# Patient Record
Sex: FEMALE | Race: WHITE | Employment: FULL TIME | URBAN - METROPOLITAN AREA
[De-identification: names, ages, dates, MRNs, and addresses within clinical notes are randomized per-mention and may not be internally consistent; named-entity substitution may affect disease eponyms.]

---

## 2017-02-01 ENCOUNTER — TRANSFERRED RECORDS (OUTPATIENT)
Dept: HEALTH INFORMATION MANAGEMENT | Facility: CLINIC | Age: 52
End: 2017-02-01

## 2017-02-01 LAB — PAP SMEAR - HIM PATIENT REPORTED: NEGATIVE

## 2017-03-16 ENCOUNTER — APPOINTMENT (OUTPATIENT)
Dept: CT IMAGING | Facility: CLINIC | Age: 52
End: 2017-03-16
Attending: EMERGENCY MEDICINE
Payer: COMMERCIAL

## 2017-03-16 ENCOUNTER — HOSPITAL ENCOUNTER (EMERGENCY)
Facility: CLINIC | Age: 52
Discharge: HOME | End: 2017-03-16
Attending: EMERGENCY MEDICINE | Admitting: EMERGENCY MEDICINE
Payer: COMMERCIAL

## 2017-03-16 VITALS
RESPIRATION RATE: 12 BRPM | DIASTOLIC BLOOD PRESSURE: 77 MMHG | WEIGHT: 130 LBS | BODY MASS INDEX: 20.4 KG/M2 | TEMPERATURE: 98.3 F | OXYGEN SATURATION: 100 % | SYSTOLIC BLOOD PRESSURE: 116 MMHG | HEIGHT: 67 IN

## 2017-03-16 DIAGNOSIS — S00.03XA CONTUSION OF SCALP, INITIAL ENCOUNTER: ICD-10-CM

## 2017-03-16 DIAGNOSIS — R55 VASOVAGAL SYNCOPE: ICD-10-CM

## 2017-03-16 DIAGNOSIS — E86.0 DEHYDRATION: ICD-10-CM

## 2017-03-16 LAB
ANION GAP SERPL CALCULATED.3IONS-SCNC: 8 MMOL/L (ref 3–14)
BASOPHILS # BLD AUTO: 0 10E9/L (ref 0–0.2)
BASOPHILS NFR BLD AUTO: 0.2 %
BUN SERPL-MCNC: 11 MG/DL (ref 7–30)
CALCIUM SERPL-MCNC: 10.2 MG/DL (ref 8.5–10.1)
CHLORIDE SERPL-SCNC: 105 MMOL/L (ref 94–109)
CO2 SERPL-SCNC: 25 MMOL/L (ref 20–32)
CREAT SERPL-MCNC: 0.84 MG/DL (ref 0.52–1.04)
DIFFERENTIAL METHOD BLD: NORMAL
EOSINOPHIL # BLD AUTO: 0.1 10E9/L (ref 0–0.7)
EOSINOPHIL NFR BLD AUTO: 1.3 %
ERYTHROCYTE [DISTWIDTH] IN BLOOD BY AUTOMATED COUNT: 12.6 % (ref 10–15)
GFR SERPL CREATININE-BSD FRML MDRD: 72 ML/MIN/1.7M2
GLUCOSE SERPL-MCNC: 119 MG/DL (ref 70–99)
HCT VFR BLD AUTO: 41.4 % (ref 35–47)
HGB BLD-MCNC: 14.1 G/DL (ref 11.7–15.7)
IMM GRANULOCYTES # BLD: 0 10E9/L (ref 0–0.4)
IMM GRANULOCYTES NFR BLD: 0.1 %
LYMPHOCYTES # BLD AUTO: 1.6 10E9/L (ref 0.8–5.3)
LYMPHOCYTES NFR BLD AUTO: 19.4 %
MCH RBC QN AUTO: 29.8 PG (ref 26.5–33)
MCHC RBC AUTO-ENTMCNC: 34.1 G/DL (ref 31.5–36.5)
MCV RBC AUTO: 88 FL (ref 78–100)
MONOCYTES # BLD AUTO: 0.7 10E9/L (ref 0–1.3)
MONOCYTES NFR BLD AUTO: 8.4 %
NEUTROPHILS # BLD AUTO: 5.9 10E9/L (ref 1.6–8.3)
NEUTROPHILS NFR BLD AUTO: 70.6 %
NRBC # BLD AUTO: 0 10*3/UL
NRBC BLD AUTO-RTO: 0 /100
PLATELET # BLD AUTO: 334 10E9/L (ref 150–450)
POTASSIUM SERPL-SCNC: 4.1 MMOL/L (ref 3.4–5.3)
RBC # BLD AUTO: 4.73 10E12/L (ref 3.8–5.2)
SODIUM SERPL-SCNC: 138 MMOL/L (ref 133–144)
WBC # BLD AUTO: 8.4 10E9/L (ref 4–11)

## 2017-03-16 PROCEDURE — 96376 TX/PRO/DX INJ SAME DRUG ADON: CPT

## 2017-03-16 PROCEDURE — 96361 HYDRATE IV INFUSION ADD-ON: CPT

## 2017-03-16 PROCEDURE — 25000128 H RX IP 250 OP 636: Performed by: EMERGENCY MEDICINE

## 2017-03-16 PROCEDURE — 96375 TX/PRO/DX INJ NEW DRUG ADDON: CPT

## 2017-03-16 PROCEDURE — 85025 COMPLETE CBC W/AUTO DIFF WBC: CPT | Performed by: EMERGENCY MEDICINE

## 2017-03-16 PROCEDURE — 96374 THER/PROPH/DIAG INJ IV PUSH: CPT

## 2017-03-16 PROCEDURE — 99285 EMERGENCY DEPT VISIT HI MDM: CPT | Mod: 25

## 2017-03-16 PROCEDURE — 70450 CT HEAD/BRAIN W/O DYE: CPT

## 2017-03-16 PROCEDURE — 80048 BASIC METABOLIC PNL TOTAL CA: CPT | Performed by: EMERGENCY MEDICINE

## 2017-03-16 RX ORDER — ONDANSETRON 2 MG/ML
4 INJECTION INTRAMUSCULAR; INTRAVENOUS ONCE
Status: COMPLETED | OUTPATIENT
Start: 2017-03-16 | End: 2017-03-16

## 2017-03-16 RX ORDER — OXYCODONE HYDROCHLORIDE 5 MG/1
5-10 TABLET ORAL EVERY 4 HOURS PRN
Qty: 15 TABLET | Refills: 0 | Status: SHIPPED | OUTPATIENT
Start: 2017-03-16 | End: 2017-03-30

## 2017-03-16 RX ORDER — ONDANSETRON 4 MG/1
4 TABLET, ORALLY DISINTEGRATING ORAL EVERY 6 HOURS PRN
Qty: 10 TABLET | Refills: 0 | Status: SHIPPED | OUTPATIENT
Start: 2017-03-16 | End: 2017-03-30

## 2017-03-16 RX ORDER — HYDROMORPHONE HYDROCHLORIDE 1 MG/ML
0.5 INJECTION, SOLUTION INTRAMUSCULAR; INTRAVENOUS; SUBCUTANEOUS ONCE
Status: COMPLETED | OUTPATIENT
Start: 2017-03-16 | End: 2017-03-16

## 2017-03-16 RX ADMIN — ONDANSETRON 4 MG: 2 SOLUTION INTRAMUSCULAR; INTRAVENOUS at 10:58

## 2017-03-16 RX ADMIN — HYDROMORPHONE HYDROCHLORIDE 0.5 MG: 1 INJECTION, SOLUTION INTRAMUSCULAR; INTRAVENOUS; SUBCUTANEOUS at 12:15

## 2017-03-16 RX ADMIN — HYDROMORPHONE HYDROCHLORIDE 0.5 MG: 1 INJECTION, SOLUTION INTRAMUSCULAR; INTRAVENOUS; SUBCUTANEOUS at 10:58

## 2017-03-16 RX ADMIN — SODIUM CHLORIDE 1000 ML: 9 INJECTION, SOLUTION INTRAVENOUS at 10:44

## 2017-03-16 ASSESSMENT — ENCOUNTER SYMPTOMS
NAUSEA: 1
ABDOMINAL PAIN: 1
DIARRHEA: 1
HEADACHES: 1
VOMITING: 0

## 2017-03-16 NOTE — ED PROVIDER NOTES
"  History     Chief Complaint:  Loss of Consciousness    HPI   Jyothi Freitas is a 51 year old female who presents following a fall last night. The patient reports that she has had ongoing issues of constipation, which Miralax is typically able to resolve. She began experiencing constipation last week, for which she took laxatives, and thinks she took too many and became dehydrated. She was having abdominal cramping and diarrhea last night.     At 0130, she got up to go to the bathroom, and suffered a syncopal episode. She hit her head and was unconscious for a brief time. She woke up on the bathroom floor with her arm in the toilet. This morning, she has had a diffuse headache, which is especially bad at the top right of her head. She does think she may have had a migraine coming on last night. She describes feeling \"foggy\", with waves of nausea. Denies rib pain, visual disturbances, or vomiting.    Allergies:  Sulfa drugs     Medications:    Xanax  Wellbutrin  Beyaz  Actonel  Alvesco  Prilosec     Past Medical History:    Asthma  Migraine  Osteopenia     Past Surgical History:    Single oophorectomy  TMJ arthroscopic surgery    Family History:    History reviewed.  No significant family history.    Social History:  Relationship status:   Tobacco use: Former smoker (Quit 1997)  Alcohol use: Seldom  The patient presents with her .     Review of Systems   Eyes: Negative for visual disturbance.   Gastrointestinal: Positive for abdominal pain, diarrhea and nausea. Negative for vomiting.   Musculoskeletal:        Negative for rib pain.   Neurological: Positive for syncope and headaches.   All other systems reviewed and are negative.      Physical Exam   First Vitals:  BP: 134/69  Heart Rate: 61  Temp: 98.3  F (36.8  C)  Resp: 12  Height: 170.2 cm (5' 7\")  Weight: 59 kg (130 lb)  SpO2: 100 %    Physical Exam  Nursing note and vitals reviewed.  Constitutional:  Appears well-developed and well-nourished, " comfortable.   HENT:    No hemotympanum. Ecchymosis on top of left ear.  Head:   Tenderness but no swelling over anterior left parietal scalp.  Nose:    Nose normal. No drainage from the nose.  Mouth/Throat:  Mucosa is moist.  Eyes:    Conjunctivae are normal.      Pupils are equal, round, and reactive to light.      Right eye exhibits no discharge. Left eye exhibits no discharge.      No scleral icterus.   Cardiovascular:  Normal rate, regular rhythm.      Normal heart sounds and intact distal pulses.       No murmur heard.  Pulmonary/Chest:  Effort normal and breath sounds normal. No respiratory distress.     No wheezes. No rales. No chest wall tenderness. No stridor.   Abdominal:   Soft. No distension and no mass. No tenderness.      No rebound and no guarding. No flank pain.  Musculoskeletal:  Full range of motion.     No tenderness in extremities.     No edema and no tenderness.                                       Neck supple. Paraspinous neck tenderness. No midline tenderness.   Neurological:   Alert and oriented to person, place, and year.      No cranial nerve deficit.      Exhibits normal muscle tone. Coordination normal.      GCS eye subscore is 4. GCS verbal subscore is 5.      GCS motor subscore is 6.   Skin:    Skin is warm and dry. No rash noted. No diaphoresis.      No erythema. No pallor.   Psychiatric:   Behavior is normal. Judgment and thought content normal.       Emergency Department Course     Imaging:  Radiographic findings were communicated with the patient who voiced understanding of the findings.    Head CT, without contrast, per radiology:   No bleed or fracture identified.      Laboratory:  CBC: WNL (WBC 8.4, HGB 14.1, )  BMP: Glucose 119 (H), Calcium 10.2, o/w WNL (Creatinine 0.84)    Interventions:  1054: Normal Saline, 1000 mL, IV  1058: Zofran, 4 mg, IV injection  1058: Dilaudid, 0.5 mg, IV injection  1215: Dilaudid, 0.5 mg, IV injection    Emergency Department  Course:  Nursing notes and vitals reviewed.  I performed an exam of the patient as documented above.  The above workup was undertaken.  1230: I rechecked the patient and discussed results.    Findings and plan explained to the Patient. Patient discharged home, status improved, with instructions regarding supportive care, medications, and reasons to return as well as the importance of close follow-up was reviewed.      Impression & Plan      Medical Decision Making:  Jyothi Freitas is a 51 year old female who comes in after a syncopal spell last night. She had been constipated, taken a lot of laxative, and felt dehydrated. She got up to go to bathroom and passed out. She did hit her head and remembers everything. Her scalp hurts. She has a bit of a migraine type headache. She was given a 0.5 mg of dilaudid, which was repeated. Migraine completely gone. Still has scalp tenderness. CT is normal. Labs also are normal with normal BMP other than calcium at 10.2, slightly elevated. Glucose 119, CBC is normal. Patient was likely dehydrated and had a vasovagal spell, causing the syncope. She is comfortable going home at this point. I am going to have her take it easy, but can always follow up and return if worsening. She was tolerating liquids and fluids here. She was orthostatic when she came in, received 1 L of fluids, and will now push fluids at home. I did speak with her about something for pain, and that can constipate, so she will take Miralax with this, and make sure she is drinking fluids. If she has further syncopal spells she will need further workup as well.    Diagnosis:    ICD-10-CM   1. Vasovagal syncope R55   2. Contusion of scalp, initial encounter S00.03XA   3. Dehydration E86.0     Disposition:  Take it easy for a few days, ice to your scalp, Motrin, Tylenol and or Roxicodone as needed for pain.  Zofran as needed for nausea.  Push fluids, Miralax 2-3 times a day.  Recheck in the clinic next week if you aren't  improving, return to the ER sooner if you get worse with severe headache, vomiting, confusion, etc.    Discharge Medications:   ONDANSETRON (ZOFRAN ODT) 4 MG ODT TAB    Take 1 tablet (4 mg) by mouth every 6 hours as needed for nausea    OXYCODONE (ROXICODONE) 5 MG IR TABLET    Take 1-2 tablets (5-10 mg) by mouth every 4 hours as needed for moderate to severe pain     IDarren, am serving as a scribe on 3/16/2017 at 10:36 AM to personally document services performed by Danyelle Raymundo MD, based on my observations and the provider's statements to me.   EMERGENCY DEPARTMENT       Danyelle Raymundo MD  03/16/17 1700

## 2017-03-16 NOTE — ED AVS SNAPSHOT
Emergency Department    [address] [facility] [address]    Phone:  [phone]    Fax:  [phone]                                       Jyothi Freitas   MRN: [id number]    Department:   Emergency Department   Date of Visit:  3/16/2017           Patient Information     Date Of Birth          1965        Your diagnoses for this visit were:     Vasovagal syncope     Contusion of scalp, initial encounter     Dehydration        You were seen by Danyelle Raymundo MD.      Follow-up Information     Schedule an appointment as soon as possible for a visit with Peter Franklin MD.    Specialty:  Internal Medicine    Why:  As needed, If symptoms worsen    Contact information:    [facility]  [address] [address]  [phone]          Discharge Instructions       Take it easy for a few days, ice to your scalp, Motrin, Tylenol and or Roxicodone as needed for pain.  Zofran as needed for nausea.  Push fluids, Miralax 2-3 times a day.  Recheck in the clinic next week if you aren't improving, return to the ER sooner if you get worse with severe headache, vomiting, confusion, etc.    Discharge Instructions  Head Injury    You have been seen today for a head injury. You were checked for serious problems, like bleeding on the brain, but these problems cannot always be found right away.  Due to this risk, you should not be alone for 24 hours after your injury.  Follow up with your regular physician in 7 days as needed. If you are taking a blood thinner, such as aspirin, Pradaxa  (dabigatran), Coumadin  (warfarin), or Plavix  (clopidogrel), you are at especially high risk for immediate or delayed bleeding, and need to re-check with a physician in 24 hours, or sooner if any of the symptoms below happen.     Return to the Emergency Department if:    You are confused, have amnesia, or you are not acting right.    Your headache gets worse or you start to have a really  bad headache even with your recommended treatment plan.    You vomit more than once.    You have a convulsion or seizure.    You have trouble walking.    You have weakness or paralysis in an arm or a leg.    You have blood or fluid coming from your ears or nose.    You have new symptoms or anything that worries you.    Sleeping:  It is okay for you to sleep, but someone should wake you up as instructed by your doctor, and someone should check on you at your usual time to wake up.     Activity:    Do not drive for at least 24 hours.    Do not drive if you have dizzy spells or trouble concentrating, or remembering things.    Do not return to any contact sports until cleared by your regular doctor.     Follow-up:  It is very important that you make an appointment with your clinic and go to the appointment.  If you do not follow-up with your regular doctor, it may result in missing an important development which could result in permanent injury or disability and/or lasting pain.  If there is any problem keeping your appointment, call your doctor or return to the Emergency Department.    MORE INFORMATION:    Concussion:  A concussion is a minor head injury that may cause temporary problems with the way your brain works.  Some symptoms include:  confusion, amnesia, nausea and vomiting, dizziness, fatigue, memory or concentration problems, irritability and sleep problems.    CT Scans: Your evaluation today may have included a CT scan (CAT scan) to look for things like bleeding or a skull fracture (break).  CT scans involve radiation and too many CT scans can cause serious health problems like cancer, especially in children.  Because of this, your doctor may not have ordered a CT scan today if they think you are at low risk for a serious or life threatening problem.    If you were given a prescription for medicine here today, be sure to read all of the information (including the package insert) that comes with your  prescription.  This will include important information about the medicine, its side effects, and any warnings that you need to know about.  The pharmacist who fills the prescription can provide more information and answer questions you may have about the medicine.  If you have questions or concerns that the pharmacist cannot address, please call or return to the Emergency Department.     Opioid Medication Information    Pain medications are among the most commonly prescribed medicines, so we are including this information for all our patients. If you did not receive pain medication or get a prescription for pain medicine, you can ignore it.     You may have been given a prescription for an opioid (narcotic) pain medicine and/or have received a pain medicine while here in the Emergency Department. These medicines can make you drowsy or impaired. You must not drive, operate dangerous equipment, or engage in any other dangerous activities while taking these medications. If you drive while taking these medications, you could be arrested for DUI, or driving under the influence. Do not drink any alcohol while you are taking these medications.     Opioid pain medications can cause addiction. If you have a history of chemical dependency of any type, you are at a higher risk of becoming addicted to pain medications.  Only take these prescribed medications to treat your pain when all other options have been tried. Take it for as short a time and as few doses as possible. Store your pain pills in a secure place, as they are frequently stolen and provide a dangerous opportunity for children or visitors in your house to start abusing these powerful medications. We will not replace any lost or stolen medicine.  As soon as your pain is better, you should flush all your remaining medication.     Many prescription pain medications contain Tylenol  (acetaminophen), including Vicodin , Tylenol #3 , Norco , Lortab , and Percocet .  You  should not take any extra pills of Tylenol  if you are using these prescription medications or you can get very sick.  Do not ever take more than 3000 mg of acetaminophen in any 24 hour period.    All opioids tend to cause constipation. Drink plenty of water and eat foods that have a lot of fiber, such as fruits, vegetables, prune juice, apple juice and high fiber cereal.  Take a laxative if you don t move your bowels at least every other day. Miralax , Milk of Magnesia, Colace , or Senna  can be used to keep you regular.      Remember that you can always come back to the Emergency Department if you are not able to see your regular doctor in the amount of time listed above, if you get any new symptoms, or if there is anything that worries you.            Future Appointments        Provider Department Dept Phone Center    3/21/2017 8:00 AM Peter Franklin MD [facility] [phone]       24 Hour Appointment Hotline       To make an appointment at any [facility], call [phone] ([phone]). If you don't have a family doctor or clinic, we will help you find one. [facility] are conveniently located to serve the needs of you and your family.             Review of your medicines      START taking        Dose / Directions Last dose taken    ondansetron 4 MG ODT tab   Commonly known as:  ZOFRAN ODT   Dose:  4 mg   Quantity:  10 tablet        Take 1 tablet (4 mg) by mouth every 6 hours as needed for nausea   Refills:  0        oxyCODONE 5 MG IR tablet   Commonly known as:  ROXICODONE   Dose:  5-10 mg   Quantity:  15 tablet        Take 1-2 tablets (5-10 mg) by mouth every 4 hours as needed for moderate to severe pain   Refills:  0          Our records show that you are taking the medicines listed below. If these are incorrect, please call your family doctor or clinic.        Dose / Directions Last dose taken    ACTONEL 150 MG tablet   Generic drug:  RISEdronate        Take  by mouth every 30  days.   Refills:  0        ALPRAZolam 0.25 MG tablet   Commonly known as:  XANAX   Dose:  0.25 mg   Quantity:  10 tablet        Take 1 tablet by mouth 3 times daily as needed for anxiety.   Refills:  0        ALVESCO 80 MCG/ACT inhaler   Generic drug:  ciclesonide        Inhale  into the lungs 2 times daily.   Refills:  0        BEYAZ 3-0.02-0.451 MG Tabs   Generic drug:  Drospiren-Eth Estrad-Levomefol        Take  by mouth daily.   Refills:  0        PRILOSEC PO        Take  by mouth.   Refills:  0        * buPROPion 150 MG 24 hr tablet   Commonly known as:  WELLBUTRIN XL   Dose:  150 mg   Quantity:  180 tablet        Take 1 tablet by mouth 2 times daily.   Refills:  3        * WELLBUTRIN  MG 24 hr tablet   Dose:  300 mg   Generic drug:  buPROPion        Take 300 mg by mouth daily.   Refills:  0        * Notice:  This list has 2 medication(s) that are the same as other medications prescribed for you. Read the directions carefully, and ask your doctor or other care provider to review them with you.            Prescriptions were sent or printed at these locations (2 Prescriptions)                   Other Prescriptions                Printed at Department/Unit printer (2 of 2)         oxyCODONE (ROXICODONE) 5 MG IR tablet               ondansetron (ZOFRAN ODT) 4 MG ODT tab                Procedures and tests performed during your visit     Basic metabolic panel    CBC with platelets + differential    Head CT w/o contrast    Orthostatic blood pressure and pulse      Orders Needing Specimen Collection     Ordered          03/16/17 1024  UA reflex to Microscopic - STAT, Pri[facility]: STAT, Needs to be Collected     Scheduled Task Status   03/16/17 1025 Collect UA reflex to Microscopic Open   Order Class:  PCU Collect                  Pending Results     No orders found from 3/14/2017 to 3/17/2017.            Pending Culture Results     No orders found from 3/14/2017 to 3/17/2017.             Test Results from your hospital  stay     3/16/2017 11:00 AM - Interface, Flexilab Results      Component Results     Component Value Ref Range & Units Status    WBC 8.4 4.0 - 11.0 10e9/L Final    RBC Count 4.73 3.8 - 5.2 10e12/L Final    Hemoglobin 14.1 11.7 - 15.7 g/dL Final    Hematocrit 41.4 35.0 - 47.0 % Final    MCV 88 78 - 100 fl Final    MCH 29.8 26.5 - 33.0 pg Final    MCHC 34.1 31.5 - 36.5 g/dL Final    RDW 12.6 10.0 - 15.0 % Final    Platelet Count 334 150 - 450 10e9/L Final    Diff Method Automated Method  Final    % Neutrophils 70.6 % Final    % Lymphocytes 19.4 % Final    % Monocytes 8.4 % Final    % Eosinophils 1.3 % Final    % Basophils 0.2 % Final    % Immature Granulocytes 0.1 % Final    Nucleated RBCs 0 0 /100 Final    Absolute Neutrophil 5.9 1.6 - 8.3 10e9/L Final    Absolute Lymphocytes 1.6 0.8 - 5.3 10e9/L Final    Absolute Monocytes 0.7 0.0 - 1.3 10e9/L Final    Absolute Eosinophils 0.1 0.0 - 0.7 10e9/L Final    Absolute Basophils 0.0 0.0 - 0.2 10e9/L Final    Abs Immature Granulocytes 0.0 0 - 0.4 10e9/L Final    Absolute Nucleated RBC 0.0  Final         3/16/2017 11:13 AM - Interface, Flexilab Results      Component Results     Component Value Ref Range & Units Status    Sodium 138 133 - 144 mmol/L Final    Potassium 4.1 3.4 - 5.3 mmol/L Final    Chloride 105 94 - 109 mmol/L Final    Carbon Dioxide 25 20 - 32 mmol/L Final    Anion Gap 8 3 - 14 mmol/L Final    Glucose 119 (H) 70 - 99 mg/dL Final    Urea Nitrogen 11 7 - 30 mg/dL Final    Creatinine 0.84 0.52 - 1.04 mg/dL Final    GFR Estimate 72 >60 mL/min/1.7m2 Final    Non  GFR Calc    GFR Estimate If Black 87 >60 mL/min/1.7m2 Final    African American GFR Calc    Calcium 10.2 (H) 8.5 - 10.1 mg/dL Final         3/16/2017 11:48 AM - Interface, Radiant Ib      Narrative     CT HEAD W/O CONTRAST   3/16/2017 11:37 AM     HISTORY: fall, hit head last pm left parietal scalp, headache    TECHNIQUE:  Axial images of the head without IV contrast material.  Radiation  dose for this scan was reduced using automated exposure  control, adjustment of the mA and/or kV according to patient size, or  iterative reconstruction technique.    COMPARISON: None.    FINDINGS: The ventricles are normal in size, shape and configuration.  The brain parenchyma and subarachnoid spaces are normal. There is no  evidence of intracranial hemorrhage, mass, acute infarct or anomaly.  The visualized portions of the sinuses and mastoids appear normal. No  intracranial bleed or skull fractures are identified..        Impression     IMPRESSION:  No bleed or fracture identified.    IRINEO AVALOS MD                Clinical Quality Measure: Blood Pressure Screening     Your blood pressure was checked while you were in the emergency department today. The last reading we obtained was  BP: 134/69 . Please read the guidelines below about what these numbers mean and what you should do about them.  If your systolic blood pressure (the top number) is less than 120 and your diastolic blood pressure (the bottom number) is less than 80, then your blood pressure is normal. There is nothing more that you need to do about it.  If your systolic blood pressure (the top number) is 120-139 or your diastolic blood pressure (the bottom number) is 80-89, your blood pressure may be higher than it should be. You should have your blood pressure rechecked within a year by a primary care provider.  If your systolic blood pressure (the top number) is 140 or greater or your diastolic blood pressure (the bottom number) is 90 or greater, you may have high blood pressure. High blood pressure is treatable, but if left untreated over time it can put you at risk for heart attack, stroke, or kidney failure. You should have your blood pressure rechecked by a primary care provider within the next 4 weeks.  If your provider in the emergency department today gave you specific instructions to follow-up with your doctor or provider even sooner than  "that, you should follow that instruction and not wait for up to 4 weeks for your follow-up visit.        Thank you for choosing [facility]       Thank you for choosing [facility] for your care. Our goal is always to provide you with excellent care. Hearing back from our patients is one way we can continue to improve our services. Please take a few minutes to complete the written survey that you may receive in the mail after you visit with us. Thank you!        [employer] Information     [employer] lets you send messages to your doctor, view your test results, renew your prescriptions, schedule appointments and more. To sign up, go to [email or web address] . Click on \"Log in\" on the left side of the screen, which will take you to the Welcome page. Then click on \"Sign up Now\" on the right side of the page.     You will be asked to enter the access code listed below, as well as some personal information. Please follow the directions to create your username and password.     Your access code is: [email or web address]  Expires: 2017 12:39 PM     Your access code will  in 90 days. If you need help or a new code, please call your [facility] clinic or [phone].        Care EveryWhere ID     This is your Care EveryWhere ID. This could be used by other organizations to access your [facility] medical records  [id number]        After Visit Summary       This is your record. Keep this with you and show to your community pharmacist(s) and doctor(s) at your next visit.                  "

## 2017-03-16 NOTE — DISCHARGE INSTRUCTIONS
Take it easy for a few days, ice to your scalp, Motrin, Tylenol and or Roxicodone as needed for pain.  Zofran as needed for nausea.  Push fluids, Miralax 2-3 times a day.  Recheck in the clinic next week if you aren't improving, return to the ER sooner if you get worse with severe headache, vomiting, confusion, etc.    Discharge Instructions  Head Injury    You have been seen today for a head injury. You were checked for serious problems, like bleeding on the brain, but these problems cannot always be found right away.  Due to this risk, you should not be alone for 24 hours after your injury.  Follow up with your regular physician in 7 days as needed. If you are taking a blood thinner, such as aspirin, Pradaxa  (dabigatran), Coumadin  (warfarin), or Plavix  (clopidogrel), you are at especially high risk for immediate or delayed bleeding, and need to re-check with a physician in 24 hours, or sooner if any of the symptoms below happen.     Return to the Emergency Department if:    You are confused, have amnesia, or you are not acting right.    Your headache gets worse or you start to have a really bad headache even with your recommended treatment plan.    You vomit more than once.    You have a convulsion or seizure.    You have trouble walking.    You have weakness or paralysis in an arm or a leg.    You have blood or fluid coming from your ears or nose.    You have new symptoms or anything that worries you.    Sleeping:  It is okay for you to sleep, but someone should wake you up as instructed by your doctor, and someone should check on you at your usual time to wake up.     Activity:    Do not drive for at least 24 hours.    Do not drive if you have dizzy spells or trouble concentrating, or remembering things.    Do not return to any contact sports until cleared by your regular doctor.     Follow-up:  It is very important that you make an appointment with your clinic and go to the appointment.  If you do not  follow-up with your regular doctor, it may result in missing an important development which could result in permanent injury or disability and/or lasting pain.  If there is any problem keeping your appointment, call your doctor or return to the Emergency Department.    MORE INFORMATION:    Concussion:  A concussion is a minor head injury that may cause temporary problems with the way your brain works.  Some symptoms include:  confusion, amnesia, nausea and vomiting, dizziness, fatigue, memory or concentration problems, irritability and sleep problems.    CT Scans: Your evaluation today may have included a CT scan (CAT scan) to look for things like bleeding or a skull fracture (break).  CT scans involve radiation and too many CT scans can cause serious health problems like cancer, especially in children.  Because of this, your doctor may not have ordered a CT scan today if they think you are at low risk for a serious or life threatening problem.    If you were given a prescription for medicine here today, be sure to read all of the information (including the package insert) that comes with your prescription.  This will include important information about the medicine, its side effects, and any warnings that you need to know about.  The pharmacist who fills the prescription can provide more information and answer questions you may have about the medicine.  If you have questions or concerns that the pharmacist cannot address, please call or return to the Emergency Department.     Opioid Medication Information    Pain medications are among the most commonly prescribed medicines, so we are including this information for all our patients. If you did not receive pain medication or get a prescription for pain medicine, you can ignore it.     You may have been given a prescription for an opioid (narcotic) pain medicine and/or have received a pain medicine while here in the Emergency Department. These medicines can make you  drowsy or impaired. You must not drive, operate dangerous equipment, or engage in any other dangerous activities while taking these medications. If you drive while taking these medications, you could be arrested for DUI, or driving under the influence. Do not drink any alcohol while you are taking these medications.     Opioid pain medications can cause addiction. If you have a history of chemical dependency of any type, you are at a higher risk of becoming addicted to pain medications.  Only take these prescribed medications to treat your pain when all other options have been tried. Take it for as short a time and as few doses as possible. Store your pain pills in a secure place, as they are frequently stolen and provide a dangerous opportunity for children or visitors in your house to start abusing these powerful medications. We will not replace any lost or stolen medicine.  As soon as your pain is better, you should flush all your remaining medication.     Many prescription pain medications contain Tylenol  (acetaminophen), including Vicodin , Tylenol #3 , Norco , Lortab , and Percocet .  You should not take any extra pills of Tylenol  if you are using these prescription medications or you can get very sick.  Do not ever take more than 3000 mg of acetaminophen in any 24 hour period.    All opioids tend to cause constipation. Drink plenty of water and eat foods that have a lot of fiber, such as fruits, vegetables, prune juice, apple juice and high fiber cereal.  Take a laxative if you don t move your bowels at least every other day. Miralax , Milk of Magnesia, Colace , or Senna  can be used to keep you regular.      Remember that you can always come back to the Emergency Department if you are not able to see your regular doctor in the amount of time listed above, if you get any new symptoms, or if there is anything that worries you.

## 2017-03-16 NOTE — ED AVS SNAPSHOT
Emergency Department    [address] [place] [address]    Phone:  [phone]    Fax:  [phone]                                       Jyothi Freitas   MRN: [id number]    Department:   Emergency Department   Date of Visit:  3/16/2017           After Visit Summary Signature Page     I have received my discharge instructions, and my questions have been answered. I have discussed any challenges I see with this plan with the nurse or doctor.    ..........................................................................................................................................  Patient/Patient Representative Signature      ..........................................................................................................................................  Patient Representative Print Name and Relationship to Patient    ..................................................               ................................................  Date                                            Time    ..........................................................................................................................................  Reviewed by Signature/Title    ...................................................              ..............................................  Date                                                            Time

## 2017-03-16 NOTE — ED NOTES
Bed: ED15  Expected date:   Expected time:   Means of arrival:   Comments:  Zackary Freitas will see

## 2017-03-29 NOTE — PROGRESS NOTES
SUBJECTIVE:                                                    Jyothi Freitas is a 51 year old female who presents to clinic today for the following health issues:      ED/UC Followup:    Facility:  [facility]  Date of visit: 3/16/17  Reason for visit:   Current Status: syncope, fall     Jyothi Freitas is a 51 year old female who presents for lots of issues.  I saw her last over 4 years ago.      1. Follow up syncope, as noted in er, notes and labs reviewed.  Patient had constipation and took laxatives and then diarrhea middle of the night and with that nausea, sweats and then syncope.  NO h/o heart dz, had been fine before, can work out without diff, no chest pain or shortness of breath, no palp but for years occasionally feels heart beating, no dizziness.  Since no syncope or dizziness.      2. Concussion, due to above, hit her head, at first memory issues and some confusion but that is better, still  Some short term memory issues, headache, but is better.  At times can mix up words.  No motor or gait or speech issues.  No n/v    3. Left ear pain off and on few weeks, had uri, that is gone, right ear fine.  No uri now, no fever.  Feels plugged. Did get ab but that has not helped.    4. Elevated blood pressure, does not check it reg, 2nd fine for guy    5. Follow up migraines, has seen neuro in past, but not now, has ha's for years and on nadolol and significantly better, no issues lately    6. Asthma, since childhood, using advair for long time, no need for albuterol, no exacerbations or shortness of breath.    She otherwise feels fine, had cpx [place] 3/16 and normal colon per patient, up to date pap and mammogram recently done.                Past Medical History:      Past Medical History:   Diagnosis Date     Intermittent asthma     since childhood     Migraines     most of adult life, has seen neuro in past, on bblocker     Normal stress echocardiogram July 2011    due to hear racing     Osteopenia 2008             Past  Surgical History:      Past Surgical History:   Procedure Laterality Date     C TMJ ARTHROSCOPY/SURGERY       single oopherectomy  2010    cyst             Social History:     Social History     Social History     Marital status:      Spouse name: N/A     Number of children: 2     Years of education: N/A     Occupational History      Unemployed     Social History Main Topics     Smoking status: Former Smoker     Quit date: 3/27/1997     Smokeless tobacco: Not on file     Alcohol use Yes      Comment: rarely     Drug use: No     Sexual activity: Not on file     Other Topics Concern     Not on file     Social History Narrative             Family History:   reviewed         Allergies:     Allergies   Allergen Reactions     Sulfa Drugs      Red face. Ringing in the ears.             Medications:     Current Outpatient Prescriptions   Medication Sig Dispense Refill     NADOLOL PO Take 20 mg by mouth daily       fluticasone-salmeterol (ADVAIR DISKUS) 100-50 MCG/DOSE diskus inhaler Inhale 1 puff into the lungs 2 times daily       fluticasone (FLOVENT DISKUS) 100 MCG/BLIST AEPB Inhale 1 puff into the lungs 2 times daily 3 Inhaler 3     albuterol (PROAIR HFA/PROVENTIL HFA/VENTOLIN HFA) 108 (90 BASE) MCG/ACT Inhaler Inhale 2 puffs into the lungs every 6 hours as needed for shortness of breath / dyspnea or wheezing 1 Inhaler 0     Drospiren-Eth Estrad-Levomefol (BEYAZ) 3-0.02-0.451 MG TABS Take  by mouth daily.       buPROPion (WELLBUTRIN XL) 300 MG 24 hr tablet Take 300 mg by mouth daily.                 Review of Systems:   The 10 point Review of Systems is negative other than noted in the HPI           Physical Exam:   Blood pressure 124/80, pulse 77, temperature 98.2  F (36.8  C), temperature source Oral, weight 130 lb (59 kg), SpO2 100 %, not currently breastfeeding.    Exam:  Constitutional: healthy appearing, alert and in no distress  Heent: Normocephalic. Head without obvious masses or lesions. PERRLDC, EOMI.  Mouth exam within normal limits: tongue, mucous membranes, posterior pharynx all normal, no lesions or abnormalities seen.  Tm's and canals within normal limits bilaterally. Neck supple, no nuchal rigidity or masses. No supraclavicular, or cervical adenopathy. Thyroid symmetric, no masses.  Cardiovascular: Regular rate and rhythm, no murmer, rub or gallops.  JVP not elevated, no edema.  Carotids within normal limits bilaterally, no bruits.  Respiratory: Normal respiratory effort.  Lungs clear, normal flow, no wheezing or crackles.  Gastrointestinal: Normal active bowel sounds.   Soft, not tender, no masses, guarding or rebound.  No hepatosplenomegaly.   Musculoskeletal: extremities normal, no gross deformities noted.  Skin: no suspicious lesions or rashes   Neurologic: Mental status within normal limits.  Speech fluent.  No gross motor abnormalities and gait intact.  Cn within normal limits, gait within normal limits.  Psychiatric: mentation appears normal and affect normal.         Data:   noted        Assessment:   1. Syncope, most c/w vasovagal, doubt ischemia, cmyop, neuro event, sepsis, gi bleed, to be safe will check echo  2. Probable concussion, is improving, if not resolving soon to call  3. Left ear pain, neg exam, to ent if persits  4. Migraines, controlled  5. Asthma, d/w patient step down therapy, will try that, change to flovent and prn albuterol, if not well controlled call.  If controlled consider going off flovent  6. Health care maintenance  7. Elevated sugar, calcium in er, will get follow up fasting labs         Plan:   Pneumovax, she left before could do it, do next time  Return to clinic for follow up labs  Echocardiogram  Change advair to flovent, prn albuterol, call if not effective  Up to date mammogram, pap and colon  Call if problems      Peter Franklin M.D.

## 2017-03-30 ENCOUNTER — OFFICE VISIT (OUTPATIENT)
Dept: FAMILY MEDICINE | Facility: CLINIC | Age: 52
End: 2017-03-30
Payer: COMMERCIAL

## 2017-03-30 ENCOUNTER — TELEPHONE (OUTPATIENT)
Dept: FAMILY MEDICINE | Facility: CLINIC | Age: 52
End: 2017-03-30

## 2017-03-30 VITALS
OXYGEN SATURATION: 100 % | SYSTOLIC BLOOD PRESSURE: 124 MMHG | BODY MASS INDEX: 20.36 KG/M2 | WEIGHT: 130 LBS | HEART RATE: 77 BPM | DIASTOLIC BLOOD PRESSURE: 80 MMHG | TEMPERATURE: 98.2 F

## 2017-03-30 DIAGNOSIS — E83.52 SERUM CALCIUM ELEVATED: ICD-10-CM

## 2017-03-30 DIAGNOSIS — R73.9 ELEVATED BLOOD SUGAR: ICD-10-CM

## 2017-03-30 DIAGNOSIS — R55 SYNCOPE, UNSPECIFIED SYNCOPE TYPE: Primary | ICD-10-CM

## 2017-03-30 DIAGNOSIS — J45.20 INTERMITTENT ASTHMA, UNCOMPLICATED: ICD-10-CM

## 2017-03-30 PROCEDURE — 99204 OFFICE O/P NEW MOD 45 MIN: CPT | Performed by: INTERNAL MEDICINE

## 2017-03-30 RX ORDER — ALBUTEROL SULFATE 90 UG/1
2 AEROSOL, METERED RESPIRATORY (INHALATION) EVERY 6 HOURS PRN
Qty: 1 INHALER | Refills: 0 | Status: SHIPPED | OUTPATIENT
Start: 2017-03-30 | End: 2017-04-25

## 2017-03-30 NOTE — PATIENT INSTRUCTIONS
Come back for the fasting labs    If the heart clinic does not call to do the echo in the next 3 days let me know    Try changing advair to flovent with albuterol as needed for acute events.  If this does not work let me know.    Peter Franklin M.D.

## 2017-03-30 NOTE — TELEPHONE ENCOUNTER
Please call patient, forgot to give her pneumovax while her, please be sure to do when comes back for labs    Thanks    Peter Franklin M.D.

## 2017-03-30 NOTE — MR AVS SNAPSHOT
After Visit Summary   3/30/2017    Jyothi Freitas    MRN: [id number]           Patient Information     Date Of Birth          1965        Visit Information        Provider Department      3/30/2017 2:00 PM Peter Franklin MD [facility]        Today's Diagnoses     Syncope, unspecified syncope type    -  1    Intermittent asthma, uncomplicated        Elevated blood sugar        Serum calcium elevated          Care Instructions    Come back for the fasting labs    If the heart clinic does not call to do the echo in the next 3 days let me know    Try changing advair to flovent with albuterol as needed for acute events.  If this does not work let me know.    Peter Franklin M.D.          Follow-ups after your visit        Future tests that were ordered for you today     Open Future Orders        Priority Expected Expires Ordered    Echocardiogram Complete Routine  3/30/2018 3/30/2017    Glucose Routine  7/7/2017 3/30/2017    Hemoglobin A1c Routine  9/26/2017 3/30/2017    Calcium ionized Routine  3/30/2018 3/30/2017            Who to contact     If you have questions or need follow up information about today's clinic visit or your schedule please contact [facility] directly at [phone].  Normal or non-critical lab and imaging results will be communicated to you by MyChart, letter or phone within 4 business days after the clinic has received the results. If you do not hear from us within 7 days, please contact the clinic through MyChart or phone. If you have a critical or abnormal lab result, we will notify you by phone as soon as possible.  Submit refill requests through [employer] or call your pharmacy and they will forward the refill request to us. Please allow 3 business days for your refill to be completed.          Additional Information About Your Visit        MyChart Information     [employer] lets you send messages to your doctor, view your test results, renew your  "prescriptions, schedule appointments and more. To sign up, go to [email or web address] . Click on \"Log in\" on the left side of the screen, which will take you to the Welcome page. Then click on \"Sign up Now\" on the right side of the page.     You will be asked to enter the access code listed below, as well as some personal information. Please follow the directions to create your username and password.     Your access code is: [email or web address]  Expires: 2017 12:39 PM     Your access code will  in 90 days. If you need help or a new code, please call your [facility] clinic or [phone].        Care EveryWhere ID     This is your Care EveryWhere ID. This could be used by other organizations to access your [facility] medical records  [id number]        Your Vitals Were     Pulse Temperature Pulse Oximetry Breastfeeding? BMI (Body Mass Index)       77 98.2  F (36.8  C) (Oral) 100% No 20.36 kg/m2        Blood Pressure from Last 3 Encounters:   17 124/80   17 116/77   04/10/12 128/84    Weight from Last 3 Encounters:   17 130 lb (59 kg)   17 130 lb (59 kg)   04/10/12 129 lb (58.5 kg)                 Today's Medication Changes          These changes are accurate as of: 3/30/17  2:39 PM.  If you have any questions, ask your nurse or doctor.               Start taking these medicines.        Dose/Directions    albuterol 108 (90 BASE) MCG/ACT Inhaler   Commonly known as:  PROAIR HFA/PROVENTIL HFA/VENTOLIN HFA   Used for:  Syncope, unspecified syncope type, Intermittent asthma, uncomplicated   Started by:  Peter Franklin MD        Dose:  2 puff   Inhale 2 puffs into the lungs every 6 hours as needed for shortness of breath / dyspnea or wheezing   Quantity:  1 Inhaler   Refills:  0       fluticasone 100 MCG/BLIST Aepb   Commonly known as:  FLOVENT DISKUS   Used for:  Intermittent asthma, uncomplicated   Started by:  Peter Franklin MD        Dose:  1 puff   Inhale 1 puff into the " lungs 2 times daily   Quantity:  3 Inhaler   Refills:  3         These medicines have changed or have updated prescriptions.        Dose/Directions    WELLBUTRIN  MG 24 hr tablet   This may have changed:  Another medication with the same name was removed. Continue taking this medication, and follow the directions you see here.   Generic drug:  buPROPion   Changed by:  Peter Franklin MD        Dose:  300 mg   Take 300 mg by mouth daily.   Refills:  0         Stop taking these medicines if you haven't already. Please contact your care team if you have questions.     ACTONEL 150 MG tablet   Generic drug:  RISEdronate   Stopped by:  Peter Franklin MD           ALPRAZolam 0.25 MG tablet   Commonly known as:  XANAX   Stopped by:  Peter Franklin MD           ALVESCO 80 MCG/ACT inhaler   Generic drug:  ciclesonide   Stopped by:  Peter Franklin MD           ondansetron 4 MG ODT tab   Commonly known as:  ZOFRAN ODT   Stopped by:  Peter Franklin MD           oxyCODONE 5 MG IR tablet   Commonly known as:  ROXICODONE   Stopped by:  Peter Franklin MD           PRILOSEC PO   Stopped by:  Peter Franklin MD                Where to get your medicines      These medications were sent to [facility] [address] [place] [address], [place] [address] [address] [facility] [address]  [address]  [facility] [address] [place] [address]     Phone:  [phone]     albuterol 108 (90 BASE) MCG/ACT Inhaler    fluticasone 100 MCG/BLIST Aepb                Primary Care Provider Office Phone # Fax #    Peter Franklin [phone]       [facility] [address]  [place] [address]        Thank you!     Thank you for choosing [facility]  for your care. Our goal is always to provide you with excellent care. Hearing back from our patients is one way we can continue to improve our services. Please take a few minutes to complete the written survey that you  may receive in the mail after your visit with us. Thank you!             Your Updated Medication List - Protect others around you: Learn how to safely use, store and throw away your medicines at www.disposemymeds.org.          This list is accurate as of: 3/30/17  2:39 PM.  Always use your most recent med list.                   Brand Name Dispense Instructions for use    ADVAIR DISKUS 100-50 MCG/DOSE diskus inhaler   Generic drug:  fluticasone-salmeterol      Inhale 1 puff into the lungs 2 times daily       albuterol 108 (90 BASE) MCG/ACT Inhaler    PROAIR HFA/PROVENTIL HFA/VENTOLIN HFA    1 Inhaler    Inhale 2 puffs into the lungs every 6 hours as needed for shortness of breath / dyspnea or wheezing       BEYAZ 3-0.02-0.451 MG Tabs   Generic drug:  Drospiren-Eth Estrad-Levomefol      Take  by mouth daily.       fluticasone 100 MCG/BLIST Aepb    FLOVENT DISKUS    3 Inhaler    Inhale 1 puff into the lungs 2 times daily       NADOLOL PO      Take 20 mg by mouth daily       WELLBUTRIN  MG 24 hr tablet   Generic drug:  buPROPion      Take 300 mg by mouth daily.

## 2017-03-30 NOTE — Clinical Note
Please abstract:  1. Colonoscopy done [employer] 3/16 and normal 2. Pap done 2/17 gyn and normal 3. Mammogram done 2/17 and normal  Peter Franklin M.D.

## 2017-04-03 DIAGNOSIS — R73.9 ELEVATED BLOOD SUGAR: ICD-10-CM

## 2017-04-03 DIAGNOSIS — E83.52 SERUM CALCIUM ELEVATED: ICD-10-CM

## 2017-04-03 LAB
CA-I SERPL ISE-MCNC: 4.8 MG/DL (ref 4.4–5.2)
GLUCOSE SERPL-MCNC: 97 MG/DL (ref 70–99)
HBA1C MFR BLD: 5.2 % (ref 4.3–6)

## 2017-04-03 PROCEDURE — 83036 HEMOGLOBIN GLYCOSYLATED A1C: CPT | Performed by: INTERNAL MEDICINE

## 2017-04-03 PROCEDURE — 82330 ASSAY OF CALCIUM: CPT | Performed by: INTERNAL MEDICINE

## 2017-04-03 PROCEDURE — 82947 ASSAY GLUCOSE BLOOD QUANT: CPT | Performed by: INTERNAL MEDICINE

## 2017-04-03 PROCEDURE — 36415 COLL VENOUS BLD VENIPUNCTURE: CPT | Performed by: INTERNAL MEDICINE

## 2017-04-03 NOTE — LETTER
[address]  [place] [address]  [phone]                                                                                               Date: 4/4/2017    Jyothi Freitas                                                                               [address] [facility]  [place] [address]              Dear Jyothi,    Your labs are all normal.  Your calcium level is now normal.  Your sugar test for diabetes is normal and a second test for diabetes, the a1c is also normal.  You do not have diabetes.    Enclosed is a copy of your results.      It was a pleasure to see you at your last appointment. If you have any questions, please feel free to call myself or my nurse at [phone].          Sincerely,    Peter Franklin MD/ Janneth LUCIANO CMA  Results for orders placed or performed in visit on 04/03/17   Glucose   Result Value Ref Range    Glucose 97 70 - 99 mg/dL   Hemoglobin A1c   Result Value Ref Range    Hemoglobin A1C 5.2 4.3 - 6.0 %   Calcium ionized   Result Value Ref Range    Calcium Ionized 4.8 4.4 - 5.2 mg/dL

## 2017-04-03 NOTE — PROGRESS NOTES
It was a pleasure seeing you.  I wanted to get back to you with your test results.  I have enclosed a copy for your records.    Your labs are all normal.  Your calcium level is now normal.  Your sugar test for diabetes is normal and a second test for diabetes, the a1c is also normal.  You do not have diabetes.    If you have any questions please call me.

## 2017-04-06 ENCOUNTER — TELEPHONE (OUTPATIENT)
Dept: FAMILY MEDICINE | Facility: CLINIC | Age: 52
End: 2017-04-06

## 2017-04-06 DIAGNOSIS — J45.20 INTERMITTENT ASTHMA, UNCOMPLICATED: Primary | ICD-10-CM

## 2017-04-06 NOTE — TELEPHONE ENCOUNTER
Insurance is not covering the Flovent inhaler prescribed recently. They do cover Qvar or Asmanex, would either of these be suitable for the patient?    Heriberto Jimenez, CMA

## 2017-04-07 NOTE — TELEPHONE ENCOUNTER
I called the patient to let her know, she will check on this at the pharmacy.    Heriberto Jimenez, CMA

## 2017-04-10 ENCOUNTER — HOSPITAL ENCOUNTER (OUTPATIENT)
Dept: CARDIOLOGY | Facility: CLINIC | Age: 52
Discharge: HOME | End: 2017-04-10
Attending: INTERNAL MEDICINE | Admitting: INTERNAL MEDICINE
Payer: COMMERCIAL

## 2017-04-10 DIAGNOSIS — R55 SYNCOPE, UNSPECIFIED SYNCOPE TYPE: ICD-10-CM

## 2017-04-10 PROCEDURE — 93306 TTE W/DOPPLER COMPLETE: CPT

## 2017-04-10 PROCEDURE — 93306 TTE W/DOPPLER COMPLETE: CPT | Mod: 26 | Performed by: INTERNAL MEDICINE

## 2017-04-10 NOTE — LETTER
[address]  [address]  [place]  [address]  Tel: [phone]    2017    Jyothi Freitas  [address] [place] [address]        Dear Ms. Freitas,    I am happy to report that your echocardiogram looks very good.  Your heart squeezes normally and no old heart attacks.  You have a very small amount of leakage at the tricuspid valve which is not a problem and not unusual.  There is nothing for you to do about this and I doubt it will ever become a problem. We can repeat the echo in 3 years to be safe.      If you have any questions please call me.      Sincerely,    Peter Franklin MD/maximilian    Results for orders placed or performed during the hospital encounter of 04/10/17   Echocardiogram Complete    Narrative    [id number]  [id number]  [id number]  [id number]^ERICA^PETER^MERCEDES        [facility]  [facility]  Echocardiography Laboratory  [address]  [address]  [place] [address]  Phone [phone]  Fax [phone]        Name: JYOTHI FREITAS  MRN: [id number]  : 1965  Study Date: 04/10/2017 03:10 PM  Age: 51 yrs  Gender: Female  [place] [facility]  Reason For Study: Syncope and collapse  Ordering Physician: PETER FRANKLIN  Referring Physician: PETER FRANKLIN  Performed By: [facility] Kathrine Stinson     BSA: 1.7 m2  Height: 67 in  Weight: 130 lb  HR: 74  BP: 135/90 mmHg  _____________________________________________________________________________  __     Procedure  Complete Echo Adult.     _____________________________________________________________________________  __        Interpretation Summary     Left ventricular systolic function is normal.  The visual ejection fraction is estimated at 60-65%.  Grade I or early diastolic dysfunction.  There is mild (1+) tricuspid regurgitation.  The right ventricular systolic pressure is approximated at 22mmHg plus the  right atrial pressure.  There is no comparison  study available.  _____________________________________________________________________________  __        Left Ventricle  The left ventricle is normal in size. There is normal left ventricular wall  thickness. Left ventricular systolic function is normal. The visual ejection  fraction is estimated at 60-65%. Grade I or early diastolic dysfunction. E by  E prime ratio is between 8 and 15, which is indeterminate for assessment of  left ventricular filling pressures.     Right Ventricle  The right ventricle is normal in structure, function and size.     Atria  Normal left atrial size. Right atrial size is normal. There is no color  Doppler evidence of an atrial shunt.     Mitral Valve  There is mild mitral annular calcification. There is trace to mild mitral  regurgitation.     Tricuspid Valve  The tricuspid valve is normal in structure and function. There is mild (1+)  tricuspid regurgitation. The right ventricular systolic pressure is  approximated at 22mmHg plus the right atrial pressure.        Aortic Valve  Normal tricuspid aortic valve. No aortic regurgitation is present. No aortic  stenosis is present.     Pulmonic Valve  The pulmonic valve is not well visualized. There is no pulmonic valvular  regurgitation. Normal pulmonic valve velocity.     Vessels  The aortic root is normal size. Normal size ascending aorta. The IVC is normal  in size and reactivity with respiration, suggesting normal central venous  pressure.     Pericardium  There is no pericardial effusion.     Rhythm  The rhythm was normal sinus.     _____________________________________________________________________________  __  MMode/2D Measurements & Calculations  IVSd: 0.96 cm  LVIDd: 4.7 cm  LVIDs: 3.0 cm  LVPWd: 0.75 cm  FS: 36.5 %  EDV(Teich): 101.3 ml  ESV(Teich): 34.2 ml  LV mass(C)d: 132.8 grams  Ao root diam: 2.6 cm  LA dimension: 3.1 cm     asc Aorta Diam: 3.4 cm  LA/Ao: 1.2  LVOT diam: 2.0 cm  LVOT area: 3.1 cm2  LA Volume (BP): 43.0  ml  LA Volume Index (BP): 25.6 ml/m2        Doppler Measurements & Calculations  MV E max nahum: 78.8 cm/sec  MV A max nahum: 57.2 cm/sec  MV E/A: 1.4  MV dec time: 0.20 sec  PA acc time: 0.05 sec  TR max nahum: 233.0 cm/sec  TR max P.7 mmHg     Lateral E/e': 7.4  Medial E/e': 13.3           _____________________________________________________________________________  __           Report approved by: Corby Syed 04/10/2017 04:52 PM              Enclosure: Lab Results

## 2017-04-11 NOTE — PROGRESS NOTES
I am happy to report that your echocardiogram looks very good.  Your heart squeezes normally and no old heart attacks.  You have a very small amount of leakage at the tricuspid valve which is not a problem and not unusual.  There is nothing for you to do about this and I doubt it will ever become a problem. We can repeat the echo in 3 years to be safe.      If you have any questions please call me.

## 2017-04-25 DIAGNOSIS — J45.20 INTERMITTENT ASTHMA, UNCOMPLICATED: ICD-10-CM

## 2017-04-25 DIAGNOSIS — R55 SYNCOPE, UNSPECIFIED SYNCOPE TYPE: ICD-10-CM

## 2017-04-25 RX ORDER — ALBUTEROL SULFATE 90 UG/1
AEROSOL, METERED RESPIRATORY (INHALATION)
Qty: 8.5 G | Refills: 0 | Status: SHIPPED | OUTPATIENT
Start: 2017-04-25 | End: 2020-04-17

## 2017-04-25 NOTE — TELEPHONE ENCOUNTER
Prescription approved per [facility] Refill Protocol.  Della Chiu RN

## 2017-04-25 NOTE — TELEPHONE ENCOUNTER
albuterol (PROAIR HFA/PROVENTIL HFA/VENTOLIN HFA) 108 (90 BASE) MCG/ACT Inhaler 1 Inhaler 0 3/30/2017          Last Written Prescription Date: 3/30/17  Last Fill Quantity: 1 Inhaler, # refills: 0    Last Office Visit with G, P or [facility] prescribing provider:  3/30/17   Future Office Visit:       Date of Last Asthma Action Plan Letter:   There are no preventive care reminders to display for this patient.   Asthma Control Test:   ACT Total Scores 4/10/2012   ACT TOTAL SCORE 22   ASTHMA ER VISITS 0 = None   ASTHMA HOSPITALIZATIONS 0 = None       Date of Last Spirometry Test:   No results found for this or any previous visit.

## 2017-05-04 ENCOUNTER — TRANSFERRED RECORDS (OUTPATIENT)
Dept: HEALTH INFORMATION MANAGEMENT | Facility: CLINIC | Age: 52
End: 2017-05-04

## 2017-05-30 ENCOUNTER — OFFICE VISIT (OUTPATIENT)
Dept: FAMILY MEDICINE | Facility: CLINIC | Age: 52
End: 2017-05-30
Payer: COMMERCIAL

## 2017-05-30 VITALS
OXYGEN SATURATION: 99 % | SYSTOLIC BLOOD PRESSURE: 118 MMHG | BODY MASS INDEX: 21.19 KG/M2 | HEART RATE: 67 BPM | HEIGHT: 67 IN | DIASTOLIC BLOOD PRESSURE: 77 MMHG | TEMPERATURE: 98.2 F | WEIGHT: 135 LBS

## 2017-05-30 DIAGNOSIS — H92.02 LEFT EAR PAIN: Primary | ICD-10-CM

## 2017-05-30 DIAGNOSIS — J45.20 INTERMITTENT ASTHMA, UNCOMPLICATED: ICD-10-CM

## 2017-05-30 PROCEDURE — 99212 OFFICE O/P EST SF 10 MIN: CPT | Performed by: NURSE PRACTITIONER

## 2017-05-30 NOTE — PROGRESS NOTES
HPI      SUBJECTIVE:                                                    Jyothi Freitas is a 51 year old female who presents to clinic today for the following health issues:    Chief Complaint   Patient presents with     Ear Problem     left ear ache , Saw ENT about 2 weeks ago        Left ear pain for a couple months   Saw PCP several weeks ago then went to ENT  No other symptoms with this ear pain--fevers, ST, HA  Pain is localized to the anterior part of the ear and tragus       Past Medical History:   Diagnosis Date     Intermittent asthma     since childhood     Migraines     most of adult life, has seen neuro in past, on bblocker     Mild depression      Normal stress echocardiogram July 2011    due to hear racing     Osteopenia 2008     Syncope 03/2017    echo nl lv size and fxn, grade 1 dd, mild tr     Past Surgical History:   Procedure Laterality Date     C TMJ ARTHROSCOPY/SURGERY       single oopherectomy  2010    cyst     Social History   Substance Use Topics     Smoking status: Former Smoker     Quit date: 3/27/1997     Smokeless tobacco: Not on file     Alcohol use Yes      Comment: rarely     Current Outpatient Prescriptions   Medication Sig Dispense Refill     beclomethasone (QVAR) 40 MCG/ACT Inhaler Inhale 2 puffs into the lungs 2 times daily 3 Inhaler 3     ALBUTEROL 108 (90 BASE) MCG/ACT inhaler INHALE 2 PUFFS INTO THE LUNGS EVERY 6 HOURS AS NEEDED FOR SHORTNESS OF BREATH OR DIFFICULT BREATHING OR WHEEZING 8.5 g 0     NADOLOL PO Take 20 mg by mouth daily       fluticasone-salmeterol (ADVAIR DISKUS) 100-50 MCG/DOSE diskus inhaler Inhale 1 puff into the lungs 2 times daily       fluticasone (FLOVENT DISKUS) 100 MCG/BLIST AEPB Inhale 1 puff into the lungs 2 times daily 3 Inhaler 3     buPROPion (WELLBUTRIN XL) 300 MG 24 hr tablet Take 300 mg by mouth daily.       [DISCONTINUED] beclomethasone (QVAR) 40 MCG/ACT Inhaler Inhale 2 puffs into the lungs 2 times daily (Patient not taking: Reported on 5/30/2017)  "3 Inhaler 3     Drospiren-Eth Estrad-Levomefol (BEYAZ) 3-0.02-0.451 MG TABS Take  by mouth daily.       Allergies   Allergen Reactions     Sulfa Drugs Other (See Comments)     Red face. Ringing in the ears.       Reviewed PMH, med list and allergies.      ROS  Detailed as above       /77 (BP Location: Right arm, Patient Position: Chair, Cuff Size: Adult Regular)  Pulse 67  Temp 98.2  F (36.8  C) (Oral)  Ht 5' 7\" (1.702 m)  Wt 135 lb (61.2 kg)  SpO2 99%  Breastfeeding? No  BMI 21.14 kg/m2      Physical Exam   Constitutional: She is well-developed, well-nourished, and in no distress.   HENT:   Head: Normocephalic.   Right Ear: Tympanic membrane, external ear and ear canal normal.   Left Ear: Tympanic membrane normal.   Small pimple left anterior ear canal. Mod amt of hard cerumen. After removal of cerumen, mild irritation of canal.   Neurological: She is alert.   Psychiatric: Mood and affect normal.   Vitals reviewed.      Assessment and Plan:       ICD-10-CM    1. Left ear pain H92.02    2. Intermittent asthma, uncomplicated J45.20 beclomethasone (QVAR) 40 MCG/ACT Inhaler       Left ear pain likely d/t pimple within the ear canal of the anterior aspect, correlating to area of pain. Albrecht was easily popped with just light pressure from otoscope. Hard piece of cerumen adjacent to the area was removed, which could have been adding to the discomfort.   If pain is not gone in the next 1-2 days, she should again f/u with ENT       Zan Valente, APRN, CNP  [facility]    "

## 2017-05-30 NOTE — NURSING NOTE
"Chief Complaint   Patient presents with     Ear Problem     left era ache        Initial /77 (BP Location: Right arm, Patient Position: Chair, Cuff Size: Adult Regular)  Pulse 67  Temp 98.2  F (36.8  C) (Oral)  Ht 5' 7\" (1.702 m)  Wt 135 lb (61.2 kg)  SpO2 99%  Breastfeeding? No  BMI 21.14 kg/m2 Estimated body mass index is 21.14 kg/(m^2) as calculated from the following:    Height as of this encounter: 5' 7\" (1.702 m).    Weight as of this encounter: 135 lb (61.2 kg).  Medication Reconciliation: complete  "

## 2017-05-30 NOTE — MR AVS SNAPSHOT
"              After Visit Summary   2017    Jyothi Freitas    MRN: [id number]           Patient Information     Date Of Birth          1965        Visit Information        Provider Department      2017 1:30 PM Zan Valente APRN CNP [facility]        Today's Diagnoses     Left ear pain    -  1    Intermittent asthma, uncomplicated           Follow-ups after your visit        Who to contact     If you have questions or need follow up information about today's clinic visit or your schedule please contact [facility] directly at [phone].  Normal or non-critical lab and imaging results will be communicated to you by [employer], letter or phone within 4 business days after the clinic has received the results. If you do not hear from us within 7 days, please contact the clinic through [employer] or phone. If you have a critical or abnormal lab result, we will notify you by phone as soon as possible.  Submit refill requests through [employer] or call your pharmacy and they will forward the refill request to us. Please allow 3 business days for your refill to be completed.          Additional Information About Your Visit        MyChart Information     [employer] lets you send messages to your doctor, view your test results, renew your prescriptions, schedule appointments and more. To sign up, go to [email or web address] . Click on \"Log in\" on the left side of the screen, which will take you to the Welcome page. Then click on \"Sign up Now\" on the right side of the page.     You will be asked to enter the access code listed below, as well as some personal information. Please follow the directions to create your username and password.     Your access code is: [email or web address]  Expires: 2017 12:39 PM     Your access code will  in 90 days. If you need help or a new code, please call your [facility] or [phone].        Care EveryWhere ID     This is your Care EveryWhere ID. " "This could be used by other organizations to access your [facility] medical records  [id number]        Your Vitals Were     Pulse Temperature Height Pulse Oximetry Breastfeeding? BMI (Body Mass Index)    67 98.2  F (36.8  C) (Oral) 5' 7\" (1.702 m) 99% No 21.14 kg/m2       Blood Pressure from Last 3 Encounters:   05/30/17 118/77   03/30/17 124/80   03/16/17 116/77    Weight from Last 3 Encounters:   05/30/17 135 lb (61.2 kg)   03/30/17 130 lb (59 kg)   03/16/17 130 lb (59 kg)              Today, you had the following     No orders found for display       Primary Care Provider Office Phone # Fax #    Peter Franklin [phone]       [facility] [address]  [place] [address]        Thank you!     Thank you for choosing [facility]  for your care. Our goal is always to provide you with excellent care. Hearing back from our patients is one way we can continue to improve our services. Please take a few minutes to complete the written survey that you may receive in the mail after your visit with us. Thank you!             Your Updated Medication List - Protect others around you: Learn how to safely use, store and throw away your medicines at www.disposemymeds.org.          This list is accurate as of: 5/30/17  3:28 PM.  Always use your most recent med list.                   Brand Name Dispense Instructions for use    ADVAIR DISKUS 100-50 MCG/DOSE diskus inhaler   Generic drug:  fluticasone-salmeterol      Inhale 1 puff into the lungs 2 times daily       albuterol 108 (90 BASE) MCG/ACT Inhaler   Generic drug:  albuterol     8.5 g    INHALE 2 PUFFS INTO THE LUNGS EVERY 6 HOURS AS NEEDED FOR SHORTNESS OF BREATH OR DIFFICULT BREATHING OR WHEEZING       BEYAZ 3-0.02-0.451 MG Tabs   Generic drug:  Drospiren-Eth Estrad-Levomefol      Take  by mouth daily.       fluticasone 100 MCG/BLIST Aepb    FLOVENT DISKUS    3 Inhaler    Inhale 1 puff into the lungs 2 times daily    "    NADOLOL PO      Take 20 mg by mouth daily       QVAR 40 MCG/ACT Inhaler   Generic drug:  beclomethasone     3 Inhaler    Inhale 2 puffs into the lungs 2 times daily       WELLBUTRIN  MG 24 hr tablet   Generic drug:  buPROPion      Take 300 mg by mouth daily.

## 2017-05-31 ASSESSMENT — ASTHMA QUESTIONNAIRES: ACT_TOTALSCORE: 25

## 2017-06-28 ENCOUNTER — TELEPHONE (OUTPATIENT)
Dept: FAMILY MEDICINE | Facility: CLINIC | Age: 52
End: 2017-06-28

## 2017-06-28 NOTE — TELEPHONE ENCOUNTER
I got a fax from the pharmacy requesting a PA on the Flovent again. Though this time they mentioned that the patient has tried and failed with QVAR. I called [facility] to complete PA form over the phone. This has been done and sent over for further clinical review.    Heriberto Jimenez, CMA

## 2017-06-29 NOTE — TELEPHONE ENCOUNTER
PA has been approved for the Flovent from 6/28/17 - 6/28/18. I called the pharmacy and patient to let them know.    Heriberto Jimenez, CMA

## 2017-10-16 ENCOUNTER — TELEPHONE (OUTPATIENT)
Dept: FAMILY MEDICINE | Facility: CLINIC | Age: 52
End: 2017-10-16

## 2017-10-16 NOTE — TELEPHONE ENCOUNTER
Reason for Call:  appointment and call back    Detailed comments: Pt has appt scheduled for 11/9 but is worried that it's too long to wait for Dr. Franklin. She states that since taking hormone meds prescribed by her gyno, she has had a serious increase in acne and hair loss. She wants to discuss other options with Dr. Franklin. Please call to advise.     Phone Number Patient can be reached at: Cell number on file:    Telephone Information:   Mobile [phone]       Best Time: any    Can we leave a detailed message on this number? Not Applicable    Call taken on 10/16/2017 at 4:13 PM by Tonya Laureano

## 2017-10-20 ENCOUNTER — OFFICE VISIT (OUTPATIENT)
Dept: FAMILY MEDICINE | Facility: CLINIC | Age: 52
End: 2017-10-20
Payer: COMMERCIAL

## 2017-10-20 VITALS
DIASTOLIC BLOOD PRESSURE: 75 MMHG | SYSTOLIC BLOOD PRESSURE: 112 MMHG | RESPIRATION RATE: 12 BRPM | BODY MASS INDEX: 20.52 KG/M2 | TEMPERATURE: 96.9 F | WEIGHT: 131 LBS | HEART RATE: 63 BPM | OXYGEN SATURATION: 100 %

## 2017-10-20 DIAGNOSIS — L70.9 ADULT ACNE: ICD-10-CM

## 2017-10-20 DIAGNOSIS — Z23 NEED FOR PROPHYLACTIC VACCINATION AND INOCULATION AGAINST INFLUENZA: ICD-10-CM

## 2017-10-20 DIAGNOSIS — L65.9 HAIR LOSS: Primary | ICD-10-CM

## 2017-10-20 DIAGNOSIS — J45.20 INTERMITTENT ASTHMA WITHOUT COMPLICATION, UNSPECIFIED ASTHMA SEVERITY: ICD-10-CM

## 2017-10-20 LAB
FERRITIN SERPL-MCNC: 8 NG/ML (ref 8–252)
IRON SATN MFR SERPL: 11 % (ref 15–46)
IRON SERPL-MCNC: 48 UG/DL (ref 35–180)
TIBC SERPL-MCNC: 427 UG/DL (ref 240–430)
TSH SERPL DL<=0.005 MIU/L-ACNC: 1 MU/L (ref 0.4–4)

## 2017-10-20 PROCEDURE — 90686 IIV4 VACC NO PRSV 0.5 ML IM: CPT | Performed by: INTERNAL MEDICINE

## 2017-10-20 PROCEDURE — 36415 COLL VENOUS BLD VENIPUNCTURE: CPT | Performed by: INTERNAL MEDICINE

## 2017-10-20 PROCEDURE — 82728 ASSAY OF FERRITIN: CPT | Performed by: INTERNAL MEDICINE

## 2017-10-20 PROCEDURE — 83540 ASSAY OF IRON: CPT | Performed by: INTERNAL MEDICINE

## 2017-10-20 PROCEDURE — 99213 OFFICE O/P EST LOW 20 MIN: CPT | Mod: 25 | Performed by: INTERNAL MEDICINE

## 2017-10-20 PROCEDURE — 90732 PPSV23 VACC 2 YRS+ SUBQ/IM: CPT | Performed by: INTERNAL MEDICINE

## 2017-10-20 PROCEDURE — 83550 IRON BINDING TEST: CPT | Performed by: INTERNAL MEDICINE

## 2017-10-20 PROCEDURE — 84443 ASSAY THYROID STIM HORMONE: CPT | Performed by: INTERNAL MEDICINE

## 2017-10-20 RX ORDER — PYRIDOXINE HCL (VITAMIN B6) 50 MG
TABLET ORAL
COMMUNITY
End: 2019-10-11

## 2017-10-20 RX ORDER — ESTERIFIED ESTROGEN AND METHYLTESTOSTERONE 1.25; 2.5 MG/1; MG/1
0.5 TABLET ORAL DAILY
Status: ON HOLD | COMMUNITY
End: 2017-10-30

## 2017-10-20 RX ORDER — MEDROXYPROGESTERONE ACETATE 2.5 MG/1
2.5 TABLET ORAL DAILY
Refills: 2 | Status: ON HOLD | COMMUNITY
Start: 2017-08-21 | End: 2017-10-30

## 2017-10-20 NOTE — PROGRESS NOTES
Jyothi Freitas is a 51 year old female who presents for     1. Hair loss, has been 2 months, started after a stressor, not patches, no scalp itching, not ill.  No family history of this.  She did change from ocp to hrt 2 months ago as well.  She feels well.      2. hrt discussion.  Patient had been on ocp for years then at gyn changed as she wasn't feeling great and now on hrt with testosterone component and feels very well now.  Not ill and no symptoms.    3. Adult acne, never before, started with hrt change, face and back.    She otherwise feels fine, needs immunizations today.  As noted at last office visit I changed inhalers and doing fine with that, no exacerbations, using albuterol when works out    Past Medical History:   Diagnosis Date     Intermittent asthma     since childhood     Migraines     most of adult life, has seen neuro in past, on bblocker     Mild depression (H)      Normal stress echocardiogram July 2011    due to hear racing     Osteopenia 2008     Syncope 03/2017    echo nl lv size and fxn, grade 1 dd, mild tr     Past Surgical History:   Procedure Laterality Date     C TMJ ARTHROSCOPY/SURGERY       single oopherectomy  2010    cyst     Social History     Social History     Marital status:      Spouse name: N/A     Number of children: 2     Years of education: N/A     Occupational History      Unemployed     Social History Main Topics     Smoking status: Former Smoker     Quit date: 3/27/1997     Smokeless tobacco: Never Used     Alcohol use Yes      Comment: rarely     Drug use: No     Sexual activity: Yes     Partners: Male     Birth control/ protection: Pill     Other Topics Concern     Not on file     Social History Narrative     Current Outpatient Prescriptions   Medication Sig Dispense Refill     estrogens-methylTESTOSTERone (ESTRATEST) 1.25-2.5 MG per tablet Take 0.5 tablets by mouth daily       medroxyPROGESTERone (PROVERA) 2.5 MG tablet Take 2.5 mg by mouth daily  2      Cyanocobalamin (B-12) 100 MCG TABS        ALBUTEROL 108 (90 BASE) MCG/ACT inhaler INHALE 2 PUFFS INTO THE LUNGS EVERY 6 HOURS AS NEEDED FOR SHORTNESS OF BREATH OR DIFFICULT BREATHING OR WHEEZING 8.5 g 0     NADOLOL PO Take 20 mg by mouth daily       fluticasone (FLOVENT DISKUS) 100 MCG/BLIST AEPB Inhale 1 puff into the lungs 2 times daily 3 Inhaler 3     buPROPion (WELLBUTRIN XL) 300 MG 24 hr tablet Take 150 mg by mouth daily        Allergies   Allergen Reactions     Sulfa Drugs Other (See Comments)     Red face. Ringing in the ears.     FAMILY HISTORY NOTED AND REVIEWED    REVIEW OF SYSTEMS: above    PHYSICAL EXAM    /75  Pulse 63  Temp 96.9  F (36.1  C) (Oral)  Resp 12  Wt 131 lb (59.4 kg)  SpO2 100%  BMI 20.52 kg/m2    Patient appears non toxic  Scalp appears within normal limits, no lesions, neg pull test    ASSESSMENT:  1. Hair loss, may be stress, will check labs, doubt hrt  2. Adult acne, suspect the testosterone in the hrt  3. hrt use, feels well on it    PLAN:  Check labs today for hair loss  If hair loss persists to derm  She will d/w patient changing hrt to get rid of testosterone as I suspect this is the cause of acne  Immunizations today    Peter Franklin M.D.

## 2017-10-20 NOTE — MR AVS SNAPSHOT
"              After Visit Summary   10/20/2017    Jyothi Freitas    MRN: [id number]           Patient Information     Date Of Birth          1965        Visit Information        Provider Department      10/20/2017 10:00 AM Peter Franklin MD [facility]        Today's Diagnoses     Hair loss    -  1    Intermittent asthma without complication, unspecified asthma severity        Adult acne        Need for prophylactic vaccination and inoculation against influenza           Follow-ups after your visit        Who to contact     If you have questions or need follow up information about today's clinic visit or your schedule please contact [facility] directly at [phone].  Normal or non-critical lab and imaging results will be communicated to you by [employer] plchart, letter or phone within 4 business days after the clinic has received the results. If you do not hear from us within 7 days, please contact the clinic through [employer] plchart or phone. If you have a critical or abnormal lab result, we will notify you by phone as soon as possible.  Submit refill requests through [employer] or call your pharmacy and they will forward the refill request to us. Please allow 3 business days for your refill to be completed.          Additional Information About Your Visit        MyChart Information     [employer] lets you send messages to your doctor, view your test results, renew your prescriptions, schedule appointments and more. To sign up, go to [email or web address] . Click on \"Log in\" on the left side of the screen, which will take you to the Welcome page. Then click on \"Sign up Now\" on the right side of the page.     You will be asked to enter the access code listed below, as well as some personal information. Please follow the directions to create your username and password.     Your access code is: [email or web address]  Expires: 2018 10:55 AM     Your access code will  in 90 days. If you need help or a new " code, please call your [place] clinic or [phone].        Care EveryWhere ID     This is your Care EveryWhere ID. This could be used by other organizations to access your [place] medical records  [id number]        Your Vitals Were     Pulse Temperature Respirations Pulse Oximetry BMI (Body Mass Index)       63 96.9  F (36.1  C) (Oral) 12 100% 20.52 kg/m2        Blood Pressure from Last 3 Encounters:   10/20/17 112/75   05/30/17 118/77   03/30/17 124/80    Weight from Last 3 Encounters:   10/20/17 131 lb (59.4 kg)   05/30/17 135 lb (61.2 kg)   03/30/17 130 lb (59 kg)              We Performed the Following     Ferritin     HC FLU VAC PRESRV FREE QUAD SPLIT VIR 3+YRS IM     Iron and iron binding capacity     PNEUMOCOCCAL VACCINE,ADULT,SQ OR IM     TSH with free T4 reflex          Today's Medication Changes          These changes are accurate as of: 10/20/17 10:55 AM.  If you have any questions, ask your nurse or doctor.               Stop taking these medicines if you haven't already. Please contact your care team if you have questions.     ADVAIR DISKUS 100-50 MCG/DOSE diskus inhaler   Generic drug:  fluticasone-salmeterol   Stopped by:  Peter Franklin MD           BEYAZ 3-0.02-0.451 MG Tabs   Generic drug:  Drospiren-Eth Estrad-Levomefol   Stopped by:  Peter Franklin MD           QVAR 40 MCG/ACT Inhaler   Generic drug:  beclomethasone   Stopped by:  Peter Franklin MD                    Primary Care Provider Office Phone # Fax #    Peter Franklin [phone] [address]  [place] [address]        Equal Access to Services     IESHA GARCIA AH: Roberta Mao, waaxda luqadaha, qaybta kaalmada angyyada, dexter galan. [facility] [phone].    ATENCIÓN: Si habla español, tiene a regalado disposición servicios gratuitos de asistencia lingüística. Monica campbell [phone].    We comply with applicable federal civil rights  laws and [place] laws. We do not discriminate on the basis of race, color, national origin, age, disability, sex, sexual orientation, or gender identity.            Thank you!     Thank you for choosing [facility]  for your care. Our goal is always to provide you with excellent care. Hearing back from our patients is one way we can continue to improve our services. Please take a few minutes to complete the written survey that you may receive in the mail after your visit with us. Thank you!             Your Updated Medication List - Protect others around you: Learn how to safely use, store and throw away your medicines at www.disposemymeds.org.          This list is accurate as of: 10/20/17 10:55 AM.  Always use your most recent med list.                   Brand Name Dispense Instructions for use Diagnosis    B-12 100 MCG Tabs           estrogens-methylTESTOSTERone 1.25-2.5 MG per tablet    ESTRATEST     Take 0.5 tablets by mouth daily        fluticasone 100 MCG/BLIST Aepb    FLOVENT DISKUS    3 Inhaler    Inhale 1 puff into the lungs 2 times daily    Intermittent asthma, uncomplicated       medroxyPROGESTERone 2.5 MG tablet    PROVERA     Take 2.5 mg by mouth daily        NADOLOL PO      Take 20 mg by mouth daily        PROAIR  (90 BASE) MCG/ACT Inhaler   Generic drug:  albuterol     8.5 g    INHALE 2 PUFFS INTO THE LUNGS EVERY 6 HOURS AS NEEDED FOR SHORTNESS OF BREATH OR DIFFICULT BREATHING OR WHEEZING    Syncope, unspecified syncope type, Intermittent asthma, uncomplicated       WELLBUTRIN  MG 24 hr tablet   Generic drug:  buPROPion      Take 150 mg by mouth daily

## 2017-10-20 NOTE — LETTER
[place] [address] [facility]  [address]  [place]  [address]  Tel: [phone]    October 23, 2017    Jyothi Freitas  [address] [facility] [address]        Dear Ms. Freitas,    Enclosed are the labs we discussed.  If they have not contacted you by now for the upper endoscopy please let me know.  After the upper gi endoscopy let's talk more about the next step, call me then.    If you have any further questions or problems, please contact our office.      Sincerely,    Peter Franklin MD/ Janneth LUCIANO CMA  Results for orders placed or performed in visit on 10/20/17   Iron and iron binding capacity   Result Value Ref Range    Iron 48 35 - 180 ug/dL    Iron Binding Cap 427 240 - 430 ug/dL    Iron Saturation Index 11 (L) 15 - 46 %   TSH with free T4 reflex   Result Value Ref Range    TSH 1.00 0.40 - 4.00 mU/L   Ferritin   Result Value Ref Range    Ferritin 8 8 - 252 ng/mL               Enclosure: Lab Results

## 2017-10-20 NOTE — PROGRESS NOTES

## 2017-10-20 NOTE — NURSING NOTE
"Chief Complaint   Patient presents with     Medication Problem       Initial /75  Pulse 63  Temp 96.9  F (36.1  C) (Oral)  Resp 12  Wt 131 lb (59.4 kg)  SpO2 100%  BMI 20.52 kg/m2 Estimated body mass index is 20.52 kg/(m^2) as calculated from the following:    Height as of 5/30/17: 5' 7\" (1.702 m).    Weight as of this encounter: 131 lb (59.4 kg).  Medication Reconciliation: complete     Doris Rocha MA     "

## 2017-10-23 ENCOUNTER — TELEPHONE (OUTPATIENT)
Dept: FAMILY MEDICINE | Facility: CLINIC | Age: 52
End: 2017-10-23

## 2017-10-23 DIAGNOSIS — E61.1 LOW IRON: Primary | ICD-10-CM

## 2017-10-23 NOTE — TELEPHONE ENCOUNTER
I d/w patient her low iron, explained risk of missing something if not looking including cancer.  No significant gi c/o x chronic constipation, not new.  Prior colon done at [facility] 3/16.  I explained to patient the testing to be done would include colon, upper gi endoscopy and pill cam.    Patient understands and wants to do some testing.  She will get me colon report, get upper gi endoscopy, ordered, and if neg consider pill cam.  Patient to call me after upper gi endoscopy to discuss things        Peter Franklin M.D.

## 2017-10-23 NOTE — PROGRESS NOTES
Enclosed are the labs we discussed.  If they have not contacted you by now for the upper endoscopy please let me know.  After the upper gi endoscopy let's talk more about the next step, call me then.

## 2017-10-30 ENCOUNTER — HOSPITAL ENCOUNTER (OUTPATIENT)
Facility: CLINIC | Age: 52
Discharge: HOME | End: 2017-10-30
Attending: INTERNAL MEDICINE | Admitting: INTERNAL MEDICINE
Payer: COMMERCIAL

## 2017-10-30 ENCOUNTER — SURGERY (OUTPATIENT)
Age: 52
End: 2017-10-30

## 2017-10-30 VITALS
HEIGHT: 67 IN | RESPIRATION RATE: 16 BRPM | BODY MASS INDEX: 20.4 KG/M2 | WEIGHT: 130 LBS | DIASTOLIC BLOOD PRESSURE: 79 MMHG | SYSTOLIC BLOOD PRESSURE: 107 MMHG | OXYGEN SATURATION: 100 %

## 2017-10-30 LAB — UPPER GI ENDOSCOPY: NORMAL

## 2017-10-30 PROCEDURE — 43239 EGD BIOPSY SINGLE/MULTIPLE: CPT | Performed by: INTERNAL MEDICINE

## 2017-10-30 PROCEDURE — 25000128 H RX IP 250 OP 636: Performed by: INTERNAL MEDICINE

## 2017-10-30 PROCEDURE — G0500 MOD SEDAT ENDO SERVICE >5YRS: HCPCS | Performed by: INTERNAL MEDICINE

## 2017-10-30 PROCEDURE — 88305 TISSUE EXAM BY PATHOLOGIST: CPT | Performed by: INTERNAL MEDICINE

## 2017-10-30 PROCEDURE — 25000125 ZZHC RX 250: Performed by: INTERNAL MEDICINE

## 2017-10-30 PROCEDURE — 88305 TISSUE EXAM BY PATHOLOGIST: CPT | Mod: 26 | Performed by: INTERNAL MEDICINE

## 2017-10-30 RX ORDER — ESTERIFIED ESTROGEN AND METHYLTESTOSTERONE .625; 1.25 MG/1; MG/1
1 TABLET ORAL DAILY
COMMUNITY
End: 2018-07-20

## 2017-10-30 RX ORDER — IPRATROPIUM BROMIDE 21 UG/1
SPRAY, METERED NASAL
Refills: 3 | Status: ON HOLD | COMMUNITY
Start: 2017-02-16 | End: 2017-10-30

## 2017-10-30 RX ORDER — KETOCONAZOLE 10 MG/ML
SHAMPOO TOPICAL
Refills: 0 | COMMUNITY
Start: 2017-10-04 | End: 2018-07-20

## 2017-10-30 RX ORDER — FENTANYL CITRATE 50 UG/ML
INJECTION, SOLUTION INTRAMUSCULAR; INTRAVENOUS PRN
Status: DISCONTINUED | OUTPATIENT
Start: 2017-10-30 | End: 2017-10-30 | Stop reason: HOSPADM

## 2017-10-30 RX ADMIN — TOPICAL ANESTHETIC 1 APPLICATOR: 200 SPRAY DENTAL; PERIODONTAL at 14:02

## 2017-10-30 RX ADMIN — FENTANYL CITRATE 100 MCG: 50 INJECTION, SOLUTION INTRAMUSCULAR; INTRAVENOUS at 14:05

## 2017-10-30 RX ADMIN — MIDAZOLAM HYDROCHLORIDE 2 MG: 1 INJECTION, SOLUTION INTRAMUSCULAR; INTRAVENOUS at 14:05

## 2017-10-31 LAB — COPATH REPORT: NORMAL

## 2017-11-13 ENCOUNTER — TELEPHONE (OUTPATIENT)
Dept: FAMILY MEDICINE | Facility: CLINIC | Age: 52
End: 2017-11-13

## 2017-11-13 DIAGNOSIS — G43.809 OTHER MIGRAINE WITHOUT STATUS MIGRAINOSUS, NOT INTRACTABLE: Primary | ICD-10-CM

## 2017-11-13 DIAGNOSIS — E61.1 LOW IRON: Primary | ICD-10-CM

## 2017-11-13 NOTE — TELEPHONE ENCOUNTER
Reason for Call:  She wants to leave test results    Detailed comments: test results and Labs said she has low Iron   Endoscopy results were negative and where to go form here should she  Take Iron Pills please call to discuss    Phone Number Patient can be reached at: Home number on file [phone] (home)    Best Time: anytime    Can we leave a detailed message on this number? YES    Call taken on 11/13/2017 at 10:41 AM by Misbah Sandoval

## 2017-11-13 NOTE — TELEPHONE ENCOUNTER
Dr. Franklin,    Please advise,  Patient wondering what next steps are for Low Iron. If appropriate, please send supplement

## 2017-11-14 NOTE — TELEPHONE ENCOUNTER
Ok so I called [facility]. I gave them the info on the Pill Cam referral. The one thing that their procedure coordinator was worried about is insurance coverage. He said that typically insurance will not cover a pill cam study if a colonoscopy and an EGD have not been done within one year to rule out bleeds in those areas. Looks like colonoscopy was done last at [facility] in March 2016. In any case, [facility] will task their business team to check with the patient's insurance and will give the patient a call to help her get scheduled if pill cam will be covered.     If not covered the patient may be giving us a call back to further discuss.    Heriberto Jimenez, [facility]

## 2017-11-14 NOTE — TELEPHONE ENCOUNTER
Please call [facility], patient needs pill cam study for low iron, please arrange to have this done    Thanks    Peter Franklin M.D.

## 2017-11-14 NOTE — TELEPHONE ENCOUNTER
Yes, I did see that and [facility] knows. I also faxed the EGD results and the pill cam referral to [facility]. Hopefully it works well with insurance.    Heriberto Jimenez, CMA

## 2017-11-15 ENCOUNTER — TELEPHONE (OUTPATIENT)
Dept: FAMILY MEDICINE | Facility: CLINIC | Age: 52
End: 2017-11-15

## 2017-11-15 DIAGNOSIS — E61.1 LOW IRON: Primary | ICD-10-CM

## 2017-11-15 RX ORDER — NADOLOL 20 MG/1
20 TABLET ORAL DAILY
Qty: 90 TABLET | Refills: 1 | Status: SHIPPED | OUTPATIENT
Start: 2017-11-15 | End: 2018-04-03

## 2017-11-15 NOTE — LETTER
[facility]  [address] [facility] [address]  [phone]          November 28, 2017    RE:  Jyothi Freitas                                                                                                                  1965  [employer] ID: [id number]                                   [address] [address]            To whom it may concern:  [employer] Utilization Management    Jyothi Freitas is under my professional care for primary care. I would like for her to have a capsule endoscopy completed. She has had a Upper GI Endoscopy on 10/30/17 and a colonoscopy in March of 2016. Both of these GI tests have been inconclusive which warrants the need to preform a capsule endoscopy. I want to make sure this capsule endoscopy is covered for the patient even though the colonoscopy she had done was completed over 1 year ago in March 2016. I do not feel the patient should need to repeat a colonoscopy because he last colonoscopy was clear.       Sincerely,        Peter Franklin MD

## 2017-11-15 NOTE — TELEPHONE ENCOUNTER
Reason for Call:  [facility]    Detailed comments: Per Alberto she needs to have a Colonoscopy 1st   Please call [facility] to discuss         Call taken on 11/15/2017 at 10:27 AM by Misbah Sandoval

## 2017-11-15 NOTE — TELEPHONE ENCOUNTER
Requested Prescriptions   Pending Prescriptions Disp Refills     nadolol (CORGARD) 20 MG tablet       Sig: Take 1 tablet (20 mg) by mouth daily    Beta-Blockers Protocol Passed    11/13/2017  8:16 PM       Passed - Blood pressure under 140/90    BP Readings from Last 3 Encounters:   10/30/17 107/79   10/20/17 112/75   05/30/17 118/77                Passed - Patient is age 6 or older       Passed - Recent or future visit with authorizing provider's specialty    Patient had office visit in the last year or has a visit in the next 30 days with authorizing provider.  See chart review.               Routing refill request to provider for review/approval because:  Medication is reported/historical    Sandee Hutton BS, RN, PHN  [facility]) [phone]

## 2017-11-16 NOTE — TELEPHONE ENCOUNTER
Per insurance policy, the colonoscopy needs to still be done within 1 year and her last colonoscopy is outside that time frame. It's possible that maybe a peer to peer review can be done, if you would like to talk with MD with insurance but not sure that would guarantee approval.    Heriberto Jimenez, CMA

## 2017-11-16 NOTE — TELEPHONE ENCOUNTER
Dr Franklin,    Per [facility], after checking insurance it has been determined for sure that insurance will not cover a pill cam study unless the patient has had a colonoscopy as well. How would you like to proceed?    Heriberto Jimenez, CMA

## 2017-11-27 NOTE — TELEPHONE ENCOUNTER
In talking with [employer], it sounds like speaking with a medical doctor at the insurance would not be the first step. We might have to submit a prior auth form to verify coverage and from there if denied could be reviewed further with appeal or [id number] with insurance. When I gave them the CPT code for a capsule endoscopy (21150) they told me this procedure does not even require a prior authorization.    BUT I will talk again to [facility] as they were the ones who ran an insurance verification (supposedly) for the capsule endoscopy and stated that patient would need to get colonoscopy first. I will check to see if this is actual insurance policy or just [facility] preference.    Sorry this is taking a bit longer than desired.    Heriberto Jimenez, [facility]

## 2017-11-28 NOTE — TELEPHONE ENCOUNTER
Spoke with [facility], they confirmed that this is not their protocol but the insurance protocol. I will attempt to send a PA form to [employer] to see what comes back on this.    If [employer] does come back saying that the procedure does not require a PA then there frankly is nothing else we can do to investigate the coverage of the capsule endoscopy prior to it being done. So if the capsule endoscopy truly does get denied coverage because the the patient did not have a colonoscopy done with in the last 12 months then the only thing we can attempt is an appeal after the fact. There is no guarantee that the capsule endoscopy can be covered even if we do an appeal, or a [id number] review if the initial appeal is denied, therefore the patient may be stuck with the cost of the procedure if all attempts to appeal fail.    Heriberto Jimenez, [facility]

## 2017-12-01 NOTE — TELEPHONE ENCOUNTER
I discussed with patient the denial on the capsule study.  I rec gi consult and patient agrees.    Peter Franklin M.D.

## 2017-12-01 NOTE — TELEPHONE ENCOUNTER
Well, so it was not an appeal just a prior authorization to see if it needed one prior to the procedure being done. Anyway, [facility] has come back stating that this procedure does not require a prior authorization and is a covered benefit if medically necessary and if it follows their guidelines prior to the procedure being done.    As you already know, [facility] is stating that the patient needs to have both an EGD and colonoscopy completed within 1 year of a capsule endoscopy per her insurance in order for it to be covered. [employer]'s coverage policy on their web site also seems to corroborate this. Because of this, I am fairly certain that patient will ultimately pay out of pocket for this procedure if she were to have it done now without repeating another colonoscopy. Unfortunately, [facility] will not allow appeals or [id number] reviews at this point because these particular processes is reserved for procedures that have already been completed and initially denied coverage by insurance.    Not sure anything else can be done at this point and to ensure best results with insurance, it would seem like the course of action would be for the patient to repeat another colonoscopy and then get the capsule endoscopy.    Heriberto Jimenez, [facility]

## 2017-12-07 ENCOUNTER — TRANSFERRED RECORDS (OUTPATIENT)
Dept: HEALTH INFORMATION MANAGEMENT | Facility: CLINIC | Age: 52
End: 2017-12-07

## 2018-03-30 DIAGNOSIS — J45.20 INTERMITTENT ASTHMA WITHOUT COMPLICATION, UNSPECIFIED ASTHMA SEVERITY: ICD-10-CM

## 2018-03-30 NOTE — TELEPHONE ENCOUNTER
PCP: Please see pended medication request for Advair.   Last ACT completed 5/30/17.   Please advise.     Carolyn White RN      ACT Total Scores 4/10/2012 5/30/2017   ACT TOTAL SCORE 22 -   ASTHMA ER VISITS 0 = None -   ASTHMA HOSPITALIZATIONS 0 = None -   ACT TOTAL SCORE (Goal Greater than or Equal to 20) - 25   In the past 12 months, how many times did you visit the emergency room for your asthma without being admitted to the hospital? - 0   In the past 12 months, how many times were you hospitalized overnight because of your asthma? - 0

## 2018-03-30 NOTE — TELEPHONE ENCOUNTER
"Last Written Prescription Date:  2/27/2018  Last Fill Quantity: 1,  # refills: 0   Last office visit: 10/20/2017 with prescribing provider:     Future Office Visit:      Requested Prescriptions   Pending Prescriptions Disp Refills     ADVAIR DISKUS 100-50 MCG/DOSE diskus inhaler [Pharmacy Med Name: ADVAIR DISKUS 100/50MCG (GREEN)60'S]  0     Sig: INHALE 1 PUFF BY MOUTH TWICE DAILY    Inhaled Steroids Protocol Failed    3/30/2018  3:37 AM       Failed - Asthma control assessment score within normal limits in last 6 months    Please review ACT score.          Passed - Patient is age 12 or older       Passed - Recent (6 mo) or future (30 days) visit within the authorizing provider's specialty    Patient had office visit in the last 6 months or has a visit in the next 30 days with authorizing provider or within the authorizing provider's specialty.  See \"Patient Info\" tab in inbasket, or \"Choose Columns\" in Meds & Orders section of the refill encounter.              "

## 2018-04-03 DIAGNOSIS — G43.809 OTHER MIGRAINE WITHOUT STATUS MIGRAINOSUS, NOT INTRACTABLE: ICD-10-CM

## 2018-04-03 RX ORDER — NADOLOL 20 MG/1
TABLET ORAL
Qty: 90 TABLET | Refills: 1 | Status: SHIPPED | OUTPATIENT
Start: 2018-04-03 | End: 2018-07-20

## 2018-04-03 NOTE — TELEPHONE ENCOUNTER
Routing refill request to provider for review/approval because:  Drug interaction warning  HIGH drug-drug interaction:   Drug-Drug: nadolol and ADVAIR DISKUS, PROAIR HFA  Pharmacologic effects of Sympathomimetics may be decreased by Beta-Adrenergic Blockers. Untoward physiologic effects, characterized by bronchospasm, may occur.     Mila LUCIANO RN

## 2018-04-03 NOTE — TELEPHONE ENCOUNTER
"Requested Prescriptions   Pending Prescriptions Disp Refills     nadolol (CORGARD) 20 MG tablet [Pharmacy Med Name: NADOLOL 20MG TABLETS] 90 tablet 0    Last Written Prescription Date:  11/15/17  Last Fill Quantity: 90,  # refills: 1   Last office visit: 10/20/2017 with prescribing provider:  pcp   Future Office Visit:     Sig: TAKE 1 TABLET(20 MG) BY MOUTH DAILY    Beta-Blockers Protocol Passed    4/3/2018  6:17 PM       Passed - Blood pressure under 140/90 in past 12 months    BP Readings from Last 3 Encounters:   10/30/17 107/79   10/20/17 112/75   05/30/17 118/77                Passed - Patient is age 6 or older       Passed - Recent (12 mo) or future (30 days) visit within the authorizing provider's specialty    Patient had office visit in the last 12 months or has a visit in the next 30 days with authorizing provider or within the authorizing provider's specialty.  See \"Patient Info\" tab in inbasket, or \"Choose Columns\" in Meds & Orders section of the refill encounter.              "

## 2018-05-04 DIAGNOSIS — J45.20 INTERMITTENT ASTHMA WITHOUT COMPLICATION, UNSPECIFIED ASTHMA SEVERITY: ICD-10-CM

## 2018-05-04 NOTE — TELEPHONE ENCOUNTER
ACT Total Scores 4/10/2012 5/30/2017   ACT TOTAL SCORE 22 -   ASTHMA ER VISITS 0 = None -   ASTHMA HOSPITALIZATIONS 0 = None -   ACT TOTAL SCORE (Goal Greater than or Equal to 20) - 25   In the past 12 months, how many times did you visit the emergency room for your asthma without being admitted to the hospital? - 0   In the past 12 months, how many times were you hospitalized overnight because of your asthma? - 0

## 2018-05-04 NOTE — TELEPHONE ENCOUNTER
"ADVAIR DISKUS 100-50 MCG/DOSE diskus inhaler 1 Inhaler 0 3/30/2018     Last Written Prescription Date:  3/30/18  Last Fill Quantity: 1 inhaler,  # refills: 0   Last office visit: 10/20/2017 with prescribing provider:  Rigoberto   Future Office Visit:  none    Requested Prescriptions   Pending Prescriptions Disp Refills     ADVAIR DISKUS 100-50 MCG/DOSE diskus inhaler [Pharmacy Med Name: ADVAIR DISKUS 100/50MCG (GREEN)60'S]  0     Sig: INHALE 1 PUFF BY MOUTH TWICE DAILY    Inhaled Steroids Protocol Failed    5/4/2018  3:35 AM       Failed - Asthma control assessment score within normal limits in last 6 months    Please review ACT score.          Failed - Recent (6 mo) or future (30 days) visit within the authorizing provider's specialty    Patient had office visit in the last 6 months or has a visit in the next 30 days with authorizing provider or within the authorizing provider's specialty.  See \"Patient Info\" tab in inbasket, or \"Choose Columns\" in Meds & Orders section of the refill encounter.           Passed - Patient is age 12 or older        No flowsheet data found.  "

## 2018-05-04 NOTE — TELEPHONE ENCOUNTER
Routing to TCs to contact patient to inform due for appointment. Please route back to refill pool once scheduled.   Mila LUCIANO RN

## 2018-05-08 NOTE — TELEPHONE ENCOUNTER
To PCP:     Routing refill request to provider for review/approval because:  Rosario given x1 and patient did not follow up, please advise    Please review and authorize if appropriate,     Thank you,   Karley HOLLOWAY RN

## 2018-06-07 DIAGNOSIS — J45.20 INTERMITTENT ASTHMA WITHOUT COMPLICATION, UNSPECIFIED ASTHMA SEVERITY: ICD-10-CM

## 2018-06-07 NOTE — LETTER
[facility]  [address] [facility]  [address]  [place]  [address]  Tel: [phone]    June 7, 2018    Jyothi Freitas  [address] [place] [address]        Brando Roe,    Our records indicate that you are due for a follow up visit with .  Please call to schedule an appointment so that we can avoid interruption of your medical care and medication refills.  Your recent request for the medication,Advair, has been filled.  Further refills can be discussed at your next appointment. I have enclosed an ACT for you to fill out.  Please bring it to your next office visit OR mail it to the clinic.      Sincerely,    Amara ARBOLEDA RN   [facility]

## 2018-06-07 NOTE — TELEPHONE ENCOUNTER
Mailed letter and ACT to patient.  Prescription approved per [facility] Refill Protocol X 1 with instructions to schedule an appointment.  Amara Collins RN

## 2018-06-07 NOTE — TELEPHONE ENCOUNTER
"Last Written Prescription Date:  5/08/18  Last Fill Quantity: 1 inhaler,  # refills: 0   Last office visit: 10/20/2017 with prescribing provider:  Rigoberto   Future Office Visit:      Requested Prescriptions   Pending Prescriptions Disp Refills     ADVAIR DISKUS 100-50 MCG/DOSE diskus inhaler [Pharmacy Med Name: ADVAIR DISKUS 100/50MCG (GREEN)60'S]  0     Sig: INHALE 1 PUFF BY MOUTH TWICE DAILY    Inhaled Steroids Protocol Failed    6/7/2018  3:37 AM       Failed - Asthma control assessment score within normal limits in last 6 months    Please review ACT score.          Failed - Recent (6 mo) or future (30 days) visit within the authorizing provider's specialty    Patient had office visit in the last 6 months or has a visit in the next 30 days with authorizing provider or within the authorizing provider's specialty.  See \"Patient Info\" tab in inbasket, or \"Choose Columns\" in Meds & Orders section of the refill encounter.           Passed - Patient is age 12 or older        ACT Total Scores 4/10/2012 5/30/2017   ACT TOTAL SCORE 22 -   ASTHMA ER VISITS 0 = None -   ASTHMA HOSPITALIZATIONS 0 = None -   ACT TOTAL SCORE (Goal Greater than or Equal to 20) - 25   In the past 12 months, how many times did you visit the emergency room for your asthma without being admitted to the hospital? - 0   In the past 12 months, how many times were you hospitalized overnight because of your asthma? - 0       "

## 2018-07-20 ENCOUNTER — OFFICE VISIT (OUTPATIENT)
Dept: FAMILY MEDICINE | Facility: CLINIC | Age: 53
End: 2018-07-20
Payer: COMMERCIAL

## 2018-07-20 VITALS
BODY MASS INDEX: 20.4 KG/M2 | TEMPERATURE: 98.6 F | OXYGEN SATURATION: 98 % | SYSTOLIC BLOOD PRESSURE: 133 MMHG | WEIGHT: 130 LBS | HEIGHT: 67 IN | HEART RATE: 68 BPM | DIASTOLIC BLOOD PRESSURE: 78 MMHG

## 2018-07-20 DIAGNOSIS — M26.609 TEMPOROMANDIBULAR JOINT DISORDER: Primary | ICD-10-CM

## 2018-07-20 DIAGNOSIS — G43.809 OTHER MIGRAINE WITHOUT STATUS MIGRAINOSUS, NOT INTRACTABLE: ICD-10-CM

## 2018-07-20 DIAGNOSIS — J45.20 INTERMITTENT ASTHMA, UNCOMPLICATED: ICD-10-CM

## 2018-07-20 PROCEDURE — 99214 OFFICE O/P EST MOD 30 MIN: CPT | Performed by: INTERNAL MEDICINE

## 2018-07-20 RX ORDER — NADOLOL 20 MG/1
TABLET ORAL
Qty: 90 TABLET | Refills: 1 | Status: SHIPPED | OUTPATIENT
Start: 2018-07-20 | End: 2018-07-20

## 2018-07-20 RX ORDER — NADOLOL 20 MG/1
TABLET ORAL
Qty: 90 TABLET | Refills: 3 | Status: SHIPPED | OUTPATIENT
Start: 2018-07-20 | End: 2019-05-21

## 2018-07-20 NOTE — PATIENT INSTRUCTIONS
Use the flovent instead of the advair and if your asthma worsens let me know    See the tmj clinic    Peter Franklin M.D.

## 2018-07-20 NOTE — PROGRESS NOTES
The patient is here for a few issues.    The patient has asthma as noted which is currently well controlled with Advair.  She does not have to use her rescue inhaler.  In the past I suggested go to stepdown therapy and just use Flovent.  When she tried this she was having a lot of stress in her life and was not sure if it made it worse.  She would like to go back and try this again.    The patient has chronic migraines.  She has had them for many years and is currently on nadolol for this.  With that she feels like the migraines are under fairly good control.  She may get one every 5 weeks now as before she was having a much more frequently.  When she has them she takes Advil or Tylenol.  They can last up to 3-4 days.  These are not new or change.  She is tried a triptan's in the past but had side effects with those.  She like to continue the nadolol.    Patient also notes for over a year of pain on the left jaw area.  She was seen by ENT and nothing was found as noted last year.  The pain has persisted since then and it is a daily discomfort.  It goes away with Tylenol or Advil.  Nothing makes it worse.  It comes and goes throughout the day.  There is no triggers.  Chewing does not bother her.  No throat pain.  No fevers or night sweats or history of vasculitis.  No significant temporal artery pains.  It is a sharp throbbing discomfort when she has it.  No neck pain.    She otherwise has felt well but does have a fair amount of stress.    Past Medical History:   Diagnosis Date     H/O colonoscopy 03/08/2016    done at [facility]     Intermittent asthma     since childhood     Low iron 10/2017    done for eval of hair loss, egd [facility]l     Migraines     most of adult life, has seen neuro in past, on bblocker     Mild depression (H)      Normal stress echocardiogram July 2011    due to hear racing     Osteopenia 2008     Syncope 03/2017    echo n[facility] lv size and fxn, grade 1 dd, mild tr     Past Surgical History:  "  Procedure Laterality Date     C TMJ ARTHROSCOPY/SURGERY       ESOPHAGOSCOPY, GASTROSCOPY, DUODENOSCOPY (EGD), COMBINED N/A 10/30/2017    Procedure: COMBINED ESOPHAGOSCOPY, GASTROSCOPY, DUODENOSCOPY (EGD), BIOPSY SINGLE OR MULTIPLE;  COMBINED ESOPHAGOSCOPY, GASTROSCOPY, DUODENOSCOPY (EGD);  Surgeon: Martin Rodriguez MD;  Location:  GI     ORTHOPEDIC SURGERY      \"leg surgery\"     single oopherectomy  2010    cyst     Social History     Social History     Marital status:      Spouse name: N/A     Number of children: 2     Years of education: N/A     Occupational History      Unemployed     Social History Main Topics     Smoking status: Former Smoker     Quit date: 3/27/1997     Smokeless tobacco: Never Used     Alcohol use Yes      Comment: rarely     Drug use: No     Sexual activity: Yes     Partners: Male     Birth control/ protection: Pill     Other Topics Concern     Not on file     Social History Narrative     Current Outpatient Prescriptions   Medication Sig Dispense Refill     ADVAIR DISKUS 100-50 MCG/DOSE diskus inhaler INHALE 1 PUFF BY MOUTH TWICE DAILY 1 Inhaler 1     ALBUTEROL 108 (90 BASE) MCG/ACT inhaler INHALE 2 PUFFS INTO THE LUNGS EVERY 6 HOURS AS NEEDED FOR SHORTNESS OF BREATH OR DIFFICULT BREATHING OR WHEEZING 8.5 g 0     buPROPion (WELLBUTRIN XL) 300 MG 24 hr tablet Take 150 mg by mouth daily        Cyanocobalamin (B-12) 100 MCG TABS        Estrogens Conjugated (PREMARIN PO)        fluticasone (FLOVENT DISKUS) 100 MCG/BLIST AEPB Inhale 1 puff into the lungs 2 times daily 3 Inhaler 3     nadolol (CORGARD) 20 MG tablet TAKE 1 TABLET(20 MG) BY MOUTH DAILY 90 tablet 3     Progesterone Micronized (PROGESTERONE PO)        [DISCONTINUED] fluticasone (FLOVENT DISKUS) 100 MCG/BLIST AEPB Inhale 1 puff into the lungs 2 times daily 3 Inhaler 3     [DISCONTINUED] nadolol (CORGARD) 20 MG tablet TAKE 1 TABLET(20 MG) BY MOUTH DAILY 90 tablet 1     [DISCONTINUED] nadolol (CORGARD) 20 MG tablet TAKE " "1 TABLET(20 MG) BY MOUTH DAILY 90 tablet 1     Allergies   Allergen Reactions     Sulfa Drugs Other (See Comments)     Red face. Ringing in the ears.     FAMILY HISTORY NOTED AND REVIEWED    REVIEW OF SYSTEMS: above    PHYSICAL EXAM    /78 (BP Location: Right arm, Patient Position: Sitting, Cuff Size: Adult Regular)  Pulse 68  Temp 98.6  F (37  C)  Ht 5' 7\" (1.702 m)  Wt 130 lb (59 kg)  SpO2 98%  Breastfeeding? No  BMI 20.36 kg/m2    Patient appears non toxic  Mouth and eye exam is normal.  Her facial exam is normal with no facial redness, or swelling.  There is no tenderness over the jaw area which is the area of her discomfort.  There is no temporal artery discomfort or enlargement.  The TMs and canals are completely normal.  Her neck exams within normal limits.  Her thyroid is not enlarged.  No supraclavicular, cervical or axillary lymphadenopathy  Lungs clear, normal flow  cv reglar rate and rhythm, no murmer, rub or gallop, carotids within normal limits, no bruits    ASSESSMENT:  1. Left tmj pain, doubt vasculitis, doubt ear, doubt cns, doubt carotid  2. Asthma, controlled, will try step down therapy  3. Migraines, stable    PLAN:  To tmj clinic  Change advair to flovent and call if worsens  Continue same migraine meds    Peter Franklin M.D.        "

## 2018-07-20 NOTE — MR AVS SNAPSHOT
After Visit Summary   7/20/2018    Jyothi Freitas    MRN: [id number]           Patient Information     Date Of Birth          1965        Visit Information        Provider Department      7/20/2018 9:00 AM Peter Franklin MD [facility]        Today's Diagnoses     Temporomandibular joint disorder    -  1    Other migraine without status migrainosus, not intractable        Intermittent asthma, uncomplicated          Care Instructions    Use the flovent instead of the advair and if your asthma worsens let me know    See the tmj clinic    Peter Franklin M.D.            Follow-ups after your visit        Additional Services     OTOLARYNGOLOGY REFERRAL       Your provider has referred you to: [facility]: [facility] ([facility] Only) - [place] [phone]   [email or web address]    Please be aware that coverage of these services is subject to the terms and limitations of your health insurance plan.  Call member services at your health plan with any benefit or coverage questions.      Please bring the following with you to your appointment:    (1) Any X-Rays, CTs or MRIs which have been performed.  Contact the facility where they were done to arrange for  prior to your scheduled appointment.   (2) List of current medications  (3) This referral request   (4) Any documents/labs given to you for this referral                  Who to contact     If you have questions or need follow up information about today's clinic visit or your schedule please contact [facility] directly at [phone].  Normal or non-critical lab and imaging results will be communicated to you by MyChart, letter or phone within 4 business days after the clinic has received the results. If you do not hear from us within 7 days, please contact the clinic through MyChart or phone. If you have a critical or abnormal lab result, we will notify you by phone as soon as possible.  Submit refill  "requests through [employer] or call your pharmacy and they will forward the refill request to us. Please allow 3 business days for your refill to be completed.          Additional Information About Your Visit        Care EveryWhere ID     This is your Care EveryWhere ID. This could be used by other organizations to access your [place] medical records  [id number]        Your Vitals Were     Pulse Temperature Height Pulse Oximetry Breastfeeding? BMI (Body Mass Index)    68 98.6  F (37  C) 5' 7\" (1.702 m) 98% No 20.36 kg/m2       Blood Pressure from Last 3 Encounters:   07/20/18 133/78   10/30/17 107/79   10/20/17 112/75    Weight from Last 3 Encounters:   07/20/18 130 lb (59 kg)   10/30/17 130 lb (59 kg)   10/20/17 131 lb (59.4 kg)              We Performed the Following     OTOLARYNGOLOGY REFERRAL          Today's Medication Changes          These changes are accurate as of 7/20/18  9:31 AM.  If you have any questions, ask your nurse or doctor.               Start taking these medicines.        Dose/Directions    nadolol 20 MG tablet   Commonly known as:  CORGARD   Used for:  Other migraine without status migrainosus, not intractable   Started by:  Peter Franklin MD        TAKE 1 TABLET(20 MG) BY MOUTH DAILY   Quantity:  90 tablet   Refills:  3         Stop taking these medicines if you haven't already. Please contact your care team if you have questions.     estrogens-methylTESTOSTERone 0.625-1.25 MG per tablet   Commonly known as:  ESTRATEST HS   Stopped by:  Peter Franklin MD                Where to get your medicines      [id number] [place] - [address] AT [facility] [place] [address]     Phone:  [phone]     fluticasone 100 MCG/BLIST Aepb    nadolol 20 MG tablet                Primary Care Provider Office Phone # Fax #    Peter Franklin [phone] [address]  [facility] [address]        Equal Access to Services     [facility] : Hadii aad ku hadaishao Sodebbieali, waaxda luqadaha, qaybta kaalmada juneguilletawanda, waxpapito florina jovanyag quintanacaspertami galan. So [facility] [phone].    ATENCIÓN: Si habla español, tiene a regalado disposición servicios gratuitos de asistencia lingüística. Llame al [phone].    We comply with applicable federal civil rights laws and [place] laws. We do not discriminate on the basis of race, color, national origin, age, disability, sex, sexual orientation, or gender identity.            Thank you!     Thank you for choosing [facility]  for your care. Our goal is always to provide you with excellent care. Hearing back from our patients is one way we can continue to improve our services. Please take a few minutes to complete the written survey that you may receive in the mail after your visit with us. Thank you!             Your Updated Medication List - Protect others around you: Learn how to safely use, store and throw away your medicines at www.disposemymeds.org.          This list is accurate as of 7/20/18  9:31 AM.  Always use your most recent med list.                   Brand Name Dispense Instructions for use Diagnosis    ADVAIR DISKUS 100-50 MCG/DOSE diskus inhaler   Generic drug:  fluticasone-salmeterol     1 Inhaler    INHALE 1 PUFF BY MOUTH TWICE DAILY    Intermittent asthma without complication, unspecified asthma severity       B-12 100 MCG Tabs           fluticasone 100 MCG/BLIST Aepb    FLOVENT DISKUS    3 Inhaler    Inhale 1 puff into the lungs 2 times daily    Intermittent asthma, uncomplicated       nadolol 20 MG tablet    CORGARD    90 tablet    TAKE 1 TABLET(20 MG) BY MOUTH DAILY    Other migraine without status migrainosus, not intractable       PREMARIN PO           PROAIR  (90 Base) MCG/ACT Inhaler   Generic drug:  albuterol     8.5 g    INHALE 2 PUFFS INTO THE LUNGS EVERY 6 HOURS AS NEEDED FOR SHORTNESS OF BREATH OR  DIFFICULT BREATHING OR WHEEZING    Syncope, unspecified syncope type, Intermittent asthma, uncomplicated       PROGESTERONE PO           WELLBUTRIN  MG 24 hr tablet   Generic drug:  buPROPion      Take 150 mg by mouth daily

## 2018-07-21 ASSESSMENT — ASTHMA QUESTIONNAIRES: ACT_TOTALSCORE: 25

## 2018-09-17 DIAGNOSIS — G43.809 OTHER MIGRAINE WITHOUT STATUS MIGRAINOSUS, NOT INTRACTABLE: ICD-10-CM

## 2018-09-17 RX ORDER — NADOLOL 20 MG/1
TABLET ORAL
Qty: 90 TABLET | Refills: 0 | COMMUNITY
Start: 2018-09-17

## 2019-01-22 DIAGNOSIS — J45.20 INTERMITTENT ASTHMA WITHOUT COMPLICATION, UNSPECIFIED ASTHMA SEVERITY: ICD-10-CM

## 2019-01-24 NOTE — TELEPHONE ENCOUNTER
"ADVAIR DISKUS 100-50 MCG/DOSE diskus inhaler 1 Inhaler 1 6/7/2018     Last Written Prescription Date:  6/7/18  Last Fill Quantity: 1 inhaler,  # refills: 1   Last office visit: 7/20/2018 with prescribing provider:  Ml   Future Office Visit:  None    Requested Prescriptions   Pending Prescriptions Disp Refills     ADVAIR DISKUS 100-50 MCG/DOSE inhaler [Pharmacy Med Name: ADVAIR DISKUS 100/50MCG (GREEN)60'S]  0     Sig: INHALE 1 PUFF BY MOUTH TWICE DAILY    Inhaled Steroids Protocol Failed - 1/23/2019  1:22 PM       Failed - Asthma control assessment score within normal limits in last 6 months    Please review ACT score.          Failed - Recent (6 mo) or future (30 days) visit within the authorizing provider's specialty    Patient had office visit in the last 6 months or has a visit in the next 30 days with authorizing provider or within the authorizing provider's specialty.  See \"Patient Info\" tab in inbasket, or \"Choose Columns\" in Meds & Orders section of the refill encounter.           Passed - Patient is age 12 or older       Passed - Medication is active on med list        No flowsheet data found.      "

## 2019-01-24 NOTE — TELEPHONE ENCOUNTER
Pt is due for annual OV. Refilled 30 day supply with note to pharmacy to inform patient to schedule an appointment     Casa STERN RN

## 2019-03-01 DIAGNOSIS — J45.20 INTERMITTENT ASTHMA WITHOUT COMPLICATION, UNSPECIFIED ASTHMA SEVERITY: ICD-10-CM

## 2019-03-01 NOTE — TELEPHONE ENCOUNTER
"ADVAIR DISKUS 100-50 MCG/DOSE inhaler    Last Written Prescription Date:  01/24/2019  Last Fill Quantity: 1,  # refills: 0   Last office visit: 7/20/2018 with prescribing provider:  Rigoberto  Future Office Visit:  Unknown     Requested Prescriptions   Pending Prescriptions Disp Refills     ADVAIR DISKUS 100-50 MCG/DOSE inhaler [Pharmacy Med Name: ADVAIR DISKUS 100/50MCG (GREEN)60'S]  0     Sig: INHALE 1 PUFF BY MOUTH TWICE DAILY    Inhaled Steroids Protocol Failed - 3/1/2019  3:36 AM       Failed - Asthma control assessment score within normal limits in last 6 months    Please review ACT score.          Failed - Recent (6 mo) or future (30 days) visit within the authorizing provider's specialty    Patient had office visit in the last 6 months or has a visit in the next 30 days with authorizing provider or within the authorizing provider's specialty.  See \"Patient Info\" tab in inbasket, or \"Choose Columns\" in Meds & Orders section of the refill encounter.           Passed - Patient is age 12 or older       Passed - Medication is active on med list          "

## 2019-03-01 NOTE — TELEPHONE ENCOUNTER
ACT Total Scores 4/10/2012 5/30/2017 7/20/2018   ACT TOTAL SCORE 22 - -   ASTHMA ER VISITS 0 = None - -   ASTHMA HOSPITALIZATIONS 0 = None - -   ACT TOTAL SCORE (Goal Greater than or Equal to 20) - 25 25   In the past 12 months, how many times did you visit the emergency room for your asthma without being admitted to the hospital? - 0 0   In the past 12 months, how many times were you hospitalized overnight because of your asthma? - 0 0     Routing refill request to provider for review/approval because:  Rosario given x1 and patient did not follow up, please advise  Labs not current:  JOSHUA CastorenaN, RN  [employer] [facility]

## 2019-04-01 ENCOUNTER — TRANSFERRED RECORDS (OUTPATIENT)
Dept: HEALTH INFORMATION MANAGEMENT | Facility: CLINIC | Age: 54
End: 2019-04-01

## 2019-04-20 DIAGNOSIS — J45.20 INTERMITTENT ASTHMA WITHOUT COMPLICATION, UNSPECIFIED ASTHMA SEVERITY: ICD-10-CM

## 2019-04-22 NOTE — TELEPHONE ENCOUNTER
"I do not see Wixela  med on pt's med list  Unless another name is     ADVAIR DISKUS 100-50 MCG/DOSE inhaler 1 Inhaler 0 3/1/2019  No   Sig: INHALE 1 PUFF BY MOUTH TWICE DAILY     Last Written Prescription Date:  03/01/2019  Last Fill Quantity: 1,  # refills: 0   Last office visit: 7/20/2018 with prescribing provider:     Future Office Visit:      Requested Prescriptions   Pending Prescriptions Disp Refills     WIXELA INHUB 100-50 MCG/DOSE inhaler [Pharmacy Med Name: WIXELA INHUB DISKUS 100/50MCG 60S]  0     Sig: INHALE 1 PUFF BY MOUTH TWICE DAILY       Inhaled Steroids Protocol Failed - 4/20/2019  2:27 PM        Failed - Asthma control assessment score within normal limits in last 6 months     Please review ACT score.           Failed - Recent (6 mo) or future (30 days) visit within the authorizing provider's specialty     Patient had office visit in the last 6 months or has a visit in the next 30 days with authorizing provider or within the authorizing provider's specialty.  See \"Patient Info\" tab in inbasket, or \"Choose Columns\" in Meds & Orders section of the refill encounter.            Passed - Patient is age 12 or older        Passed - Medication is active on med list            "

## 2019-04-23 NOTE — TELEPHONE ENCOUNTER
Routing refill request to provider for review/approval because:  Drug not active on patient's medication list  Also due for physical and asthma visit.  Pended 2 months and added in pharm comments to schedule.  Please authorize if appropriate.  Thanks,  Bisi Pires RN

## 2019-05-08 ENCOUNTER — TRANSFERRED RECORDS (OUTPATIENT)
Dept: HEALTH INFORMATION MANAGEMENT | Facility: CLINIC | Age: 54
End: 2019-05-08

## 2019-05-10 ENCOUNTER — OFFICE VISIT (OUTPATIENT)
Dept: FAMILY MEDICINE | Facility: CLINIC | Age: 54
End: 2019-05-10
Payer: COMMERCIAL

## 2019-05-10 VITALS
HEIGHT: 67 IN | BODY MASS INDEX: 21.19 KG/M2 | WEIGHT: 135 LBS | DIASTOLIC BLOOD PRESSURE: 83 MMHG | SYSTOLIC BLOOD PRESSURE: 129 MMHG | OXYGEN SATURATION: 99 % | HEART RATE: 62 BPM

## 2019-05-10 DIAGNOSIS — H61.23 BILATERAL IMPACTED CERUMEN: Primary | ICD-10-CM

## 2019-05-10 PROCEDURE — 99212 OFFICE O/P EST SF 10 MIN: CPT | Mod: 25 | Performed by: NURSE PRACTITIONER

## 2019-05-10 PROCEDURE — 69209 REMOVE IMPACTED EAR WAX UNI: CPT | Mod: 50 | Performed by: NURSE PRACTITIONER

## 2019-05-10 ASSESSMENT — ANXIETY QUESTIONNAIRES
7. FEELING AFRAID AS IF SOMETHING AWFUL MIGHT HAPPEN: NOT AT ALL
5. BEING SO RESTLESS THAT IT IS HARD TO SIT STILL: NOT AT ALL
IF YOU CHECKED OFF ANY PROBLEMS ON THIS QUESTIONNAIRE, HOW DIFFICULT HAVE THESE PROBLEMS MADE IT FOR YOU TO DO YOUR WORK, TAKE CARE OF THINGS AT HOME, OR GET ALONG WITH OTHER PEOPLE: NOT DIFFICULT AT ALL
3. WORRYING TOO MUCH ABOUT DIFFERENT THINGS: NOT AT ALL
2. NOT BEING ABLE TO STOP OR CONTROL WORRYING: NOT AT ALL
1. FEELING NERVOUS, ANXIOUS, OR ON EDGE: NOT AT ALL
6. BECOMING EASILY ANNOYED OR IRRITABLE: NOT AT ALL
GAD7 TOTAL SCORE: 0

## 2019-05-10 ASSESSMENT — PATIENT HEALTH QUESTIONNAIRE - PHQ9
SUM OF ALL RESPONSES TO PHQ QUESTIONS 1-9: 4
5. POOR APPETITE OR OVEREATING: NOT AT ALL

## 2019-05-10 ASSESSMENT — MIFFLIN-ST. JEOR: SCORE: 1244.99

## 2019-05-10 NOTE — NURSING NOTE
Attempted to wash both ears warm plain water with both elephant ear and syringe no wax removed but ears half opened.     Francine VERGARA MA

## 2019-05-10 NOTE — PROGRESS NOTES
"HPI      SUBJECTIVE:   Jyothi Freitas is a 53 year old female who presents to clinic today for the following   health issues:    Chief Complaint   Patient presents with     Ear Problem     possibly plugged        Can barely hear starting 3 days ago of R ear   Maybe fullness of right sinus   No pain   Having some ringing       ROS  Detailed as above     /83 (BP Location: Right arm, Patient Position: Chair, Cuff Size: Adult Regular)   Pulse 62   Ht 1.702 m (5' 7\")   Wt 61.2 kg (135 lb)   SpO2 99%   BMI 21.14 kg/m     Physical Exam  NAD  bilat cerumen impaction    Assessment and Plan:       ICD-10-CM    1. Bilateral impacted cerumen H61.23 HC REMOVAL IMPACTED CERUMEN IRRIGATION/LVG UNILAT     CMA completed cerumen removal by lavage     KRISTIAN Jones, CNP  [facility]     "

## 2019-05-10 NOTE — NURSING NOTE
I gave pt  PHQ-9 and 2 JERILYN forms instead of ACT.   No ans cell.  Will try pt on Monday otherwise mail

## 2019-05-11 ASSESSMENT — ANXIETY QUESTIONNAIRES: GAD7 TOTAL SCORE: 0

## 2019-05-21 ENCOUNTER — OFFICE VISIT (OUTPATIENT)
Dept: FAMILY MEDICINE | Facility: CLINIC | Age: 54
End: 2019-05-21
Payer: COMMERCIAL

## 2019-05-21 VITALS
HEIGHT: 67 IN | SYSTOLIC BLOOD PRESSURE: 119 MMHG | BODY MASS INDEX: 21.19 KG/M2 | TEMPERATURE: 97 F | DIASTOLIC BLOOD PRESSURE: 83 MMHG | HEART RATE: 83 BPM | OXYGEN SATURATION: 99 % | WEIGHT: 135 LBS

## 2019-05-21 DIAGNOSIS — E61.1 LOW IRON: ICD-10-CM

## 2019-05-21 DIAGNOSIS — R53.83 OTHER FATIGUE: ICD-10-CM

## 2019-05-21 DIAGNOSIS — G43.809 OTHER MIGRAINE WITHOUT STATUS MIGRAINOSUS, NOT INTRACTABLE: ICD-10-CM

## 2019-05-21 DIAGNOSIS — Z00.00 ROUTINE GENERAL MEDICAL EXAMINATION AT A HEALTH CARE FACILITY: Primary | ICD-10-CM

## 2019-05-21 DIAGNOSIS — R73.9 ELEVATED BLOOD SUGAR: ICD-10-CM

## 2019-05-21 DIAGNOSIS — J45.20 INTERMITTENT ASTHMA, UNCOMPLICATED: ICD-10-CM

## 2019-05-21 DIAGNOSIS — J45.20 INTERMITTENT ASTHMA WITHOUT COMPLICATION, UNSPECIFIED ASTHMA SEVERITY: ICD-10-CM

## 2019-05-21 DIAGNOSIS — F32.A MILD DEPRESSION: ICD-10-CM

## 2019-05-21 DIAGNOSIS — M85.80 OSTEOPENIA, UNSPECIFIED LOCATION: ICD-10-CM

## 2019-05-21 LAB
ALBUMIN SERPL-MCNC: 3.9 G/DL (ref 3.4–5)
ALBUMIN UR-MCNC: NEGATIVE MG/DL
ALP SERPL-CCNC: 48 U/L (ref 40–150)
ALT SERPL W P-5'-P-CCNC: 18 U/L (ref 0–50)
ANION GAP SERPL CALCULATED.3IONS-SCNC: 10 MMOL/L (ref 3–14)
APPEARANCE UR: CLEAR
AST SERPL W P-5'-P-CCNC: 15 U/L (ref 0–45)
BACTERIA #/AREA URNS HPF: ABNORMAL /HPF
BILIRUB SERPL-MCNC: 0.3 MG/DL (ref 0.2–1.3)
BILIRUB UR QL STRIP: NEGATIVE
BUN SERPL-MCNC: 13 MG/DL (ref 7–30)
CALCIUM SERPL-MCNC: 9.2 MG/DL (ref 8.5–10.1)
CHLORIDE SERPL-SCNC: 108 MMOL/L (ref 94–109)
CHOLEST SERPL-MCNC: 197 MG/DL
CO2 SERPL-SCNC: 23 MMOL/L (ref 20–32)
COLOR UR AUTO: YELLOW
CREAT SERPL-MCNC: 0.86 MG/DL (ref 0.52–1.04)
ERYTHROCYTE [DISTWIDTH] IN BLOOD BY AUTOMATED COUNT: 12.4 % (ref 10–15)
FERRITIN SERPL-MCNC: 24 NG/ML (ref 8–252)
GFR SERPL CREATININE-BSD FRML MDRD: 76 ML/MIN/{1.73_M2}
GLUCOSE SERPL-MCNC: 107 MG/DL (ref 70–99)
GLUCOSE UR STRIP-MCNC: NEGATIVE MG/DL
HBA1C MFR BLD: 5.1 % (ref 0–5.6)
HCT VFR BLD AUTO: 41.3 % (ref 35–47)
HDLC SERPL-MCNC: 77 MG/DL
HGB BLD-MCNC: 14.1 G/DL (ref 11.7–15.7)
HGB UR QL STRIP: ABNORMAL
IRON SATN MFR SERPL: 29 % (ref 15–46)
IRON SERPL-MCNC: 121 UG/DL (ref 35–180)
KETONES UR STRIP-MCNC: NEGATIVE MG/DL
LDLC SERPL CALC-MCNC: 95 MG/DL
LEUKOCYTE ESTERASE UR QL STRIP: NEGATIVE
MCH RBC QN AUTO: 31.5 PG (ref 26.5–33)
MCHC RBC AUTO-ENTMCNC: 34.1 G/DL (ref 31.5–36.5)
MCV RBC AUTO: 92 FL (ref 78–100)
NITRATE UR QL: NEGATIVE
NON-SQ EPI CELLS #/AREA URNS LPF: ABNORMAL /LPF
NONHDLC SERPL-MCNC: 120 MG/DL
PH UR STRIP: 5.5 PH (ref 5–7)
PLATELET # BLD AUTO: 276 10E9/L (ref 150–450)
POTASSIUM SERPL-SCNC: 4 MMOL/L (ref 3.4–5.3)
PROT SERPL-MCNC: 7.7 G/DL (ref 6.8–8.8)
RBC # BLD AUTO: 4.47 10E12/L (ref 3.8–5.2)
RBC #/AREA URNS AUTO: ABNORMAL /HPF
SODIUM SERPL-SCNC: 141 MMOL/L (ref 133–144)
SOURCE: ABNORMAL
SP GR UR STRIP: 1.02 (ref 1–1.03)
TIBC SERPL-MCNC: 418 UG/DL (ref 240–430)
TRIGL SERPL-MCNC: 127 MG/DL
TSH SERPL DL<=0.005 MIU/L-ACNC: 1.56 MU/L (ref 0.4–4)
UROBILINOGEN UR STRIP-ACNC: 0.2 EU/DL (ref 0.2–1)
WBC # BLD AUTO: 5 10E9/L (ref 4–11)
WBC #/AREA URNS AUTO: ABNORMAL /HPF

## 2019-05-21 PROCEDURE — 84443 ASSAY THYROID STIM HORMONE: CPT | Performed by: INTERNAL MEDICINE

## 2019-05-21 PROCEDURE — 81001 URINALYSIS AUTO W/SCOPE: CPT | Performed by: INTERNAL MEDICINE

## 2019-05-21 PROCEDURE — 83540 ASSAY OF IRON: CPT | Performed by: INTERNAL MEDICINE

## 2019-05-21 PROCEDURE — 86803 HEPATITIS C AB TEST: CPT | Performed by: INTERNAL MEDICINE

## 2019-05-21 PROCEDURE — 80061 LIPID PANEL: CPT | Performed by: INTERNAL MEDICINE

## 2019-05-21 PROCEDURE — 85027 COMPLETE CBC AUTOMATED: CPT | Performed by: INTERNAL MEDICINE

## 2019-05-21 PROCEDURE — 87389 HIV-1 AG W/HIV-1&-2 AB AG IA: CPT | Performed by: INTERNAL MEDICINE

## 2019-05-21 PROCEDURE — 82728 ASSAY OF FERRITIN: CPT | Performed by: INTERNAL MEDICINE

## 2019-05-21 PROCEDURE — 99213 OFFICE O/P EST LOW 20 MIN: CPT | Mod: 25 | Performed by: INTERNAL MEDICINE

## 2019-05-21 PROCEDURE — 36415 COLL VENOUS BLD VENIPUNCTURE: CPT | Performed by: INTERNAL MEDICINE

## 2019-05-21 PROCEDURE — 99396 PREV VISIT EST AGE 40-64: CPT | Performed by: INTERNAL MEDICINE

## 2019-05-21 PROCEDURE — 83550 IRON BINDING TEST: CPT | Performed by: INTERNAL MEDICINE

## 2019-05-21 PROCEDURE — 83036 HEMOGLOBIN GLYCOSYLATED A1C: CPT | Performed by: INTERNAL MEDICINE

## 2019-05-21 PROCEDURE — 80053 COMPREHEN METABOLIC PANEL: CPT | Performed by: INTERNAL MEDICINE

## 2019-05-21 RX ORDER — MEDROXYPROGESTERONE ACETATE 2.5 MG/1
2.5 TABLET ORAL DAILY
COMMUNITY
End: 2019-10-17

## 2019-05-21 RX ORDER — NADOLOL 20 MG/1
TABLET ORAL
Qty: 90 TABLET | Refills: 3 | Status: SHIPPED | OUTPATIENT
Start: 2019-05-21 | End: 2020-07-13

## 2019-05-21 RX ORDER — BUPROPION HYDROCHLORIDE 300 MG/1
300 TABLET ORAL EVERY MORNING
Qty: 90 TABLET | Refills: 3 | Status: SHIPPED | OUTPATIENT
Start: 2019-05-21 | End: 2020-05-01

## 2019-05-21 ASSESSMENT — MIFFLIN-ST. JEOR: SCORE: 1244.99

## 2019-05-21 NOTE — PATIENT INSTRUCTIONS
Check on your tdap status    I would recommend getting the new shingles shot called shingrix, but I would do it at your pharmacy as they can check with the insurance company to see if it is paid for.    Peter Franklin M.D.

## 2019-05-21 NOTE — PROGRESS NOTES
"   SUBJECTIVE:   CC: Jyothi Freitas is an 53 year old woman who presents for preventive health visit.     Overall the patient is doing well.  Her depression is controlled.  Her asthma is controlled.  She finds that with seasonal changes she does better with the Advair but otherwise doing well with the Flovent and no albuterol use.  She is up-to-date with her gynecologist.  No GI symptoms.  Some ongoing fatigue.  Her migraines are under good control.  She otherwise feels well.    She is working full-time for [employer]               Past Medical History:      Past Medical History:   Diagnosis Date     H/O colonoscopy 03/08/2016    done at [place] and nl     Intermittent asthma     since childhood     Low iron 10/2017    done for eval of hair loss, egd nl     Migraines     most of adult life, has seen neuro in past, on bblocker     Mild depression (H)      Normal stress echocardiogram July 2011    due to hear racing     Osteopenia 2008     Syncope 03/2017    echo nl lv size and fxn, grade 1 dd, mild tr             Past Surgical History:      Past Surgical History:   Procedure Laterality Date     C TMJ ARTHROSCOPY/SURGERY       ESOPHAGOSCOPY, GASTROSCOPY, DUODENOSCOPY (EGD), COMBINED N/A 10/30/2017    Procedure: COMBINED ESOPHAGOSCOPY, GASTROSCOPY, DUODENOSCOPY (EGD), BIOPSY SINGLE OR MULTIPLE;  COMBINED ESOPHAGOSCOPY, GASTROSCOPY, DUODENOSCOPY (EGD);  Surgeon: Martin Rodriguez MD;  Location:  GI     ORTHOPEDIC SURGERY      \"leg surgery\"     single oopherectomy  2010    cyst             Social History:     Social History     Socioeconomic History     Marital status:      Spouse name: Not on file     Number of children: 2     Years of education: Not on file     Highest education level: Not on file   Occupational History     Not on file   Social Needs     Financial resource strain: Not on file     Food insecurity:     Worry: Not on file     Inability: Not on file     Transportation needs:     Medical: " Not on file     Non-medical: Not on file   Tobacco Use     Smoking status: Former Smoker     Last attempt to quit: 3/27/1997     Years since quittin.1     Smokeless tobacco: Never Used   Substance and Sexual Activity     Alcohol use: Yes     Comment: rarely     Drug use: No     Sexual activity: Yes     Partners: Male     Birth control/protection: Pill   Lifestyle     Physical activity:     Days per week: Not on file     Minutes per session: Not on file     Stress: Not on file   Relationships     Social connections:     Talks on phone: Not on file     Gets together: Not on file     Attends Bahai service: Not on file     Active member of club or organization: Not on file     Attends meetings of clubs or organizations: Not on file     Relationship status: Not on file     Intimate partner violence:     Fear of current or ex partner: Not on file     Emotionally abused: Not on file     Physically abused: Not on file     Forced sexual activity: Not on file   Other Topics Concern     Parent/sibling w/ CABG, MI or angioplasty before 65F 55M? Not Asked   Social History Narrative     Not on file             Family History:   reviewed         Allergies:     Allergies   Allergen Reactions     Sulfa Drugs Other (See Comments)     Red face. Ringing in the ears.             Medications:     Current Outpatient Medications   Medication Sig Dispense Refill     ALBUTEROL 108 (90 BASE) MCG/ACT inhaler INHALE 2 PUFFS INTO THE LUNGS EVERY 6 HOURS AS NEEDED FOR SHORTNESS OF BREATH OR DIFFICULT BREATHING OR WHEEZING 8.5 g 0     buPROPion (WELLBUTRIN XL) 300 MG 24 hr tablet Take 1 tablet (300 mg) by mouth every morning 90 tablet 3     Cyanocobalamin (B-12) 100 MCG TABS        Estrogens Conjugated (PREMARIN PO) Take 0.625 mg by mouth        fluticasone (FLOVENT DISKUS) 100 MCG/BLIST inhaler Inhale 1 puff into the lungs 2 times daily 3 Inhaler 3     fluticasone-salmeterol (ADVAIR) 100-50 MCG/DOSE inhaler Inhale 1 puff into the lungs  "every 12 hours 1 Inhaler 3     medroxyPROGESTERone (PROVERA) 2.5 MG tablet Take 2.5 mg by mouth daily       nadolol (CORGARD) 20 MG tablet TAKE 1 TABLET(20 MG) BY MOUTH DAILY 90 tablet 3               Review of Systems:   The 10 point Review of Systems is negative other than noted in the HPI           Physical Exam:   Blood pressure 119/83, pulse 83, temperature 97  F (36.1  C), temperature source Oral, height 1.702 m (5' 7\"), weight 61.2 kg (135 lb), SpO2 99 %, not currently breastfeeding.    Exam:  Constitutional: healthy appearing, alert and in no distress  Heent: Normocephalic. Head without obvious masses or lesions. PERRLDC, EOMI. Mouth exam within normal limits: tongue, mucous membranes, posterior pharynx all normal, no lesions or abnormalities seen.  Tm's and canals within normal limits bilaterally. Neck supple, no nuchal rigidity or masses. No supraclavicular, or cervical adenopathy. Thyroid symmetric, no masses.  Cardiovascular: Regular rate and rhythm, no murmer, rub or gallops.  JVP not elevated, no edema.  Carotids within normal limits bilaterally, no bruits.  Respiratory: Normal respiratory effort.  Lungs clear, normal flow, no wheezing or crackles.  Gastrointestinal: Normal active bowel sounds.   Soft, not tender, no masses, guarding or rebound.  No hepatosplenomegaly.   Musculoskeletal: extremities normal, no gross deformities noted.  Skin: no suspicious lesions or rashes   Neurologic: Mental status within normal limits.  Speech fluent.  No gross motor abnormalities and gait intact.  Psychiatric: mentation appears normal and affect normal.         Data:   Labs sent        Assessment:   1. Normal complete physical exam  2. Migraines, stable  3. Micro hematuria, follow up ua  4. Low iron, no signs malig cause, follow up labs  5. Depression, doing well  6. Asthma, controlled  7. Osteopenia, follow up gyn  8. hcm         Plan:   She wants to check on tdap status  shingrix at pharm  Up to date colon, pap, " mammogram  Exercise, diet  Letter with labs      Peter Franklin M.D.              Healthy Habits:    Getting at least 3 servings of Calcium per day:  Yes    Bi-annual eye exam:  Yes    Dental care twice a year:  Yes    Sleep apnea or symptoms of sleep apnea:  None    Diet:  Regular (no restrictions)    Frequency of exercise:  2-3 days/week    Duration of exercise:  45-60 minutes    Taking medications regularly:  Yes    Barriers to taking medications:  Not applicable    Medication side effects:  Not applicable    PHQ-2 Total Score:    Additional concerns today:  No              Today's PHQ-2 Score:   PHQ-2 (  [employer]) 5/10/2019   Q1: Little interest or pleasure in doing things 0   Q2: Feeling down, depressed or hopeless 1   PHQ-2 Score 1       Abuse: Current or Past(Physical, Sexual or Emotional)- No  Do you feel safe in your environment? Yes    Social History     Tobacco Use     Smoking status: Former Smoker     Last attempt to quit: 3/27/1997     Years since quittin.1     Smokeless tobacco: Never Used   Substance Use Topics     Alcohol use: Yes     Comment: rarely     If you drink alcohol do you typically have >3 drinks per day or >7 drinks per week? No               Past Medical History:      Past Medical History:   Diagnosis Date     H/O colonoscopy 2016    done at [place] and nl     Intermittent asthma     since childhood     Low iron 10/2017    done for eval of hair loss, egd nl     Migraines     most of adult life, has seen neuro in past, on bblocker     Mild depression (H)      Normal stress echocardiogram 2011    due to hear racing     Osteopenia      Syncope 2017    echo nl lv size and fxn, grade 1 dd, mild tr             Past Surgical History:      Past Surgical History:   Procedure Laterality Date     C TMJ ARTHROSCOPY/SURGERY       ESOPHAGOSCOPY, GASTROSCOPY, DUODENOSCOPY (EGD), COMBINED N/A 10/30/2017    Procedure: COMBINED ESOPHAGOSCOPY, GASTROSCOPY, DUODENOSCOPY (EGD), BIOPSY  "SINGLE OR MULTIPLE;  COMBINED ESOPHAGOSCOPY, GASTROSCOPY, DUODENOSCOPY (EGD);  Surgeon: Martin Rodriguez MD;  Location:  GI     ORTHOPEDIC SURGERY      \"leg surgery\"     single oopherectomy  2010    cyst             Social History:     Social History     Socioeconomic History     Marital status:      Spouse name: Not on file     Number of children: 2     Years of education: Not on file     Highest education level: Not on file   Occupational History     Not on file   Social Needs     Financial resource strain: Not on file     Food insecurity:     Worry: Not on file     Inability: Not on file     Transportation needs:     Medical: Not on file     Non-medical: Not on file   Tobacco Use     Smoking status: Former Smoker     Last attempt to quit: 3/27/1997     Years since quittin.1     Smokeless tobacco: Never Used   Substance and Sexual Activity     Alcohol use: Yes     Comment: rarely     Drug use: No     Sexual activity: Yes     Partners: Male     Birth control/protection: Pill   Lifestyle     Physical activity:     Days per week: Not on file     Minutes per session: Not on file     Stress: Not on file   Relationships     Social connections:     Talks on phone: Not on file     Gets together: Not on file     Attends Amish service: Not on file     Active member of club or organization: Not on file     Attends meetings of clubs or organizations: Not on file     Relationship status: Not on file     Intimate partner violence:     Fear of current or ex partner: Not on file     Emotionally abused: Not on file     Physically abused: Not on file     Forced sexual activity: Not on file   Other Topics Concern     Parent/sibling w/ CABG, MI or angioplasty before 65F 55M? Not Asked   Social History Narrative     Not on file             Family History:   reviewed         Allergies:     Allergies   Allergen Reactions     Sulfa Drugs Other (See Comments)     Red face. Ringing in the ears.             " "Medications:     Current Outpatient Medications   Medication Sig Dispense Refill     ALBUTEROL 108 (90 BASE) MCG/ACT inhaler INHALE 2 PUFFS INTO THE LUNGS EVERY 6 HOURS AS NEEDED FOR SHORTNESS OF BREATH OR DIFFICULT BREATHING OR WHEEZING 8.5 g 0     buPROPion (WELLBUTRIN XL) 300 MG 24 hr tablet Take 1 tablet (300 mg) by mouth every morning 90 tablet 3     Cyanocobalamin (B-12) 100 MCG TABS        Estrogens Conjugated (PREMARIN PO) Take 0.625 mg by mouth        fluticasone (FLOVENT DISKUS) 100 MCG/BLIST inhaler Inhale 1 puff into the lungs 2 times daily 3 Inhaler 3     fluticasone-salmeterol (ADVAIR) 100-50 MCG/DOSE inhaler Inhale 1 puff into the lungs every 12 hours 1 Inhaler 3     medroxyPROGESTERone (PROVERA) 2.5 MG tablet Take 2.5 mg by mouth daily       nadolol (CORGARD) 20 MG tablet TAKE 1 TABLET(20 MG) BY MOUTH DAILY 90 tablet 3               Review of Systems:   The 10 point Review of Systems is negative other than noted in the HPI           Physical Exam:   Blood pressure 119/83, pulse 83, temperature 97  F (36.1  C), temperature source Oral, height 1.702 m (5' 7\"), weight 61.2 kg (135 lb), SpO2 99 %, not currently breastfeeding.    Exam:  Constitutional: healthy appearing, alert and in no distress  Heent: Normocephalic. Head without obvious masses or lesions. PERRLDC, EOMI. Mouth exam within normal limits: tongue, mucous membranes, posterior pharynx all normal, no lesions or abnormalities seen.  Tm's and canals within normal limits bilaterally. Neck supple, no nuchal rigidity or masses. No supraclavicular, or cervical adenopathy. Thyroid symmetric, no masses.  Cardiovascular: Regular rate and rhythm, no murmer, rub or gallops.  JVP not elevated, no edema.  Carotids within normal limits bilaterally, no bruits.  Respiratory: Normal respiratory effort.  Lungs clear, normal flow, no wheezing or crackles.  Gastrointestinal: Normal active bowel sounds.   Soft, not tender, no masses, guarding or rebound.  No " hepatosplenomegaly.   Musculoskeletal: extremities normal, no gross deformities noted.  Skin: no suspicious lesions or rashes   Neurologic: Mental status within normal limits.  Speech fluent.  No gross motor abnormalities and gait intact.  Psychiatric: mentation appears normal and affect normal.         Data:   Labs sent        Assessment:   1. Normal complete physical exam  2. Asthma, controlled  3. Low iron, doubt malig cause, follow up labs  4. Hematuria, prior neg eval  5. Depression, doing well  6. Migraines, doing well  7. Osteopenia, follow up gyn  8. Fatigue, doubt pathologic  9. Elevated sugar, follow up labs  10. hcm         Plan:   She wants to check on tdap status  shingrix at pharm  Up to date colon, mammogram and pap  Letter with labs  Exercise, diet      Peter Franklin M.D.

## 2019-05-22 LAB
HCV AB SERPL QL IA: NONREACTIVE
HIV 1+2 AB+HIV1 P24 AG SERPL QL IA: NONREACTIVE

## 2019-05-22 ASSESSMENT — ASTHMA QUESTIONNAIRES: ACT_TOTALSCORE: 25

## 2019-05-23 NOTE — RESULT ENCOUNTER NOTE
It was a pleasure seeing you for your physical examination.  I wanted to get back to you with your test results.  I have enclosed a copy for your review.     I am happy to report that your cbc or complete blood count is normal with no signs of anemia, leukemia or platelet abnormalities. Your chemistry panel shows no diabetes.  While your blood sugar was just slightly elevated the other diabetes test called the hemoglobin A1c is normal.  The bottom line is that you do not have diabetes but may be predisposed to it.  The best way to keep the sugar down is regular exercise and a healthy diet.  Your blood salts, kidney tests, liver tests, hiv test, hepatitis c test, thyroid test, iron studies, urine, and proteins are all fine.    Your total cholesterol is 197 with the normal range being below 200.  Your HDL or good cholesterol is 77 with the normal range being above 50.  Your LDL or bad cholesterol is 95 with the normal range being below 130.  These numbers are super.    I am happy to bring you this excellent report.  If you have any questions let me know.    Peter Franklin M.D.

## 2019-06-23 DIAGNOSIS — J45.20 INTERMITTENT ASTHMA WITHOUT COMPLICATION, UNSPECIFIED ASTHMA SEVERITY: ICD-10-CM

## 2019-06-24 NOTE — TELEPHONE ENCOUNTER
"Pending Prescriptions:                       Disp   Refills    ADVAIR DISKUS 100-50 MCG/DOSE inhaler [Ph*       0            Sig: INHALE 1 PUFF BY MOUTH TWICE DAILY    Last Written Prescription Date:  05/21/2019  Last Fill Quantity: 1,  # refills: 3   Last office visit: 5/21/2019 with prescribing provider:     Future Office Visit:    Requested Prescriptions   Pending Prescriptions Disp Refills     ADVAIR DISKUS 100-50 MCG/DOSE inhaler [Pharmacy Med Name: ADVAIR DISKUS 100/50MCG (GREEN)60'S]  0     Sig: INHALE 1 PUFF BY MOUTH TWICE DAILY       Inhaled Steroids Protocol Passed - 6/23/2019 10:41 AM        Passed - Patient is age 12 or older        Passed - Asthma control assessment score within normal limits in last 6 months     Please review ACT score.           Passed - Medication is active on med list        Passed - Recent (6 mo) or future (30 days) visit within the authorizing provider's specialty     Patient had office visit in the last 6 months or has a visit in the next 30 days with authorizing provider or within the authorizing provider's specialty.  See \"Patient Info\" tab in inbasket, or \"Choose Columns\" in Meds & Orders section of the refill encounter.              "

## 2019-10-01 ENCOUNTER — HEALTH MAINTENANCE LETTER (OUTPATIENT)
Age: 54
End: 2019-10-01

## 2019-10-07 ENCOUNTER — TELEPHONE (OUTPATIENT)
Dept: FAMILY MEDICINE | Facility: CLINIC | Age: 54
End: 2019-10-07

## 2019-10-07 ENCOUNTER — OFFICE VISIT (OUTPATIENT)
Dept: FAMILY MEDICINE | Facility: CLINIC | Age: 54
End: 2019-10-07
Payer: COMMERCIAL

## 2019-10-07 VITALS
HEIGHT: 67 IN | SYSTOLIC BLOOD PRESSURE: 131 MMHG | OXYGEN SATURATION: 99 % | DIASTOLIC BLOOD PRESSURE: 94 MMHG | TEMPERATURE: 98.1 F | HEART RATE: 88 BPM | BODY MASS INDEX: 21.35 KG/M2 | WEIGHT: 136 LBS

## 2019-10-07 DIAGNOSIS — N63.15 BREAST LUMP ON RIGHT SIDE AT 9 O'CLOCK POSITION: Primary | ICD-10-CM

## 2019-10-07 PROCEDURE — 99213 OFFICE O/P EST LOW 20 MIN: CPT | Performed by: INTERNAL MEDICINE

## 2019-10-07 RX ORDER — CEPHALEXIN 500 MG/1
CAPSULE ORAL
Refills: 0 | COMMUNITY
Start: 2019-10-02 | End: 2019-10-11

## 2019-10-07 ASSESSMENT — MIFFLIN-ST. JEOR: SCORE: 1249.52

## 2019-10-07 NOTE — TELEPHONE ENCOUNTER
Reason for Call:  Other appointment    Detailed comments:  Pt wants a appt this Thursday to seen by collin for lump on rt breast and circulation bilateral . Please advise     Phone Number Patient can be reached at: Home number on file [phone] (home)    Best Time: any    Can we leave a detailed message on this number? YES    Call taken on 10/7/2019 at 12:28 PM by Martinez Banuelos

## 2019-10-07 NOTE — PROGRESS NOTES
"Patient presents for a right breast lump.  She first noticed it around May.  She had a mammogram that apparently was negative, I do not have it.  She saw her gynecologist who recommended observation.  She is not sure if it has grown.  It is not painful.  She has no history of breast disease and there is no family history.    Past Medical History:   Diagnosis Date     H/O colonoscopy 2016    done at [place] and nl     Hematuria 2016    eval at [place] and per pt cysto and ct neg     Intermittent asthma     since childhood     Low iron 10/2017    done for eval of hair loss, egd nl     Migraines     most of adult life, has seen neuro in past, on bblocker     Mild depression (H)      Normal stress echocardiogram 2011    due to hear racing     Osteopenia      Syncope 2017    echo nl lv size and fxn, grade 1 dd, mild tr     Past Surgical History:   Procedure Laterality Date     C TMJ ARTHROSCOPY/SURGERY       ESOPHAGOSCOPY, GASTROSCOPY, DUODENOSCOPY (EGD), COMBINED N/A 10/30/2017    Procedure: COMBINED ESOPHAGOSCOPY, GASTROSCOPY, DUODENOSCOPY (EGD), BIOPSY SINGLE OR MULTIPLE;  COMBINED ESOPHAGOSCOPY, GASTROSCOPY, DUODENOSCOPY (EGD);  Surgeon: Martin Rodriguez MD;  Location:  GI     ORTHOPEDIC SURGERY      \"leg surgery\"     single oopherectomy  2010    cyst     Social History     Socioeconomic History     Marital status:      Spouse name: Not on file     Number of children: 2     Years of education: Not on file     Highest education level: Not on file   Occupational History     Not on file   Social Needs     Financial resource strain: Not on file     Food insecurity:     Worry: Not on file     Inability: Not on file     Transportation needs:     Medical: Not on file     Non-medical: Not on file   Tobacco Use     Smoking status: Former Smoker     Last attempt to quit: 3/27/1997     Years since quittin.5     Smokeless tobacco: Never Used   Substance and Sexual Activity     Alcohol use: Yes     " Comment: rarely     Drug use: No     Sexual activity: Yes     Partners: Male     Birth control/protection: Pill   Lifestyle     Physical activity:     Days per week: Not on file     Minutes per session: Not on file     Stress: Not on file   Relationships     Social connections:     Talks on phone: Not on file     Gets together: Not on file     Attends Episcopal service: Not on file     Active member of club or organization: Not on file     Attends meetings of clubs or organizations: Not on file     Relationship status: Not on file     Intimate partner violence:     Fear of current or ex partner: Not on file     Emotionally abused: Not on file     Physically abused: Not on file     Forced sexual activity: Not on file   Other Topics Concern     Parent/sibling w/ CABG, MI or angioplasty before 65F 55M? Not Asked   Social History Narrative     Not on file     Current Outpatient Medications   Medication Sig Dispense Refill     ALBUTEROL 108 (90 BASE) MCG/ACT inhaler INHALE 2 PUFFS INTO THE LUNGS EVERY 6 HOURS AS NEEDED FOR SHORTNESS OF BREATH OR DIFFICULT BREATHING OR WHEEZING 8.5 g 0     buPROPion (WELLBUTRIN XL) 300 MG 24 hr tablet Take 1 tablet (300 mg) by mouth every morning 90 tablet 3     cephALEXin (KEFLEX) 500 MG capsule TK 1 C PO TID FOR 7 DAYS  0     Cyanocobalamin (B-12) 100 MCG TABS        Estrogens Conjugated (PREMARIN PO) Take 0.625 mg by mouth        fluticasone (FLOVENT DISKUS) 100 MCG/BLIST inhaler Inhale 1 puff into the lungs 2 times daily 3 Inhaler 3     fluticasone-salmeterol (ADVAIR) 100-50 MCG/DOSE inhaler Inhale 1 puff into the lungs every 12 hours 1 Inhaler 3     medroxyPROGESTERone (PROVERA) 2.5 MG tablet Take 2.5 mg by mouth daily       nadolol (CORGARD) 20 MG tablet TAKE 1 TABLET(20 MG) BY MOUTH DAILY 90 tablet 3     Allergies   Allergen Reactions     Sulfa Drugs Other (See Comments)     Red face. Ringing in the ears.     FAMILY HISTORY NOTED AND REVIEWED    REVIEW OF SYSTEMS: above    PHYSICAL  "EXAM    BP (!) 131/94 (BP Location: Left arm, Patient Position: Sitting, Cuff Size: Adult Regular)   Pulse 88   Temp 98.1  F (36.7  C) (Oral)   Ht 1.702 m (5' 7\")   Wt 61.7 kg (136 lb)   LMP 10/07/2017 (Approximate)   SpO2 99%   Breastfeeding? No   BMI 21.30 kg/m      Patient appears non toxic  Left breast and nipple within normal limits  Right breast with irreg shapred nodule 4 cm right of nipple, no other lesions, no ax adenopathy    ASSESSMENT:  Breast mass    PLAN:  To breast center for mammogram and us      "

## 2019-10-11 ENCOUNTER — HOSPITAL ENCOUNTER (OUTPATIENT)
Dept: MAMMOGRAPHY | Facility: CLINIC | Age: 54
End: 2019-10-11
Attending: INTERNAL MEDICINE
Payer: COMMERCIAL

## 2019-10-11 ENCOUNTER — HOSPITAL ENCOUNTER (OUTPATIENT)
Dept: MAMMOGRAPHY | Facility: CLINIC | Age: 54
Discharge: HOME | End: 2019-10-11
Attending: INTERNAL MEDICINE | Admitting: INTERNAL MEDICINE
Payer: COMMERCIAL

## 2019-10-11 DIAGNOSIS — N63.15 BREAST LUMP ON RIGHT SIDE AT 9 O'CLOCK POSITION: ICD-10-CM

## 2019-10-11 PROCEDURE — 00000158 ZZHCL STATISTIC H-FISH PROCESS B/S: Performed by: RADIOLOGY

## 2019-10-11 PROCEDURE — G0279 TOMOSYNTHESIS, MAMMO: HCPCS

## 2019-10-11 PROCEDURE — 25000125 ZZHC RX 250: Performed by: INTERNAL MEDICINE

## 2019-10-11 PROCEDURE — 00000159 ZZHCL STATISTIC H-SEND OUTS PREP: Performed by: RADIOLOGY

## 2019-10-11 PROCEDURE — 38505 NEEDLE BIOPSY LYMPH NODES: CPT

## 2019-10-11 PROCEDURE — 88305 TISSUE EXAM BY PATHOLOGIST: CPT | Mod: 26 | Performed by: RADIOLOGY

## 2019-10-11 PROCEDURE — 88305 TISSUE EXAM BY PATHOLOGIST: CPT | Performed by: RADIOLOGY

## 2019-10-11 PROCEDURE — 88377 M/PHMTRC ALYS ISHQUANT/SEMIQ: CPT | Performed by: PATHOLOGY

## 2019-10-11 PROCEDURE — 76642 ULTRASOUND BREAST LIMITED: CPT | Mod: RT

## 2019-10-11 PROCEDURE — 27210206 US BREAST BIOPSY CORE NEEDLE RIGHT

## 2019-10-11 PROCEDURE — 88360 TUMOR IMMUNOHISTOCHEM/MANUAL: CPT | Mod: 26,59 | Performed by: RADIOLOGY

## 2019-10-11 PROCEDURE — 88360 TUMOR IMMUNOHISTOCHEM/MANUAL: CPT | Performed by: RADIOLOGY

## 2019-10-11 PROCEDURE — 40000986 MA POST PROCEDURE RIGHT

## 2019-10-11 RX ADMIN — LIDOCAINE HYDROCHLORIDE 10 ML: 10 INJECTION, SOLUTION INFILTRATION; PERINEURAL at 12:29

## 2019-10-11 NOTE — LETTER
Jyothi Freitas  [address]  [place] [address]      October 11, 2019  Date of Exam:       Dear Jyothi Freitas:    Thank you for your recent visit.    Breast Imaging Result: Based on your recent breast imaging, you have a suspicious area that usually requires a biopsy, at which time a small tissue sample would be taken from your breast.      Breast Density: Your mammogram shows that you have dense breast tissue. This means you have a slightly higher risk of getting breast cancer. It also means your mammograms will be harder to read, but it doesn't mean that mammograms aren t useful. In fact, yearly mammograms are even more important for women at higher risk.    If you have already made these arrangements, please disregard this letter.    A report of your breast imaging results was sent to: Peter Franklin    Your breast imaging will become part of your medical file here at [facility] for at least 10 years. You are responsible for informing any new health care provider or breast imaging facility of the date and location of this examination.    We appreciate the opportunity to participate in your health care.    Sincerely,    Aguilar López MD  Interpreting Radiologist

## 2019-10-11 NOTE — DISCHARGE INSTRUCTIONS

## 2019-10-14 ENCOUNTER — CARE COORDINATION (OUTPATIENT)
Dept: SURGERY | Facility: CLINIC | Age: 54
End: 2019-10-14

## 2019-10-14 ENCOUNTER — TELEPHONE (OUTPATIENT)
Dept: SURGERY | Facility: CLINIC | Age: 54
End: 2019-10-14

## 2019-10-14 DIAGNOSIS — N63.10 MASS OF RIGHT BREAST: Primary | ICD-10-CM

## 2019-10-14 DIAGNOSIS — R59.0 AXILLARY LYMPHADENOPATHY: ICD-10-CM

## 2019-10-14 NOTE — TELEPHONE ENCOUNTER
Call placed to patient with pathology results from right breast biopsy. Left message requesting call back.    Adelina LEWISN, RN, OCN  Oncology Care Coordinator  [facility]  [facility]  Phone: [phone]

## 2019-10-14 NOTE — PROGRESS NOTES
Call received from Dr. Hernandez to add Jyothi on to her schedule 10/15/2019 and to arrange for Breast MRI prior to consult. Orders entered.    Adelina LEWISN, RN, OCN  Oncology Care Coordinator  [facility]  [facility]  Phone: [phone]      
no fever and no chills.

## 2019-10-14 NOTE — TELEPHONE ENCOUNTER
"Call returned by patient.  verified.    Patient notified pathology results from right breast and right axillary biopsy performed on 10/11/2019 revealed:     1. 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   2. Lymphovascular invasion identified.   3. Ductal carcinoma in situ (DCIS) not identified.   4. Estrogen receptor (ER) stains are pending and will be reported in an   addendum.   5. Progesterone receptor (SD) stains are pending and will be reported in   an addendum.   6. HER2 studies by fluorescence in situ hybridization (FISH) are pending   and will be reported separately when   available.     B: Lymph node, right axilla, 2.0 cm size, ultrasound core biopsy: Positive    for metastatic carcinoma.     Per radiologist, Dr. Surekha Jones, results are concordant with imaging findings.    Recommendation: Surgical and Medical Oncology Consultation.      Patient is scheduled to meet with Dr. Hernandez on 10/15/2019 at 0845. Patient has breast mri scheduled for tomorrow morning at 0700. Will arrange for medical oncology consult at time of visit.      All patient's questions answered appropriately and thoroughly. Patient will call our office in the interim with additional questions/concerns.     Both parties in agreement of above plan.    Adelina DANIEL, RN, OCN  Oncology Care Coordinator  [facility]  [facility]  Phone: [phone]            "

## 2019-10-14 NOTE — TELEPHONE ENCOUNTER
Call placed to patient. Left message requesting call back to discuss appointment details.     Adelina LEWISN, RN, OCN  Oncology Care Coordinator  [facility]  [facility]  Phone: [phone]

## 2019-10-15 ENCOUNTER — CARE COORDINATION (OUTPATIENT)
Dept: SURGERY | Facility: CLINIC | Age: 54
End: 2019-10-15

## 2019-10-15 ENCOUNTER — HOSPITAL ENCOUNTER (OUTPATIENT)
Dept: MRI IMAGING | Facility: CLINIC | Age: 54
Discharge: HOME | End: 2019-10-15
Attending: SURGERY | Admitting: SURGERY
Payer: COMMERCIAL

## 2019-10-15 ENCOUNTER — OFFICE VISIT (OUTPATIENT)
Dept: SURGERY | Facility: CLINIC | Age: 54
End: 2019-10-15
Payer: COMMERCIAL

## 2019-10-15 VITALS
DIASTOLIC BLOOD PRESSURE: 80 MMHG | SYSTOLIC BLOOD PRESSURE: 120 MMHG | HEART RATE: 78 BPM | BODY MASS INDEX: 21.35 KG/M2 | HEIGHT: 67 IN | WEIGHT: 136 LBS

## 2019-10-15 DIAGNOSIS — R59.0 AXILLARY LYMPHADENOPATHY: ICD-10-CM

## 2019-10-15 DIAGNOSIS — N63.10 MASS OF RIGHT BREAST: ICD-10-CM

## 2019-10-15 DIAGNOSIS — C50.411 MALIGNANT NEOPLASM OF UPPER-OUTER QUADRANT OF RIGHT BREAST IN FEMALE, ESTROGEN RECEPTOR POSITIVE (H): Primary | ICD-10-CM

## 2019-10-15 DIAGNOSIS — Z17.0 MALIGNANT NEOPLASM OF UPPER-OUTER QUADRANT OF RIGHT BREAST IN FEMALE, ESTROGEN RECEPTOR POSITIVE (H): Primary | ICD-10-CM

## 2019-10-15 LAB
COPATH REPORT: NORMAL
COPATH REPORT: NORMAL

## 2019-10-15 PROCEDURE — A9585 GADOBUTROL INJECTION: HCPCS | Performed by: SURGERY

## 2019-10-15 PROCEDURE — 25500064 ZZH RX 255 OP 636: Performed by: SURGERY

## 2019-10-15 PROCEDURE — 77049 MRI BREAST C-+ W/CAD BI: CPT

## 2019-10-15 PROCEDURE — 99205 OFFICE O/P NEW HI 60 MIN: CPT | Performed by: SURGERY

## 2019-10-15 RX ORDER — GADOBUTROL 604.72 MG/ML
6 INJECTION INTRAVENOUS ONCE
Status: COMPLETED | OUTPATIENT
Start: 2019-10-15 | End: 2019-10-15

## 2019-10-15 RX ADMIN — GADOBUTROL 6 ML: 604.72 INJECTION INTRAVENOUS at 07:02

## 2019-10-15 ASSESSMENT — MIFFLIN-ST. JEOR: SCORE: 1249.52

## 2019-10-15 NOTE — NURSING NOTE
Breast Patients    BREAST PATIENTS (ALL)    1-Do you have any of the following symptoms? Lump(s) or Mass(es)  2-In which breast are you having the symptoms? right  3-Have you had a Mammogram? Yes  Where: [facility]  Date: 10/15/19  4-Have you ever had a breast cyst drained? Yes  Side: [facility]  Date: 10/11/19  5-Have you ever had a breast biopsy? Yes  Side: right  Date: 10/11/19  6-Have you ever had a Breast Cancer? No   7-Is there a history of Breast Cancer in your family? No  8-Have you ever had Ovarian Cancer? No  9-Is there a history of Ovarian Cancer in your family? No  10-Summarize your caffeine intake (i.e. coffee, tea, chocolate, soda etc.): 2-3    BREAST PATIENTS (FEMALE)    11-What age did your periods begin? 13/15  12-Date your last menstrual period began? Unsure - 2015?  13-Number of full-term pregnancies: 2  14-Your age when your first child was born? 27  15-Did you nurse your children? Yes  16-Are you pregnant now? No  17-Have you begun menopause? Yes  18-Have you had either ovary removed?Yes  Date of Surgery:  10+ years ago  19-Do you have breast implants? No   20-Do you use hormone replacement therapy?  Yes  Type: Provera and Premarin  Dosage: 2.5mg and 0.625mg  21-Have you taken oral contraceptive pills?  Yes, For how many years?  25  22-Have you had an intrauterine device (IUD) placed?  Yes, For how many years?  5-7  23-What is your current bra size?  Fran Scott, ROSMERY  10/15/2019      9:53 AM

## 2019-10-15 NOTE — PATIENT INSTRUCTIONS
1. Dr. Monroe, medical oncologist, on 10/17 check in at 1:20 pm [facility] [address] [place] [address].    Adelina Briggs BSN, RN, OCN  Oncology Care Coordinator  [facility]  [facility]  Phone: [phone]

## 2019-10-15 NOTE — PROGRESS NOTES
Introduced self and explained my role of oncology care coordinator to patient. Accompanied Jyothi and her  to her surgical consultation today with Dr. Hernandez.      Jyothi was given the new patient packet which includes educational material and support resources such as American Cancer Society: For Women Facing Breast Cancer, What You Need to Know about Mastectomy and Breast Conservation Surgery, [employer], Fighting Cancer through Diet and Lifestyle, and [facility] Breast Cancer Support Group.     At the end of the consultation, we reviewed plan of care and education. Await results of HER2. Jyothi will meet with Dr. Monroe to discuss benefits of chemotherapy. Orders placed for genetic counseling. She was instructed by Dr. Hernandez to discontinue HRT at this time. She will contact Dr. Franklin to discuss tapering of these.     Will follow up with Jyothi after medical oncology consult.     Jyothi has my contact information and knows to contact me in the future with any questions/concerns.     Adelina LEWISN, RN, OCN  Oncology Care Coordinator  [facility] & [facility]  [phone]

## 2019-10-15 NOTE — PROGRESS NOTES
[facility] Breast Surgery Consultation    HPI:   Jyothi Freitas is a 53 year old female who is seen in consultation at the request of Dr. Franklin for evaluation of newly diagnosed right breast invasive ductal carcinoma, grade 3, ER/AR/HER 2 pending measuring 2.6cm in size at 10:00, 4cm FN with a right axillary lymph node which is positive for metastatic carcinoma.     Jyothi had diagnostic imaging of the right breast due to a palpable lump in the right breast. This revealed an irregularly shaped spiculated mass in the right upper outer breast measuring 2.6cm at 10:00, 4cm FN and several enlarged right axillary lymph nodes. She then had biopsies with the above results.      She reports she first felt it in April 2019 and had a mammogram at that time which per her report was normal. She then saw her OB who also felt the area but thought it was symmetric with increased density on the left. She then saw Dr. Franklin as she was still concerned about it and had diagnostic imaging as above. She reports she has had a cyst drained on the left breast 5 years ago and thought it was possibly a cyst. No other breast biopsies or surgeries. She had a basal cell cancer removed from her left cheek last week. No other cancer history, no family history of cancer.     She is currently taking estrogen and progesterone replacement as she is post menopausal. She reports she has not been off some type of hormonal therapy for >25 years as she was on OCPs prior to HRT.     Hormonal history:  menarche [age], 2 children, 1st at age 27,  Post-menopausal, 25years  OCP use, 3-4years HRT, no fertility treatment.     Family history of breast cancer: No  Family history of ovarian cancer:  No  Family history of colon cancer: No  Family history of prostate cancer: No    Imaging:     Recent Results (from the past 744 hour(s))   US Breast Right    Narrative    Examination: Right breast digital diagnostic mammography and digital  breast tomosynthesis  with computer aided detection, and focused  ultrasound of the RIGHT breast, 10/11/2019.    Comparison: 5/8/2019.    History: Palpable lump in the RIGHT breast.    BREAST DENSITY: Heterogeneously dense    Findings: Mammography with digital breast tomosynthesis demonstrates  an irregularly-shaped spiculated mass at the palpable area of concern  in the upper outer RIGHT breast. Prominent lymph nodes are also seen  in the RIGHT axilla.    Focussed ultrasound by radiologist and technologist of the upper outer  RIGHT breast was performed. Irregularly-shaped hypoechoic mass with  indistinct margins is seen at the palpable area of concern, 10:00  position 4 cm from the nipple on the RIGHT. This measures 2.6 x 1.5 x  2.4 cm.    RIGHT axillary ultrasound survey demonstrates moderately enlarged  lymph nodes.      Impression    IMPRESSION: BI-RADS CATEGORY: 5 - Highly Suggestive of  Malignancy-Appropriate Action Should Be Taken.    RECOMMENDED FOLLOW-UP: Biopsy.    Ultrasound-guided core needle biopsy of the RIGHT breast and RIGHT  axilla.    The patient was given the results of the examination.    PARIS SPAULDING MD   MA Diagnostic Right w/Christian    Narrative    Examination: Right breast digital diagnostic mammography and digital  breast tomosynthesis with computer aided detection, and focused  ultrasound of the RIGHT breast, 10/11/2019.    Comparison: 5/8/2019.    History: Palpable lump in the RIGHT breast.    BREAST DENSITY: Heterogeneously dense    Findings: Mammography with digital breast tomosynthesis demonstrates  an irregularly-shaped spiculated mass at the palpable area of concern  in the upper outer RIGHT breast. Prominent lymph nodes are also seen  in the RIGHT axilla.    Focussed ultrasound by radiologist and technologist of the upper outer  RIGHT breast was performed. Irregularly-shaped hypoechoic mass with  indistinct margins is seen at the palpable area of concern, 10:00  position 4 cm from the nipple on the RIGHT.  This measures 2.6 x 1.5 x  2.4 cm.    RIGHT axillary ultrasound survey demonstrates moderately enlarged  lymph nodes.      Impression    IMPRESSION: BI-RADS CATEGORY: 5 - Highly Suggestive of  Malignancy-Appropriate Action Should Be Taken.    RECOMMENDED FOLLOW-UP: Biopsy.    Ultrasound-guided core needle biopsy of the RIGHT breast and RIGHT  axilla.    The patient was given the results of the examination.    PARIS SPAULDING MD   US Breast Biopsy Core Needle Right    Addendum: 10/14/2019    OFELIA DELGADO  [id number]  [id number]  [id number]    Addendum: The pathology diagnosis for the right breast biopsy is  invasive ductal carcinoma. The right axillary lymph node biopsy is  positive for metastatic carcinoma. Surgical and oncologic  consultations are recommended.    Surekha Geronimo MD ( Date of Addendum: 10/14/2019 )    SUREKHA GERONIMO MD      Narrative    Ultrasound guided RIGHT breast and RIGHT axillary lymph node biopsies.    Comparisons: Mammogram and ultrasound earlier on the same day    FINDINGS: Procedure, risks, benefits and alternatives were discussed  with the patient and the patient gave written and verbal consent.  Aseptic technique was utilized. 1% lidocaine was utilized for local  anesthesia. Under ultrasound guidance, biopsies of RIGHT breast mass  and RIGHT axillary lymph node were performed and markers placed as  follows and images were archived:    Specimen A:  Size: 14 gauge core needle biopsy system  Number of cores: 3  Position: 10:00 position, 4 cm from the nipple on the RIGHT.  Marker: HydroMark, shaped like a coil with open ends.     Specimen B:  Size: 14 gauge core needle biopsy system  Number of cores: 2  Position: RIGHT axilla.  Marker: HydroMark, shaped like a coil with looped ends.     Less than 5 mL blood loss. Pressure was held over this area for  approximately 10 minutes. A dressing was placed and care instructions  were discussed with the patient.    Post biopsy mammogram demonstrates clip  deployment in the breast, the  axilla was not included.      Impression    IMPRESSION:    Uncomplicated ultrasound guided core needle biopsies of  the RIGHT breast and a RIGHT axillary lymph node.    PARIS SPAULDING MD   US Biopsy Lymph Node Core Additional Node    Addendum: 10/14/2019    OFELIA DELGADO  [id number]  [id number]    Addendum: The pathology diagnosis for the right breast biopsy is  invasive ductal carcinoma. The right axillary lymph node biopsy is  positive for metastatic carcinoma. Surgical and oncologic  consultations are recommended.    Surekha Geronimo MD ( Date of Addendum: 10/14/2019 )    SUREKHA GERONIMO MD      Narrative    Ultrasound guided RIGHT breast and RIGHT axillary lymph node biopsies.    Comparisons: Mammogram and ultrasound earlier on the same day    FINDINGS: Procedure, risks, benefits and alternatives were discussed  with the patient and the patient gave written and verbal consent.  Aseptic technique was utilized. 1% lidocaine was utilized for local  anesthesia. Under ultrasound guidance, biopsies of RIGHT breast mass  and RIGHT axillary lymph node were performed and markers placed as  follows and images were archived:    Specimen A:  Size: 14 gauge core needle biopsy system  Number of cores: 3  Position: 10:00 position, 4 cm from the nipple on the RIGHT.  Marker: HydroMark, shaped like a coil with open ends.     Specimen B:  Size: 14 gauge core needle biopsy system  Number of cores: 2  Position: RIGHT axilla.  Marker: HydroMark, shaped like a coil with looped ends.     Less than 5 mL blood loss. Pressure was held over this area for  approximately 10 minutes. A dressing was placed and care instructions  were discussed with the patient.    Post biopsy mammogram demonstrates clip deployment in the breast, the  axilla was not included.      Impression    IMPRESSION:    Uncomplicated ultrasound guided core needle biopsies of  the RIGHT breast and a RIGHT axillary lymph node.    PARIS  MD ERIBERTO SPAULDING Post Procedure Right    Addendum: 10/14/2019    OFELIA DELGADO  [id number]  [id number]  [id number]    Addendum: The pathology diagnosis for the right breast biopsy is  invasive ductal carcinoma. The right axillary lymph node biopsy is  positive for metastatic carcinoma. Surgical and oncologic  consultations are recommended.    Surekha Geronimo MD ( Date of Addendum: 10/14/2019 )    SUREKHA GERONIMO MD      Narrative    Ultrasound guided RIGHT breast and RIGHT axillary lymph node biopsies.    Comparisons: Mammogram and ultrasound earlier on the same day    FINDINGS: Procedure, risks, benefits and alternatives were discussed  with the patient and the patient gave written and verbal consent.  Aseptic technique was utilized. 1% lidocaine was utilized for local  anesthesia. Under ultrasound guidance, biopsies of RIGHT breast mass  and RIGHT axillary lymph node were performed and markers placed as  follows and images were archived:    Specimen A:  Size: 14 gauge core needle biopsy system  Number of cores: 3  Position: 10:00 position, 4 cm from the nipple on the RIGHT.  Marker: HydroMark, shaped like a coil with open ends.     Specimen B:  Size: 14 gauge core needle biopsy system  Number of cores: 2  Position: RIGHT axilla.  Marker: HydroMark, shaped like a coil with looped ends.     Less than 5 mL blood loss. Pressure was held over this area for  approximately 10 minutes. A dressing was placed and care instructions  were discussed with the patient.    Post biopsy mammogram demonstrates clip deployment in the breast, the  axilla was not included.      Impression    IMPRESSION:    Uncomplicated ultrasound guided core needle biopsies of  the RIGHT breast and a RIGHT axillary lymph node.    PARIS SPAULDING MD       Percutaneous core needle biopsy, right: SPECIMEN(S):   A: Right ultrasound guided breast needle biopsy, 10:00, 4.0 cm from   nipple, 2.6 cm size   B: Right ultrasound guided breast needle biopsy, 2.0 cm size.  "intermediat   suspicion lymph node     FINAL DIAGNOSIS:   A: Breast, right, 10:00, 4.0 cm from nipple, 2.6 cm size, ultrasound core   biopsy:   1. Invasive ductal carcinoma (\"invasive carcinoma of no special type\" per   WHO classification), Marbin   grade 3 (of 3).   2. Lymphovascular invasion identified.   3. Ductal carcinoma in situ (DCIS) not identified.   4. Estrogen receptor (ER) stains are pending and will be reported in an   addendum.   5. Progesterone receptor (AZ) stains are pending and will be reported in   an addendum.   6. HER2 studies by fluorescence in situ hybridization (FISH) are pending   and will be reported separately when   available.     B: Lymph node, right axilla, 2.0 cm size, ultrasound core biopsy: Positive    for metastatic carcinoma.       Past Medical History:   has a past medical history of H/O colonoscopy (03/08/2016), Hematuria (2016), Intermittent asthma, Low iron (10/2017), Migraines, Mild depression (H), Normal stress echocardiogram (July 2011), Osteopenia (2008), and Syncope (03/2017).      Current Outpatient Medications:      ALBUTEROL 108 (90 BASE) MCG/ACT inhaler, INHALE 2 PUFFS INTO THE LUNGS EVERY 6 HOURS AS NEEDED FOR SHORTNESS OF BREATH OR DIFFICULT BREATHING OR WHEEZING, Disp: 8.5 g, Rfl: 0     buPROPion (WELLBUTRIN XL) 300 MG 24 hr tablet, Take 1 tablet (300 mg) by mouth every morning, Disp: 90 tablet, Rfl: 3     Estrogens Conjugated (PREMARIN PO), Take 0.625 mg by mouth , Disp: , Rfl:      fluticasone (FLOVENT DISKUS) 100 MCG/BLIST inhaler, Inhale 1 puff into the lungs 2 times daily, Disp: 3 Inhaler, Rfl: 3     fluticasone-salmeterol (ADVAIR) 100-50 MCG/DOSE inhaler, Inhale 1 puff into the lungs every 12 hours, Disp: 1 Inhaler, Rfl: 3     medroxyPROGESTERone (PROVERA) 2.5 MG tablet, Take 2.5 mg by mouth daily, Disp: , Rfl:      nadolol (CORGARD) 20 MG tablet, TAKE 1 TABLET(20 MG) BY MOUTH DAILY, Disp: 90 tablet, Rfl: 3  No current facility-administered medications for " "this visit.     Past Surgical History:  Past Surgical History:   Procedure Laterality Date     C TMJ ARTHROSCOPY/SURGERY       ESOPHAGOSCOPY, GASTROSCOPY, DUODENOSCOPY (EGD), COMBINED N/A 10/30/2017    Procedure: COMBINED ESOPHAGOSCOPY, GASTROSCOPY, DUODENOSCOPY (EGD), BIOPSY SINGLE OR MULTIPLE;  COMBINED ESOPHAGOSCOPY, GASTROSCOPY, DUODENOSCOPY (EGD);  Surgeon: Martin Rodriguez MD;  Location:  GI     ORTHOPEDIC SURGERY      \"leg surgery\"     single oopherectomy  2010    cyst           Allergies   Allergen Reactions     Sulfa Drugs Other (See Comments)     Red face. Ringing in the ears.        Social History:  Social History     Socioeconomic History     Marital status:      Spouse name: Not on file     Number of children: 2     Years of education: Not on file     Highest education level: Not on file   Occupational History     Not on file   Social Needs     Financial resource strain: Not on file     Food insecurity:     Worry: Not on file     Inability: Not on file     Transportation needs:     Medical: Not on file     Non-medical: Not on file   Tobacco Use     Smoking status: Former Smoker     Last attempt to quit: 3/27/1997     Years since quittin.5     Smokeless tobacco: Never Used   Substance and Sexual Activity     Alcohol use: Yes     Comment: rarely     Drug use: No     Sexual activity: Yes     Partners: Male     Birth control/protection: Pill   Lifestyle     Physical activity:     Days per week: Not on file     Minutes per session: Not on file     Stress: Not on file   Relationships     Social connections:     Talks on phone: Not on file     Gets together: Not on file     Attends Congregational service: Not on file     Active member of club or organization: Not on file     Attends meetings of clubs or organizations: Not on file     Relationship status: Not on file     Intimate partner violence:     Fear of current or ex partner: Not on file     Emotionally abused: Not on file     Physically " "abused: Not on file     Forced sexual activity: Not on file   Other Topics Concern     Parent/sibling w/ CABG, MI or angioplasty before 65F 55M? Not Asked   Social History Narrative     Not on file        ROS:  The 10 point review of systems is negative other than noted in the HPI and above.    PE:  Vitals: /80   Pulse 78   Ht 1.702 m (5' 7\")   Wt 61.7 kg (136 lb)   LMP 10/07/2017 (Approximate)   BMI 21.30 kg/m    General appearance: well-nourished, sitting comfortably, no apparent distress  Psych: normal affect, pleasant  HEENT:  Head normocephalic and atraumatic, pupils equal and round, conjunctivae clear, mucous membranes moist, external ears and nose normal  Neck: Supple without thyromegaly or masses  Lungs: Respirations unlabored  Lymphatic: No cervical, or supraclavicular lymphadenopathy  Extremities: Without edema  Musculoskeletal:  Normal station and gait  Neurologic: nonfocal, grossly intact times four extremities, alert and oriented times three  Psychiatric: Mood and affect are appropriate  Skin: Without lesions or rashes    Breast:  A bilateral breast exam was performed in the supine position.. Bilateral breasts were palpated in a circumferential clockwise fashion including the supraclavicular and axillary areas.   Breasts are small and symmetrical. There is ecchymosis on the right upper outer breast. There is a 2-3cm palpable mass which is fairly well circumscribed on the right upper outer breast at 10:00, 4-5cm FN. The remainder of the breast exam is benign, she has dense tissue bilaterally. Nipple/areola are normal.     Lymph:       No supraclavicular/infraclavicular adenopathy.   Axillary adenopathy: palpable mobile node deep in the axilla on the right.     Assessment:  Right  breast invasive ductal carcinoma, grade 3, ER pending, [place] pending, [id number] pending measuring 2.6 cm at 10:00 and 4 cm from the nipple.    Plan:  Jyothi is a 53yof who unfortunately has newly diagnosed right breast " cancer.  I reviewed the imaging and pathology reports with her and her  and explained the findings.  We talked about the fact that this is invasive ductal carcinoma  that is 2.6cm in size and that there is axillary lymph node involvement. We discussed we are still awaiting the results of ER/ID and HER 2 receptors. We next discussed that often with breast cancer with ramiro involvement, neoadjuvant chemotherapy is recommended regardless of receptor status but especially in triple negative or Her2 positive cancers. We will assist her in scheduling an appt with oncology to discuss this further. Toward the end of the appt, ER/ID did result and are both strongly positive. Her 2 is still pending. We are also awaiting a final read on her breast MRI.      We next discussed the surgical options for treatment.  I described the procedures for lumpectomy with sentinel lymph node biopsy and mastectomy with sentinel lymph node biopsy, possible axillary node dissection including the details of the procedures, the risks, anesthesia and expected recovery.      Given ramiro involvement, if she were to have surgery first, I would recommend axillary node dissection. We discussed if she had neoadjuvant chemotherapy, she could become a candidate for SLNB pending her response to chemotherapy. We discussed risk of lymphedema with axillary node dissection can be 30-40%. We also discussed that regardless of surgical option, radiation would be recommended due to ramiro involvement.     I reviewed the data regarding lumpectomy and radiation vs mastectomy that shows that the local recurrence risk is slightly higher for lumpectomy and radiation vs mastectomy (3-5% vs. 1-2%), but the survival at 20 years is the same.     We also talked about post-mastectomy reconstruction and the stages involved. We also discussed the various types of mastectomy, including total, skin-sparing, and nipple-sparing mastectomy.  We reviewed that the  nipple-sparing technique is cosmetic; sensation and contractility will likely be lost.  Jyothi  is a candidate for nipple-sparing mastectomy from an oncologic perspective .  The option of having immediate versus delayed reconstruction was also discussed.   We reviewed that the advantages of immediate reconstruction includes superior cosmetics, as the skin is preserved.      In addition, I have recommended genetic counseling. She is interested in this. This will assist in surgical decision making between lumpectomy vs mastectomies.   The natural history of BRCA mutations and breast cancer were discussed with the patient. Should a deleterious mutation be identified, she would no longer be a good candidate for breast conservation.  We also reviewed the risk reduction benefits of a prophylactic mastectomy in this situation.    Plan:   Oncology appt  Await her 2 results  Decide neoadjuvant vs adjuvant chemotherapy        Time spent with the patient with greater that 50% of the time in discussion was 60 minutes.    Kaila Hernandez MD      Please route or send letter to:  Primary Care Provider (PCP) and Referring Provider

## 2019-10-16 ENCOUNTER — PATIENT OUTREACH (OUTPATIENT)
Dept: ONCOLOGY | Facility: CLINIC | Age: 54
End: 2019-10-16

## 2019-10-16 ENCOUNTER — TELEPHONE (OUTPATIENT)
Dept: SURGERY | Facility: CLINIC | Age: 54
End: 2019-10-16

## 2019-10-16 NOTE — PROGRESS NOTES
Called Jyothi, left message on her voice mail regarding her appointment tomorrow with Dr. Monroe. Jyothi was given date, time, parking policy of clinic. Tricia Smiley RN,BSN,OCN

## 2019-10-16 NOTE — PROGRESS NOTES
[facility]    Hematology/Oncology New Patient Note      Today's Date: 10/17/19    Reason for Consult: Right breast cancer.      HISTORY OF PRESENT ILLNESS: Jyothi Freitas is a 53 year old female who presents with the following oncologic history:   1.  10/11/2019: Diagnostic right sided mammogram performed for a palpable lump in the right breast.  This showed an irregularly-shaped spiculated mass in the upper outer right breast with prominent lymph nodes in the right axilla.  Targeted ultrasound of the right upper outer breast showed an irregularly-shaped hypoechoic mass at the 10 o'clock position, 4 cm from the nipple measuring 2.6 x 1.5 x 2.4 cm.  Right axillary ultrasound showed moderately enlarged lymph nodes.  Right breast needle biopsy showed a grade 3 invasive ductal carcinoma with lymphovascular invasion identified, ER strongly positive at 95%, HI strongly positive at 95%, HER-2/juan FISH negative.  Right axillary lymph node measuring 2 cm was positive for metastatic carcinoma.  Note prior 4/2019 mammogram deemed normal.  2.  10/15/2019: Bilateral breast MRI showed known right breast malignancy measuring 2.6 x 1.4 x 2 cm, 3 mildly enlarged right axillary lymph nodes and no contralateral breast malignancy.    Jyothi is postmenopausal and had been previously taking estrogen and progesterone replacement.  She discontinued this yesterday.  She was on some type of hormonal therapy for more than 25 years.  She was previously taking oral contraceptives prior to hormone replacement therapy.  She has had chronic left-sided facial pain for the past 2 years.  She had a 2017 noncontrast head CT that showed no abnormalities.  Otherwise, she denies any fevers, chills, night sweats, unintentional weight loss, bowel or bladder dysfunction.  She does have a prior history of iron deficiency anemia but is not currently taking any iron supplements.  She is not a vegetarian.    REVIEW OF SYSTEMS:   14 point ROS was  reviewed and is negative other than as noted above in HPI.       HOME MEDICATIONS:  Current Outpatient Medications   Medication Sig Dispense Refill     ALBUTEROL 108 (90 BASE) MCG/ACT inhaler INHALE 2 PUFFS INTO THE LUNGS EVERY 6 HOURS AS NEEDED FOR SHORTNESS OF BREATH OR DIFFICULT BREATHING OR WHEEZING 8.5 g 0     buPROPion (WELLBUTRIN XL) 300 MG 24 hr tablet Take 1 tablet (300 mg) by mouth every morning 90 tablet 3     fluticasone (FLOVENT DISKUS) 100 MCG/BLIST inhaler Inhale 1 puff into the lungs 2 times daily 3 Inhaler 3     fluticasone-salmeterol (ADVAIR) 100-50 MCG/DOSE inhaler Inhale 1 puff into the lungs every 12 hours 1 Inhaler 3     nadolol (CORGARD) 20 MG tablet TAKE 1 TABLET(20 MG) BY MOUTH DAILY 90 tablet 3       ALLERGIES:  Allergies   Allergen Reactions     Sulfa Drugs Other (See Comments)     Red face. Ringing in the ears.         PAST MEDICAL HISTORY:  Past Medical History:   Diagnosis Date     H/O colonoscopy 2016    done at [facility] and [facility]     Hematuria 2016    eval at [facility] and per pt cysto and ct neg     Intermittent asthma     since childhood     Low iron 10/2017    done for eval of hair loss, [facility]     Migraines     most of adult life, has seen neuro in past, on bblocker     Mild depression (H)      Normal stress echocardiogram 2011    due to hear racing     Osteopenia      Syncope 2017    echo [facility] lv size and fxn, grade 1 dd, mild tr     Gynecologic history: Menarche at age 13 or 14, , age of first pregnancy at 27, OCP use for 25 years and HRT use for 3-4 years, no fertility treatment.    PAST SURGICAL HISTORY:  Past Surgical History:   Procedure Laterality Date     C TMJ ARTHROSCOPY/SURGERY       ESOPHAGOSCOPY, GASTROSCOPY, DUODENOSCOPY (EGD), COMBINED N/A 10/30/2017    Procedure: COMBINED ESOPHAGOSCOPY, GASTROSCOPY, DUODENOSCOPY (EGD), BIOPSY SINGLE OR MULTIPLE;  COMBINED ESOPHAGOSCOPY, GASTROSCOPY, DUODENOSCOPY (EGD);  Surgeon: Martin Rodriguez MD;  Location:  GI  "    ORTHOPEDIC SURGERY      \"leg surgery\"     single oopherectomy  2010    cyst         SOCIAL HISTORY:  Social History     Socioeconomic History     Marital status:      Spouse name: Not on file     Number of children: 2     Years of education: Not on file     Highest education level: Not on file   Occupational History     Not on file   Social Needs     Financial resource strain: Not on file     Food insecurity:     Worry: Not on file     Inability: Not on file     Transportation needs:     Medical: Not on file     Non-medical: Not on file   Tobacco Use     Smoking status: Former Smoker     Last attempt to quit: 3/27/1997     Years since quittin.5     Smokeless tobacco: Never Used   Substance and Sexual Activity     Alcohol use: Yes     Comment: rarely     Drug use: No     Sexual activity: Yes     Partners: Male     Birth control/protection: Pill   Lifestyle     Physical activity:     Days per week: Not on file     Minutes per session: Not on file     Stress: Not on file   Relationships     Social connections:     Talks on phone: Not on file     Gets together: Not on file     Attends [employer] service: Not on file     Active member of club or organization: Not on file     Attends meetings of clubs or organizations: Not on file     Relationship status: Not on file     Intimate partner violence:     Fear of current or ex partner: Not on file     Emotionally abused: Not on file     Physically abused: Not on file     Forced sexual activity: Not on file   Other Topics Concern     Parent/sibling w/ CABG, MI or angioplasty before 65F 55M? Not Asked   Social History Narrative     Not on file         FAMILY HISTORY:  Family History   Problem Relation Age of Onset     Coronary Artery Disease Father      Emphysema Mother    Negative for breast, ovarian, colon, or prostate cancer.      PHYSICAL EXAM:  Vital signs:  BP (!) 129/93   Pulse 91   Temp 97.9  F (36.6  C) (Oral)   Resp 12   Ht 1.702 m (5' 7\")   Wt 61 " kg (134 lb 6.4 oz)   LMP 10/07/2017 (Approximate)   SpO2 100%   BMI 21.05 kg/m     ECO  GENERAL/CONSTITUTIONAL: No acute distress.  Well and fit appearing.  EYES:  Extraocular movements intact.  No scleral icterus.  ENT/MOUTH: Neck supple. Oropharynx clear, no mucositis.  LYMPH: Palpable movable right axillary lymph node.  No anterior cervical, posterior cervical, supraclavicular, or inguinal adenopathy.   BREAST: Breasts are fairly symmetric.  Right upper outer breast with a palpable 3 cm well-circumscribed mass, movable, nontender with overlying ecchymosis.  Left with no palpable mass, ulceration or rash.  Nipples are everted bilaterally with no discharge.  RESPIRATORY: Clear to auscultation bilaterally. No crackles or wheezing.   CARDIOVASCULAR: Regular rate and rhythm without murmurs, gallops, or rubs.  GASTROINTESTINAL: No hepatosplenomegaly, masses, or tenderness.  No guarding.  No distention.  MUSCULOSKELETAL: Warm and well-perfused, no cyanosis, clubbing, or edema.  NEUROLOGIC: Cranial nerves II-XII are intact. Alert, oriented, answers questions appropriately.  INTEGUMENTARY: No rashes or jaundice.  GAIT: Steady, does not use assistive device      LABS:  CBC RESULTS:   Recent Labs   Lab Test 19  0850   WBC 5.0   RBC 4.47   HGB 14.1   HCT 41.3   MCV 92   MCH 31.5   MCHC 34.1   RDW 12.4          Recent Labs   Lab Test 19  0850 17  0908 17  1045     --  138   POTASSIUM 4.0  --  4.1   CHLORIDE 108  --  105   CO2 23  --  25   ANIONGAP 10  --  8   * 97 119*   BUN 13  --  11   CR 0.86  --  0.84   JEFF 9.2  --  10.2*         PATHOLOGY:  Reviewed as per HPI.    IMAGING:  Reviewed as per HPI.    ASSESSMENT/PLAN:  Jyothi Freitas is a 53 year old female with the following issues:  1.  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  -We could consider neoadjuvant chemotherapy to observe for chemo responsiveness, reduce risk of recurrent breast cancer, and reduce burden of disease prior to surgery.  However, I told her that it is unlikely that we will achieve a complete response to neoadjuvant chemotherapy given that her tumor is strongly ER and ID positive.  -She would certainly be a candidate for adjuvant endocrine therapy based on the strongly ER and ID positive tumor status.  She is postmenopausal, so I would typically recommend anastrozole to reduce her risk of breast cancer recurrence by relative 50%.  -Given the presumed multiple (at least 3) lymph nodes involved, she would not be a candidate for Oncotype DX assay.  -After much discussion, she would like to proceed with neoadjuvant chemotherapy.  I discussed the recommendation for dose dense Adriamycin and Cytoxan followed by weekly paclitaxel.  I discussed the potential side effects of this drug regimen including but not limited to cardiac dysfunction, alopecia, cytopenias, neutropenic fever, peripheral neuropathy, fatigue, nausea, emesis, other bowel or bladder dysfunction.  Provided her comprehensive drug information handout on this regimen today.  -I referred her for genetic counseling.  -Given her locally advanced disease with lymph node involvement, she would like to proceed with scans.  We will arrange for PET scan and review results.  If there is no evidence of distant metastatic disease, then we will arrange for port placement by Dr. Hernandez followed by start of neoadjuvant chemotherapy.  -She would also likely be a candidate for adjuvant radiation therapy  given her lymph node involvement and depending on radiation oncology evaluation.  We will check baseline complete blood count and comprehensive metabolic panel today.  -Will check prechemotherapy echocardiogram as well.    2. Depression  -Mood stable. Continue bupropion. Would not recommend tamoxifen in future due to interaction with bupropion unless she switched antidepressants.    3.  Prior iron deficiency  -We will check ferritin today.    4. Left sided facial pain, chronic  -Of unclear etiology.  She had a prior noncontrast head CT in 2017 that showed no abnormalities.  As outlined above, planning to have her undergo imaging.    Return after PET scan.    Maricarmen Monroe MD  Hematology/Oncology  [facility]    I spent a total of 70 minutes with the patient, with greater than 50% of the time in counseling and coordination of care.

## 2019-10-16 NOTE — TELEPHONE ENCOUNTER
Call placed to patient with results of HER2. Left message, HER2 negative, requested call back to discuss in greater detail.    Adelina LEWISN, RN, OCN  Oncology Care Coordinator  [facility]  [facility]  Phone: [phone]

## 2019-10-17 ENCOUNTER — HOSPITAL ENCOUNTER (OUTPATIENT)
Facility: CLINIC | Age: 54
Setting detail: SPECIMEN
Discharge: HOME | End: 2019-10-17
Attending: INTERNAL MEDICINE | Admitting: INTERNAL MEDICINE
Payer: COMMERCIAL

## 2019-10-17 ENCOUNTER — PATIENT OUTREACH (OUTPATIENT)
Dept: ONCOLOGY | Facility: CLINIC | Age: 54
End: 2019-10-17

## 2019-10-17 ENCOUNTER — ONCOLOGY VISIT (OUTPATIENT)
Dept: ONCOLOGY | Facility: CLINIC | Age: 54
End: 2019-10-17
Attending: INTERNAL MEDICINE
Payer: COMMERCIAL

## 2019-10-17 VITALS
HEIGHT: 67 IN | SYSTOLIC BLOOD PRESSURE: 129 MMHG | DIASTOLIC BLOOD PRESSURE: 93 MMHG | HEART RATE: 91 BPM | WEIGHT: 134.4 LBS | RESPIRATION RATE: 12 BRPM | TEMPERATURE: 97.9 F | OXYGEN SATURATION: 100 % | BODY MASS INDEX: 21.09 KG/M2

## 2019-10-17 DIAGNOSIS — G89.29 CHRONIC FACIAL PAIN: ICD-10-CM

## 2019-10-17 DIAGNOSIS — C50.411 MALIGNANT NEOPLASM OF UPPER-OUTER QUADRANT OF RIGHT BREAST IN FEMALE, ESTROGEN RECEPTOR POSITIVE (H): Primary | ICD-10-CM

## 2019-10-17 DIAGNOSIS — Z17.0 MALIGNANT NEOPLASM OF UPPER-OUTER QUADRANT OF RIGHT BREAST IN FEMALE, ESTROGEN RECEPTOR POSITIVE (H): Primary | ICD-10-CM

## 2019-10-17 DIAGNOSIS — F32.5 MAJOR DEPRESSION IN COMPLETE REMISSION (H): ICD-10-CM

## 2019-10-17 DIAGNOSIS — R51.9 CHRONIC FACIAL PAIN: ICD-10-CM

## 2019-10-17 DIAGNOSIS — Z01.818 ENCOUNTER FOR OTHER PREPROCEDURAL EXAMINATION: ICD-10-CM

## 2019-10-17 LAB
ALBUMIN SERPL-MCNC: 3.8 G/DL (ref 3.4–5)
ALP SERPL-CCNC: 45 U/L (ref 40–150)
ALT SERPL W P-5'-P-CCNC: 16 U/L (ref 0–50)
ANION GAP SERPL CALCULATED.3IONS-SCNC: 4 MMOL/L (ref 3–14)
AST SERPL W P-5'-P-CCNC: 11 U/L (ref 0–45)
BASOPHILS # BLD AUTO: 0.1 10E9/L (ref 0–0.2)
BASOPHILS NFR BLD AUTO: 0.9 %
BILIRUB SERPL-MCNC: 0.2 MG/DL (ref 0.2–1.3)
BUN SERPL-MCNC: 12 MG/DL (ref 7–30)
CALCIUM SERPL-MCNC: 8.6 MG/DL (ref 8.5–10.1)
CHLORIDE SERPL-SCNC: 112 MMOL/L (ref 94–109)
CO2 SERPL-SCNC: 26 MMOL/L (ref 20–32)
CREAT SERPL-MCNC: 0.73 MG/DL (ref 0.52–1.04)
DIFFERENTIAL METHOD BLD: NORMAL
EOSINOPHIL # BLD AUTO: 0.4 10E9/L (ref 0–0.7)
EOSINOPHIL NFR BLD AUTO: 6.2 %
ERYTHROCYTE [DISTWIDTH] IN BLOOD BY AUTOMATED COUNT: 11.8 % (ref 10–15)
FERRITIN SERPL-MCNC: 27 NG/ML (ref 8–252)
GFR SERPL CREATININE-BSD FRML MDRD: >90 ML/MIN/{1.73_M2}
GLUCOSE SERPL-MCNC: 120 MG/DL (ref 70–99)
HCT VFR BLD AUTO: 39.9 % (ref 35–47)
HGB BLD-MCNC: 13.8 G/DL (ref 11.7–15.7)
IMM GRANULOCYTES # BLD: 0 10E9/L (ref 0–0.4)
IMM GRANULOCYTES NFR BLD: 0.1 %
LYMPHOCYTES # BLD AUTO: 2 10E9/L (ref 0.8–5.3)
LYMPHOCYTES NFR BLD AUTO: 29.6 %
MCH RBC QN AUTO: 30.9 PG (ref 26.5–33)
MCHC RBC AUTO-ENTMCNC: 34.6 G/DL (ref 31.5–36.5)
MCV RBC AUTO: 90 FL (ref 78–100)
MONOCYTES # BLD AUTO: 0.7 10E9/L (ref 0–1.3)
MONOCYTES NFR BLD AUTO: 10.3 %
NEUTROPHILS # BLD AUTO: 3.6 10E9/L (ref 1.6–8.3)
NEUTROPHILS NFR BLD AUTO: 52.9 %
NRBC # BLD AUTO: 0 10*3/UL
NRBC BLD AUTO-RTO: 0 /100
PLATELET # BLD AUTO: 339 10E9/L (ref 150–450)
POTASSIUM SERPL-SCNC: 3.9 MMOL/L (ref 3.4–5.3)
PROT SERPL-MCNC: 7.2 G/DL (ref 6.8–8.8)
RBC # BLD AUTO: 4.46 10E12/L (ref 3.8–5.2)
SODIUM SERPL-SCNC: 142 MMOL/L (ref 133–144)
WBC # BLD AUTO: 6.7 10E9/L (ref 4–11)

## 2019-10-17 PROCEDURE — 80053 COMPREHEN METABOLIC PANEL: CPT | Performed by: INTERNAL MEDICINE

## 2019-10-17 PROCEDURE — 36415 COLL VENOUS BLD VENIPUNCTURE: CPT

## 2019-10-17 PROCEDURE — G0463 HOSPITAL OUTPT CLINIC VISIT: HCPCS | Mod: 25

## 2019-10-17 PROCEDURE — 90682 RIV4 VACC RECOMBINANT DNA IM: CPT | Performed by: INTERNAL MEDICINE

## 2019-10-17 PROCEDURE — 25000128 H RX IP 250 OP 636: Performed by: INTERNAL MEDICINE

## 2019-10-17 PROCEDURE — 85025 COMPLETE CBC W/AUTO DIFF WBC: CPT | Performed by: INTERNAL MEDICINE

## 2019-10-17 PROCEDURE — 82728 ASSAY OF FERRITIN: CPT | Performed by: INTERNAL MEDICINE

## 2019-10-17 PROCEDURE — G0008 ADMIN INFLUENZA VIRUS VAC: HCPCS

## 2019-10-17 PROCEDURE — 99205 OFFICE O/P NEW HI 60 MIN: CPT | Performed by: INTERNAL MEDICINE

## 2019-10-17 RX ADMIN — INFLUENZA A VIRUS A/BRISBANE/02/2018 (H1N1) RECOMBINANT HEMAGGLUTININ ANTIGEN, INFLUENZA A VIRUS A/KANSAS/14/2017 (H3N2) RECOMBINANT HEMAGGLUTININ ANTIGEN, INFLUENZA B VIRUS B/PHUKET/3073/2013 RECOMBINANT HEMAGGLUTININ ANTIGEN, AND INFLUENZA B VIRUS B/MARYLAND/15/2016 RECOMBINANT HEMAGGLUTININ ANTIGEN 0.5 ML: 45; 45; 45; 45 INJECTION INTRAMUSCULAR at 15:49

## 2019-10-17 ASSESSMENT — PAIN SCALES - GENERAL: PAINLEVEL: NO PAIN (0)

## 2019-10-17 ASSESSMENT — MIFFLIN-ST. JEOR: SCORE: 1242.26

## 2019-10-17 NOTE — PROGRESS NOTES
Medical Assistant Note:  Jyothi Freitas presents today for lab draw.    Patient seen by provider today: Yes: Monroe.   present during visit today: Not Applicable.    Concerns: No Concerns.    Procedure:  Lab draw site: LAC, Needle type: Butterfly, Gauge: 23.    Post Assessment:  Labs drawn without difficulty: Yes.    Discharge Plan:  Departure Mode: Ambulatory.    Face to Face Time: 4 min.    Shari Schoenberger, CMA

## 2019-10-17 NOTE — PROGRESS NOTES
Chemo Teach  re: Adriamycin/Cytoxan, Neulasta, Taxol  treatment plan for diagnosis: Breast Cancer  -See Pt Education documentation - Chemo Effects (Adult)  -Reviewed treatment schedule including cycle length, lab monitoring and take home medications.    Verbal and written instruction provided using:     [facility] Adriamycin/Cytoxan, Taxol, Neulasta  Drug Information   -Reviewed What Chemotherapy  Is Used For, How Chemotherapy is Given, Side Effects, When to Contact Your Doctor of Health Care Provider and Self Care Tips sections.      [place] literature:   -Patient given MyCancer Guidebook and reviewed with patient   -Reviewed Getting Ready for Chemotherapy: What to Expect Before, During and After Your Treatment   -Reviewed Tips for Increasing Protein and Calories  -Reviewed Vascular Access Port Implantation with Power Port teaching book/model   -Patient given information on cold cap therapy, wigs, [employer] support groups      Tricia Smiley RN,BSN,OCN

## 2019-10-17 NOTE — LETTER
10/17/2019         RE: Jyothi Freitas  [address]  [place] [address]        Dear Colleague,    Thank you for referring your patient, Jyothi Freitas, to the [facility]. Please see a copy of my visit note below.    [facility]    Hematology/Oncology New Patient Note      Today's Date: 10/17/19    Reason for Consult: Right breast cancer.      HISTORY OF PRESENT ILLNESS: Jyothi Freitas is a 53 year old female who presents with the following oncologic history:   1.  10/11/2019: Diagnostic right sided mammogram performed for a palpable lump in the right breast.  This showed an irregularly-shaped spiculated mass in the upper outer right breast with prominent lymph nodes in the right axilla.  Targeted ultrasound of the right upper outer breast showed an irregularly-shaped hypoechoic mass at the 10 o'clock position, 4 cm from the nipple measuring 2.6 x 1.5 x 2.4 cm.  Right axillary ultrasound showed moderately enlarged lymph nodes.  Right breast needle biopsy showed a grade 3 invasive ductal carcinoma with lymphovascular invasion identified, ER strongly positive at 95%, [place] strongly positive at 95%, HER-2/juan FISH negative.  Right axillary lymph node measuring 2 cm was positive for metastatic carcinoma.  Note prior 4/2019 mammogram deemed normal.  2.  10/15/2019: Bilateral breast MRI showed known right breast malignancy measuring 2.6 x 1.4 x 2 cm, 3 mildly enlarged right axillary lymph nodes and no contralateral breast malignancy.    Jyothi is postmenopausal and had been previously taking estrogen and progesterone replacement.  She discontinued this yesterday.  She was on some type of hormonal therapy for more than 25 years.  She was previously taking oral contraceptives prior to hormone replacement therapy.  She has had chronic left-sided facial pain for the past 2 years.  She had a 2017 noncontrast head CT that showed no abnormalities.  Otherwise, she denies any fevers, chills, night  sweats, unintentional weight loss, bowel or bladder dysfunction.  She does have a prior history of iron deficiency anemia but is not currently taking any iron supplements.  She is not a vegetarian.    REVIEW OF SYSTEMS:   14 point ROS was reviewed and is negative other than as noted above in HPI.       HOME MEDICATIONS:  Current Outpatient Medications   Medication Sig Dispense Refill     ALBUTEROL 108 (90 BASE) MCG/ACT inhaler INHALE 2 PUFFS INTO THE LUNGS EVERY 6 HOURS AS NEEDED FOR SHORTNESS OF BREATH OR DIFFICULT BREATHING OR WHEEZING 8.5 g 0     buPROPion (WELLBUTRIN XL) 300 MG 24 hr tablet Take 1 tablet (300 mg) by mouth every morning 90 tablet 3     fluticasone (FLOVENT DISKUS) 100 MCG/BLIST inhaler Inhale 1 puff into the lungs 2 times daily 3 Inhaler 3     fluticasone-salmeterol (ADVAIR) 100-50 MCG/DOSE inhaler Inhale 1 puff into the lungs every 12 hours 1 Inhaler 3     nadolol (CORGARD) 20 MG tablet TAKE 1 TABLET(20 MG) BY MOUTH DAILY 90 tablet 3       ALLERGIES:  Allergies   Allergen Reactions     Sulfa Drugs Other (See Comments)     Red face. Ringing in the ears.         PAST MEDICAL HISTORY:  Past Medical History:   Diagnosis Date     H/O colonoscopy 2016    done at [facility] and nl     Hematuria 2016    eval at [facility] and per pt cysto and ct neg     Intermittent asthma     since childhood     Low iron 10/2017    done for eval of hair loss, egd nl     Migraines     most of adult life, has seen neuro in past, on bblocker     Mild depression (H)      Normal stress echocardiogram 2011    due to hear racing     Osteopenia      Syncope 2017    echo nl lv size and fxn, grade 1 dd, mild tr     Gynecologic history: Menarche at age 13 or 14, , age of first pregnancy at 27, OCP use for 25 years and HRT use for 3-4 years, no fertility treatment.    PAST SURGICAL HISTORY:  Past Surgical History:   Procedure Laterality Date     C TMJ ARTHROSCOPY/SURGERY       ESOPHAGOSCOPY, GASTROSCOPY, DUODENOSCOPY  "(EGD), COMBINED N/A 10/30/2017    Procedure: COMBINED ESOPHAGOSCOPY, GASTROSCOPY, DUODENOSCOPY (EGD), BIOPSY SINGLE OR MULTIPLE;  COMBINED ESOPHAGOSCOPY, GASTROSCOPY, DUODENOSCOPY (EGD);  Surgeon: Martin Rodriguez MD;  Location:  GI     ORTHOPEDIC SURGERY      \"leg surgery\"     single oopherectomy  2010    cyst         SOCIAL HISTORY:  Social History     Socioeconomic History     Marital status:      Spouse name: Not on file     Number of children: 2     Years of education: Not on file     Highest education level: Not on file   Occupational History     Not on file   Social Needs     Financial resource strain: Not on file     Food insecurity:     Worry: Not on file     Inability: Not on file     Transportation needs:     Medical: Not on file     Non-medical: Not on file   Tobacco Use     Smoking status: Former Smoker     Last attempt to quit: 3/27/1997     Years since quittin.5     Smokeless tobacco: Never Used   Substance and Sexual Activity     Alcohol use: Yes     Comment: rarely     Drug use: No     Sexual activity: Yes     Partners: Male     Birth control/protection: Pill   Lifestyle     Physical activity:     Days per week: Not on file     Minutes per session: Not on file     Stress: Not on file   Relationships     Social connections:     Talks on phone: Not on file     Gets together: Not on file     Attends Spiritism service: Not on file     Active member of club or organization: Not on file     Attends meetings of clubs or organizations: Not on file     Relationship status: Not on file     Intimate partner violence:     Fear of current or ex partner: Not on file     Emotionally abused: Not on file     Physically abused: Not on file     Forced sexual activity: Not on file   Other Topics Concern     Parent/sibling w/ CABG, MI or angioplasty before 65F 55M? Not Asked   Social History Narrative     Not on file         FAMILY HISTORY:  Family History   Problem Relation Age of Onset     Coronary " "Artery Disease Father      Emphysema Mother    Negative for breast, ovarian, colon, or prostate cancer.      PHYSICAL EXAM:  Vital signs:  BP (!) 129/93   Pulse 91   Temp 97.9  F (36.6  C) (Oral)   Resp 12   Ht 1.702 m (5' 7\")   Wt 61 kg (134 lb 6.4 oz)   LMP 10/07/2017 (Approximate)   SpO2 100%   BMI 21.05 kg/m      ECO  GENERAL/CONSTITUTIONAL: No acute distress.  Well and fit appearing.  EYES:  Extraocular movements intact.  No scleral icterus.  ENT/MOUTH: Neck supple. Oropharynx clear, no mucositis.  LYMPH: Palpable movable right axillary lymph node.  No anterior cervical, posterior cervical, supraclavicular, or inguinal adenopathy.   BREAST: Breasts are fairly symmetric.  Right upper outer breast with a palpable 3 cm well-circumscribed mass, movable, nontender with overlying ecchymosis.  Left with no palpable mass, ulceration or rash.  Nipples are everted bilaterally with no discharge.  RESPIRATORY: Clear to auscultation bilaterally. No crackles or wheezing.   CARDIOVASCULAR: Regular rate and rhythm without murmurs, gallops, or rubs.  GASTROINTESTINAL: No hepatosplenomegaly, masses, or tenderness.  No guarding.  No distention.  MUSCULOSKELETAL: Warm and well-perfused, no cyanosis, clubbing, or edema.  NEUROLOGIC: Cranial nerves II-XII are intact. Alert, oriented, answers questions appropriately.  INTEGUMENTARY: No rashes or jaundice.  GAIT: Steady, does not use assistive device      LABS:  CBC RESULTS:   Recent Labs   Lab Test 19  0850   WBC 5.0   RBC 4.47   HGB 14.1   HCT 41.3   MCV 92   MCH 31.5   MCHC 34.1   RDW 12.4          Recent Labs   Lab Test 19  0850 17  0908 17  1045     --  138   POTASSIUM 4.0  --  4.1   CHLORIDE 108  --  105   CO2 23  --  25   ANIONGAP 10  --  8   * 97 119*   BUN 13  --  11   CR 0.86  --  0.84   JEFF 9.2  --  10.2*         PATHOLOGY:  Reviewed as per HPI.    IMAGING:  Reviewed as per HPI.    ASSESSMENT/PLAN:  Jyothi LEENA Freitas is a 53 " year old female with the following issues:  1.  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  -We could consider neoadjuvant chemotherapy to observe for chemo responsiveness, reduce risk of recurrent breast cancer, and reduce burden of disease prior to surgery.  However, I told her that it is unlikely that we will achieve a complete response to neoadjuvant chemotherapy given that her tumor is strongly ER and AK positive.  -She would certainly be a candidate for adjuvant endocrine therapy based on the strongly ER and AK positive tumor status.  She is postmenopausal, so I would typically recommend anastrozole to reduce her risk of breast cancer recurrence by relative 50%.  -Given the presumed multiple (at least 3) lymph nodes involved, she would not be a candidate for Oncotype DX assay.  -After much discussion, she would like to proceed with neoadjuvant chemotherapy.  I discussed the recommendation for dose dense Adriamycin and Cytoxan followed by weekly paclitaxel.  I discussed the potential side effects of this drug regimen including but not limited to cardiac dysfunction, alopecia, cytopenias, neutropenic fever, peripheral neuropathy, fatigue, nausea, emesis, other bowel or bladder dysfunction.  Provided her comprehensive drug information handout on this regimen today.  -I referred her for genetic counseling.  -Given her locally advanced disease with lymph node involvement, she would like to proceed with scans.  We will arrange for PET scan and review  "results.  If there is no evidence of distant metastatic disease, then we will arrange for port placement by Dr. Hernandez followed by start of neoadjuvant chemotherapy.  -She would also likely be a candidate for adjuvant radiation therapy given her lymph node involvement and depending on radiation oncology evaluation.  We will check baseline complete blood count and comprehensive metabolic panel today.  -Will check prechemotherapy echocardiogram as well.    2. Depression  -Mood stable. Continue bupropion. Would not recommend tamoxifen in future due to interaction with bupropion unless she switched antidepressants.    3.  Prior iron deficiency  -We will check ferritin today.    4. Left sided facial pain, chronic  -Of unclear etiology.  She had a prior noncontrast head CT in 2017 that showed no abnormalities.  As outlined above, planning to have her undergo imaging.    Return after PET scan.    Maricarmen Monroe MD  Hematology/Oncology  [facility]    I spent a total of 70 minutes with the patient, with greater than 50% of the time in counseling and coordination of care.    Oncology Rooming Note    October 17, 2019 1:14 PM   Jyothi Freitas is a 53 year old female who presents for:    Chief Complaint   Patient presents with     Oncology Clinic Visit     Initial Vitals: BP (!) 129/93   Pulse 91   Temp 97.9  F (36.6  C) (Oral)   Resp 12   Ht 1.702 m (5' 7\")   Wt 61 kg (134 lb 6.4 oz)   LMP 10/07/2017 (Approximate)   SpO2 100%   BMI 21.05 kg/m    Estimated body mass index is 21.05 kg/m  as calculated from the following:    Height as of this encounter: 1.702 m (5' 7\").    Weight as of this encounter: 61 kg (134 lb 6.4 oz). Body surface area is 1.7 meters squared.  No Pain (0) Comment: Data Unavailable   Patient's last menstrual period was 10/07/2017 (approximate).  Allergies reviewed: Yes  Medications reviewed: Yes    Medications: Medication refills not needed today.  Pharmacy name entered into " EPIC: [id number] - [place], [place] - [address] [place] [facility] [address]    Clinical concerns: no       Shari J. Schoenberger, CMA              Medical Assistant Note:  Jyothi STERN Zena presents today for lab draw.    Patient seen by provider today: Yes: Fer.   present during visit today: Not Applicable.    Concerns: No Concerns.    Procedure:  Lab draw site: LAC, Needle type: Butterfly, Gauge: 23.    Post Assessment:  Labs drawn without difficulty: Yes.    Discharge Plan:  Departure Mode: Ambulatory.    Face to Face Time: 4 min.    Shari Schoenberger, CMA          Again, thank you for allowing me to participate in the care of your patient.        Sincerely,        Maricarmen Monroe MD

## 2019-10-17 NOTE — PROGRESS NOTES
"Oncology Rooming Note    October 17, 2019 1:14 PM   Jyothi Freitas is a 53 year old female who presents for:    Chief Complaint   Patient presents with     Oncology Clinic Visit     Initial Vitals: BP (!) 129/93   Pulse 91   Temp 97.9  F (36.6  C) (Oral)   Resp 12   Ht 1.702 m (5' 7\")   Wt 61 kg (134 lb 6.4 oz)   LMP 10/07/2017 (Approximate)   SpO2 100%   BMI 21.05 kg/m   Estimated body mass index is 21.05 kg/m  as calculated from the following:    Height as of this encounter: 1.702 m (5' 7\").    Weight as of this encounter: 61 kg (134 lb 6.4 oz). Body surface area is 1.7 meters squared.  No Pain (0) Comment: Data Unavailable   Patient's last menstrual period was 10/07/2017 (approximate).  Allergies reviewed: Yes  Medications reviewed: Yes    Medications: Medication refills not needed today.  Pharmacy name entered into [facility]: [facility][id number] [place], [place] - [address] [place] [facility] [address]    Clinical concerns: no       Shari J. Schoenberger, CMA            "

## 2019-10-19 NOTE — PROGRESS NOTES
[facility]    Hematology/Oncology Established Patient Follow-up Note      Today's Date: 10/22/19    Reason for Follow-up: Right breast cancer.    HISTORY OF PRESENT ILLNESS: Jyothi Freitas is a 53 year old female who presents with the following oncologic history:   1.  10/11/2019: Diagnostic right sided mammogram performed for a palpable lump in the right breast.  This showed an irregularly-shaped spiculated mass in the upper outer right breast with prominent lymph nodes in the right axilla.  Targeted ultrasound of the right upper outer breast showed an irregularly-shaped hypoechoic mass at the 10 o'clock position, 4 cm from the nipple measuring 2.6 x 1.5 x 2.4 cm.  Right axillary ultrasound showed moderately enlarged lymph nodes.  Right breast needle biopsy showed a grade 3 invasive ductal carcinoma with lymphovascular invasion identified, ER strongly positive at 95%, [place] strongly positive at 95%, HER-2/juan FISH negative.  Right axillary lymph node measuring 2 cm was positive for metastatic carcinoma.  Note prior 4/2019 mammogram deemed normal.  2.  10/15/2019: Bilateral breast MRI showed known right breast malignancy measuring 2.6 x 1.4 x 2 cm, 3 mildly enlarged right axillary lymph nodes and no contralateral breast malignancy.  3. 10/21/2019 PET scan showed scattered small hypermetabolic bilateral axillary lymph nodes; for example, a hypermetabolic right axillary lymph node measures 1.6 x 0.9 cm with SUV max 3.6.  Hypermetabolic mass in the right breast superiorly and laterally measuring 2.1 x 1.6 cm with SUV max 4.3.  A 0.6 cm low-density lesion in the right hepatic lobe is too small for accurate PET characterization but shows no appreciable hypermetabolic activity.  Incidentally noted ectasia of the ascending thoracic aorta measures 4 cm in diameter.      INTERIM HISTORY:  Jyothi Freitas reports some sensation of tightness in the bilateral axillary regions.  No other new complaints.      REVIEW OF  "SYSTEMS:   14 point ROS was reviewed and is negative other than as noted above in HPI.       HOME MEDICATIONS:  Current Outpatient Medications   Medication Sig Dispense Refill     ALBUTEROL 108 (90 BASE) MCG/ACT inhaler INHALE 2 PUFFS INTO THE LUNGS EVERY 6 HOURS AS NEEDED FOR SHORTNESS OF BREATH OR DIFFICULT BREATHING OR WHEEZING 8.5 g 0     buPROPion (WELLBUTRIN XL) 300 MG 24 hr tablet Take 1 tablet (300 mg) by mouth every morning 90 tablet 3     fluticasone (FLOVENT DISKUS) 100 MCG/BLIST inhaler Inhale 1 puff into the lungs 2 times daily 3 Inhaler 3     fluticasone-salmeterol (ADVAIR) 100-50 MCG/DOSE inhaler Inhale 1 puff into the lungs every 12 hours 1 Inhaler 3     nadolol (CORGARD) 20 MG tablet TAKE 1 TABLET(20 MG) BY MOUTH DAILY 90 tablet 3         ALLERGIES:  Allergies   Allergen Reactions     Sulfa Drugs Other (See Comments)     Red face. Ringing in the ears.         PAST MEDICAL HISTORY:  Past Medical History:   Diagnosis Date     H/O colonoscopy 03/08/2016    done at [facility] and nl     Hematuria 2016    eval at [facility] and per pt cysto and ct neg     Intermittent asthma     since childhood     Low iron 10/2017    done for eval of hair loss, egd nl     Migraines     most of adult life, has seen neuro in past, on bblocker     Mild depression (H)      Normal stress echocardiogram July 2011    due to hear racing     Osteopenia 2008     Syncope 03/2017    echo nl lv size and fxn, grade 1 dd, mild tr         PAST SURGICAL HISTORY:  Past Surgical History:   Procedure Laterality Date     C TMJ ARTHROSCOPY/SURGERY       ESOPHAGOSCOPY, GASTROSCOPY, DUODENOSCOPY (EGD), COMBINED N/A 10/30/2017    Procedure: COMBINED ESOPHAGOSCOPY, GASTROSCOPY, DUODENOSCOPY (EGD), BIOPSY SINGLE OR MULTIPLE;  COMBINED ESOPHAGOSCOPY, GASTROSCOPY, DUODENOSCOPY (EGD);  Surgeon: Martin Rodriguez MD;  Location:  GI     ORTHOPEDIC SURGERY      \"leg surgery\"     single oopherectomy  2010    cyst         SOCIAL HISTORY:  Social History " "    Socioeconomic History     Marital status:      Spouse name: Not on file     Number of children: 2     Years of education: Not on file     Highest education level: Not on file   Occupational History     Not on file   Social Needs     Financial resource strain: Not on file     Food insecurity:     Worry: Not on file     Inability: Not on file     Transportation needs:     Medical: Not on file     Non-medical: Not on file   Tobacco Use     Smoking status: Former Smoker     Last attempt to quit: 3/27/1997     Years since quittin.5     Smokeless tobacco: Never Used   Substance and Sexual Activity     Alcohol use: Yes     Comment: rarely     Drug use: No     Sexual activity: Yes     Partners: Male     Birth control/protection: Pill   Lifestyle     Physical activity:     Days per week: Not on file     Minutes per session: Not on file     Stress: Not on file   Relationships     Social connections:     Talks on phone: Not on file     Gets together: Not on file     Attends Hinduism service: Not on file     Active member of club or organization: Not on file     Attends meetings of clubs or organizations: Not on file     Relationship status: Not on file     Intimate partner violence:     Fear of current or ex partner: Not on file     Emotionally abused: Not on file     Physically abused: Not on file     Forced sexual activity: Not on file   Other Topics Concern     Parent/sibling w/ CABG, MI or angioplasty before 65F 55M? Not Asked   Social History Narrative     Not on file         FAMILY HISTORY:  Family History   Problem Relation Age of Onset     Coronary Artery Disease Father      Emphysema Mother          PHYSICAL EXAM:  Vital signs:  BP (!) 132/91   Pulse 91   Resp 16   Ht 1.702 m (5' 7\")   Wt 60.1 kg (132 lb 6.4 oz)   LMP 10/07/2017 (Approximate)   SpO2 98%   BMI 20.74 kg/m     ECO  GENERAL/CONSTITUTIONAL: No acute distress.  Exam deferred for detailed discussion.    LABS:  CBC RESULTS: "   Recent Labs   Lab Test 10/17/19  1458   WBC 6.7   RBC 4.46   HGB 13.8   HCT 39.9   MCV 90   MCH 30.9   MCHC 34.6   RDW 11.8          Recent Labs   Lab Test 10/17/19  1458 05/21/19  0850    141   POTASSIUM 3.9 4.0   CHLORIDE 112* 108   CO2 26 23   ANIONGAP 4 10   * 107*   BUN 12 13   CR 0.73 0.86   JEFF 8.6 9.2         PATHOLOGY:  None new.    IMAGING:  PET:    ASSESSMENT/PLAN:  Jyothi Freitas is a 53 year old female with the following issues:  1.  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  The scan shows some hypermetabolic bilateral axillary lymph nodes, more prominent on the right side.  It also shows the known primary right breast cancer.  I do not feel strongly that the contralateral axillary lymph nodes are truly representative of metastatic disease.  However, I will be discussing her case at the breast tumor board and reviewing the scans with radiology.  -Plan for neoadjuvant chemotherapy to observe for chemo responsiveness, reduce risk of recurrent breast cancer, and reduce burden of disease prior to surgery.  However, as per previous discussion, she understands that it is unlikely that we will achieve a complete response to neoadjuvant chemotherapy given that her tumor is strongly ER and AZ positive.  -She would certainly be a candidate for adjuvant endocrine therapy based on the strongly ER and AZ positive tumor status.  She is postmenopausal, so I would typically recommend anastrozole to reduce her risk of breast cancer recurrence by relative 50%.  -She expresses readiness to proceed with neoadjuvant chemotherapy with dose dense Adriamycin and Cytoxan for 4 cycles followed by weekly paclitaxel for 12 weeks.    -Genetic counseling pending with appointments in November  2019.  -She would also likely be a candidate for adjuvant radiation therapy given her lymph node involvement and depending on radiation oncology evaluation.    -I reviewed the results of the prechemotherapy echocardiogram which showed normal LVEF.    2. Depression  -Mood stable. Continue bupropion. Would not recommend tamoxifen in future due to interaction with bupropion unless she switched antidepressants.    3.  Prior iron deficiency  -10/17/2019 ferritin was normal at 27.  Her hemoglobin is normal.  Reviewed these results with her.    4. Left sided facial pain, chronic  -Of unclear etiology.  She had a prior noncontrast head CT in 2017 that showed no abnormalities.  PET scan showed no specific etiology.    5. Ectasia of thoracic aorta  -This measures 4 cm on the PET scan.  I reassured Jyothi that she would not need any urgent surgical management of the aorta that may need ongoing monitoring.  I recommended referral to see Dr. Silverio Gooden in cardio-oncology/vascular.  She agrees to this consult.    Return to see me with subsequent chemo treatment.      Maricarmen Monroe MD  Hematology/Oncology  [facility]    I spent a total of 40 minutes with 100% in counseling.

## 2019-10-21 ENCOUNTER — HOSPITAL ENCOUNTER (OUTPATIENT)
Dept: CARDIOLOGY | Facility: CLINIC | Age: 54
End: 2019-10-21
Attending: INTERNAL MEDICINE
Payer: COMMERCIAL

## 2019-10-21 ENCOUNTER — HOSPITAL ENCOUNTER (OUTPATIENT)
Dept: PET IMAGING | Facility: CLINIC | Age: 54
Discharge: HOME | End: 2019-10-21
Attending: INTERNAL MEDICINE | Admitting: INTERNAL MEDICINE
Payer: COMMERCIAL

## 2019-10-21 PROCEDURE — 93306 TTE W/DOPPLER COMPLETE: CPT

## 2019-10-21 PROCEDURE — A9552 F18 FDG: HCPCS | Performed by: INTERNAL MEDICINE

## 2019-10-21 PROCEDURE — 78816 PET IMAGE W/CT FULL BODY: CPT | Mod: PI

## 2019-10-21 PROCEDURE — 93306 TTE W/DOPPLER COMPLETE: CPT | Mod: 26 | Performed by: INTERNAL MEDICINE

## 2019-10-21 PROCEDURE — 34300033 ZZH RX 343: Performed by: INTERNAL MEDICINE

## 2019-10-21 RX ADMIN — FLUDEOXYGLUCOSE F-18 10.9 MCI.: 500 INJECTION, SOLUTION INTRAVENOUS at 14:26

## 2019-10-21 NOTE — TELEPHONE ENCOUNTER
ONCOLOGY INTAKE: Records Information      APPT INFORMATION:  Referring provider:  Maricarmen Monroe MD  Referring provider s clinic:   CANCER CLINIC  Reason for visit/diagnosis:  breast cancer  Has patient been notified of appointment date and time?: Yes    RECORDS INFORMATION:  Were the records received with the referral (via [employer])? No, Internal    Has patient been seen for any external appt for this diagnosis? No    If yes, where? NA    Has patient had any imaging or procedures outside of [facility] for this condition? NO      If Yes, where? NA    ADDITIONAL INFORMATION:  Pt requested asap appt at any clinic, Scheduled with [facility] for that reason.

## 2019-10-22 ENCOUNTER — TELEPHONE (OUTPATIENT)
Dept: SURGERY | Facility: PHYSICIAN GROUP | Age: 54
End: 2019-10-22

## 2019-10-22 ENCOUNTER — ONCOLOGY VISIT (OUTPATIENT)
Dept: ONCOLOGY | Facility: CLINIC | Age: 54
End: 2019-10-22
Attending: INTERNAL MEDICINE
Payer: COMMERCIAL

## 2019-10-22 ENCOUNTER — PREP FOR PROCEDURE (OUTPATIENT)
Dept: SURGERY | Facility: PHYSICIAN GROUP | Age: 54
End: 2019-10-22

## 2019-10-22 VITALS
RESPIRATION RATE: 16 BRPM | HEIGHT: 67 IN | WEIGHT: 132.4 LBS | HEART RATE: 91 BPM | OXYGEN SATURATION: 98 % | SYSTOLIC BLOOD PRESSURE: 132 MMHG | DIASTOLIC BLOOD PRESSURE: 91 MMHG | BODY MASS INDEX: 20.78 KG/M2

## 2019-10-22 DIAGNOSIS — C50.411 MALIGNANT NEOPLASM OF UPPER-OUTER QUADRANT OF RIGHT BREAST IN FEMALE, ESTROGEN RECEPTOR POSITIVE (H): Primary | ICD-10-CM

## 2019-10-22 DIAGNOSIS — G89.29 CHRONIC FACIAL PAIN: ICD-10-CM

## 2019-10-22 DIAGNOSIS — C50.919 BREAST CANCER (H): Primary | ICD-10-CM

## 2019-10-22 DIAGNOSIS — F32.5 MAJOR DEPRESSION IN COMPLETE REMISSION (H): ICD-10-CM

## 2019-10-22 DIAGNOSIS — R51.9 CHRONIC FACIAL PAIN: ICD-10-CM

## 2019-10-22 DIAGNOSIS — I71.20 THORACIC AORTIC ANEURYSM WITHOUT RUPTURE (H): ICD-10-CM

## 2019-10-22 DIAGNOSIS — Z17.0 MALIGNANT NEOPLASM OF UPPER-OUTER QUADRANT OF RIGHT BREAST IN FEMALE, ESTROGEN RECEPTOR POSITIVE (H): Primary | ICD-10-CM

## 2019-10-22 PROCEDURE — 99215 OFFICE O/P EST HI 40 MIN: CPT | Performed by: INTERNAL MEDICINE

## 2019-10-22 PROCEDURE — G0463 HOSPITAL OUTPT CLINIC VISIT: HCPCS

## 2019-10-22 ASSESSMENT — MIFFLIN-ST. JEOR: SCORE: 1233.19

## 2019-10-22 ASSESSMENT — PAIN SCALES - GENERAL: PAINLEVEL: NO PAIN (0)

## 2019-10-22 NOTE — LETTER
10/22/2019         RE: Jyothi Freitas  [address]  [place] [address]        Dear Colleague,    Thank you for referring your patient, Jyothi Freitas, to the [facility]. Please see a copy of my visit note below.    [facility]    Hematology/Oncology Established Patient Follow-up Note      Today's Date: 10/22/19    Reason for Follow-up: Right breast cancer.    HISTORY OF PRESENT ILLNESS: Jyothi Freitas is a 53 year old female who presents with the following oncologic history:   1.  10/11/2019: Diagnostic right sided mammogram performed for a palpable lump in the right breast.  This showed an irregularly-shaped spiculated mass in the upper outer right breast with prominent lymph nodes in the right axilla.  Targeted ultrasound of the right upper outer breast showed an irregularly-shaped hypoechoic mass at the 10 o'clock position, 4 cm from the nipple measuring 2.6 x 1.5 x 2.4 cm.  Right axillary ultrasound showed moderately enlarged lymph nodes.  Right breast needle biopsy showed a grade 3 invasive ductal carcinoma with lymphovascular invasion identified, ER strongly positive at 95%, IL strongly positive at 95%, HER-2/juan FISH negative.  Right axillary lymph node measuring 2 cm was positive for metastatic carcinoma.  Note prior 4/2019 mammogram deemed normal.  2.  10/15/2019: Bilateral breast MRI showed known right breast malignancy measuring 2.6 x 1.4 x 2 cm, 3 mildly enlarged right axillary lymph nodes and no contralateral breast malignancy.  3. 10/21/2019 PET scan showed scattered small hypermetabolic bilateral axillary lymph nodes; for example, a hypermetabolic right axillary lymph node measures 1.6 x 0.9 cm with SUV max 3.6.  Hypermetabolic mass in the right breast superiorly and laterally measuring 2.1 x 1.6 cm with SUV max 4.3.  A 0.6 cm low-density lesion in the right hepatic lobe is too small for accurate PET characterization but shows no appreciable hypermetabolic activity.   Incidentally noted ectasia of the ascending thoracic aorta measures 4 cm in diameter.      INTERIM HISTORY:  Jyothi Freitas reports some sensation of tightness in the bilateral axillary regions.  No other new complaints.      REVIEW OF SYSTEMS:   14 point ROS was reviewed and is negative other than as noted above in HPI.       HOME MEDICATIONS:  Current Outpatient Medications   Medication Sig Dispense Refill     ALBUTEROL 108 (90 BASE) MCG/ACT inhaler INHALE 2 PUFFS INTO THE LUNGS EVERY 6 HOURS AS NEEDED FOR SHORTNESS OF BREATH OR DIFFICULT BREATHING OR WHEEZING 8.5 g 0     buPROPion (WELLBUTRIN XL) 300 MG 24 hr tablet Take 1 tablet (300 mg) by mouth every morning 90 tablet 3     fluticasone (FLOVENT DISKUS) 100 MCG/BLIST inhaler Inhale 1 puff into the lungs 2 times daily 3 Inhaler 3     fluticasone-salmeterol (ADVAIR) 100-50 MCG/DOSE inhaler Inhale 1 puff into the lungs every 12 hours 1 Inhaler 3     nadolol (CORGARD) 20 MG tablet TAKE 1 TABLET(20 MG) BY MOUTH DAILY 90 tablet 3         ALLERGIES:  Allergies   Allergen Reactions     Sulfa Drugs Other (See Comments)     Red face. Ringing in the ears.         PAST MEDICAL HISTORY:  Past Medical History:   Diagnosis Date     H/O colonoscopy 03/08/2016    done at [facility] and nl     Hematuria 2016    eval at [facility] and per pt cysto and ct neg     Intermittent asthma     since childhood     Low iron 10/2017    done for eval of hair loss, egd nl     Migraines     most of adult life, has seen neuro in past, on bblocker     Mild depression (H)      Normal stress echocardiogram July 2011    due to hear racing     Osteopenia 2008     Syncope 03/2017    echo nl lv size and fxn, grade 1 dd, mild tr         PAST SURGICAL HISTORY:  Past Surgical History:   Procedure Laterality Date     C TMJ ARTHROSCOPY/SURGERY       ESOPHAGOSCOPY, GASTROSCOPY, DUODENOSCOPY (EGD), COMBINED N/A 10/30/2017    Procedure: COMBINED ESOPHAGOSCOPY, GASTROSCOPY, DUODENOSCOPY (EGD), BIOPSY SINGLE OR MULTIPLE;   "COMBINED ESOPHAGOSCOPY, GASTROSCOPY, DUODENOSCOPY (EGD);  Surgeon: Martin Rodriguez MD;  Location:  GI     ORTHOPEDIC SURGERY      \"leg surgery\"     single oopherectomy  2010    cyst         SOCIAL HISTORY:  Social History     Socioeconomic History     Marital status:      Spouse name: Not on file     Number of children: 2     Years of education: Not on file     Highest education level: Not on file   Occupational History     Not on file   Social Needs     Financial resource strain: Not on file     Food insecurity:     Worry: Not on file     Inability: Not on file     Transportation needs:     Medical: Not on file     Non-medical: Not on file   Tobacco Use     Smoking status: Former Smoker     Last attempt to quit: 3/27/1997     Years since quittin.5     Smokeless tobacco: Never Used   Substance and Sexual Activity     Alcohol use: Yes     Comment: rarely     Drug use: No     Sexual activity: Yes     Partners: Male     Birth control/protection: Pill   Lifestyle     Physical activity:     Days per week: Not on file     Minutes per session: Not on file     Stress: Not on file   Relationships     Social connections:     Talks on phone: Not on file     Gets together: Not on file     Attends Congregation service: Not on file     Active member of club or organization: Not on file     Attends meetings of clubs or organizations: Not on file     Relationship status: Not on file     Intimate partner violence:     Fear of current or ex partner: Not on file     Emotionally abused: Not on file     Physically abused: Not on file     Forced sexual activity: Not on file   Other Topics Concern     Parent/sibling w/ CABG, MI or angioplasty before 65F 55M? Not Asked   Social History Narrative     Not on file         FAMILY HISTORY:  Family History   Problem Relation Age of Onset     Coronary Artery Disease Father      Emphysema Mother          PHYSICAL EXAM:  Vital signs:  BP (!) 132/91   Pulse 91   Resp 16   Ht " "1.702 m (5' 7\")   Wt 60.1 kg (132 lb 6.4 oz)   LMP 10/07/2017 (Approximate)   SpO2 98%   BMI 20.74 kg/m      ECO  GENERAL/CONSTITUTIONAL: No acute distress.  Exam deferred for detailed discussion.    LABS:  CBC RESULTS:   Recent Labs   Lab Test 10/17/19  1458   WBC 6.7   RBC 4.46   HGB 13.8   HCT 39.9   MCV 90   MCH 30.9   MCHC 34.6   RDW 11.8          Recent Labs   Lab Test 10/17/19  1458 19  0850    141   POTASSIUM 3.9 4.0   CHLORIDE 112* 108   CO2 26 23   ANIONGAP 4 10   * 107*   BUN 12 13   CR 0.73 0.86   JEFF 8.6 9.2         PATHOLOGY:  None new.    IMAGING:  PET:    ASSESSMENT/PLAN:  Jyothi Freitas is a 53 year old female with the following issues:  1.  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  The scan shows some hypermetabolic bilateral axillary lymph nodes, more prominent on the right side.  It also shows the known primary right breast cancer.  I do not feel strongly that the contralateral axillary lymph nodes are truly representative of metastatic disease.  However, I will be discussing her case at the breast tumor board and reviewing the scans with radiology.  -Plan for neoadjuvant chemotherapy to observe for chemo responsiveness, reduce risk of recurrent breast cancer, and reduce burden of disease prior to surgery.  However, as per previous discussion, she understands that it is unlikely that we will achieve a complete response to neoadjuvant chemotherapy given that her tumor is strongly ER and MA positive.  -She would certainly be a candidate for adjuvant endocrine therapy based on the strongly ER and MA positive tumor status.  She is postmenopausal, so I would typically recommend anastrozole to reduce her risk of breast cancer recurrence by relative " "50%.  -She expresses readiness to proceed with neoadjuvant chemotherapy with dose dense Adriamycin and Cytoxan for 4 cycles followed by weekly paclitaxel for 12 weeks.    -Genetic counseling pending with appointments in November 2019.  -She would also likely be a candidate for adjuvant radiation therapy given her lymph node involvement and depending on radiation oncology evaluation.    -I reviewed the results of the prechemotherapy echocardiogram which showed normal LVEF.    2. Depression  -Mood stable. Continue bupropion. Would not recommend tamoxifen in future due to interaction with bupropion unless she switched antidepressants.    3.  Prior iron deficiency  -10/17/2019 ferritin was normal at 27.  Her hemoglobin is normal.  Reviewed these results with her.    4. Left sided facial pain, chronic  -Of unclear etiology.  She had a prior noncontrast head CT in 2017 that showed no abnormalities.  PET scan showed no specific etiology.    5. Ectasia of thoracic aorta  -This measures 4 cm on the PET scan.  I reassured Jyothi that she would not need any urgent surgical management of the aorta that may need ongoing monitoring.  I recommended referral to see Dr. Silverio Gooden in cardio-oncology/vascular.  She agrees to this consult.    Return to see me with subsequent chemo treatment.      Maricarmen Monroe MD  Hematology/Oncology  [facility]    I spent a total of 40 minutes with 100% in counseling.    Oncology Rooming Note    October 22, 2019 2:23 PM   Jyothi Freitas is a 53 year old female who presents for:    Chief Complaint   Patient presents with     Oncology Clinic Visit     Initial Vitals: BP (!) 132/91   Pulse 91   Resp 16   Ht 1.702 m (5' 7\")   Wt 60.1 kg (132 lb 6.4 oz)   LMP 10/07/2017 (Approximate)   SpO2 98%   BMI 20.74 kg/m    Estimated body mass index is 20.74 kg/m  as calculated from the following:    Height as of this encounter: 1.702 m (5' 7\").    Weight as of this encounter: 60.1 kg " (132 lb 6.4 oz). Body surface area is 1.69 meters squared.  No Pain (0) Comment: Data Unavailable   Patient's last menstrual period was 10/07/2017 (approximate).  Allergies reviewed: Yes  Medications reviewed: Yes    Medications: Medication refills not needed today.  Pharmacy name entered into [employer]:    [id number] - [place], [place] - [address] AT [facility] [address]  [id number] - [place], [place] - [address] AT [address] [place] [address]    Clinical concerns: no      Gloria Hand, [facility]              Again, thank you for allowing me to participate in the care of your patient.        Sincerely,        Maricarmen Monroe MD

## 2019-10-22 NOTE — TELEPHONE ENCOUNTER
Type of surgery: Port placement  Location of surgery: [facility]  Date and time of surgery: 10/24/19 at 7:30am  Surgeon: Dr. Kaila Hernandez  Pre-Op Appt Date: Patient to schedule  Post-Op Appt Date: Patient to schedule   Packet sent out: Yes  Pre-cert/Authorization completed:  Not Applicable  Date: 10/22/19

## 2019-10-22 NOTE — PROGRESS NOTES
"Oncology Rooming Note    October 22, 2019 2:23 PM   Jyothi Freitas is a 53 year old female who presents for:    Chief Complaint   Patient presents with     Oncology Clinic Visit     Initial Vitals: BP (!) 132/91   Pulse 91   Resp 16   Ht 1.702 m (5' 7\")   Wt 60.1 kg (132 lb 6.4 oz)   LMP 10/07/2017 (Approximate)   SpO2 98%   BMI 20.74 kg/m   Estimated body mass index is 20.74 kg/m  as calculated from the following:    Height as of this encounter: 1.702 m (5' 7\").    Weight as of this encounter: 60.1 kg (132 lb 6.4 oz). Body surface area is 1.69 meters squared.  No Pain (0) Comment: Data Unavailable   Patient's last menstrual period was 10/07/2017 (approximate).  Allergies reviewed: Yes  Medications reviewed: Yes    Medications: Medication refills not needed today.  Pharmacy name entered into [employer]:    [id number] - [place], [place] - [address] [facility]  [address]  [id number] - [place], [place] - [address] AT [address] [place] [address]    Clinical concerns: no      Gloria Hand CMA            "

## 2019-10-23 ENCOUNTER — HOSPITAL ENCOUNTER (OUTPATIENT)
Dept: MAMMOGRAPHY | Facility: CLINIC | Age: 54
End: 2019-10-23
Attending: INTERNAL MEDICINE
Payer: COMMERCIAL

## 2019-10-23 ENCOUNTER — CARE COORDINATION (OUTPATIENT)
Dept: SURGERY | Facility: CLINIC | Age: 54
End: 2019-10-23

## 2019-10-23 ENCOUNTER — TELEPHONE (OUTPATIENT)
Dept: ONCOLOGY | Facility: CLINIC | Age: 54
End: 2019-10-23

## 2019-10-23 ENCOUNTER — HOSPITAL ENCOUNTER (OUTPATIENT)
Dept: MAMMOGRAPHY | Facility: CLINIC | Age: 54
Discharge: HOME | End: 2019-10-23
Attending: INTERNAL MEDICINE | Admitting: INTERNAL MEDICINE
Payer: COMMERCIAL

## 2019-10-23 DIAGNOSIS — R22.32 AXILLARY MASS, LEFT: Primary | ICD-10-CM

## 2019-10-23 DIAGNOSIS — Z17.0 MALIGNANT NEOPLASM OF UPPER-OUTER QUADRANT OF RIGHT BREAST IN FEMALE, ESTROGEN RECEPTOR POSITIVE (H): ICD-10-CM

## 2019-10-23 DIAGNOSIS — R22.32 AXILLARY MASS, LEFT: ICD-10-CM

## 2019-10-23 DIAGNOSIS — C50.411 MALIGNANT NEOPLASM OF UPPER-OUTER QUADRANT OF RIGHT BREAST IN FEMALE, ESTROGEN RECEPTOR POSITIVE (H): ICD-10-CM

## 2019-10-23 PROCEDURE — 76882 US LMTD JT/FCL EVL NVASC XTR: CPT | Mod: LT

## 2019-10-23 PROCEDURE — 88305 TISSUE EXAM BY PATHOLOGIST: CPT | Mod: 26 | Performed by: RADIOLOGY

## 2019-10-23 PROCEDURE — 88305 TISSUE EXAM BY PATHOLOGIST: CPT | Performed by: RADIOLOGY

## 2019-10-23 PROCEDURE — 25000125 ZZHC RX 250: Performed by: INTERNAL MEDICINE

## 2019-10-23 PROCEDURE — 24065 BIOPSY ARM/ELBOW SOFT TISSUE: CPT

## 2019-10-23 RX ADMIN — LIDOCAINE HYDROCHLORIDE 5 ML: 10 INJECTION, SOLUTION INFILTRATION; PERINEURAL at 13:31

## 2019-10-23 NOTE — PROGRESS NOTES
I called Jyothi and let her know about her extensive discussion about her case in the breast tumor board conference this morning.  The PET scan had shown bilateral axillary hypermetabolism.  However, the breast MRI did not show a left axillary morphologic abnormality.  However, the consensus was to consider left axillary ultrasound with attempted biopsy if the lymph node is morphologically abnormal.  This could change subsequent surgical and systemic therapeutic management.  She expressed understanding and agreement with this plan of care.  We will have the ultrasound with possible biopsy performed today.

## 2019-10-23 NOTE — TELEPHONE ENCOUNTER
Jyothi notified of followin.) Ultrasound of left axilla possible biopsy scheduled today check in at [facility] at 1230.    Jyothi verbalized understanding.    Adelina LEWISN, RN, OCN  Oncology Care Coordinator  [facility]  [facility]  Phone: [phone]

## 2019-10-24 ENCOUNTER — APPOINTMENT (OUTPATIENT)
Dept: GENERAL RADIOLOGY | Facility: CLINIC | Age: 54
End: 2019-10-24
Attending: SURGERY
Payer: COMMERCIAL

## 2019-10-24 ENCOUNTER — ANESTHESIA (OUTPATIENT)
Dept: SURGERY | Facility: CLINIC | Age: 54
End: 2019-10-24
Payer: COMMERCIAL

## 2019-10-24 ENCOUNTER — HOSPITAL ENCOUNTER (OUTPATIENT)
Facility: CLINIC | Age: 54
Discharge: HOME | End: 2019-10-24
Attending: SURGERY | Admitting: SURGERY
Payer: COMMERCIAL

## 2019-10-24 ENCOUNTER — APPOINTMENT (OUTPATIENT)
Dept: SURGERY | Facility: PHYSICIAN GROUP | Age: 54
End: 2019-10-24
Payer: COMMERCIAL

## 2019-10-24 ENCOUNTER — ANESTHESIA EVENT (OUTPATIENT)
Dept: SURGERY | Facility: CLINIC | Age: 54
End: 2019-10-24
Payer: COMMERCIAL

## 2019-10-24 VITALS
BODY MASS INDEX: 24.25 KG/M2 | OXYGEN SATURATION: 100 % | SYSTOLIC BLOOD PRESSURE: 118 MMHG | RESPIRATION RATE: 16 BRPM | HEIGHT: 62 IN | TEMPERATURE: 97.5 F | DIASTOLIC BLOOD PRESSURE: 80 MMHG | HEART RATE: 55 BPM | WEIGHT: 131.8 LBS

## 2019-10-24 DIAGNOSIS — C50.919 BREAST CANCER (H): ICD-10-CM

## 2019-10-24 DIAGNOSIS — G89.18 ACUTE POST-OPERATIVE PAIN: Primary | ICD-10-CM

## 2019-10-24 LAB — COPATH REPORT: NORMAL

## 2019-10-24 PROCEDURE — 25000125 ZZHC RX 250: Performed by: SURGERY

## 2019-10-24 PROCEDURE — 37000009 ZZH ANESTHESIA TECHNICAL FEE, EACH ADDTL 15 MIN: Performed by: SURGERY

## 2019-10-24 PROCEDURE — 25800030 ZZH RX IP 258 OP 636: Performed by: SURGERY

## 2019-10-24 PROCEDURE — 36000052 ZZH SURGERY LEVEL 2 EA 15 ADDTL MIN: Performed by: SURGERY

## 2019-10-24 PROCEDURE — 25000128 H RX IP 250 OP 636: Performed by: SURGERY

## 2019-10-24 PROCEDURE — 36000050 ZZH SURGERY LEVEL 2 1ST 30 MIN: Performed by: SURGERY

## 2019-10-24 PROCEDURE — 27210794 ZZH OR GENERAL SUPPLY STERILE: Performed by: SURGERY

## 2019-10-24 PROCEDURE — 25000132 ZZH RX MED GY IP 250 OP 250 PS 637: Performed by: ANESTHESIOLOGY

## 2019-10-24 PROCEDURE — 37000008 ZZH ANESTHESIA TECHNICAL FEE, 1ST 30 MIN: Performed by: SURGERY

## 2019-10-24 PROCEDURE — 71000012 ZZH RECOVERY PHASE 1 LEVEL 1 FIRST HR: Performed by: SURGERY

## 2019-10-24 PROCEDURE — 71000027 ZZH RECOVERY PHASE 2 EACH 15 MINS: Performed by: SURGERY

## 2019-10-24 PROCEDURE — 40000170 ZZH STATISTIC PRE-PROCEDURE ASSESSMENT II: Performed by: SURGERY

## 2019-10-24 PROCEDURE — 25800030 ZZH RX IP 258 OP 636: Performed by: NURSE ANESTHETIST, CERTIFIED REGISTERED

## 2019-10-24 PROCEDURE — 36561 INSERT TUNNELED CV CATH: CPT | Performed by: SURGERY

## 2019-10-24 PROCEDURE — C1788 PORT, INDWELLING, IMP: HCPCS | Performed by: SURGERY

## 2019-10-24 PROCEDURE — 40000277 XR SURGERY CARM FLUORO LESS THAN 5 MIN W STILLS

## 2019-10-24 PROCEDURE — 25000128 H RX IP 250 OP 636: Performed by: NURSE ANESTHETIST, CERTIFIED REGISTERED

## 2019-10-24 DEVICE — CATH PORT POWERPORT CLEARVUE ISP 8FR 5608062
Type: IMPLANTABLE DEVICE | Site: CHEST | Status: NON-FUNCTIONAL
Removed: 2020-04-01

## 2019-10-24 RX ORDER — HEPARIN SODIUM (PORCINE) LOCK FLUSH IV SOLN 100 UNIT/ML 100 UNIT/ML
SOLUTION INTRAVENOUS
Status: DISCONTINUED
Start: 2019-10-24 | End: 2019-10-24 | Stop reason: HOSPADM

## 2019-10-24 RX ORDER — HYDROCODONE BITARTRATE AND ACETAMINOPHEN 5; 325 MG/1; MG/1
1-2 TABLET ORAL EVERY 4 HOURS PRN
Qty: 10 TABLET | Refills: 0 | Status: SHIPPED | OUTPATIENT
Start: 2019-10-24 | End: 2020-02-04

## 2019-10-24 RX ORDER — CEFAZOLIN SODIUM 1 G/3ML
1 INJECTION, POWDER, FOR SOLUTION INTRAMUSCULAR; INTRAVENOUS SEE ADMIN INSTRUCTIONS
Status: DISCONTINUED | OUTPATIENT
Start: 2019-10-24 | End: 2019-10-24 | Stop reason: HOSPADM

## 2019-10-24 RX ORDER — LIDOCAINE HYDROCHLORIDE 10 MG/ML
INJECTION, SOLUTION INFILTRATION; PERINEURAL
Status: DISCONTINUED
Start: 2019-10-24 | End: 2019-10-24 | Stop reason: HOSPADM

## 2019-10-24 RX ORDER — HYDROCODONE BITARTRATE AND ACETAMINOPHEN 5; 325 MG/1; MG/1
1 TABLET ORAL
Status: DISCONTINUED | OUTPATIENT
Start: 2019-10-24 | End: 2019-10-24 | Stop reason: HOSPADM

## 2019-10-24 RX ORDER — SODIUM CHLORIDE, SODIUM LACTATE, POTASSIUM CHLORIDE, CALCIUM CHLORIDE 600; 310; 30; 20 MG/100ML; MG/100ML; MG/100ML; MG/100ML
INJECTION, SOLUTION INTRAVENOUS CONTINUOUS PRN
Status: DISCONTINUED | OUTPATIENT
Start: 2019-10-24 | End: 2019-10-24

## 2019-10-24 RX ORDER — ONDANSETRON 2 MG/ML
INJECTION INTRAMUSCULAR; INTRAVENOUS PRN
Status: DISCONTINUED | OUTPATIENT
Start: 2019-10-24 | End: 2019-10-24

## 2019-10-24 RX ORDER — PROPOFOL 10 MG/ML
INJECTION, EMULSION INTRAVENOUS CONTINUOUS PRN
Status: DISCONTINUED | OUTPATIENT
Start: 2019-10-24 | End: 2019-10-24

## 2019-10-24 RX ORDER — SODIUM CHLORIDE, SODIUM LACTATE, POTASSIUM CHLORIDE, CALCIUM CHLORIDE 600; 310; 30; 20 MG/100ML; MG/100ML; MG/100ML; MG/100ML
INJECTION, SOLUTION INTRAVENOUS CONTINUOUS
Status: DISCONTINUED | OUTPATIENT
Start: 2019-10-24 | End: 2019-10-24 | Stop reason: HOSPADM

## 2019-10-24 RX ORDER — ONDANSETRON 2 MG/ML
4 INJECTION INTRAMUSCULAR; INTRAVENOUS EVERY 30 MIN PRN
Status: DISCONTINUED | OUTPATIENT
Start: 2019-10-24 | End: 2019-10-24 | Stop reason: HOSPADM

## 2019-10-24 RX ORDER — NALOXONE HYDROCHLORIDE 0.4 MG/ML
.1-.4 INJECTION, SOLUTION INTRAMUSCULAR; INTRAVENOUS; SUBCUTANEOUS
Status: DISCONTINUED | OUTPATIENT
Start: 2019-10-24 | End: 2019-10-24 | Stop reason: HOSPADM

## 2019-10-24 RX ORDER — AMOXICILLIN 250 MG
1-2 CAPSULE ORAL 2 TIMES DAILY
Qty: 30 TABLET | Refills: 0 | Status: SHIPPED | OUTPATIENT
Start: 2019-10-24 | End: 2020-02-04

## 2019-10-24 RX ORDER — HEPARIN SODIUM 1000 [USP'U]/ML
INJECTION, SOLUTION INTRAVENOUS; SUBCUTANEOUS
Status: DISCONTINUED
Start: 2019-10-24 | End: 2019-10-24 | Stop reason: HOSPADM

## 2019-10-24 RX ORDER — FENTANYL CITRATE 50 UG/ML
25-50 INJECTION, SOLUTION INTRAMUSCULAR; INTRAVENOUS
Status: DISCONTINUED | OUTPATIENT
Start: 2019-10-24 | End: 2019-10-24 | Stop reason: HOSPADM

## 2019-10-24 RX ORDER — DEXAMETHASONE SODIUM PHOSPHATE 4 MG/ML
INJECTION, SOLUTION INTRA-ARTICULAR; INTRALESIONAL; INTRAMUSCULAR; INTRAVENOUS; SOFT TISSUE PRN
Status: DISCONTINUED | OUTPATIENT
Start: 2019-10-24 | End: 2019-10-24

## 2019-10-24 RX ORDER — ONDANSETRON 4 MG/1
4 TABLET, ORALLY DISINTEGRATING ORAL EVERY 30 MIN PRN
Status: DISCONTINUED | OUTPATIENT
Start: 2019-10-24 | End: 2019-10-24 | Stop reason: HOSPADM

## 2019-10-24 RX ORDER — FENTANYL CITRATE 50 UG/ML
INJECTION, SOLUTION INTRAMUSCULAR; INTRAVENOUS PRN
Status: DISCONTINUED | OUTPATIENT
Start: 2019-10-24 | End: 2019-10-24

## 2019-10-24 RX ORDER — HYDROMORPHONE HYDROCHLORIDE 1 MG/ML
.3-.5 INJECTION, SOLUTION INTRAMUSCULAR; INTRAVENOUS; SUBCUTANEOUS EVERY 5 MIN PRN
Status: DISCONTINUED | OUTPATIENT
Start: 2019-10-24 | End: 2019-10-24 | Stop reason: HOSPADM

## 2019-10-24 RX ORDER — CEFAZOLIN SODIUM 2 G/100ML
2 INJECTION, SOLUTION INTRAVENOUS
Status: COMPLETED | OUTPATIENT
Start: 2019-10-24 | End: 2019-10-24

## 2019-10-24 RX ADMIN — ONDANSETRON 4 MG: 2 INJECTION INTRAMUSCULAR; INTRAVENOUS at 07:55

## 2019-10-24 RX ADMIN — MIDAZOLAM 2 MG: 1 INJECTION INTRAMUSCULAR; INTRAVENOUS at 07:39

## 2019-10-24 RX ADMIN — DEXAMETHASONE SODIUM PHOSPHATE 4 MG: 4 INJECTION, SOLUTION INTRA-ARTICULAR; INTRALESIONAL; INTRAMUSCULAR; INTRAVENOUS; SOFT TISSUE at 07:55

## 2019-10-24 RX ADMIN — CEFAZOLIN SODIUM 2 G: 2 INJECTION, SOLUTION INTRAVENOUS at 07:36

## 2019-10-24 RX ADMIN — FENTANYL CITRATE 50 MCG: 50 INJECTION, SOLUTION INTRAMUSCULAR; INTRAVENOUS at 07:39

## 2019-10-24 RX ADMIN — PROPOFOL 75 MCG/KG/MIN: 10 INJECTION, EMULSION INTRAVENOUS at 07:39

## 2019-10-24 RX ADMIN — SODIUM CHLORIDE, POTASSIUM CHLORIDE, SODIUM LACTATE AND CALCIUM CHLORIDE: 600; 310; 30; 20 INJECTION, SOLUTION INTRAVENOUS at 07:35

## 2019-10-24 RX ADMIN — FENTANYL CITRATE 50 MCG: 50 INJECTION, SOLUTION INTRAMUSCULAR; INTRAVENOUS at 07:55

## 2019-10-24 RX ADMIN — ACETAMINOPHEN, ASPIRIN AND CAFFEINE 2 TABLET: 250; 250; 65 TABLET, FILM COATED ORAL at 08:55

## 2019-10-24 ASSESSMENT — MIFFLIN-ST. JEOR: SCORE: 1151.09

## 2019-10-24 ASSESSMENT — LIFESTYLE VARIABLES: TOBACCO_USE: 1

## 2019-10-24 NOTE — BRIEF OP NOTE
[facility]    Brief Operative Note    Pre-operative diagnosis: Breast cancer (H) [C50.919]  Post-operative diagnosis Same as pre-operative diagnosis    Procedure: Procedure(s):  PORT PLACEMENT  Surgeon: Surgeon(s) and Role:     * Kaila Hernandez MD - Primary     * Andriy Lopez PA-C - Assisting  Anesthesia: Monitor Anesthesia Care   Estimated blood loss: 5cc  Drains: None  Specimens: * No specimens in log *  Findings:   Appropriate placement via left internal jugular confirmed with xray  Complications: None.  Implants:   Implant Name Type Inv. Item Serial No.  Lot No. LRB No. Used   CATH PORT POWERPORT CLEARVUE ISP [id number] Catheter CATH PORT POWERPORT CLEARVUE ISP [id number]   frintit [facility] [id number] 1       Andriy Lopez PA-C  Office: [phone]  Pager: [phone]

## 2019-10-24 NOTE — ANESTHESIA POSTPROCEDURE EVALUATION
Patient: Jyothi Freitas    Procedure(s):  PORT PLACEMENT    Diagnosis:Breast cancer (H) [C50.919]  Diagnosis Additional Information: No value filed.    Anesthesia Type:  MAC    Note:  Anesthesia Post Evaluation    Patient location during evaluation: PACU  Patient participation: Able to fully participate in evaluation  Level of consciousness: awake  Pain management: adequate  Airway patency: patent  Cardiovascular status: acceptable  Respiratory status: acceptable  Hydration status: acceptable  PONV: none             Last vitals:  Vitals:    10/24/19 0830 10/24/19 0845 10/24/19 0900   BP: 127/88 (!) 127/90 (!) 116/90   Pulse: 60 51 55   Resp: 13 9 15   Temp:   36.4  C (97.5  F)   SpO2: 100% 100% 100%         Electronically Signed By: Joaquín López MD  October 24, 2019  9:46 AM

## 2019-10-24 NOTE — PROGRESS NOTES
After review by Breast Center Radiologist, Dr. Constantin King, Ms. Freitas was called and  given her 10/23/2019 Left Axillary Lymph node Biopsy Pathology results (BENIGN LYMPH NODE TISSUE) and Follow up Recommendations (Oncologic/ surgical follow up).  Jyothi  denies any post biopsy site issues. I encouraged her to notify her doctor if any breast changes or concerns arise.    Evette Manzano BSN, RN  Procedure Nurse  [facility]  [phone]

## 2019-10-24 NOTE — ANESTHESIA PREPROCEDURE EVALUATION
"Anesthesia Pre-Procedure Evaluation    Patient: Jyothi Freitas   MRN: [id number] : 1965          Preoperative Diagnosis: Breast cancer (H) [C50.919]    Procedure(s):  PORT PLACEMENT    Past Medical History:   Diagnosis Date     GERD (gastroesophageal reflux disease)      H/O colonoscopy 2016    done at [facility] and [facility]     Hematuria 2016    eval at [facility] and per pt cysto and ct neg     Intermittent asthma     since childhood     Low iron 10/2017    done for eval of hair loss, egd nl     Migraines     most of adult life, has seen neuro in past, on bblocker     Mild depression (H)      Normal stress echocardiogram 2011    due to hear racing     Osteopenia      Syncope 2017    echo nl lv size and fxn, grade 1 dd, mild tr     Past Surgical History:   Procedure Laterality Date     C TMJ ARTHROSCOPY/SURGERY       ESOPHAGOSCOPY, GASTROSCOPY, DUODENOSCOPY (EGD), COMBINED N/A 10/30/2017    Procedure: COMBINED ESOPHAGOSCOPY, GASTROSCOPY, DUODENOSCOPY (EGD), BIOPSY SINGLE OR MULTIPLE;  COMBINED ESOPHAGOSCOPY, GASTROSCOPY, DUODENOSCOPY (EGD);  Surgeon: Martin Rodriguez MD;  Location:  GI     ORTHOPEDIC SURGERY      \"leg surgery\"     single oopherectomy  2010    cyst       Anesthesia Evaluation     . Pt has had prior anesthetic. Type: General    No history of anesthetic complications          ROS/MED HX    ENT/Pulmonary:     (+)tobacco use, Past use Intermittent asthma , . .    Neurologic:     (+)migraines,     Cardiovascular:     (+) ----. : . . fainting (syncope). :. . Previous cardiac testing Echodate:10/21/19results:1. Left ventricular systolic function is normal. The visual ejection fraction is estimated at 60-65%. Global peak LV longitudinal strain is averaged at - 19.8%. This is within reported normal limits (normal <-18%).  2. No regional wall motion abnormalities noted.  3. The right ventricle is normal in structure, function and size.  4. No significant valvular pathology.date: results: date: " "results: date: results:          METS/Exercise Tolerance:  >4 METS   Hematologic:     (+) Anemia, -      Musculoskeletal:   (+)  other musculoskeletal- Cervicalgia      GI/Hepatic:     (+) GERD       Renal/Genitourinary:     (+) Other Renal/ Genitourinary, Hematuria      Endo:         Psychiatric:     (+) psychiatric history depression      Infectious Disease:         Malignancy:   (+) Malignancy History of Breast          Other:                          Physical Exam  Normal systems: cardiovascular, pulmonary and dental    Airway   Mallampati: I  TM distance: >3 FB  Neck ROM: full    Dental     Cardiovascular       Pulmonary             Lab Results   Component Value Date    WBC 6.7 10/17/2019    HGB 13.8 10/17/2019    HCT 39.9 10/17/2019     10/17/2019     10/17/2019    POTASSIUM 3.9 10/17/2019    CHLORIDE 112 (H) 10/17/2019    CO2 26 10/17/2019    BUN 12 10/17/2019    CR 0.73 10/17/2019     (H) 10/17/2019    JEFF 8.6 10/17/2019    ALBUMIN 3.8 10/17/2019    PROTTOTAL 7.2 10/17/2019    ALT 16 10/17/2019    AST 11 10/17/2019    ALKPHOS 45 10/17/2019    BILITOTAL 0.2 10/17/2019    LIPASE 224 04/10/2012    AMYLASE 106 04/10/2012    TSH 1.56 05/21/2019    HCG Negative 03/18/2008    HCGS Negative 03/09/2011       Preop Vitals  BP Readings from Last 3 Encounters:   10/24/19 120/76   10/22/19 (!) 132/91   10/17/19 (!) 129/93    Pulse Readings from Last 3 Encounters:   10/22/19 91   10/17/19 91   10/15/19 78      Resp Readings from Last 3 Encounters:   10/24/19 16   10/22/19 16   10/17/19 12    SpO2 Readings from Last 3 Encounters:   10/24/19 100%   10/22/19 98%   10/17/19 100%      Temp Readings from Last 1 Encounters:   10/24/19 36  C (96.8  F) (Oral)    Ht Readings from Last 1 Encounters:   10/24/19 1.575 m (5' 2\")      Wt Readings from Last 1 Encounters:   10/24/19 59.8 kg (131 lb 12.8 oz)    Estimated body mass index is 24.11 kg/m  as calculated from the following:    Height as of this encounter: " "1.575 m (5' 2\").    Weight as of this encounter: 59.8 kg (131 lb 12.8 oz).       Anesthesia Plan      History & Physical Review  History and physical reviewed and following examination; no interval change.    ASA Status:  2 .    NPO Status:  > 8 hours    Plan for MAC Reason for MAC:  Deep or markedly invasive procedure (G8)  PONV prophylaxis:  Ondansetron (or other 5HT-3)       Postoperative Care  Postoperative pain management:  IV analgesics, Oral pain medications and Multi-modal analgesia.      Consents  Anesthetic plan, risks, benefits and alternatives discussed with:  Patient..                 Joaquín López MD  "

## 2019-10-24 NOTE — ANESTHESIA CARE TRANSFER NOTE
Patient: Jyothi Freitas    Procedure(s):  PORT PLACEMENT    Diagnosis: Breast cancer (H) [C50.919]  Diagnosis Additional Information: No value filed.    Anesthesia Type:   MAC     Note:  Airway :Room Air  Patient transferred to:PACU  Handoff Report: Identifed the Patient, Identified the Reponsible Provider, Reviewed the pertinent medical history, Discussed the surgical course, Reviewed Intra-OP anesthesia mangement and issues during anesthesia, Set expectations for post-procedure period and Allowed opportunity for questions and acknowledgement of understanding      Vitals: (Last set prior to Anesthesia Care Transfer)    CRNA VITALS  10/24/2019 0753 - 10/24/2019 0828      10/24/2019             Resp Rate (set):  10                Electronically Signed By: KRISTIAN Razo CRNA  October 24, 2019  8:28 AM

## 2019-10-24 NOTE — OP NOTE
General Surgery Operative Note      Pre-operative diagnosis: Breast cancer (H) [C50.919]   Post-operative diagnosis: Same   Procedure: Left internal jugular Power Port placement    Surgeon: Kaila Hernandez MD   Assistant(s): Andriy Lopez PA-C  The PA s assistance was medically necessary to provide adequate exposure in the operating field, maintain hemostasis, cutting suture, clamping and ligating bleeding vessels, and visualization of anatomic structures throughout the surgical procedure.    Anesthesia                                   Local with MAC    Estimated blood loss:  Findings:                                        5 cc  Tip of the catheter at the atriocaval junction           DESCRIPTION OF PROCEDURE:  The patient was taken to the operating room after obtaining informed consent.  The patient was placed on the table in supine position.  A roll was placed between her shoulder blades.  IV anesthetic was administered.  The bilateral neck and upper chest were prepped and draped in standard sterile fashion.  We anesthetized the skin of the upper left chest.  The patient was placed in Trendelenburg.  Local anesthetic was injected into the skin in the lower neck, upper chest for the port site and proposed track for the catheter. We made an incision in the skin.  We cannulated the internal jugular vein using ultrasound guidance with a needle.  We then passed a wire through the needle into the internal jugular vein.  Fluoroscopy was used and confirmed the wire was traveling into the SVC. We then used a #15 blade to make a skin incision in the right upper chest wall. The subcutaneous tissue was divided with cautery. The fascia was identified. A pocket was created above the fascia with blunt dissection. The port fit well into the pocket. We then used a tunneling device to place the catheter through the incision in the chest and tunneled to the neck incision. We then passed a dilator over the wire under fluoroscopy.   The catheter was then placed through the catheter introducer and threaded, using fluoroscopy until the tip of the catheter was at the atrial caval junction.  The catheter introducer was removed.  We flushed the port with injectable saline.  We attached the catheter to the port and secured it in place with the catheter hub.   The port was secured to the fascia with two 2-0 prolene sutures on either side.  We then closed the subcutaneous tissue with 3-0 interrupted Vicryl sutures.  The skin was closed with a running 4-0 Vicryl subcuticular suture and Dermabond.  A final flush of full strength heparin (2ml) was injected into the port using a Cortes needle.  The patient tolerated the procedure well.  All sponge and instrument counts were correct.    Kaila Hernandez MD

## 2019-10-24 NOTE — DISCHARGE INSTRUCTIONS
Same Day Surgery Discharge Instructions for  Sedation and General Anesthesia       It's not unusual to feel dizzy, light-headed or faint for up to 24 hours after surgery or while taking pain medication.  If you have these symptoms: sit for a few minutes before standing and have someone assist you when you get up to walk or use the bathroom.      You should rest and relax for the next 24 hours. We recommend you make arrangements to have an adult stay with you for at least 24 hours after your discharge.  Avoid hazardous and strenuous activity.      DO NOT DRIVE any vehicle or operate mechanical equipment for 24 hours following the end of your surgery.  Even though you may feel normal, your reactions may be affected by the medication you have received.      Do not drink alcoholic beverages for 24 hours following surgery.       Slowly progress to your regular diet as you feel able. It's not unusual to feel nauseated and/or vomit after receiving anesthesia.  If you develop these symptoms, drink clear liquids (apple juice, ginger ale, broth, 7-up, etc. ) until you feel better.  If your nausea and vomiting persists for 24 hours, please notify your surgeon.        All narcotic pain medications, along with inactivity and anesthesia, can cause constipation. Drinking plenty of liquids and increasing fiber intake will help.      For any questions of a medical nature, call your surgeon.      Do not make important decisions for 24 hours.      If you had general anesthesia, you may have a sore throat for a couple of days related to the breathing tube used during surgery.  You may use Cepacol lozenges to help with this discomfort.  If it worsens or if you develop a fever, contact your surgeon.       If you feel your pain is not well managed with the pain medications prescribed by your surgeon, please contact your surgeon's office to let them know so they can address your concerns.       [facility] - [facility]  Discharge Instructions: Post-Operative Port Placement/Removal    ACTIVITY    Take frequent, short walks and increase your activity gradually.      Avoid strenuous physical activity or heavy lifting greater than 15-20 lbs. for 1 week.  You may climb stairs.    You may drive without restrictions when you are not using any prescription pain medication and feel comfortable in a car.    You may return to work/school when you are comfortable without any prescription pain medication.    WOUND CARE    You may remove your outer dressing or Band-Aids and shower 48 hours after the surgery.  Pat your incisions dry and leave them open to air.  Re-apply dressing (Band-Aids or gauze/tape) as needed for comfort or drainage.    You may have steri-strips (looks like white tape) or Dermabond (looks like glue) on your incisions.  You may peel off the steri-strips 2 weeks after your surgery if they have not peeled off on their own.  If you have Dermabond, it will peel up and fall off on its own.    Do not soak your incisions in a tub or pool for 2 weeks.     Do not apply any lotions, creams, or ointments to your incision(s).    A ridge under your incision(s) is normal and will gradually resolve.    DIET    Start with liquids, then gradually resume your regular diet as tolerated.     Drink plenty of liquids to stay hydrated.    PAIN    Expect some tenderness and discomfort at the incision site(s).  Use the prescribed pain medication at your discretion.  Expect gradual resolution of your pain over several days.    You may take ibuprofen with food (unless you have been told not to) instead of or in addition to your prescribed pain medication.  If you are taking Norco or Percocet, do not take any additional acetaminophen/APAP/Tylenol.    Do not drink alcohol or drive while you are taking pain medications.    You may apply ice to your incisions in 20 minute intervals as needed for the next 48 hours.  After that time, consider  switching to heat if you prefer.    EXPECTATIONS    Pain medications can cause constipation.  Limit use when possible.  Take over the counter stool softener/stimulant, such as Colace or Senna, 1-2 times a day with plenty of water.  You may take a mild over the counter laxative, such as Miralax or a suppository, as needed.      You may discontinue these medications once you are having regular bowel movements and/or are no longer taking your narcotic pain medication.    FOLLOW UP    Follow up as needed.     CALL OUR OFFICE -[phone] IF YOU HAVE:     Chills or fever above 101 F.    Increased redness, warmth, or drainage at your incisions.    Significant bleeding.    Pain not relieved by your pain medication or rest.    Increasing pain after the first 48 hours.    Any other concerns or questions.      **If you have concerns or questions about your procedure,    please contact Dr Hernandez at  [phone]**

## 2019-10-25 ENCOUNTER — PATIENT OUTREACH (OUTPATIENT)
Dept: ONCOLOGY | Facility: CLINIC | Age: 54
End: 2019-10-25

## 2019-10-25 NOTE — PROGRESS NOTES
Called Jyothi left message on her voice mail to call clinic regarding her biopsy result. Message left that results were negative. Requested Jyothi to call clinic back.

## 2019-10-28 DIAGNOSIS — Z17.0 MALIGNANT NEOPLASM OF UPPER-OUTER QUADRANT OF RIGHT BREAST IN FEMALE, ESTROGEN RECEPTOR POSITIVE (H): ICD-10-CM

## 2019-10-28 DIAGNOSIS — C50.411 MALIGNANT NEOPLASM OF UPPER-OUTER QUADRANT OF RIGHT BREAST IN FEMALE, ESTROGEN RECEPTOR POSITIVE (H): ICD-10-CM

## 2019-10-28 RX ORDER — SODIUM CHLORIDE 9 MG/ML
1000 INJECTION, SOLUTION INTRAVENOUS CONTINUOUS PRN
Status: CANCELLED
Start: 2019-10-29

## 2019-10-28 RX ORDER — EPINEPHRINE 0.3 MG/.3ML
0.3 INJECTION SUBCUTANEOUS EVERY 5 MIN PRN
Status: CANCELLED | OUTPATIENT
Start: 2019-10-29

## 2019-10-28 RX ORDER — EPINEPHRINE 1 MG/ML
0.3 INJECTION, SOLUTION, CONCENTRATE INTRAVENOUS EVERY 5 MIN PRN
Status: CANCELLED | OUTPATIENT
Start: 2019-10-29

## 2019-10-28 RX ORDER — ALBUTEROL SULFATE 90 UG/1
1-2 AEROSOL, METERED RESPIRATORY (INHALATION)
Status: CANCELLED
Start: 2019-10-29

## 2019-10-28 RX ORDER — PALONOSETRON 0.05 MG/ML
0.25 INJECTION, SOLUTION INTRAVENOUS ONCE
Status: CANCELLED
Start: 2019-10-29

## 2019-10-28 RX ORDER — MEPERIDINE HYDROCHLORIDE 25 MG/ML
25 INJECTION INTRAMUSCULAR; INTRAVENOUS; SUBCUTANEOUS EVERY 30 MIN PRN
Status: CANCELLED | OUTPATIENT
Start: 2019-10-29

## 2019-10-28 RX ORDER — DOXORUBICIN HYDROCHLORIDE 2 MG/ML
60 INJECTION, SOLUTION INTRAVENOUS ONCE
Status: CANCELLED | OUTPATIENT
Start: 2019-10-29

## 2019-10-28 RX ORDER — DIPHENHYDRAMINE HYDROCHLORIDE 50 MG/ML
50 INJECTION INTRAMUSCULAR; INTRAVENOUS
Status: CANCELLED
Start: 2019-10-29

## 2019-10-28 RX ORDER — NALOXONE HYDROCHLORIDE 0.4 MG/ML
.1-.4 INJECTION, SOLUTION INTRAMUSCULAR; INTRAVENOUS; SUBCUTANEOUS
Status: CANCELLED | OUTPATIENT
Start: 2019-10-29

## 2019-10-28 RX ORDER — ALBUTEROL SULFATE 0.83 MG/ML
2.5 SOLUTION RESPIRATORY (INHALATION)
Status: CANCELLED | OUTPATIENT
Start: 2019-10-29

## 2019-10-28 RX ORDER — LORAZEPAM 2 MG/ML
0.5 INJECTION INTRAMUSCULAR EVERY 4 HOURS PRN
Status: CANCELLED
Start: 2019-10-29

## 2019-10-28 NOTE — PROGRESS NOTES
Jyothi left MyChart message that she will be doing cold cap therapy. Left message that if she has any questions regarding her biopsy result to call clinic. Writer will touch base with her tomorrow when she comes in for chemotherapy. Tricia Smiley RN,BSN,OCN

## 2019-10-29 ENCOUNTER — INFUSION THERAPY VISIT (OUTPATIENT)
Dept: INFUSION THERAPY | Facility: CLINIC | Age: 54
End: 2019-10-29
Attending: INTERNAL MEDICINE
Payer: COMMERCIAL

## 2019-10-29 VITALS
SYSTOLIC BLOOD PRESSURE: 153 MMHG | DIASTOLIC BLOOD PRESSURE: 94 MMHG | RESPIRATION RATE: 16 BRPM | WEIGHT: 139.2 LBS | BODY MASS INDEX: 25.62 KG/M2 | HEIGHT: 62 IN | TEMPERATURE: 97.7 F

## 2019-10-29 DIAGNOSIS — Z17.0 MALIGNANT NEOPLASM OF UPPER-OUTER QUADRANT OF RIGHT BREAST IN FEMALE, ESTROGEN RECEPTOR POSITIVE (H): Primary | ICD-10-CM

## 2019-10-29 DIAGNOSIS — C50.411 MALIGNANT NEOPLASM OF UPPER-OUTER QUADRANT OF RIGHT BREAST IN FEMALE, ESTROGEN RECEPTOR POSITIVE (H): Primary | ICD-10-CM

## 2019-10-29 PROCEDURE — 96377 APPLICATON ON-BODY INJECTOR: CPT | Mod: XS

## 2019-10-29 PROCEDURE — 25800030 ZZH RX IP 258 OP 636: Performed by: INTERNAL MEDICINE

## 2019-10-29 PROCEDURE — 96367 TX/PROPH/DG ADDL SEQ IV INF: CPT

## 2019-10-29 PROCEDURE — 96413 CHEMO IV INFUSION 1 HR: CPT

## 2019-10-29 PROCEDURE — 25000128 H RX IP 250 OP 636: Performed by: INTERNAL MEDICINE

## 2019-10-29 PROCEDURE — 96411 CHEMO IV PUSH ADDL DRUG: CPT

## 2019-10-29 PROCEDURE — 96375 TX/PRO/DX INJ NEW DRUG ADDON: CPT

## 2019-10-29 RX ORDER — PROCHLORPERAZINE MALEATE 10 MG
10 TABLET ORAL EVERY 6 HOURS PRN
Qty: 30 TABLET | Refills: 5 | Status: SHIPPED | OUTPATIENT
Start: 2019-10-29 | End: 2020-02-04

## 2019-10-29 RX ORDER — HEPARIN SODIUM (PORCINE) LOCK FLUSH IV SOLN 100 UNIT/ML 100 UNIT/ML
5 SOLUTION INTRAVENOUS ONCE
Status: COMPLETED | OUTPATIENT
Start: 2019-10-29 | End: 2019-10-29

## 2019-10-29 RX ORDER — ACETAMINOPHEN 500 MG
1000 TABLET ORAL EVERY 6 HOURS PRN
COMMUNITY

## 2019-10-29 RX ORDER — PALONOSETRON 0.05 MG/ML
0.25 INJECTION, SOLUTION INTRAVENOUS ONCE
Status: COMPLETED | OUTPATIENT
Start: 2019-10-29 | End: 2019-10-29

## 2019-10-29 RX ORDER — DOXORUBICIN HYDROCHLORIDE 2 MG/ML
100 INJECTION, SOLUTION INTRAVENOUS ONCE
Status: COMPLETED | OUTPATIENT
Start: 2019-10-29 | End: 2019-10-29

## 2019-10-29 RX ORDER — DEXAMETHASONE 4 MG/1
8 TABLET ORAL DAILY
Qty: 6 TABLET | Refills: 3 | Status: SHIPPED | OUTPATIENT
Start: 2019-10-29 | End: 2019-12-26

## 2019-10-29 RX ORDER — NAPROXEN SODIUM 220 MG
220 TABLET ORAL DAILY PRN
COMMUNITY
End: 2020-02-04

## 2019-10-29 RX ORDER — LORAZEPAM 0.5 MG/1
0.5 TABLET ORAL EVERY 4 HOURS PRN
Qty: 30 TABLET | Refills: 5 | Status: SHIPPED | OUTPATIENT
Start: 2019-10-29 | End: 2019-11-13 | Stop reason: SINTOL

## 2019-10-29 RX ADMIN — DOXORUBICIN HYDROCHLORIDE 100 MG: 2 INJECTION, SOLUTION INTRAVENOUS at 11:00

## 2019-10-29 RX ADMIN — CYCLOPHOSPHAMIDE 1000 MG: 1 INJECTION, POWDER, FOR SOLUTION INTRAVENOUS; ORAL at 11:15

## 2019-10-29 RX ADMIN — PEGFILGRASTIM 6 MG: KIT SUBCUTANEOUS at 15:22

## 2019-10-29 RX ADMIN — PALONOSETRON 0.25 MG: 0.25 INJECTION, SOLUTION INTRAVENOUS at 10:03

## 2019-10-29 RX ADMIN — SODIUM CHLORIDE 250 ML: 9 INJECTION, SOLUTION INTRAVENOUS at 10:03

## 2019-10-29 RX ADMIN — DEXAMETHASONE SODIUM PHOSPHATE: 10 INJECTION, SOLUTION INTRAMUSCULAR; INTRAVENOUS at 10:08

## 2019-10-29 RX ADMIN — HEPARIN SODIUM (PORCINE) LOCK FLUSH IV SOLN 100 UNIT/ML 5 ML: 100 SOLUTION at 13:35

## 2019-10-29 ASSESSMENT — MIFFLIN-ST. JEOR: SCORE: 1184.66

## 2019-10-29 ASSESSMENT — PAIN SCALES - GENERAL: PAINLEVEL: MILD PAIN (3)

## 2019-10-29 NOTE — PROGRESS NOTES
Infusion Nursing Note:  Jyothi Freitas presents today for C1D1 AC.    Patient seen by provider today: No   present during visit today: Not Applicable.    Note: OK to use labs from 10/17 per Dr. Monroe. Pt very anxious prior to and during treatment today. Medications/side effects were explained/reinforced. Take home meds were given to patient. Pt tolerated treatment without incident..    Intravenous Access:  Implanted Port.    Treatment Conditions:  Lab Results   Component Value Date    HGB 13.8 10/17/2019     Lab Results   Component Value Date    WBC 6.7 10/17/2019      Lab Results   Component Value Date    ANEU 3.6 10/17/2019     Lab Results   Component Value Date     10/17/2019      Lab Results   Component Value Date     10/17/2019                   Lab Results   Component Value Date    POTASSIUM 3.9 10/17/2019           No results found for: MAG         Lab Results   Component Value Date    CR 0.73 10/17/2019                   Lab Results   Component Value Date    JEFF 8.6 10/17/2019                Lab Results   Component Value Date    BILITOTAL 0.2 10/17/2019           Lab Results   Component Value Date    ALBUMIN 3.8 10/17/2019                    Lab Results   Component Value Date    ALT 16 10/17/2019           Lab Results   Component Value Date    AST 11 10/17/2019       Results reviewed, labs MET treatment parameters, ok to proceed with treatment.      Post Infusion Assessment:  Patient tolerated infusion without incident.  Blood return noted pre and post infusion.  Blood return noted during administration every 2 cc.  Site patent and intact, free from redness, edema or discomfort.  No evidence of extravasations.  Access discontinued per protocol.       Discharge Plan:   Prescription refills given for compazine, zofran, ativan.  Discharge instructions reviewed with: Patient and Family.  Patient and/or family verbalized understanding of discharge instructions and all questions  answered.  Copy of AVS reviewed with patient and/or family.  Patient will return in 2 weeks for next appointment.  Patient discharged in stable condition accompanied by: self and .  Departure Mode: Ambulatory.    Sandee Schneider RN    ONPRO  Was placed on patient's: right side of abdomen.    Was placed at 3:30 PM    ONPRO injector device Lot number: [id number]    Patient education included: what patient can expect after application, what colored lights mean on the device, when to remove device, when and where to call with questions or issues, all patients questions answered and that Neulasta administration will occur at 6:30pm.    Patient tolerated administration well.

## 2019-10-29 NOTE — PROGRESS NOTES
Patient was educated on the following oral medications: Dexamethasone, Compazine and Ativan on October 29, 2019. Teaching provided to patient and family member included indication, dose, administration, adverse effects and side effect management. Written materials were provided and patient was given the opportunity to ask questions. Patient verbalized understanding of the information presented.     Genaro Herrera PharmD.

## 2019-10-29 NOTE — PATIENT INSTRUCTIONS
Your On-body Neulasta Injector was applied to your body at 3:30pm.  At approximately 6:30pm on 10/30, your On-body Injector will beep to let you know your dose delivery will begin in 2 minutes.  Your medication will be delivered over the next 45 minutes.  You can remove your Injector at 7:30pm.  Please make sure your Injector has a solid green light or has turned off prior to removing the device.  Please contact your provider at [phone] with questions or concerns.

## 2019-10-30 ENCOUNTER — PATIENT OUTREACH (OUTPATIENT)
Dept: ONCOLOGY | Facility: CLINIC | Age: 54
End: 2019-10-30

## 2019-10-30 NOTE — PROGRESS NOTES
Called patient, left message on her voice mail to see how she is feeling post chemotherapy . Left message on her voice mail for Jyothi to call clinic if she needs anything. Tricia Smiley RN,BSN,OCN

## 2019-11-01 ENCOUNTER — TELEPHONE (OUTPATIENT)
Dept: ONCOLOGY | Facility: CLINIC | Age: 54
End: 2019-11-01

## 2019-11-01 NOTE — TELEPHONE ENCOUNTER
Social Work Progress Note      Data/Intervention:  Patient Name:  Jyothi Freitas  /Age:  1965 (53 year old)    Reason for Follow-Up:  Jyothi is a 53-year-old woman with a new diagnosis of breast cancer who is followed by Dr. Monroe at [facility], and started chemotherapy 10/30/19. This clinician received referral from infusion RN for emotional support.     Intervention:   This clinician reached out to Jyothi today with goal of introducing psychosocial services and support. This clinician left  with social work contact information and availability. This clinician will await return call from Jyothi.     Plan:  1) This clinician will plan to introduce self in-person 19 at next infusion unless she returns this clinician's call prior.   2) Ongoing collaboration with multidisciplinary care team.       Please call or page if needs or concerns arise.     STEPHANIE Dumont, LICSW  Direct Phone: [phone]  Pager: [phone]

## 2019-11-08 ENCOUNTER — TELEPHONE (OUTPATIENT)
Dept: ONCOLOGY | Facility: CLINIC | Age: 54
End: 2019-11-08

## 2019-11-08 NOTE — TELEPHONE ENCOUNTER
"Social Work Progress Note      Data/Intervention:  Patient Name:  Jyothi Freitas  /Age:  1965 (53 year old)    Reason for Follow-Up:  Jyothi is a 53-year-old woman with a diagnosis of breast cancer who is followed by Dr. Monroe at [facility]. Jyothi received C1D1 AC 10/29/19. This clinician received return call from Jyothi today.     Intervention:   Jyothi is a  woman and proud mother of 2 daughters in their 20s and works full-time in property management. Jyothi acknowledged today that she feels well supported by work and family, and that she experienced minimal side effects with first round of treatment. Oriented Jyothi to role of oncology social worker as part of care team, and common concerns women express to this clinician when coping with breast cancer. Oriented Jyothi to role of oncology psychologist, palliative care , , and dietician as additional support as needed. Jyothi reports that she feels that she is coping well from a physical and emotional standpoint, and is pleased to be doing cold cap therapy. Jyothi acknowledges that if she were to lose hair with cold-cap therapy she feels that she might need additional emotional support. Provided emotional support surrounding understandable worry about hair loss and the emotional impact that this has on daily life. Jyothi describes herself as \"proactive\" and someone who will reach out if in the case of distress or need. Denied concerns at present, but acknowledged an openness to meeting with this clinician at scheduled infusion 19.     Plan:  1) This clinician will plan on in-person introduction 19, psychosocial check-in and emotional support. Social work will continue to be available as needed for ongoing psychosocial support as she continues to adjust to treatment and realize its impacts.    2) Ongoing collaboration with multidisciplinary treatment team.     Please call or page if needs or concerns arise. "     STEPHANIE Dumont, LICSW  Direct Phone: [phone]  Pager: [phone]

## 2019-11-09 NOTE — PROGRESS NOTES
[facility]    Hematology/Oncology Established Patient Follow-up Note      Today's Date: 11/13/19    Reason for Follow-up: Right breast cancer.    HISTORY OF PRESENT ILLNESS: Jyothi Freitas is a 53 year old female who presents with the following oncologic history:   1.  10/11/2019: Diagnostic right sided mammogram performed for a palpable lump in the right breast.  This showed an irregularly-shaped spiculated mass in the upper outer right breast with prominent lymph nodes in the right axilla.  Targeted ultrasound of the right upper outer breast showed an irregularly-shaped hypoechoic mass at the 10 o'clock position, 4 cm from the nipple measuring 2.6 x 1.5 x 2.4 cm.  Right axillary ultrasound showed moderately enlarged lymph nodes.  Right breast needle biopsy showed a grade 3 invasive ductal carcinoma with lymphovascular invasion identified, ER strongly positive at 95%, OK strongly positive at 95%, HER-2/juan FISH negative.  Right axillary lymph node measuring 2 cm was positive for metastatic carcinoma.  Note prior 4/2019 mammogram deemed normal.  2.  10/15/2019: Bilateral breast MRI showed known right breast malignancy measuring 2.6 x 1.4 x 2 cm, 3 mildly enlarged right axillary lymph nodes and no contralateral breast malignancy.  3. 10/21/2019 PET scan showed scattered small hypermetabolic bilateral axillary lymph nodes; for example, a hypermetabolic right axillary lymph node measures 1.6 x 0.9 cm with SUV max 3.6.  Hypermetabolic mass in the right breast superiorly and laterally measuring 2.1 x 1.6 cm with SUV max 4.3.  A 0.6 cm low-density lesion in the right hepatic lobe is too small for accurate PET characterization but shows no appreciable hypermetabolic activity.  Incidentally noted ectasia of the ascending thoracic aorta measures 4 cm in diameter.  4.  10/23/2019: Left axillary ultrasound showed benign-appearing lymph node.  Left axillary lymph node biopsy showed benign lymph node tissue.  5.   10/29/2019: Started neoadjuvant chemotherapy with dose dense Adriamycin and Cytoxan, to be followed by weekly paclitaxel.    INTERIM HISTORY:  Jyothi Freitas reports constipation for about 4 to 5 days after chemotherapy.  She was taking her ondansetron on a scheduled basis post chemo.  She also experienced bone pain for about 4 days after her Neulasta injection.  She tried ibuprofen and Tylenol which only helped for about 3 hours at a time.  This did disturb her sleep.  Lorazepam did not help her sleep as it gave her a headache.  She feels that she otherwise tolerated chemotherapy well.  She denies any new bladder dysfunction, fevers, or chills.    REVIEW OF SYSTEMS:   14 point ROS was reviewed and is negative other than as noted above in HPI.       HOME MEDICATIONS:  Current Outpatient Medications   Medication Sig Dispense Refill     acetaminophen (TYLENOL) 500 MG tablet Take 1,000 mg by mouth every 6 hours as needed for mild pain       acetaminophen-caffeine (EXCEDRIN TENSION HEADACHE) 500-65 MG TABS Take 2 tablets by mouth every 6 hours as needed for mild pain       ALBUTEROL 108 (90 BASE) MCG/ACT inhaler INHALE 2 PUFFS INTO THE LUNGS EVERY 6 HOURS AS NEEDED FOR SHORTNESS OF BREATH OR DIFFICULT BREATHING OR WHEEZING (Patient not taking: Reported on 10/29/2019) 8.5 g 0     buPROPion (WELLBUTRIN XL) 300 MG 24 hr tablet Take 1 tablet (300 mg) by mouth every morning 90 tablet 3     dexamethasone (DECADRON) 4 MG tablet Take 2 tablets (8 mg) by mouth daily for 3 days Start on Day 2 of Cycles 1 through 4. 6 tablet 3     fluticasone (FLOVENT DISKUS) 100 MCG/BLIST inhaler Inhale 1 puff into the lungs 2 times daily (Patient not taking: Reported on 10/29/2019) 3 Inhaler 3     fluticasone-salmeterol (ADVAIR) 100-50 MCG/DOSE inhaler Inhale 1 puff into the lungs every 12 hours (Patient not taking: Reported on 10/29/2019) 1 Inhaler 3     HYDROcodone-acetaminophen (NORCO) 5-325 MG tablet Take 1-2 tablets by mouth every 4 hours as  needed for moderate to severe pain (Patient not taking: Reported on 10/29/2019) 10 tablet 0     LORazepam (ATIVAN) 0.5 MG tablet Take 1 tablet (0.5 mg) by mouth every 4 hours as needed (Anxiety, Nausea/Vomiting or Sleep) 30 tablet 5     nadolol (CORGARD) 20 MG tablet TAKE 1 TABLET(20 MG) BY MOUTH DAILY 90 tablet 3     naproxen sodium (ANAPROX) 220 MG tablet Take 220 mg by mouth daily as needed for moderate pain       prochlorperazine (COMPAZINE) 10 MG tablet Take 1 tablet (10 mg) by mouth every 6 hours as needed (Nausea/Vomiting) 30 tablet 5     senna-docusate (SENOKOT-S/PERICOLACE) 8.6-50 MG tablet Take 1-2 tablets by mouth 2 times daily (Patient not taking: Reported on 10/29/2019) 30 tablet 0         ALLERGIES:  Allergies   Allergen Reactions     Sulfa Drugs Other (See Comments)     Red face. Ringing in the ears.         PAST MEDICAL HISTORY:  Past Medical History:   Diagnosis Date     GERD (gastroesophageal reflux disease)      H/O colonoscopy 03/08/2016    done at [facility] and [facility]     Hematuria 2016    eval at [facility] and per pt cysto and ct neg     Intermittent asthma     since childhood     Low iron 10/2017    done for eval of hair loss, [facility]     Migraines     most of adult life, has seen neuro in past, on bblocker     Mild depression (H)      Normal stress echocardiogram July 2011    due to hear racing     Osteopenia 2008     Syncope 03/2017    echo [facility] lv size and fxn, grade 1 dd, mild tr         PAST SURGICAL HISTORY:  Past Surgical History:   Procedure Laterality Date     C TMJ ARTHROSCOPY/SURGERY       ESOPHAGOSCOPY, GASTROSCOPY, DUODENOSCOPY (EGD), COMBINED N/A 10/30/2017    Procedure: COMBINED ESOPHAGOSCOPY, GASTROSCOPY, DUODENOSCOPY (EGD), BIOPSY SINGLE OR MULTIPLE;  COMBINED ESOPHAGOSCOPY, GASTROSCOPY, DUODENOSCOPY (EGD);  Surgeon: Martin Rodriguez MD;  Location:  GI     INSERT PORT VASCULAR ACCESS N/A 10/24/2019    Procedure: PORT PLACEMENT;  Surgeon: Kaila Hernandez MD;  Location:  OR      "ORTHOPEDIC SURGERY      \"leg surgery\"     single oopherectomy  2010    cyst         SOCIAL HISTORY:  Social History     Socioeconomic History     Marital status:      Spouse name: Not on file     Number of children: 2     Years of education: Not on file     Highest education level: Not on file   Occupational History     Not on file   Social Needs     Financial resource strain: Not on file     Food insecurity:     Worry: Not on file     Inability: Not on file     Transportation needs:     Medical: Not on file     Non-medical: Not on file   Tobacco Use     Smoking status: Former Smoker     Packs/day: 0.00     Last attempt to quit: 3/27/1997     Years since quittin.6     Smokeless tobacco: Never Used   Substance and Sexual Activity     Alcohol use: Yes     Comment: rarely     Drug use: No     Sexual activity: Yes     Partners: Male     Birth control/protection: Pill   Lifestyle     Physical activity:     Days per week: Not on file     Minutes per session: Not on file     Stress: Not on file   Relationships     Social connections:     Talks on phone: Not on file     Gets together: Not on file     Attends Hindu service: Not on file     Active member of club or organization: Not on file     Attends meetings of clubs or organizations: Not on file     Relationship status: Not on file     Intimate partner violence:     Fear of current or ex partner: Not on file     Emotionally abused: Not on file     Physically abused: Not on file     Forced sexual activity: Not on file   Other Topics Concern     Parent/sibling w/ CABG, MI or angioplasty before 65F 55M? Not Asked   Social History Narrative     Not on file         FAMILY HISTORY:  Family History   Problem Relation Age of Onset     Coronary Artery Disease Father      Emphysema Mother          PHYSICAL EXAM:  Vital signs:  /83   Pulse 57   Temp 98.6  F (37  C) (Oral)   Resp 16   Ht 1.575 m (5' 2\")   Wt 64.5 kg (142 lb 3.2 oz)   LMP 10/07/2017 " (Approximate)   SpO2 100%   BMI 26.01 kg/m     ECO  GENERAL/CONSTITUTIONAL: No acute distress.  EYES: Extraocular movements intact.  No scleral icterus.  ENT/MOUTH: Neck supple. Oropharynx clear, no mucositis.  LYMPH: No anterior cervical, posterior cervical, supraclavicular, or axillary adenopathy.   RESPIRATORY: Clear to auscultation bilaterally. No crackles or wheezing.   CARDIOVASCULAR: Regular rate and rhythm without murmurs, gallops, or rubs.  GASTROINTESTINAL: No hepatosplenomegaly, masses, or tenderness. No guarding.  No distention.  MUSCULOSKELETAL: Warm and well-perfused, no cyanosis, clubbing, or edema.  NEUROLOGIC: Cranial nerves II-XII are intact. Alert, oriented, answers questions appropriately.  INTEGUMENTARY: No rashes or jaundice.  GAIT: Steady, does not use assistive device    LABS:  CBC RESULTS:   Recent Labs   Lab Test 19  0930   WBC 8.6   RBC 4.25   HGB 12.8   HCT 38.3   MCV 90   MCH 30.1   MCHC 33.4   RDW 12.2          PATHOLOGY:  None new.    IMAGING:  None new.    ASSESSMENT/PLAN:  Jyothi Freitas is a 53 year old female with the following issues:  1.  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  I reviewed the blood counts with her.  They have adequately recovered to proceed with her second cycle of ddAC  -Plan is for her to complete a total of 4 cycles of ddAC followed by 12 weeks of weekly paclitaxel.  -Rationale for neoadjuvant chemotherapy to observe for chemo responsiveness, reduce risk of recurrent breast cancer, and reduce burden of disease prior to surgery.  However, as per previous discussion, she understands that it is unlikely that we will achieve a complete response to neoadjuvant chemotherapy given that her tumor is strongly ER and  NJ positive.  -She would certainly be a candidate for adjuvant endocrine therapy based on the strongly ER and NJ positive tumor status.  She is postmenopausal, so I would typically recommend anastrozole to reduce her risk of breast cancer recurrence by relative 50%.  -Genetic counseling pending with appointments in November 2019.  -She would also likely be a candidate for adjuvant radiation therapy given her lymph node involvement and depending on radiation oncology evaluation.        2. Depression  -Mood stable. Continue bupropion. Would not recommend tamoxifen in future due to interaction with bupropion unless she switched antidepressants.    3.  Constipation  -Likely drug related from her antiemetics.  I told her to take the antiemetics on an as-needed basis instead of scheduled.  She may take stool softeners and laxatives as needed for constipation.    4.  Bone pain related to pegfilgrastim  -I recommended she try naproxen orally twice daily and acetaminophen as needed for breakthrough pain.    5.  Insomnia  -This is more so related to the bone pain she has been experiencing from the PEG filgrastim.  If she has better control of her bone pain, hopefully she will not need to take much for her insomnia.  -I did prescribe her zolpidem to help with sleep if needed, as she did have headache to lorazepam.    6. Ectasia of thoracic aorta  -This measures 4 cm on the PET scan.  I reassured Jyothi that she would not need any urgent surgical management of the aorta that may need ongoing monitoring. Referred her to cardio-oncology for further management.    Return to see me with subsequent chemo treatment.      Maricarmen Monroe MD  Hematology/Oncology  [facility]    I spent a total of 25 minutes with the patient, with greater than 50% of the time in counseling and coordination of care.

## 2019-11-13 ENCOUNTER — ONCOLOGY VISIT (OUTPATIENT)
Dept: ONCOLOGY | Facility: CLINIC | Age: 54
End: 2019-11-13
Attending: INTERNAL MEDICINE
Payer: COMMERCIAL

## 2019-11-13 ENCOUNTER — HOSPITAL ENCOUNTER (OUTPATIENT)
Facility: CLINIC | Age: 54
Setting detail: SPECIMEN
Discharge: HOME | End: 2019-11-13
Attending: INTERNAL MEDICINE | Admitting: INTERNAL MEDICINE
Payer: COMMERCIAL

## 2019-11-13 ENCOUNTER — INFUSION THERAPY VISIT (OUTPATIENT)
Dept: INFUSION THERAPY | Facility: CLINIC | Age: 54
End: 2019-11-13
Attending: INTERNAL MEDICINE
Payer: COMMERCIAL

## 2019-11-13 VITALS
HEART RATE: 57 BPM | TEMPERATURE: 98.6 F | OXYGEN SATURATION: 100 % | DIASTOLIC BLOOD PRESSURE: 83 MMHG | HEIGHT: 62 IN | SYSTOLIC BLOOD PRESSURE: 131 MMHG | BODY MASS INDEX: 26.17 KG/M2 | RESPIRATION RATE: 16 BRPM | WEIGHT: 142.2 LBS

## 2019-11-13 VITALS — RESPIRATION RATE: 16 BRPM | SYSTOLIC BLOOD PRESSURE: 131 MMHG | TEMPERATURE: 98.6 F | DIASTOLIC BLOOD PRESSURE: 83 MMHG

## 2019-11-13 DIAGNOSIS — Z17.0 MALIGNANT NEOPLASM OF UPPER-OUTER QUADRANT OF RIGHT BREAST IN FEMALE, ESTROGEN RECEPTOR POSITIVE (H): Primary | ICD-10-CM

## 2019-11-13 DIAGNOSIS — C50.411 MALIGNANT NEOPLASM OF UPPER-OUTER QUADRANT OF RIGHT BREAST IN FEMALE, ESTROGEN RECEPTOR POSITIVE (H): Primary | ICD-10-CM

## 2019-11-13 DIAGNOSIS — F32.5 MAJOR DEPRESSION IN COMPLETE REMISSION (H): ICD-10-CM

## 2019-11-13 DIAGNOSIS — F19.982 DRUG-INDUCED INSOMNIA (H): ICD-10-CM

## 2019-11-13 LAB
BASOPHILS # BLD AUTO: 0 10E9/L (ref 0–0.2)
BASOPHILS NFR BLD AUTO: 0 %
DIFFERENTIAL METHOD BLD: ABNORMAL
EOSINOPHIL # BLD AUTO: 0 10E9/L (ref 0–0.7)
EOSINOPHIL NFR BLD AUTO: 0 %
ERYTHROCYTE [DISTWIDTH] IN BLOOD BY AUTOMATED COUNT: 12.2 % (ref 10–15)
HCT VFR BLD AUTO: 38.3 % (ref 35–47)
HGB BLD-MCNC: 12.8 G/DL (ref 11.7–15.7)
LYMPHOCYTES # BLD AUTO: 1.5 10E9/L (ref 0.8–5.3)
LYMPHOCYTES NFR BLD AUTO: 17 %
MCH RBC QN AUTO: 30.1 PG (ref 26.5–33)
MCHC RBC AUTO-ENTMCNC: 33.4 G/DL (ref 31.5–36.5)
MCV RBC AUTO: 90 FL (ref 78–100)
MONOCYTES # BLD AUTO: 0.4 10E9/L (ref 0–1.3)
MONOCYTES NFR BLD AUTO: 5 %
MYELOCYTES # BLD: 0.1 10E9/L
MYELOCYTES NFR BLD MANUAL: 1 %
NEUTROPHILS # BLD AUTO: 6.6 10E9/L (ref 1.6–8.3)
NEUTROPHILS NFR BLD AUTO: 77 %
PLATELET # BLD AUTO: 319 10E9/L (ref 150–450)
PLATELET # BLD EST: ABNORMAL 10*3/UL
RBC # BLD AUTO: 4.25 10E12/L (ref 3.8–5.2)
RBC MORPH BLD: ABNORMAL
WBC # BLD AUTO: 8.6 10E9/L (ref 4–11)

## 2019-11-13 PROCEDURE — 85025 COMPLETE CBC W/AUTO DIFF WBC: CPT | Performed by: INTERNAL MEDICINE

## 2019-11-13 PROCEDURE — 96413 CHEMO IV INFUSION 1 HR: CPT

## 2019-11-13 PROCEDURE — 25000128 H RX IP 250 OP 636: Performed by: INTERNAL MEDICINE

## 2019-11-13 PROCEDURE — 25800030 ZZH RX IP 258 OP 636: Performed by: INTERNAL MEDICINE

## 2019-11-13 PROCEDURE — 96367 TX/PROPH/DG ADDL SEQ IV INF: CPT

## 2019-11-13 PROCEDURE — 96377 APPLICATON ON-BODY INJECTOR: CPT | Mod: XS

## 2019-11-13 PROCEDURE — 96411 CHEMO IV PUSH ADDL DRUG: CPT

## 2019-11-13 PROCEDURE — 99214 OFFICE O/P EST MOD 30 MIN: CPT | Performed by: INTERNAL MEDICINE

## 2019-11-13 PROCEDURE — 96375 TX/PRO/DX INJ NEW DRUG ADDON: CPT

## 2019-11-13 PROCEDURE — G0463 HOSPITAL OUTPT CLINIC VISIT: HCPCS | Mod: 25

## 2019-11-13 RX ORDER — LORAZEPAM 2 MG/ML
0.5 INJECTION INTRAMUSCULAR EVERY 4 HOURS PRN
Status: CANCELLED
Start: 2019-11-13

## 2019-11-13 RX ORDER — MEPERIDINE HYDROCHLORIDE 25 MG/ML
25 INJECTION INTRAMUSCULAR; INTRAVENOUS; SUBCUTANEOUS EVERY 30 MIN PRN
Status: CANCELLED | OUTPATIENT
Start: 2019-11-13

## 2019-11-13 RX ORDER — ALBUTEROL SULFATE 90 UG/1
1-2 AEROSOL, METERED RESPIRATORY (INHALATION)
Status: CANCELLED
Start: 2019-11-13

## 2019-11-13 RX ORDER — EPINEPHRINE 0.3 MG/.3ML
0.3 INJECTION SUBCUTANEOUS EVERY 5 MIN PRN
Status: CANCELLED | OUTPATIENT
Start: 2019-11-13

## 2019-11-13 RX ORDER — DIPHENHYDRAMINE HYDROCHLORIDE 50 MG/ML
50 INJECTION INTRAMUSCULAR; INTRAVENOUS
Status: CANCELLED
Start: 2019-11-13

## 2019-11-13 RX ORDER — PALONOSETRON 0.05 MG/ML
0.25 INJECTION, SOLUTION INTRAVENOUS ONCE
Status: CANCELLED
Start: 2019-11-13

## 2019-11-13 RX ORDER — DOXORUBICIN HYDROCHLORIDE 2 MG/ML
60 INJECTION, SOLUTION INTRAVENOUS ONCE
Status: CANCELLED | OUTPATIENT
Start: 2019-11-13

## 2019-11-13 RX ORDER — PALONOSETRON 0.05 MG/ML
0.25 INJECTION, SOLUTION INTRAVENOUS ONCE
Status: COMPLETED | OUTPATIENT
Start: 2019-11-13 | End: 2019-11-13

## 2019-11-13 RX ORDER — SODIUM CHLORIDE 9 MG/ML
1000 INJECTION, SOLUTION INTRAVENOUS CONTINUOUS PRN
Status: CANCELLED
Start: 2019-11-13

## 2019-11-13 RX ORDER — METHYLPREDNISOLONE SODIUM SUCCINATE 125 MG/2ML
125 INJECTION, POWDER, LYOPHILIZED, FOR SOLUTION INTRAMUSCULAR; INTRAVENOUS
Status: CANCELLED
Start: 2019-11-13

## 2019-11-13 RX ORDER — ALBUTEROL SULFATE 0.83 MG/ML
2.5 SOLUTION RESPIRATORY (INHALATION)
Status: CANCELLED | OUTPATIENT
Start: 2019-11-13

## 2019-11-13 RX ORDER — DOXORUBICIN HYDROCHLORIDE 2 MG/ML
100 INJECTION, SOLUTION INTRAVENOUS ONCE
Status: COMPLETED | OUTPATIENT
Start: 2019-11-13 | End: 2019-11-13

## 2019-11-13 RX ORDER — NALOXONE HYDROCHLORIDE 0.4 MG/ML
.1-.4 INJECTION, SOLUTION INTRAMUSCULAR; INTRAVENOUS; SUBCUTANEOUS
Status: CANCELLED | OUTPATIENT
Start: 2019-11-13

## 2019-11-13 RX ORDER — EPINEPHRINE 1 MG/ML
0.3 INJECTION, SOLUTION INTRAMUSCULAR; SUBCUTANEOUS EVERY 5 MIN PRN
Status: CANCELLED | OUTPATIENT
Start: 2019-11-13

## 2019-11-13 RX ORDER — ZOLPIDEM TARTRATE 5 MG/1
5 TABLET ORAL
Qty: 30 TABLET | Refills: 1 | Status: SHIPPED | OUTPATIENT
Start: 2019-11-13 | End: 2019-11-13

## 2019-11-13 RX ORDER — ZOLPIDEM TARTRATE 5 MG/1
5 TABLET ORAL
Qty: 30 TABLET | Refills: 1 | Status: SHIPPED | OUTPATIENT
Start: 2019-11-13 | End: 2020-02-20

## 2019-11-13 RX ADMIN — PALONOSETRON HYDROCHLORIDE 0.25 MG: 0.25 INJECTION, SOLUTION INTRAVENOUS at 11:20

## 2019-11-13 RX ADMIN — DOXORUBICIN HYDROCHLORIDE 100 MG: 2 INJECTION, SOLUTION INTRAVENOUS at 12:24

## 2019-11-13 RX ADMIN — CYCLOPHOSPHAMIDE 1000 MG: 1 INJECTION, POWDER, FOR SOLUTION INTRAVENOUS; ORAL at 12:40

## 2019-11-13 RX ADMIN — PEGFILGRASTIM 6 MG: KIT SUBCUTANEOUS at 13:33

## 2019-11-13 RX ADMIN — FOSAPREPITANT: 150 INJECTION, POWDER, LYOPHILIZED, FOR SOLUTION INTRAVENOUS at 11:26

## 2019-11-13 RX ADMIN — SODIUM CHLORIDE 1000 ML: 9 INJECTION, SOLUTION INTRAVENOUS at 09:41

## 2019-11-13 ASSESSMENT — PAIN SCALES - GENERAL
PAINLEVEL: NO PAIN (0)
PAINLEVEL: NO PAIN (0)

## 2019-11-13 ASSESSMENT — MIFFLIN-ST. JEOR: SCORE: 1198.26

## 2019-11-13 NOTE — PROGRESS NOTES
Infusion Nursing Note:  Jyothi Freitas presents today for AC #2.    Patient seen by provider today: Yes: Fer   present during visit today: Not Applicable.    Note: N/A.    Intravenous Access:  Labs drawn without difficulty.  Implanted Port.    Treatment Conditions:  Lab Results   Component Value Date    HGB 12.8 11/13/2019     Lab Results   Component Value Date    WBC 8.6 11/13/2019      Lab Results   Component Value Date    ANEU 6.6 11/13/2019     Lab Results   Component Value Date     11/13/2019      Results reviewed, labs MET treatment parameters, ok to proceed with treatment.      Post Infusion Assessment:  Patient tolerated infusion without incident.  Blood return noted pre and post infusion.  Site patent and intact, free from redness, edema or discomfort.  No evidence of extravasations.  Access discontinued per protocol.       Discharge Plan:   Prescription refills given for dex and ambien.  Discharge instructions reviewed with: Patient.  Patient and/or family verbalized understanding of discharge instructions and all questions answered.  Copy of AVS reviewed with patient and/or family.  Patient will return in 2 weeks for next appointment.  Patient discharged in stable condition accompanied by: self.  Departure Mode: Ambulatory.    Sandee Schneider RN      ONPRO  Was placed on patient's: right side of abdomen.    Was placed at 1330 PM    ONPRO injector device Lot number: [id number]    Patient education included: what patient can expect after application, what colored lights mean on the device, when to remove device, when and where to call with questions or issues, all patients questions answered and that Neulasta administration will occur at 4:40-5:30pm.    Patient tolerated administration well.

## 2019-11-13 NOTE — LETTER
11/13/2019         RE: Jyothi Freitas  [address]  [place] [address]        Dear Colleague,    Thank you for referring your patient, Jyothi Freitas, to the [facility]. Please see a copy of my visit note below.    [facility]    Hematology/Oncology Established Patient Follow-up Note      Today's Date: 11/13/19    Reason for Follow-up: Right breast cancer.    HISTORY OF PRESENT ILLNESS: Jyothi Freitas is a 53 year old female who presents with the following oncologic history:   1.  10/11/2019: Diagnostic right sided mammogram performed for a palpable lump in the right breast.  This showed an irregularly-shaped spiculated mass in the upper outer right breast with prominent lymph nodes in the right axilla.  Targeted ultrasound of the right upper outer breast showed an irregularly-shaped hypoechoic mass at the 10 o'clock position, 4 cm from the nipple measuring 2.6 x 1.5 x 2.4 cm.  Right axillary ultrasound showed moderately enlarged lymph nodes.  Right breast needle biopsy showed a grade 3 invasive ductal carcinoma with lymphovascular invasion identified, ER strongly positive at 95%, OR strongly positive at 95%, HER-2/juan FISH negative.  Right axillary lymph node measuring 2 cm was positive for metastatic carcinoma.  Note prior 4/2019 mammogram deemed normal.  2.  10/15/2019: Bilateral breast MRI showed known right breast malignancy measuring 2.6 x 1.4 x 2 cm, 3 mildly enlarged right axillary lymph nodes and no contralateral breast malignancy.  3. 10/21/2019 PET scan showed scattered small hypermetabolic bilateral axillary lymph nodes; for example, a hypermetabolic right axillary lymph node measures 1.6 x 0.9 cm with SUV max 3.6.  Hypermetabolic mass in the right breast superiorly and laterally measuring 2.1 x 1.6 cm with SUV max 4.3.  A 0.6 cm low-density lesion in the right hepatic lobe is too small for accurate PET characterization but shows no appreciable hypermetabolic activity.   Incidentally noted ectasia of the ascending thoracic aorta measures 4 cm in diameter.  4.  10/23/2019: Left axillary ultrasound showed benign-appearing lymph node.  Left axillary lymph node biopsy showed benign lymph node tissue.  5.  10/29/2019: Started neoadjuvant chemotherapy with dose dense Adriamycin and Cytoxan, to be followed by weekly paclitaxel.    INTERIM HISTORY:  Jyothi Freitas reports constipation for about 4 to 5 days after chemotherapy.  She was taking her ondansetron on a scheduled basis post chemo.  She also experienced bone pain for about 4 days after her Neulasta injection.  She tried ibuprofen and Tylenol which only helped for about 3 hours at a time.  This did disturb her sleep.  Lorazepam did not help her sleep as it gave her a headache.  She feels that she otherwise tolerated chemotherapy well.  She denies any new bladder dysfunction, fevers, or chills.    REVIEW OF SYSTEMS:   14 point ROS was reviewed and is negative other than as noted above in HPI.       HOME MEDICATIONS:  Current Outpatient Medications   Medication Sig Dispense Refill     acetaminophen (TYLENOL) 500 MG tablet Take 1,000 mg by mouth every 6 hours as needed for mild pain       acetaminophen-caffeine (EXCEDRIN TENSION HEADACHE) 500-65 MG TABS Take 2 tablets by mouth every 6 hours as needed for mild pain       ALBUTEROL 108 (90 BASE) MCG/ACT inhaler INHALE 2 PUFFS INTO THE LUNGS EVERY 6 HOURS AS NEEDED FOR SHORTNESS OF BREATH OR DIFFICULT BREATHING OR WHEEZING (Patient not taking: Reported on 10/29/2019) 8.5 g 0     buPROPion (WELLBUTRIN XL) 300 MG 24 hr tablet Take 1 tablet (300 mg) by mouth every morning 90 tablet 3     dexamethasone (DECADRON) 4 MG tablet Take 2 tablets (8 mg) by mouth daily for 3 days Start on Day 2 of Cycles 1 through 4. 6 tablet 3     fluticasone (FLOVENT DISKUS) 100 MCG/BLIST inhaler Inhale 1 puff into the lungs 2 times daily (Patient not taking: Reported on 10/29/2019) 3 Inhaler 3      fluticasone-salmeterol (ADVAIR) 100-50 MCG/DOSE inhaler Inhale 1 puff into the lungs every 12 hours (Patient not taking: Reported on 10/29/2019) 1 Inhaler 3     HYDROcodone-acetaminophen (NORCO) 5-325 MG tablet Take 1-2 tablets by mouth every 4 hours as needed for moderate to severe pain (Patient not taking: Reported on 10/29/2019) 10 tablet 0     LORazepam (ATIVAN) 0.5 MG tablet Take 1 tablet (0.5 mg) by mouth every 4 hours as needed (Anxiety, Nausea/Vomiting or Sleep) 30 tablet 5     nadolol (CORGARD) 20 MG tablet TAKE 1 TABLET(20 MG) BY MOUTH DAILY 90 tablet 3     naproxen sodium (ANAPROX) 220 MG tablet Take 220 mg by mouth daily as needed for moderate pain       prochlorperazine (COMPAZINE) 10 MG tablet Take 1 tablet (10 mg) by mouth every 6 hours as needed (Nausea/Vomiting) 30 tablet 5     senna-docusate (SENOKOT-S/PERICOLACE) 8.6-50 MG tablet Take 1-2 tablets by mouth 2 times daily (Patient not taking: Reported on 10/29/2019) 30 tablet 0         ALLERGIES:  Allergies   Allergen Reactions     Sulfa Drugs Other (See Comments)     Red face. Ringing in the ears.         PAST MEDICAL HISTORY:  Past Medical History:   Diagnosis Date     GERD (gastroesophageal reflux disease)      H/O colonoscopy 03/08/2016    done at [facility] and [facility]     Hematuria 2016    eval at [facility] and per pt cysto and ct neg     Intermittent asthma     since childhood     Low iron 10/2017    done for eval of hair loss, egd [facility]     Migraines     most of adult life, has seen neuro in past, on bblocker     Mild depression (H)      Normal stress echocardiogram July 2011    due to hear racing     Osteopenia 2008     Syncope 03/2017    echo [facility] lv size and fxn, grade 1 dd, mild tr         PAST SURGICAL HISTORY:  Past Surgical History:   Procedure Laterality Date     C TMJ ARTHROSCOPY/SURGERY       ESOPHAGOSCOPY, GASTROSCOPY, DUODENOSCOPY (EGD), COMBINED N/A 10/30/2017    Procedure: COMBINED ESOPHAGOSCOPY, GASTROSCOPY, DUODENOSCOPY (EGD), BIOPSY SINGLE OR  "MULTIPLE;  COMBINED ESOPHAGOSCOPY, GASTROSCOPY, DUODENOSCOPY (EGD);  Surgeon: Martin Rodriguez MD;  Location:  GI     INSERT PORT VASCULAR ACCESS N/A 10/24/2019    Procedure: PORT PLACEMENT;  Surgeon: Kaila Hernandez MD;  Location:  OR     ORTHOPEDIC SURGERY      \"leg surgery\"     single oopherectomy  2010    cyst         SOCIAL HISTORY:  Social History     Socioeconomic History     Marital status:      Spouse name: Not on file     Number of children: 2     Years of education: Not on file     Highest education level: Not on file   Occupational History     Not on file   Social Needs     Financial resource strain: Not on file     Food insecurity:     Worry: Not on file     Inability: Not on file     Transportation needs:     Medical: Not on file     Non-medical: Not on file   Tobacco Use     Smoking status: Former Smoker     Packs/day: 0.00     Last attempt to quit: 3/27/1997     Years since quittin.6     Smokeless tobacco: Never Used   Substance and Sexual Activity     Alcohol use: Yes     Comment: rarely     Drug use: No     Sexual activity: Yes     Partners: Male     Birth control/protection: Pill   Lifestyle     Physical activity:     Days per week: Not on file     Minutes per session: Not on file     Stress: Not on file   Relationships     Social connections:     Talks on phone: Not on file     Gets together: Not on file     Attends Christianity service: Not on file     Active member of club or organization: Not on file     Attends meetings of clubs or organizations: Not on file     Relationship status: Not on file     Intimate partner violence:     Fear of current or ex partner: Not on file     Emotionally abused: Not on file     Physically abused: Not on file     Forced sexual activity: Not on file   Other Topics Concern     Parent/sibling w/ CABG, MI or angioplasty before 65F 55M? Not Asked   Social History Narrative     Not on file         FAMILY HISTORY:  Family History   Problem " "Relation Age of Onset     Coronary Artery Disease Father      Emphysema Mother          PHYSICAL EXAM:  Vital signs:  /83   Pulse 57   Temp 98.6  F (37  C) (Oral)   Resp 16   Ht 1.575 m (5' 2\")   Wt 64.5 kg (142 lb 3.2 oz)   LMP 10/07/2017 (Approximate)   SpO2 100%   BMI 26.01 kg/m      ECO  GENERAL/CONSTITUTIONAL: No acute distress.  EYES: Extraocular movements intact.  No scleral icterus.  ENT/MOUTH: Neck supple. Oropharynx clear, no mucositis.  LYMPH: No anterior cervical, posterior cervical, supraclavicular, or axillary adenopathy.   RESPIRATORY: Clear to auscultation bilaterally. No crackles or wheezing.   CARDIOVASCULAR: Regular rate and rhythm without murmurs, gallops, or rubs.  GASTROINTESTINAL: No hepatosplenomegaly, masses, or tenderness. No guarding.  No distention.  MUSCULOSKELETAL: Warm and well-perfused, no cyanosis, clubbing, or edema.  NEUROLOGIC: Cranial nerves II-XII are intact. Alert, oriented, answers questions appropriately.  INTEGUMENTARY: No rashes or jaundice.  GAIT: Steady, does not use assistive device    LABS:  CBC RESULTS:   Recent Labs   Lab Test 19  0930   WBC 8.6   RBC 4.25   HGB 12.8   HCT 38.3   MCV 90   MCH 30.1   MCHC 33.4   RDW 12.2          PATHOLOGY:  None new.    IMAGING:  None new.    ASSESSMENT/PLAN:  Jyothi Freitas is a 53 year old female with the following issues:  1.  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  I reviewed the blood counts with her.  They have adequately recovered to proceed with her second cycle of ddAC  -Plan is for her to complete a total of 4 cycles of ddAC followed by 12 weeks of weekly paclitaxel.  -Rationale for neoadjuvant chemotherapy to observe for chemo responsiveness, reduce risk of " recurrent breast cancer, and reduce burden of disease prior to surgery.  However, as per previous discussion, she understands that it is unlikely that we will achieve a complete response to neoadjuvant chemotherapy given that her tumor is strongly ER and MI positive.  -She would certainly be a candidate for adjuvant endocrine therapy based on the strongly ER and MI positive tumor status.  She is postmenopausal, so I would typically recommend anastrozole to reduce her risk of breast cancer recurrence by relative 50%.  -Genetic counseling pending with appointments in November 2019.  -She would also likely be a candidate for adjuvant radiation therapy given her lymph node involvement and depending on radiation oncology evaluation.        2. Depression  -Mood stable. Continue bupropion. Would not recommend tamoxifen in future due to interaction with bupropion unless she switched antidepressants.    3.  Constipation  -Likely drug related from her antiemetics.  I told her to take the antiemetics on an as-needed basis instead of scheduled.  She may take stool softeners and laxatives as needed for constipation.    4.  Bone pain related to pegfilgrastim  -I recommended she try naproxen orally twice daily and acetaminophen as needed for breakthrough pain.    5.  Insomnia  -This is more so related to the bone pain she has been experiencing from the PEG filgrastim.  If she has better control of her bone pain, hopefully she will not need to take much for her insomnia.  -I did prescribe her zolpidem to help with sleep if needed, as she did have headache to lorazepam.    6. Ectasia of thoracic aorta  -This measures 4 cm on the PET scan.  I reassured Jyothi that she would not need any urgent surgical management of the aorta that may need ongoing monitoring. Referred her to cardio-oncology for further management.    Return to see me with subsequent chemo treatment.      Maricarmen Monroe MD  Hematology/Oncology  [facility]  "Physicians    I spent a total of 25 minutes with the patient, with greater than 50% of the time in counseling and coordination of care.    Oncology Rooming Note    November 13, 2019 9:55 AM   Jyothi Freitas is a 53 year old female who presents for:    Chief Complaint   Patient presents with     Oncology Clinic Visit     Initial Vitals: /83   Pulse 57   Temp 98.6  F (37  C) (Oral)   Resp 16   Ht 1.575 m (5' 2\")   Wt 63 kg (139 lb)   LMP 10/07/2017 (Approximate)   SpO2 100%   BMI 25.42 kg/m    Estimated body mass index is 25.42 kg/m  as calculated from the following:    Height as of this encounter: 1.575 m (5' 2\").    Weight as of this encounter: 63 kg (139 lb). Body surface area is 1.66 meters squared.  No Pain (0) Comment: Data Unavailable   Patient's last menstrual period was 10/07/2017 (approximate).  Allergies reviewed: Yes  Medications reviewed: Yes    Medications: Medication refills not needed today.  Pharmacy name entered into [employer]:    [id number] - [place], [place] - [address] AT [facility] [address]  [id number] - [place], [place] - [address] AT [address] [place] [address]    Clinical concerns: no      Gloria Hand CMA              Again, thank you for allowing me to participate in the care of your patient.        Sincerely,        Maricarmen Monroe MD    "

## 2019-11-13 NOTE — PROGRESS NOTES
"Oncology Rooming Note    November 13, 2019 9:55 AM   Jyothi Freitas is a 53 year old female who presents for:    Chief Complaint   Patient presents with     Oncology Clinic Visit     Initial Vitals: /83   Pulse 57   Temp 98.6  F (37  C) (Oral)   Resp 16   Ht 1.575 m (5' 2\")   Wt 63 kg (139 lb)   LMP 10/07/2017 (Approximate)   SpO2 100%   BMI 25.42 kg/m   Estimated body mass index is 25.42 kg/m  as calculated from the following:    Height as of this encounter: 1.575 m (5' 2\").    Weight as of this encounter: 63 kg (139 lb). Body surface area is 1.66 meters squared.  No Pain (0) Comment: Data Unavailable   Patient's last menstrual period was 10/07/2017 (approximate).  Allergies reviewed: Yes  Medications reviewed: Yes    Medications: Medication refills not needed today.  Pharmacy name entered into [employer]:    [id number] - [place], [place] - [address] AT [facility] [address]  [id number] - [place], [place] - [address] AT [address] [place] [address]    Clinical concerns: no      Gloria Hand CMA            "

## 2019-11-14 ENCOUNTER — PRE VISIT (OUTPATIENT)
Dept: ONCOLOGY | Facility: CLINIC | Age: 54
End: 2019-11-14

## 2019-11-14 ENCOUNTER — OFFICE VISIT (OUTPATIENT)
Dept: ONCOLOGY | Facility: CLINIC | Age: 54
End: 2019-11-14
Attending: INTERNAL MEDICINE
Payer: COMMERCIAL

## 2019-11-14 DIAGNOSIS — Z80.8 FAMILY HISTORY OF MELANOMA: ICD-10-CM

## 2019-11-14 DIAGNOSIS — Z80.0 FAMILY HISTORY- STOMACH CANCER: ICD-10-CM

## 2019-11-14 DIAGNOSIS — C50.919 BREAST CANCER (H): ICD-10-CM

## 2019-11-14 DIAGNOSIS — Z80.0 FAMILY HISTORY OF RECTAL CANCER: ICD-10-CM

## 2019-11-14 DIAGNOSIS — Z17.0 MALIGNANT NEOPLASM OF RIGHT BREAST IN FEMALE, ESTROGEN RECEPTOR POSITIVE, UNSPECIFIED SITE OF BREAST (H): ICD-10-CM

## 2019-11-14 DIAGNOSIS — C50.919 BREAST CANCER (H): Primary | ICD-10-CM

## 2019-11-14 DIAGNOSIS — C50.911 MALIGNANT NEOPLASM OF RIGHT BREAST IN FEMALE, ESTROGEN RECEPTOR POSITIVE, UNSPECIFIED SITE OF BREAST (H): ICD-10-CM

## 2019-11-14 PROCEDURE — 25000128 H RX IP 250 OP 636: Performed by: INTERNAL MEDICINE

## 2019-11-14 PROCEDURE — 96040 ZZH GENETIC COUNSELING, EACH 30 MINUTES: CPT | Mod: ZF | Performed by: GENETIC COUNSELOR, MS

## 2019-11-14 PROCEDURE — 36591 DRAW BLOOD OFF VENOUS DEVICE: CPT

## 2019-11-14 RX ORDER — HEPARIN SODIUM (PORCINE) LOCK FLUSH IV SOLN 100 UNIT/ML 100 UNIT/ML
5 SOLUTION INTRAVENOUS EVERY 8 HOURS
Status: DISCONTINUED | OUTPATIENT
Start: 2019-11-14 | End: 2019-11-22 | Stop reason: HOSPADM

## 2019-11-14 RX ADMIN — HEPARIN SODIUM (PORCINE) LOCK FLUSH IV SOLN 100 UNIT/ML 5 ML: 100 SOLUTION at 16:55

## 2019-11-14 NOTE — PROGRESS NOTES
11/14/2019    Referring Provider: Maricarmen Monroe MD    Presenting Information:   I met with Jyothi Freitas today for genetic counseling at the Cancer Risk Management Program at the [facility] to discuss her personal and family history of cancer.  She is here today to review this history, cancer screening recommendations, and available genetic testing options.    Personal History:  Jyothi is a 53 year old female. She was recently diagnosed with breast cancer at age 53 (right breast, IDC, ER positive, VA positive, HER2 negative). She began neoadjuvant chemotherapy on 10/29/2019.    She also reports a recent diagnosis of basal cell carcinoma (face) in October 2019.       She had her first menstrual period at age 13 or 14, her first child at age 27, and is postmenopausal. 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 Her other ovary, fallopian tube, and uterus are in place. She reports that she had used hormone replacement therapy in the past for approximately 2-3 years. She has also used oral contraceptives in the past. She has not used infertility medications. She reports that her most recent colonoscopy in 2016 was normal and that follow-up was recommended in ten years. She has also had an upper endoscopy on 10/30/17 with benign duodenal and stomach biopsies. Jyothi reported a history of tobacco use (she quit approximately 20 years ago) and rare alcohol use.    Family History: (Please see scanned pedigree for detailed family history information)  Children:    She has one daughter (age 25) and one son (age 26) with no known history of cancer  Siblings:    There is no known history of cancer in her two brothers and one sister (all in their 50s).   Maternal:    Her mother passed away at age 77 with no known history of cancer.    Her maternal uncle was diagnosed with stomach cancer at age 52 and passed away in his 50s.    She reports  no other known cancers in her maternal relatives, although she has limited contact with her cousins.  Paternal:    Her father is 83 years old and was diagnosed with melanoma (on his side) approximately 5 years ago.    Her paternal uncle was diagnosed with rectal cancer in his 70s and then leukemia in his 80s. He passed away at age 84.     Her maternal ethnicity is [place] Islander, Nepali, and [place]. Her paternal ethnicity is Macedonian and Eastern . She reports that 2% Ashkenazi Mu-ism ancestry was detected on her father's ancestry testing.     Discussion:    Jyothi's personal and family history of cancer is suggestive of a hereditary cancer syndrome.    We reviewed the features of sporadic, familial, and hereditary cancers. In looking at Jyothi's family history, it is possible that a cancer susceptibility gene is present due to her recent diagnosis of breast cancer and the family history of cancers.    We discussed the BRCA1 and BRCA2 genes. Mutations in these genes cause a condition known as Hereditary Breast and Ovarian Cancer syndrome (HBOC). Women with a mutation in either of these genes are at increased risk for breast and ovarian cancer. There is also an increased risk for a second primary breast cancer. Men with a mutation in either of these genes are at increased risk for breast and prostate cancer. Both women and men may also be at increased risk for pancreatic cancer and melanoma. A detailed handout regarding these genes and other genes in which mutations are associated with an increased risk for breast cancer was provided to Jyothi at the end of our appointment today and can be found in the after visit summary. Topics included: inheritance pattern, cancer risks, cancer screening recommendations, and also risks, benefits and limitations of testing.    Based on her personal and family history, Jyothi does not meet current National Comprehensive Cancer Network (NCCN) criteria for genetic testing of BRCA1/2.  However,  according to the American Society of Breast Surgeons Consensus Guideline on Genetic Testing for Hereditary Breast Cancer, genetic testing should be available to all patients who have been diagnosed with breast cancer.  The guidelines state that testing should include BRCA1, BRCA2, and PALB2, and that additional testing may be warranted based on personal and/or family history.     We discussed that there are additional genes that could cause increased risk for breast cancer. As many of these genes present with overlapping features in a family and accurate cancer risk cannot always be established based upon the pedigree analysis alone, it would be reasonable for Jyothi to consider panel genetic testing to analyze multiple genes at once.    We reviewed genetic testing options for hereditary breast and gynecologic cancers: actionable high/moderate breast and gynecologic cancer risk custom panel (CustomNext-Cancer, 19 genes, a combination of GynPlus and BRCAplus + NBN, STK11, and NF1), an expanded high and moderate risk panel (OvaNext, 25 genes), or an expanded panel which includes additional genes associated with colon cancer, prostate cancer, and melanoma, among others (CancerNext, 34 genes). Jyothi expressed an interest in more broad testing. She opted for the CancerNext gene panel.   Genetic testing is available for 34 genes associated with hereditary cancer: CancerNext (APC, BAM, BARD1, BRCA1, BRCA2, BRIP1, BMPR1A, CDH1, CDK4, CDKN2A, CHEK2, DICER1, EPCAM, GREM1, HOXB13, MLH1, MRE11A, MSH2, MSH6, MUTYH, NBN, NF1, PALB2, PMS2, POLD1, POLE, PTEN, RAD50, RAD51C, RAD51D, SMAD4, SMARCA4, STK11, and TP53).  We discussed that many of the genes in the CancerNext panel are associated with specific hereditary cancer syndromes and published management guidelines: Hereditary Breast and Ovarian Cancer syndrome (BRCA1, BRCA2), Ramirez syndrome (MLH1, MSH2, MSH6, PMS2, EPCAM), Familial Adenomatous Polyposis (APC), Hereditary Diffuse  Gastric Cancer (CDH1), Familial Atypical Multiple Mole Melanoma syndrome (CDK4, CDKN2A), Juvenile Polyposis syndrome (BMPR1A, SMAD4), Cowden syndrome (PTEN), Li Fraumeni syndrome (TP53), Peutz-Jeghers syndrome (STK11), MUTYH Associated Polyposis (MUTYH), and Neurofibromatosis type 1 (NF1).   The BAM, BRIP1, CHEK2, GREM1, NBN, PALB2, POLD1, POLE, RAD51C, and RAD51D genes are associated with increased cancer risk and have published management guidelines for certain cancers.    The remaining genes (BARD1, DICER1, HOXB13, MRE11A, RAD50, and SMARCA4) are associated with increased cancer risk and may allow us to make medical recommendations when mutations are identified.    Jyothi was provided with a detailed brochure from [facility] explaining the CancerNext testing.  Consent was obtained and genetic testing for CancerNext was sent to [facility]. Turn around time: 3-4 weeks.     Medical Management: For Jyothi, we reviewed that the information from genetic testing may determine:    surgery to treat Jyothi's active cancer diagnosis (i.e. lumpectomy versus bilateral mastectomy),    additional cancer screening for which Jyothi may qualify (i.e. mammogram and breast MRI, more frequent colonoscopies, more frequent dermatologic exams, etc.),    options for risk reducing surgeries Jyothi could consider (i.e. bilateral mastectomy, surgery to remove her ovaries and/or uterus, etc.),      and targeted chemotherapies if she were to develop certain cancers in the future (i.e. immunotherapy for individuals with Ramirez syndrome, PARP inhibitors, etc.).     These recommendations will be discussed in detail once genetic testing is completed.     Plan:  1) Today Jyothi elected to proceed with genetic testing via the CancerNext panel offered by [facility].  2) This information should be available in 3-4 weeks.  3) Jyothi will return to clinic to discuss the results.    Face to face time: 60 minutes    Abida Hoover MS, [facility]  Licensed  Genetic Counselor  Office: [phone]    Attestation: Patient seen, evaluated and discussed with the Genetic Counseling Intern. I have verified the content of the note, which accurately reflects my assessment of the patient and the plan of care.    Supervising Genetic Counselor  Sadie Miller MS, [facility]  Licensed Genetic Counselor  Office: [phone]  Pager: [phone]

## 2019-11-14 NOTE — PATIENT INSTRUCTIONS
Assessing Cancer Risk  Only about 5-10% of cancers are thought to be due to an inherited cancer susceptibility gene.    These families often have:    Several people with the same or related types of cancer    Cancers diagnosed at a young age (before age 50)    Individuals with more than one primary cancer    Multiple generations of the family affected with cancer    Some people may be candidates for genetic testing of more than one gene.  For these families, genetic testing using a cancer panel may be offered.  These panels will test different genes known to increase the risk for breast, ovarian, uterine, and/or other cancers. All of the genes discussed below have published clinical management guidelines for individuals who are found to carry a mutation. The purpose of this handout is to serve as a brief summary of the genes analyzed by the panels used to inquire about hereditary breast and gynecologic cancer:  BAM, BRCA1, BRCA2, BRIP1, CDH1, CHEK2, MLH1, MSH2, MSH6, PMS2, EPCAM, PTEN, PALB2, RAD51C, RAD51D, and TP53.  ______________________________________________________________________________  Hereditary Breast and Ovarian Cancer Syndrome   (BRCA1 and BRCA2)  A single mutation in one of the copies of BRCA1 or BRCA2 increases the risk for breast and ovarian cancer, among others.  The risk for pancreatic cancer and melanoma may also be slightly increased in some families.  The chart below shows the chance that someone with a BRCA mutation would develop cancer in his or her lifetime1,2,3,4.        A person s ethnic background is also important to consider, as individuals of Ashkenazi Hindu ancestry have a higher chance of having a BRCA gene mutation.  There are three BRCA mutations that occur more frequently in this population.    Ramirez Syndrome   (MLH1, MSH2, MSH6, PMS2, and EPCAM)  Currently five genes are known to cause Ramirez Syndrome: MLH1, MSH2, MSH6, PMS2, and EPCAM.  A single mutation in one of the  Ramirez Syndrome genes increases the risk for colon, endometrial, ovarian, and stomach cancers.  Other cancers that occur less commonly in Ramirez Syndrome include urinary tract, skin, and brain cancers.  The chart below shows the chance that a person with Ramirez syndrome would develop cancer in his or her lifetime5.      *Cancer risk varies depending on Ramirez syndrome gene found    Cowden Syndrome   (PTEN)  Cowden syndrome is a hereditary condition that increases the risk for breast, thyroid, endometrial, colon, and kidney cancer.  Cowden syndrome is caused by a mutation in the PTEN gene.  A single mutation in one of the copies of PTEN causes Cowden syndrome and increases cancer risk.  The chart below shows the chance that someone with a PTEN mutation would develop cancer in their lifetime6,7.  Other benign features seen in some individuals with Cowden syndrome include benign skin lesions (facial papules, keratoses, lipomas), learning disability, autism, thyroid nodules, colon polyps, and larger head size.      *One recent study found breast cancer risk to be increased to 85%    Li-Fraumeni Syndrome   (TP53)  Li-Fraumeni Syndrome (LFS) is a cancer predisposition syndrome caused by a mutation in the TP53 gene. A single mutation in one of the copies of TP53 increases the risk for multiple cancers. Individuals with LFS are at an increased risk for developing cancer at a young age. The lifetime risk for development of a LFS-associated cancer is 50% by age 30 and 90% by age 60.   Core Cancers: Sarcomas, Breast, Brain, Lung, Leukemias/Lymphomas, Adrenocortical carcinomas  Other Cancers: Gastrointestinal, Thyroid, Skin, Genitourinary    Hereditary Diffuse Gastric Cancer   (CDH1)  Currently, one gene is known to cause hereditary diffuse gastric cancer (HDGC): CDH1.  Individuals with HDGC are at increased risk for diffuse gastric cancer and lobular breast cancer. Of people diagnosed with HDGC, 30-50% have a mutation in the CDH1  gene.  This suggests there are likely other genes that may cause HDGC that have not been identified yet.      Lifetime Cancer Risks    General Population HDGC    Diffuse Gastric  <1% ~80%   Breast 12% 39-52%         Additional Genes  BAM  BAM is a moderate-risk breast cancer gene. Women who have a mutation in BAM can have between a 2-4 fold increased risk for breast cancer compared to the general population8. BAM mutations have also been associated with increased risk for pancreatic cancer, however an estimate of this cancer risk is not well understood9. Individuals who inherit two BAM mutations have a condition called ataxia-telangiectasia (AT).  This rare autosomal recessive condition affects the nervous system and immune system, and is associated with progressive cerebellar ataxia beginning in childhood.  Individuals with ataxia-telangiectasia often have a weakened immune system and have an increased risk for childhood cancers.    PALB2  Mutations in PALB2 have been shown to increase the risk of breast cancer up to 33-58% in some families; where individuals fall within this risk range is dependent upon family [id number]. PALB2 mutations have also been associated with increased risk for pancreatic cancer, although this risk has not been quantified yet.  Individuals who inherit two PALB2 mutations--one from their mother and one from their father--have a condition called Fanconi Anemia.  This rare autosomal recessive condition is associated with short stature, developmental delay, bone marrow failure, and increased risk for childhood cancers.    CHEK2   CHEK2 is a moderate-risk breast cancer gene.  Women who have a mutation in CHEK2 have around a 2-fold increased risk for breast cancer compared to the general population, and this risk may be higher depending upon family history.11,12,13 Mutations in CHEK2 have also been shown to increase the risk of a number of other cancers, including colon and prostate, however  these cancer risks are currently not well understood.    BRIP1, RAD51C and RAD51D  Mutations in BRIP1, RAD51C, and RAD51D have been shown to increase the risk of ovarian cancer and possibly female breast cancer as well14,15 .       Lifetime Cancer Risk    General Population BRIP1 RAD51C RAD51D   Ovarian 1-2% ~5-8% ~5-9% ~7-15%           Inheritance  All of the cancer syndromes reviewed above are inherited in an autosomal dominant pattern.  This means that if a parent has a mutation, each of his or her children will have a 50% chance of inheriting that same mutation.  Therefore, each child--male or female--would have a 50% chance of being at increased risk for developing cancer.      Image obtained from Genetics Home Reference, 2013     Mutations in some genes can occur de arabella, which means that a person s mutation occurred for the first time in them and was not inherited from a parent.  Now that they have the mutation, however, it can be passed on to future generations.    Genetic Testing  Genetic testing involves a blood test and will look at the genetic information in the BAM, BRCA1, BRCA2, BRIP1, CDH1, CHEK2, MLH1, MSH2, MSH6, PMS2, EPCAM, PTEN, PALB2, RAD51C, RAD51D, and TP53 genes for any harmful mutations that are associated with increased cancer risk.  If possible, it is recommended that the person(s) who has had cancer be tested before other family members.  That person will give us the most useful information about whether or not a specific gene is associated with the cancer in the family.    Results  There are three possible results of genetic testing:    Positive--a harmful mutation was identified in one or more of the genes    Negative--no mutation was identified in any of the genes on this panel    Variant of unknown significance--a variation in one of the genes was identified, but it is unclear how this impacts cancer risk in the family    Advantages and Disadvantages   There are advantages and  disadvantages to genetic testing.    Advantages    May clarify your cancer risk    Can help you make medical decisions    May explain the cancers in your family    May give useful information to your family members (if you share your results)    Disadvantages    Possible negative emotional impact of learning about inherited cancer risk    Uncertainty in interpreting a negative test result in some situations    Possible genetic discrimination concerns (see below)    Genetic Information Nondiscrimination Act (JOHN)  JOHN is a federal law that protects individuals from health insurance or employment discrimination based on a genetic test result alone.  Although rare, there are currently no legal discrimination protections in terms of life insurance, long term care, or disability insurances.  Visit the [employer] website to learn more.    Reducing Cancer Risk  All of the genes described above have nationally recognized cancer screening guidelines that would be recommended for individuals who test positive.  In addition to increased cancer screening, surgeries may be offered or recommended to reduce cancer risk.  Recommendations are based upon an individual s genetic test result as well as their personal and family history of cancer.    Questions to Think About Regarding Genetic Testing:    What effect will the test result have on me and my relationship with my family members if I have an inherited gene mutation?  If I don t have a gene mutation?    Should I share my test results, and how will my family react to this news, which may also affect them?    Are my children ready to learn new information that may one day affect their own health?    Hereditary Cancer Resources    FORCE: Facing Our Risk of Cancer Empowered facingourrisk.org   Bright Pink bebrightpink.org   Li-Fraumeni Syndrome Association lfsassociation.org   PTEN World PTENworld.com   No stomach for cancer, Inc.  nostomachforcancer.org   Stomach cancer relief network Scrnet.org   Collaborative Group of the Americas on Inherited Colorectal Cancer (CGA) cgaicc.com    Cancer Care cancercare.org   American Cancer Society (ACS) cancer.org   National Cancer [place] (NCI) cancer.gov     Please call us if you have any questions or concerns.   Cancer Risk Management Program [phone] ([phone])  ? Maria E Yusuf, MS, [facility]  [phone]  ? Jose Rubio, MS, [facility] [phone]  ? Daniela Greenwood, MS, [facility]  [phone]  ? Naya Ernst, MS, [facility]  [phone]  ? Sadie Paul, MS, [facility] [phone]  ? Princess Alayna, MS, [facility] [phone]  ? Edel Deng, MS, [facility]  [phone]  ? Abida Kiko, MS, [facility]  [phone]    References  1. Jason A, Kandace PDP, Narmelissa S, Jen FAROOQ, Aysha JE, Mao JL, Denise N, Halley H, Alejandra O, Lizabeth A, Raoul B, Gaurang P, Mansidney S, Say DM, Phong N, Annabelle E, Jenny H, Teofilo E, Alvaro J, Gronjustyn J, Jayy B, Tulinius H, Thorlacius S, Eerola H, Vaibhavna H, Hasmukh K, French OP. Average risks of breast and ovarian cancer associated with BRCA1 or BRCA2 mutations detected in case series unselected for family history: a combined analysis of 222 studies. Am J Hum Germaine. 2003;72:1117-30.  2. Vicki N, Laina M, Mike G.  BRCA1 and BRCA2 Hereditary Breast and Ovarian Cancer. Gene Reviews online. 2013.  3. Daniel YC, Hetal S, Roseann G, Cohen S. Breast cancer risk among male BRCA1 and BRCA2 mutation carriers. J Natl Cancer Inst. 2007;99:1811-4.  4. Roger PONCE, Hannah I, Braulio J, Maggi E, Diane ER, Samaria F. Risk of breast cancer in male BRCA2 carriers. J Med Germaine. 2010;47:710-1.  5. National Comprehensive Cancer Network. Clinical practice guidelines in oncology, colorectal cancer screening. Available online (registration required). 2015.  6. Patrick OSBORNE, Haley J, Alfonso J, Vivienne LA, Bishop MS, Oswaldo C. Lifetime cancer risks in individuals with germline PTEN mutations. Clin Cancer Res.  2012;18:400-7.  7. Tomas HIGGINS. Cowden Syndrome: A Critical Review of the Clinical Literature. J Germaine . 2009:18:13-27.  8. Lisa ASCENCIO, Robby D, Leila S, Carolyn P, Ambar T, Arnie M, Jt B, Nikita H, Akira R, Selina K, Nila L, Roger DG, Say D, Tevin DF, Rigoberto MR, The Breast Cancer Susceptibility Collaboration ([place]) & Pineda GRIDER. BAM mutations that cause ataxia-telangiectasia are breast cancer susceptibility alleles. Nature Genetics. 2006;38:873-875  9. Chintan N , Chapin Y, Scarlet J, Baudilio L, Sun GM , Edda ML, Gallinger S, Thomas AG, Syngal S, Katheryn ML, Yaneli J , Sander R, Liang SZ, Yue JR, Grey VE, Josi M, Vogelstein B, Denise N, Spenser RH, Feroz KW, and Hunter AP. BAM mutations in patients with hereditary pancreatic cancer. Cancer Discover. 2012;2:41-46  10. Jason ANDERSON, et al. Breast-Cancer Risk in Families with Mutations in PALB2. NEJM. 2014; 371(6):497-506.  11. CHEK2 Breast Cancer Case-Control Consortium. CHEK2*1100delC and susceptibility to breast cancer: A collaborative analysis involving 10,860 breast cancer cases and 9,065 controls from 10 studies. Am J Hum Germaine, 74 (2004), pp. 6407-0584  12. Clementine T, Jennifer S, Rafiq K, et al. Spectrum of Mutations in BRCA1, BRCA2, CHEK2, and TP53 in Families at High Risk of Breast Cancer. CALIN. 2006;295(12):6325-8089.   13. Jann VEGA, Jose Antonio MIRZA, Radha A, et al. Risk of breast cancer in women with a CHEK2 mutation with and without a family history of breast cancer. J Clin Oncol. 2011;29:0881-1192.  14. Bakari H, Romel E, Cem SJ, et al. Contribution of germline mutations in the RAD51B, RAD51C, and RAD51D genes to ovarian cancer in the population. J Clin Oncol. 2015;33(26):8458-0665. Doi:10.1200/JCO.2015.61.2408.  15. Nya T, Yumiko YOUNG, Herbie P, et al. Mutations in BRIP1 confer high risk of ovarian cancer. Yaima Germaine. 2011;43(11):6883-3879. doi:10.1038/ng.955.

## 2019-11-14 NOTE — LETTER
Cancer Risk Management  Program Locations    [facility]  [facility]  Mailing Address  Cancer Risk Management Program  [facility]  [address]  [place] [address]    New patient appointments  [phone]  November 27, 2019    Jyothi Freitas  [address]   [facility] [address]      Dear Jyothi,    It was a pleasure meeting with you at the [facility] on November 14, 2019. Here is a copy of the progress note from your recent genetic counseling visit to the Cancer Risk Management Program. If you have any additional questions, please feel free to call.    11/14/2019    Referring Provider: Maricarmen Monroe MD    Presenting Information:   I met with Jyothi Freitas today for genetic counseling at the Cancer Risk Management Program at the [facility] to discuss her personal and family history of cancer.  She is here today to review this history, cancer screening recommendations, and available genetic testing options.    Personal History:  Jyothi is a 53 year old female. She was recently diagnosed with breast cancer at age 53 (right breast, IDC, ER positive, KY positive, HER2 negative). She began neoadjuvant chemotherapy on 10/29/2019.    She also reports a recent diagnosis of basal cell carcinoma (face) in October 2019.       She had her first menstrual period at age 13 or 14, her first child at age 27, and is postmenopausal. 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 Her other ovary, fallopian tube, and uterus are in place. She reports that she had used hormone replacement therapy in the past for approximately 2-3 years. She has also used oral contraceptives in the past. She has not used infertility medications. She  reports that her most recent colonoscopy in 2016 was normal and that follow-up was recommended in ten years. She has also had an upper endoscopy on 10/30/17 with benign duodenal and stomach biopsies. Jyothi reported a history of tobacco use (she quit approximately 20 years ago) and rare alcohol use.    Family History: (Please see scanned pedigree for detailed family history information)  Children:    She has one daughter (age 25) and one son (age 26) with no known history of cancer  Siblings:    There is no known history of cancer in her two brothers and one sister (all in their 50s).   Maternal:    Her mother passed away at age 77 with no known history of cancer.    Her maternal uncle was diagnosed with stomach cancer at age 52 and passed away in his 50s.    She reports no other known cancers in her maternal relatives, although she has limited contact with her cousins.  Paternal:    Her father is 83 years old and was diagnosed with melanoma (on his side) approximately 5 years ago.    Her paternal uncle was diagnosed with rectal cancer in his 70s and then leukemia in his 80s. He passed away at age 84.     Her maternal ethnicity is Yoruba, [place]ian, and [place]. Her paternal ethnicity is [place] and Eastern . She reports that 2% Ashkenazi Restorationist ancestry was detected on her father's ancestry testing.     Discussion:    Jyothi's personal and family history of cancer is suggestive of a hereditary cancer syndrome.    We reviewed the features of sporadic, familial, and hereditary cancers. In looking at Jyothi's family history, it is possible that a cancer susceptibility gene is present due to her recent diagnosis of breast cancer and the family history of cancers.    We discussed the BRCA1 and BRCA2 genes. Mutations in these genes cause a condition known as Hereditary Breast and Ovarian Cancer syndrome (HBOC). Women with a mutation in either of these genes are at increased risk for breast and ovarian cancer. There is also an  increased risk for a second primary breast cancer. Men with a mutation in either of these genes are at increased risk for breast and prostate cancer. Both women and men may also be at increased risk for pancreatic cancer and melanoma. A detailed handout regarding these genes and other genes in which mutations are associated with an increased risk for breast cancer was provided to Jyothi at the end of our appointment today and can be found in the after visit summary. Topics included: inheritance pattern, cancer risks, cancer screening recommendations, and also risks, benefits and limitations of testing.    Based on her personal and family history, Jyothi does not meet current National Comprehensive Cancer Network (NCCN) criteria for genetic testing of BRCA1/2.  However, according to the American Society of Breast Surgeons Consensus Guideline on Genetic Testing for Hereditary Breast Cancer, genetic testing should be available to all patients who have been diagnosed with breast cancer.  The guidelines state that testing should include BRCA1, BRCA2, and PALB2, and that additional testing may be warranted based on personal and/or family history.     We discussed that there are additional genes that could cause increased risk for breast cancer. As many of these genes present with overlapping features in a family and accurate cancer risk cannot always be established based upon the pedigree analysis alone, it would be reasonable for Jyothi to consider panel genetic testing to analyze multiple genes at once.    We reviewed genetic testing options for hereditary breast and gynecologic cancers: actionable high/moderate breast and gynecologic cancer risk custom panel (CustomNext-Cancer, 19 genes, a combination of GynPlus and BRCAplus + NBN, STK11, and NF1), an expanded high and moderate risk panel (OvaNext, 25 genes), or an expanded panel which includes additional genes associated with colon cancer, prostate cancer, and melanoma,  among others (CancerNext, 34 genes). Jyothi expressed an interest in more broad testing. She opted for the CancerNext gene panel.   Genetic testing is available for 34 genes associated with hereditary cancer: CancerNext (APC, BAM, BARD1, BRCA1, BRCA2, BRIP1, BMPR1A, CDH1, CDK4, CDKN2A, CHEK2, DICER1, EPCAM, GREM1, HOXB13, MLH1, MRE11A, MSH2, MSH6, MUTYH, NBN, NF1, PALB2, PMS2, POLD1, POLE, PTEN, RAD50, RAD51C, RAD51D, SMAD4, SMARCA4, STK11, and TP53).  We discussed that many of the genes in the CancerNext panel are associated with specific hereditary cancer syndromes and published management guidelines: Hereditary Breast and Ovarian Cancer syndrome (BRCA1, BRCA2), Ramirez syndrome (MLH1, MSH2, MSH6, PMS2, EPCAM), Familial Adenomatous Polyposis (APC), Hereditary Diffuse Gastric Cancer (CDH1), Familial Atypical Multiple Mole Melanoma syndrome (CDK4, CDKN2A), Juvenile Polyposis syndrome (BMPR1A, SMAD4), Cowden syndrome (PTEN), Li Fraumeni syndrome (TP53), Peutz-Jeghers syndrome (STK11), MUTYH Associated Polyposis (MUTYH), and Neurofibromatosis type 1 (NF1).   The BAM, BRIP1, CHEK2, GREM1, NBN, PALB2, POLD1, POLE, RAD51C, and RAD51D genes are associated with increased cancer risk and have published management guidelines for certain cancers.    The remaining genes (BARD1, DICER1, HOXB13, MRE11A, RAD50, and SMARCA4) are associated with increased cancer risk and may allow us to make medical recommendations when mutations are identified.    Jyothi was provided with a detailed brochure from [employer] explaining the CancerNext testing.  Consent was obtained and genetic testing for CancerNext was sent to [facility]. Turn around time: 3-4 weeks.     Medical Management: For Jyothi, we reviewed that the information from genetic testing may determine:    surgery to treat Jyothi's active cancer diagnosis (i.e. lumpectomy versus bilateral mastectomy),    additional cancer screening for which Jyothi may qualify (i.e.  mammogram and breast MRI, more frequent colonoscopies, more frequent dermatologic exams, etc.),    options for risk reducing surgeries Jyothi could consider (i.e. bilateral mastectomy, surgery to remove her ovaries and/or uterus, etc.),      and targeted chemotherapies if she were to develop certain cancers in the future (i.e. immunotherapy for individuals with Ramirez syndrome, PARP inhibitors, etc.).     These recommendations will be discussed in detail once genetic testing is completed.     Plan:  1) Today Jyothi elected to proceed with genetic testing via the CancerNext panel offered by [employer].  2) This information should be available in 3-4 weeks.  3) Jyothi will return to clinic to discuss the results.    Face to face time: 60 minutes    Abida Hoover MS, [facility]  Licensed Genetic Counselor  Office: [phone]    Attestation: Patient seen, evaluated and discussed with the Genetic Counseling Intern. I have verified the content of the note, which accurately reflects my assessment of the patient and the plan of care.    Supervising Genetic Counselor  Sadie Miller MS, [facility]  Licensed Genetic Counselor  Office: [phone]  Pager: [phone]

## 2019-11-15 LAB — MISCELLANEOUS TEST: NORMAL

## 2019-11-20 ENCOUNTER — OFFICE VISIT (OUTPATIENT)
Dept: CARDIOLOGY | Facility: CLINIC | Age: 54
End: 2019-11-20
Attending: INTERNAL MEDICINE
Payer: COMMERCIAL

## 2019-11-20 VITALS
WEIGHT: 141 LBS | DIASTOLIC BLOOD PRESSURE: 80 MMHG | HEART RATE: 68 BPM | HEIGHT: 67 IN | BODY MASS INDEX: 22.13 KG/M2 | SYSTOLIC BLOOD PRESSURE: 120 MMHG

## 2019-11-20 DIAGNOSIS — I77.810 ASCENDING AORTA DILATION (H): ICD-10-CM

## 2019-11-20 DIAGNOSIS — Z17.0 MALIGNANT NEOPLASM OF UPPER-OUTER QUADRANT OF RIGHT BREAST IN FEMALE, ESTROGEN RECEPTOR POSITIVE (H): Primary | ICD-10-CM

## 2019-11-20 DIAGNOSIS — C50.411 MALIGNANT NEOPLASM OF UPPER-OUTER QUADRANT OF RIGHT BREAST IN FEMALE, ESTROGEN RECEPTOR POSITIVE (H): Primary | ICD-10-CM

## 2019-11-20 PROCEDURE — 99204 OFFICE O/P NEW MOD 45 MIN: CPT | Performed by: INTERNAL MEDICINE

## 2019-11-20 RX ORDER — PRAVASTATIN SODIUM 20 MG
20 TABLET ORAL DAILY
Qty: 45 TABLET | Refills: 0 | Status: SHIPPED | OUTPATIENT
Start: 2019-11-20 | End: 2020-01-06

## 2019-11-20 ASSESSMENT — MIFFLIN-ST. JEOR: SCORE: 1272.2

## 2019-11-20 NOTE — PATIENT INSTRUCTIONS
The aorta is only mildly enlarged, I don't think it will ever be an issue for you but we can keep our eye on it. If it gets above 5.0 or 5.5cm or increases quickly in size in a short period of time, we will watch it more closely and think about other options.    When you get imaging for your oncology, I'll take a look at the aorta on those images.    You may take the PRAVASTATIN for your last two cycles of ADRIAMYCIN and then stop it.    Maybe check in with me in a year and we can see you back to review.

## 2019-11-20 NOTE — PROGRESS NOTES
Cardio oncology consultation      Jyothi Freitas MRN# [id number]   YOB: 1965 Age: 53 year old   Date of Visit 11/20/2019     Reason for consult:  Ascending thoracic aortic dilatation           Assessment and Plan:     1. Ascending thoracic aortic dilatation, mild.  4.0 cm on PET CT 10/21/2019    Images personally reviewed.  Patient has not had hypertension in the past.  She has a trileaflet aortic valve.  We discussed that her mildly dilated thoracic aorta is not likely to be an issue for her in the future.  However we will follow it for stability.  This may be done by reviewing her future CT imaging for breast cancer as her echocardiographic images are suboptimal for following the ascending aorta due to technical challenge.    I will see her back in approximately 1 year to revisit her imaging.      2. Right-sided invasive ductal carcinoma, currently on Adriamycin    Patient is keen on being aggressive at preventing future cardiomyopathy.  Will start a small dose of pravastatin 20 mg daily for her last 2 cycles of Adriamycin.  I did discuss that this is theoretical and with her normal prechemotherapy echocardiogram, she is unlikely to have any cardiomyopathy regardless of the statin in addition.      This note was transcribed using electronic voice recognition software, typographical errors may be present.                Chief Complaint:   FU Cardiac testing (Oncology visit)           History of Present Illness:   This patient is a very pleasant 53 year old female with right-sided breast cancer receiving Adriamycin currently.  She has completed 2 of her 4 cycles.  She denies any dyspnea on exertion or exertional chest discomfort.  She denies any PND/orthopnea.  Her prechemotherapy echocardiogram was normal in function and global strain.    She is referred for mildly dilated ascending aorta 4.0 cm seen on PET/CT.    We reviewed the images of her echocardiogram 10/21/2019, her MRI breast 10/15/2019 and  "her PET CT 10/21/2019.  The ascending aorta on her echocardiogram is not well-visualized, but appears to be consistent with her CT measurements at around 3.8-4.0 cm.  The measurements on the CT do appear to be faithful.    The descending thoracic aorta is not dilated.  The abdominal aorta also is free of aneurysmal change.  She has a trileaflet aortic valve and no family history of aneurysms.  She does have family history of coronary artery disease in a smoking father.  She herself has had CT coronary calcium score of 0 at [facility] in 2008.  They did not comment on any aortic dilatation.             Physical Exam:     Vitals: /80   Pulse 68   Ht 1.702 m (5' 7\")   Wt 64 kg (141 lb)   LMP 10/07/2017 (Approximate)   BMI 22.08 kg/m    Constitutional:  cooperative, alert and oriented, well developed, well nourished, in no acute distress        Skin:  warm and dry to the touch, no apparent skin lesions or masses noted        Head:  normocephalic, no masses or lesions        Eyes:  pupils equal and round, conjunctivae and lids unremarkable, sclera white, no xanthalasma, EOMS intact, no nystagmus        ENT:  no pallor or cyanosis, dentition good        Neck:  JVP normal        Chest:  normal breath sounds, clear to auscultation, normal A-P diameter, normal symmetry, normal respiratory excursion, no use of accessory muscles        Cardiac: regular rhythm;normal S1 and S2;no murmurs, gallops or rubs detected                  Abdomen:  BS normoactive        Extremities and Back:  no deformities, clubbing, cyanosis, erythema observed;no edema        Neurological:  no gross motor deficits;affect appropriate                    Past Medical History:   I have reviewed this patient's past medical history  Past Medical History:   Diagnosis Date     GERD (gastroesophageal reflux disease)      H/O colonoscopy 03/08/2016    done at [facility] and nl     Hematuria 2016    eval at [facility] and per pt cysto and ct neg     Intermittent " "asthma     since childhood     Low iron 10/2017    done for eval of hair loss, [facility]     Migraines     most of adult life, has seen neuro in past, on bblocker     Mild depression (H)      Normal stress echocardiogram 2011    due to hear racing     Osteopenia      Syncope 2017    echo nl lv size and fxn, grade 1 dd, mild tr             Past Surgical History:   I have reviewed this patient's past surgical history  Past Surgical History:   Procedure Laterality Date     C TMJ ARTHROSCOPY/SURGERY       ESOPHAGOSCOPY, GASTROSCOPY, DUODENOSCOPY (EGD), COMBINED N/A 10/30/2017    Procedure: COMBINED ESOPHAGOSCOPY, GASTROSCOPY, DUODENOSCOPY (EGD), BIOPSY SINGLE OR MULTIPLE;  COMBINED ESOPHAGOSCOPY, GASTROSCOPY, DUODENOSCOPY (EGD);  Surgeon: Martin Rodriguez MD;  Location:  GI     INSERT PORT VASCULAR ACCESS N/A 10/24/2019    Procedure: PORT PLACEMENT;  Surgeon: Kaila Hernandez MD;  Location:  OR     ORTHOPEDIC SURGERY      \"leg surgery\"     single oopherectomy  2010    cyst               Social History:   I have reviewed this patient's social history  Social History     Tobacco Use     Smoking status: Former Smoker     Packs/day: 0.00     Last attempt to quit: 3/27/1997     Years since quittin.6     Smokeless tobacco: Never Used   Substance Use Topics     Alcohol use: Yes     Comment: rarely             Family History:   I have reviewed this patient's family history  Family History   Problem Relation Age of Onset     Coronary Artery Disease Father 48        MI. and one in his 60s     Emphysema Mother         COPD             Allergies:     Allergies   Allergen Reactions     Sulfa Drugs Other (See Comments)     Red face. Ringing in the ears.             Medications:   I have reviewed this patient's current medications  Current Outpatient Medications   Medication Sig Dispense Refill     acetaminophen (TYLENOL) 500 MG tablet Take 1,000 mg by mouth every 6 hours as needed for mild pain       " acetaminophen-caffeine (EXCEDRIN TENSION HEADACHE) 500-65 MG TABS Take 2 tablets by mouth every 6 hours as needed for mild pain       ALBUTEROL 108 (90 BASE) MCG/ACT inhaler INHALE 2 PUFFS INTO THE LUNGS EVERY 6 HOURS AS NEEDED FOR SHORTNESS OF BREATH OR DIFFICULT BREATHING OR WHEEZING 8.5 g 0     buPROPion (WELLBUTRIN XL) 300 MG 24 hr tablet Take 1 tablet (300 mg) by mouth every morning 90 tablet 3     nadolol (CORGARD) 20 MG tablet TAKE 1 TABLET(20 MG) BY MOUTH DAILY 90 tablet 3     naproxen sodium (ANAPROX) 220 MG tablet Take 220 mg by mouth daily as needed for moderate pain       pravastatin (PRAVACHOL) 20 MG tablet Take 1 tablet (20 mg) by mouth daily 45 tablet 0     prochlorperazine (COMPAZINE) 10 MG tablet Take 1 tablet (10 mg) by mouth every 6 hours as needed (Nausea/Vomiting) 30 tablet 5     senna-docusate (SENOKOT-S/PERICOLACE) 8.6-50 MG tablet Take 1-2 tablets by mouth 2 times daily 30 tablet 0     zolpidem (AMBIEN) 5 MG tablet Take 1 tablet (5 mg) by mouth nightly as needed for sleep 30 tablet 1     dexamethasone (DECADRON) 4 MG tablet Take 2 tablets (8 mg) by mouth daily for 3 days Start on Day 2 of Cycles 1 through 4. 6 tablet 3     fluticasone (FLOVENT DISKUS) 100 MCG/BLIST inhaler Inhale 1 puff into the lungs 2 times daily (Patient not taking: Reported on 11/20/2019) 3 Inhaler 3     fluticasone-salmeterol (ADVAIR) 100-50 MCG/DOSE inhaler Inhale 1 puff into the lungs every 12 hours (Patient not taking: Reported on 11/20/2019) 1 Inhaler 3     HYDROcodone-acetaminophen (NORCO) 5-325 MG tablet Take 1-2 tablets by mouth every 4 hours as needed for moderate to severe pain (Patient not taking: Reported on 11/20/2019) 10 tablet 0               Review of Systems:     Review of Systems:  Skin:  Negative     Eyes:  Positive for glasses  ENT:  Negative    Respiratory:  Negative    Cardiovascular:    lower extremity symptoms;Positive for(cold, swelling and slight discoloration)  Gastroenterology: Negative     Genitourinary:  not assessed    Musculoskeletal:  Negative    Neurologic:  Positive for headaches  Psychiatric:  Negative    Heme/Lymph/Imm:  Negative    Endocrine:  Negative                       Data:   All laboratory data reviewed  Lab Results   Component Value Date    CHOL 197 05/21/2019     Lab Results   Component Value Date    HDL 77 05/21/2019     Lab Results   Component Value Date    LDL 95 05/21/2019     Lab Results   Component Value Date    TRIG 127 05/21/2019     No results found for: CHOLHDLRATIO  TSH   Date Value Ref Range Status   05/21/2019 1.56 0.40 - 4.00 mU/L Final     Last Basic Metabolic Panel:  Lab Results   Component Value Date     10/17/2019      Lab Results   Component Value Date    POTASSIUM 3.9 10/17/2019     Lab Results   Component Value Date    CHLORIDE 112 10/17/2019     Lab Results   Component Value Date    JEFF 8.6 10/17/2019     Lab Results   Component Value Date    CO2 26 10/17/2019     Lab Results   Component Value Date    BUN 12 10/17/2019     Lab Results   Component Value Date    CR 0.73 10/17/2019     Lab Results   Component Value Date     10/17/2019     Lab Results   Component Value Date    WBC 8.6 11/13/2019     Lab Results   Component Value Date    RBC 4.25 11/13/2019     Lab Results   Component Value Date    HGB 12.8 11/13/2019     Lab Results   Component Value Date    HCT 38.3 11/13/2019     Lab Results   Component Value Date    MCV 90 11/13/2019     Lab Results   Component Value Date    MCH 30.1 11/13/2019     Lab Results   Component Value Date    MCHC 33.4 11/13/2019     Lab Results   Component Value Date    RDW 12.2 11/13/2019     Lab Results   Component Value Date     11/13/2019

## 2019-11-20 NOTE — LETTER
11/20/2019    Peter Franklin MD  [address]  [facility] [address]    RE: Jyothi Freitas       Dear Colleague,    I had the pleasure of seeing Jyothi Freitas in the [facility].    Cardio oncology consultation      Jyothi Freitas MRN# [id number]   YOB: 1965 Age: 53 year old   Date of Visit 11/20/2019     Reason for consult:  Ascending thoracic aortic dilatation           Assessment and Plan:     1. Ascending thoracic aortic dilatation, mild.  4.0 cm on PET CT 10/21/2019    Images personally reviewed.  Patient has not had hypertension in the past.  She has a trileaflet aortic valve.  We discussed that her mildly dilated thoracic aorta is not likely to be an issue for her in the future.  However we will follow it for stability.  This may be done by reviewing her future CT imaging for breast cancer as her echocardiographic images are suboptimal for following the ascending aorta due to technical challenge.    I will see her back in approximately 1 year to revisit her imaging.      2. Right-sided invasive ductal carcinoma, currently on Adriamycin    Patient is keen on being aggressive at preventing future cardiomyopathy.  Will start a small dose of pravastatin 20 mg daily for her last 2 cycles of Adriamycin.  I did discuss that this is theoretical and with her normal prechemotherapy echocardiogram, she is unlikely to have any cardiomyopathy regardless of the statin in addition.      This note was transcribed using electronic voice recognition software, typographical errors may be present.                Chief Complaint:   FU Cardiac testing (Oncology visit)           History of Present Illness:   This patient is a very pleasant 53 year old female with right-sided breast cancer receiving Adriamycin currently.  She has completed 2 of her 4 cycles.  She denies any dyspnea on exertion or exertional chest discomfort.  She denies any PND/orthopnea.  Her prechemotherapy echocardiogram  "was normal in function and global strain.    She is referred for mildly dilated ascending aorta 4.0 cm seen on PET/CT.    We reviewed the images of her echocardiogram 10/21/2019, her MRI breast 10/15/2019 and her PET CT 10/21/2019.  The ascending aorta on her echocardiogram is not well-visualized, but appears to be consistent with her CT measurements at around 3.8-4.0 cm.  The measurements on the CT do appear to be faithful.    The descending thoracic aorta is not dilated.  The abdominal aorta also is free of aneurysmal change.  She has a trileaflet aortic valve and no family history of aneurysms.  She does have family history of coronary artery disease in a smoking father.  She herself has had CT coronary calcium score of 0 at [facility] in 2008.  They did not comment on any aortic dilatation.             Physical Exam:     Vitals: /80   Pulse 68   Ht 1.702 m (5' 7\")   Wt 64 kg (141 lb)   LMP 10/07/2017 (Approximate)   BMI 22.08 kg/m     Constitutional:  cooperative, alert and oriented, well developed, well nourished, in no acute distress        Skin:  warm and dry to the touch, no apparent skin lesions or masses noted        Head:  normocephalic, no masses or lesions        Eyes:  pupils equal and round, conjunctivae and lids unremarkable, sclera white, no xanthalasma, EOMS intact, no nystagmus        ENT:  no pallor or cyanosis, dentition good        Neck:  JVP normal        Chest:  normal breath sounds, clear to auscultation, normal A-P diameter, normal symmetry, normal respiratory excursion, no use of accessory muscles        Cardiac: regular rhythm;normal S1 and S2;no murmurs, gallops or rubs detected                  Abdomen:  BS normoactive        Extremities and Back:  no deformities, clubbing, cyanosis, erythema observed;no edema        Neurological:  no gross motor deficits;affect appropriate                    Past Medical History:   I have reviewed this patient's past medical history  Past " "Medical History:   Diagnosis Date     GERD (gastroesophageal reflux disease)      H/O colonoscopy 2016    done at [facility] and nl     Hematuria 2016    eval at [facility] and per pt cysto and ct neg     Intermittent asthma     since childhood     Low iron 10/2017    done for eval of hair loss, egd nl     Migraines     most of adult life, has seen neuro in past, on bblocker     Mild depression (H)      Normal stress echocardiogram 2011    due to hear racing     Osteopenia      Syncope 2017    echo nl lv size and fxn, grade 1 dd, mild tr             Past Surgical History:   I have reviewed this patient's past surgical history  Past Surgical History:   Procedure Laterality Date     C TMJ ARTHROSCOPY/SURGERY       ESOPHAGOSCOPY, GASTROSCOPY, DUODENOSCOPY (EGD), COMBINED N/A 10/30/2017    Procedure: COMBINED ESOPHAGOSCOPY, GASTROSCOPY, DUODENOSCOPY (EGD), BIOPSY SINGLE OR MULTIPLE;  COMBINED ESOPHAGOSCOPY, GASTROSCOPY, DUODENOSCOPY (EGD);  Surgeon: Martin Rodriguez MD;  Location:  GI     INSERT PORT VASCULAR ACCESS N/A 10/24/2019    Procedure: PORT PLACEMENT;  Surgeon: Kaila Hernandez MD;  Location:  OR     ORTHOPEDIC SURGERY      \"leg surgery\"     single oopherectomy  2010    cyst               Social History:   I have reviewed this patient's social history  Social History     Tobacco Use     Smoking status: Former Smoker     Packs/day: 0.00     Last attempt to quit: 3/27/1997     Years since quittin.6     Smokeless tobacco: Never Used   Substance Use Topics     Alcohol use: Yes     Comment: rarely             Family History:   I have reviewed this patient's family history  Family History   Problem Relation Age of Onset     Coronary Artery Disease Father 48        MI. and one in his 60s     Emphysema Mother         COPD             Allergies:     Allergies   Allergen Reactions     Sulfa Drugs Other (See Comments)     Red face. Ringing in the ears.             Medications:   I have reviewed this " patient's current medications  Current Outpatient Medications   Medication Sig Dispense Refill     acetaminophen (TYLENOL) 500 MG tablet Take 1,000 mg by mouth every 6 hours as needed for mild pain       acetaminophen-caffeine (EXCEDRIN TENSION HEADACHE) 500-65 MG TABS Take 2 tablets by mouth every 6 hours as needed for mild pain       ALBUTEROL 108 (90 BASE) MCG/ACT inhaler INHALE 2 PUFFS INTO THE LUNGS EVERY 6 HOURS AS NEEDED FOR SHORTNESS OF BREATH OR DIFFICULT BREATHING OR WHEEZING 8.5 g 0     buPROPion (WELLBUTRIN XL) 300 MG 24 hr tablet Take 1 tablet (300 mg) by mouth every morning 90 tablet 3     nadolol (CORGARD) 20 MG tablet TAKE 1 TABLET(20 MG) BY MOUTH DAILY 90 tablet 3     naproxen sodium (ANAPROX) 220 MG tablet Take 220 mg by mouth daily as needed for moderate pain       pravastatin (PRAVACHOL) 20 MG tablet Take 1 tablet (20 mg) by mouth daily 45 tablet 0     prochlorperazine (COMPAZINE) 10 MG tablet Take 1 tablet (10 mg) by mouth every 6 hours as needed (Nausea/Vomiting) 30 tablet 5     senna-docusate (SENOKOT-S/PERICOLACE) 8.6-50 MG tablet Take 1-2 tablets by mouth 2 times daily 30 tablet 0     zolpidem (AMBIEN) 5 MG tablet Take 1 tablet (5 mg) by mouth nightly as needed for sleep 30 tablet 1     dexamethasone (DECADRON) 4 MG tablet Take 2 tablets (8 mg) by mouth daily for 3 days Start on Day 2 of Cycles 1 through 4. 6 tablet 3     fluticasone (FLOVENT DISKUS) 100 MCG/BLIST inhaler Inhale 1 puff into the lungs 2 times daily (Patient not taking: Reported on 11/20/2019) 3 Inhaler 3     fluticasone-salmeterol (ADVAIR) 100-50 MCG/DOSE inhaler Inhale 1 puff into the lungs every 12 hours (Patient not taking: Reported on 11/20/2019) 1 Inhaler 3     HYDROcodone-acetaminophen (NORCO) 5-325 MG tablet Take 1-2 tablets by mouth every 4 hours as needed for moderate to severe pain (Patient not taking: Reported on 11/20/2019) 10 tablet 0               Review of Systems:     Review of Systems:  Skin:  Negative      Eyes:  Positive for glasses  ENT:  Negative    Respiratory:  Negative    Cardiovascular:    lower extremity symptoms;Positive for(cold, swelling and slight discoloration)  Gastroenterology: Negative    Genitourinary:  not assessed    Musculoskeletal:  Negative    Neurologic:  Positive for headaches  Psychiatric:  Negative    Heme/Lymph/Imm:  Negative    Endocrine:  Negative                       Data:   All laboratory data reviewed  Lab Results   Component Value Date    CHOL 197 05/21/2019     Lab Results   Component Value Date    HDL 77 05/21/2019     Lab Results   Component Value Date    LDL 95 05/21/2019     Lab Results   Component Value Date    TRIG 127 05/21/2019     No results found for: CHOLHDLRATIO  TSH   Date Value Ref Range Status   05/21/2019 1.56 0.40 - 4.00 mU/L Final     Last Basic Metabolic Panel:  Lab Results   Component Value Date     10/17/2019      Lab Results   Component Value Date    POTASSIUM 3.9 10/17/2019     Lab Results   Component Value Date    CHLORIDE 112 10/17/2019     Lab Results   Component Value Date    JEFF 8.6 10/17/2019     Lab Results   Component Value Date    CO2 26 10/17/2019     Lab Results   Component Value Date    BUN 12 10/17/2019     Lab Results   Component Value Date    CR 0.73 10/17/2019     Lab Results   Component Value Date     10/17/2019     Lab Results   Component Value Date    WBC 8.6 11/13/2019     Lab Results   Component Value Date    RBC 4.25 11/13/2019     Lab Results   Component Value Date    HGB 12.8 11/13/2019     Lab Results   Component Value Date    HCT 38.3 11/13/2019     Lab Results   Component Value Date    MCV 90 11/13/2019     Lab Results   Component Value Date    MCH 30.1 11/13/2019     Lab Results   Component Value Date    MCHC 33.4 11/13/2019     Lab Results   Component Value Date    RDW 12.2 11/13/2019     Lab Results   Component Value Date     11/13/2019       Thank you for allowing me to participate in the care of your  patient.    Sincerely,     Silverio Gooden MD     [facility]    cc:   Maricarmen Monroe MD  [address] [address]

## 2019-11-22 NOTE — PROGRESS NOTES
[facility]    Hematology/Oncology Established Patient Follow-up Note      Today's Date: 11/27/19    Reason for Follow-up: Right breast cancer.    HISTORY OF PRESENT ILLNESS: Jyothi Freitas is a 53 year old female who presents with the following oncologic history:   1.  10/11/2019: Diagnostic right sided mammogram performed for a palpable lump in the right breast.  This showed an irregularly-shaped spiculated mass in the upper outer right breast with prominent lymph nodes in the right axilla.  Targeted ultrasound of the right upper outer breast showed an irregularly-shaped hypoechoic mass at the 10 o'clock position, 4 cm from the nipple measuring 2.6 x 1.5 x 2.4 cm.  Right axillary ultrasound showed moderately enlarged lymph nodes.  Right breast needle biopsy showed a grade 3 invasive ductal carcinoma with lymphovascular invasion identified, ER strongly positive at 95%, OK strongly positive at 95%, HER-2/juan FISH negative.  Right axillary lymph node measuring 2 cm was positive for metastatic carcinoma.  Note prior 4/2019 mammogram deemed normal.  2.  10/15/2019: Bilateral breast MRI showed known right breast malignancy measuring 2.6 x 1.4 x 2 cm, 3 mildly enlarged right axillary lymph nodes and no contralateral breast malignancy.  3. 10/21/2019 PET scan showed scattered small hypermetabolic bilateral axillary lymph nodes; for example, a hypermetabolic right axillary lymph node measures 1.6 x 0.9 cm with SUV max 3.6.  Hypermetabolic mass in the right breast superiorly and laterally measuring 2.1 x 1.6 cm with SUV max 4.3.  A 0.6 cm low-density lesion in the right hepatic lobe is too small for accurate PET characterization but shows no appreciable hypermetabolic activity.  Incidentally noted ectasia of the ascending thoracic aorta measures 4 cm in diameter.  4.  10/23/2019: Left axillary ultrasound showed benign-appearing lymph node.  Left axillary lymph node biopsy showed benign lymph node tissue.  5.   10/29/2019: Started neoadjuvant chemotherapy with dose dense Adriamycin and Cytoxan, to be followed by weekly paclitaxel.    INTERIM HISTORY:  Jyothi Freitas reports improvement in her constipation and denies any fevers or chills.  She states feeling overall very well.    REVIEW OF SYSTEMS:   14 point ROS was reviewed and is negative other than as noted above in HPI.       HOME MEDICATIONS:  Current Outpatient Medications   Medication Sig Dispense Refill     acetaminophen (TYLENOL) 500 MG tablet Take 1,000 mg by mouth every 6 hours as needed for mild pain       acetaminophen-caffeine (EXCEDRIN TENSION HEADACHE) 500-65 MG TABS Take 2 tablets by mouth every 6 hours as needed for mild pain       ALBUTEROL 108 (90 BASE) MCG/ACT inhaler INHALE 2 PUFFS INTO THE LUNGS EVERY 6 HOURS AS NEEDED FOR SHORTNESS OF BREATH OR DIFFICULT BREATHING OR WHEEZING 8.5 g 0     buPROPion (WELLBUTRIN XL) 300 MG 24 hr tablet Take 1 tablet (300 mg) by mouth every morning 90 tablet 3     dexamethasone (DECADRON) 4 MG tablet Take 2 tablets (8 mg) by mouth daily for 3 days Start on Day 2 of Cycles 1 through 4. 6 tablet 3     fluticasone (FLOVENT DISKUS) 100 MCG/BLIST inhaler Inhale 1 puff into the lungs 2 times daily (Patient not taking: Reported on 11/20/2019) 3 Inhaler 3     fluticasone-salmeterol (ADVAIR) 100-50 MCG/DOSE inhaler Inhale 1 puff into the lungs every 12 hours (Patient not taking: Reported on 11/20/2019) 1 Inhaler 3     HYDROcodone-acetaminophen (NORCO) 5-325 MG tablet Take 1-2 tablets by mouth every 4 hours as needed for moderate to severe pain (Patient not taking: Reported on 11/20/2019) 10 tablet 0     nadolol (CORGARD) 20 MG tablet TAKE 1 TABLET(20 MG) BY MOUTH DAILY 90 tablet 3     naproxen sodium (ANAPROX) 220 MG tablet Take 220 mg by mouth daily as needed for moderate pain       pravastatin (PRAVACHOL) 20 MG tablet Take 1 tablet (20 mg) by mouth daily 45 tablet 0     prochlorperazine (COMPAZINE) 10 MG tablet Take 1 tablet (10  "mg) by mouth every 6 hours as needed (Nausea/Vomiting) 30 tablet 5     senna-docusate (SENOKOT-S/PERICOLACE) 8.6-50 MG tablet Take 1-2 tablets by mouth 2 times daily 30 tablet 0     zolpidem (AMBIEN) 5 MG tablet Take 1 tablet (5 mg) by mouth nightly as needed for sleep 30 tablet 1         ALLERGIES:  Allergies   Allergen Reactions     Sulfa Drugs Other (See Comments)     Red face. Ringing in the ears.         PAST MEDICAL HISTORY:  Past Medical History:   Diagnosis Date     GERD (gastroesophageal reflux disease)      H/O colonoscopy 03/08/2016    done at [facility] and nl     Hematuria 2016    eval at [facility] and per pt cysto and ct neg     Intermittent asthma     since childhood     Low iron 10/2017    done for eval of hair loss, egd nl     Migraines     most of adult life, has seen neuro in past, on bblocker     Mild depression (H)      Normal stress echocardiogram July 2011    due to hear racing     Osteopenia 2008     Syncope 03/2017    echo nl lv size and fxn, grade 1 dd, mild tr         PAST SURGICAL HISTORY:  Past Surgical History:   Procedure Laterality Date     C TMJ ARTHROSCOPY/SURGERY       ESOPHAGOSCOPY, GASTROSCOPY, DUODENOSCOPY (EGD), COMBINED N/A 10/30/2017    Procedure: COMBINED ESOPHAGOSCOPY, GASTROSCOPY, DUODENOSCOPY (EGD), BIOPSY SINGLE OR MULTIPLE;  COMBINED ESOPHAGOSCOPY, GASTROSCOPY, DUODENOSCOPY (EGD);  Surgeon: Martin Rodriguez MD;  Location:  GI     INSERT PORT VASCULAR ACCESS N/A 10/24/2019    Procedure: PORT PLACEMENT;  Surgeon: Kaila Hernandez MD;  Location:  OR     ORTHOPEDIC SURGERY      \"leg surgery\"     single oopherectomy  2010    cyst         SOCIAL HISTORY:  Social History     Socioeconomic History     Marital status:      Spouse name: Not on file     Number of children: 2     Years of education: Not on file     Highest education level: Not on file   Occupational History     Not on file   Social Needs     Financial resource strain: Not on file     Food insecurity:     " "Worry: Not on file     Inability: Not on file     Transportation needs:     Medical: Not on file     Non-medical: Not on file   Tobacco Use     Smoking status: Former Smoker     Packs/day: 0.00     Last attempt to quit: 3/27/1997     Years since quittin.6     Smokeless tobacco: Never Used   Substance and Sexual Activity     Alcohol use: Yes     Comment: rarely     Drug use: No     Sexual activity: Yes     Partners: Male     Birth control/protection: Pill   Lifestyle     Physical activity:     Days per week: Not on file     Minutes per session: Not on file     Stress: Not on file   Relationships     Social connections:     Talks on phone: Not on file     Gets together: Not on file     Attends Christian service: Not on file     Active member of club or organization: Not on file     Attends meetings of clubs or organizations: Not on file     Relationship status: Not on file     Intimate partner violence:     Fear of current or ex partner: Not on file     Emotionally abused: Not on file     Physically abused: Not on file     Forced sexual activity: Not on file   Other Topics Concern     Parent/sibling w/ CABG, MI or angioplasty before 65F 55M? Yes   Social History Narrative     Not on file         FAMILY HISTORY:  Family History   Problem Relation Age of Onset     Coronary Artery Disease Father 48        MI. and one in his 60s     Emphysema Mother         COPD         PHYSICAL EXAM:  Vital signs:  /76   Pulse 78   Temp 98.2  F (36.8  C) (Oral)   Resp 18   Ht 1.702 m (5' 7\")   Wt 65.3 kg (143 lb 15.4 oz)   LMP 10/07/2017 (Approximate)   SpO2 100%   BMI 22.55 kg/m     ECO  GENERAL/CONSTITUTIONAL: No acute distress.  EYES: No scleral icterus.  ENT/MOUTH: Neck supple. Oropharynx clear, no mucositis.  LYMPH: No anterior cervical, posterior cervical, supraclavicular, or axillary adenopathy.   RESPIRATORY: Clear to auscultation bilaterally. No crackles or wheezing.   CARDIOVASCULAR: Regular rate and " rhythm without murmurs.  GASTROINTESTINAL: No guarding or distention.  MUSCULOSKELETAL: Warm and well-perfused, no cyanosis, clubbing, or edema.  NEUROLOGIC: Cranial nerves II-XII are intact. Alert, oriented, answers questions appropriately.  INTEGUMENTARY: No rashes or jaundice.  GAIT: Steady, does not use assistive device    LABS:  CBC RESULTS:   Recent Labs   Lab Test 11/27/19  0842   WBC 10.5   RBC 4.02   HGB 12.1   HCT 36.6   MCV 91   MCH 30.1   MCHC 33.1   RDW 13.0              PATHOLOGY:  None new.    IMAGING:  None new.    ASSESSMENT/PLAN:  Jyothi Freitas is a 53 year old female with the following issues:  1.  Stage IIB (clinical prognostic), cT2-cN1-M0, grade 3 invasive ductal carcinoma of the right upper outer breast, strongly ER positive, AZ positive, HER-2/juan FISH negative  -Jyothi appears to be tolerating neoadjuvant chemotherapy with dose dense Adriamycin and Cytoxan well so far.  I reviewed the blood counts with her.  They have adequately recovered to proceed with her 3rd cycle of ddAC.  -Plan is for her to complete a total of 4 cycles of ddAC followed by 12 weeks of weekly paclitaxel.  -Rationale for neoadjuvant chemotherapy to observe for chemo responsiveness, reduce risk of recurrent breast cancer, and reduce burden of disease prior to surgery.  However, as per previous discussion, she understands that it is unlikely that we will achieve a complete response to neoadjuvant chemotherapy given that her tumor is strongly ER and AZ positive.  -She would certainly be a candidate for adjuvant endocrine therapy based on the strongly ER and AZ positive tumor status.  She is postmenopausal, so I would typically recommend anastrozole to reduce her risk of breast cancer recurrence by relative 50%.  -Genetic counseling pending with appointments in November 2019.  -She would also likely be a candidate for adjuvant radiation therapy given her lymph node involvement and depending on radiation oncology  evaluation.        2. Depression  -Mood stable. Continue bupropion. Would not recommend tamoxifen in future due to interaction with bupropion unless she switched antidepressants.    3.  Constipation  -Likely drug related from her antiemetics and now much improved after taking her antiemetics on an as-needed basis. She may take stool softeners and laxatives as needed for constipation.    4.  Bone pain related to pegfilgrastim  -She may take naproxen orally twice daily and acetaminophen as needed for breakthrough pain.    5.  Insomnia  -Improved.  Continue zolpidem as needed, given that she had headaches to lorazepam.    6. Ectasia of thoracic aorta  -This measures 4 cm on the PET scan.  I reassured Jyothi that she would not need any urgent surgical management of the aorta that may need ongoing monitoring. She is following with Dr. Silverio Gooden for monitoring of this issue.     Return to see me with subsequent chemo treatment.      Maricarmen Monroe MD  Hematology/Oncology  [facility]    I spent a total of 15 minutes with the patient, with greater than 50% of the time in counseling and coordination of care.

## 2019-11-27 ENCOUNTER — INFUSION THERAPY VISIT (OUTPATIENT)
Dept: INFUSION THERAPY | Facility: CLINIC | Age: 54
End: 2019-11-27
Attending: INTERNAL MEDICINE
Payer: COMMERCIAL

## 2019-11-27 ENCOUNTER — ALLIED HEALTH/NURSE VISIT (OUTPATIENT)
Dept: ONCOLOGY | Facility: CLINIC | Age: 54
End: 2019-11-27

## 2019-11-27 ENCOUNTER — HOSPITAL ENCOUNTER (OUTPATIENT)
Facility: CLINIC | Age: 54
Setting detail: SPECIMEN
Discharge: HOME | End: 2019-11-27
Attending: INTERNAL MEDICINE | Admitting: INTERNAL MEDICINE
Payer: COMMERCIAL

## 2019-11-27 ENCOUNTER — ONCOLOGY VISIT (OUTPATIENT)
Dept: ONCOLOGY | Facility: CLINIC | Age: 54
End: 2019-11-27
Attending: INTERNAL MEDICINE
Payer: COMMERCIAL

## 2019-11-27 VITALS
OXYGEN SATURATION: 100 % | SYSTOLIC BLOOD PRESSURE: 124 MMHG | WEIGHT: 143.96 LBS | DIASTOLIC BLOOD PRESSURE: 76 MMHG | RESPIRATION RATE: 18 BRPM | BODY MASS INDEX: 22.6 KG/M2 | HEART RATE: 78 BPM | TEMPERATURE: 98.2 F | HEIGHT: 67 IN

## 2019-11-27 VITALS
DIASTOLIC BLOOD PRESSURE: 76 MMHG | OXYGEN SATURATION: 100 % | WEIGHT: 144 LBS | HEART RATE: 78 BPM | TEMPERATURE: 98.2 F | RESPIRATION RATE: 18 BRPM | BODY MASS INDEX: 22.55 KG/M2 | SYSTOLIC BLOOD PRESSURE: 124 MMHG

## 2019-11-27 DIAGNOSIS — Z17.0 MALIGNANT NEOPLASM OF UPPER-OUTER QUADRANT OF RIGHT BREAST IN FEMALE, ESTROGEN RECEPTOR POSITIVE (H): Primary | ICD-10-CM

## 2019-11-27 DIAGNOSIS — Z71.9 COUNSELING, UNSPECIFIED: Primary | ICD-10-CM

## 2019-11-27 DIAGNOSIS — F32.5 MAJOR DEPRESSION IN COMPLETE REMISSION (H): ICD-10-CM

## 2019-11-27 DIAGNOSIS — C50.411 MALIGNANT NEOPLASM OF UPPER-OUTER QUADRANT OF RIGHT BREAST IN FEMALE, ESTROGEN RECEPTOR POSITIVE (H): Primary | ICD-10-CM

## 2019-11-27 DIAGNOSIS — M89.8X9 BONE PAIN DUE TO G-CSF: ICD-10-CM

## 2019-11-27 DIAGNOSIS — G47.01 INSOMNIA DUE TO MEDICAL CONDITION: ICD-10-CM

## 2019-11-27 DIAGNOSIS — K59.00 CONSTIPATION, UNSPECIFIED CONSTIPATION TYPE: ICD-10-CM

## 2019-11-27 LAB
ALBUMIN SERPL-MCNC: 3.5 G/DL (ref 3.4–5)
ALP SERPL-CCNC: 77 U/L (ref 40–150)
ALT SERPL W P-5'-P-CCNC: 25 U/L (ref 0–50)
ANION GAP SERPL CALCULATED.3IONS-SCNC: 7 MMOL/L (ref 3–14)
AST SERPL W P-5'-P-CCNC: 17 U/L (ref 0–45)
BASOPHILS # BLD AUTO: 0 10E9/L (ref 0–0.2)
BASOPHILS NFR BLD AUTO: 0 %
BILIRUB SERPL-MCNC: 0.1 MG/DL (ref 0.2–1.3)
BUN SERPL-MCNC: 10 MG/DL (ref 7–30)
CALCIUM SERPL-MCNC: 8.8 MG/DL (ref 8.5–10.1)
CHLORIDE SERPL-SCNC: 106 MMOL/L (ref 94–109)
CO2 SERPL-SCNC: 25 MMOL/L (ref 20–32)
CREAT SERPL-MCNC: 0.67 MG/DL (ref 0.52–1.04)
DIFFERENTIAL METHOD BLD: ABNORMAL
EOSINOPHIL # BLD AUTO: 0 10E9/L (ref 0–0.7)
EOSINOPHIL NFR BLD AUTO: 0 %
ERYTHROCYTE [DISTWIDTH] IN BLOOD BY AUTOMATED COUNT: 13 % (ref 10–15)
GFR SERPL CREATININE-BSD FRML MDRD: >90 ML/MIN/{1.73_M2}
GLUCOSE SERPL-MCNC: 98 MG/DL (ref 70–99)
HCT VFR BLD AUTO: 36.6 % (ref 35–47)
HGB BLD-MCNC: 12.1 G/DL (ref 11.7–15.7)
LYMPHOCYTES # BLD AUTO: 2.6 10E9/L (ref 0.8–5.3)
LYMPHOCYTES NFR BLD AUTO: 25 %
MCH RBC QN AUTO: 30.1 PG (ref 26.5–33)
MCHC RBC AUTO-ENTMCNC: 33.1 G/DL (ref 31.5–36.5)
MCV RBC AUTO: 91 FL (ref 78–100)
METAMYELOCYTES # BLD: 0.3 10E9/L
METAMYELOCYTES NFR BLD MANUAL: 3 %
MONOCYTES # BLD AUTO: 0.7 10E9/L (ref 0–1.3)
MONOCYTES NFR BLD AUTO: 7 %
MYELOCYTES # BLD: 0.2 10E9/L
MYELOCYTES NFR BLD MANUAL: 2 %
NEUTROPHILS # BLD AUTO: 6.6 10E9/L (ref 1.6–8.3)
NEUTROPHILS NFR BLD AUTO: 63 %
PLATELET # BLD AUTO: 225 10E9/L (ref 150–450)
PLATELET # BLD EST: ABNORMAL 10*3/UL
POTASSIUM SERPL-SCNC: 3.8 MMOL/L (ref 3.4–5.3)
PROT SERPL-MCNC: 6.7 G/DL (ref 6.8–8.8)
RBC # BLD AUTO: 4.02 10E12/L (ref 3.8–5.2)
RBC MORPH BLD: ABNORMAL
SODIUM SERPL-SCNC: 138 MMOL/L (ref 133–144)
WBC # BLD AUTO: 10.5 10E9/L (ref 4–11)

## 2019-11-27 PROCEDURE — 96375 TX/PRO/DX INJ NEW DRUG ADDON: CPT

## 2019-11-27 PROCEDURE — 96413 CHEMO IV INFUSION 1 HR: CPT

## 2019-11-27 PROCEDURE — 80053 COMPREHEN METABOLIC PANEL: CPT | Performed by: INTERNAL MEDICINE

## 2019-11-27 PROCEDURE — 85025 COMPLETE CBC W/AUTO DIFF WBC: CPT | Performed by: INTERNAL MEDICINE

## 2019-11-27 PROCEDURE — 25800030 ZZH RX IP 258 OP 636: Performed by: INTERNAL MEDICINE

## 2019-11-27 PROCEDURE — 96411 CHEMO IV PUSH ADDL DRUG: CPT

## 2019-11-27 PROCEDURE — 96377 APPLICATON ON-BODY INJECTOR: CPT | Mod: XS

## 2019-11-27 PROCEDURE — 96367 TX/PROPH/DG ADDL SEQ IV INF: CPT

## 2019-11-27 PROCEDURE — G0463 HOSPITAL OUTPT CLINIC VISIT: HCPCS | Mod: 25

## 2019-11-27 PROCEDURE — 99214 OFFICE O/P EST MOD 30 MIN: CPT | Performed by: INTERNAL MEDICINE

## 2019-11-27 PROCEDURE — 25000128 H RX IP 250 OP 636: Performed by: INTERNAL MEDICINE

## 2019-11-27 RX ORDER — PALONOSETRON 0.05 MG/ML
0.25 INJECTION, SOLUTION INTRAVENOUS ONCE
Status: COMPLETED | OUTPATIENT
Start: 2019-11-27 | End: 2019-11-27

## 2019-11-27 RX ORDER — DOXORUBICIN HYDROCHLORIDE 2 MG/ML
100 INJECTION, SOLUTION INTRAVENOUS ONCE
Status: COMPLETED | OUTPATIENT
Start: 2019-11-27 | End: 2019-11-27

## 2019-11-27 RX ORDER — MEPERIDINE HYDROCHLORIDE 25 MG/ML
25 INJECTION INTRAMUSCULAR; INTRAVENOUS; SUBCUTANEOUS EVERY 30 MIN PRN
Status: CANCELLED | OUTPATIENT
Start: 2019-11-27

## 2019-11-27 RX ORDER — METHYLPREDNISOLONE SODIUM SUCCINATE 125 MG/2ML
125 INJECTION, POWDER, LYOPHILIZED, FOR SOLUTION INTRAMUSCULAR; INTRAVENOUS
Status: CANCELLED
Start: 2019-11-27

## 2019-11-27 RX ORDER — HEPARIN SODIUM (PORCINE) LOCK FLUSH IV SOLN 100 UNIT/ML 100 UNIT/ML
500 SOLUTION INTRAVENOUS ONCE
Status: COMPLETED | OUTPATIENT
Start: 2019-11-27 | End: 2019-11-27

## 2019-11-27 RX ORDER — DIPHENHYDRAMINE HYDROCHLORIDE 50 MG/ML
50 INJECTION INTRAMUSCULAR; INTRAVENOUS
Status: CANCELLED
Start: 2019-11-27

## 2019-11-27 RX ORDER — DOXORUBICIN HYDROCHLORIDE 2 MG/ML
60 INJECTION, SOLUTION INTRAVENOUS ONCE
Status: CANCELLED | OUTPATIENT
Start: 2019-11-27

## 2019-11-27 RX ORDER — PALONOSETRON 0.05 MG/ML
0.25 INJECTION, SOLUTION INTRAVENOUS ONCE
Status: CANCELLED
Start: 2019-11-27

## 2019-11-27 RX ORDER — EPINEPHRINE 1 MG/ML
0.3 INJECTION, SOLUTION INTRAMUSCULAR; SUBCUTANEOUS EVERY 5 MIN PRN
Status: CANCELLED | OUTPATIENT
Start: 2019-11-27

## 2019-11-27 RX ORDER — SODIUM CHLORIDE 9 MG/ML
1000 INJECTION, SOLUTION INTRAVENOUS CONTINUOUS PRN
Status: CANCELLED
Start: 2019-11-27

## 2019-11-27 RX ORDER — NALOXONE HYDROCHLORIDE 0.4 MG/ML
.1-.4 INJECTION, SOLUTION INTRAMUSCULAR; INTRAVENOUS; SUBCUTANEOUS
Status: CANCELLED | OUTPATIENT
Start: 2019-11-27

## 2019-11-27 RX ORDER — ALBUTEROL SULFATE 90 UG/1
1-2 AEROSOL, METERED RESPIRATORY (INHALATION)
Status: CANCELLED
Start: 2019-11-27

## 2019-11-27 RX ORDER — LORAZEPAM 2 MG/ML
0.5 INJECTION INTRAMUSCULAR EVERY 4 HOURS PRN
Status: CANCELLED
Start: 2019-11-27

## 2019-11-27 RX ORDER — ALBUTEROL SULFATE 0.83 MG/ML
2.5 SOLUTION RESPIRATORY (INHALATION)
Status: CANCELLED | OUTPATIENT
Start: 2019-11-27

## 2019-11-27 RX ORDER — EPINEPHRINE 0.3 MG/.3ML
0.3 INJECTION SUBCUTANEOUS EVERY 5 MIN PRN
Status: CANCELLED | OUTPATIENT
Start: 2019-11-27

## 2019-11-27 RX ADMIN — HEPARIN SODIUM (PORCINE) LOCK FLUSH IV SOLN 100 UNIT/ML 500 UNITS: 100 SOLUTION at 12:51

## 2019-11-27 RX ADMIN — PEGFILGRASTIM 6 MG: KIT SUBCUTANEOUS at 12:22

## 2019-11-27 RX ADMIN — FOSAPREPITANT: 150 INJECTION, POWDER, LYOPHILIZED, FOR SOLUTION INTRAVENOUS at 10:51

## 2019-11-27 RX ADMIN — DOXORUBICIN HYDROCHLORIDE 100 MG: 2 INJECTION, SOLUTION INTRAVENOUS at 11:32

## 2019-11-27 RX ADMIN — PALONOSETRON 0.25 MG: 0.05 INJECTION, SOLUTION INTRAVENOUS at 10:48

## 2019-11-27 RX ADMIN — SODIUM CHLORIDE 1000 ML: 9 INJECTION, SOLUTION INTRAVENOUS at 10:10

## 2019-11-27 RX ADMIN — CYCLOPHOSPHAMIDE 1000 MG: 1 INJECTION, POWDER, FOR SOLUTION INTRAVENOUS; ORAL at 11:44

## 2019-11-27 ASSESSMENT — PAIN SCALES - GENERAL
PAINLEVEL: NO PAIN (0)
PAINLEVEL: NO PAIN (0)

## 2019-11-27 ASSESSMENT — MIFFLIN-ST. JEOR: SCORE: 1285.63

## 2019-11-27 NOTE — PROGRESS NOTES
"Oncology Rooming Note    November 27, 2019 8:55 AM   Jyothi Freitas is a 53 year old female who presents for:    Chief Complaint   Patient presents with     Oncology Clinic Visit     Initial Vitals: /76   Pulse 78   Temp 98.2  F (36.8  C) (Oral)   Resp 18   Ht 1.702 m (5' 7\")   Wt 65.3 kg (143 lb 15.4 oz)   LMP 10/07/2017 (Approximate)   SpO2 100%   BMI 22.55 kg/m   Estimated body mass index is 22.55 kg/m  as calculated from the following:    Height as of this encounter: 1.702 m (5' 7\").    Weight as of this encounter: 65.3 kg (143 lb 15.4 oz). Body surface area is 1.76 meters squared.  No Pain (0) Comment: Data Unavailable   Patient's last menstrual period was 10/07/2017 (approximate).  Allergies reviewed: Yes  Medications reviewed: Yes    Medications: Medication refills not needed today.  Pharmacy name entered into Puzl:    [id number] - [place], [place] - [address] [facility]  [id number] - [place], [place] - [address] AT [address] [place] [address]    Clinical concerns: no      Gloria Hand CMA            "

## 2019-11-27 NOTE — PROGRESS NOTES
Social Work Progress Note      Data/Intervention:  Patient Name:  Jyothi Freitas  /Age:  1965 (53 year old)    Reason for Follow-Up:  Jyothi is a 53-year-old woman with a diagnosis of right breast cancer who comes to clinic today for C3D1 AC, accompanied today by cold-capping person. This clinician met with Jyothi for planned psychosocial check-in and emotional support.     Intervention:   Jyothi reports that she is coping well from an emotional standpoint, acknowledging that source of recent distress has been seeing hair fall out when brushing. Jyothi acknowledges appreciation for still having her eyebrows and eyelashes, and remains hopeful that much of her hair will remain. 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 Provided emotional support surrounding this, and oriented to OT services through [facility]. This clinician also discussed resources of acupuncture support for neuropathy assistance if needed. Jyothi appreciative of resources and requested SW connection when she transitions to weekly Taxol.     Resources Provided:  Acupuncture  [facility]    Plan:  1) Previously provided patient/family with writer's contact information and availability. Jyothi knows to reach out to this clinician in the case of psychosocial distress or concern. This clinician will follow-up after Jyothi makes transition to weekly Taxol.   2) Ongoing collaboration with multidisciplinary care team.     Please call or page if needs or concerns arise.     STEPHANIE Dumont, [facility]  Direct Phone: [phone]  Pager: [phone]

## 2019-11-27 NOTE — PROGRESS NOTES
Infusion Nursing Note:  Jyothi Freitas presents today for C3D1 Adriamycin/ Cytoxan/ OnPro Neulasta.    Patient seen by provider today: Yes: Dr. Monroe    Note: Patient reports feeling overall well with infusions.  Has some mild mouth sores, myalgias and fatigue.  Denies much issues with nausea but does have intermittent constipation. Tolerated today's infusion without issue.    Intravenous Access:  Implanted Port.      Treatment Conditions:  Lab Results   Component Value Date    HGB 12.1 11/27/2019     Lab Results   Component Value Date    WBC 10.5 11/27/2019      Lab Results   Component Value Date    ANEU 6.6 11/27/2019     Lab Results   Component Value Date     11/27/2019      Lab Results   Component Value Date     11/27/2019                   Lab Results   Component Value Date    POTASSIUM 3.8 11/27/2019           No results found for: MAG         Lab Results   Component Value Date    CR 0.67 11/27/2019                   Lab Results   Component Value Date    JEFF 8.8 11/27/2019                Lab Results   Component Value Date    BILITOTAL 0.1 11/27/2019           Lab Results   Component Value Date    ALBUMIN 3.5 11/27/2019                    Lab Results   Component Value Date    ALT 25 11/27/2019           Lab Results   Component Value Date    AST 17 11/27/2019       Results reviewed, labs MET treatment parameters, ok to proceed with treatment.  ECHO/MUGA completed 10/21/19  EF 60-65%.      Post Infusion Assessment:  Patient tolerated infusion without incident.  Blood return noted pre and post infusion.  Blood return noted during Adriamycin administration every 2 ml.  Site patent and intact, free from redness, edema or discomfort.  No evidence of extravasations.  Access discontinued per protocol.    ONPRO  Was placed on patient's: right side of abdomen.    Was placed at 12:25 PM    ONPRO injector device Lot number: [id number]    Patient education included: all patients questions answered and that Neulasta  administration will occur at 3:25pm.    Patient tolerated administration well.      Discharge Plan:   Prescription refills given for Dexamethasone.  Discharge instructions reviewed with: Patient.  Patient and/or family verbalized understanding of discharge instructions and all questions answered.  Copy of AVS reviewed with patient and/or family.  Patient will return 12/11/19 for next appointment.  Departure Mode: Ambulatory.    Dayan Alvares, RN, RN                    '

## 2019-11-27 NOTE — LETTER
11/27/2019         RE: Jyothi Freitas  [address]  [place] [address]        Dear Colleague,    Thank you for referring your patient, Jyothi Freitas, to the [facility]. Please see a copy of my visit note below.    [facility]    Hematology/Oncology Established Patient Follow-up Note      Today's Date: 11/27/19    Reason for Follow-up: Right breast cancer.    HISTORY OF PRESENT ILLNESS: Jyothi Freitas is a 53 year old female who presents with the following oncologic history:   1.  10/11/2019: Diagnostic right sided mammogram performed for a palpable lump in the right breast.  This showed an irregularly-shaped spiculated mass in the upper outer right breast with prominent lymph nodes in the right axilla.  Targeted ultrasound of the right upper outer breast showed an irregularly-shaped hypoechoic mass at the 10 o'clock position, 4 cm from the nipple measuring 2.6 x 1.5 x 2.4 cm.  Right axillary ultrasound showed moderately enlarged lymph nodes.  Right breast needle biopsy showed a grade 3 invasive ductal carcinoma with lymphovascular invasion identified, ER strongly positive at 95%, WV strongly positive at 95%, HER-2/juan FISH negative.  Right axillary lymph node measuring 2 cm was positive for metastatic carcinoma.  Note prior 4/2019 mammogram deemed normal.  2.  10/15/2019: Bilateral breast MRI showed known right breast malignancy measuring 2.6 x 1.4 x 2 cm, 3 mildly enlarged right axillary lymph nodes and no contralateral breast malignancy.  3. 10/21/2019 PET scan showed scattered small hypermetabolic bilateral axillary lymph nodes; for example, a hypermetabolic right axillary lymph node measures 1.6 x 0.9 cm with SUV max 3.6.  Hypermetabolic mass in the right breast superiorly and laterally measuring 2.1 x 1.6 cm with SUV max 4.3.  A 0.6 cm low-density lesion in the right hepatic lobe is too small for accurate PET characterization but shows no appreciable hypermetabolic activity.   Incidentally noted ectasia of the ascending thoracic aorta measures 4 cm in diameter.  4.  10/23/2019: Left axillary ultrasound showed benign-appearing lymph node.  Left axillary lymph node biopsy showed benign lymph node tissue.  5.  10/29/2019: Started neoadjuvant chemotherapy with dose dense Adriamycin and Cytoxan, to be followed by weekly paclitaxel.    INTERIM HISTORY:  Jyothi Freitas reports improvement in her constipation and denies any fevers or chills.  She states feeling overall very well.    REVIEW OF SYSTEMS:   14 point ROS was reviewed and is negative other than as noted above in HPI.       HOME MEDICATIONS:  Current Outpatient Medications   Medication Sig Dispense Refill     acetaminophen (TYLENOL) 500 MG tablet Take 1,000 mg by mouth every 6 hours as needed for mild pain       acetaminophen-caffeine (EXCEDRIN TENSION HEADACHE) 500-65 MG TABS Take 2 tablets by mouth every 6 hours as needed for mild pain       ALBUTEROL 108 (90 BASE) MCG/ACT inhaler INHALE 2 PUFFS INTO THE LUNGS EVERY 6 HOURS AS NEEDED FOR SHORTNESS OF BREATH OR DIFFICULT BREATHING OR WHEEZING 8.5 g 0     buPROPion (WELLBUTRIN XL) 300 MG 24 hr tablet Take 1 tablet (300 mg) by mouth every morning 90 tablet 3     dexamethasone (DECADRON) 4 MG tablet Take 2 tablets (8 mg) by mouth daily for 3 days Start on Day 2 of Cycles 1 through 4. 6 tablet 3     fluticasone (FLOVENT DISKUS) 100 MCG/BLIST inhaler Inhale 1 puff into the lungs 2 times daily (Patient not taking: Reported on 11/20/2019) 3 Inhaler 3     fluticasone-salmeterol (ADVAIR) 100-50 MCG/DOSE inhaler Inhale 1 puff into the lungs every 12 hours (Patient not taking: Reported on 11/20/2019) 1 Inhaler 3     HYDROcodone-acetaminophen (NORCO) 5-325 MG tablet Take 1-2 tablets by mouth every 4 hours as needed for moderate to severe pain (Patient not taking: Reported on 11/20/2019) 10 tablet 0     nadolol (CORGARD) 20 MG tablet TAKE 1 TABLET(20 MG) BY MOUTH DAILY 90 tablet 3     naproxen sodium  "(ANAPROX) 220 MG tablet Take 220 mg by mouth daily as needed for moderate pain       pravastatin (PRAVACHOL) 20 MG tablet Take 1 tablet (20 mg) by mouth daily 45 tablet 0     prochlorperazine (COMPAZINE) 10 MG tablet Take 1 tablet (10 mg) by mouth every 6 hours as needed (Nausea/Vomiting) 30 tablet 5     senna-docusate (SENOKOT-S/PERICOLACE) 8.6-50 MG tablet Take 1-2 tablets by mouth 2 times daily 30 tablet 0     zolpidem (AMBIEN) 5 MG tablet Take 1 tablet (5 mg) by mouth nightly as needed for sleep 30 tablet 1         ALLERGIES:  Allergies   Allergen Reactions     Sulfa Drugs Other (See Comments)     Red face. Ringing in the ears.         PAST MEDICAL HISTORY:  Past Medical History:   Diagnosis Date     GERD (gastroesophageal reflux disease)      H/O colonoscopy 03/08/2016    done at [place] and [place]     Hematuria 2016    eval at [place] and per pt cysto and ct neg     Intermittent asthma     since childhood     Low iron 10/2017    done for eval of hair loss, [facility]d [place]     Migraines     most of adult life, has seen neuro in past, on bblocker     Mild depression (H)      Normal stress echocardiogram July 2011    due to hear racing     Osteopenia 2008     Syncope 03/2017    echo [place] lv size and fxn, grade 1 dd, mild tr         PAST SURGICAL HISTORY:  Past Surgical History:   Procedure Laterality Date     C TMJ ARTHROSCOPY/SURGERY       ESOPHAGOSCOPY, GASTROSCOPY, DUODENOSCOPY (EGD), COMBINED N/A 10/30/2017    Procedure: COMBINED ESOPHAGOSCOPY, GASTROSCOPY, DUODENOSCOPY (EGD), BIOPSY SINGLE OR MULTIPLE;  COMBINED ESOPHAGOSCOPY, GASTROSCOPY, DUODENOSCOPY (EGD);  Surgeon: Martin Rodriguez MD;  Location:  GI     INSERT PORT VASCULAR ACCESS N/A 10/24/2019    Procedure: PORT PLACEMENT;  Surgeon: Kaila Hernandez MD;  Location:  OR     ORTHOPEDIC SURGERY      \"leg surgery\"     single oopherectomy  2010    cyst         SOCIAL HISTORY:  Social History     Socioeconomic History     Marital status:      Spouse name: Not " "on file     Number of children: 2     Years of education: Not on file     Highest education level: Not on file   Occupational History     Not on file   Social Needs     Financial resource strain: Not on file     Food insecurity:     Worry: Not on file     Inability: Not on file     Transportation needs:     Medical: Not on file     Non-medical: Not on file   Tobacco Use     Smoking status: Former Smoker     Packs/day: 0.00     Last attempt to quit: 3/27/1997     Years since quittin.6     Smokeless tobacco: Never Used   Substance and Sexual Activity     Alcohol use: Yes     Comment: rarely     Drug use: No     Sexual activity: Yes     Partners: Male     Birth control/protection: Pill   Lifestyle     Physical activity:     Days per week: Not on file     Minutes per session: Not on file     Stress: Not on file   Relationships     Social connections:     Talks on phone: Not on file     Gets together: Not on file     Attends [employer] service: Not on file     Active member of club or organization: Not on file     Attends meetings of clubs or organizations: Not on file     Relationship status: Not on file     Intimate partner violence:     Fear of current or ex partner: Not on file     Emotionally abused: Not on file     Physically abused: Not on file     Forced sexual activity: Not on file   Other Topics Concern     Parent/sibling w/ CABG, MI or angioplasty before 65F 55M? Yes   Social History Narrative     Not on file         FAMILY HISTORY:  Family History   Problem Relation Age of Onset     Coronary Artery Disease Father 48        MI. and one in his 60s     Emphysema Mother         COPD         PHYSICAL EXAM:  Vital signs:  /76   Pulse 78   Temp 98.2  F (36.8  C) (Oral)   Resp 18   Ht 1.702 m (5' 7\")   Wt 65.3 kg (143 lb 15.4 oz)   LMP 10/07/2017 (Approximate)   SpO2 100%   BMI 22.55 kg/m      ECO  GENERAL/CONSTITUTIONAL: No acute distress.  EYES: No scleral icterus.  ENT/MOUTH: Neck supple. " Oropharynx clear, no mucositis.  LYMPH: No anterior cervical, posterior cervical, supraclavicular, or axillary adenopathy.   RESPIRATORY: Clear to auscultation bilaterally. No crackles or wheezing.   CARDIOVASCULAR: Regular rate and rhythm without murmurs.  GASTROINTESTINAL: No guarding or distention.  MUSCULOSKELETAL: Warm and well-perfused, no cyanosis, clubbing, or edema.  NEUROLOGIC: Cranial nerves II-XII are intact. Alert, oriented, answers questions appropriately.  INTEGUMENTARY: No rashes or jaundice.  GAIT: Steady, does not use assistive device    LABS:  CBC RESULTS:   Recent Labs   Lab Test 11/27/19  0842   WBC 10.5   RBC 4.02   HGB 12.1   HCT 36.6   MCV 91   MCH 30.1   MCHC 33.1   RDW 13.0              PATHOLOGY:  None new.    IMAGING:  None new.    ASSESSMENT/PLAN:  Jyothi Freitas is a 53 year old female with the following issues:  1.  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  I reviewed the blood counts with her.  They have adequately recovered to proceed with her 3rd cycle of ddAC.  -Plan is for her to complete a total of 4 cycles of ddAC followed by 12 weeks of weekly paclitaxel.  -Rationale for neoadjuvant chemotherapy to observe for chemo responsiveness, reduce risk of recurrent breast cancer, and reduce burden of disease prior to surgery.  However, as per previous discussion, she understands that it is unlikely that we will achieve a complete response to neoadjuvant chemotherapy given that her tumor is strongly ER and OK positive.  -She would certainly be a candidate for adjuvant endocrine therapy based on the strongly ER and OK positive tumor status.  She is postmenopausal, so I would typically recommend anastrozole to reduce her risk of breast cancer  "recurrence by relative 50%.  -Genetic counseling pending with appointments in November 2019.  -She would also likely be a candidate for adjuvant radiation therapy given her lymph node involvement and depending on radiation oncology evaluation.        2. Depression  -Mood stable. Continue bupropion. Would not recommend tamoxifen in future due to interaction with bupropion unless she switched antidepressants.    3.  Constipation  -Likely drug related from her antiemetics and now much improved after taking her antiemetics on an as-needed basis. She may take stool softeners and laxatives as needed for constipation.    4.  Bone pain related to pegfilgrastim  -She may take naproxen orally twice daily and acetaminophen as needed for breakthrough pain.    5.  Insomnia  -Improved.  Continue zolpidem as needed, given that she had headaches to lorazepam.    6. Ectasia of thoracic aorta  -This measures 4 cm on the PET scan.  I reassured Jyothi that she would not need any urgent surgical management of the aorta that may need ongoing monitoring. She is following with Dr. Silverio Gooden for monitoring of this issue.     Return to see me with subsequent chemo treatment.      Maricarmen Monroe MD  Hematology/Oncology  [facility]    I spent a total of 15 minutes with the patient, with greater than 50% of the time in counseling and coordination of care.    Oncology Rooming Note    November 27, 2019 8:55 AM   Jyothi Freitas is a 53 year old female who presents for:    Chief Complaint   Patient presents with     Oncology Clinic Visit     Initial Vitals: /76   Pulse 78   Temp 98.2  F (36.8  C) (Oral)   Resp 18   Ht 1.702 m (5' 7\")   Wt 65.3 kg (143 lb 15.4 oz)   LMP 10/07/2017 (Approximate)   SpO2 100%   BMI 22.55 kg/m    Estimated body mass index is 22.55 kg/m  as calculated from the following:    Height as of this encounter: 1.702 m (5' 7\").    Weight as of this encounter: 65.3 kg (143 lb 15.4 oz). Body " surface area is 1.76 meters squared.  No Pain (0) Comment: Data Unavailable   Patient's last menstrual period was 10/07/2017 (approximate).  Allergies reviewed: Yes  Medications reviewed: Yes    Medications: Medication refills not needed today.  Pharmacy name entered into [employer]:    [id number] - [place], [place] - [address] AT [facility] [address]  [id number] - [place], [place] - [address] AT [address] [place] [address]    Clinical concerns: no      Gloria Hand, ROSMERY              Again, thank you for allowing me to participate in the care of your patient.        Sincerely,        Maricarmen Monroe MD

## 2019-12-02 LAB — LAB SCANNED RESULT: NORMAL

## 2019-12-07 NOTE — PROGRESS NOTES
[facility]    Hematology/Oncology Established Patient Follow-up Note      Today's Date: 12/11/19    Reason for Follow-up: Right breast cancer.    HISTORY OF PRESENT ILLNESS: Jyothi Freitas is a 53 year old female who presents with the following oncologic history:   1.  10/11/2019: Diagnostic right sided mammogram performed for a palpable lump in the right breast.  This showed an irregularly-shaped spiculated mass in the upper outer right breast with prominent lymph nodes in the right axilla.  Targeted ultrasound of the right upper outer breast showed an irregularly-shaped hypoechoic mass at the 10 o'clock position, 4 cm from the nipple measuring 2.6 x 1.5 x 2.4 cm.  Right axillary ultrasound showed moderately enlarged lymph nodes.  Right breast needle biopsy showed a grade 3 invasive ductal carcinoma with lymphovascular invasion identified, ER strongly positive at 95%, NV strongly positive at 95%, HER-2/juan FISH negative.  Right axillary lymph node measuring 2 cm was positive for metastatic carcinoma.  Note prior 4/2019 mammogram deemed normal.  2.  10/15/2019: Bilateral breast MRI showed known right breast malignancy measuring 2.6 x 1.4 x 2 cm, 3 mildly enlarged right axillary lymph nodes and no contralateral breast malignancy.  3. 10/21/2019 PET scan showed scattered small hypermetabolic bilateral axillary lymph nodes; for example, a hypermetabolic right axillary lymph node measures 1.6 x 0.9 cm with SUV max 3.6.  Hypermetabolic mass in the right breast superiorly and laterally measuring 2.1 x 1.6 cm with SUV max 4.3.  A 0.6 cm low-density lesion in the right hepatic lobe is too small for accurate PET characterization but shows no appreciable hypermetabolic activity.  Incidentally noted ectasia of the ascending thoracic aorta measures 4 cm in diameter.  4.  10/23/2019: Left axillary ultrasound showed benign-appearing lymph node.  Left axillary lymph node biopsy showed benign lymph node tissue.  5.   10/29/2019: Started neoadjuvant chemotherapy with dose dense Adriamycin and Cytoxan, to be followed by weekly paclitaxel.    INTERIM HISTORY:  Jyothi Freitas reports intermittent sweats that is disturbing her sleep at night on a daily basis.  Otherwise, she denies any fevers, chills, bladder dysfunction.  Constipation seems to be overall well controlled.    REVIEW OF SYSTEMS:   14 point ROS was reviewed and is negative other than as noted above in HPI.       HOME MEDICATIONS:  Current Outpatient Medications   Medication Sig Dispense Refill     acetaminophen (TYLENOL) 500 MG tablet Take 1,000 mg by mouth every 6 hours as needed for mild pain       acetaminophen-caffeine (EXCEDRIN TENSION HEADACHE) 500-65 MG TABS Take 2 tablets by mouth every 6 hours as needed for mild pain       ALBUTEROL 108 (90 BASE) MCG/ACT inhaler INHALE 2 PUFFS INTO THE LUNGS EVERY 6 HOURS AS NEEDED FOR SHORTNESS OF BREATH OR DIFFICULT BREATHING OR WHEEZING 8.5 g 0     buPROPion (WELLBUTRIN XL) 300 MG 24 hr tablet Take 1 tablet (300 mg) by mouth every morning 90 tablet 3     dexamethasone (DECADRON) 4 MG tablet Take 2 tablets (8 mg) by mouth daily for 3 days Start on Day 2 of Cycles 1 through 4. 6 tablet 3     fluticasone (FLOVENT DISKUS) 100 MCG/BLIST inhaler Inhale 1 puff into the lungs 2 times daily (Patient not taking: Reported on 11/20/2019) 3 Inhaler 3     fluticasone-salmeterol (ADVAIR) 100-50 MCG/DOSE inhaler Inhale 1 puff into the lungs every 12 hours 1 Inhaler 3     HYDROcodone-acetaminophen (NORCO) 5-325 MG tablet Take 1-2 tablets by mouth every 4 hours as needed for moderate to severe pain 10 tablet 0     nadolol (CORGARD) 20 MG tablet TAKE 1 TABLET(20 MG) BY MOUTH DAILY 90 tablet 3     naproxen sodium (ANAPROX) 220 MG tablet Take 220 mg by mouth daily as needed for moderate pain       pravastatin (PRAVACHOL) 20 MG tablet Take 1 tablet (20 mg) by mouth daily 45 tablet 0     prochlorperazine (COMPAZINE) 10 MG tablet Take 1 tablet (10 mg)  "by mouth every 6 hours as needed (Nausea/Vomiting) 30 tablet 5     senna-docusate (SENOKOT-S/PERICOLACE) 8.6-50 MG tablet Take 1-2 tablets by mouth 2 times daily 30 tablet 0     zolpidem (AMBIEN) 5 MG tablet Take 1 tablet (5 mg) by mouth nightly as needed for sleep 30 tablet 1         ALLERGIES:  Allergies   Allergen Reactions     Sulfa Drugs Other (See Comments)     Red face. Ringing in the ears.         PAST MEDICAL HISTORY:  Past Medical History:   Diagnosis Date     GERD (gastroesophageal reflux disease)      H/O colonoscopy 03/08/2016    done at [facility] and [facility]     Hematuria 2016    eval at [facility] and per pt cysto and ct neg     Intermittent asthma     since childhood     Low iron 10/2017    done for eval of hair loss, [facility]d [facility]     Migraines     most of adult life, has seen neuro in past, on bblocker     Mild depression (H)      Normal stress echocardiogram July 2011    due to hear racing     Osteopenia 2008     Syncope 03/2017    echo [facility] lv size and fxn, grade 1 dd, mild tr         PAST SURGICAL HISTORY:  Past Surgical History:   Procedure Laterality Date     C TMJ ARTHROSCOPY/SURGERY       ESOPHAGOSCOPY, GASTROSCOPY, DUODENOSCOPY (EGD), COMBINED N/A 10/30/2017    Procedure: COMBINED ESOPHAGOSCOPY, GASTROSCOPY, DUODENOSCOPY (EGD), BIOPSY SINGLE OR MULTIPLE;  COMBINED ESOPHAGOSCOPY, GASTROSCOPY, DUODENOSCOPY (EGD);  Surgeon: Martin Rodriguez MD;  Location:  GI     INSERT PORT VASCULAR ACCESS N/A 10/24/2019    Procedure: PORT PLACEMENT;  Surgeon: Kaila Hernandez MD;  Location:  OR     ORTHOPEDIC SURGERY      \"leg surgery\"     single oopherectomy  2010    cyst         SOCIAL HISTORY:  Social History     Socioeconomic History     Marital status:      Spouse name: Not on file     Number of children: 2     Years of education: Not on file     Highest education level: Not on file   Occupational History     Not on file   Social Needs     Financial resource strain: Not on file     Food insecurity:     Worry: " "Not on file     Inability: Not on file     Transportation needs:     Medical: Not on file     Non-medical: Not on file   Tobacco Use     Smoking status: Former Smoker     Packs/day: 0.00     Last attempt to quit: 3/27/1997     Years since quittin.7     Smokeless tobacco: Never Used   Substance and Sexual Activity     Alcohol use: Yes     Comment: rarely     Drug use: No     Sexual activity: Yes     Partners: Male     Birth control/protection: Pill   Lifestyle     Physical activity:     Days per week: Not on file     Minutes per session: Not on file     Stress: Not on file   Relationships     Social connections:     Talks on phone: Not on file     Gets together: Not on file     Attends [employer] service: Not on file     Active member of club or organization: Not on file     Attends meetings of clubs or organizations: Not on file     Relationship status: Not on file     Intimate partner violence:     Fear of current or ex partner: Not on file     Emotionally abused: Not on file     Physically abused: Not on file     Forced sexual activity: Not on file   Other Topics Concern     Parent/sibling w/ CABG, MI or angioplasty before 65F 55M? Yes   Social History Narrative     Not on file         FAMILY HISTORY:  Family History   Problem Relation Age of Onset     Coronary Artery Disease Father 48        MI. and one in his 60s     Emphysema Mother         COPD         PHYSICAL EXAM:  Vital signs:  /73   Pulse 78   Temp 97.9  F (36.6  C) (Oral)   Resp 16   Ht 1.702 m (5' 7.01\")   Wt 66.4 kg (146 lb 6.2 oz)   LMP 10/07/2017 (Approximate)   SpO2 98%   BMI 22.92 kg/m     ECO  GENERAL/CONSTITUTIONAL: No acute distress.  EYES: No scleral icterus.  ENT/MOUTH: Neck supple. Oropharynx clear, no mucositis.  LYMPH: No cervical, supraclavicular, or axillary adenopathy.   BREAST: Palpable right upper outer breast 3 x 3 cm less circumscribed mass, movable, flatter compared to prior exam, nontender.  No rashes or " ulceration or erythema.  No dimpling.  RESPIRATORY: Clear to auscultation bilaterally. No crackles or wheezing.   CARDIOVASCULAR: Regular rate and rhythm without murmurs.  GASTROINTESTINAL: No guarding or distention.  MUSCULOSKELETAL: Warm and well-perfused, no cyanosis, clubbing, or edema.  NEUROLOGIC: Cranial nerves II-XII are intact. Alert, oriented, answers questions appropriately.  INTEGUMENTARY: No rashes or jaundice.  GAIT: Steady, does not use assistive device    LABS:  CBC RESULTS:   Recent Labs   Lab Test 12/11/19  0814   WBC 9.8   RBC 3.76*   HGB 11.7   HCT 34.2*   MCV 91   MCH 31.1   MCHC 34.2   RDW 14.2            PATHOLOGY:  None new.    IMAGING:  None new.    ASSESSMENT/PLAN:  Jyothi Freitas is a 53 year old female with the following issues:  1.  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  I reviewed the blood counts with her.  They have adequately recovered to proceed with her 4th/last cycle of ddAC.  -Plan is for her to return in 2 weeks to start 12 weeks of weekly paclitaxel.  -Rationale for neoadjuvant chemotherapy is to observe for chemo responsiveness, reduce risk of recurrent breast cancer, and reduce burden of disease prior to surgery.  However, as per previous discussion, she understands that it is unlikely that we will achieve a complete response to neoadjuvant chemotherapy given that her tumor is strongly ER and CA positive.  -She would certainly be a candidate for adjuvant endocrine therapy based on the strongly ER and CA positive tumor status.  She is postmenopausal, so I would typically recommend anastrozole to reduce her risk of breast cancer recurrence by relative 50%.  -Genetic test results pending from her consult in November  2019.  -She would also likely be a candidate for adjuvant radiation therapy given her lymph node involvement and depending on radiation oncology evaluation.        2. Depression  -Mood stable. Continue bupropion. Would not recommend tamoxifen in future due to interaction with bupropion unless she switched antidepressants.    3.  Constipation  -Likely drug related from her antiemetics and now much improved after taking her antiemetics on an as-needed basis. She may take stool softeners and laxatives as needed for constipation.    4.  Bone pain related to pegfilgrastim  -She may take naproxen orally twice daily and acetaminophen as needed for breakthrough pain.    5.  Insomnia   6.  Hot flashes, chemotherapy induced  -Intermittent, worsened by sweats likely due to hot flashes continue zolpidem as needed, given that she had headaches to lorazepam.  -Recommended she try oxybutynin 5 mg twice daily which has been shown to help with hot flashes.  I discussed potential side effects such as urinary retention.  She wishes to try oxybutynin.  Prescription issued.    7. Ectasia of thoracic aorta  -This measures 4 cm on the PET scan.  I reassured Jyothi that she would not need any urgent surgical management of the aorta that may need ongoing monitoring. She is following with Dr. Silverio Gooden for monitoring of this issue.     Return to see me with subsequent chemo treatment.      Maricarmen Monroe MD  Hematology/Oncology  [facility]    I spent a total of 25 minutes with the patient, with greater than 50% of the time in counseling and coordination of care.

## 2019-12-11 ENCOUNTER — INFUSION THERAPY VISIT (OUTPATIENT)
Dept: INFUSION THERAPY | Facility: CLINIC | Age: 54
End: 2019-12-11
Attending: INTERNAL MEDICINE
Payer: COMMERCIAL

## 2019-12-11 ENCOUNTER — HOSPITAL ENCOUNTER (OUTPATIENT)
Facility: CLINIC | Age: 54
Setting detail: SPECIMEN
Discharge: HOME | End: 2019-12-11
Attending: INTERNAL MEDICINE | Admitting: INTERNAL MEDICINE
Payer: COMMERCIAL

## 2019-12-11 ENCOUNTER — ONCOLOGY VISIT (OUTPATIENT)
Dept: ONCOLOGY | Facility: CLINIC | Age: 54
End: 2019-12-11
Attending: INTERNAL MEDICINE
Payer: COMMERCIAL

## 2019-12-11 VITALS
DIASTOLIC BLOOD PRESSURE: 73 MMHG | HEIGHT: 67 IN | WEIGHT: 146.4 LBS | RESPIRATION RATE: 16 BRPM | OXYGEN SATURATION: 98 % | TEMPERATURE: 97.9 F | BODY MASS INDEX: 22.98 KG/M2 | SYSTOLIC BLOOD PRESSURE: 105 MMHG | HEART RATE: 78 BPM

## 2019-12-11 VITALS
OXYGEN SATURATION: 98 % | DIASTOLIC BLOOD PRESSURE: 73 MMHG | HEIGHT: 67 IN | RESPIRATION RATE: 16 BRPM | HEART RATE: 78 BPM | BODY MASS INDEX: 22.98 KG/M2 | SYSTOLIC BLOOD PRESSURE: 105 MMHG | WEIGHT: 146.39 LBS | TEMPERATURE: 97.9 F

## 2019-12-11 DIAGNOSIS — M89.8X9 BONE PAIN DUE TO G-CSF: ICD-10-CM

## 2019-12-11 DIAGNOSIS — Z17.0 MALIGNANT NEOPLASM OF RIGHT BREAST IN FEMALE, ESTROGEN RECEPTOR POSITIVE, UNSPECIFIED SITE OF BREAST (H): ICD-10-CM

## 2019-12-11 DIAGNOSIS — N95.1 MENOPAUSAL SYNDROME (HOT FLASHES): ICD-10-CM

## 2019-12-11 DIAGNOSIS — Z17.0 MALIGNANT NEOPLASM OF UPPER-OUTER QUADRANT OF RIGHT BREAST IN FEMALE, ESTROGEN RECEPTOR POSITIVE (H): Primary | ICD-10-CM

## 2019-12-11 DIAGNOSIS — K59.00 CONSTIPATION, UNSPECIFIED CONSTIPATION TYPE: ICD-10-CM

## 2019-12-11 DIAGNOSIS — G44.219 EPISODIC TENSION-TYPE HEADACHE, NOT INTRACTABLE: Primary | ICD-10-CM

## 2019-12-11 DIAGNOSIS — C50.411 MALIGNANT NEOPLASM OF UPPER-OUTER QUADRANT OF RIGHT BREAST IN FEMALE, ESTROGEN RECEPTOR POSITIVE (H): Primary | ICD-10-CM

## 2019-12-11 DIAGNOSIS — G47.01 INSOMNIA DUE TO MEDICAL CONDITION: ICD-10-CM

## 2019-12-11 DIAGNOSIS — C50.911 MALIGNANT NEOPLASM OF RIGHT BREAST IN FEMALE, ESTROGEN RECEPTOR POSITIVE, UNSPECIFIED SITE OF BREAST (H): ICD-10-CM

## 2019-12-11 LAB
BASOPHILS # BLD AUTO: 0.1 10E9/L (ref 0–0.2)
BASOPHILS NFR BLD AUTO: 1 %
DACRYOCYTES BLD QL SMEAR: SLIGHT
DIFFERENTIAL METHOD BLD: ABNORMAL
ELLIPTOCYTES BLD QL SMEAR: SLIGHT
EOSINOPHIL # BLD AUTO: 0.1 10E9/L (ref 0–0.7)
EOSINOPHIL NFR BLD AUTO: 1 %
ERYTHROCYTE [DISTWIDTH] IN BLOOD BY AUTOMATED COUNT: 14.2 % (ref 10–15)
HCT VFR BLD AUTO: 34.2 % (ref 35–47)
HGB BLD-MCNC: 11.7 G/DL (ref 11.7–15.7)
LYMPHOCYTES # BLD AUTO: 1.7 10E9/L (ref 0.8–5.3)
LYMPHOCYTES NFR BLD AUTO: 17 %
MCH RBC QN AUTO: 31.1 PG (ref 26.5–33)
MCHC RBC AUTO-ENTMCNC: 34.2 G/DL (ref 31.5–36.5)
MCV RBC AUTO: 91 FL (ref 78–100)
METAMYELOCYTES # BLD: 0.3 10E9/L
METAMYELOCYTES NFR BLD MANUAL: 3 %
MONOCYTES # BLD AUTO: 0.9 10E9/L (ref 0–1.3)
MONOCYTES NFR BLD AUTO: 9 %
MYELOCYTES # BLD: 0.6 10E9/L
MYELOCYTES NFR BLD MANUAL: 6 %
NEUTROPHILS # BLD AUTO: 6.2 10E9/L (ref 1.6–8.3)
NEUTROPHILS NFR BLD AUTO: 63 %
NRBC # BLD AUTO: 0.1 10*3/UL
NRBC BLD AUTO-RTO: 1 /100
PLATELET # BLD AUTO: 271 10E9/L (ref 150–450)
PLATELET # BLD EST: ABNORMAL 10*3/UL
RBC # BLD AUTO: 3.76 10E12/L (ref 3.8–5.2)
WBC # BLD AUTO: 9.8 10E9/L (ref 4–11)

## 2019-12-11 PROCEDURE — 25800030 ZZH RX IP 258 OP 636: Performed by: INTERNAL MEDICINE

## 2019-12-11 PROCEDURE — 40001096 ZZH STATISTIC ACUPUNCTURE (RH & SH ONLY): Performed by: ACUPUNCTURIST

## 2019-12-11 PROCEDURE — 96413 CHEMO IV INFUSION 1 HR: CPT

## 2019-12-11 PROCEDURE — 96375 TX/PRO/DX INJ NEW DRUG ADDON: CPT

## 2019-12-11 PROCEDURE — 96377 APPLICATON ON-BODY INJECTOR: CPT | Mod: XS

## 2019-12-11 PROCEDURE — 96367 TX/PROPH/DG ADDL SEQ IV INF: CPT

## 2019-12-11 PROCEDURE — G0463 HOSPITAL OUTPT CLINIC VISIT: HCPCS | Mod: 25

## 2019-12-11 PROCEDURE — 85025 COMPLETE CBC W/AUTO DIFF WBC: CPT | Performed by: INTERNAL MEDICINE

## 2019-12-11 PROCEDURE — 96411 CHEMO IV PUSH ADDL DRUG: CPT

## 2019-12-11 PROCEDURE — 25000128 H RX IP 250 OP 636: Performed by: INTERNAL MEDICINE

## 2019-12-11 PROCEDURE — 99214 OFFICE O/P EST MOD 30 MIN: CPT | Performed by: INTERNAL MEDICINE

## 2019-12-11 RX ORDER — DIPHENHYDRAMINE HYDROCHLORIDE 50 MG/ML
50 INJECTION INTRAMUSCULAR; INTRAVENOUS
Status: CANCELLED
Start: 2019-12-11

## 2019-12-11 RX ORDER — HEPARIN SODIUM (PORCINE) LOCK FLUSH IV SOLN 100 UNIT/ML 100 UNIT/ML
5 SOLUTION INTRAVENOUS
Status: DISCONTINUED | OUTPATIENT
Start: 2019-12-11 | End: 2019-12-11 | Stop reason: HOSPADM

## 2019-12-11 RX ORDER — ALBUTEROL SULFATE 90 UG/1
1-2 AEROSOL, METERED RESPIRATORY (INHALATION)
Status: CANCELLED
Start: 2019-12-11

## 2019-12-11 RX ORDER — EPINEPHRINE 1 MG/ML
0.3 INJECTION, SOLUTION INTRAMUSCULAR; SUBCUTANEOUS EVERY 5 MIN PRN
Status: CANCELLED | OUTPATIENT
Start: 2019-12-11

## 2019-12-11 RX ORDER — LORAZEPAM 2 MG/ML
0.5 INJECTION INTRAMUSCULAR EVERY 4 HOURS PRN
Status: CANCELLED
Start: 2019-12-11

## 2019-12-11 RX ORDER — PALONOSETRON 0.05 MG/ML
0.25 INJECTION, SOLUTION INTRAVENOUS ONCE
Status: COMPLETED | OUTPATIENT
Start: 2019-12-11 | End: 2019-12-11

## 2019-12-11 RX ORDER — NALOXONE HYDROCHLORIDE 0.4 MG/ML
.1-.4 INJECTION, SOLUTION INTRAMUSCULAR; INTRAVENOUS; SUBCUTANEOUS
Status: CANCELLED | OUTPATIENT
Start: 2019-12-11

## 2019-12-11 RX ORDER — PALONOSETRON 0.05 MG/ML
0.25 INJECTION, SOLUTION INTRAVENOUS ONCE
Status: CANCELLED
Start: 2019-12-11

## 2019-12-11 RX ORDER — MEPERIDINE HYDROCHLORIDE 25 MG/ML
25 INJECTION INTRAMUSCULAR; INTRAVENOUS; SUBCUTANEOUS EVERY 30 MIN PRN
Status: CANCELLED | OUTPATIENT
Start: 2019-12-11

## 2019-12-11 RX ORDER — DOXORUBICIN HYDROCHLORIDE 2 MG/ML
60 INJECTION, SOLUTION INTRAVENOUS ONCE
Status: CANCELLED | OUTPATIENT
Start: 2019-12-11

## 2019-12-11 RX ORDER — DOXORUBICIN HYDROCHLORIDE 2 MG/ML
100 INJECTION, SOLUTION INTRAVENOUS ONCE
Status: COMPLETED | OUTPATIENT
Start: 2019-12-11 | End: 2019-12-11

## 2019-12-11 RX ORDER — OXYBUTYNIN CHLORIDE 5 MG/1
5 TABLET ORAL 2 TIMES DAILY
Qty: 60 TABLET | Refills: 3 | Status: SHIPPED | OUTPATIENT
Start: 2019-12-11 | End: 2020-02-04

## 2019-12-11 RX ORDER — ALBUTEROL SULFATE 0.83 MG/ML
2.5 SOLUTION RESPIRATORY (INHALATION)
Status: CANCELLED | OUTPATIENT
Start: 2019-12-11

## 2019-12-11 RX ORDER — HEPARIN SODIUM (PORCINE) LOCK FLUSH IV SOLN 100 UNIT/ML 100 UNIT/ML
5 SOLUTION INTRAVENOUS
Status: CANCELLED | OUTPATIENT
Start: 2019-12-11

## 2019-12-11 RX ORDER — SODIUM CHLORIDE 9 MG/ML
1000 INJECTION, SOLUTION INTRAVENOUS CONTINUOUS PRN
Status: CANCELLED
Start: 2019-12-11

## 2019-12-11 RX ORDER — EPINEPHRINE 0.3 MG/.3ML
0.3 INJECTION SUBCUTANEOUS EVERY 5 MIN PRN
Status: CANCELLED | OUTPATIENT
Start: 2019-12-11

## 2019-12-11 RX ORDER — METHYLPREDNISOLONE SODIUM SUCCINATE 125 MG/2ML
125 INJECTION, POWDER, LYOPHILIZED, FOR SOLUTION INTRAMUSCULAR; INTRAVENOUS
Status: CANCELLED
Start: 2019-12-11

## 2019-12-11 RX ADMIN — PALONOSETRON 0.25 MG: 0.25 INJECTION, SOLUTION INTRAVENOUS at 09:51

## 2019-12-11 RX ADMIN — CYCLOPHOSPHAMIDE 1000 MG: 1 INJECTION, POWDER, FOR SOLUTION INTRAVENOUS; ORAL at 10:39

## 2019-12-11 RX ADMIN — SODIUM CHLORIDE 1000 ML: 9 INJECTION, SOLUTION INTRAVENOUS at 09:10

## 2019-12-11 RX ADMIN — PEGFILGRASTIM 6 MG: KIT SUBCUTANEOUS at 11:39

## 2019-12-11 RX ADMIN — DEXAMETHASONE SODIUM PHOSPHATE: 10 INJECTION, SOLUTION INTRAMUSCULAR; INTRAVENOUS at 09:51

## 2019-12-11 RX ADMIN — HEPARIN SODIUM (PORCINE) LOCK FLUSH IV SOLN 100 UNIT/ML 5 ML: 100 SOLUTION at 12:03

## 2019-12-11 RX ADMIN — DOXORUBICIN HYDROCHLORIDE 100 MG: 2 INJECTION, SOLUTION INTRAVENOUS at 10:25

## 2019-12-11 ASSESSMENT — MIFFLIN-ST. JEOR
SCORE: 1296.78
SCORE: 1296.82

## 2019-12-11 ASSESSMENT — PAIN SCALES - GENERAL: PAINLEVEL: NO PAIN (0)

## 2019-12-11 NOTE — PATIENT INSTRUCTIONS
Your On-body Neulasta Injector was applied to your abdomen at 1145.  At approximately 2:45pm on 12/12/19, your On-body Injector will beep to let you know your dose delivery will begin in 2 minutes.  Your medication will be delivered over the next 45 minutes.  You can remove your Injector at 3:30pm.  Please make sure your Injector has a solid green light or has turned off prior to removing the device.  Please contact your provider at [phone] with questions or concerns.

## 2019-12-11 NOTE — LETTER
"    2019         RE: Jyothi Freitas  [address]  [facility] [address]        Dear Colleague,    Thank you for referring your patient, Jyothi Freitas, to the [facility]. Please see a copy of my visit note below.    ACUPUNCTURIST TREATMENT NOTE    Name: Jyothi Freitas  :  1965  MRN:  [id number]      Acupuncture Treatment  Patient Type: Other( cancer clinic)  Intervention Reason: Headache  Pre-session Headache Ratin  Post-session Headache Ratin  Patient complaint:: Mild headache and muscle tension.  Jyothi is in clinic today for an infusion tx and would like to try acupuncture.  Jyothi explains that she does get tension headaches but the recent headaches that she has been experiencing wake her from sleep at 2 am and the pain is in a different location, in area of GB meridian around the ears.  Acupuncture (Points):: GB 20, GB 21, LI 4, St 36, GB 34, Tasneem 3  TCM Diagnosis: Cold stagnating Qi and blood in the head  Practitioner Observed: 30 minute treatment during infusion  Checklist: Progress Note Completed, Consent Reveiwed  Follow up comments: Tx 1: infusion     \"Risks and benefits of acupuncture were discussed with patient. Consent for treatment was given. We thank you for the referral.\"     Theresa Ojeda L.Ac.     Date:  2019  Time:  2:46 PM        Again, thank you for allowing me to participate in the care of your patient.        Sincerely,        Theresa Ojeda    "

## 2019-12-11 NOTE — PROGRESS NOTES
"Oncology Rooming Note    December 11, 2019 8:25 AM   Jyothi Freitas is a 53 year old female who presents for:    Chief Complaint   Patient presents with     Oncology Clinic Visit     Initial Vitals: /73   Pulse 78   Temp 97.9  F (36.6  C) (Oral)   Resp 16   Ht 1.702 m (5' 7.01\")   Wt 66.4 kg (146 lb 6.2 oz)   LMP 10/07/2017 (Approximate)   SpO2 98%   BMI 22.92 kg/m   Estimated body mass index is 22.92 kg/m  as calculated from the following:    Height as of this encounter: 1.702 m (5' 7.01\").    Weight as of this encounter: 66.4 kg (146 lb 6.2 oz). Body surface area is 1.77 meters squared.  No Pain (0) Comment: Data Unavailable   Patient's last menstrual period was 10/07/2017 (approximate).  Allergies reviewed: Yes  Medications reviewed: Yes    Medications: Medication refills not needed today.  Pharmacy name entered into [employer]:    [id number] - [place], [place] - [address] AT [facility] [address]  [id number] - [place], [place] - [address] AT [address] [place] [address]    Clinical concerns: No      Gloria Hand CMA            "

## 2019-12-11 NOTE — PROGRESS NOTES
Infusion Nursing Note:  Jyothi Freitas presents today for C4D1 AC.    Patient seen by provider today: Yes: Dr. Monroe   present during visit today: Not Applicable.    Note: cold caps with chemo. Pt will have weekly Taxol, cold application reviewed with pt and she agreed to do it.     ONPRO  Was placed on patient's: right side of abdomen.    Was placed at 11:45 AM    ONPRO injector device Lot number: [id number]    Patient education included: what patient can expect after application, what colored lights mean on the device, when to remove device, when and where to call with questions or issues, all patients questions answered and that Neulasta administration will occur at 1:45pm on 12/12/19  Patient tolerated administration well.    Intravenous Access:  Labs drawn without difficultyr  Implanted Port.    Treatment Conditions:  Lab Results   Component Value Date    HGB 11.7 12/11/2019     Lab Results   Component Value Date    WBC 9.8 12/11/2019      Lab Results   Component Value Date    ANEU 6.2 12/11/2019     Lab Results   Component Value Date     12/11/2019      Results reviewed, labs MET treatment parameters, ok to proceed with treatment.      Post Infusion Assessment:  Patient tolerated infusion without incident.  Blood return noted pre and post infusion.  Blood return noted during administration every 2 cc.  Site patent and intact, free from redness, edema or discomfort.  No evidence of extravasations.       Discharge Plan:   Prescription refills given for Dexamethasone.  Discharge instructions reviewed with: Patient.  Patient and/or family verbalized understanding of discharge instructions and all questions answered.  Copy of AVS reviewed with patient and/or family.  Patient will return 12/26/19 for next appointment.  Patient discharged in stable condition accompanied by: self.  Departure Mode: Ambulatory.    Huang Malone RN

## 2019-12-11 NOTE — LETTER
12/11/2019         RE: Jyothi Freitas  [address]  [place] [address]        Dear Colleague,    Thank you for referring your patient, Jyothi Freitas, to the [facility]. Please see a copy of my visit note below.    [facility] [place]    Hematology/Oncology Established Patient Follow-up Note      Today's Date: 12/11/19    Reason for Follow-up: Right breast cancer.    HISTORY OF PRESENT ILLNESS: Jyothi Freitas is a 53 year old female who presents with the following oncologic history:   1.  10/11/2019: Diagnostic right sided mammogram performed for a palpable lump in the right breast.  This showed an irregularly-shaped spiculated mass in the upper outer right breast with prominent lymph nodes in the right axilla.  Targeted ultrasound of the right upper outer breast showed an irregularly-shaped hypoechoic mass at the 10 o'clock position, 4 cm from the nipple measuring 2.6 x 1.5 x 2.4 cm.  Right axillary ultrasound showed moderately enlarged lymph nodes.  Right breast needle biopsy showed a grade 3 invasive ductal carcinoma with lymphovascular invasion identified, ER strongly positive at 95%, WI strongly positive at 95%, HER-2/juan FISH negative.  Right axillary lymph node measuring 2 cm was positive for metastatic carcinoma.  Note prior 4/2019 mammogram deemed normal.  2.  10/15/2019: Bilateral breast MRI showed known right breast malignancy measuring 2.6 x 1.4 x 2 cm, 3 mildly enlarged right axillary lymph nodes and no contralateral breast malignancy.  3. 10/21/2019 PET scan showed scattered small hypermetabolic bilateral axillary lymph nodes; for example, a hypermetabolic right axillary lymph node measures 1.6 x 0.9 cm with SUV max 3.6.  Hypermetabolic mass in the right breast superiorly and laterally measuring 2.1 x 1.6 cm with SUV max 4.3.  A 0.6 cm low-density lesion in the right hepatic lobe is too small for accurate PET characterization but shows no appreciable hypermetabolic activity.   Incidentally noted ectasia of the ascending thoracic aorta measures 4 cm in diameter.  4.  10/23/2019: Left axillary ultrasound showed benign-appearing lymph node.  Left axillary lymph node biopsy showed benign lymph node tissue.  5.  10/29/2019: Started neoadjuvant chemotherapy with dose dense Adriamycin and Cytoxan, to be followed by weekly paclitaxel.    INTERIM HISTORY:  Jyothi Freitas reports intermittent sweats that is disturbing her sleep at night on a daily basis.  Otherwise, she denies any fevers, chills, bladder dysfunction.  Constipation seems to be overall well controlled.    REVIEW OF SYSTEMS:   14 point ROS was reviewed and is negative other than as noted above in HPI.       HOME MEDICATIONS:  Current Outpatient Medications   Medication Sig Dispense Refill     acetaminophen (TYLENOL) 500 MG tablet Take 1,000 mg by mouth every 6 hours as needed for mild pain       acetaminophen-caffeine (EXCEDRIN TENSION HEADACHE) 500-65 MG TABS Take 2 tablets by mouth every 6 hours as needed for mild pain       ALBUTEROL 108 (90 BASE) MCG/ACT inhaler INHALE 2 PUFFS INTO THE LUNGS EVERY 6 HOURS AS NEEDED FOR SHORTNESS OF BREATH OR DIFFICULT BREATHING OR WHEEZING 8.5 g 0     buPROPion (WELLBUTRIN XL) 300 MG 24 hr tablet Take 1 tablet (300 mg) by mouth every morning 90 tablet 3     dexamethasone (DECADRON) 4 MG tablet Take 2 tablets (8 mg) by mouth daily for 3 days Start on Day 2 of Cycles 1 through 4. 6 tablet 3     fluticasone (FLOVENT DISKUS) 100 MCG/BLIST inhaler Inhale 1 puff into the lungs 2 times daily (Patient not taking: Reported on 11/20/2019) 3 Inhaler 3     fluticasone-salmeterol (ADVAIR) 100-50 MCG/DOSE inhaler Inhale 1 puff into the lungs every 12 hours 1 Inhaler 3     HYDROcodone-acetaminophen (NORCO) 5-325 MG tablet Take 1-2 tablets by mouth every 4 hours as needed for moderate to severe pain 10 tablet 0     nadolol (CORGARD) 20 MG tablet TAKE 1 TABLET(20 MG) BY MOUTH DAILY 90 tablet 3     naproxen sodium  "(ANAPROX) 220 MG tablet Take 220 mg by mouth daily as needed for moderate pain       pravastatin (PRAVACHOL) 20 MG tablet Take 1 tablet (20 mg) by mouth daily 45 tablet 0     prochlorperazine (COMPAZINE) 10 MG tablet Take 1 tablet (10 mg) by mouth every 6 hours as needed (Nausea/Vomiting) 30 tablet 5     senna-docusate (SENOKOT-S/PERICOLACE) 8.6-50 MG tablet Take 1-2 tablets by mouth 2 times daily 30 tablet 0     zolpidem (AMBIEN) 5 MG tablet Take 1 tablet (5 mg) by mouth nightly as needed for sleep 30 tablet 1         ALLERGIES:  Allergies   Allergen Reactions     Sulfa Drugs Other (See Comments)     Red face. Ringing in the ears.         PAST MEDICAL HISTORY:  Past Medical History:   Diagnosis Date     GERD (gastroesophageal reflux disease)      H/O colonoscopy 03/08/2016    done at [facility] and [facility]     Hematuria 2016    eval at [facility] and per pt cysto and ct neg     Intermittent asthma     since childhood     Low iron 10/2017    done for eval of hair loss, egd [facility]l     Migraines     most of adult life, has seen neuro in past, on bblocker     Mild depression (H)      Normal stress echocardiogram July 2011    due to hear racing     Osteopenia 2008     Syncope 03/2017    echo n[facility] lv size and fxn, grade 1 dd, mild tr         PAST SURGICAL HISTORY:  Past Surgical History:   Procedure Laterality Date     C TMJ ARTHROSCOPY/SURGERY       ESOPHAGOSCOPY, GASTROSCOPY, DUODENOSCOPY (EGD), COMBINED N/A 10/30/2017    Procedure: COMBINED ESOPHAGOSCOPY, GASTROSCOPY, DUODENOSCOPY (EGD), BIOPSY SINGLE OR MULTIPLE;  COMBINED ESOPHAGOSCOPY, GASTROSCOPY, DUODENOSCOPY (EGD);  Surgeon: Martin Rodriguez MD;  Location:  GI     INSERT PORT VASCULAR ACCESS N/A 10/24/2019    Procedure: PORT PLACEMENT;  Surgeon: Kaila Hernandez MD;  Location:  OR     ORTHOPEDIC SURGERY      \"leg surgery\"     single oopherectomy  2010    cyst         SOCIAL HISTORY:  Social History     Socioeconomic History     Marital status:      Spouse name: Not " "on file     Number of children: 2     Years of education: Not on file     Highest education level: Not on file   Occupational History     Not on file   Social Needs     Financial resource strain: Not on file     Food insecurity:     Worry: Not on file     Inability: Not on file     Transportation needs:     Medical: Not on file     Non-medical: Not on file   Tobacco Use     Smoking status: Former Smoker     Packs/day: 0.00     Last attempt to quit: 3/27/1997     Years since quittin.7     Smokeless tobacco: Never Used   Substance and Sexual Activity     Alcohol use: Yes     Comment: rarely     Drug use: No     Sexual activity: Yes     Partners: Male     Birth control/protection: Pill   Lifestyle     Physical activity:     Days per week: Not on file     Minutes per session: Not on file     Stress: Not on file   Relationships     Social connections:     Talks on phone: Not on file     Gets together: Not on file     Attends Yazidism service: Not on file     Active member of club or organization: Not on file     Attends meetings of clubs or organizations: Not on file     Relationship status: Not on file     Intimate partner violence:     Fear of current or ex partner: Not on file     Emotionally abused: Not on file     Physically abused: Not on file     Forced sexual activity: Not on file   Other Topics Concern     Parent/sibling w/ CABG, MI or angioplasty before 65F 55M? Yes   Social History Narrative     Not on file         FAMILY HISTORY:  Family History   Problem Relation Age of Onset     Coronary Artery Disease Father 48        MI. and one in his 60s     Emphysema Mother         COPD         PHYSICAL EXAM:  Vital signs:  /73   Pulse 78   Temp 97.9  F (36.6  C) (Oral)   Resp 16   Ht 1.702 m (5' 7.01\")   Wt 66.4 kg (146 lb 6.2 oz)   LMP 10/07/2017 (Approximate)   SpO2 98%   BMI 22.92 kg/m      ECO  GENERAL/CONSTITUTIONAL: No acute distress.  EYES: No scleral icterus.  ENT/MOUTH: Neck supple. " Oropharynx clear, no mucositis.  LYMPH: No cervical, supraclavicular, or axillary adenopathy.   BREAST: Palpable right upper outer breast 3 x 3 cm less circumscribed mass, movable, flatter compared to prior exam, nontender.  No rashes or ulceration or erythema.  No dimpling.  RESPIRATORY: Clear to auscultation bilaterally. No crackles or wheezing.   CARDIOVASCULAR: Regular rate and rhythm without murmurs.  GASTROINTESTINAL: No guarding or distention.  MUSCULOSKELETAL: Warm and well-perfused, no cyanosis, clubbing, or edema.  NEUROLOGIC: Cranial nerves II-XII are intact. Alert, oriented, answers questions appropriately.  INTEGUMENTARY: No rashes or jaundice.  GAIT: Steady, does not use assistive device    LABS:  CBC RESULTS:   Recent Labs   Lab Test 12/11/19  0814   WBC 9.8   RBC 3.76*   HGB 11.7   HCT 34.2*   MCV 91   MCH 31.1   MCHC 34.2   RDW 14.2            PATHOLOGY:  None new.    IMAGING:  None new.    ASSESSMENT/PLAN:  Jyothi Freitas is a 53 year old female with the following issues:  1.  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  I reviewed the blood counts with her.  They have adequately recovered to proceed with her 4th/last cycle of ddAC.  -Plan is for her to return in 2 weeks to start 12 weeks of weekly paclitaxel.  -Rationale for neoadjuvant chemotherapy is to observe for chemo responsiveness, reduce risk of recurrent breast cancer, and reduce burden of disease prior to surgery.  However, as per previous discussion, she understands that it is unlikely that we will achieve a complete response to neoadjuvant chemotherapy given that her tumor is strongly ER and WY positive.  -She would certainly be a candidate for adjuvant endocrine therapy based on the  strongly ER and IA positive tumor status.  She is postmenopausal, so I would typically recommend anastrozole to reduce her risk of breast cancer recurrence by relative 50%.  -Genetic test results pending from her consult in November 2019.  -She would also likely be a candidate for adjuvant radiation therapy given her lymph node involvement and depending on radiation oncology evaluation.        2. Depression  -Mood stable. Continue bupropion. Would not recommend tamoxifen in future due to interaction with bupropion unless she switched antidepressants.    3.  Constipation  -Likely drug related from her antiemetics and now much improved after taking her antiemetics on an as-needed basis. She may take stool softeners and laxatives as needed for constipation.    4.  Bone pain related to pegfilgrastim  -She may take naproxen orally twice daily and acetaminophen as needed for breakthrough pain.    5.  Insomnia   6.  Hot flashes, chemotherapy induced  -Intermittent, worsened by sweats likely due to hot flashes continue zolpidem as needed, given that she had headaches to lorazepam.  -Recommended she try oxybutynin 5 mg twice daily which has been shown to help with hot flashes.  I discussed potential side effects such as urinary retention.  She wishes to try oxybutynin.  Prescription issued.    7. Ectasia of thoracic aorta  -This measures 4 cm on the PET scan.  I reassured Jyothi that she would not need any urgent surgical management of the aorta that may need ongoing monitoring. She is following with Dr. Silverio Gooden for monitoring of this issue.     Return to see me with subsequent chemo treatment.      Maricarmen Monroe MD  Hematology/Oncology  [facility]    I spent a total of 25 minutes with the patient, with greater than 50% of the time in counseling and coordination of care.    Oncology Rooming Note    December 11, 2019 8:25 AM   Jyothi Freitas is a 53 year old female who presents for:    Chief Complaint  "  Patient presents with     Oncology Clinic Visit     Initial Vitals: /73   Pulse 78   Temp 97.9  F (36.6  C) (Oral)   Resp 16   Ht 1.702 m (5' 7.01\")   Wt 66.4 kg (146 lb 6.2 oz)   LMP 10/07/2017 (Approximate)   SpO2 98%   BMI 22.92 kg/m    Estimated body mass index is 22.92 kg/m  as calculated from the following:    Height as of this encounter: 1.702 m (5' 7.01\").    Weight as of this encounter: 66.4 kg (146 lb 6.2 oz). Body surface area is 1.77 meters squared.  No Pain (0) Comment: Data Unavailable   Patient's last menstrual period was 10/07/2017 (approximate).  Allergies reviewed: Yes  Medications reviewed: Yes    Medications: Medication refills not needed today.  Pharmacy name entered into Method:    [facility][id number] - [place], [place] - [address] AT [facility] [address]  [facility] #[id number] - [place], [place] - [address] AT [address] [place] [address]    Clinical concerns: No      Gloria Hand, [facility]              Again, thank you for allowing me to participate in the care of your patient.        Sincerely,        Maricarmen Monroe MD    "

## 2019-12-11 NOTE — PATIENT INSTRUCTIONS
1. Proceed with ddAC cycle 4 today.  2. Return in 2 weeks to start cycle 1 Taxol with lab check.    Patient in [facility]

## 2019-12-11 NOTE — PROGRESS NOTES
"ACUPUNCTURIST TREATMENT NOTE    Name: Jyothi Freitas  :  1965  MRN:  [id number]      Acupuncture Treatment  Patient Type: Other( cancer clinic)  Intervention Reason: Headache  Pre-session Headache Ratin  Post-session Headache Ratin  Patient complaint:: Mild headache and muscle tension.  Jyothi is in clinic today for an infusion tx and would like to try acupuncture.  Jyothi explains that she does get tension headaches but the recent headaches that she has been experiencing wake her from sleep at 2 am and the pain is in a different location, in area of GB meridian around the ears.  Acupuncture (Points):: GB 20, GB 21, LI 4, St 36, GB 34, Tasneem 3  TCM Diagnosis: Cold stagnating Qi and blood in the head  Practitioner Observed: 30 minute treatment during infusion  Checklist: Progress Note Completed, Consent Reveiwed  Follow up comments: Tx 1: infusion     \"Risks and benefits of acupuncture were discussed with patient. Consent for treatment was given. We thank you for the referral.\"     Theresa Ojeda L.Ac.     Date:  2019  Time:  2:46 PM      "

## 2019-12-26 ENCOUNTER — HOSPITAL ENCOUNTER (OUTPATIENT)
Facility: CLINIC | Age: 54
Setting detail: SPECIMEN
End: 2019-12-26
Attending: INTERNAL MEDICINE
Payer: COMMERCIAL

## 2019-12-26 ENCOUNTER — ONCOLOGY VISIT (OUTPATIENT)
Dept: ONCOLOGY | Facility: CLINIC | Age: 54
End: 2019-12-26
Attending: INTERNAL MEDICINE
Payer: COMMERCIAL

## 2019-12-26 ENCOUNTER — INFUSION THERAPY VISIT (OUTPATIENT)
Dept: INFUSION THERAPY | Facility: CLINIC | Age: 54
End: 2019-12-26
Attending: INTERNAL MEDICINE
Payer: COMMERCIAL

## 2019-12-26 ENCOUNTER — HOSPITAL ENCOUNTER (OUTPATIENT)
Facility: CLINIC | Age: 54
Setting detail: SPECIMEN
Discharge: HOME | End: 2019-12-26
Attending: INTERNAL MEDICINE | Admitting: INTERNAL MEDICINE
Payer: COMMERCIAL

## 2019-12-26 VITALS
TEMPERATURE: 97.5 F | DIASTOLIC BLOOD PRESSURE: 70 MMHG | HEART RATE: 70 BPM | BODY MASS INDEX: 23.04 KG/M2 | WEIGHT: 146.8 LBS | OXYGEN SATURATION: 100 % | HEIGHT: 67 IN | RESPIRATION RATE: 16 BRPM | SYSTOLIC BLOOD PRESSURE: 121 MMHG

## 2019-12-26 VITALS
TEMPERATURE: 97.5 F | RESPIRATION RATE: 16 BRPM | BODY MASS INDEX: 23.04 KG/M2 | OXYGEN SATURATION: 100 % | DIASTOLIC BLOOD PRESSURE: 70 MMHG | SYSTOLIC BLOOD PRESSURE: 121 MMHG | HEART RATE: 70 BPM | WEIGHT: 146.83 LBS | HEIGHT: 67 IN

## 2019-12-26 DIAGNOSIS — Z17.0 MALIGNANT NEOPLASM OF RIGHT BREAST IN FEMALE, ESTROGEN RECEPTOR POSITIVE, UNSPECIFIED SITE OF BREAST (H): ICD-10-CM

## 2019-12-26 DIAGNOSIS — C50.911 MALIGNANT NEOPLASM OF RIGHT BREAST IN FEMALE, ESTROGEN RECEPTOR POSITIVE, UNSPECIFIED SITE OF BREAST (H): ICD-10-CM

## 2019-12-26 DIAGNOSIS — C50.411 MALIGNANT NEOPLASM OF UPPER-OUTER QUADRANT OF RIGHT BREAST IN FEMALE, ESTROGEN RECEPTOR POSITIVE (H): Primary | ICD-10-CM

## 2019-12-26 DIAGNOSIS — C50.411 MALIGNANT NEOPLASM OF UPPER-OUTER QUADRANT OF RIGHT BREAST IN FEMALE, ESTROGEN RECEPTOR POSITIVE (H): ICD-10-CM

## 2019-12-26 DIAGNOSIS — Z17.0 MALIGNANT NEOPLASM OF UPPER-OUTER QUADRANT OF RIGHT BREAST IN FEMALE, ESTROGEN RECEPTOR POSITIVE (H): ICD-10-CM

## 2019-12-26 DIAGNOSIS — Z17.0 MALIGNANT NEOPLASM OF UPPER-OUTER QUADRANT OF RIGHT BREAST IN FEMALE, ESTROGEN RECEPTOR POSITIVE (H): Primary | ICD-10-CM

## 2019-12-26 LAB
ALBUMIN SERPL-MCNC: 3.3 G/DL (ref 3.4–5)
ALP SERPL-CCNC: 59 U/L (ref 40–150)
ALT SERPL W P-5'-P-CCNC: 19 U/L (ref 0–50)
AST SERPL W P-5'-P-CCNC: 15 U/L (ref 0–45)
BASOPHILS # BLD AUTO: 0.1 10E9/L (ref 0–0.2)
BASOPHILS NFR BLD AUTO: 2 %
BILIRUB DIRECT SERPL-MCNC: <0.1 MG/DL (ref 0–0.2)
BILIRUB SERPL-MCNC: 0.1 MG/DL (ref 0.2–1.3)
DIFFERENTIAL METHOD BLD: ABNORMAL
ELLIPTOCYTES BLD QL SMEAR: SLIGHT
EOSINOPHIL # BLD AUTO: 0.1 10E9/L (ref 0–0.7)
EOSINOPHIL NFR BLD AUTO: 1 %
ERYTHROCYTE [DISTWIDTH] IN BLOOD BY AUTOMATED COUNT: 15 % (ref 10–15)
HCT VFR BLD AUTO: 33.4 % (ref 35–47)
HGB BLD-MCNC: 10.7 G/DL (ref 11.7–15.7)
LYMPHOCYTES # BLD AUTO: 1.7 10E9/L (ref 0.8–5.3)
LYMPHOCYTES NFR BLD AUTO: 29 %
MCH RBC QN AUTO: 30 PG (ref 26.5–33)
MCHC RBC AUTO-ENTMCNC: 32 G/DL (ref 31.5–36.5)
MCV RBC AUTO: 94 FL (ref 78–100)
MONOCYTES # BLD AUTO: 0.6 10E9/L (ref 0–1.3)
MONOCYTES NFR BLD AUTO: 10 %
MYELOCYTES # BLD: 0.1 10E9/L
MYELOCYTES NFR BLD MANUAL: 1 %
NEUTROPHILS # BLD AUTO: 3.3 10E9/L (ref 1.6–8.3)
NEUTROPHILS NFR BLD AUTO: 57 %
PLATELET # BLD AUTO: 276 10E9/L (ref 150–450)
PLATELET # BLD EST: ABNORMAL 10*3/UL
PROT SERPL-MCNC: 6.1 G/DL (ref 6.8–8.8)
RBC # BLD AUTO: 3.57 10E12/L (ref 3.8–5.2)
WBC # BLD AUTO: 5.8 10E9/L (ref 4–11)

## 2019-12-26 PROCEDURE — 96367 TX/PROPH/DG ADDL SEQ IV INF: CPT

## 2019-12-26 PROCEDURE — 80076 HEPATIC FUNCTION PANEL: CPT | Performed by: INTERNAL MEDICINE

## 2019-12-26 PROCEDURE — 25800030 ZZH RX IP 258 OP 636: Performed by: INTERNAL MEDICINE

## 2019-12-26 PROCEDURE — 25000128 H RX IP 250 OP 636: Performed by: INTERNAL MEDICINE

## 2019-12-26 PROCEDURE — 85025 COMPLETE CBC W/AUTO DIFF WBC: CPT | Performed by: INTERNAL MEDICINE

## 2019-12-26 PROCEDURE — G0463 HOSPITAL OUTPT CLINIC VISIT: HCPCS | Mod: 25

## 2019-12-26 PROCEDURE — 99214 OFFICE O/P EST MOD 30 MIN: CPT | Performed by: INTERNAL MEDICINE

## 2019-12-26 PROCEDURE — 96413 CHEMO IV INFUSION 1 HR: CPT

## 2019-12-26 PROCEDURE — 96375 TX/PRO/DX INJ NEW DRUG ADDON: CPT

## 2019-12-26 RX ORDER — SODIUM CHLORIDE 9 MG/ML
1000 INJECTION, SOLUTION INTRAVENOUS CONTINUOUS PRN
Status: CANCELLED
Start: 2019-12-26

## 2019-12-26 RX ORDER — LORAZEPAM 2 MG/ML
0.5 INJECTION INTRAMUSCULAR EVERY 4 HOURS PRN
Status: CANCELLED
Start: 2019-12-26

## 2019-12-26 RX ORDER — EPINEPHRINE 0.3 MG/.3ML
0.3 INJECTION SUBCUTANEOUS EVERY 5 MIN PRN
Status: CANCELLED | OUTPATIENT
Start: 2019-12-26

## 2019-12-26 RX ORDER — ALBUTEROL SULFATE 0.83 MG/ML
2.5 SOLUTION RESPIRATORY (INHALATION)
Status: CANCELLED | OUTPATIENT
Start: 2019-12-26

## 2019-12-26 RX ORDER — DIPHENHYDRAMINE HYDROCHLORIDE 50 MG/ML
50 INJECTION INTRAMUSCULAR; INTRAVENOUS
Status: CANCELLED
Start: 2019-12-26

## 2019-12-26 RX ORDER — EPINEPHRINE 1 MG/ML
0.3 INJECTION, SOLUTION INTRAMUSCULAR; SUBCUTANEOUS EVERY 5 MIN PRN
Status: CANCELLED | OUTPATIENT
Start: 2019-12-26

## 2019-12-26 RX ORDER — DIPHENHYDRAMINE HCL 25 MG
50 CAPSULE ORAL ONCE
Status: CANCELLED
Start: 2019-12-26

## 2019-12-26 RX ORDER — HEPARIN SODIUM (PORCINE) LOCK FLUSH IV SOLN 100 UNIT/ML 100 UNIT/ML
5 SOLUTION INTRAVENOUS
Status: CANCELLED | OUTPATIENT
Start: 2019-12-26

## 2019-12-26 RX ORDER — METHYLPREDNISOLONE SODIUM SUCCINATE 125 MG/2ML
125 INJECTION, POWDER, LYOPHILIZED, FOR SOLUTION INTRAMUSCULAR; INTRAVENOUS
Status: CANCELLED
Start: 2019-12-26

## 2019-12-26 RX ORDER — MEPERIDINE HYDROCHLORIDE 25 MG/ML
25 INJECTION INTRAMUSCULAR; INTRAVENOUS; SUBCUTANEOUS EVERY 30 MIN PRN
Status: CANCELLED | OUTPATIENT
Start: 2019-12-26

## 2019-12-26 RX ORDER — ALBUTEROL SULFATE 90 UG/1
1-2 AEROSOL, METERED RESPIRATORY (INHALATION)
Status: CANCELLED
Start: 2019-12-26

## 2019-12-26 RX ORDER — NALOXONE HYDROCHLORIDE 0.4 MG/ML
.1-.4 INJECTION, SOLUTION INTRAMUSCULAR; INTRAVENOUS; SUBCUTANEOUS
Status: CANCELLED | OUTPATIENT
Start: 2019-12-26

## 2019-12-26 RX ORDER — HEPARIN SODIUM (PORCINE) LOCK FLUSH IV SOLN 100 UNIT/ML 100 UNIT/ML
5 SOLUTION INTRAVENOUS
Status: DISCONTINUED | OUTPATIENT
Start: 2019-12-26 | End: 2019-12-26 | Stop reason: HOSPADM

## 2019-12-26 RX ADMIN — DEXAMETHASONE SODIUM PHOSPHATE 20 MG: 10 INJECTION, SOLUTION INTRAMUSCULAR; INTRAVENOUS at 09:19

## 2019-12-26 RX ADMIN — SODIUM CHLORIDE 1000 ML: 9 INJECTION, SOLUTION INTRAVENOUS at 08:48

## 2019-12-26 RX ADMIN — FAMOTIDINE 20 MG: 20 INJECTION, SOLUTION INTRAVENOUS at 09:51

## 2019-12-26 RX ADMIN — DIPHENHYDRAMINE HYDROCHLORIDE 50 MG: 50 INJECTION, SOLUTION INTRAMUSCULAR; INTRAVENOUS at 09:37

## 2019-12-26 RX ADMIN — HEPARIN SODIUM (PORCINE) LOCK FLUSH IV SOLN 100 UNIT/ML 5 ML: 100 SOLUTION at 11:58

## 2019-12-26 RX ADMIN — PACLITAXEL 135 MG: 6 INJECTION, SOLUTION INTRAVENOUS at 10:09

## 2019-12-26 ASSESSMENT — PAIN SCALES - GENERAL: PAINLEVEL: NO PAIN (0)

## 2019-12-26 ASSESSMENT — MIFFLIN-ST. JEOR
SCORE: 1293.63
SCORE: 1293.78

## 2019-12-26 NOTE — PROGRESS NOTES
Visit Date:   12/26/2019      SUBJECTIVE:  Ms. Freitas is a 54-year-old female with clinical stage IIB right breast cancer.  ER positive and HER-2/juan negative.  She is on neoadjuvant chemotherapy.  She completed 4 cycles of dose-dense Adriamycin and Cytoxan.  Today she will start weekly Taxol.      Overall, she is doing good.  Mild fatigue.  No headache.  No dizziness.  No chest pain.  No shortness of breath. No abdominal pain, nausea or vomiting.  Appetite is good.  No urinary. Has constipation.  No bleeding.  No fever, chills or night sweats.  No neuropathy.      PHYSICAL EXAMINATION:   GENERAL:  Alert and oriented x 3.   VITAL SIGNS:  Reviewed.  ECOG PS of 1.     EYES:  No icterus.   THROAT:  No ulcer. No thrush.   NECK:  Supple. No lymphadenopathy. No thyromegaly.   AXILLAE:  No lymphadenopathy.   LUNGS:  Good air entry bilaterally.  No crackles or wheezing.   HEART:  Regular.  No murmur.   ABDOMEN:  Soft.  Nontender. No mass.   EXTREMITIES:  No pedal edema.  No calf swelling or tenderness.   SKIN:  No rash.      LABORATORY DATA:  Reviewed.      ASSESSMENT:     1.  A 54-year-old female with clinical stage IIB right breast cancer on neoadjuvant chemotherapy.   2.  Mild anemia from chemotherapy.      PLAN:   1.  The patient is on neoadjuvant chemotherapy.  Overall, she is tolerating it well.  She has completed 4 cycles of Adriamycin and Cytoxan.  She will start weekly Taxol.      Discussed regarding weekly Taxol.  Side effects were reviewed.  Discussed regarding neuropathy.  I am hoping she will tolerate it well.     2.  Labs were all reviewed.  They are good for treatment.  She will start weekly Taxol today. The patient has mild anemia.  Anemia can get worse.  We will continue to monitor CBC.     3.  The patient has constipation.  She will continue on MiraLax and Ex-Lax, which helps.     4.  The patient will follow up with Dr. Monroe in 3-4 weeks' time.  Advised her to call us with any questions or concerns.          SULEMAN CAMARENA MD             D: 2019   T: 2019   MT: KATE      Name:     OFELIA DELGADO   MRN:      [id number]        Account:      [id number]   :      1965           Visit Date:   2019      Document: [id number]

## 2019-12-26 NOTE — PROGRESS NOTES
Infusion Nursing Note:  Jyothi STERN Zena presents today for Taxol.    Patient seen by provider today: Yes: Dr. Hughes   present during visit today: Not Applicable.    Note: cold caps.    Intravenous Access:  Labs drawn without difficulty.  Implanted Port.    Treatment Conditions:  Lab Results   Component Value Date    HGB 10.7 12/26/2019     Lab Results   Component Value Date    WBC 5.8 12/26/2019      Lab Results   Component Value Date    ANEU 3.3 12/26/2019     Lab Results   Component Value Date     12/26/2019      Lab Results   Component Value Date     11/27/2019                   Lab Results   Component Value Date    POTASSIUM 3.8 11/27/2019           No results found for: MAG         Lab Results   Component Value Date    CR 0.67 11/27/2019                   Lab Results   Component Value Date    JEFF 8.8 11/27/2019                Lab Results   Component Value Date    BILITOTAL 0.1 12/26/2019           Lab Results   Component Value Date    ALBUMIN 3.3 12/26/2019                    Lab Results   Component Value Date    ALT 19 12/26/2019           Lab Results   Component Value Date    AST 15 12/26/2019       Results reviewed, labs MET treatment parameters, ok to proceed with treatment.      Post Infusion Assessment:  Patient tolerated infusion without incident.  Blood return noted pre and post infusion.  Site patent and intact, free from redness, edema or discomfort.  No evidence of extravasations.  Access discontinued per protocol.       Discharge Plan:   Discharge instructions reviewed with: Patient.  Patient and/or family verbalized understanding of discharge instructions and all questions answered.  AVS to patient via OmmvenT.  Patient will return 1/2/20 for next appointment.   Patient discharged in stable condition accompanied by: self.  Departure Mode: Ambulatory.    Huang Malone RN

## 2019-12-26 NOTE — PROGRESS NOTES
"Oncology Rooming Note    December 26, 2019 8:01 AM   Jyothi Freitas is a 54 year old female who presents for:    Chief Complaint   Patient presents with     Oncology Clinic Visit     Initial Vitals: /70   Pulse 70   Temp 97.5  F (36.4  C) (Oral)   Resp 16   Ht 1.702 m (5' 7.01\")   Wt 66.6 kg (146 lb 13.2 oz)   LMP 10/07/2017 (Approximate)   SpO2 100%   BMI 22.99 kg/m   Estimated body mass index is 22.99 kg/m  as calculated from the following:    Height as of this encounter: 1.702 m (5' 7.01\").    Weight as of this encounter: 66.6 kg (146 lb 13.2 oz). Body surface area is 1.77 meters squared.  No Pain (0) Comment: Data Unavailable   Patient's last menstrual period was 10/07/2017 (approximate).  Allergies reviewed: Yes  Medications reviewed: Yes    Medications: Medication refills not needed today.  Pharmacy name entered into Atavist:    [facility][id number] - [place], [place] - [address] AT [facility] [address]  [facility] #[id number] - [place], [place] - [address] AT [address] [place] [address]    Clinical concerns: no      Gloria Hand CMA            "

## 2019-12-30 DIAGNOSIS — C50.411 MALIGNANT NEOPLASM OF UPPER-OUTER QUADRANT OF RIGHT BREAST IN FEMALE, ESTROGEN RECEPTOR POSITIVE (H): ICD-10-CM

## 2019-12-30 DIAGNOSIS — Z17.0 MALIGNANT NEOPLASM OF UPPER-OUTER QUADRANT OF RIGHT BREAST IN FEMALE, ESTROGEN RECEPTOR POSITIVE (H): ICD-10-CM

## 2019-12-30 RX ORDER — MEPERIDINE HYDROCHLORIDE 25 MG/ML
25 INJECTION INTRAMUSCULAR; INTRAVENOUS; SUBCUTANEOUS EVERY 30 MIN PRN
Status: CANCELLED | OUTPATIENT
Start: 2020-01-02

## 2019-12-30 RX ORDER — EPINEPHRINE 1 MG/ML
0.3 INJECTION, SOLUTION, CONCENTRATE INTRAVENOUS EVERY 5 MIN PRN
Status: CANCELLED | OUTPATIENT
Start: 2020-01-02

## 2019-12-30 RX ORDER — ALBUTEROL SULFATE 0.83 MG/ML
2.5 SOLUTION RESPIRATORY (INHALATION)
Status: CANCELLED | OUTPATIENT
Start: 2020-01-02

## 2019-12-30 RX ORDER — NALOXONE HYDROCHLORIDE 0.4 MG/ML
.1-.4 INJECTION, SOLUTION INTRAMUSCULAR; INTRAVENOUS; SUBCUTANEOUS
Status: CANCELLED | OUTPATIENT
Start: 2020-01-02

## 2019-12-30 RX ORDER — DIPHENHYDRAMINE HYDROCHLORIDE 50 MG/ML
50 INJECTION INTRAMUSCULAR; INTRAVENOUS
Status: CANCELLED
Start: 2020-01-02

## 2019-12-30 RX ORDER — EPINEPHRINE 0.3 MG/.3ML
0.3 INJECTION SUBCUTANEOUS EVERY 5 MIN PRN
Status: CANCELLED | OUTPATIENT
Start: 2020-01-02

## 2019-12-30 RX ORDER — DIPHENHYDRAMINE HCL 25 MG
50 CAPSULE ORAL ONCE
Status: CANCELLED
Start: 2020-01-02

## 2019-12-30 RX ORDER — ALBUTEROL SULFATE 90 UG/1
1-2 AEROSOL, METERED RESPIRATORY (INHALATION)
Status: CANCELLED
Start: 2020-01-02

## 2019-12-30 RX ORDER — LORAZEPAM 2 MG/ML
0.5 INJECTION INTRAMUSCULAR EVERY 4 HOURS PRN
Status: CANCELLED
Start: 2020-01-02

## 2019-12-30 RX ORDER — SODIUM CHLORIDE 9 MG/ML
1000 INJECTION, SOLUTION INTRAVENOUS CONTINUOUS PRN
Status: CANCELLED
Start: 2020-01-02

## 2020-01-02 ENCOUNTER — INFUSION THERAPY VISIT (OUTPATIENT)
Dept: INFUSION THERAPY | Facility: CLINIC | Age: 55
End: 2020-01-02
Attending: INTERNAL MEDICINE
Payer: COMMERCIAL

## 2020-01-02 ENCOUNTER — HOSPITAL ENCOUNTER (OUTPATIENT)
Facility: CLINIC | Age: 55
Setting detail: SPECIMEN
Discharge: HOME | End: 2020-01-02
Attending: INTERNAL MEDICINE | Admitting: INTERNAL MEDICINE
Payer: COMMERCIAL

## 2020-01-02 VITALS
WEIGHT: 145.6 LBS | DIASTOLIC BLOOD PRESSURE: 77 MMHG | TEMPERATURE: 98.2 F | OXYGEN SATURATION: 98 % | HEART RATE: 72 BPM | SYSTOLIC BLOOD PRESSURE: 111 MMHG | BODY MASS INDEX: 22.8 KG/M2 | RESPIRATION RATE: 20 BRPM

## 2020-01-02 DIAGNOSIS — C50.911 MALIGNANT NEOPLASM OF RIGHT BREAST IN FEMALE, ESTROGEN RECEPTOR POSITIVE, UNSPECIFIED SITE OF BREAST (H): ICD-10-CM

## 2020-01-02 DIAGNOSIS — Z17.0 MALIGNANT NEOPLASM OF UPPER-OUTER QUADRANT OF RIGHT BREAST IN FEMALE, ESTROGEN RECEPTOR POSITIVE (H): Primary | ICD-10-CM

## 2020-01-02 DIAGNOSIS — Z17.0 MALIGNANT NEOPLASM OF RIGHT BREAST IN FEMALE, ESTROGEN RECEPTOR POSITIVE, UNSPECIFIED SITE OF BREAST (H): ICD-10-CM

## 2020-01-02 DIAGNOSIS — C50.411 MALIGNANT NEOPLASM OF UPPER-OUTER QUADRANT OF RIGHT BREAST IN FEMALE, ESTROGEN RECEPTOR POSITIVE (H): Primary | ICD-10-CM

## 2020-01-02 LAB
BASOPHILS # BLD AUTO: 0.1 10E9/L (ref 0–0.2)
BASOPHILS NFR BLD AUTO: 3 %
DIFFERENTIAL METHOD BLD: ABNORMAL
EOSINOPHIL # BLD AUTO: 0.1 10E9/L (ref 0–0.7)
EOSINOPHIL NFR BLD AUTO: 2.1 %
ERYTHROCYTE [DISTWIDTH] IN BLOOD BY AUTOMATED COUNT: 15.1 % (ref 10–15)
HCT VFR BLD AUTO: 31.8 % (ref 35–47)
HGB BLD-MCNC: 10.8 G/DL (ref 11.7–15.7)
IMM GRANULOCYTES # BLD: 0 10E9/L (ref 0–0.4)
IMM GRANULOCYTES NFR BLD: 0.9 %
LYMPHOCYTES # BLD AUTO: 1 10E9/L (ref 0.8–5.3)
LYMPHOCYTES NFR BLD AUTO: 28.7 %
MCH RBC QN AUTO: 31.4 PG (ref 26.5–33)
MCHC RBC AUTO-ENTMCNC: 34 G/DL (ref 31.5–36.5)
MCV RBC AUTO: 92 FL (ref 78–100)
MONOCYTES # BLD AUTO: 0.5 10E9/L (ref 0–1.3)
MONOCYTES NFR BLD AUTO: 15 %
NEUTROPHILS # BLD AUTO: 1.7 10E9/L (ref 1.6–8.3)
NEUTROPHILS NFR BLD AUTO: 50.3 %
NRBC # BLD AUTO: 0 10*3/UL
NRBC BLD AUTO-RTO: 0 /100
PLATELET # BLD AUTO: 373 10E9/L (ref 150–450)
RBC # BLD AUTO: 3.44 10E12/L (ref 3.8–5.2)
WBC # BLD AUTO: 3.3 10E9/L (ref 4–11)

## 2020-01-02 PROCEDURE — 96375 TX/PRO/DX INJ NEW DRUG ADDON: CPT

## 2020-01-02 PROCEDURE — 85025 COMPLETE CBC W/AUTO DIFF WBC: CPT | Performed by: INTERNAL MEDICINE

## 2020-01-02 PROCEDURE — 25800030 ZZH RX IP 258 OP 636: Performed by: INTERNAL MEDICINE

## 2020-01-02 PROCEDURE — 96366 THER/PROPH/DIAG IV INF ADDON: CPT

## 2020-01-02 PROCEDURE — 96367 TX/PROPH/DG ADDL SEQ IV INF: CPT

## 2020-01-02 PROCEDURE — 25000128 H RX IP 250 OP 636: Performed by: INTERNAL MEDICINE

## 2020-01-02 PROCEDURE — 96413 CHEMO IV INFUSION 1 HR: CPT

## 2020-01-02 PROCEDURE — 25000132 ZZH RX MED GY IP 250 OP 250 PS 637: Performed by: INTERNAL MEDICINE

## 2020-01-02 RX ORDER — HEPARIN SODIUM (PORCINE) LOCK FLUSH IV SOLN 100 UNIT/ML 100 UNIT/ML
5 SOLUTION INTRAVENOUS
Status: DISCONTINUED | OUTPATIENT
Start: 2020-01-02 | End: 2020-01-02 | Stop reason: HOSPADM

## 2020-01-02 RX ORDER — DIPHENHYDRAMINE HCL 25 MG
50 CAPSULE ORAL ONCE
Status: COMPLETED | OUTPATIENT
Start: 2020-01-02 | End: 2020-01-02

## 2020-01-02 RX ORDER — HEPARIN SODIUM (PORCINE) LOCK FLUSH IV SOLN 100 UNIT/ML 100 UNIT/ML
5 SOLUTION INTRAVENOUS
Status: CANCELLED | OUTPATIENT
Start: 2020-01-02

## 2020-01-02 RX ADMIN — FAMOTIDINE 20 MG: 20 INJECTION, SOLUTION INTRAVENOUS at 09:27

## 2020-01-02 RX ADMIN — HEPARIN SODIUM (PORCINE) LOCK FLUSH IV SOLN 100 UNIT/ML 5 ML: 100 SOLUTION at 11:00

## 2020-01-02 RX ADMIN — PACLITAXEL 135 MG: 6 INJECTION, SOLUTION INTRAVENOUS at 09:51

## 2020-01-02 RX ADMIN — DEXAMETHASONE SODIUM PHOSPHATE 20 MG: 10 INJECTION, SOLUTION INTRAMUSCULAR; INTRAVENOUS at 09:03

## 2020-01-02 RX ADMIN — SODIUM CHLORIDE 1000 ML: 9 INJECTION, SOLUTION INTRAVENOUS at 08:33

## 2020-01-02 RX ADMIN — DIPHENHYDRAMINE HYDROCHLORIDE 50 MG: 25 CAPSULE ORAL at 08:55

## 2020-01-02 ASSESSMENT — PAIN SCALES - GENERAL: PAINLEVEL: NO PAIN (0)

## 2020-01-02 NOTE — PROGRESS NOTES
Infusion Nursing Note:  Jyothi STERN Zena presents today for Cycle 6 Day 1 Taxol.    Patient seen by provider today: No   present during visit today: Not Applicable.    Note: N/A.    Intravenous Access:  Implanted Port.    Treatment Conditions:  Lab Results   Component Value Date    HGB 10.8 01/02/2020     Lab Results   Component Value Date    WBC 3.3 01/02/2020      Lab Results   Component Value Date    ANEU 1.7 01/02/2020     Lab Results   Component Value Date     01/02/2020      Results reviewed, labs MET treatment parameters, ok to proceed with treatment.      Post Infusion Assessment:  Patient tolerated infusion without incident.  Blood return noted pre and post infusion.  Site patent and intact, free from redness, edema or discomfort.  No evidence of extravasations.  Access discontinued per protocol.       Discharge Plan:   Patient declined prescription refills.  Discharge instructions reviewed with: Patient.  Patient verbalized understanding of discharge instructions and all questions answered.  AVS to patient via KeraT.  Patient will return 1/9/20 for next appointment.   Patient discharged in stable condition accompanied by: self and friend.  Departure Mode: Ambulatory.    Dhara Klein RN

## 2020-01-06 ENCOUNTER — HOSPITAL ENCOUNTER (OUTPATIENT)
Dept: ULTRASOUND IMAGING | Facility: CLINIC | Age: 55
Discharge: HOME | End: 2020-01-06
Attending: NURSE PRACTITIONER | Admitting: NURSE PRACTITIONER
Payer: COMMERCIAL

## 2020-01-06 ENCOUNTER — PATIENT OUTREACH (OUTPATIENT)
Dept: ONCOLOGY | Facility: CLINIC | Age: 55
End: 2020-01-06

## 2020-01-06 DIAGNOSIS — C50.411 MALIGNANT NEOPLASM OF UPPER-OUTER QUADRANT OF RIGHT BREAST IN FEMALE, ESTROGEN RECEPTOR POSITIVE (H): ICD-10-CM

## 2020-01-06 DIAGNOSIS — C50.411 MALIGNANT NEOPLASM OF UPPER-OUTER QUADRANT OF RIGHT BREAST IN FEMALE, ESTROGEN RECEPTOR POSITIVE (H): Primary | ICD-10-CM

## 2020-01-06 DIAGNOSIS — Z17.0 MALIGNANT NEOPLASM OF UPPER-OUTER QUADRANT OF RIGHT BREAST IN FEMALE, ESTROGEN RECEPTOR POSITIVE (H): ICD-10-CM

## 2020-01-06 DIAGNOSIS — Z17.0 MALIGNANT NEOPLASM OF UPPER-OUTER QUADRANT OF RIGHT BREAST IN FEMALE, ESTROGEN RECEPTOR POSITIVE (H): Primary | ICD-10-CM

## 2020-01-06 PROCEDURE — 93971 EXTREMITY STUDY: CPT | Mod: LT

## 2020-01-06 RX ORDER — PRAVASTATIN SODIUM 20 MG
20 TABLET ORAL DAILY
Qty: 90 TABLET | Refills: 3 | Status: SHIPPED | OUTPATIENT
Start: 2020-01-06 | End: 2020-02-04

## 2020-01-06 NOTE — PROGRESS NOTES
Patient called clinic stating that she has pain above her left port site. She is concerned that she may have a blood clot. Consulted with TRESSA Vaughn and ultrasound of the left upper extremity will be ordered  For patient . She is aware that she will get a call from scheduling. Tricia Smiley RN,BSN,OCN

## 2020-01-06 NOTE — PROGRESS NOTES
Trey Roe she is aware of her normal left upper extremity ultrasound result. Tricia Smiley RN,BSN,OCN

## 2020-01-08 RX ORDER — METHYLPREDNISOLONE SODIUM SUCCINATE 125 MG/2ML
125 INJECTION, POWDER, LYOPHILIZED, FOR SOLUTION INTRAMUSCULAR; INTRAVENOUS
Status: CANCELLED
Start: 2020-01-09

## 2020-01-08 RX ORDER — EPINEPHRINE 0.3 MG/.3ML
0.3 INJECTION SUBCUTANEOUS EVERY 5 MIN PRN
Status: CANCELLED | OUTPATIENT
Start: 2020-01-09

## 2020-01-08 RX ORDER — ALBUTEROL SULFATE 0.83 MG/ML
2.5 SOLUTION RESPIRATORY (INHALATION)
Status: CANCELLED | OUTPATIENT
Start: 2020-01-09

## 2020-01-08 RX ORDER — SODIUM CHLORIDE 9 MG/ML
1000 INJECTION, SOLUTION INTRAVENOUS CONTINUOUS PRN
Status: CANCELLED
Start: 2020-01-09

## 2020-01-08 RX ORDER — DIPHENHYDRAMINE HYDROCHLORIDE 50 MG/ML
50 INJECTION INTRAMUSCULAR; INTRAVENOUS
Status: CANCELLED
Start: 2020-01-09

## 2020-01-08 RX ORDER — NALOXONE HYDROCHLORIDE 0.4 MG/ML
.1-.4 INJECTION, SOLUTION INTRAMUSCULAR; INTRAVENOUS; SUBCUTANEOUS
Status: CANCELLED | OUTPATIENT
Start: 2020-01-09

## 2020-01-08 RX ORDER — LORAZEPAM 2 MG/ML
0.5 INJECTION INTRAMUSCULAR EVERY 4 HOURS PRN
Status: CANCELLED
Start: 2020-01-09

## 2020-01-08 RX ORDER — ALBUTEROL SULFATE 90 UG/1
1-2 AEROSOL, METERED RESPIRATORY (INHALATION)
Status: CANCELLED
Start: 2020-01-09

## 2020-01-08 RX ORDER — MEPERIDINE HYDROCHLORIDE 25 MG/ML
25 INJECTION INTRAMUSCULAR; INTRAVENOUS; SUBCUTANEOUS EVERY 30 MIN PRN
Status: CANCELLED | OUTPATIENT
Start: 2020-01-09

## 2020-01-08 RX ORDER — DIPHENHYDRAMINE HCL 25 MG
50 CAPSULE ORAL ONCE
Status: CANCELLED
Start: 2020-01-09

## 2020-01-08 RX ORDER — EPINEPHRINE 1 MG/ML
0.3 INJECTION, SOLUTION INTRAMUSCULAR; SUBCUTANEOUS EVERY 5 MIN PRN
Status: CANCELLED | OUTPATIENT
Start: 2020-01-09

## 2020-01-08 NOTE — PROGRESS NOTES
"1/13/2020    Referring Provider: Maricarmen Monroe MD    Presenting Information:  Jyothi Freitas returned to the Cancer Risk Management Program at [facility] to discuss her genetic testing results. Her blood was drawn on 11/14/2019. CancerNext test was ordered from [facility]. This testing was done because of her personal and family history of breast, stomach, and rectal cancer, as well as melanoma.    Genetic Testing Result: NEGATIVE  Jyothi is negative for mutations in the APC, BAM, BARD1, BRCA1, BRCA2, BRIP1, BMPR1A, CDH1, CDK4, CDKN2A, CHEK2, DICER1, EPCAM, HOXB13, GREM1, MLH1, MRE11A, MSH2, MSH6, MUTYH, NBN, NF1, PALB2, PMS2, POLD1, POLE, PTEN, RAD50, RAD51C, RAD51D, SMAD4, SMARCA4, STK11, and TP53 genes. No mutations were found in any of the 34 genes analyzed. This test involved sequencing and deletion/duplication analysis of all genes with the exception of EPCAM and GREM1 (deletions only).    Testing did not detect an identifiable mutation associated with Hereditary Breast and Ovarian Cancer syndrome (BRCA1, BRCA2), Ramirez syndrome (MLH1, MSH2, MSH6, PMS2, EPCAM), Familial Adenomatous Polyposis (APC), Hereditary Diffuse Gastric Cancer (CDH1), Familial Atypical Multiple Mole Melanoma syndrome (CDK4, CDKN2A), Juvenile Polyposis syndrome (BMPR1A, SMAD4), Cowden syndrome (PTEN), Li Fraumeni syndrome (TP53), Peutz-Jeghers syndrome (STK11), MUTYH Associated Polyposis (MUTYH), or Neurofibromatosis type 1 (NF1).    A copy of the test report can be found in the Laboratory tab, dated 11/14/2019, and named \"SEND OUTS MISC TEST\". The report is scanned in as a linked document.    Interpretation:  We discussed several different interpretations of this negative test result.    1. One explanation may be that there is a different gene or combination of genes and environment that are associated with the cancers in this family.  2. Another explanation may be that her maternal or paternal relatives did have a " mutation in a cancer susceptibility gene and she did not inherit it.  3. There is also a small possibility that there is a mutation in one of these genes, and the testing laboratory could not find it with their current testing methods.       Screening:  Based on this negative test result, it is important for Jyothi and her relatives to refer back to the family history for appropriate cancer screening.      Jyothi s close female relatives remain at increased risk for breast cancer given their family history. Breast cancer screening is generally recommended to begin approximately 10 years younger than the earliest age of breast cancer diagnosis in the family, or at age 40, whichever comes first. In this family, screening may begin at age 40. Breast screening options should be discussed with an individual's primary care provider and a genetic counselor, to determine at what age to begin screening, what screening is appropriate, and if additional screening (such as breast MRI) is necessary based on personal/family history factors.      Due to the close family history of melanoma, Jyothi remains at some increased risk for developing melanoma. According to the American Cancer Society, Jyothi is encouraged to speak to her primary care provider about having regular skin exams and safe skin practices (i.e. sunscreen, self skin exams, limited sun exposure, etc.).    Other population cancer screening options, such as those recommended by the American Cancer Society and the National Comprehensive Cancer Network (NCCN), are also appropriate for Jyothi and her family. These screening recommendations may change if there are changes to Jyothi's personal and/or family history of cancer. Final screening recommendations should be made by each individual's managing physician.     Inheritance:  We reviewed the autosomal dominant inheritance of mutations in these 34 genes. We discussed that Jyothi cannot/did not pass on an identifiable mutation in  these genes to her children based on this test result. Mutations in these genes do not skip generations.      Additional Testing Considerations:  Although Jyothi's genetic testing result was negative, other relatives may still carry a gene mutation associated with hereditary cancer as well as melanoma. Genetic counseling may be considered for those relatives who have had cancer or those who are closest to those who have had cancer to discuss genetic testing options.  If any of these relatives do pursue genetic testing, Jyothi is encouraged to contact me so that we may review the impact of their test results on her.    Summary:  We do not have an explanation for Jyothi's breast cancer or family history of cancer. While no genetic changes were identified, Jyothi may still be at risk for certain cancers due to family history, environmental factors, or other genetic causes not identified by this test.  Because of that, it is important that she continue with cancer screening based on her personal and family history as discussed above.    Genetic testing is rapidly advancing, and new cancer susceptibility genes will most likely be identified in the future. Therefore, I encouraged Jyothi to contact me annually or if there are changes in her personal or family history. This may change how we assess her cancer risk, screening, and the testing we would offer.    Plan:  1.  I provided Jyothi with a copy of her test results today and a handout summarizing negative genetic test results (see after visit summary).  2. She plans to follow-up with her medical providers.  3. She should contact me annually, or sooner if her family history changes.    If Jyothi has any further questions, I encouraged her to contact me at [phone].    Edel Deng MS, [facility]  Licensed genetic counselor  [phone]    Time spent face to face: 10 minutes

## 2020-01-08 NOTE — PATIENT INSTRUCTIONS
Negative Genetic Test Result    Genetic Testing  You had a blood test that looked at the genetic information in one or more genes associated with increased cancer risk.  The testing looked for any harmful changes that would stop this particular gene from working like it should. If an individual does not have any harmful changes or variants of unknown significance found from their blood test, their genetic test result is reported as negative.       Results  The genetic test did not identify any pathogenic (harmful) changes in the genes that were tested. There are several possible explanations for a negative test result. Without knowing the gene mutation in your family, the cause of the cancer in you or your relatives is still unknown. Your genetic counselor can help interpret the result for you and your relatives. In this case, there are several reasons that may explain the negative test result:    There may be a gene mutation in the family that you did not inherit.     You may have a gene mutation in a different gene that was not included in the test, or has not yet been discovered.     The cancers in you or your family may be due to a combination of genetic factors and environment (multifactorial/familial).    The cancers in you or your family may be sporadic/random cancers.    There is very small chance that a mutation was not found by current testing methods.  As testing technology evolves over time, it may still be possible to identify a mutation in a gene that was not found on this test.    It is important to note which genes were included in your test. A list of these genes can be found on your test result.    Screening Recommendations  Due to this negative test result, cancer screening recommendations should be based on your personal and family history. This may include increased cancer screening for you and/or your family members. Your genetic counselor and health care provider can help make  appropriate recommendations.      Please call us if you have any questions or concerns.   Cancer Risk Management Program [phone]-CANCER ([phone])  ? Jose Rubio, MS, [facility] [phone]  ? Daniela Greenwood, MS, [facility]  [phone]  ? Naya Ernst, MS, [facility]  [phone]  ? Edel Deng, MS, [facility] [phone]  ? Sadie Miller, MS, [facility] [phone]  ? Princess Catalan, MS, [facility] [phone]  ? Abida Hoover, MS, [facility]  [phone]

## 2020-01-09 ENCOUNTER — ONCOLOGY VISIT (OUTPATIENT)
Dept: ONCOLOGY | Facility: CLINIC | Age: 55
End: 2020-01-09
Attending: NURSE PRACTITIONER
Payer: COMMERCIAL

## 2020-01-09 ENCOUNTER — HOSPITAL ENCOUNTER (OUTPATIENT)
Facility: CLINIC | Age: 55
Setting detail: SPECIMEN
Discharge: HOME | End: 2020-01-09
Attending: INTERNAL MEDICINE | Admitting: NURSE PRACTITIONER
Payer: COMMERCIAL

## 2020-01-09 ENCOUNTER — INFUSION THERAPY VISIT (OUTPATIENT)
Dept: INFUSION THERAPY | Facility: CLINIC | Age: 55
End: 2020-01-09
Attending: INTERNAL MEDICINE
Payer: COMMERCIAL

## 2020-01-09 VITALS
OXYGEN SATURATION: 100 % | DIASTOLIC BLOOD PRESSURE: 82 MMHG | TEMPERATURE: 97.7 F | HEIGHT: 67 IN | SYSTOLIC BLOOD PRESSURE: 114 MMHG | BODY MASS INDEX: 22.6 KG/M2 | RESPIRATION RATE: 16 BRPM | WEIGHT: 144 LBS | HEART RATE: 69 BPM

## 2020-01-09 DIAGNOSIS — C50.411 MALIGNANT NEOPLASM OF UPPER-OUTER QUADRANT OF RIGHT BREAST IN FEMALE, ESTROGEN RECEPTOR POSITIVE (H): Primary | ICD-10-CM

## 2020-01-09 DIAGNOSIS — C50.911 MALIGNANT NEOPLASM OF RIGHT BREAST IN FEMALE, ESTROGEN RECEPTOR POSITIVE, UNSPECIFIED SITE OF BREAST (H): ICD-10-CM

## 2020-01-09 DIAGNOSIS — Z17.0 MALIGNANT NEOPLASM OF UPPER-OUTER QUADRANT OF RIGHT BREAST IN FEMALE, ESTROGEN RECEPTOR POSITIVE (H): Primary | ICD-10-CM

## 2020-01-09 DIAGNOSIS — Z17.0 MALIGNANT NEOPLASM OF UPPER-OUTER QUADRANT OF RIGHT BREAST IN FEMALE, ESTROGEN RECEPTOR POSITIVE (H): ICD-10-CM

## 2020-01-09 DIAGNOSIS — C50.411 MALIGNANT NEOPLASM OF UPPER-OUTER QUADRANT OF RIGHT BREAST IN FEMALE, ESTROGEN RECEPTOR POSITIVE (H): ICD-10-CM

## 2020-01-09 DIAGNOSIS — Z17.0 MALIGNANT NEOPLASM OF RIGHT BREAST IN FEMALE, ESTROGEN RECEPTOR POSITIVE, UNSPECIFIED SITE OF BREAST (H): ICD-10-CM

## 2020-01-09 LAB
BASOPHILS # BLD AUTO: 0.1 10E9/L (ref 0–0.2)
BASOPHILS NFR BLD AUTO: 2.7 %
DIFFERENTIAL METHOD BLD: ABNORMAL
EOSINOPHIL # BLD AUTO: 0.2 10E9/L (ref 0–0.7)
EOSINOPHIL NFR BLD AUTO: 5.9 %
ERYTHROCYTE [DISTWIDTH] IN BLOOD BY AUTOMATED COUNT: 15.3 % (ref 10–15)
HCT VFR BLD AUTO: 34.7 % (ref 35–47)
HGB BLD-MCNC: 11.7 G/DL (ref 11.7–15.7)
IMM GRANULOCYTES # BLD: 0.1 10E9/L (ref 0–0.4)
IMM GRANULOCYTES NFR BLD: 1.5 %
LYMPHOCYTES # BLD AUTO: 1.2 10E9/L (ref 0.8–5.3)
LYMPHOCYTES NFR BLD AUTO: 28.6 %
MCH RBC QN AUTO: 31.5 PG (ref 26.5–33)
MCHC RBC AUTO-ENTMCNC: 33.7 G/DL (ref 31.5–36.5)
MCV RBC AUTO: 93 FL (ref 78–100)
MONOCYTES # BLD AUTO: 0.6 10E9/L (ref 0–1.3)
MONOCYTES NFR BLD AUTO: 14.6 %
NEUTROPHILS # BLD AUTO: 1.9 10E9/L (ref 1.6–8.3)
NEUTROPHILS NFR BLD AUTO: 46.7 %
NRBC # BLD AUTO: 0 10*3/UL
NRBC BLD AUTO-RTO: 0 /100
PLATELET # BLD AUTO: 413 10E9/L (ref 150–450)
RBC # BLD AUTO: 3.72 10E12/L (ref 3.8–5.2)
WBC # BLD AUTO: 4.1 10E9/L (ref 4–11)

## 2020-01-09 PROCEDURE — 85025 COMPLETE CBC W/AUTO DIFF WBC: CPT | Performed by: NURSE PRACTITIONER

## 2020-01-09 PROCEDURE — 25800030 ZZH RX IP 258 OP 636: Performed by: NURSE PRACTITIONER

## 2020-01-09 PROCEDURE — 25000132 ZZH RX MED GY IP 250 OP 250 PS 637: Performed by: NURSE PRACTITIONER

## 2020-01-09 PROCEDURE — 96413 CHEMO IV INFUSION 1 HR: CPT

## 2020-01-09 PROCEDURE — 25000128 H RX IP 250 OP 636: Performed by: NURSE PRACTITIONER

## 2020-01-09 PROCEDURE — 96367 TX/PROPH/DG ADDL SEQ IV INF: CPT

## 2020-01-09 PROCEDURE — G0463 HOSPITAL OUTPT CLINIC VISIT: HCPCS

## 2020-01-09 PROCEDURE — 99213 OFFICE O/P EST LOW 20 MIN: CPT | Performed by: NURSE PRACTITIONER

## 2020-01-09 PROCEDURE — 96375 TX/PRO/DX INJ NEW DRUG ADDON: CPT

## 2020-01-09 PROCEDURE — 25000128 H RX IP 250 OP 636: Performed by: INTERNAL MEDICINE

## 2020-01-09 RX ORDER — HEPARIN SODIUM (PORCINE) LOCK FLUSH IV SOLN 100 UNIT/ML 100 UNIT/ML
5 SOLUTION INTRAVENOUS
Status: CANCELLED | OUTPATIENT
Start: 2020-01-09

## 2020-01-09 RX ORDER — DIPHENHYDRAMINE HCL 25 MG
50 CAPSULE ORAL ONCE
Status: COMPLETED | OUTPATIENT
Start: 2020-01-09 | End: 2020-01-09

## 2020-01-09 RX ORDER — HEPARIN SODIUM (PORCINE) LOCK FLUSH IV SOLN 100 UNIT/ML 100 UNIT/ML
5 SOLUTION INTRAVENOUS
Status: DISCONTINUED | OUTPATIENT
Start: 2020-01-09 | End: 2020-01-09 | Stop reason: HOSPADM

## 2020-01-09 RX ADMIN — PACLITAXEL 135 MG: 6 INJECTION, SOLUTION INTRAVENOUS at 10:33

## 2020-01-09 RX ADMIN — DIPHENHYDRAMINE HYDROCHLORIDE 50 MG: 25 CAPSULE ORAL at 09:39

## 2020-01-09 RX ADMIN — FAMOTIDINE 20 MG: 20 INJECTION, SOLUTION INTRAVENOUS at 09:57

## 2020-01-09 RX ADMIN — DEXAMETHASONE SODIUM PHOSPHATE 20 MG: 10 INJECTION, SOLUTION INTRAMUSCULAR; INTRAVENOUS at 09:42

## 2020-01-09 RX ADMIN — SODIUM CHLORIDE 1000 ML: 9 INJECTION, SOLUTION INTRAVENOUS at 09:39

## 2020-01-09 RX ADMIN — HEPARIN SODIUM (PORCINE) LOCK FLUSH IV SOLN 100 UNIT/ML 5 ML: 100 SOLUTION at 11:37

## 2020-01-09 ASSESSMENT — PAIN SCALES - GENERAL: PAINLEVEL: NO PAIN (0)

## 2020-01-09 ASSESSMENT — MIFFLIN-ST. JEOR: SCORE: 1280.96

## 2020-01-09 NOTE — PROGRESS NOTES
Oncology/Hematology Visit Note  Jan 9, 2020    Reason for Visit: follow up of right breast cancer ER positive HER-2 negative  Currently on neoadjuvant chemo completed 4 cycles of AC  and currently on weekly Taxol    Interval History:  Patient reports she has been tolerating Taxol well.  She denies fever chills sweats denies cough no shortness of breath denies chest pain denies nausea vomiting diarrhea denies abdominal pain denies bleeding denies neuropathy    Review of Systems:  14 point ROS of systems including Constitutional, Eyes, Respiratory, Cardiovascular, Gastroenterology, Genitourinary, Integumentary, Muscularskeletal, Psychiatric were all negative except for pertinent positives noted in my HPI.      Current Outpatient Medications   Medication Sig Dispense Refill     acetaminophen (TYLENOL) 500 MG tablet Take 1,000 mg by mouth every 6 hours as needed for mild pain       acetaminophen-caffeine (EXCEDRIN TENSION HEADACHE) 500-65 MG TABS Take 2 tablets by mouth every 6 hours as needed for mild pain       ALBUTEROL 108 (90 BASE) MCG/ACT inhaler INHALE 2 PUFFS INTO THE LUNGS EVERY 6 HOURS AS NEEDED FOR SHORTNESS OF BREATH OR DIFFICULT BREATHING OR WHEEZING 8.5 g 0     buPROPion (WELLBUTRIN XL) 300 MG 24 hr tablet Take 1 tablet (300 mg) by mouth every morning 90 tablet 3     fluticasone (FLOVENT DISKUS) 100 MCG/BLIST inhaler Inhale 1 puff into the lungs 2 times daily 3 Inhaler 3     fluticasone-salmeterol (ADVAIR) 100-50 MCG/DOSE inhaler Inhale 1 puff into the lungs every 12 hours 1 Inhaler 3     HYDROcodone-acetaminophen (NORCO) 5-325 MG tablet Take 1-2 tablets by mouth every 4 hours as needed for moderate to severe pain 10 tablet 0     nadolol (CORGARD) 20 MG tablet TAKE 1 TABLET(20 MG) BY MOUTH DAILY 90 tablet 3     naproxen sodium (ANAPROX) 220 MG tablet Take 220 mg by mouth daily as needed for moderate pain       oxybutynin (DITROPAN) 5 MG tablet Take 1 tablet (5 mg) by mouth 2 times daily 60 tablet 3      "pravastatin (PRAVACHOL) 20 MG tablet Take 1 tablet (20 mg) by mouth daily 90 tablet 3     prochlorperazine (COMPAZINE) 10 MG tablet Take 1 tablet (10 mg) by mouth every 6 hours as needed (Nausea/Vomiting) 30 tablet 5     senna-docusate (SENOKOT-S/PERICOLACE) 8.6-50 MG tablet Take 1-2 tablets by mouth 2 times daily 30 tablet 0     zolpidem (AMBIEN) 5 MG tablet Take 1 tablet (5 mg) by mouth nightly as needed for sleep 30 tablet 1       Physical Examination:  General: The patient is a pleasant female in no acute distress.  /82   Pulse 69   Temp 97.7  F (36.5  C) (Oral)   Resp 16   Ht 1.702 m (5' 7.01\")   Wt 65.3 kg (144 lb)   LMP 10/07/2017 (Approximate)   SpO2 100%   BMI 22.55 kg/m    HEENT: EOMI, PERRL. Sclerae are anicteric. Oral mucosa is pink and moist with no lesions or thrush.   Lymph: Neck is supple with no lymphadenopathy in the cervical or supraclavicular areas.   Heart: Regular rate and rhythm.   Lungs: Clear to auscultation bilaterally.   GI: Bowel sounds present, soft, nontender with no palpable hepatosplenomegaly or masses.   Extremities: No lower extremity edema noted bilaterally.   Skin: No rashes, petechiae, or bruising noted on exposed skin.    Laboratory Data:  Results for orders placed or performed in visit on 01/09/20 (from the past 24 hour(s))   CBC with platelets diff   Result Value Ref Range    WBC 4.1 4.0 - 11.0 10e9/L    RBC Count 3.72 (L) 3.8 - 5.2 10e12/L    Hemoglobin 11.7 11.7 - 15.7 g/dL    Hematocrit 34.7 (L) 35.0 - 47.0 %    MCV 93 78 - 100 fl    MCH 31.5 26.5 - 33.0 pg    MCHC 33.7 31.5 - 36.5 g/dL    RDW 15.3 (H) 10.0 - 15.0 %    Platelet Count 413 150 - 450 10e9/L    Diff Method Automated Method     % Neutrophils 46.7 %    % Lymphocytes 28.6 %    % Monocytes 14.6 %    % Eosinophils 5.9 %    % Basophils 2.7 %    % Immature Granulocytes 1.5 %    Nucleated RBCs 0 0 /100    Absolute Neutrophil 1.9 1.6 - 8.3 10e9/L    Absolute Lymphocytes 1.2 0.8 - 5.3 10e9/L    Absolute " Monocytes 0.6 0.0 - 1.3 10e9/L    Absolute Eosinophils 0.2 0.0 - 0.7 10e9/L    Absolute Basophils 0.1 0.0 - 0.2 10e9/L    Abs Immature Granulocytes 0.1 0 - 0.4 10e9/L    Absolute Nucleated RBC 0.0          Assessment and Plan:    This is a 54-year-old female with      right breast cancer ER positive HER-2 negative  Currently on neoadjuvant chemo completed 4 cycles of AC  and currently on weekly Taxol  -Overall patient reports she has been tolerating treatment well  -Reviewed okay to proceed with chemo  -[id number] with Dr. Monroe     Patient advised to call in the event of fever chills sweats cough shortness of breath chest pain nausea vomiting diarrhea abdominal pain neuropathy or any changes in health condition    KRISTIAN Garcia [facility]     Chart documentation with Dragon Voice recognition Software. Although reviewed after completion, some words and grammatical errors may remain.

## 2020-01-09 NOTE — PROGRESS NOTES
Infusion Nursing Note:  Jyothi Freitas presents today for Cycle 7 Day 1 Taxol.    Patient seen by provider today: Yes: Roderick Valentin NP   present during visit today: Not Applicable.    Note: N/A.    Intravenous Access:  Implanted Port.    Treatment Conditions:  Lab Results   Component Value Date    HGB 11.7 01/09/2020     Lab Results   Component Value Date    WBC 4.1 01/09/2020      Lab Results   Component Value Date    ANEU 1.9 01/09/2020     Lab Results   Component Value Date     01/09/2020      Results reviewed, labs MET treatment parameters, ok to proceed with treatment.      Post Infusion Assessment:  Patient tolerated infusion without incident.  Blood return noted pre and post infusion.  Site patent and intact, free from redness, edema or discomfort.  No evidence of extravasations.  Access discontinued per protocol.       Discharge Plan:   Patient declined prescription refills.  Discharge instructions reviewed with: Patient.  Patient verbalized understanding of discharge instructions and all questions answered.  AVS to patient via [employer].  Patient will return 1/16/20 for next appointment.   Patient discharged in stable condition accompanied by: self.  Departure Mode: Ambulatory.    Dhara Klein RN

## 2020-01-09 NOTE — PROGRESS NOTES
Nursing Note:  Jyothi Freitas presents today for port labs for chemo today.    Patient seen by provider today: Yes: Roderick Valentin NP   present during visit today: Not Applicable.    Note: N/A.    Intravenous Access:  Labs drawn without difficulty.  Implanted Port.    Discharge Plan:   Patient was sent to [facility] for Roderick appointment.    Ashlyn Yuan RN

## 2020-01-09 NOTE — LETTER
1/9/2020         RE: Jyothi Freitas  [address]  [place] [address]        Dear Colleague,    Thank you for referring your patient, Jyothi Freitas, to the [facility]. Please see a copy of my visit note below.    Oncology/Hematology Visit Note  Jan 9, 2020    Reason for Visit: follow up of right breast cancer ER positive HER-2 negative  Currently on neoadjuvant chemo completed 4 cycles of AC  and currently on weekly Taxol    Interval History:  Patient reports she has been tolerating Taxol well.  She denies fever chills sweats denies cough no shortness of breath denies chest pain denies nausea vomiting diarrhea denies abdominal pain denies bleeding denies neuropathy    Review of Systems:  14 point ROS of systems including Constitutional, Eyes, Respiratory, Cardiovascular, Gastroenterology, Genitourinary, Integumentary, Muscularskeletal, Psychiatric were all negative except for pertinent positives noted in my HPI.      Current Outpatient Medications   Medication Sig Dispense Refill     acetaminophen (TYLENOL) 500 MG tablet Take 1,000 mg by mouth every 6 hours as needed for mild pain       acetaminophen-caffeine (EXCEDRIN TENSION HEADACHE) 500-65 MG TABS Take 2 tablets by mouth every 6 hours as needed for mild pain       ALBUTEROL 108 (90 BASE) MCG/ACT inhaler INHALE 2 PUFFS INTO THE LUNGS EVERY 6 HOURS AS NEEDED FOR SHORTNESS OF BREATH OR DIFFICULT BREATHING OR WHEEZING 8.5 g 0     buPROPion (WELLBUTRIN XL) 300 MG 24 hr tablet Take 1 tablet (300 mg) by mouth every morning 90 tablet 3     fluticasone (FLOVENT DISKUS) 100 MCG/BLIST inhaler Inhale 1 puff into the lungs 2 times daily 3 Inhaler 3     fluticasone-salmeterol (ADVAIR) 100-50 MCG/DOSE inhaler Inhale 1 puff into the lungs every 12 hours 1 Inhaler 3     HYDROcodone-acetaminophen (NORCO) 5-325 MG tablet Take 1-2 tablets by mouth every 4 hours as needed for moderate to severe pain 10 tablet 0     nadolol (CORGARD) 20 MG tablet TAKE 1 TABLET(20  "MG) BY MOUTH DAILY 90 tablet 3     naproxen sodium (ANAPROX) 220 MG tablet Take 220 mg by mouth daily as needed for moderate pain       oxybutynin (DITROPAN) 5 MG tablet Take 1 tablet (5 mg) by mouth 2 times daily 60 tablet 3     pravastatin (PRAVACHOL) 20 MG tablet Take 1 tablet (20 mg) by mouth daily 90 tablet 3     prochlorperazine (COMPAZINE) 10 MG tablet Take 1 tablet (10 mg) by mouth every 6 hours as needed (Nausea/Vomiting) 30 tablet 5     senna-docusate (SENOKOT-S/PERICOLACE) 8.6-50 MG tablet Take 1-2 tablets by mouth 2 times daily 30 tablet 0     zolpidem (AMBIEN) 5 MG tablet Take 1 tablet (5 mg) by mouth nightly as needed for sleep 30 tablet 1       Physical Examination:  General: The patient is a pleasant female in no acute distress.  /82   Pulse 69   Temp 97.7  F (36.5  C) (Oral)   Resp 16   Ht 1.702 m (5' 7.01\")   Wt 65.3 kg (144 lb)   LMP 10/07/2017 (Approximate)   SpO2 100%   BMI 22.55 kg/m     HEENT: EOMI, PERRL. Sclerae are anicteric. Oral mucosa is pink and moist with no lesions or thrush.   Lymph: Neck is supple with no lymphadenopathy in the cervical or supraclavicular areas.   Heart: Regular rate and rhythm.   Lungs: Clear to auscultation bilaterally.   GI: Bowel sounds present, soft, nontender with no palpable hepatosplenomegaly or masses.   Extremities: No lower extremity edema noted bilaterally.   Skin: No rashes, petechiae, or bruising noted on exposed skin.    Laboratory Data:  Results for orders placed or performed in visit on 01/09/20 (from the past 24 hour(s))   CBC with platelets diff   Result Value Ref Range    WBC 4.1 4.0 - 11.0 10e9/L    RBC Count 3.72 (L) 3.8 - 5.2 10e12/L    Hemoglobin 11.7 11.7 - 15.7 g/dL    Hematocrit 34.7 (L) 35.0 - 47.0 %    MCV 93 78 - 100 fl    MCH 31.5 26.5 - 33.0 pg    MCHC 33.7 31.5 - 36.5 g/dL    RDW 15.3 (H) 10.0 - 15.0 %    Platelet Count 413 150 - 450 10e9/L    Diff Method Automated Method     % Neutrophils 46.7 %    % Lymphocytes 28.6 % "    % Monocytes 14.6 %    % Eosinophils 5.9 %    % Basophils 2.7 %    % Immature Granulocytes 1.5 %    Nucleated RBCs 0 0 /100    Absolute Neutrophil 1.9 1.6 - 8.3 10e9/L    Absolute Lymphocytes 1.2 0.8 - 5.3 10e9/L    Absolute Monocytes 0.6 0.0 - 1.3 10e9/L    Absolute Eosinophils 0.2 0.0 - 0.7 10e9/L    Absolute Basophils 0.1 0.0 - 0.2 10e9/L    Abs Immature Granulocytes 0.1 0 - 0.4 10e9/L    Absolute Nucleated RBC 0.0          Assessment and Plan:    This is a 54-year-old female with      right breast cancer ER positive HER-2 negative  Currently on neoadjuvant chemo completed 4 cycles of AC  and currently on weekly Taxol  -Overall patient reports she has been tolerating treatment well  -Reviewed okay to proceed with chemo  -[id number] with Dr. Monroe     Patient advised to call in the event of fever chills sweats cough shortness of breath chest pain nausea vomiting diarrhea abdominal pain neuropathy or any changes in health condition    KRISTIAN Garcia CNP  [facility]     Chart documentation with Dragon Voice recognition Software. Although reviewed after completion, some words and grammatical errors may remain.          Again, thank you for allowing me to participate in the care of your patient.        Sincerely,        KRISTIAN Garcia CNP

## 2020-01-13 ENCOUNTER — ONCOLOGY VISIT (OUTPATIENT)
Dept: ONCOLOGY | Facility: CLINIC | Age: 55
End: 2020-01-13
Attending: INTERNAL MEDICINE
Payer: COMMERCIAL

## 2020-01-13 DIAGNOSIS — Z17.0 MALIGNANT NEOPLASM OF UPPER-OUTER QUADRANT OF RIGHT BREAST IN FEMALE, ESTROGEN RECEPTOR POSITIVE (H): Primary | ICD-10-CM

## 2020-01-13 DIAGNOSIS — C50.411 MALIGNANT NEOPLASM OF UPPER-OUTER QUADRANT OF RIGHT BREAST IN FEMALE, ESTROGEN RECEPTOR POSITIVE (H): Primary | ICD-10-CM

## 2020-01-13 DIAGNOSIS — Z80.8 FAMILY HISTORY OF MELANOMA: ICD-10-CM

## 2020-01-13 DIAGNOSIS — Z80.0 FAMILY HISTORY OF RECTAL CANCER: ICD-10-CM

## 2020-01-13 DIAGNOSIS — Z80.0 FAMILY HISTORY- STOMACH CANCER: ICD-10-CM

## 2020-01-13 PROCEDURE — 40000072 ZZH STATISTIC GENETIC COUNSELING, < 16 MIN: Performed by: GENETIC COUNSELOR, MS

## 2020-01-13 NOTE — LETTER
Cancer Risk Management  Program Locations    [facility]  Mailing Address  Cancer Risk Management Program  [facility]  [address]  [place] [address]    New patient appointments  [phone]  January 13, 2020    Jyothi Freitas  [address]   [place] [address]      Dear Jyothi,    It was a pleasure meeting with you at the [facility] on 1/13/2020.  Here is a copy of the progress note from your recent genetic counseling visit to the Cancer Risk Management Program.  If you have any additional questions, please feel free to call.    Referring Provider: Maricarmen Monroe MD    Presenting Information:  Jyothi Freitas returned to the Cancer Risk Management Program at [facility] to discuss her genetic testing results. Her blood was drawn on 11/14/2019. CancerNext test was ordered from [facility]. This testing was done because of her personal and family history of breast, stomach, and rectal cancer, as well as melanoma.    Genetic Testing Result: NEGATIVE  Jyothi is negative for mutations in the APC, BAM, BARD1, BRCA1, BRCA2, BRIP1, BMPR1A, CDH1, CDK4, CDKN2A, CHEK2, DICER1, EPCAM, HOXB13, GREM1, MLH1, MRE11A, MSH2, MSH6, MUTYH, NBN, NF1, PALB2, PMS2, POLD1, POLE, PTEN, RAD50, RAD51C, RAD51D, SMAD4, SMARCA4, STK11, and TP53 genes. No mutations were found in any of the 34 genes analyzed. This test involved sequencing and deletion/duplication analysis of all genes with the exception of EPCAM and GREM1 (deletions only).    Testing did not detect an identifiable mutation associated with Hereditary Breast and Ovarian Cancer syndrome (BRCA1, BRCA2), Ramirez syndrome (MLH1, MSH2, MSH6, PMS2, EPCAM), Familial Adenomatous Polyposis (APC), Hereditary Diffuse Gastric  Cancer (CDH1), Familial Atypical Multiple Mole Melanoma syndrome (CDK4, CDKN2A), Juvenile Polyposis syndrome (BMPR1A, SMAD4), Cowden syndrome (PTEN), Li Fraumeni syndrome (TP53), Peutz-Jeghers syndrome (STK11), MUTYH Associated Polyposis (MUTYH), or Neurofibromatosis type 1 (NF1).    Interpretation:  We discussed several different interpretations of this negative test result.    1. One explanation may be that there is a different gene or combination of genes and environment that are associated with the cancers in this family.  2. Another explanation may be that her maternal or paternal relatives did have a mutation in a cancer susceptibility gene and she did not inherit it.  3. There is also a small possibility that there is a mutation in one of these genes, and the testing laboratory could not find it with their current testing methods.       Screening:  Based on this negative test result, it is important for Jyothi and her relatives to refer back to the family history for appropriate cancer screening.      Jyothi s close female relatives remain at increased risk for breast cancer given their family history. Breast cancer screening is generally recommended to begin approximately 10 years younger than the earliest age of breast cancer diagnosis in the family, or at age 40, whichever comes first. In this family, screening may begin at age 40. Breast screening options should be discussed with an individual's primary care provider and a genetic counselor, to determine at what age to begin screening, what screening is appropriate, and if additional screening (such as breast MRI) is necessary based on personal/family history factors.      Due to the close family history of melanoma, Jyothi remains at some increased risk for developing melanoma. According to the American Cancer Society, Jyothi is encouraged to speak to her primary care provider about having regular skin exams and safe skin practices (i.e. sunscreen, self skin  exams, limited sun exposure, etc.).    Other population cancer screening options, such as those recommended by the American Cancer Society and the National Comprehensive Cancer Network (NCCN), are also appropriate for Jyothi and her family. These screening recommendations may change if there are changes to Jyothi's personal and/or family history of cancer. Final screening recommendations should be made by each individual's managing physician.     Inheritance:  We reviewed the autosomal dominant inheritance of mutations in these 34 genes. We discussed that Jyothi cannot/did not pass on an identifiable mutation in these genes to her children based on this test result. Mutations in these genes do not skip generations.      Additional Testing Considerations:  Although Jyothi's genetic testing result was negative, other relatives may still carry a gene mutation associated with hereditary cancer as well as melanoma. Genetic counseling may be considered for those relatives who have had cancer or those who are closest to those who have had cancer to discuss genetic testing options.  If any of these relatives do pursue genetic testing, Jyothi is encouraged to contact me so that we may review the impact of their test results on her.    Summary:  We do not have an explanation for Jyothi's breast cancer or family history of cancer. While no genetic changes were identified, Jyothi may still be at risk for certain cancers due to family history, environmental factors, or other genetic causes not identified by this test.  Because of that, it is important that she continue with cancer screening based on her personal and family history as discussed above.    Genetic testing is rapidly advancing, and new cancer susceptibility genes will most likely be identified in the future. Therefore, I encouraged Jyothi to contact me annually or if there are changes in her personal or family history. This may change how we assess her cancer risk, screening, and  the testing we would offer.    Plan:  1.  I provided Jyothi with a copy of her test results today and a handout summarizing negative genetic test results (see after visit summary).  2. She plans to follow-up with her medical providers.  3. She should contact me annually, or sooner if her family history changes.    If Jyothi has any further questions, I encouraged her to contact me at [phone].    Edel Deng MS, [facility]  Licensed genetic counselor  [phone]

## 2020-01-13 NOTE — LETTER
"    1/13/2020         RE: Jyothi Freitas  [address]  [place] [address]        Dear Colleague,    Thank you for referring your patient, Jyothi Freitas, to the CANCER RISK MANAGEMENT PROGRAM. Please see a copy of my visit note below.    1/13/2020    Referring Provider: Maricarmen Monroe MD    Presenting Information:  Jyothi Freitas returned to the Cancer Risk Management Program at [facility] to discuss her genetic testing results. Her blood was drawn on 11/14/2019. CancerNext test was ordered from [facility]. This testing was done because of her personal and family history of breast, stomach, and rectal cancer, as well as melanoma.    Genetic Testing Result: SHILPA Roe is negative for mutations in the APC, BAM, BARD1, BRCA1, BRCA2, BRIP1, BMPR1A, CDH1, CDK4, CDKN2A, CHEK2, DICER1, EPCAM, HOXB13, GREM1, MLH1, MRE11A, MSH2, MSH6, MUTYH, NBN, NF1, PALB2, PMS2, POLD1, POLE, PTEN, RAD50, RAD51C, RAD51D, SMAD4, SMARCA4, STK11, and TP53 genes. No mutations were found in any of the 34 genes analyzed. This test involved sequencing and deletion/duplication analysis of all genes with the exception of EPCAM and GREM1 (deletions only).    Testing did not detect an identifiable mutation associated with Hereditary Breast and Ovarian Cancer syndrome (BRCA1, BRCA2), Ramirez syndrome (MLH1, MSH2, MSH6, PMS2, EPCAM), Familial Adenomatous Polyposis (APC), Hereditary Diffuse Gastric Cancer (CDH1), Familial Atypical Multiple Mole Melanoma syndrome (CDK4, CDKN2A), Juvenile Polyposis syndrome (BMPR1A, SMAD4), Cowden syndrome (PTEN), Li Fraumeni syndrome (TP53), Peutz-Jeghers syndrome (STK11), MUTYH Associated Polyposis (MUTYH), or Neurofibromatosis type 1 (NF1).    A copy of the test report can be found in the Laboratory tab, dated 11/14/2019, and named \"SEND OUTS MISC TEST\". The report is scanned in as a linked document.    Interpretation:  We discussed several different interpretations of this negative test " result.    1. One explanation may be that there is a different gene or combination of genes and environment that are associated with the cancers in this family.  2. Another explanation may be that her maternal or paternal relatives did have a mutation in a cancer susceptibility gene and she did not inherit it.  3. There is also a small possibility that there is a mutation in one of these genes, and the testing laboratory could not find it with their current testing methods.       Screening:  Based on this negative test result, it is important for Jyothi and her relatives to refer back to the family history for appropriate cancer screening.      Jyothi s close female relatives remain at increased risk for breast cancer given their family history. Breast cancer screening is generally recommended to begin approximately 10 years younger than the earliest age of breast cancer diagnosis in the family, or at age 40, whichever comes first. In this family, screening may begin at age 40. Breast screening options should be discussed with an individual's primary care provider and a genetic counselor, to determine at what age to begin screening, what screening is appropriate, and if additional screening (such as breast MRI) is necessary based on personal/family history factors.      Due to the close family history of melanoma, Jyothi remains at some increased risk for developing melanoma. According to the American Cancer Society, Jyothi is encouraged to speak to her primary care provider about having regular skin exams and safe skin practices (i.e. sunscreen, self skin exams, limited sun exposure, etc.).    Other population cancer screening options, such as those recommended by the American Cancer Society and the National Comprehensive Cancer Network (NCCN), are also appropriate for Jyothi and her family. These screening recommendations may change if there are changes to Jyothi's personal and/or family history of cancer. Final screening  recommendations should be made by each individual's managing physician.     Inheritance:  We reviewed the autosomal dominant inheritance of mutations in these 34 genes. We discussed that Jyothi cannot/did not pass on an identifiable mutation in these genes to her children based on this test result. Mutations in these genes do not skip generations.      Additional Testing Considerations:  Although Jyothi's genetic testing result was negative, other relatives may still carry a gene mutation associated with hereditary cancer as well as melanoma. Genetic counseling may be considered for those relatives who have had cancer or those who are closest to those who have had cancer to discuss genetic testing options.  If any of these relatives do pursue genetic testing, Jyothi is encouraged to contact me so that we may review the impact of their test results on her.    Summary:  We do not have an explanation for Jyothi's breast cancer or family history of cancer. While no genetic changes were identified, Jyothi may still be at risk for certain cancers due to family history, environmental factors, or other genetic causes not identified by this test.  Because of that, it is important that she continue with cancer screening based on her personal and family history as discussed above.    Genetic testing is rapidly advancing, and new cancer susceptibility genes will most likely be identified in the future. Therefore, I encouraged Jyothi to contact me annually or if there are changes in her personal or family history. This may change how we assess her cancer risk, screening, and the testing we would offer.    Plan:  1.  I provided Jyothi with a copy of her test results today and a handout summarizing negative genetic test results (see after visit summary).  2. She plans to follow-up with her medical providers.  3. She should contact me annually, or sooner if her family history changes.    If Jyothi has any further questions, I encouraged her to  contact me at [phone].    Edel Deng MS, [facility]  Licensed genetic counselor  [phone]    Time spent face to face: 10 minutes      Again, thank you for allowing me to participate in the care of your patient.        Sincerely,        JEWEL Deng, GC

## 2020-01-16 ENCOUNTER — INFUSION THERAPY VISIT (OUTPATIENT)
Dept: INFUSION THERAPY | Facility: CLINIC | Age: 55
End: 2020-01-16
Attending: INTERNAL MEDICINE
Payer: COMMERCIAL

## 2020-01-16 ENCOUNTER — HOSPITAL ENCOUNTER (OUTPATIENT)
Facility: CLINIC | Age: 55
Setting detail: SPECIMEN
Discharge: HOME | End: 2020-01-16
Attending: INTERNAL MEDICINE | Admitting: INTERNAL MEDICINE
Payer: COMMERCIAL

## 2020-01-16 VITALS
OXYGEN SATURATION: 100 % | TEMPERATURE: 97.7 F | DIASTOLIC BLOOD PRESSURE: 87 MMHG | BODY MASS INDEX: 22.7 KG/M2 | SYSTOLIC BLOOD PRESSURE: 124 MMHG | RESPIRATION RATE: 16 BRPM | WEIGHT: 144.6 LBS | HEART RATE: 70 BPM | HEIGHT: 67 IN

## 2020-01-16 DIAGNOSIS — Z17.0 MALIGNANT NEOPLASM OF UPPER-OUTER QUADRANT OF RIGHT BREAST IN FEMALE, ESTROGEN RECEPTOR POSITIVE (H): Primary | ICD-10-CM

## 2020-01-16 DIAGNOSIS — Z17.0 MALIGNANT NEOPLASM OF RIGHT BREAST IN FEMALE, ESTROGEN RECEPTOR POSITIVE, UNSPECIFIED SITE OF BREAST (H): ICD-10-CM

## 2020-01-16 DIAGNOSIS — C50.411 MALIGNANT NEOPLASM OF UPPER-OUTER QUADRANT OF RIGHT BREAST IN FEMALE, ESTROGEN RECEPTOR POSITIVE (H): ICD-10-CM

## 2020-01-16 DIAGNOSIS — Z17.0 MALIGNANT NEOPLASM OF UPPER-OUTER QUADRANT OF RIGHT BREAST IN FEMALE, ESTROGEN RECEPTOR POSITIVE (H): ICD-10-CM

## 2020-01-16 DIAGNOSIS — C50.411 MALIGNANT NEOPLASM OF UPPER-OUTER QUADRANT OF RIGHT BREAST IN FEMALE, ESTROGEN RECEPTOR POSITIVE (H): Primary | ICD-10-CM

## 2020-01-16 DIAGNOSIS — C50.911 MALIGNANT NEOPLASM OF RIGHT BREAST IN FEMALE, ESTROGEN RECEPTOR POSITIVE, UNSPECIFIED SITE OF BREAST (H): ICD-10-CM

## 2020-01-16 DIAGNOSIS — F19.982 DRUG-INDUCED INSOMNIA (H): ICD-10-CM

## 2020-01-16 LAB
BASOPHILS # BLD AUTO: 0.1 10E9/L (ref 0–0.2)
BASOPHILS NFR BLD AUTO: 2 %
BURR CELLS BLD QL SMEAR: ABNORMAL
DIFFERENTIAL METHOD BLD: ABNORMAL
EOSINOPHIL # BLD AUTO: 0.3 10E9/L (ref 0–0.7)
EOSINOPHIL NFR BLD AUTO: 9 %
ERYTHROCYTE [DISTWIDTH] IN BLOOD BY AUTOMATED COUNT: 14.6 % (ref 10–15)
HCT VFR BLD AUTO: 34.9 % (ref 35–47)
HGB BLD-MCNC: 11.6 G/DL (ref 11.7–15.7)
LYMPHOCYTES # BLD AUTO: 0.9 10E9/L (ref 0.8–5.3)
LYMPHOCYTES NFR BLD AUTO: 25 %
MCH RBC QN AUTO: 31.4 PG (ref 26.5–33)
MCHC RBC AUTO-ENTMCNC: 33.2 G/DL (ref 31.5–36.5)
MCV RBC AUTO: 94 FL (ref 78–100)
MONOCYTES # BLD AUTO: 0.5 10E9/L (ref 0–1.3)
MONOCYTES NFR BLD AUTO: 14 %
NEUTROPHILS # BLD AUTO: 1.9 10E9/L (ref 1.6–8.3)
NEUTROPHILS NFR BLD AUTO: 50 %
OVALOCYTES BLD QL SMEAR: SLIGHT
PLATELET # BLD AUTO: 370 10E9/L (ref 150–450)
PLATELET # BLD EST: ABNORMAL 10*3/UL
RBC # BLD AUTO: 3.7 10E12/L (ref 3.8–5.2)
WBC # BLD AUTO: 3.7 10E9/L (ref 4–11)

## 2020-01-16 PROCEDURE — 96375 TX/PRO/DX INJ NEW DRUG ADDON: CPT

## 2020-01-16 PROCEDURE — 85025 COMPLETE CBC W/AUTO DIFF WBC: CPT | Performed by: INTERNAL MEDICINE

## 2020-01-16 PROCEDURE — 25000128 H RX IP 250 OP 636: Performed by: INTERNAL MEDICINE

## 2020-01-16 PROCEDURE — 25000132 ZZH RX MED GY IP 250 OP 250 PS 637: Performed by: INTERNAL MEDICINE

## 2020-01-16 PROCEDURE — 96367 TX/PROPH/DG ADDL SEQ IV INF: CPT

## 2020-01-16 PROCEDURE — 96413 CHEMO IV INFUSION 1 HR: CPT

## 2020-01-16 PROCEDURE — 25800030 ZZH RX IP 258 OP 636: Performed by: INTERNAL MEDICINE

## 2020-01-16 RX ORDER — ALBUTEROL SULFATE 90 UG/1
1-2 AEROSOL, METERED RESPIRATORY (INHALATION)
Status: CANCELLED
Start: 2020-01-16

## 2020-01-16 RX ORDER — EPINEPHRINE 0.3 MG/.3ML
0.3 INJECTION SUBCUTANEOUS EVERY 5 MIN PRN
Status: CANCELLED | OUTPATIENT
Start: 2020-01-16

## 2020-01-16 RX ORDER — SODIUM CHLORIDE 9 MG/ML
1000 INJECTION, SOLUTION INTRAVENOUS CONTINUOUS PRN
Status: CANCELLED
Start: 2020-01-16

## 2020-01-16 RX ORDER — DIPHENHYDRAMINE HCL 25 MG
50 CAPSULE ORAL ONCE
Status: COMPLETED | OUTPATIENT
Start: 2020-01-16 | End: 2020-01-16

## 2020-01-16 RX ORDER — LORAZEPAM 2 MG/ML
0.5 INJECTION INTRAMUSCULAR EVERY 4 HOURS PRN
Status: CANCELLED
Start: 2020-01-16

## 2020-01-16 RX ORDER — HEPARIN SODIUM (PORCINE) LOCK FLUSH IV SOLN 100 UNIT/ML 100 UNIT/ML
5 SOLUTION INTRAVENOUS
Status: CANCELLED | OUTPATIENT
Start: 2020-01-16

## 2020-01-16 RX ORDER — MEPERIDINE HYDROCHLORIDE 25 MG/ML
25 INJECTION INTRAMUSCULAR; INTRAVENOUS; SUBCUTANEOUS EVERY 30 MIN PRN
Status: CANCELLED | OUTPATIENT
Start: 2020-01-16

## 2020-01-16 RX ORDER — METHYLPREDNISOLONE SODIUM SUCCINATE 125 MG/2ML
125 INJECTION, POWDER, LYOPHILIZED, FOR SOLUTION INTRAMUSCULAR; INTRAVENOUS
Status: CANCELLED
Start: 2020-01-16

## 2020-01-16 RX ORDER — HEPARIN SODIUM (PORCINE) LOCK FLUSH IV SOLN 100 UNIT/ML 100 UNIT/ML
5 SOLUTION INTRAVENOUS
Status: DISCONTINUED | OUTPATIENT
Start: 2020-01-16 | End: 2020-01-16 | Stop reason: HOSPADM

## 2020-01-16 RX ORDER — EPINEPHRINE 1 MG/ML
0.3 INJECTION, SOLUTION INTRAMUSCULAR; SUBCUTANEOUS EVERY 5 MIN PRN
Status: CANCELLED | OUTPATIENT
Start: 2020-01-16

## 2020-01-16 RX ORDER — DIPHENHYDRAMINE HCL 25 MG
50 CAPSULE ORAL ONCE
Status: CANCELLED
Start: 2020-01-16

## 2020-01-16 RX ORDER — ALBUTEROL SULFATE 0.83 MG/ML
2.5 SOLUTION RESPIRATORY (INHALATION)
Status: CANCELLED | OUTPATIENT
Start: 2020-01-16

## 2020-01-16 RX ORDER — DIPHENHYDRAMINE HYDROCHLORIDE 50 MG/ML
50 INJECTION INTRAMUSCULAR; INTRAVENOUS
Status: CANCELLED
Start: 2020-01-16

## 2020-01-16 RX ORDER — NALOXONE HYDROCHLORIDE 0.4 MG/ML
.1-.4 INJECTION, SOLUTION INTRAMUSCULAR; INTRAVENOUS; SUBCUTANEOUS
Status: CANCELLED | OUTPATIENT
Start: 2020-01-16

## 2020-01-16 RX ADMIN — FAMOTIDINE 20 MG: 20 INJECTION, SOLUTION INTRAVENOUS at 10:03

## 2020-01-16 RX ADMIN — SODIUM CHLORIDE 1000 ML: 9 INJECTION, SOLUTION INTRAVENOUS at 09:23

## 2020-01-16 RX ADMIN — HEPARIN SODIUM (PORCINE) LOCK FLUSH IV SOLN 100 UNIT/ML 5 ML: 100 SOLUTION at 11:48

## 2020-01-16 RX ADMIN — DEXAMETHASONE SODIUM PHOSPHATE 20 MG: 10 INJECTION, SOLUTION INTRAMUSCULAR; INTRAVENOUS at 09:44

## 2020-01-16 RX ADMIN — PACLITAXEL 135 MG: 6 INJECTION, SOLUTION INTRAVENOUS at 10:26

## 2020-01-16 RX ADMIN — DIPHENHYDRAMINE HYDROCHLORIDE 50 MG: 25 CAPSULE ORAL at 09:37

## 2020-01-16 ASSESSMENT — MIFFLIN-ST. JEOR: SCORE: 1283.65

## 2020-01-16 NOTE — PROGRESS NOTES
Infusion Nursing Note:  Jyothi STERN Zena presents today for C8D1 Taxol.    Patient seen by provider today: No   present during visit today: Not Applicable.    Note: N/A.    Intravenous Access:  Labs drawn without difficulty.  Implanted Port.    Treatment Conditions:  Lab Results   Component Value Date    HGB 11.6 01/16/2020     Lab Results   Component Value Date    WBC 3.7 01/16/2020      Lab Results   Component Value Date    ANEU 1.9 01/16/2020     Lab Results   Component Value Date     01/16/2020      Results reviewed, labs MET treatment parameters, ok to proceed with treatment.      Post Infusion Assessment:  Patient tolerated infusion without incident.  Blood return noted pre and post infusion.  Site patent and intact, free from redness, edema or discomfort.  No evidence of extravasations.  Access discontinued per protocol.       Discharge Plan:   Prescription refills given for Ativan and Ambien.  Discharge instructions reviewed with: Patient.  Patient and/or family verbalized understanding of discharge instructions and all questions answered.  AVS to patient via tok tok tokT.  Patient will return 1/23/20 for next appointment.   Patient discharged in stable condition accompanied by: self.  Departure Mode: Ambulatory.    Huang Malone RN

## 2020-01-22 ENCOUNTER — TELEPHONE (OUTPATIENT)
Dept: ONCOLOGY | Facility: CLINIC | Age: 55
End: 2020-01-22

## 2020-01-22 DIAGNOSIS — K12.1 STOMATITIS AND MUCOSITIS: Primary | ICD-10-CM

## 2020-01-22 DIAGNOSIS — K12.30 STOMATITIS AND MUCOSITIS: Primary | ICD-10-CM

## 2020-01-22 RX ORDER — DIPHENHYDRAMINE HYDROCHLORIDE AND LIDOCAINE HYDROCHLORIDE AND ALUMINUM HYDROXIDE AND MAGNESIUM HYDRO
5-10 KIT EVERY 6 HOURS PRN
Qty: 237 ML | Refills: 3 | Status: SHIPPED | OUTPATIENT
Start: 2020-01-22 | End: 2020-03-31

## 2020-01-22 NOTE — TELEPHONE ENCOUNTER
Jyothi called clinic stating that she recently went to the urgent care. She tested negative for strep and influenza . Temp is low grade at 99.2. Throat is sore and swallowing is difficult. Consulted with dr. Monroe who will see her tomorrow and she does not need to cancel her Taxol infusion. Script for magic mouthwash was sent to [facility] pharmacy. Called Jyothi she is aware and will  magic mouthwash today. Tricia Smiley RN,BSN,OCN

## 2020-01-22 NOTE — PROGRESS NOTES
[facility]    Hematology/Oncology Established Patient Follow-up Note      Today's Date: 01/23/20    Reason for Follow-up: Right breast cancer.    HISTORY OF PRESENT ILLNESS: Jyothi Freiats is a 53 year old female who presents with the following oncologic history:   1.  10/11/2019: Diagnostic right sided mammogram performed for a palpable lump in the right breast.  This showed an irregularly-shaped spiculated mass in the upper outer right breast with prominent lymph nodes in the right axilla.  Targeted ultrasound of the right upper outer breast showed an irregularly-shaped hypoechoic mass at the 10 o'clock position, 4 cm from the nipple measuring 2.6 x 1.5 x 2.4 cm.  Right axillary ultrasound showed moderately enlarged lymph nodes.  Right breast needle biopsy showed a grade 3 invasive ductal carcinoma with lymphovascular invasion identified, ER strongly positive at 95%, MT strongly positive at 95%, HER-2/juan FISH negative.  Right axillary lymph node measuring 2 cm was positive for metastatic carcinoma.  Note prior 4/2019 mammogram deemed normal.  2.  10/15/2019: Bilateral breast MRI showed known right breast malignancy measuring 2.6 x 1.4 x 2 cm, 3 mildly enlarged right axillary lymph nodes and no contralateral breast malignancy.  3. 10/21/2019 PET scan showed scattered small hypermetabolic bilateral axillary lymph nodes; for example, a hypermetabolic right axillary lymph node measures 1.6 x 0.9 cm with SUV max 3.6.  Hypermetabolic mass in the right breast superiorly and laterally measuring 2.1 x 1.6 cm with SUV max 4.3.  A 0.6 cm low-density lesion in the right hepatic lobe is too small for accurate PET characterization but shows no appreciable hypermetabolic activity.  Incidentally noted ectasia of the ascending thoracic aorta measures 4 cm in diameter.  4.  10/23/2019: Left axillary ultrasound showed benign-appearing lymph node.  Left axillary lymph node biopsy showed benign lymph node tissue.  5.   10/29/2019: Started neoadjuvant chemotherapy with dose dense Adriamycin and Cytoxan, followed by weekly paclitaxel.    INTERIM HISTORY:  Jyothi reports hot flashes mainly at night that did improve on oxybutynin but she was having side effects of dry eyes and fluid retention.  She is requesting different intervention for her hot flashes.  She had a low-grade temperature of 99.3 yesterday from a transient viral illness but feels better today with no fever, although she did take acetaminophen this morning.  Otherwise, she denies any chills, bladder dysfunction.  Constipation seems to be overall well controlled.  She does report more significant fatigue.    REVIEW OF SYSTEMS:   14 point ROS was reviewed and is negative other than as noted above in HPI.       HOME MEDICATIONS:  Current Outpatient Medications   Medication Sig Dispense Refill     acetaminophen (TYLENOL) 500 MG tablet Take 1,000 mg by mouth every 6 hours as needed for mild pain       acetaminophen-caffeine (EXCEDRIN TENSION HEADACHE) 500-65 MG TABS Take 2 tablets by mouth every 6 hours as needed for mild pain       ALBUTEROL 108 (90 BASE) MCG/ACT inhaler INHALE 2 PUFFS INTO THE LUNGS EVERY 6 HOURS AS NEEDED FOR SHORTNESS OF BREATH OR DIFFICULT BREATHING OR WHEEZING 8.5 g 0     buPROPion (WELLBUTRIN XL) 300 MG 24 hr tablet Take 1 tablet (300 mg) by mouth every morning 90 tablet 3     fluticasone (FLOVENT DISKUS) 100 MCG/BLIST inhaler Inhale 1 puff into the lungs 2 times daily 3 Inhaler 3     fluticasone-salmeterol (ADVAIR) 100-50 MCG/DOSE inhaler Inhale 1 puff into the lungs every 12 hours 1 Inhaler 3     HYDROcodone-acetaminophen (NORCO) 5-325 MG tablet Take 1-2 tablets by mouth every 4 hours as needed for moderate to severe pain 10 tablet 0     magic mouthwash suspension, diphenhydrAMINE, lidocaine, aluminum-magnesium & simethicone, (FIRST-MOUTHWASH BLM) compounding kit Swish and swallow 5-10 mLs in mouth every 6 hours as needed for mouth sores 237 mL 3      nadolol (CORGARD) 20 MG tablet TAKE 1 TABLET(20 MG) BY MOUTH DAILY 90 tablet 3     naproxen sodium (ANAPROX) 220 MG tablet Take 220 mg by mouth daily as needed for moderate pain       oxybutynin (DITROPAN) 5 MG tablet Take 1 tablet (5 mg) by mouth 2 times daily 60 tablet 3     pravastatin (PRAVACHOL) 20 MG tablet Take 1 tablet (20 mg) by mouth daily 90 tablet 3     prochlorperazine (COMPAZINE) 10 MG tablet Take 1 tablet (10 mg) by mouth every 6 hours as needed (Nausea/Vomiting) 30 tablet 5     senna-docusate (SENOKOT-S/PERICOLACE) 8.6-50 MG tablet Take 1-2 tablets by mouth 2 times daily 30 tablet 0     zolpidem (AMBIEN) 5 MG tablet Take 1 tablet (5 mg) by mouth nightly as needed for sleep 30 tablet 1         ALLERGIES:  Allergies   Allergen Reactions     Sulfa Drugs Other (See Comments)     Red face. Ringing in the ears.         PAST MEDICAL HISTORY:  Past Medical History:   Diagnosis Date     GERD (gastroesophageal reflux disease)      H/O colonoscopy 03/08/2016    done at [facility] and [facility]     Hematuria 2016    eval at [facility] and per pt cysto and ct neg     Intermittent asthma     since childhood     Low iron 10/2017    done for eval of hair loss, [facility]l     Migraines     most of adult life, has seen neuro in past, on bblocker     Mild depression (H)      Normal stress echocardiogram July 2011    due to hear racing     Osteopenia 2008     Syncope 03/2017    echo nl lv size and fxn, grade 1 dd, mild tr         PAST SURGICAL HISTORY:  Past Surgical History:   Procedure Laterality Date     C TMJ ARTHROSCOPY/SURGERY       ESOPHAGOSCOPY, GASTROSCOPY, DUODENOSCOPY (EGD), COMBINED N/A 10/30/2017    Procedure: COMBINED ESOPHAGOSCOPY, GASTROSCOPY, DUODENOSCOPY (EGD), BIOPSY SINGLE OR MULTIPLE;  COMBINED ESOPHAGOSCOPY, GASTROSCOPY, DUODENOSCOPY (EGD);  Surgeon: Martin Rodriguez MD;  Location:  GI     INSERT PORT VASCULAR ACCESS N/A 10/24/2019    Procedure: PORT PLACEMENT;  Surgeon: Kaila Hernandez MD;  Location:  OR      "ORTHOPEDIC SURGERY      \"leg surgery\"     single oopherectomy  2010    cyst         SOCIAL HISTORY:  Social History     Socioeconomic History     Marital status:      Spouse name: Not on file     Number of children: 2     Years of education: Not on file     Highest education level: Not on file   Occupational History     Not on file   Social Needs     Financial resource strain: Not on file     Food insecurity:     Worry: Not on file     Inability: Not on file     Transportation needs:     Medical: Not on file     Non-medical: Not on file   Tobacco Use     Smoking status: Former Smoker     Packs/day: 0.00     Last attempt to quit: 3/27/1997     Years since quittin.8     Smokeless tobacco: Never Used   Substance and Sexual Activity     Alcohol use: Yes     Comment: rarely     Drug use: No     Sexual activity: Yes     Partners: Male     Birth control/protection: Pill   Lifestyle     Physical activity:     Days per week: Not on file     Minutes per session: Not on file     Stress: Not on file   Relationships     Social connections:     Talks on phone: Not on file     Gets together: Not on file     Attends Mandaen service: Not on file     Active member of club or organization: Not on file     Attends meetings of clubs or organizations: Not on file     Relationship status: Not on file     Intimate partner violence:     Fear of current or ex partner: Not on file     Emotionally abused: Not on file     Physically abused: Not on file     Forced sexual activity: Not on file   Other Topics Concern     Parent/sibling w/ CABG, MI or angioplasty before 65F 55M? Yes   Social History Narrative     Not on file         FAMILY HISTORY:  Family History   Problem Relation Age of Onset     Coronary Artery Disease Father 48        MI. and one in his 60s     Emphysema Mother         COPD         PHYSICAL EXAM:  Vital signs:  BP (!) 144/92   Pulse 83   Temp 98  F (36.7  C) (Oral)   Resp 16   Ht 1.702 m (5' 7.01\")   Wt 65.3 " kg (144 lb)   LMP 10/07/2017 (Approximate)   SpO2 100%   BMI 22.55 kg/m     ECO  GENERAL/CONSTITUTIONAL: No acute distress.  EYES: No scleral icterus.  ENT/MOUTH: Neck supple. Oropharynx clear, no mucositis.  LYMPH: No cervical, supraclavicular, or axillary adenopathy.   BREAST: Palpable right upper outer breast 3 x 2 cm less circumscribed mass, movable, nontender and even flatter compared to prior exam.  No rashes or ulceration or erythema.  No dimpling.  The nipples are everted bilaterally with no discharge.  RESPIRATORY: Clear to auscultation bilaterally. No crackles or wheezing.   CARDIOVASCULAR: Regular rate and rhythm without murmurs.  GASTROINTESTINAL: No guarding or distention.  MUSCULOSKELETAL: Warm and well-perfused, no cyanosis, clubbing, or edema.  NEUROLOGIC: Cranial nerves II-XII are intact. Alert, oriented, answers questions appropriately.  INTEGUMENTARY: No rashes or jaundice.      LABS:  CBC RESULTS:   Recent Labs   Lab Test 20  0755   WBC 4.9   RBC 3.76*   HGB 11.6*   HCT 35.2   MCV 94   MCH 30.9   MCHC 33.0   RDW 14.0        Last Comprehensive Metabolic Panel:  Sodium   Date Value Ref Range Status   2019 138 133 - 144 mmol/L Final     Potassium   Date Value Ref Range Status   2019 3.8 3.4 - 5.3 mmol/L Final     Chloride   Date Value Ref Range Status   2019 106 94 - 109 mmol/L Final     Carbon Dioxide   Date Value Ref Range Status   2019 25 20 - 32 mmol/L Final     Anion Gap   Date Value Ref Range Status   2019 7 3 - 14 mmol/L Final     Glucose   Date Value Ref Range Status   2019 98 70 - 99 mg/dL Final     Urea Nitrogen   Date Value Ref Range Status   2019 10 7 - 30 mg/dL Final     Creatinine   Date Value Ref Range Status   2019 0.67 0.52 - 1.04 mg/dL Final     GFR Estimate   Date Value Ref Range Status   2019 >90 >60 mL/min/[1.73_m2] Final     Comment:     Non  GFR Calc  Starting 2018, serum creatinine  based estimated GFR (eGFR) will be   calculated using the Chronic Kidney Disease Epidemiology Collaboration   (CKD-EPI) equation.       Calcium   Date Value Ref Range Status   11/27/2019 8.8 8.5 - 10.1 mg/dL Final     Bilirubin Total   Date Value Ref Range Status   01/23/2020 0.3 0.2 - 1.3 mg/dL Final     Alkaline Phosphatase   Date Value Ref Range Status   01/23/2020 57 40 - 150 U/L Final     ALT   Date Value Ref Range Status   01/23/2020 44 0 - 50 U/L Final     AST   Date Value Ref Range Status   01/23/2020 20 0 - 45 U/L Final         PATHOLOGY:  None new.    IMAGING:  None new.    ASSESSMENT/PLAN:  Jyothi Freitas is a 53 year old female with the following issues:  1.  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  I reviewed the blood counts with her.  They have adequately recovered to proceed with her next dose of paclitaxel today.  -Plan is for her to complete 12 weeks of weekly paclitaxel.  -Rationale for neoadjuvant chemotherapy is to observe for chemo responsiveness, reduce risk of recurrent breast cancer, and reduce burden of disease prior to surgery.  However, as per previous discussion, she understands that it is unlikely that we will achieve a complete response to neoadjuvant chemotherapy given that her tumor is strongly ER and WA positive.  -She would certainly be a candidate for adjuvant endocrine therapy based on the strongly ER and WA positive tumor status.  She is postmenopausal, so I would typically recommend anastrozole to reduce her risk of breast cancer recurrence by relative 50%.  -Genetic test results pending from her consult in November 2019.  -She would also likely be a candidate for adjuvant radiation therapy given her lymph node involvement and  depending on radiation oncology evaluation.  She is planning for a lumpectomy with Dr Hernandez.  We could have her undergo breast imaging prior to surgery.      2. Depression  -Mood stable. Continue bupropion. Would not recommend tamoxifen in future due to interaction with bupropion unless she switched antidepressants.    3.  Constipation  -Likely drug related from her antiemetics and now much improved after taking her antiemetics on an as-needed basis. She may take stool softeners and laxatives as needed for constipation.    4.  Bone pain related to pegfilgrastim  -She may take naproxen orally twice daily and acetaminophen as needed for breakthrough pain.    5.  Insomnia   6.  Hot flashes, chemotherapy induced  -Intermittent, worsened by sweats likely due to hot flashes continue zolpidem as needed, given that she had headaches to lorazepam.  -Oxybutynin did help with hot flashes but she had too much dry eye syndrome and fluid retention.   -I provided her the option of trying gabapentin at night only given that there are sedative effects with it.  She would like to try it so I did provide her a prescription.    7. Ectasia of thoracic aorta  -This measures 4 cm on the PET scan.  I reassured Jyothi that she would not need any urgent surgical management of the aorta that may need ongoing monitoring. She is following with Dr. Silverio Gooden for monitoring of this issue.     Return in 2 weeks.      Maricarmen Monroe MD  Hematology/Oncology  [facility]    I spent a total of 30 minutes with the patient, with greater than 50% of the time in counseling and coordination of care.

## 2020-01-22 NOTE — TELEPHONE ENCOUNTER
Jyothi called this morning stating she feels flushed, (denies fever)fatigued, has a sore throat, it does look red, states there was a patch on it that is now gone.   Denies cough but states she does have a thick yellow phlegm in the back of her throat.  She is scheduled for Taxol and Dr. Monroe tomorrow.    Advised her to go to  to have her throat looked at, get a rapid strep.   She was agreeable to this plan    Will forwad to Dr. Dailey RNCC to follow up on results.

## 2020-01-23 ENCOUNTER — INFUSION THERAPY VISIT (OUTPATIENT)
Dept: INFUSION THERAPY | Facility: CLINIC | Age: 55
End: 2020-01-23
Attending: INTERNAL MEDICINE
Payer: COMMERCIAL

## 2020-01-23 ENCOUNTER — HOSPITAL ENCOUNTER (OUTPATIENT)
Facility: CLINIC | Age: 55
Setting detail: SPECIMEN
Discharge: HOME | End: 2020-01-23
Attending: INTERNAL MEDICINE | Admitting: INTERNAL MEDICINE
Payer: COMMERCIAL

## 2020-01-23 ENCOUNTER — ONCOLOGY VISIT (OUTPATIENT)
Dept: ONCOLOGY | Facility: CLINIC | Age: 55
End: 2020-01-23
Attending: INTERNAL MEDICINE
Payer: COMMERCIAL

## 2020-01-23 VITALS
WEIGHT: 144 LBS | DIASTOLIC BLOOD PRESSURE: 92 MMHG | SYSTOLIC BLOOD PRESSURE: 144 MMHG | HEIGHT: 67 IN | HEART RATE: 83 BPM | BODY MASS INDEX: 22.6 KG/M2 | TEMPERATURE: 98 F | OXYGEN SATURATION: 100 % | RESPIRATION RATE: 16 BRPM

## 2020-01-23 DIAGNOSIS — C50.411 MALIGNANT NEOPLASM OF UPPER-OUTER QUADRANT OF RIGHT BREAST IN FEMALE, ESTROGEN RECEPTOR POSITIVE (H): Primary | ICD-10-CM

## 2020-01-23 DIAGNOSIS — N95.1 MENOPAUSAL SYNDROME (HOT FLASHES): ICD-10-CM

## 2020-01-23 DIAGNOSIS — G44.209 TENSION HEADACHE: ICD-10-CM

## 2020-01-23 DIAGNOSIS — Z17.0 MALIGNANT NEOPLASM OF UPPER-OUTER QUADRANT OF RIGHT BREAST IN FEMALE, ESTROGEN RECEPTOR POSITIVE (H): Primary | ICD-10-CM

## 2020-01-23 DIAGNOSIS — Z17.0 MALIGNANT NEOPLASM OF RIGHT BREAST IN FEMALE, ESTROGEN RECEPTOR POSITIVE, UNSPECIFIED SITE OF BREAST (H): ICD-10-CM

## 2020-01-23 DIAGNOSIS — K59.00 CONSTIPATION, UNSPECIFIED CONSTIPATION TYPE: ICD-10-CM

## 2020-01-23 DIAGNOSIS — G44.219 EPISODIC TENSION-TYPE HEADACHE, NOT INTRACTABLE: ICD-10-CM

## 2020-01-23 DIAGNOSIS — C50.911 MALIGNANT NEOPLASM OF RIGHT BREAST IN FEMALE, ESTROGEN RECEPTOR POSITIVE, UNSPECIFIED SITE OF BREAST (H): ICD-10-CM

## 2020-01-23 DIAGNOSIS — F32.5 MAJOR DEPRESSION IN COMPLETE REMISSION (H): ICD-10-CM

## 2020-01-23 LAB
ALBUMIN SERPL-MCNC: 3.7 G/DL (ref 3.4–5)
ALP SERPL-CCNC: 57 U/L (ref 40–150)
ALT SERPL W P-5'-P-CCNC: 44 U/L (ref 0–50)
AST SERPL W P-5'-P-CCNC: 20 U/L (ref 0–45)
BASOPHILS # BLD AUTO: 0.1 10E9/L (ref 0–0.2)
BASOPHILS NFR BLD AUTO: 1 %
BILIRUB DIRECT SERPL-MCNC: <0.1 MG/DL (ref 0–0.2)
BILIRUB SERPL-MCNC: 0.3 MG/DL (ref 0.2–1.3)
DIFFERENTIAL METHOD BLD: ABNORMAL
EOSINOPHIL # BLD AUTO: 0.2 10E9/L (ref 0–0.7)
EOSINOPHIL NFR BLD AUTO: 4.7 %
ERYTHROCYTE [DISTWIDTH] IN BLOOD BY AUTOMATED COUNT: 14 % (ref 10–15)
HCT VFR BLD AUTO: 35.2 % (ref 35–47)
HGB BLD-MCNC: 11.6 G/DL (ref 11.7–15.7)
IMM GRANULOCYTES # BLD: 0.1 10E9/L (ref 0–0.4)
IMM GRANULOCYTES NFR BLD: 1 %
LYMPHOCYTES # BLD AUTO: 0.7 10E9/L (ref 0.8–5.3)
LYMPHOCYTES NFR BLD AUTO: 14.6 %
MCH RBC QN AUTO: 30.9 PG (ref 26.5–33)
MCHC RBC AUTO-ENTMCNC: 33 G/DL (ref 31.5–36.5)
MCV RBC AUTO: 94 FL (ref 78–100)
MONOCYTES # BLD AUTO: 0.6 10E9/L (ref 0–1.3)
MONOCYTES NFR BLD AUTO: 12.1 %
NEUTROPHILS # BLD AUTO: 3.3 10E9/L (ref 1.6–8.3)
NEUTROPHILS NFR BLD AUTO: 66.6 %
NRBC # BLD AUTO: 0 10*3/UL
NRBC BLD AUTO-RTO: 0 /100
PLATELET # BLD AUTO: 315 10E9/L (ref 150–450)
PROT SERPL-MCNC: 6.3 G/DL (ref 6.8–8.8)
RBC # BLD AUTO: 3.76 10E12/L (ref 3.8–5.2)
WBC # BLD AUTO: 4.9 10E9/L (ref 4–11)

## 2020-01-23 PROCEDURE — 80076 HEPATIC FUNCTION PANEL: CPT | Performed by: INTERNAL MEDICINE

## 2020-01-23 PROCEDURE — 25000132 ZZH RX MED GY IP 250 OP 250 PS 637: Performed by: INTERNAL MEDICINE

## 2020-01-23 PROCEDURE — 85025 COMPLETE CBC W/AUTO DIFF WBC: CPT | Performed by: INTERNAL MEDICINE

## 2020-01-23 PROCEDURE — 25800030 ZZH RX IP 258 OP 636: Performed by: INTERNAL MEDICINE

## 2020-01-23 PROCEDURE — 96367 TX/PROPH/DG ADDL SEQ IV INF: CPT

## 2020-01-23 PROCEDURE — 99215 OFFICE O/P EST HI 40 MIN: CPT | Performed by: INTERNAL MEDICINE

## 2020-01-23 PROCEDURE — 96413 CHEMO IV INFUSION 1 HR: CPT

## 2020-01-23 PROCEDURE — 25000128 H RX IP 250 OP 636: Performed by: INTERNAL MEDICINE

## 2020-01-23 PROCEDURE — G0463 HOSPITAL OUTPT CLINIC VISIT: HCPCS

## 2020-01-23 PROCEDURE — 96375 TX/PRO/DX INJ NEW DRUG ADDON: CPT

## 2020-01-23 RX ORDER — EPINEPHRINE 0.3 MG/.3ML
0.3 INJECTION SUBCUTANEOUS EVERY 5 MIN PRN
Status: CANCELLED | OUTPATIENT
Start: 2020-01-23

## 2020-01-23 RX ORDER — ALBUTEROL SULFATE 0.83 MG/ML
2.5 SOLUTION RESPIRATORY (INHALATION)
Status: CANCELLED | OUTPATIENT
Start: 2020-01-23

## 2020-01-23 RX ORDER — SODIUM CHLORIDE 9 MG/ML
1000 INJECTION, SOLUTION INTRAVENOUS CONTINUOUS PRN
Status: CANCELLED
Start: 2020-01-23

## 2020-01-23 RX ORDER — ALBUTEROL SULFATE 90 UG/1
1-2 AEROSOL, METERED RESPIRATORY (INHALATION)
Status: CANCELLED
Start: 2020-01-23

## 2020-01-23 RX ORDER — DIPHENHYDRAMINE HYDROCHLORIDE 50 MG/ML
50 INJECTION INTRAMUSCULAR; INTRAVENOUS
Status: CANCELLED
Start: 2020-01-23

## 2020-01-23 RX ORDER — HEPARIN SODIUM (PORCINE) LOCK FLUSH IV SOLN 100 UNIT/ML 100 UNIT/ML
5 SOLUTION INTRAVENOUS
Status: CANCELLED | OUTPATIENT
Start: 2020-01-23

## 2020-01-23 RX ORDER — LORAZEPAM 2 MG/ML
0.5 INJECTION INTRAMUSCULAR EVERY 4 HOURS PRN
Status: CANCELLED
Start: 2020-01-23

## 2020-01-23 RX ORDER — DIPHENHYDRAMINE HCL 25 MG
50 CAPSULE ORAL ONCE
Status: CANCELLED
Start: 2020-01-23

## 2020-01-23 RX ORDER — GABAPENTIN 100 MG/1
300 CAPSULE ORAL EVERY EVENING
Qty: 30 CAPSULE | Refills: 1 | Status: SHIPPED | OUTPATIENT
Start: 2020-01-23 | End: 2020-01-23

## 2020-01-23 RX ORDER — METHYLPREDNISOLONE SODIUM SUCCINATE 125 MG/2ML
125 INJECTION, POWDER, LYOPHILIZED, FOR SOLUTION INTRAMUSCULAR; INTRAVENOUS
Status: CANCELLED
Start: 2020-01-23

## 2020-01-23 RX ORDER — KETOROLAC TROMETHAMINE 30 MG/ML
30 INJECTION, SOLUTION INTRAMUSCULAR; INTRAVENOUS ONCE
Status: COMPLETED | OUTPATIENT
Start: 2020-01-23 | End: 2020-01-23

## 2020-01-23 RX ORDER — EPINEPHRINE 1 MG/ML
0.3 INJECTION, SOLUTION INTRAMUSCULAR; SUBCUTANEOUS EVERY 5 MIN PRN
Status: CANCELLED | OUTPATIENT
Start: 2020-01-23

## 2020-01-23 RX ORDER — MEPERIDINE HYDROCHLORIDE 25 MG/ML
25 INJECTION INTRAMUSCULAR; INTRAVENOUS; SUBCUTANEOUS EVERY 30 MIN PRN
Status: CANCELLED | OUTPATIENT
Start: 2020-01-23

## 2020-01-23 RX ORDER — GABAPENTIN 100 MG/1
300 CAPSULE ORAL EVERY EVENING
Qty: 60 CAPSULE | Refills: 1 | Status: SHIPPED | OUTPATIENT
Start: 2020-01-23 | End: 2020-03-31

## 2020-01-23 RX ORDER — HEPARIN SODIUM (PORCINE) LOCK FLUSH IV SOLN 100 UNIT/ML 100 UNIT/ML
5 SOLUTION INTRAVENOUS
Status: DISCONTINUED | OUTPATIENT
Start: 2020-01-23 | End: 2020-01-23 | Stop reason: HOSPADM

## 2020-01-23 RX ORDER — NALOXONE HYDROCHLORIDE 0.4 MG/ML
.1-.4 INJECTION, SOLUTION INTRAMUSCULAR; INTRAVENOUS; SUBCUTANEOUS
Status: CANCELLED | OUTPATIENT
Start: 2020-01-23

## 2020-01-23 RX ORDER — DIPHENHYDRAMINE HCL 25 MG
50 CAPSULE ORAL ONCE
Status: COMPLETED | OUTPATIENT
Start: 2020-01-23 | End: 2020-01-23

## 2020-01-23 RX ADMIN — DIPHENHYDRAMINE HYDROCHLORIDE 50 MG: 25 CAPSULE ORAL at 09:27

## 2020-01-23 RX ADMIN — PACLITAXEL 135 MG: 6 INJECTION, SOLUTION INTRAVENOUS at 10:33

## 2020-01-23 RX ADMIN — Medication 5 ML: at 12:29

## 2020-01-23 RX ADMIN — SODIUM CHLORIDE 1000 ML: 9 INJECTION, SOLUTION INTRAVENOUS at 09:27

## 2020-01-23 RX ADMIN — FAMOTIDINE 20 MG: 20 INJECTION, SOLUTION INTRAVENOUS at 10:01

## 2020-01-23 RX ADMIN — KETOROLAC TROMETHAMINE 30 MG: 30 INJECTION, SOLUTION INTRAMUSCULAR; INTRAVENOUS at 12:14

## 2020-01-23 RX ADMIN — DEXAMETHASONE SODIUM PHOSPHATE 12 MG: 10 INJECTION, SOLUTION INTRAMUSCULAR; INTRAVENOUS at 09:42

## 2020-01-23 ASSESSMENT — PAIN SCALES - GENERAL
PAINLEVEL: SEVERE PAIN (6)
PAINLEVEL: NO PAIN (0)
PAINLEVEL: SEVERE PAIN (7)
PAINLEVEL: MODERATE PAIN (5)

## 2020-01-23 ASSESSMENT — MIFFLIN-ST. JEOR: SCORE: 1280.96

## 2020-01-23 NOTE — PROGRESS NOTES
Nursing Note:  Jyothi Freitas presents today for port labs.    Patient seen by provider today: Yes: Dr Monroe   present during visit today: Not Applicable.    Note: N/A.    Intravenous Access:  Implanted Port.    Discharge Plan:   Patient was sent to [facility] for provider appointment.    Bella Lozano RN

## 2020-01-23 NOTE — PROGRESS NOTES
Infusion Nursing Note:  Jyothi Freitas presents today for C9D1 Taxol.    Patient seen by provider today: Yes: Dr. Monroe    Note: After patient back in infusion with premeds running, patient reported a headache of 7/10 that felt like a possible migraine.  Dr. Monroe notified and IV Toradol given with relief.  Patient tolerated chemo infusion well.     Intravenous Access:  Implanted Port.      Treatment Conditions:  Lab Results   Component Value Date    HGB 11.6 01/23/2020     Lab Results   Component Value Date    WBC 4.9 01/23/2020      Lab Results   Component Value Date    ANEU 3.3 01/23/2020     Lab Results   Component Value Date     01/23/2020      Lab Results   Component Value Date     11/27/2019                   Lab Results   Component Value Date    POTASSIUM 3.8 11/27/2019           No results found for: MAG         Lab Results   Component Value Date    CR 0.67 11/27/2019                   Lab Results   Component Value Date    JEFF 8.8 11/27/2019                Lab Results   Component Value Date    BILITOTAL 0.3 01/23/2020           Lab Results   Component Value Date    ALBUMIN 3.7 01/23/2020                    Lab Results   Component Value Date    ALT 44 01/23/2020           Lab Results   Component Value Date    AST 20 01/23/2020       Results reviewed, labs MET treatment parameters, ok to proceed with treatment.      Post Infusion Assessment:  Patient tolerated infusion without incident.  Blood return noted pre and post infusion.  Site patent and intact, free from redness, edema or discomfort.  No evidence of extravasations.  Access discontinued per protocol.    Discharge Plan:   Patient declined prescription refills.  Discharge instructions reviewed with: Patient.  Patient and/or family verbalized understanding of discharge instructions and all questions answered.  Copy of AVS reviewed with patient and/or family.  Patient will return 1/30/20 for next appointment.  Patient discharged in stable  condition accompanied by: self.  Departure Mode: Ambulatory.    Dayan Alvares, RN, RN

## 2020-01-23 NOTE — LETTER
1/23/2020         RE: Jyothi Freitas  [address]  [place] [address]        Dear Colleague,    Thank you for referring your patient, Jyothi Freitas, to the [facility]. Please see a copy of my visit note below.    [facility] [place] [facility]    Hematology/Oncology Established Patient Follow-up Note      Today's Date: 01/23/20    Reason for Follow-up: Right breast cancer.    HISTORY OF PRESENT ILLNESS: Jyothi Freitas is a 53 year old female who presents with the following oncologic history:   1.  10/11/2019: Diagnostic right sided mammogram performed for a palpable lump in the right breast.  This showed an irregularly-shaped spiculated mass in the upper outer right breast with prominent lymph nodes in the right axilla.  Targeted ultrasound of the right upper outer breast showed an irregularly-shaped hypoechoic mass at the 10 o'clock position, 4 cm from the nipple measuring 2.6 x 1.5 x 2.4 cm.  Right axillary ultrasound showed moderately enlarged lymph nodes.  Right breast needle biopsy showed a grade 3 invasive ductal carcinoma with lymphovascular invasion identified, ER strongly positive at 95%, MD strongly positive at 95%, HER-2/juan FISH negative.  Right axillary lymph node measuring 2 cm was positive for metastatic carcinoma.  Note prior 4/2019 mammogram deemed normal.  2.  10/15/2019: Bilateral breast MRI showed known right breast malignancy measuring 2.6 x 1.4 x 2 cm, 3 mildly enlarged right axillary lymph nodes and no contralateral breast malignancy.  3. 10/21/2019 PET scan showed scattered small hypermetabolic bilateral axillary lymph nodes; for example, a hypermetabolic right axillary lymph node measures 1.6 x 0.9 cm with SUV max 3.6.  Hypermetabolic mass in the right breast superiorly and laterally measuring 2.1 x 1.6 cm with SUV max 4.3.  A 0.6 cm low-density lesion in the right hepatic lobe is too small for accurate PET characterization but shows no appreciable hypermetabolic activity.   Incidentally noted ectasia of the ascending thoracic aorta measures 4 cm in diameter.  4.  10/23/2019: Left axillary ultrasound showed benign-appearing lymph node.  Left axillary lymph node biopsy showed benign lymph node tissue.  5.  10/29/2019: Started neoadjuvant chemotherapy with dose dense Adriamycin and Cytoxan, followed by weekly paclitaxel.    INTERIM HISTORY:  Jyothi reports hot flashes mainly at night that did improve on oxybutynin but she was having side effects of dry eyes and fluid retention.  She is requesting different intervention for her hot flashes.  She had a low-grade temperature of 99.3 yesterday from a transient viral illness but feels better today with no fever, although she did take acetaminophen this morning.  Otherwise, she denies any chills, bladder dysfunction.  Constipation seems to be overall well controlled.  She does report more significant fatigue.    REVIEW OF SYSTEMS:   14 point ROS was reviewed and is negative other than as noted above in HPI.       HOME MEDICATIONS:  Current Outpatient Medications   Medication Sig Dispense Refill     acetaminophen (TYLENOL) 500 MG tablet Take 1,000 mg by mouth every 6 hours as needed for mild pain       acetaminophen-caffeine (EXCEDRIN TENSION HEADACHE) 500-65 MG TABS Take 2 tablets by mouth every 6 hours as needed for mild pain       ALBUTEROL 108 (90 BASE) MCG/ACT inhaler INHALE 2 PUFFS INTO THE LUNGS EVERY 6 HOURS AS NEEDED FOR SHORTNESS OF BREATH OR DIFFICULT BREATHING OR WHEEZING 8.5 g 0     buPROPion (WELLBUTRIN XL) 300 MG 24 hr tablet Take 1 tablet (300 mg) by mouth every morning 90 tablet 3     fluticasone (FLOVENT DISKUS) 100 MCG/BLIST inhaler Inhale 1 puff into the lungs 2 times daily 3 Inhaler 3     fluticasone-salmeterol (ADVAIR) 100-50 MCG/DOSE inhaler Inhale 1 puff into the lungs every 12 hours 1 Inhaler 3     HYDROcodone-acetaminophen (NORCO) 5-325 MG tablet Take 1-2 tablets by mouth every 4 hours as needed for moderate to severe  pain 10 tablet 0     magic mouthwash suspension, diphenhydrAMINE, lidocaine, aluminum-magnesium & simethicone, (FIRST-MOUTHWASH BLM) compounding kit Swish and swallow 5-10 mLs in mouth every 6 hours as needed for mouth sores 237 mL 3     nadolol (CORGARD) 20 MG tablet TAKE 1 TABLET(20 MG) BY MOUTH DAILY 90 tablet 3     naproxen sodium (ANAPROX) 220 MG tablet Take 220 mg by mouth daily as needed for moderate pain       oxybutynin (DITROPAN) 5 MG tablet Take 1 tablet (5 mg) by mouth 2 times daily 60 tablet 3     pravastatin (PRAVACHOL) 20 MG tablet Take 1 tablet (20 mg) by mouth daily 90 tablet 3     prochlorperazine (COMPAZINE) 10 MG tablet Take 1 tablet (10 mg) by mouth every 6 hours as needed (Nausea/Vomiting) 30 tablet 5     senna-docusate (SENOKOT-S/PERICOLACE) 8.6-50 MG tablet Take 1-2 tablets by mouth 2 times daily 30 tablet 0     zolpidem (AMBIEN) 5 MG tablet Take 1 tablet (5 mg) by mouth nightly as needed for sleep 30 tablet 1         ALLERGIES:  Allergies   Allergen Reactions     Sulfa Drugs Other (See Comments)     Red face. Ringing in the ears.         PAST MEDICAL HISTORY:  Past Medical History:   Diagnosis Date     GERD (gastroesophageal reflux disease)      H/O colonoscopy 03/08/2016    done at [facility] and [facility]     Hematuria 2016    eval at [facility] and per pt cysto and ct neg     Intermittent asthma     since childhood     Low iron 10/2017    done for eval of hair loss, [facility]d [facility]     Migraines     most of adult life, has seen neuro in past, on bblocker     Mild depression (H)      Normal stress echocardiogram July 2011    due to hear racing     Osteopenia 2008     Syncope 03/2017    echo [facility] lv size and fxn, grade 1 dd, mild tr         PAST SURGICAL HISTORY:  Past Surgical History:   Procedure Laterality Date     C TMJ ARTHROSCOPY/SURGERY       ESOPHAGOSCOPY, GASTROSCOPY, DUODENOSCOPY (EGD), COMBINED N/A 10/30/2017    Procedure: COMBINED ESOPHAGOSCOPY, GASTROSCOPY, DUODENOSCOPY (EGD), BIOPSY SINGLE OR MULTIPLE;   "COMBINED ESOPHAGOSCOPY, GASTROSCOPY, DUODENOSCOPY (EGD);  Surgeon: Martin Rodriguez MD;  Location:  GI     INSERT PORT VASCULAR ACCESS N/A 10/24/2019    Procedure: PORT PLACEMENT;  Surgeon: Kaila Hernandez MD;  Location:  OR     ORTHOPEDIC SURGERY      \"leg surgery\"     single oopherectomy  2010    cyst         SOCIAL HISTORY:  Social History     Socioeconomic History     Marital status:      Spouse name: Not on file     Number of children: 2     Years of education: Not on file     Highest education level: Not on file   Occupational History     Not on file   Social Needs     Financial resource strain: Not on file     Food insecurity:     Worry: Not on file     Inability: Not on file     Transportation needs:     Medical: Not on file     Non-medical: Not on file   Tobacco Use     Smoking status: Former Smoker     Packs/day: 0.00     Last attempt to quit: 3/27/1997     Years since quittin.8     Smokeless tobacco: Never Used   Substance and Sexual Activity     Alcohol use: Yes     Comment: rarely     Drug use: No     Sexual activity: Yes     Partners: Male     Birth control/protection: Pill   Lifestyle     Physical activity:     Days per week: Not on file     Minutes per session: Not on file     Stress: Not on file   Relationships     Social connections:     Talks on phone: Not on file     Gets together: Not on file     Attends Taoism service: Not on file     Active member of club or organization: Not on file     Attends meetings of clubs or organizations: Not on file     Relationship status: Not on file     Intimate partner violence:     Fear of current or ex partner: Not on file     Emotionally abused: Not on file     Physically abused: Not on file     Forced sexual activity: Not on file   Other Topics Concern     Parent/sibling w/ CABG, MI or angioplasty before 65F 55M? Yes   Social History Narrative     Not on file         FAMILY HISTORY:  Family History   Problem Relation Age of Onset " "    Coronary Artery Disease Father 48        MI. and one in his 60s     Emphysema Mother         COPD         PHYSICAL EXAM:  Vital signs:  BP (!) 144/92   Pulse 83   Temp 98  F (36.7  C) (Oral)   Resp 16   Ht 1.702 m (5' 7.01\")   Wt 65.3 kg (144 lb)   LMP 10/07/2017 (Approximate)   SpO2 100%   BMI 22.55 kg/m      ECO  GENERAL/CONSTITUTIONAL: No acute distress.  EYES: No scleral icterus.  ENT/MOUTH: Neck supple. Oropharynx clear, no mucositis.  LYMPH: No cervical, supraclavicular, or axillary adenopathy.   BREAST: Palpable right upper outer breast 3 x 2 cm less circumscribed mass, movable, nontender and even flatter compared to prior exam.  No rashes or ulceration or erythema.  No dimpling.  The nipples are everted bilaterally with no discharge.  RESPIRATORY: Clear to auscultation bilaterally. No crackles or wheezing.   CARDIOVASCULAR: Regular rate and rhythm without murmurs.  GASTROINTESTINAL: No guarding or distention.  MUSCULOSKELETAL: Warm and well-perfused, no cyanosis, clubbing, or edema.  NEUROLOGIC: Cranial nerves II-XII are intact. Alert, oriented, answers questions appropriately.  INTEGUMENTARY: No rashes or jaundice.      LABS:  CBC RESULTS:   Recent Labs   Lab Test 20  0755   WBC 4.9   RBC 3.76*   HGB 11.6*   HCT 35.2   MCV 94   MCH 30.9   MCHC 33.0   RDW 14.0        Last Comprehensive Metabolic Panel:  Sodium   Date Value Ref Range Status   2019 138 133 - 144 mmol/L Final     Potassium   Date Value Ref Range Status   2019 3.8 3.4 - 5.3 mmol/L Final     Chloride   Date Value Ref Range Status   2019 106 94 - 109 mmol/L Final     Carbon Dioxide   Date Value Ref Range Status   2019 25 20 - 32 mmol/L Final     Anion Gap   Date Value Ref Range Status   2019 7 3 - 14 mmol/L Final     Glucose   Date Value Ref Range Status   2019 98 70 - 99 mg/dL Final     Urea Nitrogen   Date Value Ref Range Status   2019 10 7 - 30 mg/dL Final     Creatinine "   Date Value Ref Range Status   11/27/2019 0.67 0.52 - 1.04 mg/dL Final     GFR Estimate   Date Value Ref Range Status   11/27/2019 >90 >60 mL/min/[1.73_m2] Final     Comment:     Non  GFR Calc  Starting 12/18/2018, serum creatinine based estimated GFR (eGFR) will be   calculated using the Chronic Kidney Disease Epidemiology Collaboration   (CKD-EPI) equation.       Calcium   Date Value Ref Range Status   11/27/2019 8.8 8.5 - 10.1 mg/dL Final     Bilirubin Total   Date Value Ref Range Status   01/23/2020 0.3 0.2 - 1.3 mg/dL Final     Alkaline Phosphatase   Date Value Ref Range Status   01/23/2020 57 40 - 150 U/L Final     ALT   Date Value Ref Range Status   01/23/2020 44 0 - 50 U/L Final     AST   Date Value Ref Range Status   01/23/2020 20 0 - 45 U/L Final         PATHOLOGY:  None new.    IMAGING:  None new.    ASSESSMENT/PLAN:  Jyothi Freitas is a 53 year old female with the following issues:  1.  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  I reviewed the blood counts with her.  They have adequately recovered to proceed with her next dose of paclitaxel today.  -Plan is for her to complete 12 weeks of weekly paclitaxel.  -Rationale for neoadjuvant chemotherapy is to observe for chemo responsiveness, reduce risk of recurrent breast cancer, and reduce burden of disease prior to surgery.  However, as per previous discussion, she understands that it is unlikely that we will achieve a complete response to neoadjuvant chemotherapy given that her tumor is strongly ER and WA positive.  -She would certainly be a candidate for adjuvant endocrine therapy based on the strongly ER and WA positive tumor status.  She is postmenopausal, so I would typically recommend  anastrozole to reduce her risk of breast cancer recurrence by relative 50%.  -Genetic test results pending from her consult in November 2019.  -She would also likely be a candidate for adjuvant radiation therapy given her lymph node involvement and depending on radiation oncology evaluation.  She is planning for a lumpectomy with Dr Hernandez.  We could have her undergo breast imaging prior to surgery.      2. Depression  -Mood stable. Continue bupropion. Would not recommend tamoxifen in future due to interaction with bupropion unless she switched antidepressants.    3.  Constipation  -Likely drug related from her antiemetics and now much improved after taking her antiemetics on an as-needed basis. She may take stool softeners and laxatives as needed for constipation.    4.  Bone pain related to pegfilgrastim  -She may take naproxen orally twice daily and acetaminophen as needed for breakthrough pain.    5.  Insomnia   6.  Hot flashes, chemotherapy induced  -Intermittent, worsened by sweats likely due to hot flashes continue zolpidem as needed, given that she had headaches to lorazepam.  -Oxybutynin did help with hot flashes but she had too much dry eye syndrome and fluid retention.   -I provided her the option of trying gabapentin at night only given that there are sedative effects with it.  She would like to try it so I did provide her a prescription.    7. Ectasia of thoracic aorta  -This measures 4 cm on the PET scan.  I reassured Jyothi that she would not need any urgent surgical management of the aorta that may need ongoing monitoring. She is following with Dr. Silverio Gooden for monitoring of this issue.     Return in 2 weeks.      Maricarmen Monroe MD  Hematology/Oncology  [facility]    I spent a total of 30 minutes with the patient, with greater than 50% of the time in counseling and coordination of care.    Oncology Rooming Note    January 23, 2020 8:39 AM   Jyothi Freitas is a 54 year old  "female who presents for:    Chief Complaint   Patient presents with     Oncology Clinic Visit     Initial Vitals: BP (!) 144/92   Pulse 83   Temp 98  F (36.7  C) (Oral)   Resp 16   Ht 1.702 m (5' 7.01\")   Wt 65.3 kg (144 lb)   LMP 10/07/2017 (Approximate)   SpO2 100%   BMI 22.55 kg/m    Estimated body mass index is 22.55 kg/m  as calculated from the following:    Height as of this encounter: 1.702 m (5' 7.01\").    Weight as of this encounter: 65.3 kg (144 lb). Body surface area is 1.76 meters squared.  Severe Pain (6) Comment: Data Unavailable   Patient's last menstrual period was 10/07/2017 (approximate).  Allergies reviewed: Yes  Medications reviewed: Yes    Medications: Medication refills not needed today.  Pharmacy name entered into [employer]:    [facility][id number] - [place], [place] - [address] AT [facility] [address]  [facility] #[id number] - [place], [place] - [address] AT [address] [place] [address]    Clinical concerns: no      Gloria Hand, [facility]              Again, thank you for allowing me to participate in the care of your patient.        Sincerely,        Maricarmen Monroe MD    "

## 2020-01-23 NOTE — PATIENT INSTRUCTIONS
1. Proceed with paclitaxel today.  2. Return in 1 week for next paclitaxel treatment with lab check.    Patient in [facility]

## 2020-01-23 NOTE — PROGRESS NOTES
"Oncology Rooming Note    January 23, 2020 8:39 AM   Jyothi Freitas is a 54 year old female who presents for:    Chief Complaint   Patient presents with     Oncology Clinic Visit     Initial Vitals: BP (!) 144/92   Pulse 83   Temp 98  F (36.7  C) (Oral)   Resp 16   Ht 1.702 m (5' 7.01\")   Wt 65.3 kg (144 lb)   LMP 10/07/2017 (Approximate)   SpO2 100%   BMI 22.55 kg/m   Estimated body mass index is 22.55 kg/m  as calculated from the following:    Height as of this encounter: 1.702 m (5' 7.01\").    Weight as of this encounter: 65.3 kg (144 lb). Body surface area is 1.76 meters squared.  Severe Pain (6) Comment: Data Unavailable   Patient's last menstrual period was 10/07/2017 (approximate).  Allergies reviewed: Yes  Medications reviewed: Yes    Medications: Medication refills not needed today.  Pharmacy name entered into [employer]:    [facility][id number] - [place], [place] - [address] [facility]  [address]  [facility] #[id number] - [place], [place] - [address] AT [address] [place] [address]    Clinical concerns: no      Gloria Hand CMA            "

## 2020-01-24 ENCOUNTER — ONCOLOGY VISIT (OUTPATIENT)
Dept: ONCOLOGY | Facility: CLINIC | Age: 55
End: 2020-01-24
Attending: INTERNAL MEDICINE
Payer: COMMERCIAL

## 2020-01-24 ENCOUNTER — PATIENT OUTREACH (OUTPATIENT)
Dept: ONCOLOGY | Facility: CLINIC | Age: 55
End: 2020-01-24

## 2020-01-24 VITALS
SYSTOLIC BLOOD PRESSURE: 145 MMHG | RESPIRATION RATE: 17 BRPM | OXYGEN SATURATION: 99 % | DIASTOLIC BLOOD PRESSURE: 88 MMHG | TEMPERATURE: 98.2 F | WEIGHT: 146.8 LBS | HEART RATE: 85 BPM | BODY MASS INDEX: 22.99 KG/M2

## 2020-01-24 DIAGNOSIS — C50.411 MALIGNANT NEOPLASM OF UPPER-OUTER QUADRANT OF RIGHT BREAST IN FEMALE, ESTROGEN RECEPTOR POSITIVE (H): Primary | ICD-10-CM

## 2020-01-24 DIAGNOSIS — Z17.0 MALIGNANT NEOPLASM OF UPPER-OUTER QUADRANT OF RIGHT BREAST IN FEMALE, ESTROGEN RECEPTOR POSITIVE (H): Primary | ICD-10-CM

## 2020-01-24 PROCEDURE — G0463 HOSPITAL OUTPT CLINIC VISIT: HCPCS

## 2020-01-24 PROCEDURE — 99214 OFFICE O/P EST MOD 30 MIN: CPT | Performed by: NURSE PRACTITIONER

## 2020-01-24 RX ORDER — AMOXICILLIN AND CLAVULANATE POTASSIUM 500; 125 MG/1; MG/1
1 TABLET, FILM COATED ORAL 2 TIMES DAILY
Qty: 14 TABLET | Refills: 0 | Status: SHIPPED | OUTPATIENT
Start: 2020-01-24 | End: 2020-02-04

## 2020-01-24 ASSESSMENT — PAIN SCALES - GENERAL: PAINLEVEL: SEVERE PAIN (6)

## 2020-01-24 NOTE — PROGRESS NOTES
Jyothi sent [employer] message stating that she does not feel any better. She feels that she has now developed a sinus infection. She will come in at 0330 PM to be accessed by TRESSA Vaughn. Tricia Smiley RN,BSN,OCN

## 2020-01-24 NOTE — PROGRESS NOTES
Oncology/Hematology Visit Note  Jan 24, 2020    Reason for Visit: follow up of right breast cancer ER positive HER-2 negative  Currently on neoadjuvant chemo completed 4 cycles of AC  and currently on weekly Taxol    Interval History:  She reports she noted some nasal discharge from she feels it comes from her sinuses she has some mild sinus pain she denies cough shortness of breath chest pain denies fever chills sweats.      Review of Systems:  14 point ROS of systems including Constitutional, Eyes, Respiratory, Cardiovascular, Gastroenterology, Genitourinary, Integumentary, Muscularskeletal, Psychiatric were all negative except for pertinent positives noted in my HPI.      Current Outpatient Medications   Medication Sig Dispense Refill     acetaminophen (TYLENOL) 500 MG tablet Take 1,000 mg by mouth every 6 hours as needed for mild pain       acetaminophen-caffeine (EXCEDRIN TENSION HEADACHE) 500-65 MG TABS Take 2 tablets by mouth every 6 hours as needed for mild pain       ALBUTEROL 108 (90 BASE) MCG/ACT inhaler INHALE 2 PUFFS INTO THE LUNGS EVERY 6 HOURS AS NEEDED FOR SHORTNESS OF BREATH OR DIFFICULT BREATHING OR WHEEZING 8.5 g 0     amoxicillin-clavulanate (AUGMENTIN) 500-125 MG tablet Take 1 tablet by mouth 2 times daily for 7 days 14 tablet 0     buPROPion (WELLBUTRIN XL) 300 MG 24 hr tablet Take 1 tablet (300 mg) by mouth every morning 90 tablet 3     fluticasone (FLOVENT DISKUS) 100 MCG/BLIST inhaler Inhale 1 puff into the lungs 2 times daily 3 Inhaler 3     fluticasone-salmeterol (ADVAIR) 100-50 MCG/DOSE inhaler Inhale 1 puff into the lungs every 12 hours 1 Inhaler 3     gabapentin (NEURONTIN) 100 MG capsule Take 3 capsules (300 mg) by mouth every evening 60 capsule 1     HYDROcodone-acetaminophen (NORCO) 5-325 MG tablet Take 1-2 tablets by mouth every 4 hours as needed for moderate to severe pain 10 tablet 0     magic mouthwash suspension, diphenhydrAMINE, lidocaine, aluminum-magnesium & simethicone,  (FIRST-MOUTHWASH BLM) compounding kit Swish and swallow 5-10 mLs in mouth every 6 hours as needed for mouth sores 237 mL 3     nadolol (CORGARD) 20 MG tablet TAKE 1 TABLET(20 MG) BY MOUTH DAILY 90 tablet 3     naproxen sodium (ANAPROX) 220 MG tablet Take 220 mg by mouth daily as needed for moderate pain       oxybutynin (DITROPAN) 5 MG tablet Take 1 tablet (5 mg) by mouth 2 times daily 60 tablet 3     pravastatin (PRAVACHOL) 20 MG tablet Take 1 tablet (20 mg) by mouth daily 90 tablet 3     prochlorperazine (COMPAZINE) 10 MG tablet Take 1 tablet (10 mg) by mouth every 6 hours as needed (Nausea/Vomiting) 30 tablet 5     senna-docusate (SENOKOT-S/PERICOLACE) 8.6-50 MG tablet Take 1-2 tablets by mouth 2 times daily 30 tablet 0     zolpidem (AMBIEN) 5 MG tablet Take 1 tablet (5 mg) by mouth nightly as needed for sleep 30 tablet 1       Physical Examination:  General: The patient is a pleasant female in no acute distress.  BP (!) 145/88   Pulse 85   Temp 98.2  F (36.8  C) (Oral)   Resp 17   Wt 66.6 kg (146 lb 12.8 oz)   LMP 10/07/2017 (Approximate)   SpO2 99%   BMI 22.99 kg/m    HEENT: EOMI, PERRL. Sclerae are anicteric. Oral mucosa is pink and moist with no lesions or thrush.   Lymph: Neck is supple with no lymphadenopathy in the cervical or supraclavicular areas.   Heart: Regular rate and rhythm.   Lungs: Clear to auscultation bilaterally.   GI: Bowel sounds present, soft, nontender with no palpable hepatosplenomegaly or masses.   Extremities: No lower extremity edema noted bilaterally.   Skin: No rashes, petechiae, or bruising noted on exposed skin.    Laboratory Data:  Results for OFELIA DELGADO (MRN [id number]) as of 1/24/2020 15:32   Ref. Range 1/23/2020 07:55   Albumin Latest Ref Range: 3.4 - 5.0 g/dL 3.7   Protein Total Latest Ref Range: 6.8 - 8.8 g/dL 6.3 (L)   Bilirubin Total Latest Ref Range: 0.2 - 1.3 mg/dL 0.3   Alkaline Phosphatase Latest Ref Range: 40 - 150 U/L 57   ALT Latest Ref Range: 0 - 50 U/L 44    AST Latest Ref Range: 0 - 45 U/L 20   Bilirubin Direct Latest Ref Range: 0.0 - 0.2 mg/dL <0.1   WBC Latest Ref Range: 4.0 - 11.0 10e9/L 4.9   Hemoglobin Latest Ref Range: 11.7 - 15.7 g/dL 11.6 (L)   Hematocrit Latest Ref Range: 35.0 - 47.0 % 35.2   Platelet Count Latest Ref Range: 150 - 450 10e9/L 315   RBC Count Latest Ref Range: 3.8 - 5.2 10e12/L 3.76 (L)   MCV Latest Ref Range: 78 - 100 fl 94   MCH Latest Ref Range: 26.5 - 33.0 pg 30.9   MCHC Latest Ref Range: 31.5 - 36.5 g/dL 33.0   RDW Latest Ref Range: 10.0 - 15.0 % 14.0   Diff Method Unknown Automated Method   % Neutrophils Latest Units: % 66.6   % Lymphocytes Latest Units: % 14.6   % Monocytes Latest Units: % 12.1   % Eosinophils Latest Units: % 4.7   % Basophils Latest Units: % 1.0   % Immature Granulocytes Latest Units: % 1.0   Nucleated RBCs Latest Ref Range: 0 /100 0   Absolute Neutrophil Latest Ref Range: 1.6 - 8.3 10e9/L 3.3   Absolute Lymphocytes Latest Ref Range: 0.8 - 5.3 10e9/L 0.7 (L)   Absolute Monocytes Latest Ref Range: 0.0 - 1.3 10e9/L 0.6   Absolute Eosinophils Latest Ref Range: 0.0 - 0.7 10e9/L 0.2   Absolute Basophils Latest Ref Range: 0.0 - 0.2 10e9/L 0.1   Abs Immature Granulocytes Latest Ref Range: 0 - 0.4 10e9/L 0.1   Absolute Nucleated RBC Unknown 0.0       Assessment and Plan:    This is a 54-year-old female with      right breast cancer ER positive HER-2 negative  Currently on neoadjuvant chemo completed 4 cycles of AC  and currently on weekly Taxol  -Overall patient reports she has been tolerating treatment well  Patient has follow-up appointment with Dr. Monroe in February      Sinusitis  [facility] her for flu and strep throat which were both negative  Patient denies fever cough  Order Augmentin-she took it in the past and was very helpful for the same symptoms      Patient advised to call in the event of fever chills sweats cough shortness of breath chest pain nausea vomiting diarrhea  abdominal pain neuropathy or any changes in health condition      KRISTIAN Garcia CNP  [facility]     Chart documentation with Dragon Voice recognition Software. Although reviewed after completion, some words and grammatical errors may remain.

## 2020-01-24 NOTE — LETTER
1/24/2020         RE: Jyothi Freitas  [address]  [place] [address]        Dear Colleague,    Thank you for referring your patient, Jyothi Freitas, to the [facility]. Please see a copy of my visit note below.    Oncology/Hematology Visit Note  Jan 24, 2020    Reason for Visit: follow up of right breast cancer ER positive HER-2 negative  Currently on neoadjuvant chemo completed 4 cycles of AC  and currently on weekly Taxol    Interval History:  She reports she noted some nasal discharge from she feels it comes from her sinuses she has some mild sinus pain she denies cough shortness of breath chest pain denies fever chills sweats.      Review of Systems:  14 point ROS of systems including Constitutional, Eyes, Respiratory, Cardiovascular, Gastroenterology, Genitourinary, Integumentary, Muscularskeletal, Psychiatric were all negative except for pertinent positives noted in my HPI.      Current Outpatient Medications   Medication Sig Dispense Refill     acetaminophen (TYLENOL) 500 MG tablet Take 1,000 mg by mouth every 6 hours as needed for mild pain       acetaminophen-caffeine (EXCEDRIN TENSION HEADACHE) 500-65 MG TABS Take 2 tablets by mouth every 6 hours as needed for mild pain       ALBUTEROL 108 (90 BASE) MCG/ACT inhaler INHALE 2 PUFFS INTO THE LUNGS EVERY 6 HOURS AS NEEDED FOR SHORTNESS OF BREATH OR DIFFICULT BREATHING OR WHEEZING 8.5 g 0     amoxicillin-clavulanate (AUGMENTIN) 500-125 MG tablet Take 1 tablet by mouth 2 times daily for 7 days 14 tablet 0     buPROPion (WELLBUTRIN XL) 300 MG 24 hr tablet Take 1 tablet (300 mg) by mouth every morning 90 tablet 3     fluticasone (FLOVENT DISKUS) 100 MCG/BLIST inhaler Inhale 1 puff into the lungs 2 times daily 3 Inhaler 3     fluticasone-salmeterol (ADVAIR) 100-50 MCG/DOSE inhaler Inhale 1 puff into the lungs every 12 hours 1 Inhaler 3     gabapentin (NEURONTIN) 100 MG capsule Take 3 capsules (300 mg) by mouth every evening 60 capsule 1      HYDROcodone-acetaminophen (NORCO) 5-325 MG tablet Take 1-2 tablets by mouth every 4 hours as needed for moderate to severe pain 10 tablet 0     magic mouthwash suspension, diphenhydrAMINE, lidocaine, aluminum-magnesium & simethicone, (FIRST-MOUTHWASH BLM) compounding kit Swish and swallow 5-10 mLs in mouth every 6 hours as needed for mouth sores 237 mL 3     nadolol (CORGARD) 20 MG tablet TAKE 1 TABLET(20 MG) BY MOUTH DAILY 90 tablet 3     naproxen sodium (ANAPROX) 220 MG tablet Take 220 mg by mouth daily as needed for moderate pain       oxybutynin (DITROPAN) 5 MG tablet Take 1 tablet (5 mg) by mouth 2 times daily 60 tablet 3     pravastatin (PRAVACHOL) 20 MG tablet Take 1 tablet (20 mg) by mouth daily 90 tablet 3     prochlorperazine (COMPAZINE) 10 MG tablet Take 1 tablet (10 mg) by mouth every 6 hours as needed (Nausea/Vomiting) 30 tablet 5     senna-docusate (SENOKOT-S/PERICOLACE) 8.6-50 MG tablet Take 1-2 tablets by mouth 2 times daily 30 tablet 0     zolpidem (AMBIEN) 5 MG tablet Take 1 tablet (5 mg) by mouth nightly as needed for sleep 30 tablet 1       Physical Examination:  General: The patient is a pleasant female in no acute distress.  BP (!) 145/88   Pulse 85   Temp 98.2  F (36.8  C) (Oral)   Resp 17   Wt 66.6 kg (146 lb 12.8 oz)   LMP 10/07/2017 (Approximate)   SpO2 99%   BMI 22.99 kg/m     HEENT: EOMI, PERRL. Sclerae are anicteric. Oral mucosa is pink and moist with no lesions or thrush.   Lymph: Neck is supple with no lymphadenopathy in the cervical or supraclavicular areas.   Heart: Regular rate and rhythm.   Lungs: Clear to auscultation bilaterally.   GI: Bowel sounds present, soft, nontender with no palpable hepatosplenomegaly or masses.   Extremities: No lower extremity edema noted bilaterally.   Skin: No rashes, petechiae, or bruising noted on exposed skin.    Laboratory Data:  Results for OFELIA DELGADO (MRN [id number]) as of 1/24/2020 15:32   Ref. Range 1/23/2020 07:55   Albumin Latest Ref  Range: 3.4 - 5.0 g/dL 3.7   Protein Total Latest Ref Range: 6.8 - 8.8 g/dL 6.3 (L)   Bilirubin Total Latest Ref Range: 0.2 - 1.3 mg/dL 0.3   Alkaline Phosphatase Latest Ref Range: 40 - 150 U/L 57   ALT Latest Ref Range: 0 - 50 U/L 44   AST Latest Ref Range: 0 - 45 U/L 20   Bilirubin Direct Latest Ref Range: 0.0 - 0.2 mg/dL <0.1   WBC Latest Ref Range: 4.0 - 11.0 10e9/L 4.9   Hemoglobin Latest Ref Range: 11.7 - 15.7 g/dL 11.6 (L)   Hematocrit Latest Ref Range: 35.0 - 47.0 % 35.2   Platelet Count Latest Ref Range: 150 - 450 10e9/L 315   RBC Count Latest Ref Range: 3.8 - 5.2 10e12/L 3.76 (L)   MCV Latest Ref Range: 78 - 100 fl 94   MCH Latest Ref Range: 26.5 - 33.0 pg 30.9   MCHC Latest Ref Range: 31.5 - 36.5 g/dL 33.0   RDW Latest Ref Range: 10.0 - 15.0 % 14.0   Diff Method Unknown Automated Method   % Neutrophils Latest Units: % 66.6   % Lymphocytes Latest Units: % 14.6   % Monocytes Latest Units: % 12.1   % Eosinophils Latest Units: % 4.7   % Basophils Latest Units: % 1.0   % Immature Granulocytes Latest Units: % 1.0   Nucleated RBCs Latest Ref Range: 0 /100 0   Absolute Neutrophil Latest Ref Range: 1.6 - 8.3 10e9/L 3.3   Absolute Lymphocytes Latest Ref Range: 0.8 - 5.3 10e9/L 0.7 (L)   Absolute Monocytes Latest Ref Range: 0.0 - 1.3 10e9/L 0.6   Absolute Eosinophils Latest Ref Range: 0.0 - 0.7 10e9/L 0.2   Absolute Basophils Latest Ref Range: 0.0 - 0.2 10e9/L 0.1   Abs Immature Granulocytes Latest Ref Range: 0 - 0.4 10e9/L 0.1   Absolute Nucleated RBC Unknown 0.0       Assessment and Plan:    This is a 54-year-old female with      right breast cancer ER positive HER-2 negative  Currently on neoadjuvant chemo completed 4 cycles of AC  and currently on weekly Taxol  -Overall patient reports she has been tolerating treatment well  Patient has follow-up appointment with Dr. Monroe in February      Sinusitis  [facility] her for flu and strep throat which were both negative  Patient  denies fever cough  Order Augmentin-she took it in the past and was very helpful for the same symptoms      Patient advised to call in the event of fever chills sweats cough shortness of breath chest pain nausea vomiting diarrhea abdominal pain neuropathy or any changes in health condition      KRISTIAN Garcia CNP  [facility]     Chart documentation with Dragon Voice recognition Software. Although reviewed after completion, some words and grammatical errors may remain.          Again, thank you for allowing me to participate in the care of your patient.        Sincerely,        KRISTIAN Garcia CNP

## 2020-01-26 ENCOUNTER — NURSE TRIAGE (OUTPATIENT)
Dept: NURSING | Facility: CLINIC | Age: 55
End: 2020-01-26

## 2020-01-26 NOTE — TELEPHONE ENCOUNTER
Jyothi on Augmentin for possible sinusitis, started on Friday 1/24/20.  Symptoms included sore throat, yellow phlegm in throat she was unable to clear and sinus pain.  On Taxol weekly.  Now has new onset blood from throat.  Worried she may have a mucosal sore in the top of her throat, area looks dark red and bright red when visualized.  Pain improved, no SOB.  Had stopped Magic Mouthwash, advised to restart this.  Blood mixed with ongoing yellow mucous, no fever to report.  Did page on call oncologist for group, Dr. Weiss directly to patient ([phone]) per paging guidelines.  Advised to call back within 20-30 minutes if no return phone call received.  Paged at 8:20 am.      Additional Information    Negative: [1] Taking antibiotic > 7 days AND [2] nasal discharge not improved    Negative: [1] Taking antibiotic > 48 hours (2 days) AND [2] fever persists    Negative: [1] Taking antibiotic > 72 hours (3 days) AND [2] sinus pain not improved    Negative: [1] SEVERE sinus pain AND [2] not improved 2 hours after pain medicine    Negative: [1] Redness or swelling on the cheek, forehead or around the eye AND [2] new since starting antibiotics    Negative: [1] Taking antibiotic < 48 hours AND [2] fever persists    Negative: [1] SEVERE headache AND [2] fever    Negative: [1] Taking antibiotic > 24 hours AND [2] fever > 103 F (39.4 C)    Negative: [1] Redness or swelling on the cheek, forehead or around the eye AND [2] fever    Negative: Patient sounds very sick or weak to the triager    Negative: [1] Taking antibiotic AND [2] nose still blocked    [1] Taking antibiotic < 72 hours (3 days) AND [2] sinus pain not improved    Protocols used: SINUS INFECTION ON ANTIBIOTIC FOLLOW-UP CALL-A-

## 2020-01-27 DIAGNOSIS — C50.411 MALIGNANT NEOPLASM OF UPPER-OUTER QUADRANT OF RIGHT BREAST IN FEMALE, ESTROGEN RECEPTOR POSITIVE (H): ICD-10-CM

## 2020-01-27 DIAGNOSIS — Z17.0 MALIGNANT NEOPLASM OF UPPER-OUTER QUADRANT OF RIGHT BREAST IN FEMALE, ESTROGEN RECEPTOR POSITIVE (H): ICD-10-CM

## 2020-01-27 RX ORDER — LORAZEPAM 2 MG/ML
0.5 INJECTION INTRAMUSCULAR EVERY 4 HOURS PRN
Status: CANCELLED
Start: 2020-01-30

## 2020-01-27 RX ORDER — EPINEPHRINE 1 MG/ML
0.3 INJECTION, SOLUTION, CONCENTRATE INTRAVENOUS EVERY 5 MIN PRN
Status: CANCELLED | OUTPATIENT
Start: 2020-01-30

## 2020-01-27 RX ORDER — DIPHENHYDRAMINE HCL 25 MG
50 CAPSULE ORAL ONCE
Status: CANCELLED
Start: 2020-01-30

## 2020-01-27 RX ORDER — DIPHENHYDRAMINE HYDROCHLORIDE 50 MG/ML
50 INJECTION INTRAMUSCULAR; INTRAVENOUS
Status: CANCELLED
Start: 2020-01-30

## 2020-01-27 RX ORDER — ALBUTEROL SULFATE 90 UG/1
1-2 AEROSOL, METERED RESPIRATORY (INHALATION)
Status: CANCELLED
Start: 2020-01-30

## 2020-01-27 RX ORDER — ALBUTEROL SULFATE 0.83 MG/ML
2.5 SOLUTION RESPIRATORY (INHALATION)
Status: CANCELLED | OUTPATIENT
Start: 2020-01-30

## 2020-01-27 RX ORDER — EPINEPHRINE 0.3 MG/.3ML
0.3 INJECTION SUBCUTANEOUS EVERY 5 MIN PRN
Status: CANCELLED | OUTPATIENT
Start: 2020-01-30

## 2020-01-27 RX ORDER — MEPERIDINE HYDROCHLORIDE 25 MG/ML
25 INJECTION INTRAMUSCULAR; INTRAVENOUS; SUBCUTANEOUS EVERY 30 MIN PRN
Status: CANCELLED | OUTPATIENT
Start: 2020-01-30

## 2020-01-27 RX ORDER — SODIUM CHLORIDE 9 MG/ML
1000 INJECTION, SOLUTION INTRAVENOUS CONTINUOUS PRN
Status: CANCELLED
Start: 2020-01-30

## 2020-01-27 RX ORDER — NALOXONE HYDROCHLORIDE 0.4 MG/ML
.1-.4 INJECTION, SOLUTION INTRAMUSCULAR; INTRAVENOUS; SUBCUTANEOUS
Status: CANCELLED | OUTPATIENT
Start: 2020-01-30

## 2020-01-30 ENCOUNTER — HOSPITAL ENCOUNTER (OUTPATIENT)
Facility: CLINIC | Age: 55
Setting detail: SPECIMEN
Discharge: HOME | End: 2020-01-30
Attending: INTERNAL MEDICINE | Admitting: INTERNAL MEDICINE
Payer: COMMERCIAL

## 2020-01-30 ENCOUNTER — INFUSION THERAPY VISIT (OUTPATIENT)
Dept: INFUSION THERAPY | Facility: CLINIC | Age: 55
End: 2020-01-30
Attending: INTERNAL MEDICINE
Payer: COMMERCIAL

## 2020-01-30 VITALS
WEIGHT: 145.6 LBS | DIASTOLIC BLOOD PRESSURE: 63 MMHG | BODY MASS INDEX: 22.8 KG/M2 | TEMPERATURE: 98.4 F | RESPIRATION RATE: 16 BRPM | OXYGEN SATURATION: 98 % | HEART RATE: 85 BPM | SYSTOLIC BLOOD PRESSURE: 136 MMHG

## 2020-01-30 DIAGNOSIS — Z17.0 MALIGNANT NEOPLASM OF RIGHT BREAST IN FEMALE, ESTROGEN RECEPTOR POSITIVE, UNSPECIFIED SITE OF BREAST (H): ICD-10-CM

## 2020-01-30 DIAGNOSIS — Z17.0 MALIGNANT NEOPLASM OF UPPER-OUTER QUADRANT OF RIGHT BREAST IN FEMALE, ESTROGEN RECEPTOR POSITIVE (H): Primary | ICD-10-CM

## 2020-01-30 DIAGNOSIS — C50.911 MALIGNANT NEOPLASM OF RIGHT BREAST IN FEMALE, ESTROGEN RECEPTOR POSITIVE, UNSPECIFIED SITE OF BREAST (H): ICD-10-CM

## 2020-01-30 DIAGNOSIS — C50.411 MALIGNANT NEOPLASM OF UPPER-OUTER QUADRANT OF RIGHT BREAST IN FEMALE, ESTROGEN RECEPTOR POSITIVE (H): Primary | ICD-10-CM

## 2020-01-30 LAB
BASOPHILS # BLD AUTO: 0.1 10E9/L (ref 0–0.2)
BASOPHILS NFR BLD AUTO: 1.3 %
DIFFERENTIAL METHOD BLD: ABNORMAL
EOSINOPHIL # BLD AUTO: 0.3 10E9/L (ref 0–0.7)
EOSINOPHIL NFR BLD AUTO: 4.8 %
ERYTHROCYTE [DISTWIDTH] IN BLOOD BY AUTOMATED COUNT: 13.7 % (ref 10–15)
HCT VFR BLD AUTO: 35 % (ref 35–47)
HGB BLD-MCNC: 11.8 G/DL (ref 11.7–15.7)
IMM GRANULOCYTES # BLD: 0.1 10E9/L (ref 0–0.4)
IMM GRANULOCYTES NFR BLD: 1 %
LYMPHOCYTES # BLD AUTO: 1.2 10E9/L (ref 0.8–5.3)
LYMPHOCYTES NFR BLD AUTO: 21.9 %
MCH RBC QN AUTO: 32.2 PG (ref 26.5–33)
MCHC RBC AUTO-ENTMCNC: 33.7 G/DL (ref 31.5–36.5)
MCV RBC AUTO: 95 FL (ref 78–100)
MONOCYTES # BLD AUTO: 0.5 10E9/L (ref 0–1.3)
MONOCYTES NFR BLD AUTO: 9.1 %
NEUTROPHILS # BLD AUTO: 3.3 10E9/L (ref 1.6–8.3)
NEUTROPHILS NFR BLD AUTO: 61.9 %
NRBC # BLD AUTO: 0 10*3/UL
NRBC BLD AUTO-RTO: 0 /100
PLATELET # BLD AUTO: 357 10E9/L (ref 150–450)
RBC # BLD AUTO: 3.67 10E12/L (ref 3.8–5.2)
WBC # BLD AUTO: 5.3 10E9/L (ref 4–11)

## 2020-01-30 PROCEDURE — 96413 CHEMO IV INFUSION 1 HR: CPT

## 2020-01-30 PROCEDURE — 25000132 ZZH RX MED GY IP 250 OP 250 PS 637: Performed by: INTERNAL MEDICINE

## 2020-01-30 PROCEDURE — 25800030 ZZH RX IP 258 OP 636: Performed by: INTERNAL MEDICINE

## 2020-01-30 PROCEDURE — 85025 COMPLETE CBC W/AUTO DIFF WBC: CPT | Performed by: INTERNAL MEDICINE

## 2020-01-30 PROCEDURE — 25000128 H RX IP 250 OP 636: Performed by: INTERNAL MEDICINE

## 2020-01-30 PROCEDURE — 96375 TX/PRO/DX INJ NEW DRUG ADDON: CPT

## 2020-01-30 PROCEDURE — 96367 TX/PROPH/DG ADDL SEQ IV INF: CPT

## 2020-01-30 RX ORDER — DIPHENHYDRAMINE HCL 25 MG
50 CAPSULE ORAL ONCE
Status: COMPLETED | OUTPATIENT
Start: 2020-01-30 | End: 2020-01-30

## 2020-01-30 RX ORDER — HEPARIN SODIUM (PORCINE) LOCK FLUSH IV SOLN 100 UNIT/ML 100 UNIT/ML
5 SOLUTION INTRAVENOUS
Status: CANCELLED | OUTPATIENT
Start: 2020-01-30

## 2020-01-30 RX ORDER — HEPARIN SODIUM (PORCINE) LOCK FLUSH IV SOLN 100 UNIT/ML 100 UNIT/ML
5 SOLUTION INTRAVENOUS
Status: DISCONTINUED | OUTPATIENT
Start: 2020-01-30 | End: 2020-01-30 | Stop reason: HOSPADM

## 2020-01-30 RX ADMIN — PACLITAXEL 135 MG: 6 INJECTION, SOLUTION INTRAVENOUS at 09:39

## 2020-01-30 RX ADMIN — SODIUM CHLORIDE 1000 ML: 9 INJECTION, SOLUTION INTRAVENOUS at 08:47

## 2020-01-30 RX ADMIN — FAMOTIDINE 20 MG: 20 INJECTION, SOLUTION INTRAVENOUS at 09:07

## 2020-01-30 RX ADMIN — DEXAMETHASONE SODIUM PHOSPHATE 12 MG: 10 INJECTION, SOLUTION INTRAMUSCULAR; INTRAVENOUS at 08:47

## 2020-01-30 RX ADMIN — SODIUM CHLORIDE, PRESERVATIVE FREE 5 ML: 5 INJECTION INTRAVENOUS at 14:28

## 2020-01-30 RX ADMIN — DIPHENHYDRAMINE HYDROCHLORIDE 50 MG: 25 CAPSULE ORAL at 08:52

## 2020-01-30 ASSESSMENT — PAIN SCALES - GENERAL: PAINLEVEL: NO PAIN (0)

## 2020-01-30 NOTE — PROGRESS NOTES
Infusion Nursing Note:  Jyothi Freitas presents today for PD, [id number].    Patient seen by provider today: No   present during visit today: Not Applicable.  A crack was observed in pt primary line which was leaking on to the floor. Half of the dex secondary line had infused . Mary Ann BASS was consulted as to if pt should get more dex. It was decide since we will soon be moving away from pre-meds with Taxol its ok to proceed with the amount and volume of dex pt received. Pt was informed and will be with us 4 hrs after completion of Taxol for cold cap therapy and can be continued to be observed. Port access will be kept until she leaves clinic in the event pt shows any signs of an allergic reaction.   Note: N/A.    Intravenous Access:  Implanted Port.    Treatment Conditions:  Lab Results   Component Value Date    HGB 11.8 01/30/2020     Lab Results   Component Value Date    WBC 5.3 01/30/2020      Lab Results   Component Value Date    ANEU 3.3 01/30/2020     Lab Results   Component Value Date     01/30/2020      Lab Results   Component Value Date     11/27/2019                   Lab Results   Component Value Date    POTASSIUM 3.8 11/27/2019           No results found for: MAG         Lab Results   Component Value Date    CR 0.67 11/27/2019                   Lab Results   Component Value Date    JEFF 8.8 11/27/2019                Lab Results   Component Value Date    BILITOTAL 0.3 01/23/2020           Lab Results   Component Value Date    ALBUMIN 3.7 01/23/2020                    Lab Results   Component Value Date    ALT 44 01/23/2020           Lab Results   Component Value Date    AST 20 01/23/2020       Results reviewed, labs MET treatment parameters, ok to proceed with treatment.      Post Infusion Assessment:  Patient tolerated infusion without incident.  Blood return noted pre and post infusion.  Site patent and intact, free from redness, edema or discomfort.  No evidence of  extravasations.  Access discontinued per protocol.       Discharge Plan:   Patient declined prescription refills.  Discharge instructions reviewed with: Patient.  Patient and/or family verbalized understanding of discharge instructions and all questions answered.  Copy of AVS reviewed with patient and/or family.  Patient will return 2/6/20   for next appointment.  Patient discharged in stable condition accompanied by: self.  Departure Mode: Ambulatory.    Susana Porter RN

## 2020-02-03 NOTE — PROGRESS NOTES
[facility]    Hematology/Oncology Established Patient Follow-up Note      Today's Date: 2/06/20    Reason for Follow-up: Right breast cancer.    HISTORY OF PRESENT ILLNESS: Jyothi Freitas is a 53 year old female who presents with the following oncologic history:   1.  10/11/2019: Diagnostic right sided mammogram performed for a palpable lump in the right breast.  This showed an irregularly-shaped spiculated mass in the upper outer right breast with prominent lymph nodes in the right axilla.  Targeted ultrasound of the right upper outer breast showed an irregularly-shaped hypoechoic mass at the 10 o'clock position, 4 cm from the nipple measuring 2.6 x 1.5 x 2.4 cm.  Right axillary ultrasound showed moderately enlarged lymph nodes.  Right breast needle biopsy showed a grade 3 invasive ductal carcinoma with lymphovascular invasion identified, ER strongly positive at 95%, OK strongly positive at 95%, HER-2/juan FISH negative.  Right axillary lymph node measuring 2 cm was positive for metastatic carcinoma.  Note prior 4/2019 mammogram deemed normal.  2.  10/15/2019: Bilateral breast MRI showed known right breast malignancy measuring 2.6 x 1.4 x 2 cm, 3 mildly enlarged right axillary lymph nodes and no contralateral breast malignancy.  3. 10/21/2019 PET scan showed scattered small hypermetabolic bilateral axillary lymph nodes; for example, a hypermetabolic right axillary lymph node measures 1.6 x 0.9 cm with SUV max 3.6.  Hypermetabolic mass in the right breast superiorly and laterally measuring 2.1 x 1.6 cm with SUV max 4.3.  A 0.6 cm low-density lesion in the right hepatic lobe is too small for accurate PET characterization but shows no appreciable hypermetabolic activity.  Incidentally noted ectasia of the ascending thoracic aorta measures 4 cm in diameter.  4.  10/23/2019: Left axillary ultrasound showed benign-appearing lymph node.  Left axillary lymph node biopsy showed benign lymph node tissue.  5.   10/29/2019: Started neoadjuvant chemotherapy with dose dense Adriamycin and Cytoxan, followed by weekly paclitaxel.    INTERIM HISTORY:  Jyothi reports hot flashes improved with the use of gabapentin.  She is currently at a dose of 300 mg nightly.  Has helped her sleep as well.  She is still dealing with an upper respiratory infection with copious postnasal drainage.  She received a prescription for Augmentin from Dr. Franklin for sinus infection.  She thinks that her drainage is a bit better. Otherwise, she denies any recent fevers, chills, bladder dysfunction.  Constipation seems to be overall well controlled.  She denies any paresthesias.    REVIEW OF SYSTEMS:   14 point ROS was reviewed and is negative other than as noted above in HPI.       HOME MEDICATIONS:  Current Outpatient Medications   Medication Sig Dispense Refill     acetaminophen (TYLENOL) 500 MG tablet Take 1,000 mg by mouth every 6 hours as needed for mild pain       acetaminophen-caffeine (EXCEDRIN TENSION HEADACHE) 500-65 MG TABS Take 2 tablets by mouth every 6 hours as needed for mild pain       ALBUTEROL 108 (90 BASE) MCG/ACT inhaler INHALE 2 PUFFS INTO THE LUNGS EVERY 6 HOURS AS NEEDED FOR SHORTNESS OF BREATH OR DIFFICULT BREATHING OR WHEEZING 8.5 g 0     buPROPion (WELLBUTRIN XL) 300 MG 24 hr tablet Take 1 tablet (300 mg) by mouth every morning 90 tablet 3     fluticasone (FLOVENT DISKUS) 100 MCG/BLIST inhaler Inhale 1 puff into the lungs 2 times daily 3 Inhaler 3     fluticasone-salmeterol (ADVAIR) 100-50 MCG/DOSE inhaler Inhale 1 puff into the lungs every 12 hours 1 Inhaler 3     gabapentin (NEURONTIN) 100 MG capsule Take 3 capsules (300 mg) by mouth every evening 60 capsule 1     HYDROcodone-acetaminophen (NORCO) 5-325 MG tablet Take 1-2 tablets by mouth every 4 hours as needed for moderate to severe pain 10 tablet 0     magic mouthwash suspension, diphenhydrAMINE, lidocaine, aluminum-magnesium & simethicone, (FIRST-MOUTHWASH BLM) compounding  kit Swish and swallow 5-10 mLs in mouth every 6 hours as needed for mouth sores 237 mL 3     nadolol (CORGARD) 20 MG tablet TAKE 1 TABLET(20 MG) BY MOUTH DAILY 90 tablet 3     naproxen sodium (ANAPROX) 220 MG tablet Take 220 mg by mouth daily as needed for moderate pain       oxybutynin (DITROPAN) 5 MG tablet Take 1 tablet (5 mg) by mouth 2 times daily 60 tablet 3     pravastatin (PRAVACHOL) 20 MG tablet Take 1 tablet (20 mg) by mouth daily 90 tablet 3     prochlorperazine (COMPAZINE) 10 MG tablet Take 1 tablet (10 mg) by mouth every 6 hours as needed (Nausea/Vomiting) 30 tablet 5     senna-docusate (SENOKOT-S/PERICOLACE) 8.6-50 MG tablet Take 1-2 tablets by mouth 2 times daily 30 tablet 0     zolpidem (AMBIEN) 5 MG tablet Take 1 tablet (5 mg) by mouth nightly as needed for sleep 30 tablet 1         ALLERGIES:  Allergies   Allergen Reactions     Sulfa Drugs Other (See Comments)     Red face. Ringing in the ears.         PAST MEDICAL HISTORY:  Past Medical History:   Diagnosis Date     GERD (gastroesophageal reflux disease)      H/O colonoscopy 03/08/2016    done at [facility] and nl     Hematuria 2016    eval at [facility] and per pt cysto and ct neg     Intermittent asthma     since childhood     Low iron 10/2017    done for eval of hair loss, egd nl     Migraines     most of adult life, has seen neuro in past, on bblocker     Mild depression (H)      Normal stress echocardiogram July 2011    due to hear racing     Osteopenia 2008     Syncope 03/2017    echo nl lv size and fxn, grade 1 dd, mild tr         PAST SURGICAL HISTORY:  Past Surgical History:   Procedure Laterality Date     C TMJ ARTHROSCOPY/SURGERY       ESOPHAGOSCOPY, GASTROSCOPY, DUODENOSCOPY (EGD), COMBINED N/A 10/30/2017    Procedure: COMBINED ESOPHAGOSCOPY, GASTROSCOPY, DUODENOSCOPY (EGD), BIOPSY SINGLE OR MULTIPLE;  COMBINED ESOPHAGOSCOPY, GASTROSCOPY, DUODENOSCOPY (EGD);  Surgeon: Martin Rodriguez MD;  Location:  GI     INSERT PORT VASCULAR ACCESS N/A  "10/24/2019    Procedure: PORT PLACEMENT;  Surgeon: Kaila Hernandez MD;  Location: [facility] OR     ORTHOPEDIC SURGERY      \"leg surgery\"     single oopherectomy  2010    cyst         SOCIAL HISTORY:  Social History     Socioeconomic History     Marital status:      Spouse name: Not on file     Number of children: 2     Years of education: Not on file     Highest education level: Not on file   Occupational History     Not on file   Social Needs     Financial resource strain: Not on file     Food insecurity:     Worry: Not on file     Inability: Not on file     Transportation needs:     Medical: Not on file     Non-medical: Not on file   Tobacco Use     Smoking status: Former Smoker     Packs/day: 0.00     Last attempt to quit: 3/27/1997     Years since quittin.8     Smokeless tobacco: Never Used   Substance and Sexual Activity     Alcohol use: Yes     Comment: rarely     Drug use: No     Sexual activity: Yes     Partners: Male     Birth control/protection: Pill   Lifestyle     Physical activity:     Days per week: Not on file     Minutes per session: Not on file     Stress: Not on file   Relationships     Social connections:     Talks on phone: Not on file     Gets together: Not on file     Attends [employer] service: Not on file     Active member of club or organization: Not on file     Attends meetings of clubs or organizations: Not on file     Relationship status: Not on file     Intimate partner violence:     Fear of current or ex partner: Not on file     Emotionally abused: Not on file     Physically abused: Not on file     Forced sexual activity: Not on file   Other Topics Concern     Parent/sibling w/ CABG, MI or angioplasty before 65F 55M? Yes   Social History Narrative     Not on file         FAMILY HISTORY:  Family History   Problem Relation Age of Onset     Coronary Artery Disease Father 48        MI. and one in his 60s     Emphysema Mother         COPD         PHYSICAL EXAM:  Vital signs:  /87 "   Pulse 79   Temp 97.3  F (36.3  C) (Oral)   Resp 16   Wt 65.9 kg (145 lb 3.2 oz)   LMP 10/07/2017 (Approximate)   SpO2 96%   BMI 22.73 kg/m     ECO  GENERAL/CONSTITUTIONAL: No acute distress.  EYES: No scleral icterus.  ENT/MOUTH: Neck supple. Oropharynx with mild posterior pharyngeal edema with whitish drainage.  No thrush.  LYMPH: No cervical, supraclavicular, or axillary adenopathy.   BREAST: Palpable right upper outer breast 3 x 2 cm less distinct mass, movable, nontender and flat, stable since prior exam.  No rashes or ulceration or erythema.  No dimpling.  The nipples are everted bilaterally with no discharge.  RESPIRATORY: Clear to auscultation bilaterally. No crackles or wheezing.   CARDIOVASCULAR: Regular rate and rhythm without murmurs.  GASTROINTESTINAL: No guarding or distention.  MUSCULOSKELETAL: Warm and well-perfused, no cyanosis, clubbing, or edema.  NEUROLOGIC: Cranial nerves II-XII are intact. Alert, oriented, answers questions appropriately.  INTEGUMENTARY: No rashes or jaundice.      LABS:  CBC RESULTS:   Recent Labs   Lab Test 20  0735   WBC 4.6   RBC 3.76*   HGB 11.7   HCT 35.7   MCV 95   MCH 31.1   MCHC 32.8   RDW 13.2        Last Comprehensive Metabolic Panel:  Sodium   Date Value Ref Range Status   2019 138 133 - 144 mmol/L Final     Potassium   Date Value Ref Range Status   2019 3.8 3.4 - 5.3 mmol/L Final     Chloride   Date Value Ref Range Status   2019 106 94 - 109 mmol/L Final     Carbon Dioxide   Date Value Ref Range Status   2019 25 20 - 32 mmol/L Final     Anion Gap   Date Value Ref Range Status   2019 7 3 - 14 mmol/L Final     Glucose   Date Value Ref Range Status   2019 98 70 - 99 mg/dL Final     Urea Nitrogen   Date Value Ref Range Status   2019 10 7 - 30 mg/dL Final     Creatinine   Date Value Ref Range Status   2019 0.67 0.52 - 1.04 mg/dL Final     GFR Estimate   Date Value Ref Range Status   2019 >90  >60 mL/min/[1.73_m2] Final     Comment:     Non  GFR Calc  Starting 12/18/2018, serum creatinine based estimated GFR (eGFR) will be   calculated using the Chronic Kidney Disease Epidemiology Collaboration   (CKD-EPI) equation.       Calcium   Date Value Ref Range Status   11/27/2019 8.8 8.5 - 10.1 mg/dL Final     Bilirubin Total   Date Value Ref Range Status   01/23/2020 0.3 0.2 - 1.3 mg/dL Final     Alkaline Phosphatase   Date Value Ref Range Status   01/23/2020 57 40 - 150 U/L Final     ALT   Date Value Ref Range Status   01/23/2020 44 0 - 50 U/L Final     AST   Date Value Ref Range Status   01/23/2020 20 0 - 45 U/L Final     PATHOLOGY:  None new.    IMAGING:  None new.    ASSESSMENT/PLAN:  Jyothi Freitas is a 53 year old female with the following issues:  1.  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  I reviewed the blood counts with her.  They have adequately recovered to proceed with her next dose of paclitaxel today.  -Plan is for her to complete 12 weeks of weekly paclitaxel.  She will have 5 doses remaining after today.  -Rationale for neoadjuvant chemotherapy is to observe for chemo responsiveness, reduce risk of recurrent breast cancer, and reduce burden of disease prior to surgery.  However, as per previous discussion, she understands that it is unlikely that we will achieve a complete response to neoadjuvant chemotherapy given that her tumor is strongly ER and OH positive.  -She would certainly be a candidate for adjuvant endocrine therapy based on the strongly ER and OH positive tumor status.  She is postmenopausal, so I would typically recommend anastrozole to reduce her risk of breast cancer recurrence by relative 50%.  -Genetic test  results pending from her consult in November 2019.  -She would also likely be a candidate for adjuvant radiation therapy given her lymph node involvement and depending on radiation oncology evaluation.  She is planning for a lumpectomy with Dr Hernandez.  We will arrange a repeat breast MRI 1 week after her last Taxol treatment, prior to surgery.  We will arrange her follow-up with Dr. Hernandez  to occur after her breast MRI.    2. Depression  -Mood stable. Continue bupropion. Would not recommend tamoxifen in future due to interaction with bupropion unless she switched antidepressants.    3.  Constipation  -Likely drug related from her antiemetics and now much improved after taking her antiemetics on an as-needed basis. She may take stool softeners and laxatives as needed for constipation.    4.  Insomnia   5.  Hot flashes, chemotherapy induced  -Oxybutynin did help with hot flashes but she had too much dry eye syndrome and fluid retention.   -She has had improvement in her hot flashes and insomnia with the use of gabapentin.  Continue to titrate up as needed.    6. Ectasia of thoracic aorta  -This measures 4 cm on the PET scan.  I reassured Jyothi that she would not need any urgent surgical management of the aorta that may need ongoing monitoring. She is following with Dr. Silverio Gooden for monitoring of this issue.     Return in 2 weeks.    Maricarmen Monroe MD  Hematology/Oncology  [facility]    I spent a total of 30 minutes with the patient, with greater than 50% of the time in counseling and coordination of care.

## 2020-02-04 ENCOUNTER — OFFICE VISIT (OUTPATIENT)
Dept: FAMILY MEDICINE | Facility: CLINIC | Age: 55
End: 2020-02-04
Payer: COMMERCIAL

## 2020-02-04 VITALS
HEART RATE: 67 BPM | OXYGEN SATURATION: 100 % | WEIGHT: 147 LBS | TEMPERATURE: 97 F | BODY MASS INDEX: 23.02 KG/M2 | DIASTOLIC BLOOD PRESSURE: 98 MMHG | SYSTOLIC BLOOD PRESSURE: 135 MMHG

## 2020-02-04 DIAGNOSIS — Z17.0 MALIGNANT NEOPLASM OF RIGHT BREAST IN FEMALE, ESTROGEN RECEPTOR POSITIVE, UNSPECIFIED SITE OF BREAST (H): ICD-10-CM

## 2020-02-04 DIAGNOSIS — C50.911 MALIGNANT NEOPLASM OF RIGHT BREAST IN FEMALE, ESTROGEN RECEPTOR POSITIVE, UNSPECIFIED SITE OF BREAST (H): ICD-10-CM

## 2020-02-04 DIAGNOSIS — C77.3 SECONDARY AND UNSPECIFIED MALIGNANT NEOPLASM OF AXILLA AND UPPER LIMB LYMPH NODES (H): ICD-10-CM

## 2020-02-04 DIAGNOSIS — J01.01 ACUTE RECURRENT MAXILLARY SINUSITIS: Primary | ICD-10-CM

## 2020-02-04 DIAGNOSIS — I77.810 ASCENDING AORTA DILATION (H): ICD-10-CM

## 2020-02-04 PROCEDURE — 99213 OFFICE O/P EST LOW 20 MIN: CPT | Performed by: INTERNAL MEDICINE

## 2020-02-04 NOTE — PROGRESS NOTES
"The patient presents for a sinus infection.  As noted, she has breast cancer and is undergoing chemotherapy for that.    The patient developed 2-1/2 weeks ago a sore throat with sinus pain and pressure and thick postnasal drainage.  She was given Augmentin 500 mg twice daily for 1 week.  She did feel better but then her symptoms mostly returned.  She now has ongoing facial pain and discomfort but not coughing wheezing or shortness of breath.  She has the sore throat with postnasal drainage.  No fevers or night sweats.  No nasal drainage but does have facial pressure.  No recent travel.    Past Medical History:   Diagnosis Date     GERD (gastroesophageal reflux disease)      H/O colonoscopy 03/08/2016    done at [facility] and nl     Hematuria 2016    eval at [facility] and per pt cysto and ct neg     Intermittent asthma     since childhood     Low iron 10/2017    done for eval of hair loss, egd nl     Migraines     most of adult life, has seen neuro in past, on bblocker     Mild depression (H)      Normal stress echocardiogram July 2011    due to hear racing     Osteopenia 2008     Syncope 03/2017    echo nl lv size and fxn, grade 1 dd, mild tr     Past Surgical History:   Procedure Laterality Date     C TMJ ARTHROSCOPY/SURGERY       ESOPHAGOSCOPY, GASTROSCOPY, DUODENOSCOPY (EGD), COMBINED N/A 10/30/2017    Procedure: COMBINED ESOPHAGOSCOPY, GASTROSCOPY, DUODENOSCOPY (EGD), BIOPSY SINGLE OR MULTIPLE;  COMBINED ESOPHAGOSCOPY, GASTROSCOPY, DUODENOSCOPY (EGD);  Surgeon: Martin Rodriguez MD;  Location:  GI     INSERT PORT VASCULAR ACCESS N/A 10/24/2019    Procedure: PORT PLACEMENT;  Surgeon: Kaila Hernandez MD;  Location:  OR     ORTHOPEDIC SURGERY      \"leg surgery\"     single oopherectomy  2010    cyst     Social History     Socioeconomic History     Marital status:      Spouse name: Not on file     Number of children: 2     Years of education: Not on file     Highest education level: Not on file   Occupational " History     Not on file   Social Needs     Financial resource strain: Not on file     Food insecurity:     Worry: Not on file     Inability: Not on file     Transportation needs:     Medical: Not on file     Non-medical: Not on file   Tobacco Use     Smoking status: Former Smoker     Packs/day: 0.00     Last attempt to quit: 3/27/1997     Years since quittin.8     Smokeless tobacco: Never Used   Substance and Sexual Activity     Alcohol use: Yes     Comment: rarely     Drug use: No     Sexual activity: Yes     Partners: Male     Birth control/protection: Pill   Lifestyle     Physical activity:     Days per week: Not on file     Minutes per session: Not on file     Stress: Not on file   Relationships     Social connections:     Talks on phone: Not on file     Gets together: Not on file     Attends Latter day service: Not on file     Active member of club or organization: Not on file     Attends meetings of clubs or organizations: Not on file     Relationship status: Not on file     Intimate partner violence:     Fear of current or ex partner: Not on file     Emotionally abused: Not on file     Physically abused: Not on file     Forced sexual activity: Not on file   Other Topics Concern     Parent/sibling w/ CABG, MI or angioplasty before 65F 55M? Yes   Social History Narrative     Not on file     Current Outpatient Medications   Medication Sig Dispense Refill     acetaminophen (TYLENOL) 500 MG tablet Take 1,000 mg by mouth every 6 hours as needed for mild pain       acetaminophen-caffeine (EXCEDRIN TENSION HEADACHE) 500-65 MG TABS Take 2 tablets by mouth every 6 hours as needed for mild pain       ALBUTEROL 108 (90 BASE) MCG/ACT inhaler INHALE 2 PUFFS INTO THE LUNGS EVERY 6 HOURS AS NEEDED FOR SHORTNESS OF BREATH OR DIFFICULT BREATHING OR WHEEZING 8.5 g 0     amoxicillin-clavulanate (AUGMENTIN) 875-125 MG tablet Take 1 tablet by mouth 2 times daily 20 tablet 0     buPROPion (WELLBUTRIN XL) 300 MG 24 hr tablet  Take 1 tablet (300 mg) by mouth every morning 90 tablet 3     gabapentin (NEURONTIN) 100 MG capsule Take 3 capsules (300 mg) by mouth every evening 60 capsule 1     magic mouthwash suspension, diphenhydrAMINE, lidocaine, aluminum-magnesium & simethicone, (FIRST-MOUTHWASH BLM) compounding kit Swish and swallow 5-10 mLs in mouth every 6 hours as needed for mouth sores 237 mL 3     nadolol (CORGARD) 20 MG tablet TAKE 1 TABLET(20 MG) BY MOUTH DAILY 90 tablet 3     zolpidem (AMBIEN) 5 MG tablet Take 1 tablet (5 mg) by mouth nightly as needed for sleep 30 tablet 1     Allergies   Allergen Reactions     Sulfa Drugs Other (See Comments)     Red face. Ringing in the ears.     FAMILY HISTORY NOTED AND REVIEWED    REVIEW OF SYSTEMS: above    PHYSICAL EXAM    BP (!) 135/98 (BP Location: Left arm, Cuff Size: Adult Regular)   Pulse 67   Temp 97  F (36.1  C) (Tympanic)   Wt 66.7 kg (147 lb)   LMP 10/07/2017 (Approximate)   SpO2 100%   BMI 23.02 kg/m      Patient appears non toxic  Tympanic membranes and canals: within normal limits bilaterally.   Mouth: Posterior pharynx, mucous membranes and tongue exam within normal limits.  Some dried blood on post pharynx  Neck: supple, no nuchal rigidity or masses.  No anterior or posterior cervical adenopathy.    Lungs: clear, normal flow and effort.  cv rrr    ASSESSMENT:  Probable sinusitis      PLAN:  augmentin 875mg bid for 10 days, call if not resolving soon    She otherwise is stable and will follow-up with oncology.  As noted, she has the possibility of slight aortic root dilatation and is seeing cardiology for that.  She notes that her depression is doing relatively well.    Peter Franklin M.D.

## 2020-02-06 ENCOUNTER — HOSPITAL ENCOUNTER (OUTPATIENT)
Facility: CLINIC | Age: 55
Setting detail: SPECIMEN
End: 2020-02-06
Attending: INTERNAL MEDICINE
Payer: COMMERCIAL

## 2020-02-06 ENCOUNTER — HOSPITAL ENCOUNTER (OUTPATIENT)
Facility: CLINIC | Age: 55
Setting detail: SPECIMEN
Discharge: HOME | End: 2020-02-06
Attending: INTERNAL MEDICINE | Admitting: INTERNAL MEDICINE
Payer: COMMERCIAL

## 2020-02-06 ENCOUNTER — ONCOLOGY VISIT (OUTPATIENT)
Dept: ONCOLOGY | Facility: CLINIC | Age: 55
End: 2020-02-06
Attending: INTERNAL MEDICINE
Payer: COMMERCIAL

## 2020-02-06 ENCOUNTER — INFUSION THERAPY VISIT (OUTPATIENT)
Dept: INFUSION THERAPY | Facility: CLINIC | Age: 55
End: 2020-02-06
Attending: INTERNAL MEDICINE
Payer: COMMERCIAL

## 2020-02-06 VITALS
SYSTOLIC BLOOD PRESSURE: 133 MMHG | OXYGEN SATURATION: 96 % | DIASTOLIC BLOOD PRESSURE: 87 MMHG | TEMPERATURE: 97.3 F | HEART RATE: 79 BPM | RESPIRATION RATE: 16 BRPM | WEIGHT: 145.2 LBS | BODY MASS INDEX: 22.73 KG/M2

## 2020-02-06 DIAGNOSIS — G44.219 EPISODIC TENSION-TYPE HEADACHE, NOT INTRACTABLE: Primary | ICD-10-CM

## 2020-02-06 DIAGNOSIS — C50.411 MALIGNANT NEOPLASM OF UPPER-OUTER QUADRANT OF RIGHT BREAST IN FEMALE, ESTROGEN RECEPTOR POSITIVE (H): ICD-10-CM

## 2020-02-06 DIAGNOSIS — N95.1 MENOPAUSAL SYNDROME (HOT FLASHES): ICD-10-CM

## 2020-02-06 DIAGNOSIS — Z17.0 MALIGNANT NEOPLASM OF UPPER-OUTER QUADRANT OF RIGHT BREAST IN FEMALE, ESTROGEN RECEPTOR POSITIVE (H): Primary | ICD-10-CM

## 2020-02-06 DIAGNOSIS — Z17.0 MALIGNANT NEOPLASM OF UPPER-OUTER QUADRANT OF RIGHT BREAST IN FEMALE, ESTROGEN RECEPTOR POSITIVE (H): ICD-10-CM

## 2020-02-06 DIAGNOSIS — K59.00 CONSTIPATION, UNSPECIFIED CONSTIPATION TYPE: ICD-10-CM

## 2020-02-06 DIAGNOSIS — F32.5 MAJOR DEPRESSION IN COMPLETE REMISSION (H): ICD-10-CM

## 2020-02-06 DIAGNOSIS — C50.411 MALIGNANT NEOPLASM OF UPPER-OUTER QUADRANT OF RIGHT BREAST IN FEMALE, ESTROGEN RECEPTOR POSITIVE (H): Primary | ICD-10-CM

## 2020-02-06 DIAGNOSIS — Z17.0 MALIGNANT NEOPLASM OF RIGHT BREAST IN FEMALE, ESTROGEN RECEPTOR POSITIVE, UNSPECIFIED SITE OF BREAST (H): ICD-10-CM

## 2020-02-06 DIAGNOSIS — C50.911 MALIGNANT NEOPLASM OF RIGHT BREAST IN FEMALE, ESTROGEN RECEPTOR POSITIVE, UNSPECIFIED SITE OF BREAST (H): ICD-10-CM

## 2020-02-06 LAB
BASOPHILS # BLD AUTO: 0.1 10E9/L (ref 0–0.2)
BASOPHILS NFR BLD AUTO: 2.2 %
DIFFERENTIAL METHOD BLD: ABNORMAL
EOSINOPHIL # BLD AUTO: 0.2 10E9/L (ref 0–0.7)
EOSINOPHIL NFR BLD AUTO: 4.8 %
ERYTHROCYTE [DISTWIDTH] IN BLOOD BY AUTOMATED COUNT: 13.2 % (ref 10–15)
HCT VFR BLD AUTO: 35.7 % (ref 35–47)
HGB BLD-MCNC: 11.7 G/DL (ref 11.7–15.7)
IMM GRANULOCYTES # BLD: 0.1 10E9/L (ref 0–0.4)
IMM GRANULOCYTES NFR BLD: 1.1 %
LYMPHOCYTES # BLD AUTO: 1.1 10E9/L (ref 0.8–5.3)
LYMPHOCYTES NFR BLD AUTO: 23.9 %
MCH RBC QN AUTO: 31.1 PG (ref 26.5–33)
MCHC RBC AUTO-ENTMCNC: 32.8 G/DL (ref 31.5–36.5)
MCV RBC AUTO: 95 FL (ref 78–100)
MONOCYTES # BLD AUTO: 0.6 10E9/L (ref 0–1.3)
MONOCYTES NFR BLD AUTO: 14 %
NEUTROPHILS # BLD AUTO: 2.5 10E9/L (ref 1.6–8.3)
NEUTROPHILS NFR BLD AUTO: 54 %
NRBC # BLD AUTO: 0 10*3/UL
NRBC BLD AUTO-RTO: 0 /100
PLATELET # BLD AUTO: 389 10E9/L (ref 150–450)
RBC # BLD AUTO: 3.76 10E12/L (ref 3.8–5.2)
WBC # BLD AUTO: 4.6 10E9/L (ref 4–11)

## 2020-02-06 PROCEDURE — 25000132 ZZH RX MED GY IP 250 OP 250 PS 637: Performed by: INTERNAL MEDICINE

## 2020-02-06 PROCEDURE — 85025 COMPLETE CBC W/AUTO DIFF WBC: CPT | Performed by: INTERNAL MEDICINE

## 2020-02-06 PROCEDURE — 25800030 ZZH RX IP 258 OP 636: Performed by: INTERNAL MEDICINE

## 2020-02-06 PROCEDURE — 96361 HYDRATE IV INFUSION ADD-ON: CPT

## 2020-02-06 PROCEDURE — 96413 CHEMO IV INFUSION 1 HR: CPT

## 2020-02-06 PROCEDURE — 25000128 H RX IP 250 OP 636: Performed by: INTERNAL MEDICINE

## 2020-02-06 PROCEDURE — G0463 HOSPITAL OUTPT CLINIC VISIT: HCPCS | Mod: 25

## 2020-02-06 PROCEDURE — 96367 TX/PROPH/DG ADDL SEQ IV INF: CPT

## 2020-02-06 PROCEDURE — 99215 OFFICE O/P EST HI 40 MIN: CPT | Performed by: INTERNAL MEDICINE

## 2020-02-06 RX ORDER — EPINEPHRINE 1 MG/ML
0.3 INJECTION, SOLUTION INTRAMUSCULAR; SUBCUTANEOUS EVERY 5 MIN PRN
Status: CANCELLED | OUTPATIENT
Start: 2020-02-06

## 2020-02-06 RX ORDER — ALBUTEROL SULFATE 0.83 MG/ML
2.5 SOLUTION RESPIRATORY (INHALATION)
Status: CANCELLED | OUTPATIENT
Start: 2020-02-06

## 2020-02-06 RX ORDER — NALOXONE HYDROCHLORIDE 0.4 MG/ML
.1-.4 INJECTION, SOLUTION INTRAMUSCULAR; INTRAVENOUS; SUBCUTANEOUS
Status: CANCELLED | OUTPATIENT
Start: 2020-02-06

## 2020-02-06 RX ORDER — DIPHENHYDRAMINE HCL 25 MG
50 CAPSULE ORAL ONCE
Status: CANCELLED
Start: 2020-02-06

## 2020-02-06 RX ORDER — ALBUTEROL SULFATE 90 UG/1
1-2 AEROSOL, METERED RESPIRATORY (INHALATION)
Status: CANCELLED
Start: 2020-02-06

## 2020-02-06 RX ORDER — SODIUM CHLORIDE 9 MG/ML
1000 INJECTION, SOLUTION INTRAVENOUS CONTINUOUS PRN
Status: CANCELLED
Start: 2020-02-06

## 2020-02-06 RX ORDER — EPINEPHRINE 0.3 MG/.3ML
0.3 INJECTION SUBCUTANEOUS EVERY 5 MIN PRN
Status: CANCELLED | OUTPATIENT
Start: 2020-02-06

## 2020-02-06 RX ORDER — METHYLPREDNISOLONE SODIUM SUCCINATE 125 MG/2ML
125 INJECTION, POWDER, LYOPHILIZED, FOR SOLUTION INTRAMUSCULAR; INTRAVENOUS
Status: CANCELLED
Start: 2020-02-06

## 2020-02-06 RX ORDER — DIPHENHYDRAMINE HCL 25 MG
50 CAPSULE ORAL ONCE
Status: COMPLETED | OUTPATIENT
Start: 2020-02-06 | End: 2020-02-06

## 2020-02-06 RX ORDER — MEPERIDINE HYDROCHLORIDE 25 MG/ML
25 INJECTION INTRAMUSCULAR; INTRAVENOUS; SUBCUTANEOUS EVERY 30 MIN PRN
Status: CANCELLED | OUTPATIENT
Start: 2020-02-06

## 2020-02-06 RX ORDER — DIPHENHYDRAMINE HYDROCHLORIDE 50 MG/ML
50 INJECTION INTRAMUSCULAR; INTRAVENOUS
Status: CANCELLED
Start: 2020-02-06

## 2020-02-06 RX ORDER — HEPARIN SODIUM (PORCINE) LOCK FLUSH IV SOLN 100 UNIT/ML 100 UNIT/ML
5 SOLUTION INTRAVENOUS
Status: CANCELLED | OUTPATIENT
Start: 2020-02-06

## 2020-02-06 RX ORDER — LORAZEPAM 2 MG/ML
0.5 INJECTION INTRAMUSCULAR EVERY 4 HOURS PRN
Status: CANCELLED
Start: 2020-02-06

## 2020-02-06 RX ORDER — HEPARIN SODIUM (PORCINE) LOCK FLUSH IV SOLN 100 UNIT/ML 100 UNIT/ML
5 SOLUTION INTRAVENOUS
Status: DISCONTINUED | OUTPATIENT
Start: 2020-02-06 | End: 2020-02-06 | Stop reason: HOSPADM

## 2020-02-06 RX ADMIN — DEXAMETHASONE SODIUM PHOSPHATE 12 MG: 10 INJECTION, SOLUTION INTRAMUSCULAR; INTRAVENOUS at 09:28

## 2020-02-06 RX ADMIN — SODIUM CHLORIDE 1000 ML: 9 INJECTION, SOLUTION INTRAVENOUS at 09:10

## 2020-02-06 RX ADMIN — DIPHENHYDRAMINE HYDROCHLORIDE 50 MG: 25 CAPSULE ORAL at 09:18

## 2020-02-06 RX ADMIN — Medication 5 ML: at 13:49

## 2020-02-06 RX ADMIN — PACLITAXEL 135 MG: 6 INJECTION, SOLUTION INTRAVENOUS at 10:07

## 2020-02-06 RX ADMIN — FAMOTIDINE 20 MG: 20 INJECTION, SOLUTION INTRAVENOUS at 09:45

## 2020-02-06 ASSESSMENT — PAIN SCALES - GENERAL: PAINLEVEL: NO PAIN (0)

## 2020-02-06 ASSESSMENT — PATIENT HEALTH QUESTIONNAIRE - PHQ9: SUM OF ALL RESPONSES TO PHQ QUESTIONS 1-9: 8

## 2020-02-06 NOTE — PATIENT INSTRUCTIONS
1. Proceed with Taxol today.  2. Continue Taxol weekly through 3/12/2020.    Scheduled/ [facility]   3. RTC MD every other Taxol through 3/12/2020.  Scheduled/ [facility]   4. Lab check weekly through 3/12/2020.  5. 3/19/2020 Breast MRI.  Scheduled 3/19/20 Check in at 8:30am, Breast MRI at 9:00am/ Zo[facility]   6. Arrange follow-up with Dr. Kaila Hernandez.  Scheduled 3/27/20 at 2:45pm, [facility] [address], [address] , [place]  [phone]  / [facility] FAJARDO printed and given to patient/ [facility]

## 2020-02-06 NOTE — PROGRESS NOTES
Nursing Note:  Jyothi Freitas presents today for port labs.    Patient seen by provider today: Yes: Dr. Monroe   present during visit today: Not Applicable.    Note: N/A.    Intravenous Access:  Labs drawn without difficulty.  Implanted Port.    Discharge Plan:   Patient was sent to [facility] for MD appointment.    Giovana Marino RN

## 2020-02-06 NOTE — PROGRESS NOTES
Infusion Nursing Note:  Jyothi STERN Zena presents today for C11D1 Taxol.    Patient seen by provider today: Yes: Fer   present during visit today: Not Applicable.    Note: N/A.    Intravenous Access:  Implanted Port.    Treatment Conditions:  Lab Results   Component Value Date    HGB 11.7 02/06/2020     Lab Results   Component Value Date    WBC 4.6 02/06/2020      Lab Results   Component Value Date    ANEU 2.5 02/06/2020     Lab Results   Component Value Date     02/06/2020      Results reviewed, labs MET treatment parameters, ok to proceed with treatment.      Post Infusion Assessment:  Patient tolerated infusion without incident.  Blood return noted pre and post infusion.  No evidence of extravasations.  Access discontinued per protocol.       Discharge Plan:   Discharge instructions reviewed with: Patient.  Patient and/or family verbalized understanding of discharge instructions and all questions answered.  Patient discharged in stable condition accompanied by: self.  Departure Mode: Ambulatory.    Kaila Hightower RN

## 2020-02-06 NOTE — LETTER
2/6/2020         RE: Jyothi Freitas  [address]  [place] [address]        Dear Colleague,    Thank you for referring your patient, Jyothi Freitas, to the [facility]. Please see a copy of my visit note below.    [facility]    Hematology/Oncology Established Patient Follow-up Note      Today's Date: 2/06/20    Reason for Follow-up: Right breast cancer.    HISTORY OF PRESENT ILLNESS: Jyothi Freitas is a 53 year old female who presents with the following oncologic history:   1.  10/11/2019: Diagnostic right sided mammogram performed for a palpable lump in the right breast.  This showed an irregularly-shaped spiculated mass in the upper outer right breast with prominent lymph nodes in the right axilla.  Targeted ultrasound of the right upper outer breast showed an irregularly-shaped hypoechoic mass at the 10 o'clock position, 4 cm from the nipple measuring 2.6 x 1.5 x 2.4 cm.  Right axillary ultrasound showed moderately enlarged lymph nodes.  Right breast needle biopsy showed a grade 3 invasive ductal carcinoma with lymphovascular invasion identified, ER strongly positive at 95%, CT strongly positive at 95%, HER-2/juan FISH negative.  Right axillary lymph node measuring 2 cm was positive for metastatic carcinoma.  Note prior 4/2019 mammogram deemed normal.  2.  10/15/2019: Bilateral breast MRI showed known right breast malignancy measuring 2.6 x 1.4 x 2 cm, 3 mildly enlarged right axillary lymph nodes and no contralateral breast malignancy.  3. 10/21/2019 PET scan showed scattered small hypermetabolic bilateral axillary lymph nodes; for example, a hypermetabolic right axillary lymph node measures 1.6 x 0.9 cm with SUV max 3.6.  Hypermetabolic mass in the right breast superiorly and laterally measuring 2.1 x 1.6 cm with SUV max 4.3.  A 0.6 cm low-density lesion in the right hepatic lobe is too small for accurate PET characterization but shows no appreciable hypermetabolic activity.   Incidentally noted ectasia of the ascending thoracic aorta measures 4 cm in diameter.  4.  10/23/2019: Left axillary ultrasound showed benign-appearing lymph node.  Left axillary lymph node biopsy showed benign lymph node tissue.  5.  10/29/2019: Started neoadjuvant chemotherapy with dose dense Adriamycin and Cytoxan, followed by weekly paclitaxel.    INTERIM HISTORY:  Jyothi reports hot flashes improved with the use of gabapentin.  She is currently at a dose of 300 mg nightly.  Has helped her sleep as well.  She is still dealing with an upper respiratory infection with copious postnasal drainage.  She received a prescription for Augmentin from Dr. Franklin for sinus infection.  She thinks that her drainage is a bit better. Otherwise, she denies any recent fevers, chills, bladder dysfunction.  Constipation seems to be overall well controlled.  She denies any paresthesias.    REVIEW OF SYSTEMS:   14 point ROS was reviewed and is negative other than as noted above in HPI.       HOME MEDICATIONS:  Current Outpatient Medications   Medication Sig Dispense Refill     acetaminophen (TYLENOL) 500 MG tablet Take 1,000 mg by mouth every 6 hours as needed for mild pain       acetaminophen-caffeine (EXCEDRIN TENSION HEADACHE) 500-65 MG TABS Take 2 tablets by mouth every 6 hours as needed for mild pain       ALBUTEROL 108 (90 BASE) MCG/ACT inhaler INHALE 2 PUFFS INTO THE LUNGS EVERY 6 HOURS AS NEEDED FOR SHORTNESS OF BREATH OR DIFFICULT BREATHING OR WHEEZING 8.5 g 0     buPROPion (WELLBUTRIN XL) 300 MG 24 hr tablet Take 1 tablet (300 mg) by mouth every morning 90 tablet 3     fluticasone (FLOVENT DISKUS) 100 MCG/BLIST inhaler Inhale 1 puff into the lungs 2 times daily 3 Inhaler 3     fluticasone-salmeterol (ADVAIR) 100-50 MCG/DOSE inhaler Inhale 1 puff into the lungs every 12 hours 1 Inhaler 3     gabapentin (NEURONTIN) 100 MG capsule Take 3 capsules (300 mg) by mouth every evening 60 capsule 1     HYDROcodone-acetaminophen (NORCO)  5-325 MG tablet Take 1-2 tablets by mouth every 4 hours as needed for moderate to severe pain 10 tablet 0     magic mouthwash suspension, diphenhydrAMINE, lidocaine, aluminum-magnesium & simethicone, (FIRST-MOUTHWASH BLM) compounding kit Swish and swallow 5-10 mLs in mouth every 6 hours as needed for mouth sores 237 mL 3     nadolol (CORGARD) 20 MG tablet TAKE 1 TABLET(20 MG) BY MOUTH DAILY 90 tablet 3     naproxen sodium (ANAPROX) 220 MG tablet Take 220 mg by mouth daily as needed for moderate pain       oxybutynin (DITROPAN) 5 MG tablet Take 1 tablet (5 mg) by mouth 2 times daily 60 tablet 3     pravastatin (PRAVACHOL) 20 MG tablet Take 1 tablet (20 mg) by mouth daily 90 tablet 3     prochlorperazine (COMPAZINE) 10 MG tablet Take 1 tablet (10 mg) by mouth every 6 hours as needed (Nausea/Vomiting) 30 tablet 5     senna-docusate (SENOKOT-S/PERICOLACE) 8.6-50 MG tablet Take 1-2 tablets by mouth 2 times daily 30 tablet 0     zolpidem (AMBIEN) 5 MG tablet Take 1 tablet (5 mg) by mouth nightly as needed for sleep 30 tablet 1         ALLERGIES:  Allergies   Allergen Reactions     Sulfa Drugs Other (See Comments)     Red face. Ringing in the ears.         PAST MEDICAL HISTORY:  Past Medical History:   Diagnosis Date     GERD (gastroesophageal reflux disease)      H/O colonoscopy 03/08/2016    done at [facility] and [facility]     Hematuria 2016    eval at [facility] and per pt cysto and ct neg     Intermittent asthma     since childhood     Low iron 10/2017    done for eval of hair loss, [facility]d [facility]     Migraines     most of adult life, has seen neuro in past, on bblocker     Mild depression (H)      Normal stress echocardiogram July 2011    due to hear racing     Osteopenia 2008     Syncope 03/2017    echo n[facility] lv size and fxn, grade 1 dd, mild tr         PAST SURGICAL HISTORY:  Past Surgical History:   Procedure Laterality Date     C TMJ ARTHROSCOPY/SURGERY       ESOPHAGOSCOPY, GASTROSCOPY, DUODENOSCOPY (EGD), COMBINED N/A 10/30/2017     "Procedure: COMBINED ESOPHAGOSCOPY, GASTROSCOPY, DUODENOSCOPY (EGD), BIOPSY SINGLE OR MULTIPLE;  COMBINED ESOPHAGOSCOPY, GASTROSCOPY, DUODENOSCOPY (EGD);  Surgeon: Martin Rodriguez MD;  Location:  GI     INSERT PORT VASCULAR ACCESS N/A 10/24/2019    Procedure: PORT PLACEMENT;  Surgeon: Kaila Hernandez MD;  Location:  OR     ORTHOPEDIC SURGERY      \"leg surgery\"     single oopherectomy  2010    cyst         SOCIAL HISTORY:  Social History     Socioeconomic History     Marital status:      Spouse name: Not on file     Number of children: 2     Years of education: Not on file     Highest education level: Not on file   Occupational History     Not on file   Social Needs     Financial resource strain: Not on file     Food insecurity:     Worry: Not on file     Inability: Not on file     Transportation needs:     Medical: Not on file     Non-medical: Not on file   Tobacco Use     Smoking status: Former Smoker     Packs/day: 0.00     Last attempt to quit: 3/27/1997     Years since quittin.8     Smokeless tobacco: Never Used   Substance and Sexual Activity     Alcohol use: Yes     Comment: rarely     Drug use: No     Sexual activity: Yes     Partners: Male     Birth control/protection: Pill   Lifestyle     Physical activity:     Days per week: Not on file     Minutes per session: Not on file     Stress: Not on file   Relationships     Social connections:     Talks on phone: Not on file     Gets together: Not on file     Attends [employer] service: Not on file     Active member of club or organization: Not on file     Attends meetings of clubs or organizations: Not on file     Relationship status: Not on file     Intimate partner violence:     Fear of current or ex partner: Not on file     Emotionally abused: Not on file     Physically abused: Not on file     Forced sexual activity: Not on file   Other Topics Concern     Parent/sibling w/ CABG, MI or angioplasty before 65F 55M? Yes   Social History " Narrative     Not on file         FAMILY HISTORY:  Family History   Problem Relation Age of Onset     Coronary Artery Disease Father 48        MI. and one in his 60s     Emphysema Mother         COPD         PHYSICAL EXAM:  Vital signs:  /87   Pulse 79   Temp 97.3  F (36.3  C) (Oral)   Resp 16   Wt 65.9 kg (145 lb 3.2 oz)   LMP 10/07/2017 (Approximate)   SpO2 96%   BMI 22.73 kg/m      ECO  GENERAL/CONSTITUTIONAL: No acute distress.  EYES: No scleral icterus.  ENT/MOUTH: Neck supple. Oropharynx with mild posterior pharyngeal edema with whitish drainage.  No thrush.  LYMPH: No cervical, supraclavicular, or axillary adenopathy.   BREAST: Palpable right upper outer breast 3 x 2 cm less distinct mass, movable, nontender and flat, stable since prior exam.  No rashes or ulceration or erythema.  No dimpling.  The nipples are everted bilaterally with no discharge.  RESPIRATORY: Clear to auscultation bilaterally. No crackles or wheezing.   CARDIOVASCULAR: Regular rate and rhythm without murmurs.  GASTROINTESTINAL: No guarding or distention.  MUSCULOSKELETAL: Warm and well-perfused, no cyanosis, clubbing, or edema.  NEUROLOGIC: Cranial nerves II-XII are intact. Alert, oriented, answers questions appropriately.  INTEGUMENTARY: No rashes or jaundice.      LABS:  CBC RESULTS:   Recent Labs   Lab Test 20  0735   WBC 4.6   RBC 3.76*   HGB 11.7   HCT 35.7   MCV 95   MCH 31.1   MCHC 32.8   RDW 13.2        Last Comprehensive Metabolic Panel:  Sodium   Date Value Ref Range Status   2019 138 133 - 144 mmol/L Final     Potassium   Date Value Ref Range Status   2019 3.8 3.4 - 5.3 mmol/L Final     Chloride   Date Value Ref Range Status   2019 106 94 - 109 mmol/L Final     Carbon Dioxide   Date Value Ref Range Status   2019 25 20 - 32 mmol/L Final     Anion Gap   Date Value Ref Range Status   2019 7 3 - 14 mmol/L Final     Glucose   Date Value Ref Range Status   2019 98 70 -  99 mg/dL Final     Urea Nitrogen   Date Value Ref Range Status   11/27/2019 10 7 - 30 mg/dL Final     Creatinine   Date Value Ref Range Status   11/27/2019 0.67 0.52 - 1.04 mg/dL Final     GFR Estimate   Date Value Ref Range Status   11/27/2019 >90 >60 mL/min/[1.73_m2] Final     Comment:     Non  GFR Calc  Starting 12/18/2018, serum creatinine based estimated GFR (eGFR) will be   calculated using the Chronic Kidney Disease Epidemiology Collaboration   (CKD-EPI) equation.       Calcium   Date Value Ref Range Status   11/27/2019 8.8 8.5 - 10.1 mg/dL Final     Bilirubin Total   Date Value Ref Range Status   01/23/2020 0.3 0.2 - 1.3 mg/dL Final     Alkaline Phosphatase   Date Value Ref Range Status   01/23/2020 57 40 - 150 U/L Final     ALT   Date Value Ref Range Status   01/23/2020 44 0 - 50 U/L Final     AST   Date Value Ref Range Status   01/23/2020 20 0 - 45 U/L Final     PATHOLOGY:  None new.    IMAGING:  None new.    ASSESSMENT/PLAN:  Jyothi Freitas is a 53 year old female with the following issues:  1.  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  I reviewed the blood counts with her.  They have adequately recovered to proceed with her next dose of paclitaxel today.  -Plan is for her to complete 12 weeks of weekly paclitaxel.  She will have 5 doses remaining after today.  -Rationale for neoadjuvant chemotherapy is to observe for chemo responsiveness, reduce risk of recurrent breast cancer, and reduce burden of disease prior to surgery.  However, as per previous discussion, she understands that it is unlikely that we will achieve a complete response to neoadjuvant chemotherapy given that her tumor is strongly ER and MT positive.  -She would certainly be a  candidate for adjuvant endocrine therapy based on the strongly ER and OK positive tumor status.  She is postmenopausal, so I would typically recommend anastrozole to reduce her risk of breast cancer recurrence by relative 50%.  -Genetic test results pending from her consult in November 2019.  -She would also likely be a candidate for adjuvant radiation therapy given her lymph node involvement and depending on radiation oncology evaluation.  She is planning for a lumpectomy with Dr Hernandez.  We will arrange a repeat breast MRI 1 week after her last Taxol treatment, prior to surgery.  We will arrange her follow-up with Dr. Hernandez  to occur after her breast MRI.    2. Depression  -Mood stable. Continue bupropion. Would not recommend tamoxifen in future due to interaction with bupropion unless she switched antidepressants.    3.  Constipation  -Likely drug related from her antiemetics and now much improved after taking her antiemetics on an as-needed basis. She may take stool softeners and laxatives as needed for constipation.    4.  Insomnia   5.  Hot flashes, chemotherapy induced  -Oxybutynin did help with hot flashes but she had too much dry eye syndrome and fluid retention.   -She has had improvement in her hot flashes and insomnia with the use of gabapentin.  Continue to titrate up as needed.    6. Ectasia of thoracic aorta  -This measures 4 cm on the PET scan.  I reassured Jyothi that she would not need any urgent surgical management of the aorta that may need ongoing monitoring. She is following with Dr. Silverio Gooden for monitoring of this issue.     Return in 2 weeks.    Maricarmen Monroe MD  Hematology/Oncology  [facility]    I spent a total of 30 minutes with the patient, with greater than 50% of the time in counseling and coordination of care.    Again, thank you for allowing me to participate in the care of your patient.        Sincerely,        Maricarmen Monroe MD

## 2020-02-10 DIAGNOSIS — Z17.0 MALIGNANT NEOPLASM OF UPPER-OUTER QUADRANT OF RIGHT BREAST IN FEMALE, ESTROGEN RECEPTOR POSITIVE (H): ICD-10-CM

## 2020-02-10 DIAGNOSIS — C50.411 MALIGNANT NEOPLASM OF UPPER-OUTER QUADRANT OF RIGHT BREAST IN FEMALE, ESTROGEN RECEPTOR POSITIVE (H): ICD-10-CM

## 2020-02-10 DIAGNOSIS — G44.219 EPISODIC TENSION-TYPE HEADACHE, NOT INTRACTABLE: ICD-10-CM

## 2020-02-10 RX ORDER — EPINEPHRINE 0.3 MG/.3ML
0.3 INJECTION SUBCUTANEOUS EVERY 5 MIN PRN
Status: CANCELLED | OUTPATIENT
Start: 2020-02-13

## 2020-02-10 RX ORDER — LORAZEPAM 2 MG/ML
0.5 INJECTION INTRAMUSCULAR EVERY 4 HOURS PRN
Status: CANCELLED
Start: 2020-02-13

## 2020-02-10 RX ORDER — DIPHENHYDRAMINE HCL 25 MG
50 CAPSULE ORAL ONCE
Status: CANCELLED
Start: 2020-02-13

## 2020-02-10 RX ORDER — ALBUTEROL SULFATE 0.83 MG/ML
2.5 SOLUTION RESPIRATORY (INHALATION)
Status: CANCELLED | OUTPATIENT
Start: 2020-02-13

## 2020-02-10 RX ORDER — ALBUTEROL SULFATE 90 UG/1
1-2 AEROSOL, METERED RESPIRATORY (INHALATION)
Status: CANCELLED
Start: 2020-02-13

## 2020-02-10 RX ORDER — EPINEPHRINE 1 MG/ML
0.3 INJECTION, SOLUTION, CONCENTRATE INTRAVENOUS EVERY 5 MIN PRN
Status: CANCELLED | OUTPATIENT
Start: 2020-02-13

## 2020-02-10 RX ORDER — DIPHENHYDRAMINE HYDROCHLORIDE 50 MG/ML
50 INJECTION INTRAMUSCULAR; INTRAVENOUS
Status: CANCELLED
Start: 2020-02-13

## 2020-02-10 RX ORDER — NALOXONE HYDROCHLORIDE 0.4 MG/ML
.1-.4 INJECTION, SOLUTION INTRAMUSCULAR; INTRAVENOUS; SUBCUTANEOUS
Status: CANCELLED | OUTPATIENT
Start: 2020-02-13

## 2020-02-10 RX ORDER — MEPERIDINE HYDROCHLORIDE 25 MG/ML
25 INJECTION INTRAMUSCULAR; INTRAVENOUS; SUBCUTANEOUS EVERY 30 MIN PRN
Status: CANCELLED | OUTPATIENT
Start: 2020-02-13

## 2020-02-10 RX ORDER — SODIUM CHLORIDE 9 MG/ML
1000 INJECTION, SOLUTION INTRAVENOUS CONTINUOUS PRN
Status: CANCELLED
Start: 2020-02-13

## 2020-02-13 ENCOUNTER — HOSPITAL ENCOUNTER (OUTPATIENT)
Facility: CLINIC | Age: 55
Setting detail: SPECIMEN
Discharge: HOME | End: 2020-02-13
Attending: INTERNAL MEDICINE | Admitting: INTERNAL MEDICINE
Payer: COMMERCIAL

## 2020-02-13 ENCOUNTER — INFUSION THERAPY VISIT (OUTPATIENT)
Dept: INFUSION THERAPY | Facility: CLINIC | Age: 55
End: 2020-02-13
Attending: INTERNAL MEDICINE
Payer: COMMERCIAL

## 2020-02-13 VITALS
SYSTOLIC BLOOD PRESSURE: 111 MMHG | DIASTOLIC BLOOD PRESSURE: 68 MMHG | BODY MASS INDEX: 22.86 KG/M2 | WEIGHT: 146 LBS | TEMPERATURE: 97.9 F | HEART RATE: 85 BPM | RESPIRATION RATE: 18 BRPM

## 2020-02-13 DIAGNOSIS — G44.219 EPISODIC TENSION-TYPE HEADACHE, NOT INTRACTABLE: Primary | ICD-10-CM

## 2020-02-13 DIAGNOSIS — C50.411 MALIGNANT NEOPLASM OF UPPER-OUTER QUADRANT OF RIGHT BREAST IN FEMALE, ESTROGEN RECEPTOR POSITIVE (H): ICD-10-CM

## 2020-02-13 DIAGNOSIS — Z17.0 MALIGNANT NEOPLASM OF UPPER-OUTER QUADRANT OF RIGHT BREAST IN FEMALE, ESTROGEN RECEPTOR POSITIVE (H): ICD-10-CM

## 2020-02-13 LAB
BASOPHILS # BLD AUTO: 0.1 10E9/L (ref 0–0.2)
BASOPHILS NFR BLD AUTO: 2 %
DIFFERENTIAL METHOD BLD: ABNORMAL
EOSINOPHIL # BLD AUTO: 0.2 10E9/L (ref 0–0.7)
EOSINOPHIL NFR BLD AUTO: 4.5 %
ERYTHROCYTE [DISTWIDTH] IN BLOOD BY AUTOMATED COUNT: 13 % (ref 10–15)
HCT VFR BLD AUTO: 35.5 % (ref 35–47)
HGB BLD-MCNC: 11.8 G/DL (ref 11.7–15.7)
IMM GRANULOCYTES # BLD: 0.1 10E9/L (ref 0–0.4)
IMM GRANULOCYTES NFR BLD: 1.1 %
LYMPHOCYTES # BLD AUTO: 0.8 10E9/L (ref 0.8–5.3)
LYMPHOCYTES NFR BLD AUTO: 18.6 %
MCH RBC QN AUTO: 31.6 PG (ref 26.5–33)
MCHC RBC AUTO-ENTMCNC: 33.2 G/DL (ref 31.5–36.5)
MCV RBC AUTO: 95 FL (ref 78–100)
MONOCYTES # BLD AUTO: 0.7 10E9/L (ref 0–1.3)
MONOCYTES NFR BLD AUTO: 15.4 %
NEUTROPHILS # BLD AUTO: 2.6 10E9/L (ref 1.6–8.3)
NEUTROPHILS NFR BLD AUTO: 58.4 %
NRBC # BLD AUTO: 0 10*3/UL
NRBC BLD AUTO-RTO: 0 /100
PLATELET # BLD AUTO: 380 10E9/L (ref 150–450)
RBC # BLD AUTO: 3.73 10E12/L (ref 3.8–5.2)
WBC # BLD AUTO: 4.4 10E9/L (ref 4–11)

## 2020-02-13 PROCEDURE — 25000128 H RX IP 250 OP 636: Performed by: INTERNAL MEDICINE

## 2020-02-13 PROCEDURE — 25000132 ZZH RX MED GY IP 250 OP 250 PS 637: Performed by: INTERNAL MEDICINE

## 2020-02-13 PROCEDURE — 85025 COMPLETE CBC W/AUTO DIFF WBC: CPT | Performed by: INTERNAL MEDICINE

## 2020-02-13 PROCEDURE — 25800030 ZZH RX IP 258 OP 636: Performed by: INTERNAL MEDICINE

## 2020-02-13 PROCEDURE — 96367 TX/PROPH/DG ADDL SEQ IV INF: CPT

## 2020-02-13 PROCEDURE — 96413 CHEMO IV INFUSION 1 HR: CPT

## 2020-02-13 RX ORDER — DIPHENHYDRAMINE HCL 25 MG
50 CAPSULE ORAL ONCE
Status: COMPLETED | OUTPATIENT
Start: 2020-02-13 | End: 2020-02-13

## 2020-02-13 RX ADMIN — DEXAMETHASONE SODIUM PHOSPHATE 12 MG: 10 INJECTION, SOLUTION INTRAMUSCULAR; INTRAVENOUS at 09:42

## 2020-02-13 RX ADMIN — SODIUM CHLORIDE 1000 ML: 9 INJECTION, SOLUTION INTRAVENOUS at 09:17

## 2020-02-13 RX ADMIN — FAMOTIDINE 20 MG: 20 INJECTION, SOLUTION INTRAVENOUS at 10:04

## 2020-02-13 RX ADMIN — PACLITAXEL 135 MG: 6 INJECTION, SOLUTION INTRAVENOUS at 10:30

## 2020-02-13 RX ADMIN — DIPHENHYDRAMINE HYDROCHLORIDE 50 MG: 25 CAPSULE ORAL at 09:16

## 2020-02-13 ASSESSMENT — PAIN SCALES - GENERAL: PAINLEVEL: NO PAIN (0)

## 2020-02-13 NOTE — PROGRESS NOTES
Infusion Nursing Note:  Jyothi Freitas presents today for C12D1 Taxol.    Patient seen by provider today: No   present during visit today: Not Applicable.    Note: N/A.    Intravenous Access:  Implanted Port.    Treatment Conditions:  Lab Results   Component Value Date    HGB 11.8 02/13/2020     Lab Results   Component Value Date    WBC 4.4 02/13/2020      Lab Results   Component Value Date    ANEU 2.6 02/13/2020     Lab Results   Component Value Date     02/13/2020      Lab Results   Component Value Date     11/27/2019                   Lab Results   Component Value Date    POTASSIUM 3.8 11/27/2019           No results found for: MAG         Lab Results   Component Value Date    CR 0.67 11/27/2019                   Lab Results   Component Value Date    JEFF 8.8 11/27/2019                Lab Results   Component Value Date    BILITOTAL 0.3 01/23/2020           Lab Results   Component Value Date    ALBUMIN 3.7 01/23/2020                    Lab Results   Component Value Date    ALT 44 01/23/2020           Lab Results   Component Value Date    AST 20 01/23/2020       Results reviewed, labs MET treatment parameters, ok to proceed with treatment.      Post Infusion Assessment:  Patient tolerated infusion without incident.  Blood return noted pre and post infusion.  Site patent and intact, free from redness, edema or discomfort.  No evidence of extravasations.  Access discontinued per protocol.       Discharge Plan:   Patient declined prescription refills.  Discharge instructions reviewed with: Patient.  Patient and/or family verbalized understanding of discharge instructions and all questions answered.  Patient discharged in stable condition accompanied by: self.  Departure Mode: Ambulatory.    Kaila Hightower RN

## 2020-02-18 NOTE — PROGRESS NOTES
[facility]    Hematology/Oncology Established Patient Follow-up Note      Today's Date: 2/20/20    Reason for Follow-up: Right breast cancer.    HISTORY OF PRESENT ILLNESS: Jyothi Freitas is a 54 year old female who presents with the following oncologic history:   1.  10/11/2019: Diagnostic right sided mammogram performed for a palpable lump in the right breast.  This showed an irregularly-shaped spiculated mass in the upper outer right breast with prominent lymph nodes in the right axilla.  Targeted ultrasound of the right upper outer breast showed an irregularly-shaped hypoechoic mass at the 10 o'clock position, 4 cm from the nipple measuring 2.6 x 1.5 x 2.4 cm.  Right axillary ultrasound showed moderately enlarged lymph nodes.  Right breast needle biopsy showed a grade 3 invasive ductal carcinoma with lymphovascular invasion identified, ER strongly positive at 95%, MA strongly positive at 95%, HER-2/juan FISH negative.  Right axillary lymph node measuring 2 cm was positive for metastatic carcinoma.  Note prior 4/2019 mammogram deemed normal.  2.  10/15/2019: Bilateral breast MRI showed known right breast malignancy measuring 2.6 x 1.4 x 2 cm, 3 mildly enlarged right axillary lymph nodes and no contralateral breast malignancy.  3. 10/21/2019 PET scan showed scattered small hypermetabolic bilateral axillary lymph nodes; for example, a hypermetabolic right axillary lymph node measures 1.6 x 0.9 cm with SUV max 3.6.  Hypermetabolic mass in the right breast superiorly and laterally measuring 2.1 x 1.6 cm with SUV max 4.3.  A 0.6 cm low-density lesion in the right hepatic lobe is too small for accurate PET characterization but shows no appreciable hypermetabolic activity.  Incidentally noted ectasia of the ascending thoracic aorta measures 4 cm in diameter.  4.  10/23/2019: Left axillary ultrasound showed benign-appearing lymph node.  Left axillary lymph node biopsy showed benign lymph node tissue.  5.   10/29/2019: Started neoadjuvant chemotherapy with dose dense Adriamycin and Cytoxan, followed by weekly paclitaxel.    INTERIM HISTORY:  Jyothi reports hot flashes overall well controlled with gabapentin 300 mg nightly  She is hesitant to increase her gabapentin dosage as she wants to avoid weight gain..  Otherwise, she denies any recent fevers, chills, bladder dysfunction. She denies any paresthesias.    REVIEW OF SYSTEMS:   14 point ROS was reviewed and is negative other than as noted above in HPI.       HOME MEDICATIONS:  Current Outpatient Medications   Medication Sig Dispense Refill     acetaminophen (TYLENOL) 500 MG tablet Take 1,000 mg by mouth every 6 hours as needed for mild pain       acetaminophen-caffeine (EXCEDRIN TENSION HEADACHE) 500-65 MG TABS Take 2 tablets by mouth every 6 hours as needed for mild pain       ALBUTEROL 108 (90 BASE) MCG/ACT inhaler INHALE 2 PUFFS INTO THE LUNGS EVERY 6 HOURS AS NEEDED FOR SHORTNESS OF BREATH OR DIFFICULT BREATHING OR WHEEZING 8.5 g 0     amoxicillin-clavulanate (AUGMENTIN) 875-125 MG tablet Take 1 tablet by mouth 2 times daily 20 tablet 0     buPROPion (WELLBUTRIN XL) 300 MG 24 hr tablet Take 1 tablet (300 mg) by mouth every morning 90 tablet 3     gabapentin (NEURONTIN) 100 MG capsule Take 3 capsules (300 mg) by mouth every evening 60 capsule 1     magic mouthwash suspension, diphenhydrAMINE, lidocaine, aluminum-magnesium & simethicone, (FIRST-MOUTHWASH BLM) compounding kit Swish and swallow 5-10 mLs in mouth every 6 hours as needed for mouth sores 237 mL 3     nadolol (CORGARD) 20 MG tablet TAKE 1 TABLET(20 MG) BY MOUTH DAILY 90 tablet 3     zolpidem (AMBIEN) 5 MG tablet Take 1 tablet (5 mg) by mouth nightly as needed for sleep 30 tablet 1         ALLERGIES:  Allergies   Allergen Reactions     Sulfa Drugs Other (See Comments)     Red face. Ringing in the ears.         PAST MEDICAL HISTORY:  Past Medical History:   Diagnosis Date     GERD (gastroesophageal reflux  "disease)      H/O colonoscopy 2016    done at [facility] and [facility]     Hematuria 2016    eval at [facility] and per pt cysto and ct neg     Intermittent asthma     since childhood     Low iron 10/2017    done for eval of hair loss, [facility]     Migraines     most of adult life, has seen neuro in past, on bblocker     Mild depression (H)      Normal stress echocardiogram 2011    due to hear racing     Osteopenia      Syncope 2017    echo [facility] lv size and fxn, grade 1 dd, mild tr         PAST SURGICAL HISTORY:  Past Surgical History:   Procedure Laterality Date     C TMJ ARTHROSCOPY/SURGERY       ESOPHAGOSCOPY, GASTROSCOPY, DUODENOSCOPY (EGD), COMBINED N/A 10/30/2017    Procedure: COMBINED ESOPHAGOSCOPY, GASTROSCOPY, DUODENOSCOPY (EGD), BIOPSY SINGLE OR MULTIPLE;  COMBINED ESOPHAGOSCOPY, GASTROSCOPY, DUODENOSCOPY (EGD);  Surgeon: Martin Rodriguez MD;  Location:  GI     INSERT PORT VASCULAR ACCESS N/A 10/24/2019    Procedure: PORT PLACEMENT;  Surgeon: Kaila Hernandez MD;  Location:  OR     ORTHOPEDIC SURGERY      \"leg surgery\"     single oopherectomy  2010    cyst         SOCIAL HISTORY:  Social History     Socioeconomic History     Marital status:      Spouse name: Not on file     Number of children: 2     Years of education: Not on file     Highest education level: Not on file   Occupational History     Not on file   Social Needs     Financial resource strain: Not on file     Food insecurity:     Worry: Not on file     Inability: Not on file     Transportation needs:     Medical: Not on file     Non-medical: Not on file   Tobacco Use     Smoking status: Former Smoker     Packs/day: 0.00     Last attempt to quit: 3/27/1997     Years since quittin.9     Smokeless tobacco: Never Used   Substance and Sexual Activity     Alcohol use: Yes     Comment: rarely     Drug use: No     Sexual activity: Yes     Partners: Male     Birth control/protection: Pill   Lifestyle     Physical activity:     Days per " week: Not on file     Minutes per session: Not on file     Stress: Not on file   Relationships     Social connections:     Talks on phone: Not on file     Gets together: Not on file     Attends Worship service: Not on file     Active member of club or organization: Not on file     Attends meetings of clubs or organizations: Not on file     Relationship status: Not on file     Intimate partner violence:     Fear of current or ex partner: Not on file     Emotionally abused: Not on file     Physically abused: Not on file     Forced sexual activity: Not on file   Other Topics Concern     Parent/sibling w/ CABG, MI or angioplasty before 65F 55M? Yes   Social History Narrative     Not on file         FAMILY HISTORY:  Family History   Problem Relation Age of Onset     Coronary Artery Disease Father 48        MI. and one in his 60s     Emphysema Mother         COPD         PHYSICAL EXAM:  Vital signs:  /84   Pulse 73   Temp 97.9  F (36.6  C) (Oral)   Resp 16   Wt 66.4 kg (146 lb 6.4 oz)   LMP 10/07/2017 (Approximate)   SpO2 100%   BMI 22.92 kg/m     ECO  GENERAL/CONSTITUTIONAL: No acute distress.  EYES: No scleral icterus.  ENT/MOUTH: Neck supple. Oropharynx grossly clear.  LYMPH: No cervical, supraclavicular, or axillary adenopathy.   BREAST: Slight fullness at the right upper outer breast with the mass much less distinct and not measurable.  No rashes or ulceration or erythema.  No dimpling.  The nipples are everted bilaterally with no discharge.  RESPIRATORY: Clear to auscultation bilaterally. No crackles or wheezing.   CARDIOVASCULAR: Regular rate and rhythm without murmurs.  GASTROINTESTINAL: No guarding or distention.  MUSCULOSKELETAL: Warm and well-perfused, no cyanosis, clubbing, or edema.  NEUROLOGIC: Cranial nerves II-XII are intact. Alert, oriented, answers questions appropriately.  INTEGUMENTARY: No rashes or jaundice.      LABS:  CBC RESULTS:   Recent Labs   Lab Test 20  0805   WBC 4.3    RBC 3.85   HGB 12.3   HCT 36.8   MCV 96   MCH 31.9   MCHC 33.4   RDW 12.8        Last Comprehensive Metabolic Panel:  Sodium   Date Value Ref Range Status   11/27/2019 138 133 - 144 mmol/L Final     Potassium   Date Value Ref Range Status   11/27/2019 3.8 3.4 - 5.3 mmol/L Final     Chloride   Date Value Ref Range Status   11/27/2019 106 94 - 109 mmol/L Final     Carbon Dioxide   Date Value Ref Range Status   11/27/2019 25 20 - 32 mmol/L Final     Anion Gap   Date Value Ref Range Status   11/27/2019 7 3 - 14 mmol/L Final     Glucose   Date Value Ref Range Status   11/27/2019 98 70 - 99 mg/dL Final     Urea Nitrogen   Date Value Ref Range Status   11/27/2019 10 7 - 30 mg/dL Final     Creatinine   Date Value Ref Range Status   11/27/2019 0.67 0.52 - 1.04 mg/dL Final     GFR Estimate   Date Value Ref Range Status   11/27/2019 >90 >60 mL/min/[1.73_m2] Final     Comment:     Non  GFR Calc  Starting 12/18/2018, serum creatinine based estimated GFR (eGFR) will be   calculated using the Chronic Kidney Disease Epidemiology Collaboration   (CKD-EPI) equation.       Calcium   Date Value Ref Range Status   11/27/2019 8.8 8.5 - 10.1 mg/dL Final     Bilirubin Total   Date Value Ref Range Status   02/20/2020 0.2 0.2 - 1.3 mg/dL Final     Alkaline Phosphatase   Date Value Ref Range Status   02/20/2020 57 40 - 150 U/L Final     ALT   Date Value Ref Range Status   02/20/2020 32 0 - 50 U/L Final     AST   Date Value Ref Range Status   02/20/2020 19 0 - 45 U/L Final       PATHOLOGY:  None new.    IMAGING:  None new.    ASSESSMENT/PLAN:  Jyothi Freitas is a 53 year old female with the following issues:  1.  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  I reviewed the blood counts with her.  They have adequately recovered to proceed with her next dose of paclitaxel today.  -Plan is for her to complete 12 weeks of weekly paclitaxel.    -Rationale for neoadjuvant chemotherapy is to observe for chemo responsiveness, reduce risk of recurrent breast cancer, and reduce burden of disease prior to surgery.  However, as per previous discussion, she understands that it is unlikely that we will achieve a complete response to neoadjuvant chemotherapy given that her tumor is strongly ER and SC positive.  -She would be a candidate for adjuvant endocrine therapy based on the strongly ER and SC positive tumor status.  She is postmenopausal, so I would typically recommend anastrozole to reduce her risk of breast cancer recurrence by relative 50%.  -She would also likely be a candidate for adjuvant radiation therapy given her lymph node involvement and depending on radiation oncology evaluation.  She is planning for a lumpectomy with Dr Hernandez.  She has a repeat breast MRI arranged 1 week after her last Taxol treatment, prior to surgery.      2. Depression  -Mood stable. Continue bupropion. Would not recommend tamoxifen in future due to interaction with bupropion unless she switched antidepressants.    3.  Constipation  -Likely drug related from her antiemetics and now much improved after taking her antiemetics on an as-needed basis. She may take stool softeners and laxatives as needed for constipation.    4.  Insomnia   5.  Hot flashes, chemotherapy induced  -Oxybutynin did help with hot flashes but she had too much dry eye syndrome and fluid retention.   -She has had improvement in her hot flashes and insomnia with the use of gabapentin.  Continue gabapentin at 300 mg nightly.  -I renewed her Ambien today.    6. Ectasia of thoracic aorta  -This measures 4 cm on the PET scan.  I reassured Jyothi that she would not need any urgent surgical management  of the aorta that may need ongoing monitoring. She is following with Dr. Silverio Gooden for monitoring of this issue.     Return in 2 weeks.    Maricarmen Monroe MD  Hematology/Oncology  [facility]    I spent a total of 25 minutes with the patient, with greater than 50% of the time in counseling and coordination of care.

## 2020-02-20 ENCOUNTER — HOSPITAL ENCOUNTER (OUTPATIENT)
Facility: CLINIC | Age: 55
Setting detail: SPECIMEN
Discharge: HOME | End: 2020-02-20
Attending: INTERNAL MEDICINE | Admitting: INTERNAL MEDICINE
Payer: COMMERCIAL

## 2020-02-20 ENCOUNTER — INFUSION THERAPY VISIT (OUTPATIENT)
Dept: INFUSION THERAPY | Facility: CLINIC | Age: 55
End: 2020-02-20
Attending: INTERNAL MEDICINE
Payer: COMMERCIAL

## 2020-02-20 ENCOUNTER — ONCOLOGY VISIT (OUTPATIENT)
Dept: ONCOLOGY | Facility: CLINIC | Age: 55
End: 2020-02-20
Attending: INTERNAL MEDICINE
Payer: COMMERCIAL

## 2020-02-20 VITALS
DIASTOLIC BLOOD PRESSURE: 84 MMHG | TEMPERATURE: 97.9 F | HEART RATE: 73 BPM | OXYGEN SATURATION: 100 % | BODY MASS INDEX: 22.92 KG/M2 | WEIGHT: 146.4 LBS | SYSTOLIC BLOOD PRESSURE: 122 MMHG | RESPIRATION RATE: 16 BRPM

## 2020-02-20 DIAGNOSIS — G44.219 EPISODIC TENSION-TYPE HEADACHE, NOT INTRACTABLE: Primary | ICD-10-CM

## 2020-02-20 DIAGNOSIS — N95.1 MENOPAUSAL SYNDROME (HOT FLASHES): ICD-10-CM

## 2020-02-20 DIAGNOSIS — G47.01 INSOMNIA DUE TO MEDICAL CONDITION: ICD-10-CM

## 2020-02-20 DIAGNOSIS — F19.982 DRUG-INDUCED INSOMNIA (H): ICD-10-CM

## 2020-02-20 DIAGNOSIS — C50.411 MALIGNANT NEOPLASM OF UPPER-OUTER QUADRANT OF RIGHT BREAST IN FEMALE, ESTROGEN RECEPTOR POSITIVE (H): Primary | ICD-10-CM

## 2020-02-20 DIAGNOSIS — G44.219 EPISODIC TENSION-TYPE HEADACHE, NOT INTRACTABLE: ICD-10-CM

## 2020-02-20 DIAGNOSIS — Z17.0 MALIGNANT NEOPLASM OF UPPER-OUTER QUADRANT OF RIGHT BREAST IN FEMALE, ESTROGEN RECEPTOR POSITIVE (H): ICD-10-CM

## 2020-02-20 DIAGNOSIS — C50.411 MALIGNANT NEOPLASM OF UPPER-OUTER QUADRANT OF RIGHT BREAST IN FEMALE, ESTROGEN RECEPTOR POSITIVE (H): ICD-10-CM

## 2020-02-20 DIAGNOSIS — Z17.0 MALIGNANT NEOPLASM OF UPPER-OUTER QUADRANT OF RIGHT BREAST IN FEMALE, ESTROGEN RECEPTOR POSITIVE (H): Primary | ICD-10-CM

## 2020-02-20 DIAGNOSIS — F32.5 MAJOR DEPRESSION IN COMPLETE REMISSION (H): ICD-10-CM

## 2020-02-20 DIAGNOSIS — K59.00 CONSTIPATION, UNSPECIFIED CONSTIPATION TYPE: ICD-10-CM

## 2020-02-20 LAB
ALBUMIN SERPL-MCNC: 3.7 G/DL (ref 3.4–5)
ALP SERPL-CCNC: 57 U/L (ref 40–150)
ALT SERPL W P-5'-P-CCNC: 32 U/L (ref 0–50)
AST SERPL W P-5'-P-CCNC: 19 U/L (ref 0–45)
BASOPHILS # BLD AUTO: 0.1 10E9/L (ref 0–0.2)
BASOPHILS NFR BLD AUTO: 1.4 %
BILIRUB DIRECT SERPL-MCNC: <0.1 MG/DL (ref 0–0.2)
BILIRUB SERPL-MCNC: 0.2 MG/DL (ref 0.2–1.3)
DIFFERENTIAL METHOD BLD: NORMAL
EOSINOPHIL # BLD AUTO: 0.2 10E9/L (ref 0–0.7)
EOSINOPHIL NFR BLD AUTO: 3.5 %
ERYTHROCYTE [DISTWIDTH] IN BLOOD BY AUTOMATED COUNT: 12.8 % (ref 10–15)
HCT VFR BLD AUTO: 36.8 % (ref 35–47)
HGB BLD-MCNC: 12.3 G/DL (ref 11.7–15.7)
IMM GRANULOCYTES # BLD: 0.1 10E9/L (ref 0–0.4)
IMM GRANULOCYTES NFR BLD: 1.2 %
LYMPHOCYTES # BLD AUTO: 1 10E9/L (ref 0.8–5.3)
LYMPHOCYTES NFR BLD AUTO: 23.7 %
MCH RBC QN AUTO: 31.9 PG (ref 26.5–33)
MCHC RBC AUTO-ENTMCNC: 33.4 G/DL (ref 31.5–36.5)
MCV RBC AUTO: 96 FL (ref 78–100)
MONOCYTES # BLD AUTO: 0.8 10E9/L (ref 0–1.3)
MONOCYTES NFR BLD AUTO: 17.5 %
NEUTROPHILS # BLD AUTO: 2.3 10E9/L (ref 1.6–8.3)
NEUTROPHILS NFR BLD AUTO: 52.7 %
NRBC # BLD AUTO: 0 10*3/UL
NRBC BLD AUTO-RTO: 0 /100
PLATELET # BLD AUTO: 405 10E9/L (ref 150–450)
PROT SERPL-MCNC: 6.9 G/DL (ref 6.8–8.8)
RBC # BLD AUTO: 3.85 10E12/L (ref 3.8–5.2)
WBC # BLD AUTO: 4.3 10E9/L (ref 4–11)

## 2020-02-20 PROCEDURE — 80076 HEPATIC FUNCTION PANEL: CPT | Performed by: INTERNAL MEDICINE

## 2020-02-20 PROCEDURE — 25000132 ZZH RX MED GY IP 250 OP 250 PS 637: Performed by: INTERNAL MEDICINE

## 2020-02-20 PROCEDURE — 96361 HYDRATE IV INFUSION ADD-ON: CPT

## 2020-02-20 PROCEDURE — 99214 OFFICE O/P EST MOD 30 MIN: CPT | Performed by: INTERNAL MEDICINE

## 2020-02-20 PROCEDURE — 96413 CHEMO IV INFUSION 1 HR: CPT

## 2020-02-20 PROCEDURE — G0463 HOSPITAL OUTPT CLINIC VISIT: HCPCS | Mod: 25

## 2020-02-20 PROCEDURE — 96367 TX/PROPH/DG ADDL SEQ IV INF: CPT

## 2020-02-20 PROCEDURE — 25000128 H RX IP 250 OP 636: Performed by: INTERNAL MEDICINE

## 2020-02-20 PROCEDURE — 25800030 ZZH RX IP 258 OP 636: Performed by: INTERNAL MEDICINE

## 2020-02-20 PROCEDURE — 85025 COMPLETE CBC W/AUTO DIFF WBC: CPT | Performed by: INTERNAL MEDICINE

## 2020-02-20 RX ORDER — ALBUTEROL SULFATE 90 UG/1
1-2 AEROSOL, METERED RESPIRATORY (INHALATION)
Status: CANCELLED
Start: 2020-02-20

## 2020-02-20 RX ORDER — EPINEPHRINE 0.3 MG/.3ML
0.3 INJECTION SUBCUTANEOUS EVERY 5 MIN PRN
Status: CANCELLED | OUTPATIENT
Start: 2020-02-20

## 2020-02-20 RX ORDER — LORAZEPAM 2 MG/ML
0.5 INJECTION INTRAMUSCULAR EVERY 4 HOURS PRN
Status: CANCELLED
Start: 2020-02-20

## 2020-02-20 RX ORDER — METHYLPREDNISOLONE SODIUM SUCCINATE 125 MG/2ML
125 INJECTION, POWDER, LYOPHILIZED, FOR SOLUTION INTRAMUSCULAR; INTRAVENOUS
Status: CANCELLED
Start: 2020-02-20

## 2020-02-20 RX ORDER — NALOXONE HYDROCHLORIDE 0.4 MG/ML
.1-.4 INJECTION, SOLUTION INTRAMUSCULAR; INTRAVENOUS; SUBCUTANEOUS
Status: CANCELLED | OUTPATIENT
Start: 2020-02-20

## 2020-02-20 RX ORDER — ZOLPIDEM TARTRATE 5 MG/1
5 TABLET ORAL
Qty: 30 TABLET | Refills: 1 | Status: SHIPPED | OUTPATIENT
Start: 2020-02-20 | End: 2020-03-31

## 2020-02-20 RX ORDER — ALBUTEROL SULFATE 0.83 MG/ML
2.5 SOLUTION RESPIRATORY (INHALATION)
Status: CANCELLED | OUTPATIENT
Start: 2020-02-20

## 2020-02-20 RX ORDER — SODIUM CHLORIDE 9 MG/ML
1000 INJECTION, SOLUTION INTRAVENOUS CONTINUOUS PRN
Status: CANCELLED
Start: 2020-02-20

## 2020-02-20 RX ORDER — DIPHENHYDRAMINE HCL 25 MG
50 CAPSULE ORAL ONCE
Status: CANCELLED
Start: 2020-02-20

## 2020-02-20 RX ORDER — MEPERIDINE HYDROCHLORIDE 25 MG/ML
25 INJECTION INTRAMUSCULAR; INTRAVENOUS; SUBCUTANEOUS EVERY 30 MIN PRN
Status: CANCELLED | OUTPATIENT
Start: 2020-02-20

## 2020-02-20 RX ORDER — DIPHENHYDRAMINE HYDROCHLORIDE 50 MG/ML
50 INJECTION INTRAMUSCULAR; INTRAVENOUS
Status: CANCELLED
Start: 2020-02-20

## 2020-02-20 RX ORDER — DIPHENHYDRAMINE HCL 25 MG
50 CAPSULE ORAL ONCE
Status: COMPLETED | OUTPATIENT
Start: 2020-02-20 | End: 2020-02-20

## 2020-02-20 RX ORDER — EPINEPHRINE 1 MG/ML
0.3 INJECTION, SOLUTION INTRAMUSCULAR; SUBCUTANEOUS EVERY 5 MIN PRN
Status: CANCELLED | OUTPATIENT
Start: 2020-02-20

## 2020-02-20 RX ADMIN — DEXAMETHASONE SODIUM PHOSPHATE 12 MG: 10 INJECTION, SOLUTION INTRAMUSCULAR; INTRAVENOUS at 09:48

## 2020-02-20 RX ADMIN — SODIUM CHLORIDE 1000 ML: 9 INJECTION, SOLUTION INTRAVENOUS at 09:34

## 2020-02-20 RX ADMIN — PACLITAXEL 142 MG: 6 INJECTION, SOLUTION INTRAVENOUS at 10:30

## 2020-02-20 RX ADMIN — FAMOTIDINE 20 MG: 20 INJECTION, SOLUTION INTRAVENOUS at 10:06

## 2020-02-20 RX ADMIN — DIPHENHYDRAMINE HYDROCHLORIDE 50 MG: 25 CAPSULE ORAL at 09:34

## 2020-02-20 ASSESSMENT — PAIN SCALES - GENERAL: PAINLEVEL: NO PAIN (0)

## 2020-02-20 NOTE — PROGRESS NOTES
Nursing Note:  Jyothi Freitas presents today for port labs.    Patient seen by provider today: Yes: Dr. Monroe   present during visit today: Not Applicable.    Note: N/A.    Intravenous Access:  Labs drawn without difficulty.  Implanted Port.    Discharge Plan:   Patient was sent to [facility] for Dr. Monroe appointment.    Ashlyn Yuan RN

## 2020-02-20 NOTE — PROGRESS NOTES
"Oncology Rooming Note    February 20, 2020 8:53 AM   Jyothi Freitas is a 54 year old female who presents for:    Chief Complaint   Patient presents with     Oncology Clinic Visit     Initial Vitals: /84   Pulse 73   Temp 97.9  F (36.6  C) (Oral)   Resp 16   Wt 66.4 kg (146 lb 6.4 oz)   LMP 10/07/2017 (Approximate)   SpO2 100%   BMI 22.92 kg/m   Estimated body mass index is 22.92 kg/m  as calculated from the following:    Height as of 1/23/20: 1.702 m (5' 7.01\").    Weight as of this encounter: 66.4 kg (146 lb 6.4 oz). Body surface area is 1.77 meters squared.  No Pain (0) Comment: Data Unavailable   Patient's last menstrual period was 10/07/2017 (approximate).  Allergies reviewed: Yes  Medications reviewed: Yes    Medications: MEDICATION REFILLS NEEDED TODAY. Provider was notified. Ambien  Pharmacy name entered into [employer]:    [id number] - [place], [place] - [address] AT [facility] [address]  [id number] - [place], [place] - [address] AT [address] [place] [address]    Clinical concerns: no       Shari J. Schoenberger, CMA            "

## 2020-02-20 NOTE — LETTER
2/20/2020         RE: Jyothi Freitas  [address]  [place] [address]        Dear Colleague,    Thank you for referring your patient, Jyothi Freitas, to the [facility]. Please see a copy of my visit note below.    [facility]    Hematology/Oncology Established Patient Follow-up Note      Today's Date: 2/20/20    Reason for Follow-up: Right breast cancer.    HISTORY OF PRESENT ILLNESS: Jyothi Freitas is a 54 year old female who presents with the following oncologic history:   1.  10/11/2019: Diagnostic right sided mammogram performed for a palpable lump in the right breast.  This showed an irregularly-shaped spiculated mass in the upper outer right breast with prominent lymph nodes in the right axilla.  Targeted ultrasound of the right upper outer breast showed an irregularly-shaped hypoechoic mass at the 10 o'clock position, 4 cm from the nipple measuring 2.6 x 1.5 x 2.4 cm.  Right axillary ultrasound showed moderately enlarged lymph nodes.  Right breast needle biopsy showed a grade 3 invasive ductal carcinoma with lymphovascular invasion identified, ER strongly positive at 95%, AR strongly positive at 95%, HER-2/juan FISH negative.  Right axillary lymph node measuring 2 cm was positive for metastatic carcinoma.  Note prior 4/2019 mammogram deemed normal.  2.  10/15/2019: Bilateral breast MRI showed known right breast malignancy measuring 2.6 x 1.4 x 2 cm, 3 mildly enlarged right axillary lymph nodes and no contralateral breast malignancy.  3. 10/21/2019 PET scan showed scattered small hypermetabolic bilateral axillary lymph nodes; for example, a hypermetabolic right axillary lymph node measures 1.6 x 0.9 cm with SUV max 3.6.  Hypermetabolic mass in the right breast superiorly and laterally measuring 2.1 x 1.6 cm with SUV max 4.3.  A 0.6 cm low-density lesion in the right hepatic lobe is too small for accurate PET characterization but shows no appreciable hypermetabolic activity.   Incidentally noted ectasia of the ascending thoracic aorta measures 4 cm in diameter.  4.  10/23/2019: Left axillary ultrasound showed benign-appearing lymph node.  Left axillary lymph node biopsy showed benign lymph node tissue.  5.  10/29/2019: Started neoadjuvant chemotherapy with dose dense Adriamycin and Cytoxan, followed by weekly paclitaxel.    INTERIM HISTORY:  Jyothi reports hot flashes overall well controlled with gabapentin 300 mg nightly  She is hesitant to increase her gabapentin dosage as she wants to avoid weight gain..  Otherwise, she denies any recent fevers, chills, bladder dysfunction. She denies any paresthesias.    REVIEW OF SYSTEMS:   14 point ROS was reviewed and is negative other than as noted above in HPI.       HOME MEDICATIONS:  Current Outpatient Medications   Medication Sig Dispense Refill     acetaminophen (TYLENOL) 500 MG tablet Take 1,000 mg by mouth every 6 hours as needed for mild pain       acetaminophen-caffeine (EXCEDRIN TENSION HEADACHE) 500-65 MG TABS Take 2 tablets by mouth every 6 hours as needed for mild pain       ALBUTEROL 108 (90 BASE) MCG/ACT inhaler INHALE 2 PUFFS INTO THE LUNGS EVERY 6 HOURS AS NEEDED FOR SHORTNESS OF BREATH OR DIFFICULT BREATHING OR WHEEZING 8.5 g 0     amoxicillin-clavulanate (AUGMENTIN) 875-125 MG tablet Take 1 tablet by mouth 2 times daily 20 tablet 0     buPROPion (WELLBUTRIN XL) 300 MG 24 hr tablet Take 1 tablet (300 mg) by mouth every morning 90 tablet 3     gabapentin (NEURONTIN) 100 MG capsule Take 3 capsules (300 mg) by mouth every evening 60 capsule 1     magic mouthwash suspension, diphenhydrAMINE, lidocaine, aluminum-magnesium & simethicone, (FIRST-MOUTHWASH BLM) compounding kit Swish and swallow 5-10 mLs in mouth every 6 hours as needed for mouth sores 237 mL 3     nadolol (CORGARD) 20 MG tablet TAKE 1 TABLET(20 MG) BY MOUTH DAILY 90 tablet 3     zolpidem (AMBIEN) 5 MG tablet Take 1 tablet (5 mg) by mouth nightly as needed for sleep 30  "tablet 1         ALLERGIES:  Allergies   Allergen Reactions     Sulfa Drugs Other (See Comments)     Red face. Ringing in the ears.         PAST MEDICAL HISTORY:  Past Medical History:   Diagnosis Date     GERD (gastroesophageal reflux disease)      H/O colonoscopy 2016    done at [place] and [place]     Hematuria 2016    eval at [place] and per pt cysto and ct neg     Intermittent asthma     since childhood     Low iron 10/2017    done for eval of hair loss, [place]d [place]     Migraines     most of adult life, has seen neuro in past, on bblocker     Mild depression (H)      Normal stress echocardiogram 2011    due to hear racing     Osteopenia      Syncope 2017    echo [place] lv size and fxn, grade 1 dd, mild tr         PAST SURGICAL HISTORY:  Past Surgical History:   Procedure Laterality Date     C TMJ ARTHROSCOPY/SURGERY       ESOPHAGOSCOPY, GASTROSCOPY, DUODENOSCOPY (EGD), COMBINED N/A 10/30/2017    Procedure: COMBINED ESOPHAGOSCOPY, GASTROSCOPY, DUODENOSCOPY (EGD), BIOPSY SINGLE OR MULTIPLE;  COMBINED ESOPHAGOSCOPY, GASTROSCOPY, DUODENOSCOPY (EGD);  Surgeon: Martin Rodriguez MD;  Location:  GI     INSERT PORT VASCULAR ACCESS N/A 10/24/2019    Procedure: PORT PLACEMENT;  Surgeon: Kaila Hernandez MD;  Location:  OR     ORTHOPEDIC SURGERY      \"leg surgery\"     single oopherectomy  2010    cyst         SOCIAL HISTORY:  Social History     Socioeconomic History     Marital status:      Spouse name: Not on file     Number of children: 2     Years of education: Not on file     Highest education level: Not on file   Occupational History     Not on file   Social Needs     Financial resource strain: Not on file     Food insecurity:     Worry: Not on file     Inability: Not on file     Transportation needs:     Medical: Not on file     Non-medical: Not on file   Tobacco Use     Smoking status: Former Smoker     Packs/day: 0.00     Last attempt to quit: 3/27/1997     Years since quittin.9     Smokeless " tobacco: Never Used   Substance and Sexual Activity     Alcohol use: Yes     Comment: rarely     Drug use: No     Sexual activity: Yes     Partners: Male     Birth control/protection: Pill   Lifestyle     Physical activity:     Days per week: Not on file     Minutes per session: Not on file     Stress: Not on file   Relationships     Social connections:     Talks on phone: Not on file     Gets together: Not on file     Attends [employer] service: Not on file     Active member of club or organization: Not on file     Attends meetings of clubs or organizations: Not on file     Relationship status: Not on file     Intimate partner violence:     Fear of current or ex partner: Not on file     Emotionally abused: Not on file     Physically abused: Not on file     Forced sexual activity: Not on file   Other Topics Concern     Parent/sibling w/ CABG, MI or angioplasty before 65F 55M? Yes   Social History Narrative     Not on file         FAMILY HISTORY:  Family History   Problem Relation Age of Onset     Coronary Artery Disease Father 48        MI. and one in his 60s     Emphysema Mother         COPD         PHYSICAL EXAM:  Vital signs:  /84   Pulse 73   Temp 97.9  F (36.6  C) (Oral)   Resp 16   Wt 66.4 kg (146 lb 6.4 oz)   LMP 10/07/2017 (Approximate)   SpO2 100%   BMI 22.92 kg/m      ECO  GENERAL/CONSTITUTIONAL: No acute distress.  EYES: No scleral icterus.  ENT/MOUTH: Neck supple. Oropharynx grossly clear.  LYMPH: No cervical, supraclavicular, or axillary adenopathy.   BREAST: Slight fullness at the right upper outer breast with the mass much less distinct and not measurable.  No rashes or ulceration or erythema.  No dimpling.  The nipples are everted bilaterally with no discharge.  RESPIRATORY: Clear to auscultation bilaterally. No crackles or wheezing.   CARDIOVASCULAR: Regular rate and rhythm without murmurs.  GASTROINTESTINAL: No guarding or distention.  MUSCULOSKELETAL: Warm and well-perfused, no  cyanosis, clubbing, or edema.  NEUROLOGIC: Cranial nerves II-XII are intact. Alert, oriented, answers questions appropriately.  INTEGUMENTARY: No rashes or jaundice.      LABS:  CBC RESULTS:   Recent Labs   Lab Test 02/20/20  0805   WBC 4.3   RBC 3.85   HGB 12.3   HCT 36.8   MCV 96   MCH 31.9   MCHC 33.4   RDW 12.8        Last Comprehensive Metabolic Panel:  Sodium   Date Value Ref Range Status   11/27/2019 138 133 - 144 mmol/L Final     Potassium   Date Value Ref Range Status   11/27/2019 3.8 3.4 - 5.3 mmol/L Final     Chloride   Date Value Ref Range Status   11/27/2019 106 94 - 109 mmol/L Final     Carbon Dioxide   Date Value Ref Range Status   11/27/2019 25 20 - 32 mmol/L Final     Anion Gap   Date Value Ref Range Status   11/27/2019 7 3 - 14 mmol/L Final     Glucose   Date Value Ref Range Status   11/27/2019 98 70 - 99 mg/dL Final     Urea Nitrogen   Date Value Ref Range Status   11/27/2019 10 7 - 30 mg/dL Final     Creatinine   Date Value Ref Range Status   11/27/2019 0.67 0.52 - 1.04 mg/dL Final     GFR Estimate   Date Value Ref Range Status   11/27/2019 >90 >60 mL/min/[1.73_m2] Final     Comment:     Non  GFR Calc  Starting 12/18/2018, serum creatinine based estimated GFR (eGFR) will be   calculated using the Chronic Kidney Disease Epidemiology Collaboration   (CKD-EPI) equation.       Calcium   Date Value Ref Range Status   11/27/2019 8.8 8.5 - 10.1 mg/dL Final     Bilirubin Total   Date Value Ref Range Status   02/20/2020 0.2 0.2 - 1.3 mg/dL Final     Alkaline Phosphatase   Date Value Ref Range Status   02/20/2020 57 40 - 150 U/L Final     ALT   Date Value Ref Range Status   02/20/2020 32 0 - 50 U/L Final     AST   Date Value Ref Range Status   02/20/2020 19 0 - 45 U/L Final       PATHOLOGY:  None new.    IMAGING:  None new.    ASSESSMENT/PLAN:  Jyothi Freitas is a 53 year old female with the following issues:  1.  Stage IIB (clinical prognostic), cT2-cN1-M0, grade 3 invasive ductal  carcinoma of the right upper outer breast, strongly ER positive, NV positive, HER-2/juan FISH negative  -Jyothi continues to tolerate rating neoadjuvant chemotherapy with paclitaxel well so far with good partial response after completing ddAC and several doses of paclitaxel.  I reviewed the blood counts with her.  They have adequately recovered to proceed with her next dose of paclitaxel today.  -Plan is for her to complete 12 weeks of weekly paclitaxel.    -Rationale for neoadjuvant chemotherapy is to observe for chemo responsiveness, reduce risk of recurrent breast cancer, and reduce burden of disease prior to surgery.  However, as per previous discussion, she understands that it is unlikely that we will achieve a complete response to neoadjuvant chemotherapy given that her tumor is strongly ER and NV positive.  -She would be a candidate for adjuvant endocrine therapy based on the strongly ER and NV positive tumor status.  She is postmenopausal, so I would typically recommend anastrozole to reduce her risk of breast cancer recurrence by relative 50%.  -She would also likely be a candidate for adjuvant radiation therapy given her lymph node involvement and depending on radiation oncology evaluation.  She is planning for a lumpectomy with Dr Hernandez.  She has a repeat breast MRI arranged 1 week after her last Taxol treatment, prior to surgery.      2. Depression  -Mood stable. Continue bupropion. Would not recommend tamoxifen in future due to interaction with bupropion unless she switched antidepressants.    3.  Constipation  -Likely drug related from her antiemetics and now much improved after taking her antiemetics on an as-needed basis. She may take stool softeners and laxatives as needed for constipation.    4.  Insomnia   5.  Hot flashes, chemotherapy induced  -Oxybutynin did help with hot flashes but she had too much dry eye syndrome and fluid retention.   -She has had improvement in her hot flashes and  "insomnia with the use of gabapentin.  Continue gabapentin at 300 mg nightly.  -I renewed her Ambien today.    6. Ectasia of thoracic aorta  -This measures 4 cm on the PET scan.  I reassured Jyothi that she would not need any urgent surgical management of the aorta that may need ongoing monitoring. She is following with Dr. Silverio Gooden for monitoring of this issue.     Return in 2 weeks.    Maricarmen Monroe MD  Hematology/Oncology  [facility]    I spent a total of 25 minutes with the patient, with greater than 50% of the time in counseling and coordination of care.    Oncology Rooming Note    February 20, 2020 8:53 AM   Jyothi Freitas is a 54 year old female who presents for:    Chief Complaint   Patient presents with     Oncology Clinic Visit     Initial Vitals: /84   Pulse 73   Temp 97.9  F (36.6  C) (Oral)   Resp 16   Wt 66.4 kg (146 lb 6.4 oz)   LMP 10/07/2017 (Approximate)   SpO2 100%   BMI 22.92 kg/m    Estimated body mass index is 22.92 kg/m  as calculated from the following:    Height as of 1/23/20: 1.702 m (5' 7.01\").    Weight as of this encounter: 66.4 kg (146 lb 6.4 oz). Body surface area is 1.77 meters squared.  No Pain (0) Comment: Data Unavailable   Patient's last menstrual period was 10/07/2017 (approximate).  Allergies reviewed: Yes  Medications reviewed: Yes    Medications: MEDICATION REFILLS NEEDED TODAY. Provider was notified. Ambien  Pharmacy name entered into [employer]:    [id number] - [place], [place] - [address] AT [address]  [id number] - [place], [place] - [address] AT [address] [place] [address]    Clinical concerns: no       Shari J. Schoenberger, [facility]              Again, thank you for allowing me to participate in the care of your patient.        Sincerely,        Maricarmen Monroe MD    "

## 2020-02-20 NOTE — PROGRESS NOTES
Infusion Nursing Note:  Jyothi Freitas presents today for C13D.   Taxol  Patient seen by provider today: Yes: Fer   present during visit today: Not Applicable.    Note: N/A.    Intravenous Access:  Implanted Port.    Treatment Conditions:  Lab Results   Component Value Date    HGB 12.3 02/20/2020     Lab Results   Component Value Date    WBC 4.3 02/20/2020      Lab Results   Component Value Date    ANEU 2.3 02/20/2020     Lab Results   Component Value Date     02/20/2020      Lab Results   Component Value Date     11/27/2019                   Lab Results   Component Value Date    POTASSIUM 3.8 11/27/2019           No results found for: MAG         Lab Results   Component Value Date    CR 0.67 11/27/2019                   Lab Results   Component Value Date    JEFF 8.8 11/27/2019                Lab Results   Component Value Date    BILITOTAL 0.2 02/20/2020           Lab Results   Component Value Date    ALBUMIN 3.7 02/20/2020                    Lab Results   Component Value Date    ALT 32 02/20/2020           Lab Results   Component Value Date    AST 19 02/20/2020       Results reviewed, labs MET treatment parameters, ok to proceed with treatment.      Post Infusion Assessment:  Patient tolerated infusion without incident.  Blood return noted pre and post infusion.  Site patent and intact, free from redness, edema or discomfort.  No evidence of extravasations.  Access discontinued per protocol.       Discharge Plan:   Discharge instructions reviewed with: Patient.  Patient and/or family verbalized understanding of discharge instructions and all questions answered.  Patient discharged in stable condition accompanied by: self.  Departure Mode: Ambulatory.    Kaila Hightower RN

## 2020-02-24 DIAGNOSIS — G44.219 EPISODIC TENSION-TYPE HEADACHE, NOT INTRACTABLE: ICD-10-CM

## 2020-02-24 DIAGNOSIS — Z17.0 MALIGNANT NEOPLASM OF UPPER-OUTER QUADRANT OF RIGHT BREAST IN FEMALE, ESTROGEN RECEPTOR POSITIVE (H): ICD-10-CM

## 2020-02-24 DIAGNOSIS — C50.411 MALIGNANT NEOPLASM OF UPPER-OUTER QUADRANT OF RIGHT BREAST IN FEMALE, ESTROGEN RECEPTOR POSITIVE (H): ICD-10-CM

## 2020-02-24 RX ORDER — MEPERIDINE HYDROCHLORIDE 25 MG/ML
25 INJECTION INTRAMUSCULAR; INTRAVENOUS; SUBCUTANEOUS EVERY 30 MIN PRN
Status: CANCELLED | OUTPATIENT
Start: 2020-02-27

## 2020-02-24 RX ORDER — ALBUTEROL SULFATE 0.83 MG/ML
2.5 SOLUTION RESPIRATORY (INHALATION)
Status: CANCELLED | OUTPATIENT
Start: 2020-02-27

## 2020-02-24 RX ORDER — ALBUTEROL SULFATE 90 UG/1
1-2 AEROSOL, METERED RESPIRATORY (INHALATION)
Status: CANCELLED
Start: 2020-02-27

## 2020-02-24 RX ORDER — EPINEPHRINE 0.3 MG/.3ML
0.3 INJECTION SUBCUTANEOUS EVERY 5 MIN PRN
Status: CANCELLED | OUTPATIENT
Start: 2020-02-27

## 2020-02-24 RX ORDER — NALOXONE HYDROCHLORIDE 0.4 MG/ML
.1-.4 INJECTION, SOLUTION INTRAMUSCULAR; INTRAVENOUS; SUBCUTANEOUS
Status: CANCELLED | OUTPATIENT
Start: 2020-02-27

## 2020-02-24 RX ORDER — SODIUM CHLORIDE 9 MG/ML
1000 INJECTION, SOLUTION INTRAVENOUS CONTINUOUS PRN
Status: CANCELLED
Start: 2020-02-27

## 2020-02-24 RX ORDER — EPINEPHRINE 1 MG/ML
0.3 INJECTION, SOLUTION, CONCENTRATE INTRAVENOUS EVERY 5 MIN PRN
Status: CANCELLED | OUTPATIENT
Start: 2020-02-27

## 2020-02-24 RX ORDER — LORAZEPAM 2 MG/ML
0.5 INJECTION INTRAMUSCULAR EVERY 4 HOURS PRN
Status: CANCELLED
Start: 2020-02-27

## 2020-02-24 RX ORDER — DIPHENHYDRAMINE HYDROCHLORIDE 50 MG/ML
50 INJECTION INTRAMUSCULAR; INTRAVENOUS
Status: CANCELLED
Start: 2020-02-27

## 2020-02-24 RX ORDER — DIPHENHYDRAMINE HCL 25 MG
50 CAPSULE ORAL ONCE
Status: CANCELLED
Start: 2020-02-27

## 2020-02-27 ENCOUNTER — INFUSION THERAPY VISIT (OUTPATIENT)
Dept: INFUSION THERAPY | Facility: CLINIC | Age: 55
End: 2020-02-27
Attending: INTERNAL MEDICINE
Payer: COMMERCIAL

## 2020-02-27 ENCOUNTER — HOSPITAL ENCOUNTER (OUTPATIENT)
Facility: CLINIC | Age: 55
Setting detail: SPECIMEN
Discharge: HOME | End: 2020-02-27
Attending: INTERNAL MEDICINE | Admitting: INTERNAL MEDICINE
Payer: COMMERCIAL

## 2020-02-27 ENCOUNTER — TELEPHONE (OUTPATIENT)
Dept: FAMILY MEDICINE | Facility: CLINIC | Age: 55
End: 2020-02-27

## 2020-02-27 VITALS
SYSTOLIC BLOOD PRESSURE: 113 MMHG | OXYGEN SATURATION: 99 % | RESPIRATION RATE: 18 BRPM | TEMPERATURE: 97.5 F | BODY MASS INDEX: 23.14 KG/M2 | WEIGHT: 147.8 LBS | HEART RATE: 74 BPM | DIASTOLIC BLOOD PRESSURE: 78 MMHG

## 2020-02-27 DIAGNOSIS — G44.219 EPISODIC TENSION-TYPE HEADACHE, NOT INTRACTABLE: Primary | ICD-10-CM

## 2020-02-27 DIAGNOSIS — Z17.0 MALIGNANT NEOPLASM OF UPPER-OUTER QUADRANT OF RIGHT BREAST IN FEMALE, ESTROGEN RECEPTOR POSITIVE (H): ICD-10-CM

## 2020-02-27 DIAGNOSIS — Z17.0 MALIGNANT NEOPLASM OF RIGHT BREAST IN FEMALE, ESTROGEN RECEPTOR POSITIVE, UNSPECIFIED SITE OF BREAST (H): ICD-10-CM

## 2020-02-27 DIAGNOSIS — C50.911 MALIGNANT NEOPLASM OF RIGHT BREAST IN FEMALE, ESTROGEN RECEPTOR POSITIVE, UNSPECIFIED SITE OF BREAST (H): ICD-10-CM

## 2020-02-27 DIAGNOSIS — C50.411 MALIGNANT NEOPLASM OF UPPER-OUTER QUADRANT OF RIGHT BREAST IN FEMALE, ESTROGEN RECEPTOR POSITIVE (H): ICD-10-CM

## 2020-02-27 LAB
BASOPHILS # BLD AUTO: 0.1 10E9/L (ref 0–0.2)
BASOPHILS NFR BLD AUTO: 1.5 %
DIFFERENTIAL METHOD BLD: ABNORMAL
EOSINOPHIL # BLD AUTO: 0.2 10E9/L (ref 0–0.7)
EOSINOPHIL NFR BLD AUTO: 4 %
ERYTHROCYTE [DISTWIDTH] IN BLOOD BY AUTOMATED COUNT: 12.7 % (ref 10–15)
HCT VFR BLD AUTO: 34.8 % (ref 35–47)
HGB BLD-MCNC: 11.7 G/DL (ref 11.7–15.7)
IMM GRANULOCYTES # BLD: 0.1 10E9/L (ref 0–0.4)
IMM GRANULOCYTES NFR BLD: 1.2 %
LYMPHOCYTES # BLD AUTO: 0.9 10E9/L (ref 0.8–5.3)
LYMPHOCYTES NFR BLD AUTO: 21.5 %
MCH RBC QN AUTO: 32.1 PG (ref 26.5–33)
MCHC RBC AUTO-ENTMCNC: 33.6 G/DL (ref 31.5–36.5)
MCV RBC AUTO: 96 FL (ref 78–100)
MONOCYTES # BLD AUTO: 0.6 10E9/L (ref 0–1.3)
MONOCYTES NFR BLD AUTO: 13.9 %
NEUTROPHILS # BLD AUTO: 2.3 10E9/L (ref 1.6–8.3)
NEUTROPHILS NFR BLD AUTO: 57.9 %
NRBC # BLD AUTO: 0 10*3/UL
NRBC BLD AUTO-RTO: 0 /100
PLATELET # BLD AUTO: 364 10E9/L (ref 150–450)
RBC # BLD AUTO: 3.64 10E12/L (ref 3.8–5.2)
WBC # BLD AUTO: 4 10E9/L (ref 4–11)

## 2020-02-27 PROCEDURE — 85025 COMPLETE CBC W/AUTO DIFF WBC: CPT | Performed by: INTERNAL MEDICINE

## 2020-02-27 PROCEDURE — 96367 TX/PROPH/DG ADDL SEQ IV INF: CPT

## 2020-02-27 PROCEDURE — 25000128 H RX IP 250 OP 636: Performed by: INTERNAL MEDICINE

## 2020-02-27 PROCEDURE — 25000132 ZZH RX MED GY IP 250 OP 250 PS 637: Performed by: INTERNAL MEDICINE

## 2020-02-27 PROCEDURE — 96413 CHEMO IV INFUSION 1 HR: CPT

## 2020-02-27 PROCEDURE — 25800030 ZZH RX IP 258 OP 636: Performed by: INTERNAL MEDICINE

## 2020-02-27 RX ORDER — DIPHENHYDRAMINE HCL 25 MG
50 CAPSULE ORAL ONCE
Status: COMPLETED | OUTPATIENT
Start: 2020-02-27 | End: 2020-02-27

## 2020-02-27 RX ORDER — HEPARIN SODIUM (PORCINE) LOCK FLUSH IV SOLN 100 UNIT/ML 100 UNIT/ML
5 SOLUTION INTRAVENOUS
Status: CANCELLED | OUTPATIENT
Start: 2020-02-27

## 2020-02-27 RX ORDER — HEPARIN SODIUM (PORCINE) LOCK FLUSH IV SOLN 100 UNIT/ML 100 UNIT/ML
5 SOLUTION INTRAVENOUS
Status: DISCONTINUED | OUTPATIENT
Start: 2020-02-27 | End: 2020-02-27 | Stop reason: HOSPADM

## 2020-02-27 RX ADMIN — SODIUM CHLORIDE 1000 ML: 9 INJECTION, SOLUTION INTRAVENOUS at 08:45

## 2020-02-27 RX ADMIN — PACLITAXEL 142 MG: 6 INJECTION, SOLUTION INTRAVENOUS at 09:30

## 2020-02-27 RX ADMIN — FAMOTIDINE 20 MG: 20 INJECTION, SOLUTION INTRAVENOUS at 09:12

## 2020-02-27 RX ADMIN — DIPHENHYDRAMINE HYDROCHLORIDE 50 MG: 25 CAPSULE ORAL at 08:37

## 2020-02-27 RX ADMIN — DEXAMETHASONE SODIUM PHOSPHATE 12 MG: 10 INJECTION, SOLUTION INTRAMUSCULAR; INTRAVENOUS at 08:53

## 2020-02-27 RX ADMIN — HEPARIN SODIUM (PORCINE) LOCK FLUSH IV SOLN 100 UNIT/ML 5 ML: 100 SOLUTION at 11:23

## 2020-02-27 ASSESSMENT — PAIN SCALES - GENERAL: PAINLEVEL: NO PAIN (0)

## 2020-02-27 NOTE — TELEPHONE ENCOUNTER
Jyothi left a  msg on [facility]'s referral line requesting a referral to an ENT for sinus issues she has had over a month. Her ins doesn't require referrals for I gave her the phone number to [facility]. (Natalya in referrals covering for Robyn)

## 2020-02-27 NOTE — PROGRESS NOTES
Infusion Nursing Note:  Jyothi Freitas presents today for C14D1 Taxol.    Patient seen by provider today: No    Note: Patient reports to tolerating her infusion well overall.  Reports some issues with fatigue and taste changes.  Also reporting chills after every Taxol infusion on days 1-2.  Doesn't think she is febrile with the chills but has not checked her temperature. Patient denies any aches or other signs of infections with the chills.  This RN recommended patient take her temperature during her episodes of chills and to call the clinic if fevers over 100.4.  Patient verbalizes understanding.    Intravenous Access:  Implanted Port.      Treatment Conditions:  Lab Results   Component Value Date    HGB 11.7 02/27/2020     Lab Results   Component Value Date    WBC 4.0 02/27/2020      Lab Results   Component Value Date    ANEU 2.3 02/27/2020     Lab Results   Component Value Date     02/27/2020      Results reviewed, labs MET treatment parameters, ok to proceed with treatment.      Post Infusion Assessment:  Patient tolerated infusion without incident.  Blood return noted pre and post infusion.  Site patent and intact, free from redness, edema or discomfort.  No evidence of extravasations.  Access discontinued per protocol.    Discharge Plan:   Patient declined prescription refills.  Discharge instructions reviewed with: Patient.  Patient and/or family verbalized understanding of discharge instructions and all questions answered.  AVS to patient via [employer].  Patient will return 3/5/20 for next appointment.   Patient discharged in stable condition accompanied by: self.  Departure Mode: Ambulatory.    Dayan Alvares, RN, RN

## 2020-03-03 NOTE — PROGRESS NOTES
[facility] [place] [facility]    Hematology/Oncology Established Patient Follow-up Note      Today's Date: 3/05/20    Reason for Follow-up: Right breast cancer.    HISTORY OF PRESENT ILLNESS: Jyothi Freitas is a 54 year old female who presents with the following oncologic history:   1.  10/11/2019: Diagnostic right sided mammogram performed for a palpable lump in the right breast.  This showed an irregularly-shaped spiculated mass in the upper outer right breast with prominent lymph nodes in the right axilla.  Targeted ultrasound of the right upper outer breast showed an irregularly-shaped hypoechoic mass at the 10 o'clock position, 4 cm from the nipple measuring 2.6 x 1.5 x 2.4 cm.  Right axillary ultrasound showed moderately enlarged lymph nodes.  Right breast needle biopsy showed a grade 3 invasive ductal carcinoma with lymphovascular invasion identified, ER strongly positive at 95%, MS strongly positive at 95%, HER-2/juan FISH negative.  Right axillary lymph node measuring 2 cm was positive for metastatic carcinoma.  Note prior 4/2019 mammogram deemed normal.  2.  10/15/2019: Bilateral breast MRI showed known right breast malignancy measuring 2.6 x 1.4 x 2 cm, 3 mildly enlarged right axillary lymph nodes and no contralateral breast malignancy.  3. 10/21/2019 PET scan showed scattered small hypermetabolic bilateral axillary lymph nodes; for example, a hypermetabolic right axillary lymph node measures 1.6 x 0.9 cm with SUV max 3.6.  Hypermetabolic mass in the right breast superiorly and laterally measuring 2.1 x 1.6 cm with SUV max 4.3.  A 0.6 cm low-density lesion in the right hepatic lobe is too small for accurate PET characterization but shows no appreciable hypermetabolic activity.  Incidentally noted ectasia of the ascending thoracic aorta measures 4 cm in diameter.  4.  10/23/2019: Left axillary ultrasound showed benign-appearing lymph node.  Left axillary lymph node biopsy showed benign lymph node tissue.  5.   10/29/2019: Started neoadjuvant chemotherapy with dose dense Adriamycin and Cytoxan, followed by weekly paclitaxel.    INTERIM HISTORY:  Jyothi stopped gabapentin 300 mg and started taking sleeping pills nightly.  She decided not to continue have a [place] in order to avoid weight gain. Otherwise, she denies any recent fevers, chills, bladder dysfunction. She denies any paresthesias.    REVIEW OF SYSTEMS:   14 point ROS was reviewed and is negative other than as noted above in HPI.       HOME MEDICATIONS:  Current Outpatient Medications   Medication Sig Dispense Refill     acetaminophen (TYLENOL) 500 MG tablet Take 1,000 mg by mouth every 6 hours as needed for mild pain       acetaminophen-caffeine (EXCEDRIN TENSION HEADACHE) 500-65 MG TABS Take 2 tablets by mouth every 6 hours as needed for mild pain       ALBUTEROL 108 (90 BASE) MCG/ACT inhaler INHALE 2 PUFFS INTO THE LUNGS EVERY 6 HOURS AS NEEDED FOR SHORTNESS OF BREATH OR DIFFICULT BREATHING OR WHEEZING 8.5 g 0     buPROPion (WELLBUTRIN XL) 300 MG 24 hr tablet Take 1 tablet (300 mg) by mouth every morning 90 tablet 3     gabapentin (NEURONTIN) 100 MG capsule Take 3 capsules (300 mg) by mouth every evening 60 capsule 1     magic mouthwash suspension, diphenhydrAMINE, lidocaine, aluminum-magnesium & simethicone, (FIRST-MOUTHWASH BLM) compounding kit Swish and swallow 5-10 mLs in mouth every 6 hours as needed for mouth sores 237 mL 3     nadolol (CORGARD) 20 MG tablet TAKE 1 TABLET(20 MG) BY MOUTH DAILY 90 tablet 3     zolpidem 5 MG PO tablet Take 1 tablet (5 mg) by mouth nightly as needed for sleep 30 tablet 1         ALLERGIES:  Allergies   Allergen Reactions     Sulfa Drugs Other (See Comments)     Red face. Ringing in the ears.         PAST MEDICAL HISTORY:  Past Medical History:   Diagnosis Date     GERD (gastroesophageal reflux disease)      H/O colonoscopy 03/08/2016    done at [facility] [place] and nl     Hematuria 2016    eval at [facility] and per pt cysto and ct neg      "Intermittent asthma     since childhood     Low iron 10/2017    done for eval of hair loss, egd nl     Migraines     most of adult life, has seen neuro in past, on bblocker     Mild depression (H)      Normal stress echocardiogram 2011    due to hear racing     Osteopenia      Syncope 2017    echo nl lv size and fxn, grade 1 dd, mild tr         PAST SURGICAL HISTORY:  Past Surgical History:   Procedure Laterality Date     C TMJ ARTHROSCOPY/SURGERY       ESOPHAGOSCOPY, GASTROSCOPY, DUODENOSCOPY (EGD), COMBINED N/A 10/30/2017    Procedure: COMBINED ESOPHAGOSCOPY, GASTROSCOPY, DUODENOSCOPY (EGD), BIOPSY SINGLE OR MULTIPLE;  COMBINED ESOPHAGOSCOPY, GASTROSCOPY, DUODENOSCOPY (EGD);  Surgeon: Martin Rodriguez MD;  Location:  GI     INSERT PORT VASCULAR ACCESS N/A 10/24/2019    Procedure: PORT PLACEMENT;  Surgeon: Kaila Hernandez MD;  Location:  OR     ORTHOPEDIC SURGERY      \"leg surgery\"     single oopherectomy  2010    cyst         SOCIAL HISTORY:  Social History     Socioeconomic History     Marital status:      Spouse name: Not on file     Number of children: 2     Years of education: Not on file     Highest education level: Not on file   Occupational History     Not on file   Social Needs     Financial resource strain: Not on file     Food insecurity:     Worry: Not on file     Inability: Not on file     Transportation needs:     Medical: Not on file     Non-medical: Not on file   Tobacco Use     Smoking status: Former Smoker     Packs/day: 0.00     Last attempt to quit: 3/27/1997     Years since quittin.9     Smokeless tobacco: Never Used   Substance and Sexual Activity     Alcohol use: Yes     Comment: rarely     Drug use: No     Sexual activity: Yes     Partners: Male     Birth control/protection: Pill   Lifestyle     Physical activity:     Days per week: Not on file     Minutes per session: Not on file     Stress: Not on file   Relationships     Social connections:     Talks on " phone: Not on file     Gets together: Not on file     Attends Church service: Not on file     Active member of club or organization: Not on file     Attends meetings of clubs or organizations: Not on file     Relationship status: Not on file     Intimate partner violence:     Fear of current or ex partner: Not on file     Emotionally abused: Not on file     Physically abused: Not on file     Forced sexual activity: Not on file   Other Topics Concern     Parent/sibling w/ CABG, MI or angioplasty before 65F 55M? Yes   Social History Narrative     Not on file         FAMILY HISTORY:  Family History   Problem Relation Age of Onset     Coronary Artery Disease Father 48        MI. and one in his 60s     Emphysema Mother         COPD         PHYSICAL EXAM:  Vital signs:  /79   Pulse 79   Temp 98  F (36.7  C) (Oral)   Resp 16   Wt 66.6 kg (146 lb 12.8 oz)   LMP 10/07/2017 (Approximate)   SpO2 99%   BMI 22.99 kg/m     ECO  GENERAL/CONSTITUTIONAL: No acute distress.  EYES: No scleral icterus.  ENT/MOUTH: Neck supple. Oropharynx grossly clear.  LYMPH: No cervical, supraclavicular, or axillary adenopathy.   BREAST: No distinct mass at the right upper outer breast.  No rashes or ulceration or erythema.  No dimpling.  The nipples are everted bilaterally with no discharge.  RESPIRATORY: Clear to auscultation bilaterally. No crackles or wheezing.   CARDIOVASCULAR: Regular rate and rhythm without murmurs.  GASTROINTESTINAL: No guarding or distention.  MUSCULOSKELETAL: Warm and well-perfused, no cyanosis, clubbing, or edema.  NEUROLOGIC: Cranial nerves II-XII are intact. Alert, oriented, answers questions appropriately.  INTEGUMENTARY: No rashes or jaundice.      LABS:  CBC RESULTS:   Recent Labs   Lab Test 20  0800   WBC 3.2*   RBC 3.63*   HGB 11.6*   HCT 34.7*   MCV 96   MCH 32.0   MCHC 33.4   RDW 12.7          PATHOLOGY:  None new.    IMAGING:  None new.    ASSESSMENT/PLAN:  Jyothi Freitas is a 53  year old female with the following issues:  1.  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  I reviewed the blood counts with her.  They are just borderline recovered to proceed with her next dose of paclitaxel today.  -Plan is for her to complete 12 weeks of weekly paclitaxel.    -Rationale for neoadjuvant chemotherapy is to observe for chemo responsiveness, reduce risk of recurrent breast cancer, and reduce burden of disease prior to surgery.  However, as per previous discussion, she understands that it is less likely that we will achieve a complete response to neoadjuvant chemotherapy given that her tumor is strongly ER and VT positive.  -She would be a candidate for adjuvant endocrine therapy based on the strongly ER and VT positive tumor status.  She is postmenopausal, so I would typically recommend anastrozole to reduce her risk of breast cancer recurrence by relative 50%.  -She would also likely be a candidate for adjuvant radiation therapy given her lymph node involvement and depending on radiation oncology evaluation.  She is planning for a lumpectomy with Dr Hernandez.  She has a repeat breast MRI arranged 1 week after her last Taxol treatment, prior to surgery.  I will call her with the MRI results.    2. Depression  -Mood stable. Continue bupropion. Would not recommend tamoxifen in future due to interaction with bupropion unless she switched antidepressants.    3.  Insomnia   4.  Hot flashes, chemotherapy induced  -Oxybutynin did help with hot flashes but she had too much dry eye syndrome and fluid retention.   -She has had improvement in her hot flashes and insomnia with the use of gabapentin.  However, she  did not want the weight gain associated with gabapentin.  -She has switched back to Ambien which is helping with her sleep without waking.    5. Ectasia of thoracic aorta  -This measures 4 cm on the PET scan.  I reassured Jyothi that she would not need any urgent surgical management of the aorta that may need ongoing monitoring. She is following with Dr. Silverio Gooden for monitoring of this issue.     Return 3/26/2020.    Maricarmen Monroe MD  Hematology/Oncology  [facility]    I spent a total of 25 minutes with the patient, with greater than 50% of the time in counseling and coordination of care.

## 2020-03-05 ENCOUNTER — ONCOLOGY VISIT (OUTPATIENT)
Dept: ONCOLOGY | Facility: CLINIC | Age: 55
End: 2020-03-05
Attending: INTERNAL MEDICINE
Payer: COMMERCIAL

## 2020-03-05 ENCOUNTER — INFUSION THERAPY VISIT (OUTPATIENT)
Dept: INFUSION THERAPY | Facility: CLINIC | Age: 55
End: 2020-03-05
Attending: INTERNAL MEDICINE
Payer: COMMERCIAL

## 2020-03-05 ENCOUNTER — HOSPITAL ENCOUNTER (OUTPATIENT)
Facility: CLINIC | Age: 55
Setting detail: SPECIMEN
Discharge: HOME | End: 2020-03-05
Attending: INTERNAL MEDICINE | Admitting: INTERNAL MEDICINE
Payer: COMMERCIAL

## 2020-03-05 VITALS
BODY MASS INDEX: 22.99 KG/M2 | RESPIRATION RATE: 16 BRPM | WEIGHT: 146.8 LBS | OXYGEN SATURATION: 99 % | HEART RATE: 79 BPM | SYSTOLIC BLOOD PRESSURE: 124 MMHG | TEMPERATURE: 98 F | DIASTOLIC BLOOD PRESSURE: 79 MMHG

## 2020-03-05 DIAGNOSIS — F19.982 DRUG-INDUCED INSOMNIA (H): ICD-10-CM

## 2020-03-05 DIAGNOSIS — C50.411 MALIGNANT NEOPLASM OF UPPER-OUTER QUADRANT OF RIGHT BREAST IN FEMALE, ESTROGEN RECEPTOR POSITIVE (H): ICD-10-CM

## 2020-03-05 DIAGNOSIS — C50.411 MALIGNANT NEOPLASM OF UPPER-OUTER QUADRANT OF RIGHT BREAST IN FEMALE, ESTROGEN RECEPTOR POSITIVE (H): Primary | ICD-10-CM

## 2020-03-05 DIAGNOSIS — N95.1 MENOPAUSAL SYNDROME (HOT FLASHES): ICD-10-CM

## 2020-03-05 DIAGNOSIS — Z17.0 MALIGNANT NEOPLASM OF UPPER-OUTER QUADRANT OF RIGHT BREAST IN FEMALE, ESTROGEN RECEPTOR POSITIVE (H): ICD-10-CM

## 2020-03-05 DIAGNOSIS — C50.911 MALIGNANT NEOPLASM OF RIGHT BREAST IN FEMALE, ESTROGEN RECEPTOR POSITIVE, UNSPECIFIED SITE OF BREAST (H): ICD-10-CM

## 2020-03-05 DIAGNOSIS — Z17.0 MALIGNANT NEOPLASM OF RIGHT BREAST IN FEMALE, ESTROGEN RECEPTOR POSITIVE, UNSPECIFIED SITE OF BREAST (H): ICD-10-CM

## 2020-03-05 DIAGNOSIS — F32.5 MAJOR DEPRESSION IN COMPLETE REMISSION (H): ICD-10-CM

## 2020-03-05 DIAGNOSIS — Z17.0 MALIGNANT NEOPLASM OF UPPER-OUTER QUADRANT OF RIGHT BREAST IN FEMALE, ESTROGEN RECEPTOR POSITIVE (H): Primary | ICD-10-CM

## 2020-03-05 DIAGNOSIS — G44.219 EPISODIC TENSION-TYPE HEADACHE, NOT INTRACTABLE: Primary | ICD-10-CM

## 2020-03-05 LAB
BASOPHILS # BLD AUTO: 0.1 10E9/L (ref 0–0.2)
BASOPHILS NFR BLD AUTO: 1.6 %
DIFFERENTIAL METHOD BLD: ABNORMAL
EOSINOPHIL # BLD AUTO: 0.1 10E9/L (ref 0–0.7)
EOSINOPHIL NFR BLD AUTO: 4.4 %
ERYTHROCYTE [DISTWIDTH] IN BLOOD BY AUTOMATED COUNT: 12.7 % (ref 10–15)
HCT VFR BLD AUTO: 34.7 % (ref 35–47)
HGB BLD-MCNC: 11.6 G/DL (ref 11.7–15.7)
IMM GRANULOCYTES # BLD: 0.1 10E9/L (ref 0–0.4)
IMM GRANULOCYTES NFR BLD: 1.6 %
LYMPHOCYTES # BLD AUTO: 1 10E9/L (ref 0.8–5.3)
LYMPHOCYTES NFR BLD AUTO: 30.2 %
MCH RBC QN AUTO: 32 PG (ref 26.5–33)
MCHC RBC AUTO-ENTMCNC: 33.4 G/DL (ref 31.5–36.5)
MCV RBC AUTO: 96 FL (ref 78–100)
MONOCYTES # BLD AUTO: 0.5 10E9/L (ref 0–1.3)
MONOCYTES NFR BLD AUTO: 15.4 %
NEUTROPHILS # BLD AUTO: 1.5 10E9/L (ref 1.6–8.3)
NEUTROPHILS NFR BLD AUTO: 46.8 %
NRBC # BLD AUTO: 0 10*3/UL
NRBC BLD AUTO-RTO: 0 /100
PLATELET # BLD AUTO: 350 10E9/L (ref 150–450)
RBC # BLD AUTO: 3.63 10E12/L (ref 3.8–5.2)
WBC # BLD AUTO: 3.2 10E9/L (ref 4–11)

## 2020-03-05 PROCEDURE — 25800030 ZZH RX IP 258 OP 636: Performed by: INTERNAL MEDICINE

## 2020-03-05 PROCEDURE — G0463 HOSPITAL OUTPT CLINIC VISIT: HCPCS | Mod: 25

## 2020-03-05 PROCEDURE — 96413 CHEMO IV INFUSION 1 HR: CPT

## 2020-03-05 PROCEDURE — 85025 COMPLETE CBC W/AUTO DIFF WBC: CPT | Performed by: INTERNAL MEDICINE

## 2020-03-05 PROCEDURE — 96375 TX/PRO/DX INJ NEW DRUG ADDON: CPT

## 2020-03-05 PROCEDURE — 25000132 ZZH RX MED GY IP 250 OP 250 PS 637: Performed by: INTERNAL MEDICINE

## 2020-03-05 PROCEDURE — 25000128 H RX IP 250 OP 636: Performed by: INTERNAL MEDICINE

## 2020-03-05 PROCEDURE — 96367 TX/PROPH/DG ADDL SEQ IV INF: CPT

## 2020-03-05 PROCEDURE — 99214 OFFICE O/P EST MOD 30 MIN: CPT | Performed by: INTERNAL MEDICINE

## 2020-03-05 RX ORDER — NALOXONE HYDROCHLORIDE 0.4 MG/ML
.1-.4 INJECTION, SOLUTION INTRAMUSCULAR; INTRAVENOUS; SUBCUTANEOUS
Status: CANCELLED | OUTPATIENT
Start: 2020-03-05

## 2020-03-05 RX ORDER — EPINEPHRINE 0.3 MG/.3ML
0.3 INJECTION SUBCUTANEOUS EVERY 5 MIN PRN
Status: CANCELLED | OUTPATIENT
Start: 2020-03-05

## 2020-03-05 RX ORDER — DIPHENHYDRAMINE HCL 25 MG
50 CAPSULE ORAL ONCE
Status: CANCELLED
Start: 2020-03-05

## 2020-03-05 RX ORDER — ALBUTEROL SULFATE 0.83 MG/ML
2.5 SOLUTION RESPIRATORY (INHALATION)
Status: CANCELLED | OUTPATIENT
Start: 2020-03-05

## 2020-03-05 RX ORDER — MEPERIDINE HYDROCHLORIDE 25 MG/ML
25 INJECTION INTRAMUSCULAR; INTRAVENOUS; SUBCUTANEOUS EVERY 30 MIN PRN
Status: CANCELLED | OUTPATIENT
Start: 2020-03-05

## 2020-03-05 RX ORDER — DIPHENHYDRAMINE HCL 25 MG
50 CAPSULE ORAL ONCE
Status: COMPLETED | OUTPATIENT
Start: 2020-03-05 | End: 2020-03-05

## 2020-03-05 RX ORDER — LORAZEPAM 2 MG/ML
0.5 INJECTION INTRAMUSCULAR EVERY 4 HOURS PRN
Status: CANCELLED
Start: 2020-03-05

## 2020-03-05 RX ORDER — HEPARIN SODIUM (PORCINE) LOCK FLUSH IV SOLN 100 UNIT/ML 100 UNIT/ML
5 SOLUTION INTRAVENOUS
Status: CANCELLED | OUTPATIENT
Start: 2020-03-05

## 2020-03-05 RX ORDER — HEPARIN SODIUM (PORCINE) LOCK FLUSH IV SOLN 100 UNIT/ML 100 UNIT/ML
5 SOLUTION INTRAVENOUS
Status: DISCONTINUED | OUTPATIENT
Start: 2020-03-05 | End: 2020-03-05 | Stop reason: HOSPADM

## 2020-03-05 RX ORDER — METHYLPREDNISOLONE SODIUM SUCCINATE 125 MG/2ML
125 INJECTION, POWDER, LYOPHILIZED, FOR SOLUTION INTRAMUSCULAR; INTRAVENOUS
Status: CANCELLED
Start: 2020-03-05

## 2020-03-05 RX ORDER — SODIUM CHLORIDE 9 MG/ML
1000 INJECTION, SOLUTION INTRAVENOUS CONTINUOUS PRN
Status: CANCELLED
Start: 2020-03-05

## 2020-03-05 RX ORDER — DIPHENHYDRAMINE HYDROCHLORIDE 50 MG/ML
50 INJECTION INTRAMUSCULAR; INTRAVENOUS
Status: CANCELLED
Start: 2020-03-05

## 2020-03-05 RX ORDER — ALBUTEROL SULFATE 90 UG/1
1-2 AEROSOL, METERED RESPIRATORY (INHALATION)
Status: CANCELLED
Start: 2020-03-05

## 2020-03-05 RX ORDER — EPINEPHRINE 1 MG/ML
0.3 INJECTION, SOLUTION INTRAMUSCULAR; SUBCUTANEOUS EVERY 5 MIN PRN
Status: CANCELLED | OUTPATIENT
Start: 2020-03-05

## 2020-03-05 RX ADMIN — DEXAMETHASONE SODIUM PHOSPHATE 12 MG: 10 INJECTION, SOLUTION INTRAMUSCULAR; INTRAVENOUS at 09:20

## 2020-03-05 RX ADMIN — DIPHENHYDRAMINE HYDROCHLORIDE 50 MG: 25 CAPSULE ORAL at 09:08

## 2020-03-05 RX ADMIN — SODIUM CHLORIDE 1000 ML: 9 INJECTION, SOLUTION INTRAVENOUS at 09:09

## 2020-03-05 RX ADMIN — PACLITAXEL 142 MG: 6 INJECTION, SOLUTION INTRAVENOUS at 10:57

## 2020-03-05 RX ADMIN — Medication 5 ML: at 12:10

## 2020-03-05 RX ADMIN — FAMOTIDINE 20 MG: 20 INJECTION, SOLUTION INTRAVENOUS at 09:47

## 2020-03-05 ASSESSMENT — PAIN SCALES - GENERAL: PAINLEVEL: NO PAIN (0)

## 2020-03-05 NOTE — LETTER
3/5/2020         RE: Jyothi Freitas  [address]  [place] [address]        Dear Colleague,    Thank you for referring your patient, Jyothi Freitas, to the [facility]. Please see a copy of my visit note below.    [facility]    Hematology/Oncology Established Patient Follow-up Note      Today's Date: 3/05/20    Reason for Follow-up: Right breast cancer.    HISTORY OF PRESENT ILLNESS: Jyothi Freitas is a 54 year old female who presents with the following oncologic history:   1.  10/11/2019: Diagnostic right sided mammogram performed for a palpable lump in the right breast.  This showed an irregularly-shaped spiculated mass in the upper outer right breast with prominent lymph nodes in the right axilla.  Targeted ultrasound of the right upper outer breast showed an irregularly-shaped hypoechoic mass at the 10 o'clock position, 4 cm from the nipple measuring 2.6 x 1.5 x 2.4 cm.  Right axillary ultrasound showed moderately enlarged lymph nodes.  Right breast needle biopsy showed a grade 3 invasive ductal carcinoma with lymphovascular invasion identified, ER strongly positive at 95%, PA strongly positive at 95%, HER-2/juan FISH negative.  Right axillary lymph node measuring 2 cm was positive for metastatic carcinoma.  Note prior 4/2019 mammogram deemed normal.  2.  10/15/2019: Bilateral breast MRI showed known right breast malignancy measuring 2.6 x 1.4 x 2 cm, 3 mildly enlarged right axillary lymph nodes and no contralateral breast malignancy.  3. 10/21/2019 PET scan showed scattered small hypermetabolic bilateral axillary lymph nodes; for example, a hypermetabolic right axillary lymph node measures 1.6 x 0.9 cm with SUV max 3.6.  Hypermetabolic mass in the right breast superiorly and laterally measuring 2.1 x 1.6 cm with SUV max 4.3.  A 0.6 cm low-density lesion in the right hepatic lobe is too small for accurate PET characterization but shows no appreciable hypermetabolic activity.   Incidentally noted ectasia of the ascending thoracic aorta measures 4 cm in diameter.  4.  10/23/2019: Left axillary ultrasound showed benign-appearing lymph node.  Left axillary lymph node biopsy showed benign lymph node tissue.  5.  10/29/2019: Started neoadjuvant chemotherapy with dose dense Adriamycin and Cytoxan, followed by weekly paclitaxel.    INTERIM HISTORY:  Jyothi stopped gabapentin 300 mg and started taking sleeping pills nightly.  She decided not to continue have a [place] in order to avoid weight gain. Otherwise, she denies any recent fevers, chills, bladder dysfunction. She denies any paresthesias.    REVIEW OF SYSTEMS:   14 point ROS was reviewed and is negative other than as noted above in HPI.       HOME MEDICATIONS:  Current Outpatient Medications   Medication Sig Dispense Refill     acetaminophen (TYLENOL) 500 MG tablet Take 1,000 mg by mouth every 6 hours as needed for mild pain       acetaminophen-caffeine (EXCEDRIN TENSION HEADACHE) 500-65 MG TABS Take 2 tablets by mouth every 6 hours as needed for mild pain       ALBUTEROL 108 (90 BASE) MCG/ACT inhaler INHALE 2 PUFFS INTO THE LUNGS EVERY 6 HOURS AS NEEDED FOR SHORTNESS OF BREATH OR DIFFICULT BREATHING OR WHEEZING 8.5 g 0     buPROPion (WELLBUTRIN XL) 300 MG 24 hr tablet Take 1 tablet (300 mg) by mouth every morning 90 tablet 3     gabapentin (NEURONTIN) 100 MG capsule Take 3 capsules (300 mg) by mouth every evening 60 capsule 1     magic mouthwash suspension, diphenhydrAMINE, lidocaine, aluminum-magnesium & simethicone, (FIRST-MOUTHWASH BLM) compounding kit Swish and swallow 5-10 mLs in mouth every 6 hours as needed for mouth sores 237 mL 3     nadolol (CORGARD) 20 MG tablet TAKE 1 TABLET(20 MG) BY MOUTH DAILY 90 tablet 3     zolpidem 5 MG PO tablet Take 1 tablet (5 mg) by mouth nightly as needed for sleep 30 tablet 1         ALLERGIES:  Allergies   Allergen Reactions     Sulfa Drugs Other (See Comments)     Red face. Ringing in the ears.  "        PAST MEDICAL HISTORY:  Past Medical History:   Diagnosis Date     GERD (gastroesophageal reflux disease)      H/O colonoscopy 2016    done at [facility] and nl     Hematuria 2016    eval at [facility] and per pt cysto and ct neg     Intermittent asthma     since childhood     Low iron 10/2017    done for eval of hair loss, egd nl     Migraines     most of adult life, has seen neuro in past, on bblocker     Mild depression (H)      Normal stress echocardiogram 2011    due to hear racing     Osteopenia      Syncope 2017    echo nl lv size and fxn, grade 1 dd, mild tr         PAST SURGICAL HISTORY:  Past Surgical History:   Procedure Laterality Date     C TMJ ARTHROSCOPY/SURGERY       ESOPHAGOSCOPY, GASTROSCOPY, DUODENOSCOPY (EGD), COMBINED N/A 10/30/2017    Procedure: COMBINED ESOPHAGOSCOPY, GASTROSCOPY, DUODENOSCOPY (EGD), BIOPSY SINGLE OR MULTIPLE;  COMBINED ESOPHAGOSCOPY, GASTROSCOPY, DUODENOSCOPY (EGD);  Surgeon: Martin Rodriguez MD;  Location:  GI     INSERT PORT VASCULAR ACCESS N/A 10/24/2019    Procedure: PORT PLACEMENT;  Surgeon: Kaila Hernandez MD;  Location:  OR     ORTHOPEDIC SURGERY      \"leg surgery\"     single oopherectomy  2010    cyst         SOCIAL HISTORY:  Social History     Socioeconomic History     Marital status:      Spouse name: Not on file     Number of children: 2     Years of education: Not on file     Highest education level: Not on file   Occupational History     Not on file   Social Needs     Financial resource strain: Not on file     Food insecurity:     Worry: Not on file     Inability: Not on file     Transportation needs:     Medical: Not on file     Non-medical: Not on file   Tobacco Use     Smoking status: Former Smoker     Packs/day: 0.00     Last attempt to quit: 3/27/1997     Years since quittin.9     Smokeless tobacco: Never Used   Substance and Sexual Activity     Alcohol use: Yes     Comment: rarely     Drug use: No     Sexual activity: " Yes     Partners: Male     Birth control/protection: Pill   Lifestyle     Physical activity:     Days per week: Not on file     Minutes per session: Not on file     Stress: Not on file   Relationships     Social connections:     Talks on phone: Not on file     Gets together: Not on file     Attends Confucianism service: Not on file     Active member of club or organization: Not on file     Attends meetings of clubs or organizations: Not on file     Relationship status: Not on file     Intimate partner violence:     Fear of current or ex partner: Not on file     Emotionally abused: Not on file     Physically abused: Not on file     Forced sexual activity: Not on file   Other Topics Concern     Parent/sibling w/ CABG, MI or angioplasty before 65F 55M? Yes   Social History Narrative     Not on file         FAMILY HISTORY:  Family History   Problem Relation Age of Onset     Coronary Artery Disease Father 48        MI. and one in his 60s     Emphysema Mother         COPD         PHYSICAL EXAM:  Vital signs:  /79   Pulse 79   Temp 98  F (36.7  C) (Oral)   Resp 16   Wt 66.6 kg (146 lb 12.8 oz)   LMP 10/07/2017 (Approximate)   SpO2 99%   BMI 22.99 kg/m      ECO  GENERAL/CONSTITUTIONAL: No acute distress.  EYES: No scleral icterus.  ENT/MOUTH: Neck supple. Oropharynx grossly clear.  LYMPH: No cervical, supraclavicular, or axillary adenopathy.   BREAST: No distinct mass at the right upper outer breast.  No rashes or ulceration or erythema.  No dimpling.  The nipples are everted bilaterally with no discharge.  RESPIRATORY: Clear to auscultation bilaterally. No crackles or wheezing.   CARDIOVASCULAR: Regular rate and rhythm without murmurs.  GASTROINTESTINAL: No guarding or distention.  MUSCULOSKELETAL: Warm and well-perfused, no cyanosis, clubbing, or edema.  NEUROLOGIC: Cranial nerves II-XII are intact. Alert, oriented, answers questions appropriately.  INTEGUMENTARY: No rashes or jaundice.      LABS:  CBC  RESULTS:   Recent Labs   Lab Test 03/05/20  0800   WBC 3.2*   RBC 3.63*   HGB 11.6*   HCT 34.7*   MCV 96   MCH 32.0   MCHC 33.4   RDW 12.7          PATHOLOGY:  None new.    IMAGING:  None new.    ASSESSMENT/PLAN:  Jyothi Freitas is a 53 year old female with the following issues:  1.  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  I reviewed the blood counts with her.  They are just borderline recovered to proceed with her next dose of paclitaxel today.  -Plan is for her to complete 12 weeks of weekly paclitaxel.    -Rationale for neoadjuvant chemotherapy is to observe for chemo responsiveness, reduce risk of recurrent breast cancer, and reduce burden of disease prior to surgery.  However, as per previous discussion, she understands that it is less likely that we will achieve a complete response to neoadjuvant chemotherapy given that her tumor is strongly ER and IA positive.  -She would be a candidate for adjuvant endocrine therapy based on the strongly ER and IA positive tumor status.  She is postmenopausal, so I would typically recommend anastrozole to reduce her risk of breast cancer recurrence by relative 50%.  -She would also likely be a candidate for adjuvant radiation therapy given her lymph node involvement and depending on radiation oncology evaluation.  She is planning for a lumpectomy with Dr Hernandez.  She has a repeat breast MRI arranged 1 week after her last Taxol treatment, prior to surgery.  I will call her with the MRI results.    2. Depression  -Mood stable. Continue bupropion. Would not recommend tamoxifen in future due to interaction with bupropion unless she switched antidepressants.    3.  Insomnia  "  4.  Hot flashes, chemotherapy induced  -Oxybutynin did help with hot flashes but she had too much dry eye syndrome and fluid retention.   -She has had improvement in her hot flashes and insomnia with the use of gabapentin.  However, she did not want the weight gain associated with gabapentin.  -She has switched back to Ambien which is helping with her sleep without waking.    5. Ectasia of thoracic aorta  -This measures 4 cm on the PET scan.  I reassured Jyothi that she would not need any urgent surgical management of the aorta that may need ongoing monitoring. She is following with Dr. Silverio Gooden for monitoring of this issue.     Return 3/26/2020.    Maricarmen Monroe MD  Hematology/Oncology  [facility]    I spent a total of 25 minutes with the patient, with greater than 50% of the time in counseling and coordination of care.    Oncology Rooming Note    March 5, 2020 8:09 AM   Jyothi Freitas is a 54 year old female who presents for:    Chief Complaint   Patient presents with     Oncology Clinic Visit     Initial Vitals: /79   Pulse 79   Temp 98  F (36.7  C) (Oral)   Resp 16   Wt 66.6 kg (146 lb 12.8 oz)   LMP 10/07/2017 (Approximate)   SpO2 99%   BMI 22.99 kg/m    Estimated body mass index is 22.99 kg/m  as calculated from the following:    Height as of 1/23/20: 1.702 m (5' 7.01\").    Weight as of this encounter: 66.6 kg (146 lb 12.8 oz). Body surface area is 1.77 meters squared.  No Pain (0) Comment: Data Unavailable   Patient's last menstrual period was 10/07/2017 (approximate).  Allergies reviewed: Yes  Medications reviewed: Yes    Medications: Medication refills not needed today.  Pharmacy name entered into [employer]:    [id number] - [place], [place] - [address] AT [facility] [address]  [facility] #[id number] - [place], [place] - [address] AT [address] [place] [address]    Clinical concerns: no       Shari J. Schoenberger, [facility]              Again, thank you " for allowing me to participate in the care of your patient.        Sincerely,        Maricarmen Monroe MD

## 2020-03-05 NOTE — PROGRESS NOTES
Infusion Nursing Note:  Jyothi Freitas presents today for taxol.    Patient seen by provider today: Yes: Dr Monroe   present during visit today: Not Applicable.    Note: cold caps and ice to hands/feet.    Intravenous Access:  Implanted Port accessed in fast track.    Treatment Conditions:  Lab Results   Component Value Date    HGB 11.6 03/05/2020     Lab Results   Component Value Date    WBC 3.2 03/05/2020      Lab Results   Component Value Date    ANEU 1.5 03/05/2020     Lab Results   Component Value Date     03/05/2020      Lab Results   Component Value Date     11/27/2019                   Lab Results   Component Value Date    POTASSIUM 3.8 11/27/2019           No results found for: MAG         Lab Results   Component Value Date    CR 0.67 11/27/2019                   Lab Results   Component Value Date    JEFF 8.8 11/27/2019                Lab Results   Component Value Date    BILITOTAL 0.2 02/20/2020           Lab Results   Component Value Date    ALBUMIN 3.7 02/20/2020                    Lab Results   Component Value Date    ALT 32 02/20/2020           Lab Results   Component Value Date    AST 19 02/20/2020       Results reviewed, labs MET treatment parameters, per Dr. Monroe ok to proceed with treatment with ANC 1.5.      Post Infusion Assessment:  Patient tolerated infusion without incident.  Blood return noted pre and post infusion.  Site patent and intact, free from redness, edema or discomfort.  No evidence of extravasations.  Access discontinued per protocol.       Discharge Plan:   Discharge instructions reviewed with: Patient.  Patient and/or family verbalized understanding of discharge instructions and all questions answered.  AVS to patient via [employer].  Patient will return 3/12/20 for next appointment.   Patient discharged in stable condition accompanied by: self.  Departure Mode: Ambulatory.    Radha Moreno RN

## 2020-03-05 NOTE — PATIENT INSTRUCTIONS
1. Proceed with Taxol today.  2. RTC MD 3/26 with lab check.    Patient going to [facility]

## 2020-03-05 NOTE — PROGRESS NOTES
"Oncology Rooming Note    March 5, 2020 8:09 AM   Jyothi Freitas is a 54 year old female who presents for:    Chief Complaint   Patient presents with     Oncology Clinic Visit     Initial Vitals: /79   Pulse 79   Temp 98  F (36.7  C) (Oral)   Resp 16   Wt 66.6 kg (146 lb 12.8 oz)   LMP 10/07/2017 (Approximate)   SpO2 99%   BMI 22.99 kg/m   Estimated body mass index is 22.99 kg/m  as calculated from the following:    Height as of 1/23/20: 1.702 m (5' 7.01\").    Weight as of this encounter: 66.6 kg (146 lb 12.8 oz). Body surface area is 1.77 meters squared.  No Pain (0) Comment: Data Unavailable   Patient's last menstrual period was 10/07/2017 (approximate).  Allergies reviewed: Yes  Medications reviewed: Yes    Medications: Medication refills not needed today.  Pharmacy name entered into [employer]:    [id number] - [place], [place] - [address] AT [facility] [address]  [id number] - [place], [place] - [address] AT [address] [place] [address]    Clinical concerns: no       Shari J. Schoenberger, CMA            "

## 2020-03-05 NOTE — PROGRESS NOTES
Nursing Note:  Jyothi Freitas presents today for port labs.    Patient seen by provider today: Yes:    present during visit today: Not Applicable.    Note: N/A.    Intravenous Access:  Implanted Port.    Discharge Plan:   Patient was sent to [facility] for next appointment.    Bella Lozano RN

## 2020-03-09 DIAGNOSIS — G44.219 EPISODIC TENSION-TYPE HEADACHE, NOT INTRACTABLE: Primary | ICD-10-CM

## 2020-03-09 DIAGNOSIS — C50.411 MALIGNANT NEOPLASM OF UPPER-OUTER QUADRANT OF RIGHT BREAST IN FEMALE, ESTROGEN RECEPTOR POSITIVE (H): ICD-10-CM

## 2020-03-09 DIAGNOSIS — Z17.0 MALIGNANT NEOPLASM OF UPPER-OUTER QUADRANT OF RIGHT BREAST IN FEMALE, ESTROGEN RECEPTOR POSITIVE (H): ICD-10-CM

## 2020-03-09 RX ORDER — MEPERIDINE HYDROCHLORIDE 25 MG/ML
25 INJECTION INTRAMUSCULAR; INTRAVENOUS; SUBCUTANEOUS EVERY 30 MIN PRN
Status: CANCELLED | OUTPATIENT
Start: 2020-03-12

## 2020-03-09 RX ORDER — LORAZEPAM 2 MG/ML
0.5 INJECTION INTRAMUSCULAR EVERY 4 HOURS PRN
Status: CANCELLED
Start: 2020-03-12

## 2020-03-09 RX ORDER — ALBUTEROL SULFATE 90 UG/1
1-2 AEROSOL, METERED RESPIRATORY (INHALATION)
Status: CANCELLED
Start: 2020-03-12

## 2020-03-09 RX ORDER — ALBUTEROL SULFATE 0.83 MG/ML
2.5 SOLUTION RESPIRATORY (INHALATION)
Status: CANCELLED | OUTPATIENT
Start: 2020-03-12

## 2020-03-09 RX ORDER — EPINEPHRINE 0.3 MG/.3ML
0.3 INJECTION SUBCUTANEOUS EVERY 5 MIN PRN
Status: CANCELLED | OUTPATIENT
Start: 2020-03-12

## 2020-03-09 RX ORDER — DIPHENHYDRAMINE HCL 25 MG
50 CAPSULE ORAL ONCE
Status: CANCELLED
Start: 2020-03-12

## 2020-03-09 RX ORDER — SODIUM CHLORIDE 9 MG/ML
1000 INJECTION, SOLUTION INTRAVENOUS CONTINUOUS PRN
Status: CANCELLED
Start: 2020-03-12

## 2020-03-09 RX ORDER — DIPHENHYDRAMINE HYDROCHLORIDE 50 MG/ML
50 INJECTION INTRAMUSCULAR; INTRAVENOUS
Status: CANCELLED
Start: 2020-03-12

## 2020-03-09 RX ORDER — NALOXONE HYDROCHLORIDE 0.4 MG/ML
.1-.4 INJECTION, SOLUTION INTRAMUSCULAR; INTRAVENOUS; SUBCUTANEOUS
Status: CANCELLED | OUTPATIENT
Start: 2020-03-12

## 2020-03-09 RX ORDER — EPINEPHRINE 1 MG/ML
0.3 INJECTION, SOLUTION, CONCENTRATE INTRAVENOUS EVERY 5 MIN PRN
Status: CANCELLED | OUTPATIENT
Start: 2020-03-12

## 2020-03-12 ENCOUNTER — INFUSION THERAPY VISIT (OUTPATIENT)
Dept: INFUSION THERAPY | Facility: CLINIC | Age: 55
End: 2020-03-12
Attending: INTERNAL MEDICINE
Payer: COMMERCIAL

## 2020-03-12 ENCOUNTER — HOSPITAL ENCOUNTER (OUTPATIENT)
Facility: CLINIC | Age: 55
Setting detail: SPECIMEN
Discharge: HOME | End: 2020-03-12
Attending: INTERNAL MEDICINE | Admitting: INTERNAL MEDICINE
Payer: COMMERCIAL

## 2020-03-12 VITALS
TEMPERATURE: 97.9 F | DIASTOLIC BLOOD PRESSURE: 88 MMHG | WEIGHT: 150 LBS | OXYGEN SATURATION: 100 % | SYSTOLIC BLOOD PRESSURE: 128 MMHG | BODY MASS INDEX: 23.49 KG/M2 | RESPIRATION RATE: 18 BRPM | HEART RATE: 75 BPM

## 2020-03-12 DIAGNOSIS — G44.219 EPISODIC TENSION-TYPE HEADACHE, NOT INTRACTABLE: Primary | ICD-10-CM

## 2020-03-12 DIAGNOSIS — C50.911 MALIGNANT NEOPLASM OF RIGHT BREAST IN FEMALE, ESTROGEN RECEPTOR POSITIVE, UNSPECIFIED SITE OF BREAST (H): ICD-10-CM

## 2020-03-12 DIAGNOSIS — Z17.0 MALIGNANT NEOPLASM OF RIGHT BREAST IN FEMALE, ESTROGEN RECEPTOR POSITIVE, UNSPECIFIED SITE OF BREAST (H): ICD-10-CM

## 2020-03-12 DIAGNOSIS — Z17.0 MALIGNANT NEOPLASM OF UPPER-OUTER QUADRANT OF RIGHT BREAST IN FEMALE, ESTROGEN RECEPTOR POSITIVE (H): ICD-10-CM

## 2020-03-12 DIAGNOSIS — C50.411 MALIGNANT NEOPLASM OF UPPER-OUTER QUADRANT OF RIGHT BREAST IN FEMALE, ESTROGEN RECEPTOR POSITIVE (H): ICD-10-CM

## 2020-03-12 LAB
BASOPHILS # BLD AUTO: 0.1 10E9/L (ref 0–0.2)
BASOPHILS NFR BLD AUTO: 1.8 %
DIFFERENTIAL METHOD BLD: ABNORMAL
EOSINOPHIL # BLD AUTO: 0.2 10E9/L (ref 0–0.7)
EOSINOPHIL NFR BLD AUTO: 4.4 %
ERYTHROCYTE [DISTWIDTH] IN BLOOD BY AUTOMATED COUNT: 12.7 % (ref 10–15)
HCT VFR BLD AUTO: 34.7 % (ref 35–47)
HGB BLD-MCNC: 11.6 G/DL (ref 11.7–15.7)
IMM GRANULOCYTES # BLD: 0.1 10E9/L (ref 0–0.4)
IMM GRANULOCYTES NFR BLD: 1.6 %
LYMPHOCYTES # BLD AUTO: 1.1 10E9/L (ref 0.8–5.3)
LYMPHOCYTES NFR BLD AUTO: 27.4 %
MCH RBC QN AUTO: 32 PG (ref 26.5–33)
MCHC RBC AUTO-ENTMCNC: 33.4 G/DL (ref 31.5–36.5)
MCV RBC AUTO: 96 FL (ref 78–100)
MONOCYTES # BLD AUTO: 0.5 10E9/L (ref 0–1.3)
MONOCYTES NFR BLD AUTO: 13.6 %
NEUTROPHILS # BLD AUTO: 2 10E9/L (ref 1.6–8.3)
NEUTROPHILS NFR BLD AUTO: 51.2 %
NRBC # BLD AUTO: 0 10*3/UL
NRBC BLD AUTO-RTO: 0 /100
PLATELET # BLD AUTO: 358 10E9/L (ref 150–450)
RBC # BLD AUTO: 3.62 10E12/L (ref 3.8–5.2)
WBC # BLD AUTO: 3.9 10E9/L (ref 4–11)

## 2020-03-12 PROCEDURE — 25000128 H RX IP 250 OP 636: Performed by: INTERNAL MEDICINE

## 2020-03-12 PROCEDURE — 96375 TX/PRO/DX INJ NEW DRUG ADDON: CPT

## 2020-03-12 PROCEDURE — 96413 CHEMO IV INFUSION 1 HR: CPT

## 2020-03-12 PROCEDURE — 25000132 ZZH RX MED GY IP 250 OP 250 PS 637: Performed by: INTERNAL MEDICINE

## 2020-03-12 PROCEDURE — 25800030 ZZH RX IP 258 OP 636: Performed by: INTERNAL MEDICINE

## 2020-03-12 PROCEDURE — 85025 COMPLETE CBC W/AUTO DIFF WBC: CPT | Performed by: INTERNAL MEDICINE

## 2020-03-12 RX ORDER — DIPHENHYDRAMINE HCL 25 MG
50 CAPSULE ORAL ONCE
Status: COMPLETED | OUTPATIENT
Start: 2020-03-12 | End: 2020-03-12

## 2020-03-12 RX ORDER — HEPARIN SODIUM (PORCINE) LOCK FLUSH IV SOLN 100 UNIT/ML 100 UNIT/ML
5 SOLUTION INTRAVENOUS
Status: CANCELLED | OUTPATIENT
Start: 2020-03-12

## 2020-03-12 RX ORDER — HEPARIN SODIUM (PORCINE) LOCK FLUSH IV SOLN 100 UNIT/ML 100 UNIT/ML
5 SOLUTION INTRAVENOUS
Status: DISCONTINUED | OUTPATIENT
Start: 2020-03-12 | End: 2020-03-12 | Stop reason: HOSPADM

## 2020-03-12 RX ADMIN — FAMOTIDINE 20 MG: 20 INJECTION, SOLUTION INTRAVENOUS at 09:23

## 2020-03-12 RX ADMIN — HEPARIN SODIUM (PORCINE) LOCK FLUSH IV SOLN 100 UNIT/ML 5 ML: 100 SOLUTION at 12:15

## 2020-03-12 RX ADMIN — PACLITAXEL 142 MG: 6 INJECTION, SOLUTION INTRAVENOUS at 09:44

## 2020-03-12 RX ADMIN — DIPHENHYDRAMINE HYDROCHLORIDE 50 MG: 25 CAPSULE ORAL at 08:55

## 2020-03-12 RX ADMIN — DEXAMETHASONE SODIUM PHOSPHATE 12 MG: 10 INJECTION, SOLUTION INTRAMUSCULAR; INTRAVENOUS at 09:06

## 2020-03-12 RX ADMIN — SODIUM CHLORIDE 1000 ML: 9 INJECTION, SOLUTION INTRAVENOUS at 08:55

## 2020-03-12 ASSESSMENT — PAIN SCALES - GENERAL: PAINLEVEL: NO PAIN (0)

## 2020-03-12 NOTE — PROGRESS NOTES
Infusion Nursing Note:  Jyothi Freitas presents today for C16D1 Taxol.    Patient seen by provider today: No    Note: Reports fatigue and some intermittent myalgias in BLE. Tolerated today's infusion without issue.    Intravenous Access:  Implanted Port.      Treatment Conditions:  Lab Results   Component Value Date    HGB 11.6 03/12/2020     Lab Results   Component Value Date    WBC 3.9 03/12/2020      Lab Results   Component Value Date    ANEU 2.0 03/12/2020     Lab Results   Component Value Date     03/12/2020      Lab Results   Component Value Date     11/27/2019                   Lab Results   Component Value Date    POTASSIUM 3.8 11/27/2019           No results found for: MAG         Lab Results   Component Value Date    CR 0.67 11/27/2019                   Lab Results   Component Value Date    JEFF 8.8 11/27/2019                Lab Results   Component Value Date    BILITOTAL 0.2 02/20/2020           Lab Results   Component Value Date    ALBUMIN 3.7 02/20/2020                    Lab Results   Component Value Date    ALT 32 02/20/2020           Lab Results   Component Value Date    AST 19 02/20/2020       Results reviewed, labs MET treatment parameters, ok to proceed with treatment.      Post Infusion Assessment:  Patient tolerated infusion without incident.  Blood return noted pre and post infusion.  Site patent and intact, free from redness, edema or discomfort.  No evidence of extravasations.  Access discontinued per protocol.    Discharge Plan:   Patient declined prescription refills.  Discharge instructions reviewed with: Patient.  Patient and/or family verbalized understanding of discharge instructions and all questions answered.  AVS to patient via [employer].  Patient will return 3/26/20 for next appointment.   Patient discharged in stable condition accompanied by: self.  Departure Mode: Ambulatory.    Dayan Alvares, RN, RN

## 2020-03-18 ENCOUNTER — TELEPHONE (OUTPATIENT)
Dept: MEDSURG UNIT | Facility: CLINIC | Age: 55
End: 2020-03-18

## 2020-03-19 ENCOUNTER — HOSPITAL ENCOUNTER (OUTPATIENT)
Dept: MRI IMAGING | Facility: CLINIC | Age: 55
End: 2020-03-19
Attending: INTERNAL MEDICINE
Payer: COMMERCIAL

## 2020-03-19 ENCOUNTER — HOSPITAL ENCOUNTER (OUTPATIENT)
Facility: CLINIC | Age: 55
Discharge: HOME | End: 2020-03-19
Admitting: RADIOLOGY
Payer: COMMERCIAL

## 2020-03-19 DIAGNOSIS — C50.911 MALIGNANT NEOPLASM OF RIGHT BREAST IN FEMALE, ESTROGEN RECEPTOR POSITIVE, UNSPECIFIED SITE OF BREAST (H): Primary | ICD-10-CM

## 2020-03-19 DIAGNOSIS — Z17.0 MALIGNANT NEOPLASM OF RIGHT BREAST IN FEMALE, ESTROGEN RECEPTOR POSITIVE, UNSPECIFIED SITE OF BREAST (H): Primary | ICD-10-CM

## 2020-03-19 DIAGNOSIS — Z17.0 MALIGNANT NEOPLASM OF UPPER-OUTER QUADRANT OF RIGHT BREAST IN FEMALE, ESTROGEN RECEPTOR POSITIVE (H): ICD-10-CM

## 2020-03-19 DIAGNOSIS — C50.411 MALIGNANT NEOPLASM OF UPPER-OUTER QUADRANT OF RIGHT BREAST IN FEMALE, ESTROGEN RECEPTOR POSITIVE (H): ICD-10-CM

## 2020-03-19 PROCEDURE — 77049 MRI BREAST C-+ W/CAD BI: CPT

## 2020-03-19 PROCEDURE — 40000863 ZZH STATISTIC RADIOLOGY XRAY, US, CT, MAR, NM

## 2020-03-19 PROCEDURE — 25500064 ZZH RX 255 OP 636: Performed by: INTERNAL MEDICINE

## 2020-03-19 PROCEDURE — A9585 GADOBUTROL INJECTION: HCPCS | Performed by: INTERNAL MEDICINE

## 2020-03-19 RX ORDER — HEPARIN SODIUM (PORCINE) LOCK FLUSH IV SOLN 100 UNIT/ML 100 UNIT/ML
5 SOLUTION INTRAVENOUS
Status: CANCELLED | OUTPATIENT
Start: 2020-03-19

## 2020-03-19 RX ORDER — GADOBUTROL 604.72 MG/ML
7 INJECTION INTRAVENOUS ONCE
Status: COMPLETED | OUTPATIENT
Start: 2020-03-19 | End: 2020-03-19

## 2020-03-19 RX ORDER — HEPARIN SODIUM (PORCINE) LOCK FLUSH IV SOLN 100 UNIT/ML 100 UNIT/ML
5 SOLUTION INTRAVENOUS
Status: DISCONTINUED | OUTPATIENT
Start: 2020-03-19 | End: 2020-03-19 | Stop reason: HOSPADM

## 2020-03-19 RX ADMIN — GADOBUTROL 7 ML: 604.72 INJECTION INTRAVENOUS at 09:11

## 2020-03-23 ENCOUNTER — MYC MEDICAL ADVICE (OUTPATIENT)
Dept: ONCOLOGY | Facility: CLINIC | Age: 55
End: 2020-03-23

## 2020-03-26 ENCOUNTER — HOSPITAL ENCOUNTER (OUTPATIENT)
Facility: CLINIC | Age: 55
Setting detail: SPECIMEN
Discharge: HOME | End: 2020-03-26
Attending: INTERNAL MEDICINE | Admitting: INTERNAL MEDICINE
Payer: COMMERCIAL

## 2020-03-26 ENCOUNTER — VIRTUAL VISIT (OUTPATIENT)
Dept: ONCOLOGY | Facility: CLINIC | Age: 55
End: 2020-03-26
Attending: INTERNAL MEDICINE
Payer: COMMERCIAL

## 2020-03-26 ENCOUNTER — INFUSION THERAPY VISIT (OUTPATIENT)
Dept: INFUSION THERAPY | Facility: CLINIC | Age: 55
End: 2020-03-26
Attending: INTERNAL MEDICINE
Payer: COMMERCIAL

## 2020-03-26 VITALS
RESPIRATION RATE: 18 BRPM | DIASTOLIC BLOOD PRESSURE: 87 MMHG | OXYGEN SATURATION: 100 % | HEART RATE: 84 BPM | SYSTOLIC BLOOD PRESSURE: 124 MMHG

## 2020-03-26 VITALS
SYSTOLIC BLOOD PRESSURE: 124 MMHG | BODY MASS INDEX: 23.49 KG/M2 | OXYGEN SATURATION: 98 % | WEIGHT: 150 LBS | RESPIRATION RATE: 16 BRPM | DIASTOLIC BLOOD PRESSURE: 87 MMHG | HEART RATE: 84 BPM

## 2020-03-26 DIAGNOSIS — Z17.0 MALIGNANT NEOPLASM OF UPPER-OUTER QUADRANT OF RIGHT BREAST IN FEMALE, ESTROGEN RECEPTOR POSITIVE (H): ICD-10-CM

## 2020-03-26 DIAGNOSIS — C50.411 MALIGNANT NEOPLASM OF UPPER-OUTER QUADRANT OF RIGHT BREAST IN FEMALE, ESTROGEN RECEPTOR POSITIVE (H): ICD-10-CM

## 2020-03-26 DIAGNOSIS — N95.1 MENOPAUSAL SYNDROME (HOT FLASHES): ICD-10-CM

## 2020-03-26 DIAGNOSIS — F19.982 DRUG-INDUCED INSOMNIA (H): Primary | ICD-10-CM

## 2020-03-26 DIAGNOSIS — Z17.0 MALIGNANT NEOPLASM OF RIGHT BREAST IN FEMALE, ESTROGEN RECEPTOR POSITIVE, UNSPECIFIED SITE OF BREAST (H): Primary | ICD-10-CM

## 2020-03-26 DIAGNOSIS — C50.911 MALIGNANT NEOPLASM OF RIGHT BREAST IN FEMALE, ESTROGEN RECEPTOR POSITIVE, UNSPECIFIED SITE OF BREAST (H): Primary | ICD-10-CM

## 2020-03-26 LAB
ALBUMIN SERPL-MCNC: 3.6 G/DL (ref 3.4–5)
ALP SERPL-CCNC: 47 U/L (ref 40–150)
ALT SERPL W P-5'-P-CCNC: 24 U/L (ref 0–50)
ANION GAP SERPL CALCULATED.3IONS-SCNC: 7 MMOL/L (ref 3–14)
AST SERPL W P-5'-P-CCNC: 16 U/L (ref 0–45)
BASOPHILS # BLD AUTO: 0.1 10E9/L (ref 0–0.2)
BASOPHILS NFR BLD AUTO: 1.8 %
BILIRUB SERPL-MCNC: 0.2 MG/DL (ref 0.2–1.3)
BUN SERPL-MCNC: 14 MG/DL (ref 7–30)
CALCIUM SERPL-MCNC: 9 MG/DL (ref 8.5–10.1)
CHLORIDE SERPL-SCNC: 108 MMOL/L (ref 94–109)
CO2 SERPL-SCNC: 25 MMOL/L (ref 20–32)
CREAT SERPL-MCNC: 0.68 MG/DL (ref 0.52–1.04)
DIFFERENTIAL METHOD BLD: NORMAL
EOSINOPHIL # BLD AUTO: 0.2 10E9/L (ref 0–0.7)
EOSINOPHIL NFR BLD AUTO: 5 %
ERYTHROCYTE [DISTWIDTH] IN BLOOD BY AUTOMATED COUNT: 12.5 % (ref 10–15)
GFR SERPL CREATININE-BSD FRML MDRD: >90 ML/MIN/{1.73_M2}
GLUCOSE SERPL-MCNC: 108 MG/DL (ref 70–99)
HCT VFR BLD AUTO: 36.6 % (ref 35–47)
HGB BLD-MCNC: 12.2 G/DL (ref 11.7–15.7)
IMM GRANULOCYTES # BLD: 0 10E9/L (ref 0–0.4)
IMM GRANULOCYTES NFR BLD: 0.3 %
LYMPHOCYTES # BLD AUTO: 0.9 10E9/L (ref 0.8–5.3)
LYMPHOCYTES NFR BLD AUTO: 21.8 %
MCH RBC QN AUTO: 31.6 PG (ref 26.5–33)
MCHC RBC AUTO-ENTMCNC: 33.3 G/DL (ref 31.5–36.5)
MCV RBC AUTO: 95 FL (ref 78–100)
MONOCYTES # BLD AUTO: 0.9 10E9/L (ref 0–1.3)
MONOCYTES NFR BLD AUTO: 22 %
NEUTROPHILS # BLD AUTO: 2 10E9/L (ref 1.6–8.3)
NEUTROPHILS NFR BLD AUTO: 49.1 %
NRBC # BLD AUTO: 0 10*3/UL
NRBC BLD AUTO-RTO: 0 /100
PLATELET # BLD AUTO: 335 10E9/L (ref 150–450)
PLATELET # BLD EST: NORMAL 10*3/UL
POTASSIUM SERPL-SCNC: 3.9 MMOL/L (ref 3.4–5.3)
PROT SERPL-MCNC: 6.5 G/DL (ref 6.8–8.8)
RBC # BLD AUTO: 3.86 10E12/L (ref 3.8–5.2)
RBC MORPH BLD: NORMAL
SODIUM SERPL-SCNC: 140 MMOL/L (ref 133–144)
WBC # BLD AUTO: 4 10E9/L (ref 4–11)

## 2020-03-26 PROCEDURE — 80053 COMPREHEN METABOLIC PANEL: CPT | Performed by: INTERNAL MEDICINE

## 2020-03-26 PROCEDURE — 85025 COMPLETE CBC W/AUTO DIFF WBC: CPT | Performed by: INTERNAL MEDICINE

## 2020-03-26 PROCEDURE — 99213 OFFICE O/P EST LOW 20 MIN: CPT | Mod: TEL | Performed by: INTERNAL MEDICINE

## 2020-03-26 PROCEDURE — 36591 DRAW BLOOD OFF VENOUS DEVICE: CPT

## 2020-03-26 PROCEDURE — 25000128 H RX IP 250 OP 636: Performed by: INTERNAL MEDICINE

## 2020-03-26 RX ORDER — HEPARIN SODIUM (PORCINE) LOCK FLUSH IV SOLN 100 UNIT/ML 100 UNIT/ML
5 SOLUTION INTRAVENOUS
Status: CANCELLED | OUTPATIENT
Start: 2020-03-26

## 2020-03-26 RX ORDER — HEPARIN SODIUM (PORCINE) LOCK FLUSH IV SOLN 100 UNIT/ML 100 UNIT/ML
5 SOLUTION INTRAVENOUS
Status: DISCONTINUED | OUTPATIENT
Start: 2020-03-26 | End: 2020-03-26 | Stop reason: HOSPADM

## 2020-03-26 RX ADMIN — HEPARIN SODIUM (PORCINE) LOCK FLUSH IV SOLN 100 UNIT/ML 5 ML: 100 SOLUTION at 07:27

## 2020-03-26 ASSESSMENT — PAIN SCALES - GENERAL
PAINLEVEL: NO PAIN (0)
PAINLEVEL: NO PAIN (0)

## 2020-03-26 NOTE — PROGRESS NOTES
Nursing Note:  Jyothi Freitas presents today for port labs.    Patient seen by provider today: No   present during visit today: Not Applicable.    Note: Patient tolerated port labs without issue..    Intravenous Access:  Implanted Port.    Discharge Plan:   Patient was sent home for her telephone visit appointment with Dr. Monroe.    Dayan Alvares RN

## 2020-03-26 NOTE — PROGRESS NOTES
"[facility]    Hematology/Oncology Established Patient Follow-up Note      Today's Date: 3/26/20    Reason for Follow-up: Right breast cancer.    Jyothi Freitas is a 54 year old female who is being evaluated via a billable telephone visit.      The patient has been notified of following:     \"This telephone visit will be conducted via a call between you and your physician/provider. We have found that certain health care needs can be provided without the need for a physical exam.  This service lets us provide the care you need with a short phone conversation.  If a prescription is necessary we can send it directly to your pharmacy.  If lab work is needed we can place an order for that and you can then stop by our lab to have the test done at a later time.    If during the course of the call the physician/provider feels a telephone visit is not appropriate, you will not be charged for this service.\"       HISTORY OF PRESENT ILLNESS: Jyothi Freitas is a 54 year old female who presents with the following oncologic history:   1.  10/11/2019: Diagnostic right sided mammogram performed for a palpable lump in the right breast.  This showed an irregularly-shaped spiculated mass in the upper outer right breast with prominent lymph nodes in the right axilla.  Targeted ultrasound of the right upper outer breast showed an irregularly-shaped hypoechoic mass at the 10 o'clock position, 4 cm from the nipple measuring 2.6 x 1.5 x 2.4 cm.  Right axillary ultrasound showed moderately enlarged lymph nodes.  Right breast needle biopsy showed a grade 3 invasive ductal carcinoma with lymphovascular invasion identified, ER strongly positive at 95%, WI strongly positive at 95%, HER-2/juan FISH negative.  Right axillary lymph node measuring 2 cm was positive for metastatic carcinoma.  Note prior 4/2019 mammogram deemed normal.  2.  10/15/2019: Bilateral breast MRI showed known right breast malignancy measuring 2.6 x 1.4 x 2 cm, 3 " mildly enlarged right axillary lymph nodes and no contralateral breast malignancy.  3. 10/21/2019 PET scan showed scattered small hypermetabolic bilateral axillary lymph nodes; for example, a hypermetabolic right axillary lymph node measures 1.6 x 0.9 cm with SUV max 3.6.  Hypermetabolic mass in the right breast superiorly and laterally measuring 2.1 x 1.6 cm with SUV max 4.3.  A 0.6 cm low-density lesion in the right hepatic lobe is too small for accurate PET characterization but shows no appreciable hypermetabolic activity.  Incidentally noted ectasia of the ascending thoracic aorta measures 4 cm in diameter.  4.  10/23/2019: Left axillary ultrasound showed benign-appearing lymph node.  Left axillary lymph node biopsy showed benign lymph node tissue.  5.  10/29/2019: Started neoadjuvant chemotherapy with dose dense Adriamycin and Cytoxan, followed by weekly paclitaxel with completion on 3/12/2020.  6.  3/19/2020: Breast MRI showed no residual normal enhancement in the right breast mass.  The right axillary lymphadenopathy has resolved.    INTERIM HISTORY:  Jyothi reports feeling well and denies any recent fevers, chills, shortness of breath, cough, bowel or bladder dysfunction. She denies any paresthesias.  Hot flashes are stable.    REVIEW OF SYSTEMS:   14 point ROS was reviewed and is negative other than as noted above in HPI.       HOME MEDICATIONS:  Current Outpatient Medications   Medication Sig Dispense Refill     acetaminophen (TYLENOL) 500 MG tablet Take 1,000 mg by mouth every 6 hours as needed for mild pain       acetaminophen-caffeine (EXCEDRIN TENSION HEADACHE) 500-65 MG TABS Take 2 tablets by mouth every 6 hours as needed for mild pain       ALBUTEROL 108 (90 BASE) MCG/ACT inhaler INHALE 2 PUFFS INTO THE LUNGS EVERY 6 HOURS AS NEEDED FOR SHORTNESS OF BREATH OR DIFFICULT BREATHING OR WHEEZING 8.5 g 0     buPROPion (WELLBUTRIN XL) 300 MG 24 hr tablet Take 1 tablet (300 mg) by mouth every morning 90 tablet  "3     nadolol (CORGARD) 20 MG tablet TAKE 1 TABLET(20 MG) BY MOUTH DAILY 90 tablet 3     zolpidem 5 MG PO tablet Take 1 tablet (5 mg) by mouth nightly as needed for sleep 30 tablet 1     gabapentin (NEURONTIN) 100 MG capsule Take 3 capsules (300 mg) by mouth every evening (Patient not taking: Reported on 3/26/2020) 60 capsule 1     magic mouthwash suspension, diphenhydrAMINE, lidocaine, aluminum-magnesium & simethicone, (FIRST-MOUTHWASH BLM) compounding kit Swish and swallow 5-10 mLs in mouth every 6 hours as needed for mouth sores (Patient not taking: Reported on 3/26/2020) 237 mL 3         ALLERGIES:  Allergies   Allergen Reactions     Sulfa Drugs Other (See Comments)     Red face. Ringing in the ears.         PAST MEDICAL HISTORY:  Past Medical History:   Diagnosis Date     GERD (gastroesophageal reflux disease)      H/O colonoscopy 03/08/2016    done at [place] and [place]     Hematuria 2016    eval at [place] and per pt cysto and ct neg     Intermittent asthma     since childhood     Low iron 10/2017    done for eval of hair loss, egd [place]     Migraines     most of adult life, has seen neuro in past, on bblocker     Mild depression (H)      Normal stress echocardiogram July 2011    due to hear racing     Osteopenia 2008     Syncope 03/2017    echo [place] lv size and fxn, grade 1 dd, mild tr         PAST SURGICAL HISTORY:  Past Surgical History:   Procedure Laterality Date     C TMJ ARTHROSCOPY/SURGERY       ESOPHAGOSCOPY, GASTROSCOPY, DUODENOSCOPY (EGD), COMBINED N/A 10/30/2017    Procedure: COMBINED ESOPHAGOSCOPY, GASTROSCOPY, DUODENOSCOPY (EGD), BIOPSY SINGLE OR MULTIPLE;  COMBINED ESOPHAGOSCOPY, GASTROSCOPY, DUODENOSCOPY (EGD);  Surgeon: Martin Rodriguez MD;  Location:  GI     INSERT PORT VASCULAR ACCESS N/A 10/24/2019    Procedure: PORT PLACEMENT;  Surgeon: Kaila Hernandez MD;  Location:  OR     ORTHOPEDIC SURGERY      \"leg surgery\"     single oopherectomy  2010    cyst         SOCIAL HISTORY:  Social History "     Socioeconomic History     Marital status:      Spouse name: Not on file     Number of children: 2     Years of education: Not on file     Highest education level: Not on file   Occupational History     Not on file   Social Needs     Financial resource strain: Not on file     Food insecurity     Worry: Not on file     Inability: Not on file     Transportation needs     Medical: Not on file     Non-medical: Not on file   Tobacco Use     Smoking status: Former Smoker     Packs/day: 0.00     Last attempt to quit: 3/27/1997     Years since quittin.0     Smokeless tobacco: Never Used   Substance and Sexual Activity     Alcohol use: Yes     Comment: rarely     Drug use: No     Sexual activity: Yes     Partners: Male     Birth control/protection: Pill   Lifestyle     Physical activity     Days per week: Not on file     Minutes per session: Not on file     Stress: Not on file   Relationships     Social connections     Talks on phone: Not on file     Gets together: Not on file     Attends [employer]ism service: Not on file     Active member of club or organization: Not on file     Attends meetings of clubs or organizations: Not on file     Relationship status: Not on file     Intimate partner violence     Fear of current or ex partner: Not on file     Emotionally abused: Not on file     Physically abused: Not on file     Forced sexual activity: Not on file   Other Topics Concern     Parent/sibling w/ CABG, MI or angioplasty before 65F 55M? Yes   Social History Narrative     Not on file         FAMILY HISTORY:  Family History   Problem Relation Age of Onset     Coronary Artery Disease Father 48        MI. and one in his 60s     Emphysema Mother         COPD         PHYSICAL EXAM:  Vital signs:  /87   Pulse 84   Resp 16   Wt 68 kg (150 lb)   LMP 10/07/2017 (Approximate)   SpO2 98%   BMI 23.49 kg/m     ECO  Not performed as this was a telephone visit.    LABS:  CBC RESULTS:   Recent Labs   Lab Test  03/26/20  0729   WBC 4.0   RBC 3.86   HGB 12.2   HCT 36.6   MCV 95   MCH 31.6   MCHC 33.3   RDW 12.5        Last Comprehensive Metabolic Panel:  Sodium   Date Value Ref Range Status   03/26/2020 140 133 - 144 mmol/L Final     Potassium   Date Value Ref Range Status   03/26/2020 3.9 3.4 - 5.3 mmol/L Final     Chloride   Date Value Ref Range Status   03/26/2020 108 94 - 109 mmol/L Final     Carbon Dioxide   Date Value Ref Range Status   03/26/2020 25 20 - 32 mmol/L Final     Anion Gap   Date Value Ref Range Status   03/26/2020 7 3 - 14 mmol/L Final     Glucose   Date Value Ref Range Status   03/26/2020 108 (H) 70 - 99 mg/dL Final     Urea Nitrogen   Date Value Ref Range Status   03/26/2020 14 7 - 30 mg/dL Final     Creatinine   Date Value Ref Range Status   03/26/2020 0.68 0.52 - 1.04 mg/dL Final     GFR Estimate   Date Value Ref Range Status   03/26/2020 >90 >60 mL/min/[1.73_m2] Final     Comment:     Non  GFR Calc  Starting 12/18/2018, serum creatinine based estimated GFR (eGFR) will be   calculated using the Chronic Kidney Disease Epidemiology Collaboration   (CKD-EPI) equation.       Calcium   Date Value Ref Range Status   03/26/2020 9.0 8.5 - 10.1 mg/dL Final     Bilirubin Total   Date Value Ref Range Status   03/26/2020 0.2 0.2 - 1.3 mg/dL Final     Alkaline Phosphatase   Date Value Ref Range Status   03/26/2020 47 40 - 150 U/L Final     ALT   Date Value Ref Range Status   03/26/2020 24 0 - 50 U/L Final     AST   Date Value Ref Range Status   03/26/2020 16 0 - 45 U/L Final       PATHOLOGY:  None new.    IMAGING:  3/19/2020 breast MRI reviewed as per HPI.    ASSESSMENT/PLAN:  Jyothi Freitas is a 53 year old female with the following issues:  1.  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  I reviewed her labs which show normal/recovered blood counts and normal kidney and liver function.  -Plan is for her to complete 12 weeks of weekly paclitaxel.    -She will meet with Dr. Hernandez via phone visit to discuss surgical planning.  I recommended to Jyothi that she start adjuvant endocrine therapy with anastrozole either post surgery or after completion of radiation.  -We will make the referral to radiation oncology approximately 1 week post surgery.  I told her that she would need approximately 4 to 6 weeks post surgery for recovery before starting radiation.    2. Depression  -Mood stable. Continue bupropion.     3.  Insomnia   4.  Hot flashes, chemotherapy induced  -Oxybutynin did help with hot flashes but she had too much dry eye syndrome and fluid retention.   -She has had improvement in her hot flashes and insomnia with the use of gabapentin.  However, she did not want the weight gain associated with gabapentin.  -She has switched back to Ambien which is helping with her sleep without waking.    5. Ectasia of thoracic aorta  -This measured 4 cm on the PET scan.  -Surgical management not needed but she will need ongoing monitoring. She is following with Dr. Silverio Gooden for monitoring of this issue.     Return 1 week after surgery via phone or video visit.    Maricarmen Monroe MD  Hematology/Oncology  [facility]    Phone call duration: 16 minutes

## 2020-03-26 NOTE — PROGRESS NOTES
"Jyothi Freitas is a 54 year old female who is being evaluated via a billable telephone visit.      The patient has been notified of following:     \"This telephone visit will be conducted via a call between you and your physician/provider. We have found that certain health care needs can be provided without the need for a physical exam.  This service lets us provide the care you need with a short phone conversation.  If a prescription is necessary we can send it directly to your pharmacy.  If lab work is needed we can place an order for that and you can then stop by our lab to have the test done at a later time.    If during the course of the call the physician/provider feels a telephone visit is not appropriate, you will not be charged for this service.\"     Jyothi Freitas complains of   Chief Complaint   Patient presents with     telephone visit     phone visit       I have reviewed and updated the patient's Past Medical History, Social History, Family History and Medication List.    ALLERGIES  Sulfa drugs    Patients phone number [phone]    Assessment/Plan:      Phone call duration:  5 minutes    Kacie Wright  "

## 2020-03-26 NOTE — PATIENT INSTRUCTIONS
RTC MD phone or video visit 1 week post-surgery (date to be determined)      Patient prefers a call to schedule

## 2020-03-27 ENCOUNTER — VIRTUAL VISIT (OUTPATIENT)
Dept: SURGERY | Facility: CLINIC | Age: 55
End: 2020-03-27
Payer: COMMERCIAL

## 2020-03-27 ENCOUNTER — PREP FOR PROCEDURE (OUTPATIENT)
Dept: SURGERY | Facility: CLINIC | Age: 55
End: 2020-03-27

## 2020-03-27 DIAGNOSIS — C50.911 MALIGNANT NEOPLASM OF RIGHT BREAST (H): Primary | ICD-10-CM

## 2020-03-27 DIAGNOSIS — Z17.0 MALIGNANT NEOPLASM OF UPPER-OUTER QUADRANT OF RIGHT BREAST IN FEMALE, ESTROGEN RECEPTOR POSITIVE (H): Primary | ICD-10-CM

## 2020-03-27 DIAGNOSIS — C50.411 MALIGNANT NEOPLASM OF UPPER-OUTER QUADRANT OF RIGHT BREAST IN FEMALE, ESTROGEN RECEPTOR POSITIVE (H): Primary | ICD-10-CM

## 2020-03-27 PROCEDURE — 99214 OFFICE O/P EST MOD 30 MIN: CPT | Mod: TEL | Performed by: SURGERY

## 2020-03-27 NOTE — PROGRESS NOTES
"Jyothi Freitas is a 54 year old female who is being evaluated via a billable telephone visit.      The patient has been notified of following:     \"This telephone visit will be conducted via a call between you and your physician/provider. We have found that certain health care needs can be provided without the need for a physical exam.  This service lets us provide the care you need with a short phone conversation.  If a prescription is necessary we can send it directly to your pharmacy.  If lab work is needed we can place an order for that and you can then stop by our lab to have the test done at a later time.    If during the course of the call the physician/provider feels a telephone visit is not appropriate, you will not be charged for this service.\"       Physician has received verbal consent for a Telephone Visit from the patient? Yes    Jyothi Freitas complains of    Chief Complaint   Patient presents with     RECHECK       I have reviewed and updated the patient's Past Medical History, Social History, Family History and Medication List.    ALLERGIES  Sulfa drugs    Additional provider notes:   Jyothi is a 54yof who originally presented on 10/15/2019 with a mass in her right breast. She was diagnosed with right breast invasive ductal carcinoma, grade 3, ER/LA positive, HER 2 negative pending measuring 2.6cm in size at 10:00, 4cm FN with a right axillary lymph node which is positive for metastatic carcinoma.      Jyothi had diagnostic imaging of the right breast due to a palpable lump in the right breast. This revealed an irregularly shaped spiculated mass in the right upper outer breast measuring 2.6cm at 10:00, 4cm FN and several enlarged right axillary lymph nodes. She then had biopsies with the above results.     She had a breast MRI which revealed a 2.6cm right breast malignancy and 3 mildly enlarged axillary lymph nodes on the right, PET CT also revealed concerning axillary lymph nodes and the breast lesion. She " had a biopsy of a left axillary node which was negative. She underwent neoadjuvant chemotherapy with Dr. Monroe and has now completed her therapy on 3/12/2020. She reports overall she tolerated therapy well.      Plan:  Jyothi is here to discuss breast surgery options after completion of neoadjuvant chemotherapy.      We discussed the surgical options for treatment.  I described the procedures for lumpectomy with sentinel lymph node biopsy and mastectomy with sentinel lymph node biopsy, possible axillary node dissection including the details of the procedures, the risks, anesthesia and expected recovery.      I reviewed the data regarding lumpectomy and radiation vs mastectomy that shows that the local recurrence risk is slightly higher for lumpectomy and radiation vs mastectomy (3-5% vs. 1-2%), but the survival at 20 years is the same.  I advised that lymph node biopsy is recommended whenever we are dealing with invasive breast cancer and described the procedure for sentinel lymph node biopsy.  We talked about the risk for lymphedema which is small with removal of only a few nodes, but certainly not zero.  we discussed the previously biopsied right axillary lymph node would be seed localized to ensure excision.  I also explained that since this is a small tumor and is no longer palpable, I would ask radiology to place a radioactive seed pre-operatively for localization.    We talked about post-lumpectomy radiation, the course and usual side effects. We discussed that with lumpectomy, radiation is typically recommended to decrease risk of recurrence. It may be necessary following mastectomy depending on final pathology and if ramiro involvement is present.     We also talked about post-mastectomy reconstruction and the stages involved. We also discussed the various types of mastectomy, including total, skin-sparing, and nipple-sparing mastectomy.  We reviewed that the nipple-sparing technique is cosmetic; sensation and  contractility will likely be lost.  She  is a candidate for nipple-sparing mastectomy from an oncologic perspective .  The option of having immediate versus delayed reconstruction was also discussed.   We reviewed that the advantages of immediate reconstruction includes superior cosmetics, as the skin is preserved.      Jyothi would like to proceed with right breast seed localized partial mastectomy with right axillary sentinel lymph node biopsy and seed localized excision of previously biopsied right axillary lymph node and port removal. We will schedule this for 3-6 weeks after her completion of chemotherapy. Typically this is in a 4-6 week window. She would like it performed at 3 weeks due to corona virus and the possibility that it may not be possible if we wait longer. This is reasonable and she understands the slight increased risk of infection related to recent chemotherapy.     Please route or send letter to:  Primary Care Provider (PCP) and Referring Provider      Phone call duration: 40 minutes    Kaila Hernandez MD

## 2020-03-30 ENCOUNTER — TELEPHONE (OUTPATIENT)
Dept: SURGERY | Facility: PHYSICIAN GROUP | Age: 55
End: 2020-03-30

## 2020-03-30 NOTE — TELEPHONE ENCOUNTER
Type of surgery: Seed localized right breast lumpectomy with seed localized right axillary node excision and right sentinel lymph node biopsy with port removal  Location of surgery: [facility]  Date and time of surgery: 4/1/20 at 1pm  Surgeon: Dr. Kaila Hernandez  Pre-Op Appt Date: Patient to schedule  Post-Op Appt Date: Patient to schedule   Packet sent out: Yes  Pre-cert/Authorization completed:  Not Applicable  Date: 3/30/20

## 2020-03-31 ENCOUNTER — OFFICE VISIT (OUTPATIENT)
Dept: FAMILY MEDICINE | Facility: CLINIC | Age: 55
End: 2020-03-31
Payer: COMMERCIAL

## 2020-03-31 ENCOUNTER — TELEPHONE (OUTPATIENT)
Dept: SURGERY | Facility: CLINIC | Age: 55
End: 2020-03-31

## 2020-03-31 VITALS
WEIGHT: 148 LBS | HEART RATE: 64 BPM | OXYGEN SATURATION: 100 % | BODY MASS INDEX: 23.23 KG/M2 | DIASTOLIC BLOOD PRESSURE: 76 MMHG | HEIGHT: 67 IN | SYSTOLIC BLOOD PRESSURE: 126 MMHG

## 2020-03-31 DIAGNOSIS — Z01.818 PREOP GENERAL PHYSICAL EXAM: Primary | ICD-10-CM

## 2020-03-31 DIAGNOSIS — C50.911 MALIGNANT NEOPLASM OF RIGHT BREAST IN FEMALE, ESTROGEN RECEPTOR POSITIVE, UNSPECIFIED SITE OF BREAST (H): ICD-10-CM

## 2020-03-31 DIAGNOSIS — Z17.0 MALIGNANT NEOPLASM OF RIGHT BREAST IN FEMALE, ESTROGEN RECEPTOR POSITIVE, UNSPECIFIED SITE OF BREAST (H): ICD-10-CM

## 2020-03-31 PROCEDURE — 99214 OFFICE O/P EST MOD 30 MIN: CPT | Performed by: INTERNAL MEDICINE

## 2020-03-31 RX ORDER — ZOLPIDEM TARTRATE 5 MG/1
5 TABLET ORAL
Qty: 30 TABLET | Refills: 1 | COMMUNITY
Start: 2020-03-31 | End: 2020-07-13

## 2020-03-31 ASSESSMENT — MIFFLIN-ST. JEOR: SCORE: 1299.1

## 2020-03-31 NOTE — TELEPHONE ENCOUNTER
Jyothi is scheduled for seed localized right breast lumpectomy, seed localized right axillary lymph node excision, right SLNB, removal of port-a-cath with Dr. Hernandez on 4/1/2020. Pre procedure call placed to patient. Left message requesting call back with questions or concerns.    Adelina Briggs BSN, RN, OCN  Oncology Care Coordinator  [facility]  [facility]  Phone: [phone]

## 2020-03-31 NOTE — PROGRESS NOTES
[address] [address]  [phone]  Dept: [phone]    PRE-OP EVALUATION:  Today's date: 3/31/2020    Jyothi Freitas (: 1965) presents for pre-operative evaluation assessment as requested by Dr. Hernandez.  She requires evaluation and anesthesia risk assessment prior to undergoing surgery/procedure for treatment of Breast Cancer .    Proposed Surgery/ Procedure: Mastectomy  Date of Surgery/ Procedure: 2020  Time of Surgery/ Procedure: 1:00pm  Hospital/Surgical Facility: [facility]  Fax number for surgical facility: Viewable thru [facility]  Primary Physician: Peter Franklin  Type of Anesthesia Anticipated: General    Patient has a Health Care Directive or Living Will:  NO    1. NO - Do you have a history of heart attack, stroke, stent, bypass or surgery on an artery in the head, neck, heart or legs?  2. NO - Do you ever have any pain or discomfort in your chest?  3. NO - Do you have a history of  Heart Failure?  4. NO - Are you troubled by shortness of breath when: walking on the level, up a slight hill or at night?  5. NO - Do you currently have a cold, bronchitis or other respiratory infection?  6. NO - Do you have a cough, shortness of breath or wheezing?  7. NO - Do you sometimes get pains in the calves of your legs when you walk?  8.YES - Do you or anyone in your family have previous history of blood clots?  9. NO - Do you or does anyone in your family have a serious bleeding problem such as prolonged bleeding following surgeries or cuts?  10. YES - Have you ever had problems with anemia or been told to take iron pills?  11. NO - Have you had any abnormal blood loss such as black, tarry or bloody stools, or abnormal vaginal bleeding?  12. NO - Have you ever had a blood transfusion?  13. NO - Have you or any of your relatives ever had problems with anesthesia?  14. NO - Do you have sleep apnea, excessive snoring or daytime drowsiness?  15. NO - Do you have  any prosthetic heart valves?  16. NO - Do you have prosthetic joints?  17. NO - Is there any chance that you may be pregnant?      HPI:     HPI related to upcoming procedure:     Patient had a diagnostic mammogram in October 2019 for a palpable lump at the right breast.  Mammogram showed an irregularly-shaped spiculated mass at the upper outer right breast.  Prominent lymph nodes were also seen at the right axilla.  Biopsy was positive for estrogen receptor positive invasive ductal carcinoma.  Patient completed chemotherapy and follow-up imaging reportedly revealed complete resolution of the breast mass.  Patient is then scheduled for prophylactic lumpectomy.  She denies breast pain, redness/swelling, nipple discharge/bleeding.      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     Priority: Medium     Ascending aorta dilation (H) 02/04/2020     Priority: Medium     Episodic tension-type headache, not intractable 01/23/2020     Priority: Medium     Breast cancer (H) 10/22/2019     Priority: Medium     Added automatically from request for surgery [id number]       Malignant neoplasm of upper-outer quadrant of right breast in female, estrogen receptor positive (H) 10/22/2019     Priority: Medium     Low iron 10/01/2017     Priority: Medium     Mild depression (H)      Priority: Medium     Intermittent asthma      Priority: Medium     Migraines      Priority: Medium     Osteopenia      Priority: Medium     Cervicalgia 10/01/2009     Priority: Medium      Past Medical History:   Diagnosis Date     GERD (gastroesophageal reflux disease)      H/O colonoscopy 03/08/2016    done at [place] and nl     Hematuria 2016    eval at [facility] and per pt cysto and ct neg      "Intermittent asthma     since childhood     Low iron 10/2017    done for eval of hair loss, egd nl     Migraines     most of adult life, has seen neuro in past, on bblocker     Mild depression (H)      Normal stress echocardiogram July 2011    due to hear racing     Osteopenia 2008     Syncope 03/2017    echo nl lv size and fxn, grade 1 dd, mild tr     Past Surgical History:   Procedure Laterality Date     C TMJ ARTHROSCOPY/SURGERY       ESOPHAGOSCOPY, GASTROSCOPY, DUODENOSCOPY (EGD), COMBINED N/A 10/30/2017    Procedure: COMBINED ESOPHAGOSCOPY, GASTROSCOPY, DUODENOSCOPY (EGD), BIOPSY SINGLE OR MULTIPLE;  COMBINED ESOPHAGOSCOPY, GASTROSCOPY, DUODENOSCOPY (EGD);  Surgeon: Martin Rodriguez MD;  Location:  GI     INSERT PORT VASCULAR ACCESS N/A 10/24/2019    Procedure: PORT PLACEMENT;  Surgeon: Kaila Hernandez MD;  Location:  OR     ORTHOPEDIC SURGERY      \"leg surgery\"     single oopherectomy  2010    cyst     Current Outpatient Medications   Medication Sig Dispense Refill     acetaminophen (TYLENOL) 500 MG tablet Take 1,000 mg by mouth every 6 hours as needed for mild pain       acetaminophen-caffeine (EXCEDRIN TENSION HEADACHE) 500-65 MG TABS Take 2 tablets by mouth every 6 hours as needed for mild pain       ALBUTEROL 108 (90 BASE) MCG/ACT inhaler INHALE 2 PUFFS INTO THE LUNGS EVERY 6 HOURS AS NEEDED FOR SHORTNESS OF BREATH OR DIFFICULT BREATHING OR WHEEZING 8.5 g 0     buPROPion (WELLBUTRIN XL) 300 MG 24 hr tablet Take 1 tablet (300 mg) by mouth every morning 90 tablet 3     nadolol (CORGARD) 20 MG tablet TAKE 1 TABLET(20 MG) BY MOUTH DAILY 90 tablet 3     zolpidem (AMBIEN) 5 MG tablet Take 1 tablet (5 mg) by mouth nightly as needed for sleep 30 tablet 1       OTC products: Took naproxen last night for headache      Allergies   Allergen Reactions     Sulfa Drugs Other (See Comments)     Red face. Ringing in the ears.      Latex Allergy: NO    Social History     Tobacco Use     Smoking status: Former " "Smoker     Packs/day: 0.00     Last attempt to quit: 3/27/1997     Years since quittin.0     Smokeless tobacco: Never Used   Substance Use Topics     Alcohol use: Yes     Comment: rarely     History   Drug Use No       REVIEW OF SYSTEMS:     C: NEGATIVE for fever, chills, change in weight  E/M: NEGATIVE for ear, mouth and throat problems  R: NEGATIVE for significant cough or SOB  CV: NEGATIVE for chest pain, palpitations or peripheral edema  GI: NEGATIVE for nausea, abdominal pain, heartburn, or change in bowel habits  : NEGATIVE for frequency, dysuria, or hematuria  N: NEGATIVE for weakness, dizziness or paresthesias  H: NEGATIVE for bleeding problems      EXAM:   /76 (BP Location: Right arm, Patient Position: Chair, Cuff Size: Adult Regular)   Pulse 64   Ht 1.702 m (5' 7.01\")   Wt 67.1 kg (148 lb)   LMP 10/07/2017 (Approximate)   SpO2 100%   BMI 23.17 kg/m        GENERAL APPEARANCE: healthy, alert and no distress    NECK: no adenopathy, no asymmetry, masses, or scars and thyroid normal to palpation    RESP: lungs clear to auscultation - no rales, rhonchi or wheezes    CV: regular rates and rhythm, normal S1 S2, no S3 or S4 and no murmur, click or rub    ABDOMEN:  soft, nontender, no HSM or masses and bowel sounds normal    NEURO: Normal strength and tone, sensory exam grossly normal, mentation intact and speech normal    PSYCH: mentation appears normal. and affect normal/bright    LYMPHATICS: No cervical or supraclavicular nodes      DIAGNOSTICS:       Recent Labs   Lab Test 20  0729 20  0817  19  0842  19  0850 17  0908   HGB 12.2 11.6*   < > 12.1   < > 14.1  --     358   < > 225   < > 276  --      --   --  138   < > 141  --    POTASSIUM 3.9  --   --  3.8   < > 4.0  --    CR 0.68  --   --  0.67   < > 0.86  --    A1C  --   --   --   --   --  5.1 5.2    < > = values in this interval not displayed.       IMPRESSION:   Diagnosis/reason for consult: Right " breast cancer    The proposed surgical procedure is considered LOW risk.    REVISED CARDIAC RISK INDEX  The patient has the following serious cardiovascular risks for perioperative complications such as (MI, PE, VFib and 3  AV Block):  No serious cardiac risks  INTERPRETATION: 0 risks: Class I (very low risk - 0.4% complication rate)    The patient has the following additional risks for perioperative complications:  No identified additional risks      ICD-10-CM    1. Preop general physical exam  Z01.818    2. Malignant neoplasm of right breast in female, estrogen receptor positive, unspecified site of breast (H)  C50.911     Z17.0        RECOMMENDATIONS:       APPROVAL GIVEN to proceed with proposed procedure, without further diagnostic evaluation       Signed Electronically by: Donnie Ruiz MD    Copy of this evaluation report is provided to requesting physician.    [facility] Preop Guidelines    Revised Cardiac Risk Index

## 2020-03-31 NOTE — TELEPHONE ENCOUNTER
Follow-up call placed to patient. Jyothi notified, unfortunately, there is a no visitor policy in place during this time. Left details for Jyothi on what to expect during seed placement and pre-op period. Encouraged Jyothi to call back with additional questions/concerns.     Adelina LEWISN, RN, OCN  Oncology Care Coordinator  [facility]  [facility]  Phone: [phone]

## 2020-03-31 NOTE — PATIENT INSTRUCTIONS
Take your Nadolol on the morning of your surgery with a small sip of water.          Before Your Surgery      Call your surgeon if there is any change in your health. This includes signs of a cold or flu (such as a sore throat, runny nose, cough, rash or fever).    Do not smoke, drink alcohol or take over the counter medicine (unless your surgeon or primary care doctor tells you to) for the 24 hours before and after surgery.    If you take prescribed drugs: Follow your doctor s orders about which medicines to take and which to stop until after surgery.    Eating and drinking prior to surgery: follow the instructions from your surgeon    Take a shower or bath the night before surgery. Use the soap your surgeon gave you to gently clean your skin. If you do not have soap from your surgeon, use your regular soap. Do not shave or scrub the surgery site.  Wear clean pajamas and have clean sheets on your bed.

## 2020-04-01 ENCOUNTER — ANESTHESIA (OUTPATIENT)
Dept: SURGERY | Facility: CLINIC | Age: 55
End: 2020-04-01
Payer: COMMERCIAL

## 2020-04-01 ENCOUNTER — HOSPITAL ENCOUNTER (OUTPATIENT)
Dept: MAMMOGRAPHY | Facility: CLINIC | Age: 55
End: 2020-04-01
Attending: SURGERY
Payer: COMMERCIAL

## 2020-04-01 ENCOUNTER — HOSPITAL ENCOUNTER (OUTPATIENT)
Facility: CLINIC | Age: 55
Discharge: HOME | End: 2020-04-01
Attending: SURGERY | Admitting: SURGERY
Payer: COMMERCIAL

## 2020-04-01 ENCOUNTER — ANESTHESIA EVENT (OUTPATIENT)
Dept: SURGERY | Facility: CLINIC | Age: 55
End: 2020-04-01
Payer: COMMERCIAL

## 2020-04-01 ENCOUNTER — HOSPITAL ENCOUNTER (OUTPATIENT)
Dept: NUCLEAR MEDICINE | Facility: CLINIC | Age: 55
Setting detail: NUCLEAR MEDICINE
End: 2020-04-01
Attending: SURGERY
Payer: COMMERCIAL

## 2020-04-01 ENCOUNTER — SURGERY (OUTPATIENT)
Age: 55
End: 2020-04-01
Payer: COMMERCIAL

## 2020-04-01 ENCOUNTER — APPOINTMENT (OUTPATIENT)
Dept: SURGERY | Facility: PHYSICIAN GROUP | Age: 55
End: 2020-04-01
Payer: COMMERCIAL

## 2020-04-01 VITALS
OXYGEN SATURATION: 92 % | RESPIRATION RATE: 15 BRPM | TEMPERATURE: 98.1 F | BODY MASS INDEX: 23.23 KG/M2 | HEIGHT: 67 IN | SYSTOLIC BLOOD PRESSURE: 121 MMHG | DIASTOLIC BLOOD PRESSURE: 80 MMHG | HEART RATE: 69 BPM | WEIGHT: 148 LBS

## 2020-04-01 DIAGNOSIS — C50.911 MALIGNANT NEOPLASM OF RIGHT BREAST (H): ICD-10-CM

## 2020-04-01 DIAGNOSIS — G89.18 POST-OP PAIN: Primary | ICD-10-CM

## 2020-04-01 PROCEDURE — 88300 SURGICAL PATH GROSS: CPT | Mod: 26 | Performed by: SURGERY

## 2020-04-01 PROCEDURE — 88300 SURGICAL PATH GROSS: CPT | Performed by: SURGERY

## 2020-04-01 PROCEDURE — 40000170 ZZH STATISTIC PRE-PROCEDURE ASSESSMENT II: Performed by: SURGERY

## 2020-04-01 PROCEDURE — 36000058 ZZH SURGERY LEVEL 3 EA 15 ADDTL MIN: Performed by: SURGERY

## 2020-04-01 PROCEDURE — 25000128 H RX IP 250 OP 636: Performed by: SURGERY

## 2020-04-01 PROCEDURE — 34300033 ZZH RX 343: Performed by: SURGERY

## 2020-04-01 PROCEDURE — 25000125 ZZHC RX 250

## 2020-04-01 PROCEDURE — 25000132 ZZH RX MED GY IP 250 OP 250 PS 637: Performed by: PHYSICIAN ASSISTANT

## 2020-04-01 PROCEDURE — 25000125 ZZHC RX 250: Performed by: SURGERY

## 2020-04-01 PROCEDURE — 25800030 ZZH RX IP 258 OP 636: Performed by: NURSE ANESTHETIST, CERTIFIED REGISTERED

## 2020-04-01 PROCEDURE — 71000027 ZZH RECOVERY PHASE 2 EACH 15 MINS: Performed by: SURGERY

## 2020-04-01 PROCEDURE — 37000008 ZZH ANESTHESIA TECHNICAL FEE, 1ST 30 MIN: Performed by: SURGERY

## 2020-04-01 PROCEDURE — 40000268 MA BREAST SPECIMEN RIGHT OR

## 2020-04-01 PROCEDURE — 71000012 ZZH RECOVERY PHASE 1 LEVEL 1 FIRST HR: Performed by: SURGERY

## 2020-04-01 PROCEDURE — 25800030 ZZH RX IP 258 OP 636: Performed by: ANESTHESIOLOGY

## 2020-04-01 PROCEDURE — 25000125 ZZHC RX 250: Performed by: NURSE ANESTHETIST, CERTIFIED REGISTERED

## 2020-04-01 PROCEDURE — 27210794 ZZH OR GENERAL SUPPLY STERILE: Performed by: SURGERY

## 2020-04-01 PROCEDURE — A9520 TC99 TILMANOCEPT DIAG 0.5MCI: HCPCS | Performed by: SURGERY

## 2020-04-01 PROCEDURE — 19285 PERQ DEV BREAST 1ST US IMAG: CPT | Mod: RT

## 2020-04-01 PROCEDURE — 88307 TISSUE EXAM BY PATHOLOGIST: CPT | Performed by: SURGERY

## 2020-04-01 PROCEDURE — 37000009 ZZH ANESTHESIA TECHNICAL FEE, EACH ADDTL 15 MIN: Performed by: SURGERY

## 2020-04-01 PROCEDURE — 88307 TISSUE EXAM BY PATHOLOGIST: CPT | Mod: 26 | Performed by: SURGERY

## 2020-04-01 PROCEDURE — 36000056 ZZH SURGERY LEVEL 3 1ST 30 MIN: Performed by: SURGERY

## 2020-04-01 PROCEDURE — 25000128 H RX IP 250 OP 636: Performed by: NURSE ANESTHETIST, CERTIFIED REGISTERED

## 2020-04-01 PROCEDURE — 38792 RA TRACER ID OF SENTINL NODE: CPT

## 2020-04-01 PROCEDURE — 25000128 H RX IP 250 OP 636: Performed by: ANESTHESIOLOGY

## 2020-04-01 PROCEDURE — 19286 PERQ DEV BREAST ADD US IMAG: CPT

## 2020-04-01 PROCEDURE — 71000013 ZZH RECOVERY PHASE 1 LEVEL 1 EA ADDTL HR: Performed by: SURGERY

## 2020-04-01 PROCEDURE — 40000986 MA POST PROCEDURE RIGHT

## 2020-04-01 RX ORDER — LABETALOL HYDROCHLORIDE 5 MG/ML
10 INJECTION, SOLUTION INTRAVENOUS
Status: DISCONTINUED | OUTPATIENT
Start: 2020-04-01 | End: 2020-04-01 | Stop reason: HOSPADM

## 2020-04-01 RX ORDER — ONDANSETRON 2 MG/ML
INJECTION INTRAMUSCULAR; INTRAVENOUS PRN
Status: DISCONTINUED | OUTPATIENT
Start: 2020-04-01 | End: 2020-04-01

## 2020-04-01 RX ORDER — FENTANYL CITRATE 50 UG/ML
25-50 INJECTION, SOLUTION INTRAMUSCULAR; INTRAVENOUS EVERY 5 MIN PRN
Status: DISCONTINUED | OUTPATIENT
Start: 2020-04-01 | End: 2020-04-01 | Stop reason: HOSPADM

## 2020-04-01 RX ORDER — ONDANSETRON 2 MG/ML
4 INJECTION INTRAMUSCULAR; INTRAVENOUS EVERY 30 MIN PRN
Status: DISCONTINUED | OUTPATIENT
Start: 2020-04-01 | End: 2020-04-01 | Stop reason: HOSPADM

## 2020-04-01 RX ORDER — POLYETHYLENE GLYCOL 3350 17 G/17G
1 POWDER, FOR SOLUTION ORAL DAILY
Qty: 10 PACKET | Refills: 0 | Status: SHIPPED | OUTPATIENT
Start: 2020-04-01 | End: 2020-06-15

## 2020-04-01 RX ORDER — PROPOFOL 10 MG/ML
INJECTION, EMULSION INTRAVENOUS CONTINUOUS PRN
Status: DISCONTINUED | OUTPATIENT
Start: 2020-04-01 | End: 2020-04-01

## 2020-04-01 RX ORDER — FENTANYL CITRATE 50 UG/ML
INJECTION, SOLUTION INTRAMUSCULAR; INTRAVENOUS PRN
Status: DISCONTINUED | OUTPATIENT
Start: 2020-04-01 | End: 2020-04-01

## 2020-04-01 RX ORDER — HYDROCODONE BITARTRATE AND ACETAMINOPHEN 5; 325 MG/1; MG/1
1 TABLET ORAL
Status: COMPLETED | OUTPATIENT
Start: 2020-04-01 | End: 2020-04-01

## 2020-04-01 RX ORDER — AMOXICILLIN 250 MG
1-2 CAPSULE ORAL 2 TIMES DAILY PRN
Qty: 20 TABLET | Refills: 0 | Status: SHIPPED | OUTPATIENT
Start: 2020-04-01 | End: 2020-07-13

## 2020-04-01 RX ORDER — CEFAZOLIN SODIUM 2 G/100ML
2 INJECTION, SOLUTION INTRAVENOUS
Status: COMPLETED | OUTPATIENT
Start: 2020-04-01 | End: 2020-04-01

## 2020-04-01 RX ORDER — NALOXONE HYDROCHLORIDE 0.4 MG/ML
.1-.4 INJECTION, SOLUTION INTRAMUSCULAR; INTRAVENOUS; SUBCUTANEOUS
Status: DISCONTINUED | OUTPATIENT
Start: 2020-04-01 | End: 2020-04-01 | Stop reason: HOSPADM

## 2020-04-01 RX ORDER — DEXAMETHASONE SODIUM PHOSPHATE 4 MG/ML
INJECTION, SOLUTION INTRA-ARTICULAR; INTRALESIONAL; INTRAMUSCULAR; INTRAVENOUS; SOFT TISSUE PRN
Status: DISCONTINUED | OUTPATIENT
Start: 2020-04-01 | End: 2020-04-01

## 2020-04-01 RX ORDER — FENTANYL CITRATE 50 UG/ML
25-50 INJECTION, SOLUTION INTRAMUSCULAR; INTRAVENOUS
Status: DISCONTINUED | OUTPATIENT
Start: 2020-04-01 | End: 2020-04-01 | Stop reason: HOSPADM

## 2020-04-01 RX ORDER — MEPERIDINE HYDROCHLORIDE 25 MG/ML
12.5 INJECTION INTRAMUSCULAR; INTRAVENOUS; SUBCUTANEOUS
Status: DISCONTINUED | OUTPATIENT
Start: 2020-04-01 | End: 2020-04-01 | Stop reason: HOSPADM

## 2020-04-01 RX ORDER — SODIUM CHLORIDE, SODIUM LACTATE, POTASSIUM CHLORIDE, CALCIUM CHLORIDE 600; 310; 30; 20 MG/100ML; MG/100ML; MG/100ML; MG/100ML
INJECTION, SOLUTION INTRAVENOUS CONTINUOUS
Status: DISCONTINUED | OUTPATIENT
Start: 2020-04-01 | End: 2020-04-01 | Stop reason: HOSPADM

## 2020-04-01 RX ORDER — LIDOCAINE HYDROCHLORIDE 20 MG/ML
INJECTION, SOLUTION INFILTRATION; PERINEURAL PRN
Status: DISCONTINUED | OUTPATIENT
Start: 2020-04-01 | End: 2020-04-01

## 2020-04-01 RX ORDER — PROPOFOL 10 MG/ML
INJECTION, EMULSION INTRAVENOUS PRN
Status: DISCONTINUED | OUTPATIENT
Start: 2020-04-01 | End: 2020-04-01

## 2020-04-01 RX ORDER — ONDANSETRON 4 MG/1
4 TABLET, ORALLY DISINTEGRATING ORAL EVERY 30 MIN PRN
Status: DISCONTINUED | OUTPATIENT
Start: 2020-04-01 | End: 2020-04-01 | Stop reason: HOSPADM

## 2020-04-01 RX ORDER — CEFAZOLIN SODIUM 1 G/3ML
1 INJECTION, POWDER, FOR SOLUTION INTRAMUSCULAR; INTRAVENOUS SEE ADMIN INSTRUCTIONS
Status: DISCONTINUED | OUTPATIENT
Start: 2020-04-01 | End: 2020-04-01 | Stop reason: HOSPADM

## 2020-04-01 RX ORDER — MAGNESIUM HYDROXIDE 1200 MG/15ML
LIQUID ORAL PRN
Status: DISCONTINUED | OUTPATIENT
Start: 2020-04-01 | End: 2020-04-01 | Stop reason: HOSPADM

## 2020-04-01 RX ORDER — HYDROCODONE BITARTRATE AND ACETAMINOPHEN 5; 325 MG/1; MG/1
1-2 TABLET ORAL EVERY 4 HOURS PRN
Qty: 15 TABLET | Refills: 0 | Status: SHIPPED | OUTPATIENT
Start: 2020-04-01 | End: 2020-06-15

## 2020-04-01 RX ORDER — HYDROMORPHONE HYDROCHLORIDE 1 MG/ML
.3-.5 INJECTION, SOLUTION INTRAMUSCULAR; INTRAVENOUS; SUBCUTANEOUS EVERY 10 MIN PRN
Status: DISCONTINUED | OUTPATIENT
Start: 2020-04-01 | End: 2020-04-01 | Stop reason: HOSPADM

## 2020-04-01 RX ADMIN — DEXAMETHASONE SODIUM PHOSPHATE 4 MG: 4 INJECTION, SOLUTION INTRA-ARTICULAR; INTRALESIONAL; INTRAMUSCULAR; INTRAVENOUS; SOFT TISSUE at 13:15

## 2020-04-01 RX ADMIN — HYDROMORPHONE HYDROCHLORIDE 0.5 MG: 1 INJECTION, SOLUTION INTRAMUSCULAR; INTRAVENOUS; SUBCUTANEOUS at 15:23

## 2020-04-01 RX ADMIN — FENTANYL CITRATE 25 MCG: 50 INJECTION, SOLUTION INTRAMUSCULAR; INTRAVENOUS at 13:56

## 2020-04-01 RX ADMIN — PROPOFOL: 10 INJECTION, EMULSION INTRAVENOUS at 13:59

## 2020-04-01 RX ADMIN — PROPOFOL 150 MG: 10 INJECTION, EMULSION INTRAVENOUS at 13:06

## 2020-04-01 RX ADMIN — HYDROCODONE BITARTRATE AND ACETAMINOPHEN 1 TABLET: 5; 325 TABLET ORAL at 15:29

## 2020-04-01 RX ADMIN — SODIUM CHLORIDE, POTASSIUM CHLORIDE, SODIUM LACTATE AND CALCIUM CHLORIDE: 600; 310; 30; 20 INJECTION, SOLUTION INTRAVENOUS at 12:08

## 2020-04-01 RX ADMIN — FENTANYL CITRATE 50 MCG: 50 INJECTION, SOLUTION INTRAMUSCULAR; INTRAVENOUS at 13:06

## 2020-04-01 RX ADMIN — LIDOCAINE HYDROCHLORIDE 5 ML: 10 INJECTION, SOLUTION INFILTRATION; PERINEURAL at 11:28

## 2020-04-01 RX ADMIN — ONDANSETRON 4 MG: 2 INJECTION INTRAMUSCULAR; INTRAVENOUS at 13:15

## 2020-04-01 RX ADMIN — TILMANOCEPT 0.55 MILLICURIE: KIT at 12:20

## 2020-04-01 RX ADMIN — FENTANYL CITRATE 25 MCG: 50 INJECTION, SOLUTION INTRAMUSCULAR; INTRAVENOUS at 13:30

## 2020-04-01 RX ADMIN — SODIUM CHLORIDE 1000 ML: 900 IRRIGANT IRRIGATION at 13:30

## 2020-04-01 RX ADMIN — METHYLENE BLUE 20 MG: 5 INJECTION INTRAVENOUS at 13:21

## 2020-04-01 RX ADMIN — SODIUM CHLORIDE, POTASSIUM CHLORIDE, SODIUM LACTATE AND CALCIUM CHLORIDE: 600; 310; 30; 20 INJECTION, SOLUTION INTRAVENOUS at 14:34

## 2020-04-01 RX ADMIN — CEFAZOLIN SODIUM 2 G: 2 INJECTION, SOLUTION INTRAVENOUS at 13:14

## 2020-04-01 RX ADMIN — PROPOFOL 150 MCG/KG/MIN: 10 INJECTION, EMULSION INTRAVENOUS at 13:06

## 2020-04-01 RX ADMIN — MIDAZOLAM 2 MG: 1 INJECTION INTRAMUSCULAR; INTRAVENOUS at 12:58

## 2020-04-01 RX ADMIN — LIDOCAINE HYDROCHLORIDE 80 MG: 20 INJECTION, SOLUTION INFILTRATION; PERINEURAL at 13:06

## 2020-04-01 RX ADMIN — FENTANYL CITRATE 50 MCG: 0.05 INJECTION, SOLUTION INTRAMUSCULAR; INTRAVENOUS at 15:45

## 2020-04-01 RX ADMIN — PHENYLEPHRINE HYDROCHLORIDE 100 MCG: 10 INJECTION INTRAVENOUS at 13:20

## 2020-04-01 RX ADMIN — FENTANYL CITRATE 50 MCG: 0.05 INJECTION, SOLUTION INTRAMUSCULAR; INTRAVENOUS at 15:03

## 2020-04-01 ASSESSMENT — MIFFLIN-ST. JEOR: SCORE: 1299.07

## 2020-04-01 NOTE — ANESTHESIA PREPROCEDURE EVALUATION
"Anesthesia Pre-Procedure Evaluation    Patient: Jyothi Freitas   MRN: [id number] : 1965          Preoperative Diagnosis: Malignant neoplasm of right breast (H) [C50.911]    Procedure(s):  SEED LOCALIZED RIGHT BREAST LUMPECTOMY WITH SEED LOCALIZED RIGHT AXILLARY NODE EXCISION  RIGHT SENTINEL LYMPH NODE BIOPSY  REMOVAL, VASCULAR ACCESS PORT    Past Medical History:   Diagnosis Date     GERD (gastroesophageal reflux disease)      H/O colonoscopy 2016    done at [place] and nl     Hematuria     eval at [place] and per pt cysto and ct neg     Intermittent asthma     since childhood     Low iron 10/2017    done for eval of hair loss, egd nl     Migraines     most of adult life, has seen neuro in past, on bblocker     Mild depression (H)      Normal stress echocardiogram 2011    due to hear racing     Osteopenia      Syncope 2017    echo nl lv size and fxn, grade 1 dd, mild tr     Past Surgical History:   Procedure Laterality Date     C TMJ ARTHROSCOPY/SURGERY       ESOPHAGOSCOPY, GASTROSCOPY, DUODENOSCOPY (EGD), COMBINED N/A 10/30/2017    Procedure: COMBINED ESOPHAGOSCOPY, GASTROSCOPY, DUODENOSCOPY (EGD), BIOPSY SINGLE OR MULTIPLE;  COMBINED ESOPHAGOSCOPY, GASTROSCOPY, DUODENOSCOPY (EGD);  Surgeon: Martin Rodriguez MD;  Location:  GI     INSERT PORT VASCULAR ACCESS N/A 10/24/2019    Procedure: PORT PLACEMENT;  Surgeon: Kaila Hernandez MD;  Location:  OR     ORTHOPEDIC SURGERY      \"leg surgery\"     single oopherectomy  2010    cyst       Anesthesia Evaluation     .             ROS/MED HX    ENT/Pulmonary:     (+)Intermittent asthma , . .   (-) sleep apnea   Neurologic:     (+)migraines,     Cardiovascular: Comment: Dilated ascending aorta;        METS/Exercise Tolerance:     Hematologic:         Musculoskeletal:         GI/Hepatic:     (+) GERD       Renal/Genitourinary:         Endo:         Psychiatric:     (+) psychiatric history depression      Infectious Disease:         Malignancy: " "  (+) Malignancy History of Breast          Other:                          Physical Exam  Normal systems: cardiovascular, pulmonary and dental    Airway   Mallampati: II  TM distance: >3 FB  Neck ROM: full    Dental     Cardiovascular       Pulmonary             Lab Results   Component Value Date    WBC 4.0 03/26/2020    HGB 12.2 03/26/2020    HCT 36.6 03/26/2020     03/26/2020     03/26/2020    POTASSIUM 3.9 03/26/2020    CHLORIDE 108 03/26/2020    CO2 25 03/26/2020    BUN 14 03/26/2020    CR 0.68 03/26/2020     (H) 03/26/2020    JEFF 9.0 03/26/2020    ALBUMIN 3.6 03/26/2020    PROTTOTAL 6.5 (L) 03/26/2020    ALT 24 03/26/2020    AST 16 03/26/2020    ALKPHOS 47 03/26/2020    BILITOTAL 0.2 03/26/2020    LIPASE 224 04/10/2012    AMYLASE 106 04/10/2012    TSH 1.56 05/21/2019    HCG Negative 03/18/2008    HCGS Negative 03/09/2011       Preop Vitals  BP Readings from Last 3 Encounters:   03/31/20 126/76   03/26/20 124/87   03/26/20 124/87    Pulse Readings from Last 3 Encounters:   03/31/20 64   03/26/20 84   03/26/20 84      Resp Readings from Last 3 Encounters:   03/26/20 16   03/26/20 18   03/12/20 18    SpO2 Readings from Last 3 Encounters:   03/31/20 100%   03/26/20 98%   03/26/20 100%      Temp Readings from Last 1 Encounters:   03/12/20 36.6  C (97.9  F) (Oral)    Ht Readings from Last 1 Encounters:   03/31/20 1.702 m (5' 7.01\")      Wt Readings from Last 1 Encounters:   03/31/20 67.1 kg (148 lb)    Estimated body mass index is 23.17 kg/m  as calculated from the following:    Height as of 3/31/20: 1.702 m (5' 7.01\").    Weight as of 3/31/20: 67.1 kg (148 lb).       Anesthesia Plan      History & Physical Review  History and physical reviewed and following examination; no interval change.    ASA Status:  2 .    NPO Status:  > 8 hours    Plan for General (LMA;) with Intravenous induction. Maintenance will be Balanced.    PONV prophylaxis:  Ondansetron (or other 5HT-3) and Dexamethasone or " Solumedrol         Postoperative Care  Postoperative pain management:  IV analgesics.      Consents  Anesthetic plan, risks, benefits and alternatives discussed with:  Patient..                 ABELARDO MAGANA MD

## 2020-04-01 NOTE — PROGRESS NOTES
SBAR Seed Localization    SITUATION:  Patient to breast imaging center for imaging guided seed localizations before breast lumpectomy or excision biopsy with sentinel node injection.    BACKGROUND:  Breast imaging cancer, breast abnormality  Ordered procedure completed: Yes  Special needs identified: Yes     ASSESSMENT:  SBAR report called to patient care unit because of unexpected event in radiology: No  Allergies and medication list reviewed prior to procedure. Yes  Skin cleansed with ChloraPrep One-Step.  Anesthesia: approximately 10ml of 1% Lidocaine injection subcutaneous before seed insertion administered by the radiologist.   Gauze dressing over insertion site(s).  Post procedure mammogram completed: Yes    Patient tolerance: Patient tolerated procedure well.    RECOMMENDATIONS:  Patient transferred to Same Day Surgery in stable condition via wheelchair with Breast Imaging Staff.    Please call [facility] [phone] if there are any questions.

## 2020-04-01 NOTE — ANESTHESIA CARE TRANSFER NOTE
Patient: Jyothi Freitas    Procedure(s):  SEED LOCALIZED RIGHT BREAST LUMPECTOMY WITH SEED LOCALIZED RIGHT AXILLARY NODE EXCISION  RIGHT SENTINEL LYMPH NODE BIOPSY  REMOVAL, VASCULAR ACCESS PORT    Diagnosis: Malignant neoplasm of right breast (H) [C50.911]  Diagnosis Additional Information: No value filed.    Anesthesia Type:   General     Note:  Airway :Face Mask  Patient transferred to:PACU  Handoff Report: Identifed the Patient, Identified the Reponsible Provider, Reviewed the pertinent medical history, Discussed the surgical course, Reviewed Intra-OP anesthesia mangement and issues during anesthesia, Set expectations for post-procedure period and Allowed opportunity for questions and acknowledgement of understanding      Vitals: (Last set prior to Anesthesia Care Transfer)    CRNA VITALS  4/1/2020 1412 - 4/1/2020 1446      4/1/2020             Pulse:  64    SpO2:  98 %    Resp Rate (set):  10                Electronically Signed By: KRISTIAN Bacon CRNA  April 1, 2020  2:46 PM

## 2020-04-01 NOTE — DISCHARGE INSTRUCTIONS
Same Day Surgery Discharge Instructions for  Sedation and General Anesthesia       It's not unusual to feel dizzy, light-headed or faint for up to 24 hours after surgery or while taking pain medication.  If you have these symptoms: sit for a few minutes before standing and have someone assist you when you get up to walk or use the bathroom.      You should rest and relax for the next 24 hours. We recommend you make arrangements to have an adult stay with you for at least 24 hours after your discharge.  Avoid hazardous and strenuous activity.      DO NOT DRIVE any vehicle or operate mechanical equipment for 24 hours following the end of your surgery.  Even though you may feel normal, your reactions may be affected by the medication you have received.      Do not drink alcoholic beverages for 24 hours following surgery.       Slowly progress to your regular diet as you feel able. It's not unusual to feel nauseated and/or vomit after receiving anesthesia.  If you develop these symptoms, drink clear liquids (apple juice, ginger ale, broth, 7-up, etc. ) until you feel better.  If your nausea and vomiting persists for 24 hours, please notify your surgeon.        All narcotic pain medications, along with inactivity and anesthesia, can cause constipation. Drinking plenty of liquids and increasing fiber intake will help.      For any questions of a medical nature, call your surgeon.      Do not make important decisions for 24 hours.      If you had general anesthesia, you may have a sore throat for a couple of days related to the breathing tube used during surgery.  You may use Cepacol lozenges to help with this discomfort.  If it worsens or if you develop a fever, contact your surgeon.       If you feel your pain is not well managed with the pain medications prescribed by your surgeon, please contact your surgeon's office to let them know so they can address your concerns.       CoVid 19 Information    We want to give you  information regarding Covid. Please consult your primary care provider with any questions you might have.     Patient who have symptoms (cough, fever, or shortness of breath), need to isolate for 7 days from when symptoms started OR 72 hours after fever resolves (without fever reducing medications) AND improvement of respiratory symptoms (whichever is longer).      Isolate yourself at home (in own room/own bathroom if possible)    Do Not allow any visitors    Do Not go to work or school    Do Not go to Y[employer],  centers, shopping, or other public places.    Do Not shake hands.    Avoid close and intimate contact with others (hugging, kissing).    Follow CDC recommendations for household cleaning of frequently touched services.     After the initial 7 days, continue to isolate yourself from household members as much as possible. To continue decrease the risk of community spread and exposure, you and any members of your household should limit activities in public for 14 days after starting home isolation.     You can reference the following CDC link for helpful home isolation/care tips:  https://www.cdc.gov/coronavirus/2019-ncov/downloads/10Things.pdf    Protect Others:    Cover Your Mouth and Nose with a mask, disposable tissue or wash cloth to avoid spreading germs to others.    Wash your hands and face frequently with soap and water    Call Your Primary Doctor If: Breathing difficulty develops or you become worse.    For more information about COVID19 and options for caring for yourself at home, please visit the CDC website at https://www.cdc.gov/coronavirus/2019-ncov/about/steps-when-sick.html  For more options for care at [facility], please visit our website at [email or web address]        [facility]  Discharge Instructions: Post-Operative Breast Surgery    ACTIVITY    Take frequent short walks and increase your activity gradually.       Avoid strenuous physical activity or heavy lifting greater than 15-20 lbs. for 1-2 weeks with arm on the surgery side.  You may climb stairs.    Gentle rotation and stretching of your arms and shoulders will prevent joint stiffness.    You may drive without restrictions when you are not using any prescription pain medication and feel comfortable in a car.    You may return to work/school when you are comfortable without any prescription pain medication.    WOUND CARE    You may remove your outer dressing and shower 48 hours after the surgery.  Pat your incisions dry and leave them open to air.  Re-apply dressing (Band-Aids or gauze/tape) as needed for drainage.    You may have steri-strips (looks like white tape) or Dermabond (looks like glue) on your incisions.  You may peel off the steri-strips 2 weeks after your surgery if they have not peeled off on their own.  If you have Dermabond, it will peel up and fall off on its own.    Do not soak your incisions in a tub or pool for 2 weeks.     Do not apply any lotions, creams, or ointments to your incisions.    A ridge under your incisions is normal and will gradually resolve.    Wear a supportive bra for 1-2 weeks, day and night.    DIET    Start with liquids, then gradually resume your regular diet as tolerated.     Drink plenty of liquids to stay hydrated.    PAIN    Expect some tenderness and discomfort at the incision site(s).  Use the prescribed pain medication at your discretion.  Expect gradual resolution of your pain over several days.    You may take ibuprofen with food (unless you have been told not to) instead of or in addition to your prescribed pain medication.  If you are taking Norco or Percocet, do not take any additional acetaminophen/APAP/Tylenol.    Do not drink alcohol or drive while you are taking pain medications.    You may apply ice to your incisions in 20 minute intervals as needed for the next 48 hours.      EXPECTATIONS    Pain  medications can cause constipation.  Limit use when possible.  Take over the counter stool softener/stimulant, such as Colace or Senna, 1-2 times a day with plenty of water.  You may take a mild over the counter laxative, such as Miralax or a suppository, as needed.      You may discontinue these medications once you are having regular bowel movements and/or are no longer taking your narcotic pain medication.    Blue dye may have been used during your surgery to locate lymph nodes and can cause your urine to be blue/green for several days after surgery.  This is not a cause for concern and will resolve on its own.     RETURN APPOINTMENT    Your surgeon will call in 1-2 weeks for follow up.  Please call the office at [phone] with any questions, concerns or if you have not received a call.      CALL OUR OFFICE -[phone] IF YOU HAVE:     Chills or fever above 101 F.    Increased redness, warmth, or drainage at your incisions.    Significant bleeding.    Pain not relieved by your pain medication or rest.    Increasing pain after the first 48 hours.    Any other concerns or questions.

## 2020-04-01 NOTE — BRIEF OP NOTE
[facility]    Brief Operative Note    Pre-operative diagnosis: Malignant neoplasm of right breast (H) [C50.911]  Post-operative diagnosis Same as pre-operative diagnosis    Procedure: Procedure(s):  SEED LOCALIZED RIGHT BREAST LUMPECTOMY WITH SEED LOCALIZED RIGHT AXILLARY NODE EXCISION  RIGHT SENTINEL LYMPH NODE BIOPSY  REMOVAL, VASCULAR ACCESS PORT  Surgeon: Surgeon(s) and Role:     * Kaila Hernandez MD - Primary     * Andriy Lopez PA-C - Assisting  Anesthesia: General   Estimated blood loss: 10cc  Drains: None  Specimens:   ID Type Source Tests Collected by Time Destination   A : POWER PORT EXPLANT Implant Implant SURGICAL PATHOLOGY EXAM Kaila Hernandez MD 4/1/2020  1:34 PM    B : RIGHT BREAST LUMPECTOMY  Tissue Breast, Right SURGICAL PATHOLOGY EXAM Kaila Hernandez MD 4/1/2020  1:47 PM    C : RIGHT AXILLARY SEED LOCALIZED SENTINEL LYMPH NODE Tissue Lymph Node, [place] SURGICAL PATHOLOGY EXAM Kaila Hernandez MD 4/1/2020  2:02 PM    D : RIGHT AXILLARY SENTINEL LYMPH NODE #2 Tissue Lymph Node, [place] SURGICAL PATHOLOGY EXAM Kaila Hernandez MD 4/1/2020  2:06 PM    E : RIGHT AXILLARY SENTINEL LYMPH NODE #3 Tissue Lymph Node, [place] SURGICAL PATHOLOGY EXAM Kaila Hernandez MD 4/1/2020  2:12 PM      Findings:   See op note.  Complications: None.  Implants:   Implant Name Type Inv. Item Serial No.  Lot No. LRB No. Used Action   CATH PORT POWERPORT CLEARVUE ISP [id number] Catheter CATH PORT POWERPORT CLEARVUE ISP [id number]  [facility] [id number] Left 1 Explanted     Andriy Lopez PA-C

## 2020-04-01 NOTE — ANESTHESIA POSTPROCEDURE EVALUATION
Patient: Jyothi Freitas    Procedure(s):  SEED LOCALIZED RIGHT BREAST LUMPECTOMY WITH SEED LOCALIZED RIGHT AXILLARY NODE EXCISION  RIGHT SENTINEL LYMPH NODE BIOPSY  REMOVAL, VASCULAR ACCESS PORT    Diagnosis:Malignant neoplasm of right breast (H) [C50.911]  Diagnosis Additional Information: No value filed.    Anesthesia Type:  General    Note:  Anesthesia Post Evaluation    Patient location during evaluation: PACU  Patient participation: Able to fully participate in evaluation  Level of consciousness: awake  Pain management: adequate  Airway patency: patent  Cardiovascular status: acceptable  Respiratory status: acceptable  Hydration status: acceptable  PONV: none             Last vitals:  Vitals:    04/01/20 1540 04/01/20 1550 04/01/20 1600   BP: 124/87 121/72 115/81   Pulse:   65   Resp: 12 15 15   Temp: 36.7  C (98.1  F) 36.8  C (98.2  F) 36.8  C (98.2  F)   SpO2: 95% 98% 95%         Electronically Signed By: Joaquín López MD  April 1, 2020  4:23 PM

## 2020-04-03 LAB — COPATH REPORT: NORMAL

## 2020-04-06 ENCOUNTER — TELEPHONE (OUTPATIENT)
Dept: SURGERY | Facility: PHYSICIAN GROUP | Age: 55
End: 2020-04-06

## 2020-04-06 NOTE — TELEPHONE ENCOUNTER
I called Jyothi and discussed her pathology report. It revealed a complete pathologic response to neoadjuvant chemotherapy in the right breast with no residual cancer. She did have 1 of 7 lymph nodes with macrometastatic disease. She had originally presented on 10/11/2019 with a palpable mass which measured 2.6cm and was biopsied and found to be grade 3 IDC, ER 95% positive, CT 95 % positive, Her 2 negative. She had a right axillary lymph node which was biopsied and positive at that time. She underwent neoadjuvant chemotherapy with Dr. Monroe and then underwent lumpectomy with seed localization of the previously positive lymph node and SLNB as above.     She reports she is feeling well overall. She has more tenderness at the axillary incision than on her breast as expected. She has some swelling inferior to the incisions, also expected. I recommend she continue icing in the axilla. She will call us with any concerns regarding her incisions going forward. This phone visit will serve as her follow up visit.     I recommend she follow up with Dr. Monroe regarding initiation of hormone blockade and meet with Radiation oncology at some point as well to determine start time for radiation. Referral placed to Rad Onc. This may be delayed due to coronavirus.     She should follow up with me in 6 months. I typically obtain a mammogram on the right at that visit, but this would depend on when she completes her radiation therapy.     Kaila Hernandez MD  [facility]  [phone]

## 2020-04-06 NOTE — TELEPHONE ENCOUNTER
You are scheduled with Dr. Son Hurd at [facility] [place] on 4/9/20 at 10:30am.

## 2020-04-06 NOTE — TELEPHONE ENCOUNTER
Patient had surgery 4/1/20. Left message with patient to call clinic to schedule a video visit with Dr. Monroe next Monday or Tuesday. Tricia Smiley RN,BSN,OCN

## 2020-04-07 ENCOUNTER — TELEPHONE (OUTPATIENT)
Dept: ONCOLOGY | Facility: CLINIC | Age: 55
End: 2020-04-07

## 2020-04-07 NOTE — TELEPHONE ENCOUNTER
Called Jyothibatsheva chan message that Dr. Monroe would like to do a video visit either Monday 4/13 or Tuesday 4/14. Instructed her to call scheduling to schedule a video visit and to download the AW Touchpoint shanae for video visit. Tricia Smiley RN,BSN,OCN

## 2020-04-09 ENCOUNTER — TRANSFERRED RECORDS (OUTPATIENT)
Dept: HEALTH INFORMATION MANAGEMENT | Facility: CLINIC | Age: 55
End: 2020-04-09

## 2020-04-11 NOTE — PROGRESS NOTES
"[facility]    Hematology/Oncology Established Patient Follow-up Note      Today's Date: 4/13/20    Reason for Follow-up: Right breast cancer.    Jyothi Freitas is a 54 year old female who is being evaluated via a billable video visit.      The patient has been notified of following:     \"This video visit will be conducted via a call between you and your physician/provider. We have found that certain health care needs can be provided without the need for an in-person physical exam.  This service lets us provide the care you need with a video conversation.  If a prescription is necessary we can send it directly to your pharmacy.  If lab work is needed we can place an order for that and you can then stop by our lab to have the test done at a later time.    Video visits are billed at different rates depending on your insurance coverage.  Please reach out to your insurance provider with any questions.    If during the course of the call the physician/provider feels a video visit is not appropriate, you will not be charged for this service.\"    Patient has given verbal consent for Video visit? Yes    How would you like to obtain your AVS? Yon    Patient would like the video invitation sent by: Send to e-mail at: [email or web address]      Video Start Time: 10:42 AM    Video-Visit Details    Type of service:  Video Visit    Video End Time (time video stopped): 11:09AM    Originating Location (pt. Location): Home    Distant Location (provider location):  [facility]     Mode of Communication:  Video Conference via C-sam      HISTORY OF PRESENT ILLNESS: Jyothi Freitas is a 54 year old female who presents with the following oncologic history:   1.  10/11/2019: Diagnostic right sided mammogram performed for a palpable lump in the right breast.  This showed an irregularly-shaped spiculated mass in the upper outer right breast with prominent lymph nodes in the right axilla.  Targeted ultrasound " of the right upper outer breast showed an irregularly-shaped hypoechoic mass at the 10 o'clock position, 4 cm from the nipple measuring 2.6 x 1.5 x 2.4 cm.  Right axillary ultrasound showed moderately enlarged lymph nodes.  Right breast needle biopsy showed a grade 3 invasive ductal carcinoma with lymphovascular invasion identified, ER strongly positive at 95%, OK strongly positive at 95%, HER-2/juan FISH negative.  Right axillary lymph node measuring 2 cm was positive for metastatic carcinoma.  Note prior 4/2019 mammogram deemed normal.  2.  10/15/2019: Bilateral breast MRI showed known right breast malignancy measuring 2.6 x 1.4 x 2 cm, 3 mildly enlarged right axillary lymph nodes and no contralateral breast malignancy.  3. 10/21/2019 PET scan showed scattered small hypermetabolic bilateral axillary lymph nodes; for example, a hypermetabolic right axillary lymph node measures 1.6 x 0.9 cm with SUV max 3.6.  Hypermetabolic mass in the right breast superiorly and laterally measuring 2.1 x 1.6 cm with SUV max 4.3.  A 0.6 cm low-density lesion in the right hepatic lobe is too small for accurate PET characterization but shows no appreciable hypermetabolic activity.  Incidentally noted ectasia of the ascending thoracic aorta measures 4 cm in diameter.  4.  10/23/2019: Left axillary ultrasound showed benign-appearing lymph node.  Left axillary lymph node biopsy showed benign lymph node tissue.  5.  10/29/2019: Started neoadjuvant chemotherapy with dose dense Adriamycin and Cytoxan, followed by weekly paclitaxel with completion on 3/12/2020.  6.  3/19/2020: Breast MRI showed no residual normal enhancement in the right breast mass.  The right axillary lymphadenopathy has resolved.  7. 4/01/2020: Underwent right breast lumpectomy and right axillary sentinel lymph node biopsy under care of Dr. Kaila Hernandez.  Pathology showed fibrocystic change and no evidence of residual carcinoma in the right breast.  Metastatic breast  adenocarcinoma to one of 5 lymph node fragments in 1 of 3 lymph nodes excised. Extranodal extension present. ypT0-N1a.    INTERIM HISTORY:  Jyothi reports feeling overall well and denies any recent fevers, chills, shortness of breath, cough, bowel or bladder dysfunction. Hot flashes are stable to improved. She does report some right axillary lymphatic cording.    REVIEW OF SYSTEMS:   14 point ROS was reviewed and is negative other than as noted above in HPI.       HOME MEDICATIONS:  Current Outpatient Medications   Medication Sig Dispense Refill     acetaminophen (TYLENOL) 500 MG tablet Take 1,000 mg by mouth every 6 hours as needed for mild pain       acetaminophen-caffeine (EXCEDRIN TENSION HEADACHE) 500-65 MG TABS Take 2 tablets by mouth every 6 hours as needed for mild pain       ALBUTEROL 108 (90 BASE) MCG/ACT inhaler INHALE 2 PUFFS INTO THE LUNGS EVERY 6 HOURS AS NEEDED FOR SHORTNESS OF BREATH OR DIFFICULT BREATHING OR WHEEZING 8.5 g 0     buPROPion (WELLBUTRIN XL) 300 MG 24 hr tablet Take 1 tablet (300 mg) by mouth every morning 90 tablet 3     HYDROcodone-acetaminophen (NORCO) 5-325 MG tablet Take 1-2 tablets by mouth every 4 hours as needed for moderate to severe pain 15 tablet 0     nadolol (CORGARD) 20 MG tablet TAKE 1 TABLET(20 MG) BY MOUTH DAILY 90 tablet 3     polyethylene glycol (MIRALAX) packet Take 17 g by mouth daily 10 packet 0     senna-docusate (SENOKOT-S/PERICOLACE) 8.6-50 MG tablet Take 1-2 tablets by mouth 2 times daily as needed for constipation (While on oral opioids.) 20 tablet 0     zolpidem (AMBIEN) 5 MG tablet Take 1 tablet (5 mg) by mouth nightly as needed for sleep 30 tablet 1         ALLERGIES:  Allergies   Allergen Reactions     Sulfa Drugs Other (See Comments)     Red face. Ringing in the ears.         PAST MEDICAL HISTORY:  Past Medical History:   Diagnosis Date     GERD (gastroesophageal reflux disease)      H/O colonoscopy 03/08/2016    done at [place] and nl     Hematuria 2016     "eval at [place] and per pt cysto and ct neg     Intermittent asthma     since childhood     Low iron 10/2017    done for eval of hair loss, egd nl     Migraines     most of adult life, has seen neuro in past, on bblocker     Mild depression (H)      Normal stress echocardiogram July 2011    due to hear racing     Osteopenia 2008     Syncope 03/2017    echo nl lv size and fxn, grade 1 dd, mild tr         PAST SURGICAL HISTORY:  Past Surgical History:   Procedure Laterality Date     BIOPSY NODE SENTINEL Right 4/1/2020    Procedure: RIGHT SENTINEL LYMPH NODE BIOPSY;  Surgeon: Kaila Hernandez MD;  Location:  OR     C [facility]J ARTHROSCOPY/SURGERY       ESOPHAGOSCOPY, GASTROSCOPY, DUODENOSCOPY (EGD), COMBINED N/A 10/30/2017    Procedure: COMBINED ESOPHAGOSCOPY, GASTROSCOPY, DUODENOSCOPY (EGD), BIOPSY SINGLE OR MULTIPLE;  COMBINED ESOPHAGOSCOPY, GASTROSCOPY, DUODENOSCOPY (EGD);  Surgeon: Martin Rodriguez MD;  Location:  GI     INSERT PORT VASCULAR ACCESS N/A 10/24/2019    Procedure: PORT PLACEMENT;  Surgeon: Kaila Hernandez MD;  Location:  OR     LUMPECTOMY BREAST WITH SEED LOCALIZATION Right 4/1/2020    Procedure: SEED LOCALIZED RIGHT BREAST LUMPECTOMY WITH SEED LOCALIZED RIGHT AXILLARY NODE EXCISION;  Surgeon: Kaila Hernandez MD;  Location:  OR     ORTHOPEDIC SURGERY      \"leg surgery\"     REMOVE PORT VASCULAR ACCESS Left 4/1/2020    Procedure: REMOVAL, VASCULAR ACCESS PORT;  Surgeon: Kaila Hernandez MD;  Location:  OR     single oopherectomy  2010    cyst         SOCIAL HISTORY:  Social History     Socioeconomic History     Marital status:      Spouse name: Not on file     Number of children: 2     Years of education: Not on file     Highest education level: Not on file   Occupational History     Not on file   Social Needs     Financial resource strain: Not on file     Food insecurity     Worry: Not on file     Inability: Not on file     Transportation needs     Medical: Not on file     " Non-medical: Not on file   Tobacco Use     Smoking status: Former Smoker     Packs/day: 0.00     Last attempt to quit: 3/27/1997     Years since quittin.0     Smokeless tobacco: Never Used   Substance and Sexual Activity     Alcohol use: Yes     Comment: rarely     Drug use: No     Sexual activity: Yes     Partners: Male     Birth control/protection: Pill   Lifestyle     Physical activity     Days per week: Not on file     Minutes per session: Not on file     Stress: Not on file   Relationships     Social connections     Talks on phone: Not on file     Gets together: Not on file     Attends Pentec[employer]ism service: Not on file     Active member of club or organization: Not on file     Attends meetings of clubs or organizations: Not on file     Relationship status: Not on file     Intimate partner violence     Fear of current or ex partner: Not on file     Emotionally abused: Not on file     Physically abused: Not on file     Forced sexual activity: Not on file   Other Topics Concern     Parent/sibling w/ CABG, MI or angioplasty before 65F 55M? Yes   Social History Narrative     Not on file         FAMILY HISTORY:  Family History   Problem Relation Age of Onset     Coronary Artery Disease Father 48        MI. and one in his 60s     Emphysema Mother         COPD         PHYSICAL EXAM:  Vital signs:  Not taken.  ECO  GENERAL: No acute distress.  ENT: No scleral icterus.  INTEGUMENT: No overt rashes or jaundice.    LABS:  CBC RESULTS:   Recent Labs   Lab Test 20  0729   WBC 4.0   RBC 3.86   HGB 12.2   HCT 36.6   MCV 95   MCH 31.6   MCHC 33.3   RDW 12.5        Last Comprehensive Metabolic Panel:  Sodium   Date Value Ref Range Status   2020 140 133 - 144 mmol/L Final     Potassium   Date Value Ref Range Status   2020 3.9 3.4 - 5.3 mmol/L Final     Chloride   Date Value Ref Range Status   2020 108 94 - 109 mmol/L Final     Carbon Dioxide   Date Value Ref Range Status   2020 25 20 -  32 mmol/L Final     Anion Gap   Date Value Ref Range Status   03/26/2020 7 3 - 14 mmol/L Final     Glucose   Date Value Ref Range Status   03/26/2020 108 (H) 70 - 99 mg/dL Final     Urea Nitrogen   Date Value Ref Range Status   03/26/2020 14 7 - 30 mg/dL Final     Creatinine   Date Value Ref Range Status   03/26/2020 0.68 0.52 - 1.04 mg/dL Final     GFR Estimate   Date Value Ref Range Status   03/26/2020 >90 >60 mL/min/[1.73_m2] Final     Comment:     Non  GFR Calc  Starting 12/18/2018, serum creatinine based estimated GFR (eGFR) will be   calculated using the Chronic Kidney Disease Epidemiology Collaboration   (CKD-EPI) equation.       Calcium   Date Value Ref Range Status   03/26/2020 9.0 8.5 - 10.1 mg/dL Final     Bilirubin Total   Date Value Ref Range Status   03/26/2020 0.2 0.2 - 1.3 mg/dL Final     Alkaline Phosphatase   Date Value Ref Range Status   03/26/2020 47 40 - 150 U/L Final     ALT   Date Value Ref Range Status   03/26/2020 24 0 - 50 U/L Final     AST   Date Value Ref Range Status   03/26/2020 16 0 - 45 U/L Final       PATHOLOGY:  None new.    IMAGING:  None new since last visit.    ASSESSMENT/PLAN:  Jyothi Freitas is a 53 year old female with the following issues:  1.  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 She had a very good partial response to neoadjuvant chemotherapy albeit with some residual disease in one of the sentinel nodes with extranodal extension.  -She met with Dr. Jaycob Hurd on 4/9/2020 to discuss adjuvant radiation therapy.  She plans to proceed with radiation 4 weeks post-surgery.  -I recommended to Jyothi that she start adjuvant endocrine therapy with anastrozole either now or after completion of radiation but preferably now given some residual lymph node involvement.  She is willing to  start anastrazole now.  Prescription issued.    2. Depression  -Mood stable. Continue bupropion.     3.  Insomnia   4.  Hot flashes, chemotherapy induced  -Oxybutynin did help with hot flashes but she had too much dry eye syndrome and fluid retention.   -She has had improvement in her hot flashes and insomnia with the use of gabapentin.  However, she did not want the weight gain associated with gabapentin.  -She has switched back to Ambien which is helping with her sleep without waking.    5. Ectasia of thoracic aorta  -This measured 4 cm on the PET scan.  -Surgical management not needed but she will need ongoing monitoring. She is following with Dr. Silverio Gooden for monitoring of this issue.    6. Lymphatic cording  -Will refer her to lymphedema.  She prefers a virtual visit if possible.     Return in about 2 months for a video visit.    Maricarmen Monroe MD  Hematology/Oncology  [facility]    I spent a total of 26 minutes with the patient, with greater than 50% of the time in counseling and coordination of care.

## 2020-04-13 ENCOUNTER — VIRTUAL VISIT (OUTPATIENT)
Dept: ONCOLOGY | Facility: CLINIC | Age: 55
End: 2020-04-13
Attending: INTERNAL MEDICINE
Payer: COMMERCIAL

## 2020-04-13 DIAGNOSIS — Z17.0 MALIGNANT NEOPLASM OF UPPER-OUTER QUADRANT OF RIGHT BREAST IN FEMALE, ESTROGEN RECEPTOR POSITIVE (H): Primary | ICD-10-CM

## 2020-04-13 DIAGNOSIS — F32.5 MAJOR DEPRESSION IN COMPLETE REMISSION (H): ICD-10-CM

## 2020-04-13 DIAGNOSIS — I89.0 LYMPHEDEMA OF RIGHT ARM: ICD-10-CM

## 2020-04-13 DIAGNOSIS — N95.1 MENOPAUSAL SYNDROME (HOT FLASHES): ICD-10-CM

## 2020-04-13 DIAGNOSIS — C50.411 MALIGNANT NEOPLASM OF UPPER-OUTER QUADRANT OF RIGHT BREAST IN FEMALE, ESTROGEN RECEPTOR POSITIVE (H): Primary | ICD-10-CM

## 2020-04-13 PROCEDURE — 99214 OFFICE O/P EST MOD 30 MIN: CPT | Mod: GT | Performed by: INTERNAL MEDICINE

## 2020-04-13 RX ORDER — ANASTROZOLE 1 MG/1
1 TABLET ORAL DAILY
Qty: 90 TABLET | Refills: 3 | Status: SHIPPED | OUTPATIENT
Start: 2020-04-13 | End: 2020-06-15 | Stop reason: SINTOL

## 2020-04-13 NOTE — PROGRESS NOTES
"Jyothi Freitas is a 54 year old female who is being evaluated via a billable video visit.      The patient has been notified of following:     \"This video visit will be conducted via a call between you and your physician/provider. We have found that certain health care needs can be provided without the need for an in-person physical exam.  This service lets us provide the care you need with a video conversation.  If a prescription is necessary we can send it directly to your pharmacy.  If lab work is needed we can place an order for that and you can then stop by our lab to have the test done at a later time.    Video visits are billed at different rates depending on your insurance coverage.  Please reach out to your insurance provider with any questions.    If during the course of the call the physician/provider feels a video visit is not appropriate, you will not be charged for this service.\"    Patient has given verbal consent for Video visit? Yes    How would you like to obtain your AVS? Yon    Patient would like the video invitation sent by: Send to e-mail at: [email or web address]      Video Start Time:   Additional provider notes:yes    Video-Visit Details    Type of service:  Video Visit    Video End Time (time video stopped):   Originating Location (pt. Location): Home    Distant Location (provider location):  [facility]     Mode of Communication:  Video Conference via Emelia Nagy CMA        "

## 2020-04-17 DIAGNOSIS — J45.20 INTERMITTENT ASTHMA, UNCOMPLICATED: ICD-10-CM

## 2020-04-17 DIAGNOSIS — R55 SYNCOPE, UNSPECIFIED SYNCOPE TYPE: ICD-10-CM

## 2020-04-17 NOTE — TELEPHONE ENCOUNTER
.Reason for Call:  Medication or medication refill:    Do you use a [facility]?  Name of the pharmacy and phone number for the current request:  [id number] [address] - [phone]     Name of the medication requested: ALBUTEROL 108 (90 BASE) MCG/ACT inhaler     Other request: pt is requesting refill of rescue inhaler.     Can we leave a detailed message on this number? YES    Phone number patient can be reached at: Home number on file [phone] (home)    Best Time: any    Call taken on 4/17/2020 at 11:57 AM by Mayra Ace

## 2020-04-20 RX ORDER — ALBUTEROL SULFATE 90 UG/1
AEROSOL, METERED RESPIRATORY (INHALATION)
Qty: 8.5 G | Refills: 0 | Status: SHIPPED | OUTPATIENT
Start: 2020-04-20 | End: 2020-11-06

## 2020-04-20 NOTE — TELEPHONE ENCOUNTER
Routing refill request to provider for review/approval because:  ACT score needed    Please review and authorize if appropriate.    Thank you,   Casa STERN RN

## 2020-04-22 ENCOUNTER — HOSPITAL ENCOUNTER (OUTPATIENT)
Dept: OCCUPATIONAL THERAPY | Facility: CLINIC | Age: 55
Setting detail: THERAPIES SERIES
End: 2020-04-22
Attending: INTERNAL MEDICINE
Payer: COMMERCIAL

## 2020-04-22 PROCEDURE — 97165 OT EVAL LOW COMPLEX 30 MIN: CPT | Mod: GO,GT

## 2020-04-22 PROCEDURE — 97140 MANUAL THERAPY 1/> REGIONS: CPT | Mod: GO,95

## 2020-04-22 NOTE — PROGRESS NOTES
Jyothi Freitas is a 54 year old female who is being seen via a billable video visit.      Patient has given verbal consent for Video visit? Yes    Video Start Time: 7:36 a.m.    Telehealth Visit Details    Type of Service:  Telehealth    Video End Time (time video stopped): 8:36 a/m    Originating Location (pt. location): Home    Additional Participants in Telehealth Visit: none    Distant Location (provider location):  [facility]     Mode of Communication (Audio Visual or Audio Only):  audio and visual    Caden Thomas, OT  April 22, 2020

## 2020-04-22 NOTE — PROGRESS NOTES
04/22/20 1000   Rehab Discipline   Discipline OT   Type of Visit   Type of visit Initial Edema Evaluation  (telehealth)   General Information   Start of care 04/22/20   Referring physician Michael Monroe   Orders Evaluate and treat as indicated   Order date 04/13/20   Medical diagnosis lymphedema   Onset of illness / date of surgery 04/01/20   Edema onset 04/01/20   Affected body parts RUE;Trunk   Edema etiology Cancer with lymph node dissection;Chemo;Surgery   Location - Cancer with lymph node dissection Pt reports 7 LN removed from r side; 1/7 positive   Chemotherapy comments Neoadjuntive chemo 10/19-3/20   Edema etiology comments Pt found to have R sided breast mass in Oct 2019. Biopsy and US confirm r sided breast cancer. Pt has receievd neoadjunctive chemo Oct 2019 through March 2020. April 1st 2020 pt had R sided lumpectomy and LND. Raditaiont o start May 2020 for 5 weeks   Pertinent history of current problem (PT: include personal factors and/or comorbidities that impact the POC; OT: include additional occupational profile info) PMH significant for asthma, migraines, mild depression, and breats cancer.   Surgical / medical history reviewed Yes   Prior level of functional mobility I   Prior treatment   (none)   Community support Family / friend caregiver   Patient role / employment history Employed   Living environment Apartment / condo   Fall Risk Screen   Fall screen completed by OT   Have you fallen 2 or more times in the past year? No   Have you fallen and had an injury in the past year? No   Is patient a fall risk? No   Abuse Screen (yes response referral indicated)   Feels Unsafe at Home or Work/School no   Feels Threatened by Someone no   Does Anyone Try to Keep You From Having Contact with Others or Doing Things Outside Your Home? no   Physical Signs of Abuse Present no   System Outcome Measures   Lymphedema Life Impact Scale (score range 0-72). A higher score indicates greater impairment.   (no LLIS  issued-telehealth)   Subjective Report   Patient report of symptoms discomfort and compromised AROM with RUE shoulder use   Patient / Family Goals   Patient / family goals statement to learn about lymphedema; to get help with STACIA on pts R side   Pain   Patient currently in pain   (with reaching RUE)   Pain comments Pt has discomfort with reaching tasks but at rest no pain   Cognitive Status   Orientation Orientation to person, place and time   Level of consciousness Alert   Follows commands and answers questions 100% of the time   Personal safety and judgement Intact   Edema Exam / Assessment   Skin condition Non-pitting  (no visualized lymphedema noted; STACIA s/s noted)   Skin condition comments no lymphedema identified  (pt has no concerns about lymphedema)   Radial pulse comments   (unable to assess-telehealth)   Stemmer sign comments   (unable to assess-telehealth)   Girth Measurements   Girth Measurements   (unable to assess-telehealth)   Range of Motion   ROM comments RUE displays gross limitation sin shoulder flexion and abduction with compensation movements noted and asymmetry with LUE mikey flexion/abduction. Approx 15-20 degrees lacking mikey flexion R side noted  (unable to measure-telehealth)   Strength   Strength comments   (unable to assess-telehealth; 3+/5 displayed BUE)   Activities of Daily Living   Activities of Daily Living I-Mod I   Bed Mobility   Bed mobility I   Transfers   Transfers I   Sensory   Sensory perception comments no neuropathy reported or identified   Vascular Assessment   Vascular Assessment Comments no concerns noted or reported   Coordination   Coordination Gross motor coordination appropriate   Muscle Tone   Muscle tone No deficits were identified   Planned Edema Interventions   Planned edema interventions Manual lymph drainage;Exercises;Precautions to prevent infection / exacerbation;Education;Manual therapy;Skin care / precautions;Myofascial release;Home management program  development   Clinical Impression   Criteria for skilled therapeutic intervention met Yes   Therapy diagnosis STACIA; AROM deficits   Influenced by the following impairments / conditions Edema;Axillary Web Syndrome   Assessment of Occupational Performance 1-3 Performance Deficits   Identified Performance Deficits pain with reaching tasks; pain with UB self cares (dressing)   Clinical Decision Making (Complexity) Low complexity   Treatment Frequency 2x/week   Treatment duration 2x/wk x 4 weeks for in clinic; 1x/wk x 6 weeks with telehealth 2/2 covid   Patient / family and/or staff in agreement with plan of care Yes   Risks and benefits of therapy have been explained Yes   Clinical impression comments Pt will benefit from skilled lymphedema services to improve RUE shoulder AROM in planes flexion and abduction for pain reducutons that will aide improvements in reaching and UB dressing tasks. Pt will benefit from education on s/s lymphedema for self monitoring and early intevrention needs as issues may or may not present.   Goals   Edema Eval Goals 1;2;3   Goal 1   Goal identifier Education   Goal description Pt will report understanding s/s lymphedema, treatment options, and s/s skin infections for safety and I building with home management STACIA/lymphedema   Target date 04/22/20   Date met 04/22/20   Goal 2   Goal identifier Pain   Goal description Pt will report a reduction or cessation of pain and discomfort with reaching tasks for improvements in UB drssing and reaching task engagement   Target date 06/26/20   Goal 3   Goal identifier AROM   Goal description Pt will display BUE mikey flexion and abuction movements within 5 degrees of one another for R shoulder flexion and abduction improvements needed to aide UB dressing and reaching task engagement   Target date 06/26/20   Total Evaluation Time   OT Hakeem, Low Complexity Minutes (39675) 20

## 2020-04-30 DIAGNOSIS — F32.A MILD DEPRESSION: ICD-10-CM

## 2020-05-01 RX ORDER — BUPROPION HYDROCHLORIDE 300 MG/1
TABLET ORAL
Qty: 90 TABLET | Refills: 3 | Status: SHIPPED | OUTPATIENT
Start: 2020-05-01 | End: 2021-04-12

## 2020-05-01 NOTE — TELEPHONE ENCOUNTER
Routing refill request to provider for review/approval because:  PHQ9 score of 8 (not on protocol for > the 5).  Please authorize if appropriate.  Thanks,  Bisi Pires RN

## 2020-05-07 DIAGNOSIS — N95.1 MENOPAUSAL SYNDROME (HOT FLASHES): Primary | ICD-10-CM

## 2020-05-07 RX ORDER — GABAPENTIN 100 MG/1
300 CAPSULE ORAL EVERY EVENING
Qty: 270 CAPSULE | Refills: 3 | Status: SHIPPED | OUTPATIENT
Start: 2020-05-07 | End: 2020-07-13

## 2020-05-07 NOTE — TELEPHONE ENCOUNTER
"Refill request received for Gabapentin 100mg Capsules. Per last visit note,   \"-She has had improvement in her hot flashes and insomnia with the use of gabapentin.  However, she did not want the weight gain associated with gabapentin.\"    No active Rx for Gabapentin in patient's medication list.     Writer contacted patient to discuss Rx request. Patient reports she did request refill. She had stopped Gabapentin for a short time, but decided to resume for her night sweats. Does want a refill. Confirmed she is taking 300mg every every everning. Requested 100mg capsules in case she needs to increase dosage eventually. Also requesting 90 day supply if possible.     Rx entered as requested and routed to Dr. Monroe for signature if appropriate.     Shilpa Sebastian, BSN, RN, PHN    "

## 2020-06-10 ENCOUNTER — TELEPHONE (OUTPATIENT)
Dept: ONCOLOGY | Facility: CLINIC | Age: 55
End: 2020-06-10

## 2020-06-10 NOTE — TELEPHONE ENCOUNTER
Called Jyothi she was unable to do video visit this morning. Appointment will be rescheduled to Monday 6/15/20 at 0200 PM. Tricia Smiley RN,BSN,OCN

## 2020-06-10 NOTE — TELEPHONE ENCOUNTER
Oncology Distress Screening Follow-up  Clinical Social Work  [facility]    Identified Concern and Score From Distress Screenin. How concerned are you about your ability to eat?   0            2. How concerned are you about unintended weight loss or your current weight?   0            3. How concerned are you about feeling depressed or very sad?   7Abnormal              4. How concerned are you about feeling anxious or very scared?   0            5. Do you struggle with the loss of meaning and miles in your life?       Not at all            6. How concerned are you about work and home life issues that may be affected by your cancer?   0            7. How concerned are you about knowing what resources are available to help you?   0            8. Do you currently have what you would describe as Confucianism or spiritual struggles?               Not at all              Date of Distress Screenin/10/20    Intervention:   Jyothi is a 54-year-old woman with a diagnosis of breast cancer who is followed by Dr. Monroe at [facility] [place], who scored positive on distress screen from [place] this morning. This clinician attempted to reach Jyothi on phone today to follow-up regarding positive distress screen, but was unable to reach patient or leave a voicemail as mailbox was full and not accepting messages.     Follow-up Required:   This clinician will follow-up within 72 hours. Patient was previously given social work contact information.     Please page in the case of psychosocial distress or need.    STEPHANIE Dumont, [facility] CenterSW  Phone: [phone]  Pager: [phone]    [facility]: M, Thu  *every other Tue, 8am-4:30pm  [facility]: W, F, *every other Tue, 8am-4:30pm

## 2020-06-11 ENCOUNTER — TELEPHONE (OUTPATIENT)
Dept: ONCOLOGY | Facility: CLINIC | Age: 55
End: 2020-06-11

## 2020-06-11 NOTE — TELEPHONE ENCOUNTER
Oncology Distress Screening Follow-up  Clinical Social Work  [facility]    Identified Concern and Score From Distress Screenin. How concerned are you about your ability to eat?   0            2. How concerned are you about unintended weight loss or your current weight?   0            3. How concerned are you about feeling depressed or very sad?   7Abnormal              4. How concerned are you about feeling anxious or very scared?   0            5. Do you struggle with the loss of meaning and miles in your life?       Not at all            6. How concerned are you about work and home life issues that may be affected by your cancer?   0            7. How concerned are you about knowing what resources are available to help you?   0            8. Do you currently have what you would describe as Sikhism or spiritual struggles?               Not at all              Date of Distress Screenin/10/20    Intervention:   Outreach attempt x2 made today. Jyothi is a 54-year-old woman with a diagnosis of breast cancer who is followed by Dr. Monroe at [facility] [place], who scored positive on distress screen from [place] yesterday. This clinician attempted to reach Jyothi on phone today to follow-up regarding positive distress screen, but was unable to reach patient or leave a voicemail as mailbox was full and not accepting messages.     Follow-up Required:   This clinician will send [employer] message with social work contact information, and notify Dr. Monroe of distress recorded for next appointment 6/15/20. Will continue to be available as needed for ongoing psychosocial support.     Please page in the case of psychosocial distress or need.    STEPHANIE Dumont, [facility]  Phone: [phone]  Pager: [phone]    [facility]: M, Thu  *every other Tue, 8am-4:30pm  [facility]: W, F, *every other Tue, 8am-4:30pm

## 2020-06-13 NOTE — PROGRESS NOTES
"[facility]    Hematology/Oncology Established Patient Follow-up Note      Today's Date: 6/15/20    Reason for Follow-up: Right breast cancer.    Jyothi Freitas is a 54 year old female who is being evaluated via a billable video visit.      The patient has been notified of following:     \"This video visit will be conducted via a call between you and your physician/provider. We have found that certain health care needs can be provided without the need for an in-person physical exam.  This service lets us provide the care you need with a video conversation.  If a prescription is necessary we can send it directly to your pharmacy.  If lab work is needed we can place an order for that and you can then stop by our lab to have the test done at a later time.    Video visits are billed at different rates depending on your insurance coverage.  Please reach out to your insurance provider with any questions.    If during the course of the call the physician/provider feels a video visit is not appropriate, you will not be charged for this service.\"    Patient has given verbal consent for Video visit? Yes    How would you like to obtain your AVS? Yon    Patient in virtual waiting room           Video-Visit Details    Type of service:  Video Visit    Originating Location (pt. Location): Home    Distant Location (provider location):  [facility]     Mode of Communication:  Video Conference via [employer]    Video visit duration: 35 minutes    HISTORY OF PRESENT ILLNESS: Jyothi Freitas is a 54 year old female who presents with the following oncologic history:   1.  10/11/2019: Diagnostic right sided mammogram performed for a palpable lump in the right breast.  This showed an irregularly-shaped spiculated mass in the upper outer right breast with prominent lymph nodes in the right axilla.  Targeted ultrasound of the right upper outer breast showed an irregularly-shaped hypoechoic mass at the 10 o'clock " position, 4 cm from the nipple measuring 2.6 x 1.5 x 2.4 cm.  Right axillary ultrasound showed moderately enlarged lymph nodes.  Right breast needle biopsy showed a grade 3 invasive ductal carcinoma with lymphovascular invasion identified, ER strongly positive at 95%, OK strongly positive at 95%, HER-2/juan FISH negative.  Right axillary lymph node measuring 2 cm was positive for metastatic carcinoma.  Note prior 4/2019 mammogram deemed normal.  2.  10/15/2019: Bilateral breast MRI showed known right breast malignancy measuring 2.6 x 1.4 x 2 cm, 3 mildly enlarged right axillary lymph nodes and no contralateral breast malignancy.  3. 10/21/2019 PET scan showed scattered small hypermetabolic bilateral axillary lymph nodes; for example, a hypermetabolic right axillary lymph node measures 1.6 x 0.9 cm with SUV max 3.6.  Hypermetabolic mass in the right breast superiorly and laterally measuring 2.1 x 1.6 cm with SUV max 4.3.  A 0.6 cm low-density lesion in the right hepatic lobe is too small for accurate PET characterization but shows no appreciable hypermetabolic activity.  Incidentally noted ectasia of the ascending thoracic aorta measures 4 cm in diameter.  4.  10/23/2019: Left axillary ultrasound showed benign-appearing lymph node.  Left axillary lymph node biopsy showed benign lymph node tissue.  5.  10/29/2019: Started neoadjuvant chemotherapy with dose dense Adriamycin and Cytoxan, followed by weekly paclitaxel with completion on 3/12/2020.  6.  3/19/2020: Breast MRI showed no residual enhancement in the right breast mass.  The right axillary lymphadenopathy has resolved.  7. 4/01/2020: Underwent right breast lumpectomy and right axillary sentinel lymph node biopsy under care of Dr. Kaila Hernandez.  Pathology showed fibrocystic change and no evidence of residual carcinoma in the right breast.  Metastatic breast adenocarcinoma to one of 5 lymph node fragments in 1 of 3 lymph nodes excised. Extranodal extension  present. ypT0-N1a.  8. 4/29/2020: Started adjuvant anastrazole.  9. 6/15/2020: Completed adjuvant radiation therapy.    INTERIM HISTORY:  Jyothi completed adjuvant radiation therapy as of today.  She reports fatigue.  She does reports joint stiffness and polyarthralgias after starting anastrazole.  She is biking and doing other exercise.  She denies any recent fevers, chills, shortness of breath, cough, bowel or bladder dysfunction. Hot flashes are under control with adding back in gabapentin 300 mg. She denies right axillary lymphatic cording.    REVIEW OF SYSTEMS:   14 point ROS was reviewed and is negative other than as noted above in HPI.       HOME MEDICATIONS:  Current Outpatient Medications   Medication Sig Dispense Refill     acetaminophen (TYLENOL) 500 MG tablet Take 1,000 mg by mouth every 6 hours as needed for mild pain       acetaminophen-caffeine (EXCEDRIN TENSION HEADACHE) 500-65 MG TABS Take 2 tablets by mouth every 6 hours as needed for mild pain       albuterol (PROAIR HFA) 108 (90 Base) MCG/ACT inhaler INHALE 2 PUFFS INTO THE LUNGS EVERY 6 HOURS AS NEEDED FOR SHORTNESS OF BREATH OR DIFFICULT BREATHING OR WHEEZING 8.5 g 0     anastrozole (ARIMIDEX) 1 MG tablet Take 1 tablet (1 mg) by mouth daily 90 tablet 3     buPROPion (WELLBUTRIN XL) 300 MG 24 hr tablet TAKE 1 TABLET(300 MG) BY MOUTH EVERY MORNING 90 tablet 3     gabapentin (NEURONTIN) 100 MG capsule Take 3 capsules (300 mg) by mouth every evening 270 capsule 3     HYDROcodone-acetaminophen (NORCO) 5-325 MG tablet Take 1-2 tablets by mouth every 4 hours as needed for moderate to severe pain 15 tablet 0     nadolol (CORGARD) 20 MG tablet TAKE 1 TABLET(20 MG) BY MOUTH DAILY (Patient not taking: Reported on 6/10/2020) 90 tablet 3     polyethylene glycol (MIRALAX) packet Take 17 g by mouth daily 10 packet 0     senna-docusate (SENOKOT-S/PERICOLACE) 8.6-50 MG tablet Take 1-2 tablets by mouth 2 times daily as needed for constipation (While on oral  "opioids.) (Patient not taking: Reported on 6/10/2020) 20 tablet 0     zolpidem (AMBIEN) 5 MG tablet Take 1 tablet (5 mg) by mouth nightly as needed for sleep 30 tablet 1         ALLERGIES:  Allergies   Allergen Reactions     Sulfa Drugs Other (See Comments)     Red face. Ringing in the ears.         PAST MEDICAL HISTORY:  Past Medical History:   Diagnosis Date     GERD (gastroesophageal reflux disease)      H/O colonoscopy 03/08/2016    done at [facility] and [facility]     Hematuria 2016    eval at [facility] and per pt cysto and ct neg     Intermittent asthma     since childhood     Low iron 10/2017    done for eval of hair loss, [facility]d [facility]     Migraines     most of adult life, has seen neuro in past, on bblocker     Mild depression (H)      Normal stress echocardiogram July 2011    due to hear racing     Osteopenia 2008     Syncope 03/2017    echo [facility] lv size and fxn, grade 1 dd, mild tr         PAST SURGICAL HISTORY:  Past Surgical History:   Procedure Laterality Date     BIOPSY NODE SENTINEL Right 4/1/2020    Procedure: RIGHT SENTINEL LYMPH NODE BIOPSY;  Surgeon: Kaila Hernandez MD;  Location:  OR      TMJ ARTHROSCOPY/SURGERY       ESOPHAGOSCOPY, GASTROSCOPY, DUODENOSCOPY (EGD), COMBINED N/A 10/30/2017    Procedure: COMBINED ESOPHAGOSCOPY, GASTROSCOPY, DUODENOSCOPY (EGD), BIOPSY SINGLE OR MULTIPLE;  COMBINED ESOPHAGOSCOPY, GASTROSCOPY, DUODENOSCOPY (EGD);  Surgeon: Martin Rodriguez MD;  Location:  GI     INSERT PORT VASCULAR ACCESS N/A 10/24/2019    Procedure: PORT PLACEMENT;  Surgeon: Kaila Hernandez MD;  Location:  OR     LUMPECTOMY BREAST WITH SEED LOCALIZATION Right 4/1/2020    Procedure: SEED LOCALIZED RIGHT BREAST LUMPECTOMY WITH SEED LOCALIZED RIGHT AXILLARY NODE EXCISION;  Surgeon: Kaila Hernandez MD;  Location:  OR     ORTHOPEDIC SURGERY      \"leg surgery\"     REMOVE PORT VASCULAR ACCESS Left 4/1/2020    Procedure: REMOVAL, VASCULAR ACCESS PORT;  Surgeon: Kaila Hernandez MD;  Location:  OR     single " oopherectomy  2010    cyst         SOCIAL HISTORY:  Social History     Socioeconomic History     Marital status:      Spouse name: Not on file     Number of children: 2     Years of education: Not on file     Highest education level: Not on file   Occupational History     Not on file   Social Needs     Financial resource strain: Not on file     Food insecurity     Worry: Not on file     Inability: Not on file     Transportation needs     Medical: Not on file     Non-medical: Not on file   Tobacco Use     Smoking status: Former Smoker     Packs/day: 0.00     Last attempt to quit: 3/27/1997     Years since quittin.2     Smokeless tobacco: Never Used   Substance and Sexual Activity     Alcohol use: Yes     Comment: rarely     Drug use: No     Sexual activity: Yes     Partners: Male     Birth control/protection: Pill   Lifestyle     Physical activity     Days per week: Not on file     Minutes per session: Not on file     Stress: Not on file   Relationships     Social connections     Talks on phone: Not on file     Gets together: Not on file     Attends [employer] service: Not on file     Active member of club or organization: Not on file     Attends meetings of clubs or organizations: Not on file     Relationship status: Not on file     Intimate partner violence     Fear of current or ex partner: Not on file     Emotionally abused: Not on file     Physically abused: Not on file     Forced sexual activity: Not on file   Other Topics Concern     Parent/sibling w/ CABG, MI or angioplasty before 65F 55M? Yes   Social History Narrative     Not on file         FAMILY HISTORY:  Family History   Problem Relation Age of Onset     Coronary Artery Disease Father 48        MI. and one in his 60s     Emphysema Mother         COPD         PHYSICAL EXAM:  Vital signs:  Not taken.  GENERAL: No acute distress.  EYES: No scleral icterus. No overt erythema.  RESPIRATORY: No cough.  No labored breathing.  MUSCULOSKELETAL: Range  of motion in the neck, shoulders, and arms appear normal.  SKIN: No overt rashes, discolorations, or lesions over the face and neck.  NEUROLOGIC: Alert.  No overt tremors.  PSYCHIATRIC: Normal affect and mood.  Does not appear anxious.    The rest of a comprehensive physical examination is deferred due to PHE (public health emergency) video visit restrictions.    LABS:  CBC RESULTS:   Recent Labs   Lab Test 03/26/20  0729   WBC 4.0   RBC 3.86   HGB 12.2   HCT 36.6   MCV 95   MCH 31.6   MCHC 33.3   RDW 12.5        Last Comprehensive Metabolic Panel:  Sodium   Date Value Ref Range Status   03/26/2020 140 133 - 144 mmol/L Final     Potassium   Date Value Ref Range Status   03/26/2020 3.9 3.4 - 5.3 mmol/L Final     Chloride   Date Value Ref Range Status   03/26/2020 108 94 - 109 mmol/L Final     Carbon Dioxide   Date Value Ref Range Status   03/26/2020 25 20 - 32 mmol/L Final     Anion Gap   Date Value Ref Range Status   03/26/2020 7 3 - 14 mmol/L Final     Glucose   Date Value Ref Range Status   03/26/2020 108 (H) 70 - 99 mg/dL Final     Urea Nitrogen   Date Value Ref Range Status   03/26/2020 14 7 - 30 mg/dL Final     Creatinine   Date Value Ref Range Status   03/26/2020 0.68 0.52 - 1.04 mg/dL Final     GFR Estimate   Date Value Ref Range Status   03/26/2020 >90 >60 mL/min/[1.73_m2] Final     Comment:     Non  GFR Calc  Starting 12/18/2018, serum creatinine based estimated GFR (eGFR) will be   calculated using the Chronic Kidney Disease Epidemiology Collaboration   (CKD-EPI) equation.       Calcium   Date Value Ref Range Status   03/26/2020 9.0 8.5 - 10.1 mg/dL Final     Bilirubin Total   Date Value Ref Range Status   03/26/2020 0.2 0.2 - 1.3 mg/dL Final     Alkaline Phosphatase   Date Value Ref Range Status   03/26/2020 47 40 - 150 U/L Final     ALT   Date Value Ref Range Status   03/26/2020 24 0 - 50 U/L Final     AST   Date Value Ref Range Status   03/26/2020 16 0 - 45 U/L Final        PATHOLOGY:  None new.    IMAGING:  None new since last visit.    ASSESSMENT/PLAN:  Jyothi Freitas is a 53 year old female with the following issues:  1.  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 Polyarthralgias  -I reviewed the pathology results with Jyothi. She had a very good partial response to neoadjuvant chemotherapy albeit with some residual disease in one of the sentinel nodes with extranodal extension.  -She asked about her pathology report again -- I answered several questions she had about her results today.  -She has completed adjuvant radiation therapy as of 6/15/2020, tolerating that overall well but with some fatigue.   -Since she has been having joint stiffness, polyarthralgias, and myalgias from anastrazole, I offered to switch her to letrozole or exemestane.  I discussed that although these alternatives have similar side effects but that some patients may tolerate one aromatase inhibitor better than another.  Jyothi would like to switch to letrozole.  Prescription issued.  Will follow-up on her tolerance in 1 month.  -Will plan to alternate mammogram with breast MRI, staggered by 6 months.  Next mammogram due 10/2020.    3. Depression  -Mood stable. Continue bupropion.     4.  Insomnia   5.  Hot flashes, drug induced  -Oxybutynin did help with hot flashes but she had too much dry eye syndrome and fluid retention.   -She has had significant improvement in her hot flashes and insomnia with the use of gabapentin, which she resumed and will continue.  -She will continue to take Ambien as needed.    6. Ectasia of thoracic aorta  -This measured 4 cm on the PET scan.  -Surgical management not needed but she will need ongoing monitoring. She is following with Dr. Silverio Gooden for monitoring of this issue.     Return in 1 month.    Maricarmen Monroe,  MD  Hematology/Oncology  [facility]

## 2020-06-15 ENCOUNTER — VIRTUAL VISIT (OUTPATIENT)
Dept: ONCOLOGY | Facility: CLINIC | Age: 55
End: 2020-06-15
Attending: INTERNAL MEDICINE
Payer: COMMERCIAL

## 2020-06-15 ENCOUNTER — TRANSFERRED RECORDS (OUTPATIENT)
Dept: HEALTH INFORMATION MANAGEMENT | Facility: CLINIC | Age: 55
End: 2020-06-15

## 2020-06-15 DIAGNOSIS — F32.5 MAJOR DEPRESSION IN COMPLETE REMISSION (H): ICD-10-CM

## 2020-06-15 DIAGNOSIS — N95.1 MENOPAUSAL SYNDROME (HOT FLASHES): ICD-10-CM

## 2020-06-15 DIAGNOSIS — Z17.0 MALIGNANT NEOPLASM OF UPPER-OUTER QUADRANT OF RIGHT BREAST IN FEMALE, ESTROGEN RECEPTOR POSITIVE (H): Primary | ICD-10-CM

## 2020-06-15 DIAGNOSIS — F19.982 DRUG-INDUCED INSOMNIA (H): ICD-10-CM

## 2020-06-15 DIAGNOSIS — C50.411 MALIGNANT NEOPLASM OF UPPER-OUTER QUADRANT OF RIGHT BREAST IN FEMALE, ESTROGEN RECEPTOR POSITIVE (H): Primary | ICD-10-CM

## 2020-06-15 PROCEDURE — 99215 OFFICE O/P EST HI 40 MIN: CPT | Mod: GT | Performed by: INTERNAL MEDICINE

## 2020-06-15 RX ORDER — LETROZOLE 2.5 MG/1
2.5 TABLET, FILM COATED ORAL DAILY
Qty: 30 TABLET | Refills: 3 | Status: SHIPPED | OUTPATIENT
Start: 2020-06-15 | End: 2020-07-13

## 2020-06-15 NOTE — LETTER
"    6/15/2020         RE: Jyothi Freitas  [address]  [place] [address]        Dear Colleague,    Thank you for referring your patient, Jyothi Freitas, to the [facility]. Please see a copy of my visit note below.    [facility]    Hematology/Oncology Established Patient Follow-up Note      Today's Date: 6/15/20    Reason for Follow-up: Right breast cancer.    Jyothi Freitas is a 54 year old female who is being evaluated via a billable video visit.      The patient has been notified of following:     \"This video visit will be conducted via a call between you and your physician/provider. We have found that certain health care needs can be provided without the need for an in-person physical exam.  This service lets us provide the care you need with a video conversation.  If a prescription is necessary we can send it directly to your pharmacy.  If lab work is needed we can place an order for that and you can then stop by our lab to have the test done at a later time.    Video visits are billed at different rates depending on your insurance coverage.  Please reach out to your insurance provider with any questions.    If during the course of the call the physician/provider feels a video visit is not appropriate, you will not be charged for this service.\"    Patient has given verbal consent for Video visit? Yes    How would you like to obtain your AVS? Yon    Patient in virtual waiting room           Video-Visit Details    Type of service:  Video Visit    Originating Location (pt. Location): Home    Distant Location (provider location):  [facility]     Mode of Communication:  Video Conference via [employer]    Video visit duration: 35 minutes    HISTORY OF PRESENT ILLNESS: Jyothi Freitas is a 54 year old female who presents with the following oncologic history:   1.  10/11/2019: Diagnostic right sided mammogram performed for a palpable lump in the right breast.  This showed " an irregularly-shaped spiculated mass in the upper outer right breast with prominent lymph nodes in the right axilla.  Targeted ultrasound of the right upper outer breast showed an irregularly-shaped hypoechoic mass at the 10 o'clock position, 4 cm from the nipple measuring 2.6 x 1.5 x 2.4 cm.  Right axillary ultrasound showed moderately enlarged lymph nodes.  Right breast needle biopsy showed a grade 3 invasive ductal carcinoma with lymphovascular invasion identified, ER strongly positive at 95%, [place] strongly positive at 95%, HER-2/juan FISH negative.  Right axillary lymph node measuring 2 cm was positive for metastatic carcinoma.  Note prior 4/2019 mammogram deemed normal.  2.  10/15/2019: Bilateral breast MRI showed known right breast malignancy measuring 2.6 x 1.4 x 2 cm, 3 mildly enlarged right axillary lymph nodes and no contralateral breast malignancy.  3. 10/21/2019 PET scan showed scattered small hypermetabolic bilateral axillary lymph nodes; for example, a hypermetabolic right axillary lymph node measures 1.6 x 0.9 cm with SUV max 3.6.  Hypermetabolic mass in the right breast superiorly and laterally measuring 2.1 x 1.6 cm with SUV max 4.3.  A 0.6 cm low-density lesion in the right hepatic lobe is too small for accurate PET characterization but shows no appreciable hypermetabolic activity.  Incidentally noted ectasia of the ascending thoracic aorta measures 4 cm in diameter.  4.  10/23/2019: Left axillary ultrasound showed benign-appearing lymph node.  Left axillary lymph node biopsy showed benign lymph node tissue.  5.  10/29/2019: Started neoadjuvant chemotherapy with dose dense Adriamycin and Cytoxan, followed by weekly paclitaxel with completion on 3/12/2020.  6.  3/19/2020: Breast MRI showed no residual enhancement in the right breast mass.  The right axillary lymphadenopathy has resolved.  7. 4/01/2020: Underwent right breast lumpectomy and right axillary sentinel lymph node biopsy under care of   Kaila David.  Pathology showed fibrocystic change and no evidence of residual carcinoma in the right breast.  Metastatic breast adenocarcinoma to one of 5 lymph node fragments in 1 of 3 lymph nodes excised. Extranodal extension present. ypT0-N1a.  8. 4/29/2020: Started adjuvant anastrazole.  9. 6/15/2020: Completed adjuvant radiation therapy.    INTERIM HISTORY:  Jyothi completed adjuvant radiation therapy as of today.  She reports fatigue.  She does reports joint stiffness and polyarthralgias after starting anastrazole.  She is biking and doing other exercise.  She denies any recent fevers, chills, shortness of breath, cough, bowel or bladder dysfunction. Hot flashes are under control with adding back in gabapentin 300 mg. She denies right axillary lymphatic cording.    REVIEW OF SYSTEMS:   14 point ROS was reviewed and is negative other than as noted above in HPI.       HOME MEDICATIONS:  Current Outpatient Medications   Medication Sig Dispense Refill     acetaminophen (TYLENOL) 500 MG tablet Take 1,000 mg by mouth every 6 hours as needed for mild pain       acetaminophen-caffeine (EXCEDRIN TENSION HEADACHE) 500-65 MG TABS Take 2 tablets by mouth every 6 hours as needed for mild pain       albuterol (PROAIR HFA) 108 (90 Base) MCG/ACT inhaler INHALE 2 PUFFS INTO THE LUNGS EVERY 6 HOURS AS NEEDED FOR SHORTNESS OF BREATH OR DIFFICULT BREATHING OR WHEEZING 8.5 g 0     anastrozole (ARIMIDEX) 1 MG tablet Take 1 tablet (1 mg) by mouth daily 90 tablet 3     buPROPion (WELLBUTRIN XL) 300 MG 24 hr tablet TAKE 1 TABLET(300 MG) BY MOUTH EVERY MORNING 90 tablet 3     gabapentin (NEURONTIN) 100 MG capsule Take 3 capsules (300 mg) by mouth every evening 270 capsule 3     HYDROcodone-acetaminophen (NORCO) 5-325 MG tablet Take 1-2 tablets by mouth every 4 hours as needed for moderate to severe pain 15 tablet 0     nadolol (CORGARD) 20 MG tablet TAKE 1 TABLET(20 MG) BY MOUTH DAILY (Patient not taking: Reported on 6/10/2020) 90  tablet 3     polyethylene glycol (MIRALAX) packet Take 17 g by mouth daily 10 packet 0     senna-docusate (SENOKOT-S/PERICOLACE) 8.6-50 MG tablet Take 1-2 tablets by mouth 2 times daily as needed for constipation (While on oral opioids.) (Patient not taking: Reported on 6/10/2020) 20 tablet 0     zolpidem (AMBIEN) 5 MG tablet Take 1 tablet (5 mg) by mouth nightly as needed for sleep 30 tablet 1         ALLERGIES:  Allergies   Allergen Reactions     Sulfa Drugs Other (See Comments)     Red face. Ringing in the ears.         PAST MEDICAL HISTORY:  Past Medical History:   Diagnosis Date     GERD (gastroesophageal reflux disease)      H/O colonoscopy 03/08/2016    done at [facility] and [facility]     Hematuria 2016    eval at [facility] and per pt cysto and ct neg     Intermittent asthma     since childhood     Low iron 10/2017    done for eval of hair loss, [facility]     Migraines     most of adult life, has seen neuro in past, on bblocker     Mild depression (H)      Normal stress echocardiogram July 2011    due to hear racing     Osteopenia 2008     Syncope 03/2017    echo [facility] lv size and fxn, grade 1 dd, mild tr         PAST SURGICAL HISTORY:  Past Surgical History:   Procedure Laterality Date     BIOPSY NODE SENTINEL Right 4/1/2020    Procedure: RIGHT SENTINEL LYMPH NODE BIOPSY;  Surgeon: Kaila Hernandez MD;  Location:  OR      TMJ ARTHROSCOPY/SURGERY       ESOPHAGOSCOPY, GASTROSCOPY, DUODENOSCOPY (EGD), COMBINED N/A 10/30/2017    Procedure: COMBINED ESOPHAGOSCOPY, GASTROSCOPY, DUODENOSCOPY (EGD), BIOPSY SINGLE OR MULTIPLE;  COMBINED ESOPHAGOSCOPY, GASTROSCOPY, DUODENOSCOPY (EGD);  Surgeon: Martin Rodriguez MD;  Location:  GI     INSERT PORT VASCULAR ACCESS N/A 10/24/2019    Procedure: PORT PLACEMENT;  Surgeon: Kaila Hernandez MD;  Location:  OR     LUMPECTOMY BREAST WITH SEED LOCALIZATION Right 4/1/2020    Procedure: SEED LOCALIZED RIGHT BREAST LUMPECTOMY WITH SEED LOCALIZED RIGHT AXILLARY NODE EXCISION;  Surgeon:  "Kaila Hernandez MD;  Location:  OR     ORTHOPEDIC SURGERY      \"leg surgery\"     REMOVE PORT VASCULAR ACCESS Left 2020    Procedure: REMOVAL, VASCULAR ACCESS PORT;  Surgeon: Kaila Hernandez MD;  Location:  OR     single oopherectomy  2010    cyst         SOCIAL HISTORY:  Social History     Socioeconomic History     Marital status:      Spouse name: Not on file     Number of children: 2     Years of education: Not on file     Highest education level: Not on file   Occupational History     Not on file   Social Needs     Financial resource strain: Not on file     Food insecurity     Worry: Not on file     Inability: Not on file     Transportation needs     Medical: Not on file     Non-medical: Not on file   Tobacco Use     Smoking status: Former Smoker     Packs/day: 0.00     Last attempt to quit: 3/27/1997     Years since quittin.2     Smokeless tobacco: Never Used   Substance and Sexual Activity     Alcohol use: Yes     Comment: rarely     Drug use: No     Sexual activity: Yes     Partners: Male     Birth control/protection: Pill   Lifestyle     Physical activity     Days per week: Not on file     Minutes per session: Not on file     Stress: Not on file   Relationships     Social connections     Talks on phone: Not on file     Gets together: Not on file     Attends Muslim service: Not on file     Active member of club or organization: Not on file     Attends meetings of clubs or organizations: Not on file     Relationship status: Not on file     Intimate partner violence     Fear of current or ex partner: Not on file     Emotionally abused: Not on file     Physically abused: Not on file     Forced sexual activity: Not on file   Other Topics Concern     Parent/sibling w/ CABG, MI or angioplasty before 65F 55M? Yes   Social History Narrative     Not on file         FAMILY HISTORY:  Family History   Problem Relation Age of Onset     Coronary Artery Disease Father 48        MI. and one in his " 60s     Emphysema Mother         COPD         PHYSICAL EXAM:  Vital signs:  Not taken.  GENERAL: No acute distress.  EYES: No scleral icterus. No overt erythema.  RESPIRATORY: No cough.  No labored breathing.  MUSCULOSKELETAL: Range of motion in the neck, shoulders, and arms appear normal.  SKIN: No overt rashes, discolorations, or lesions over the face and neck.  NEUROLOGIC: Alert.  No overt tremors.  PSYCHIATRIC: Normal affect and mood.  Does not appear anxious.    The rest of a comprehensive physical examination is deferred due to PHE (public health emergency) video visit restrictions.    LABS:  CBC RESULTS:   Recent Labs   Lab Test 03/26/20  0729   WBC 4.0   RBC 3.86   HGB 12.2   HCT 36.6   MCV 95   MCH 31.6   MCHC 33.3   RDW 12.5        Last Comprehensive Metabolic Panel:  Sodium   Date Value Ref Range Status   03/26/2020 140 133 - 144 mmol/L Final     Potassium   Date Value Ref Range Status   03/26/2020 3.9 3.4 - 5.3 mmol/L Final     Chloride   Date Value Ref Range Status   03/26/2020 108 94 - 109 mmol/L Final     Carbon Dioxide   Date Value Ref Range Status   03/26/2020 25 20 - 32 mmol/L Final     Anion Gap   Date Value Ref Range Status   03/26/2020 7 3 - 14 mmol/L Final     Glucose   Date Value Ref Range Status   03/26/2020 108 (H) 70 - 99 mg/dL Final     Urea Nitrogen   Date Value Ref Range Status   03/26/2020 14 7 - 30 mg/dL Final     Creatinine   Date Value Ref Range Status   03/26/2020 0.68 0.52 - 1.04 mg/dL Final     GFR Estimate   Date Value Ref Range Status   03/26/2020 >90 >60 mL/min/[1.73_m2] Final     Comment:     Non  GFR Calc  Starting 12/18/2018, serum creatinine based estimated GFR (eGFR) will be   calculated using the Chronic Kidney Disease Epidemiology Collaboration   (CKD-EPI) equation.       Calcium   Date Value Ref Range Status   03/26/2020 9.0 8.5 - 10.1 mg/dL Final     Bilirubin Total   Date Value Ref Range Status   03/26/2020 0.2 0.2 - 1.3 mg/dL Final     Alkaline  Phosphatase   Date Value Ref Range Status   03/26/2020 47 40 - 150 U/L Final     ALT   Date Value Ref Range Status   03/26/2020 24 0 - 50 U/L Final     AST   Date Value Ref Range Status   03/26/2020 16 0 - 45 U/L Final       PATHOLOGY:  None new.    IMAGING:  None new since last visit.    ASSESSMENT/PLAN:  Jyothi Freitas is a 53 year old female with the following issues:  1.  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 Polyarthralgias  -I reviewed the pathology results with Jyothi. She had a very good partial response to neoadjuvant chemotherapy albeit with some residual disease in one of the sentinel nodes with extranodal extension.  -She asked about her pathology report again -- I answered several questions she had about her results today.  -She has completed adjuvant radiation therapy as of 6/15/2020, tolerating that overall well but with some fatigue.   -Since she has been having joint stiffness, polyarthralgias, and myalgias from anastrazole, I offered to switch her to letrozole or exemestane.  I discussed that although these alternatives have similar side effects but that some patients may tolerate one aromatase inhibitor better than another.  Jyothi would like to switch to letrozole.  Prescription issued.  Will follow-up on her tolerance in 1 month.  -Will plan to alternate mammogram with breast MRI, staggered by 6 months.  Next mammogram due 10/2020.    3. Depression  -Mood stable. Continue bupropion.     4.  Insomnia   5.  Hot flashes, drug induced  -Oxybutynin did help with hot flashes but she had too much dry eye syndrome and fluid retention.   -She has had significant improvement in her hot flashes and insomnia with the use of gabapentin, which she resumed and will continue.  -She will continue to take Ambien as needed.    6. Ectasia of thoracic aorta  -This measured 4 cm  on the PET scan.  -Surgical management not needed but she will need ongoing monitoring. She is following with Dr. Silverio Gooden for monitoring of this issue.     Return in 1 month.    Maricarmen Monroe MD  Hematology/Oncology  [facility]    Again, thank you for allowing me to participate in the care of your patient.        Sincerely,        Maricarmen Monroe MD

## 2020-06-15 NOTE — LETTER
"    6/15/2020         RE: Jyothi Freitas  [address]  [place] [address]        Dear Colleague,    Thank you for referring your patient, Jyothi Freitas, to the [facility]. Please see a copy of my visit note below.    [facility]    Hematology/Oncology Established Patient Follow-up Note      Today's Date: 6/15/20    Reason for Follow-up: Right breast cancer.    Jyothi Freitas is a 54 year old female who is being evaluated via a billable video visit.      The patient has been notified of following:     \"This video visit will be conducted via a call between you and your physician/provider. We have found that certain health care needs can be provided without the need for an in-person physical exam.  This service lets us provide the care you need with a video conversation.  If a prescription is necessary we can send it directly to your pharmacy.  If lab work is needed we can place an order for that and you can then stop by our lab to have the test done at a later time.    Video visits are billed at different rates depending on your insurance coverage.  Please reach out to your insurance provider with any questions.    If during the course of the call the physician/provider feels a video visit is not appropriate, you will not be charged for this service.\"    Patient has given verbal consent for Video visit? Yes    How would you like to obtain your AVS? Yon    Patient in virtual waiting room           Video-Visit Details    Type of service:  Video Visit    Originating Location (pt. Location): Home    Distant Location (provider location):  [facility]     Mode of Communication:  Video Conference via [employer]    Video visit duration: 35 minutes    HISTORY OF PRESENT ILLNESS: Jyothi Freitas is a 54 year old female who presents with the following oncologic history:   1.  10/11/2019: Diagnostic right sided mammogram performed for a palpable lump in the right breast.  This showed " an irregularly-shaped spiculated mass in the upper outer right breast with prominent lymph nodes in the right axilla.  Targeted ultrasound of the right upper outer breast showed an irregularly-shaped hypoechoic mass at the 10 o'clock position, 4 cm from the nipple measuring 2.6 x 1.5 x 2.4 cm.  Right axillary ultrasound showed moderately enlarged lymph nodes.  Right breast needle biopsy showed a grade 3 invasive ductal carcinoma with lymphovascular invasion identified, ER strongly positive at 95%, MA strongly positive at 95%, HER-2/juan FISH negative.  Right axillary lymph node measuring 2 cm was positive for metastatic carcinoma.  Note prior 4/2019 mammogram deemed normal.  2.  10/15/2019: Bilateral breast MRI showed known right breast malignancy measuring 2.6 x 1.4 x 2 cm, 3 mildly enlarged right axillary lymph nodes and no contralateral breast malignancy.  3. 10/21/2019 PET scan showed scattered small hypermetabolic bilateral axillary lymph nodes; for example, a hypermetabolic right axillary lymph node measures 1.6 x 0.9 cm with SUV max 3.6.  Hypermetabolic mass in the right breast superiorly and laterally measuring 2.1 x 1.6 cm with SUV max 4.3.  A 0.6 cm low-density lesion in the right hepatic lobe is too small for accurate PET characterization but shows no appreciable hypermetabolic activity.  Incidentally noted ectasia of the ascending thoracic aorta measures 4 cm in diameter.  4.  10/23/2019: Left axillary ultrasound showed benign-appearing lymph node.  Left axillary lymph node biopsy showed benign lymph node tissue.  5.  10/29/2019: Started neoadjuvant chemotherapy with dose dense Adriamycin and Cytoxan, followed by weekly paclitaxel with completion on 3/12/2020.  6.  3/19/2020: Breast MRI showed no residual enhancement in the right breast mass.  The right axillary lymphadenopathy has resolved.  7. 4/01/2020: Underwent right breast lumpectomy and right axillary sentinel lymph node biopsy under care of   Kaila David.  Pathology showed fibrocystic change and no evidence of residual carcinoma in the right breast.  Metastatic breast adenocarcinoma to one of 5 lymph node fragments in 1 of 3 lymph nodes excised. Extranodal extension present. ypT0-N1a.  8. 4/29/2020: Started adjuvant anastrazole.  9. 6/15/2020: Completed adjuvant radiation therapy.    INTERIM HISTORY:  Jyothi completed adjuvant radiation therapy as of today.  She reports fatigue.  She does reports joint stiffness and polyarthralgias after starting anastrazole.  She is biking and doing other exercise.  She denies any recent fevers, chills, shortness of breath, cough, bowel or bladder dysfunction. Hot flashes are under control with adding back in gabapentin 300 mg. She denies right axillary lymphatic cording.    REVIEW OF SYSTEMS:   14 point ROS was reviewed and is negative other than as noted above in HPI.       HOME MEDICATIONS:  Current Outpatient Medications   Medication Sig Dispense Refill     acetaminophen (TYLENOL) 500 MG tablet Take 1,000 mg by mouth every 6 hours as needed for mild pain       acetaminophen-caffeine (EXCEDRIN TENSION HEADACHE) 500-65 MG TABS Take 2 tablets by mouth every 6 hours as needed for mild pain       albuterol (PROAIR HFA) 108 (90 Base) MCG/ACT inhaler INHALE 2 PUFFS INTO THE LUNGS EVERY 6 HOURS AS NEEDED FOR SHORTNESS OF BREATH OR DIFFICULT BREATHING OR WHEEZING 8.5 g 0     anastrozole (ARIMIDEX) 1 MG tablet Take 1 tablet (1 mg) by mouth daily 90 tablet 3     buPROPion (WELLBUTRIN XL) 300 MG 24 hr tablet TAKE 1 TABLET(300 MG) BY MOUTH EVERY MORNING 90 tablet 3     gabapentin (NEURONTIN) 100 MG capsule Take 3 capsules (300 mg) by mouth every evening 270 capsule 3     HYDROcodone-acetaminophen (NORCO) 5-325 MG tablet Take 1-2 tablets by mouth every 4 hours as needed for moderate to severe pain 15 tablet 0     nadolol (CORGARD) 20 MG tablet TAKE 1 TABLET(20 MG) BY MOUTH DAILY (Patient not taking: Reported on 6/10/2020) 90  tablet 3     polyethylene glycol (MIRALAX) packet Take 17 g by mouth daily 10 packet 0     senna-docusate (SENOKOT-S/PERICOLACE) 8.6-50 MG tablet Take 1-2 tablets by mouth 2 times daily as needed for constipation (While on oral opioids.) (Patient not taking: Reported on 6/10/2020) 20 tablet 0     zolpidem (AMBIEN) 5 MG tablet Take 1 tablet (5 mg) by mouth nightly as needed for sleep 30 tablet 1         ALLERGIES:  Allergies   Allergen Reactions     Sulfa Drugs Other (See Comments)     Red face. Ringing in the ears.         PAST MEDICAL HISTORY:  Past Medical History:   Diagnosis Date     GERD (gastroesophageal reflux disease)      H/O colonoscopy 03/08/2016    done at [facility]     Hematuria 2016    eval at [facility] and per pt cysto and ct neg     Intermittent asthma     since childhood     Low iron 10/2017    done for eval of hair loss, [facility]     Migraines     most of adult life, has seen neuro in past, on bblocker     Mild depression (H)      Normal stress echocardiogram July 2011    due to hear racing     Osteopenia 2008     Syncope 03/2017    echo [facility] lv size and fxn, grade 1 dd, mild tr         PAST SURGICAL HISTORY:  Past Surgical History:   Procedure Laterality Date     BIOPSY NODE SENTINEL Right 4/1/2020    Procedure: RIGHT SENTINEL LYMPH NODE BIOPSY;  Surgeon: Kaila Hernandez MD;  Location:  OR      TMJ ARTHROSCOPY/SURGERY       ESOPHAGOSCOPY, GASTROSCOPY, DUODENOSCOPY (EGD), COMBINED N/A 10/30/2017    Procedure: COMBINED ESOPHAGOSCOPY, GASTROSCOPY, DUODENOSCOPY (EGD), BIOPSY SINGLE OR MULTIPLE;  COMBINED ESOPHAGOSCOPY, GASTROSCOPY, DUODENOSCOPY (EGD);  Surgeon: Martin Rodriguez MD;  Location:  GI     INSERT PORT VASCULAR ACCESS N/A 10/24/2019    Procedure: PORT PLACEMENT;  Surgeon: Kaila Hernandez MD;  Location:  OR     LUMPECTOMY BREAST WITH SEED LOCALIZATION Right 4/1/2020    Procedure: SEED LOCALIZED RIGHT BREAST LUMPECTOMY WITH SEED LOCALIZED RIGHT AXILLARY NODE EXCISION;  Surgeon:  "Kaila Hernandez MD;  Location:  OR     ORTHOPEDIC SURGERY      \"leg surgery\"     REMOVE PORT VASCULAR ACCESS Left 2020    Procedure: REMOVAL, VASCULAR ACCESS PORT;  Surgeon: Kaila Hernandez MD;  Location:  OR     single oopherectomy  2010    cyst         SOCIAL HISTORY:  Social History     Socioeconomic History     Marital status:      Spouse name: Not on file     Number of children: 2     Years of education: Not on file     Highest education level: Not on file   Occupational History     Not on file   Social Needs     Financial resource strain: Not on file     Food insecurity     Worry: Not on file     Inability: Not on file     Transportation needs     Medical: Not on file     Non-medical: Not on file   Tobacco Use     Smoking status: Former Smoker     Packs/day: 0.00     Last attempt to quit: 3/27/1997     Years since quittin.2     Smokeless tobacco: Never Used   Substance and Sexual Activity     Alcohol use: Yes     Comment: rarely     Drug use: No     Sexual activity: Yes     Partners: Male     Birth control/protection: Pill   Lifestyle     Physical activity     Days per week: Not on file     Minutes per session: Not on file     Stress: Not on file   Relationships     Social connections     Talks on phone: Not on file     Gets together: Not on file     Attends Spiritism service: Not on file     Active member of club or organization: Not on file     Attends meetings of clubs or organizations: Not on file     Relationship status: Not on file     Intimate partner violence     Fear of current or ex partner: Not on file     Emotionally abused: Not on file     Physically abused: Not on file     Forced sexual activity: Not on file   Other Topics Concern     Parent/sibling w/ CABG, MI or angioplasty before 65F 55M? Yes   Social History Narrative     Not on file         FAMILY HISTORY:  Family History   Problem Relation Age of Onset     Coronary Artery Disease Father 48        MI. and one in his " 60s     Emphysema Mother         COPD         PHYSICAL EXAM:  Vital signs:  Not taken.  GENERAL: No acute distress.  EYES: No scleral icterus. No overt erythema.  RESPIRATORY: No cough.  No labored breathing.  MUSCULOSKELETAL: Range of motion in the neck, shoulders, and arms appear normal.  SKIN: No overt rashes, discolorations, or lesions over the face and neck.  NEUROLOGIC: Alert.  No overt tremors.  PSYCHIATRIC: Normal affect and mood.  Does not appear anxious.    The rest of a comprehensive physical examination is deferred due to PHE (public health emergency) video visit restrictions.    LABS:  CBC RESULTS:   Recent Labs   Lab Test 03/26/20  0729   WBC 4.0   RBC 3.86   HGB 12.2   HCT 36.6   MCV 95   MCH 31.6   MCHC 33.3   RDW 12.5        Last Comprehensive Metabolic Panel:  Sodium   Date Value Ref Range Status   03/26/2020 140 133 - 144 mmol/L Final     Potassium   Date Value Ref Range Status   03/26/2020 3.9 3.4 - 5.3 mmol/L Final     Chloride   Date Value Ref Range Status   03/26/2020 108 94 - 109 mmol/L Final     Carbon Dioxide   Date Value Ref Range Status   03/26/2020 25 20 - 32 mmol/L Final     Anion Gap   Date Value Ref Range Status   03/26/2020 7 3 - 14 mmol/L Final     Glucose   Date Value Ref Range Status   03/26/2020 108 (H) 70 - 99 mg/dL Final     Urea Nitrogen   Date Value Ref Range Status   03/26/2020 14 7 - 30 mg/dL Final     Creatinine   Date Value Ref Range Status   03/26/2020 0.68 0.52 - 1.04 mg/dL Final     GFR Estimate   Date Value Ref Range Status   03/26/2020 >90 >60 mL/min/[1.73_m2] Final     Comment:     Non  GFR Calc  Starting 12/18/2018, serum creatinine based estimated GFR (eGFR) will be   calculated using the Chronic Kidney Disease Epidemiology Collaboration   (CKD-EPI) equation.       Calcium   Date Value Ref Range Status   03/26/2020 9.0 8.5 - 10.1 mg/dL Final     Bilirubin Total   Date Value Ref Range Status   03/26/2020 0.2 0.2 - 1.3 mg/dL Final     Alkaline  Phosphatase   Date Value Ref Range Status   03/26/2020 47 40 - 150 U/L Final     ALT   Date Value Ref Range Status   03/26/2020 24 0 - 50 U/L Final     AST   Date Value Ref Range Status   03/26/2020 16 0 - 45 U/L Final       PATHOLOGY:  None new.    IMAGING:  None new since last visit.    ASSESSMENT/PLAN:  Jyothi Freitas is a 53 year old female with the following issues:  1.  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 Polyarthralgias  -I reviewed the pathology results with Jyothi. She had a very good partial response to neoadjuvant chemotherapy albeit with some residual disease in one of the sentinel nodes with extranodal extension.  -She asked about her pathology report again -- I answered several questions she had about her results today.  -She has completed adjuvant radiation therapy as of 6/15/2020, tolerating that overall well but with some fatigue.   -Since she has been having joint stiffness, polyarthralgias, and myalgias from anastrazole, I offered to switch her to letrozole or exemestane.  I discussed that although these alternatives have similar side effects but that some patients may tolerate one aromatase inhibitor better than another.  Jyothi would like to switch to letrozole.  Prescription issued.  Will follow-up on her tolerance in 1 month.  -Will plan to alternate mammogram with breast MRI, staggered by 6 months.  Next mammogram due 10/2020.    3. Depression  -Mood stable. Continue bupropion.     4.  Insomnia   5.  Hot flashes, drug induced  -Oxybutynin did help with hot flashes but she had too much dry eye syndrome and fluid retention.   -She has had significant improvement in her hot flashes and insomnia with the use of gabapentin, which she resumed and will continue.  -She will continue to take Ambien as needed.    6. Ectasia of thoracic aorta  -This measured 4 cm  on the PET scan.  -Surgical management not needed but she will need ongoing monitoring. She is following with Dr. Silverio Gooden for monitoring of this issue.     Return in 1 month.    Maricaremn Monroe MD  Hematology/Oncology  [facility]    Again, thank you for allowing me to participate in the care of your patient.        Sincerely,        Maricarmen Monroe MD

## 2020-06-16 ENCOUNTER — MYC MEDICAL ADVICE (OUTPATIENT)
Dept: CARDIOLOGY | Facility: CLINIC | Age: 55
End: 2020-06-16

## 2020-06-17 DIAGNOSIS — J45.20 INTERMITTENT ASTHMA, UNCOMPLICATED: ICD-10-CM

## 2020-06-17 NOTE — TELEPHONE ENCOUNTER
[employer] message received. RN will reply to patient via [employer].       Hello   I had surgery early April but the report just came in recently and I also saw my imaging recently at [facility] which led to this question:   One of the markers that was placed at the time of my initial biopsy still remains. If the marker was placed at the location of the cancer, and the  cancer site was to be removed, wouldnt the marker come out with the tissue?     Thank you,     Jyothi

## 2020-06-18 NOTE — TELEPHONE ENCOUNTER
Routing refill request to provider for review/approval because:  Drug not active on patient's medication list  Please authorize if appropriate.  Thanks,  Bisi Pires RN

## 2020-07-10 NOTE — PROGRESS NOTES
"[facility]    Hematology/Oncology Established Patient Follow-up Note      Today's Date: 7/13/20    Reason for Follow-up: Right breast cancer.    Jyothi Freitas is a 54 year old female who is being evaluated via a billable video visit.      The patient has been notified of following:     \"This video visit will be conducted via a call between you and your physician/provider. We have found that certain health care needs can be provided without the need for an in-person physical exam.  This service lets us provide the care you need with a video conversation.  If a prescription is necessary we can send it directly to your pharmacy.  If lab work is needed we can place an order for that and you can then stop by our lab to have the test done at a later time.    Video visits are billed at different rates depending on your insurance coverage.  Please reach out to your insurance provider with any questions.    If during the course of the call the physician/provider feels a video visit is not appropriate, you will not be charged for this service.\"    Patient has given verbal consent for Video visit? Yes    How would you like to obtain your AVS? TruLeafhart    Text to cell phone: [phone]          Video-Visit Details    Type of service:  Video Visit (initial video then switched to telephone due to video malfunction).    Originating Location (pt. Location): Home    Distant Location (provider location):  [facility]     Mode of Communication:  Video Conference via Doximity    Video visit duration: 15 minutes    HISTORY OF PRESENT ILLNESS: Jyothi Freitas is a 54 year old female who presents with the following oncologic history:   1.  10/11/2019: Diagnostic right sided mammogram performed for a palpable lump in the right breast.  This showed an irregularly-shaped spiculated mass in the upper outer right breast with prominent lymph nodes in the right axilla.  Targeted ultrasound of the right upper outer " breast showed an irregularly-shaped hypoechoic mass at the 10 o'clock position, 4 cm from the nipple measuring 2.6 x 1.5 x 2.4 cm.  Right axillary ultrasound showed moderately enlarged lymph nodes.  Right breast needle biopsy showed a grade 3 invasive ductal carcinoma with lymphovascular invasion identified, ER strongly positive at 95%, IN strongly positive at 95%, HER-2/juan FISH negative.  Right axillary lymph node measuring 2 cm was positive for metastatic carcinoma.  Note prior 4/2019 mammogram deemed normal.  2.  10/15/2019: Bilateral breast MRI showed known right breast malignancy measuring 2.6 x 1.4 x 2 cm, 3 mildly enlarged right axillary lymph nodes and no contralateral breast malignancy.  3. 10/21/2019 PET scan showed scattered small hypermetabolic bilateral axillary lymph nodes; for example, a hypermetabolic right axillary lymph node measures 1.6 x 0.9 cm with SUV max 3.6.  Hypermetabolic mass in the right breast superiorly and laterally measuring 2.1 x 1.6 cm with SUV max 4.3.  A 0.6 cm low-density lesion in the right hepatic lobe is too small for accurate PET characterization but shows no appreciable hypermetabolic activity.  Incidentally noted ectasia of the ascending thoracic aorta measures 4 cm in diameter.  4.  10/23/2019: Left axillary ultrasound showed benign-appearing lymph node.  Left axillary lymph node biopsy showed benign lymph node tissue.  5.  10/29/2019: Started neoadjuvant chemotherapy with dose dense Adriamycin and Cytoxan, followed by weekly paclitaxel with completion on 3/12/2020.  6.  3/19/2020: Breast MRI showed no residual enhancement in the right breast mass.  The right axillary lymphadenopathy has resolved.  7. 4/01/2020: Underwent right breast lumpectomy and right axillary sentinel lymph node biopsy under care of Dr. Kaila Hernandez.  Pathology showed fibrocystic change and no evidence of residual carcinoma in the right breast.  Metastatic breast adenocarcinoma to one of 5 lymph  node fragments in 1 of 3 lymph nodes excised. Extranodal extension present. ypT0-N1a.  8. 4/29/2020: Started adjuvant anastrazole.  9. 6/15/2020: Completed adjuvant radiation therapy.  10. 6/15/2020: Switched to adjuvant letrozole with some mild improvement in polyathralgias.    INTERIM HISTORY:  Jyothi reports some mild improvement in polyathralgias.  Increasing her gabapentin to 400 mg improved her hot flashes at nights. She does have some shooting pains in her legs which occasionally keep her awake at night.  She does use Ambien intermittently and requests a refill.    She has been regularly exercising.  She denies any recent fevers, chills, shortness of breath, cough, bowel or bladder dysfunction.       REVIEW OF SYSTEMS:   14 point ROS was reviewed and is negative other than as noted above in HPI.       HOME MEDICATIONS:  Current Outpatient Medications   Medication Sig Dispense Refill     acetaminophen (TYLENOL) 500 MG tablet Take 1,000 mg by mouth every 6 hours as needed for mild pain       acetaminophen-caffeine (EXCEDRIN TENSION HEADACHE) 500-65 MG TABS Take 2 tablets by mouth every 6 hours as needed for mild pain       albuterol (PROAIR HFA) 108 (90 Base) MCG/ACT inhaler INHALE 2 PUFFS INTO THE LUNGS EVERY 6 HOURS AS NEEDED FOR SHORTNESS OF BREATH OR DIFFICULT BREATHING OR WHEEZING 8.5 g 0     buPROPion (WELLBUTRIN XL) 300 MG 24 hr tablet TAKE 1 TABLET(300 MG) BY MOUTH EVERY MORNING 90 tablet 3     FLOVENT DISKUS 100 MCG/BLIST inhaler INHALE 1 PUFF INTO THE LUNGS TWICE DAILY 3 Inhaler 3     gabapentin (NEURONTIN) 100 MG capsule Take 3 capsules (300 mg) by mouth every evening 270 capsule 3     letrozole (FEMARA) 2.5 MG tablet Take 1 tablet (2.5 mg) by mouth daily 30 tablet 3     nadolol (CORGARD) 20 MG tablet TAKE 1 TABLET(20 MG) BY MOUTH DAILY 90 tablet 3     senna-docusate (SENOKOT-S/PERICOLACE) 8.6-50 MG tablet Take 1-2 tablets by mouth 2 times daily as needed for constipation (While on oral opioids.) 20  "tablet 0     zolpidem (AMBIEN) 5 MG tablet Take 1 tablet (5 mg) by mouth nightly as needed for sleep 30 tablet 1         ALLERGIES:  Allergies   Allergen Reactions     Sulfa Drugs Other (See Comments)     Red face. Ringing in the ears.         PAST MEDICAL HISTORY:  Past Medical History:   Diagnosis Date     GERD (gastroesophageal reflux disease)      H/O colonoscopy 03/08/2016    done at [place] and [facility]     Hematuria 2016    eval at [place] and per pt cysto and ct neg     Intermittent asthma     since childhood     Low iron 10/2017    done for eval of hair loss, [facility]     Migraines     most of adult life, has seen neuro in past, on bblocker     Mild depression (H)      Normal stress echocardiogram July 2011    due to hear racing     Osteopenia 2008     Syncope 03/2017    echo [facility] lv size and fxn, grade 1 dd, mild tr         PAST SURGICAL HISTORY:  Past Surgical History:   Procedure Laterality Date     BIOPSY NODE SENTINEL Right 4/1/2020    Procedure: RIGHT SENTINEL LYMPH NODE BIOPSY;  Surgeon: Kaila Hernandez MD;  Location:  OR      TMJ ARTHROSCOPY/SURGERY       ESOPHAGOSCOPY, GASTROSCOPY, DUODENOSCOPY (EGD), COMBINED N/A 10/30/2017    Procedure: COMBINED ESOPHAGOSCOPY, GASTROSCOPY, DUODENOSCOPY (EGD), BIOPSY SINGLE OR MULTIPLE;  COMBINED ESOPHAGOSCOPY, GASTROSCOPY, DUODENOSCOPY (EGD);  Surgeon: Martin Rodriguez MD;  Location:  GI     INSERT PORT VASCULAR ACCESS N/A 10/24/2019    Procedure: PORT PLACEMENT;  Surgeon: Kaila Hernandez MD;  Location:  OR     LUMPECTOMY BREAST WITH SEED LOCALIZATION Right 4/1/2020    Procedure: SEED LOCALIZED RIGHT BREAST LUMPECTOMY WITH SEED LOCALIZED RIGHT AXILLARY NODE EXCISION;  Surgeon: Kaila Hernandez MD;  Location:  OR     ORTHOPEDIC SURGERY      \"leg surgery\"     REMOVE PORT VASCULAR ACCESS Left 4/1/2020    Procedure: REMOVAL, VASCULAR ACCESS PORT;  Surgeon: Kaila Hernandez MD;  Location:  OR     single oopherectomy  2010    cyst         SOCIAL HISTORY:  Social " History     Socioeconomic History     Marital status:      Spouse name: Not on file     Number of children: 2     Years of education: Not on file     Highest education level: Not on file   Occupational History     Not on file   Social Needs     Financial resource strain: Not on file     Food insecurity     Worry: Not on file     Inability: Not on file     Transportation needs     Medical: Not on file     Non-medical: Not on file   Tobacco Use     Smoking status: Former Smoker     Packs/day: 0.00     Last attempt to quit: 3/27/1997     Years since quittin.3     Smokeless tobacco: Never Used   Substance and Sexual Activity     Alcohol use: Yes     Comment: rarely     Drug use: No     Sexual activity: Yes     Partners: Male     Birth control/protection: Pill   Lifestyle     Physical activity     Days per week: Not on file     Minutes per session: Not on file     Stress: Not on file   Relationships     Social connections     Talks on phone: Not on file     Gets together: Not on file     Attends Sabianist service: Not on file     Active member of club or organization: Not on file     Attends meetings of clubs or organizations: Not on file     Relationship status: Not on file     Intimate partner violence     Fear of current or ex partner: Not on file     Emotionally abused: Not on file     Physically abused: Not on file     Forced sexual activity: Not on file   Other Topics Concern     Parent/sibling w/ CABG, MI or angioplasty before 65F 55M? Yes   Social History Narrative     Not on file         FAMILY HISTORY:  Family History   Problem Relation Age of Onset     Coronary Artery Disease Father 48        MI. and one in his 60s     Emphysema Mother         COPD         PHYSICAL EXAM:  Vital signs:  Not taken.  GENERAL: No acute distress.  EYES: No scleral icterus. No overt erythema.  RESPIRATORY: No cough.  No labored breathing.  MUSCULOSKELETAL: Range of motion in the neck, shoulders, and arms appear  normal.  SKIN: No overt rashes, discolorations, or lesions over the face and neck.  NEUROLOGIC: Alert.  No overt tremors.  PSYCHIATRIC: Normal affect and mood.  Does not appear anxious.    The rest of a comprehensive physical examination is deferred due to PHE (public health emergency) video visit restrictions.    LABS:  CBC RESULTS:   Recent Labs   Lab Test 03/26/20  0729   WBC 4.0   RBC 3.86   HGB 12.2   HCT 36.6   MCV 95   MCH 31.6   MCHC 33.3   RDW 12.5        Last Comprehensive Metabolic Panel:  Sodium   Date Value Ref Range Status   03/26/2020 140 133 - 144 mmol/L Final     Potassium   Date Value Ref Range Status   03/26/2020 3.9 3.4 - 5.3 mmol/L Final     Chloride   Date Value Ref Range Status   03/26/2020 108 94 - 109 mmol/L Final     Carbon Dioxide   Date Value Ref Range Status   03/26/2020 25 20 - 32 mmol/L Final     Anion Gap   Date Value Ref Range Status   03/26/2020 7 3 - 14 mmol/L Final     Glucose   Date Value Ref Range Status   03/26/2020 108 (H) 70 - 99 mg/dL Final     Urea Nitrogen   Date Value Ref Range Status   03/26/2020 14 7 - 30 mg/dL Final     Creatinine   Date Value Ref Range Status   03/26/2020 0.68 0.52 - 1.04 mg/dL Final     GFR Estimate   Date Value Ref Range Status   03/26/2020 >90 >60 mL/min/[1.73_m2] Final     Comment:     Non  GFR Calc  Starting 12/18/2018, serum creatinine based estimated GFR (eGFR) will be   calculated using the Chronic Kidney Disease Epidemiology Collaboration   (CKD-EPI) equation.       Calcium   Date Value Ref Range Status   03/26/2020 9.0 8.5 - 10.1 mg/dL Final     Bilirubin Total   Date Value Ref Range Status   03/26/2020 0.2 0.2 - 1.3 mg/dL Final     Alkaline Phosphatase   Date Value Ref Range Status   03/26/2020 47 40 - 150 U/L Final     ALT   Date Value Ref Range Status   03/26/2020 24 0 - 50 U/L Final     AST   Date Value Ref Range Status   03/26/2020 16 0 - 45 U/L Final       PATHOLOGY:  None new since last visit.    IMAGING:  None  new since last visit.    ASSESSMENT/PLAN:  Jyothi Freitas is a 53 year old female with the following issues:  1.  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 Polyarthralgias  -Jyothi has no clinical evidence for recurrent breast cancer by history.  -She has completed adjuvant radiation therapy as of 6/15/2020, tolerating that overall well but with some fatigue.   -Due to joint stiffness, polyarthralgias, and myalgias from anastrazole, she switched to letrozole one month ago. She is tolerating this a bit better.  I discussed that she may have some delayed side effects of paclitaxel but also the body may be adapting to letrozole as well.  I recommended incorporating magnesium 100-300 mg daily at night and vitamin Dd 1644-1871 international unit(s) daily.  -Will plan to alternate mammogram with breast MRI, staggered by 6 months.  Next mammogram due 10/2020, ordered.    3. Depression  -Mood stable. Continue bupropion.     4.  Insomnia   5.  Hot flashes, drug induced  -Oxybutynin did help with hot flashes but she had too much dry eye syndrome and fluid retention.   -She has had significant improvement in her hot flashes and insomnia with the use of gabapentin, which she resumed and will continue. Prescription issued for 400 mg daily.  -She will continue to take Ambien as needed. Prescription refilled for Ambien today.    6. Ectasia of thoracic aorta  -This measured 4 cm on the PET scan.  -Surgical management not needed but she will need ongoing monitoring. She is following with Dr. Silverio Gooden for monitoring of this issue.     Return in 3 months in-person.    Maricarmen Monroe MD  Hematology/Oncology  [facility]

## 2020-07-13 ENCOUNTER — VIRTUAL VISIT (OUTPATIENT)
Dept: ONCOLOGY | Facility: CLINIC | Age: 55
End: 2020-07-13
Attending: INTERNAL MEDICINE
Payer: COMMERCIAL

## 2020-07-13 DIAGNOSIS — N95.1 MENOPAUSAL SYNDROME (HOT FLASHES): ICD-10-CM

## 2020-07-13 DIAGNOSIS — F51.01 PRIMARY INSOMNIA: ICD-10-CM

## 2020-07-13 DIAGNOSIS — Z17.0 MALIGNANT NEOPLASM OF UPPER-OUTER QUADRANT OF RIGHT BREAST IN FEMALE, ESTROGEN RECEPTOR POSITIVE (H): Primary | ICD-10-CM

## 2020-07-13 DIAGNOSIS — C50.411 MALIGNANT NEOPLASM OF UPPER-OUTER QUADRANT OF RIGHT BREAST IN FEMALE, ESTROGEN RECEPTOR POSITIVE (H): Primary | ICD-10-CM

## 2020-07-13 PROCEDURE — 40001009 ZZH VIDEO/TELEPHONE VISIT; NO CHARGE

## 2020-07-13 PROCEDURE — 99214 OFFICE O/P EST MOD 30 MIN: CPT | Mod: GT | Performed by: INTERNAL MEDICINE

## 2020-07-13 RX ORDER — LETROZOLE 2.5 MG/1
2.5 TABLET, FILM COATED ORAL DAILY
Qty: 90 TABLET | Refills: 3 | Status: SHIPPED | OUTPATIENT
Start: 2020-07-13 | End: 2020-10-21

## 2020-07-13 RX ORDER — ZOLPIDEM TARTRATE 5 MG/1
5 TABLET ORAL
Qty: 30 TABLET | Refills: 3 | Status: SHIPPED | OUTPATIENT
Start: 2020-07-13 | End: 2020-10-21

## 2020-07-13 RX ORDER — GABAPENTIN 100 MG/1
400 CAPSULE ORAL EVERY EVENING
Qty: 270 CAPSULE | Refills: 3 | Status: SHIPPED | OUTPATIENT
Start: 2020-07-13 | End: 2021-08-09

## 2020-07-13 ASSESSMENT — PAIN SCALES - GENERAL: PAINLEVEL: MODERATE PAIN (4)

## 2020-07-13 NOTE — PROGRESS NOTES
"Jyothi Freitas is a 54 year old female who is being evaluated via a billable video visit.      The patient has been notified of following:     \"This video visit will be conducted via a call between you and your physician/provider. We have found that certain health care needs can be provided without the need for an in-person physical exam.  This service lets us provide the care you need with a video conversation.  If a prescription is necessary we can send it directly to your pharmacy.  If lab work is needed we can place an order for that and you can then stop by our lab to have the test done at a later time.    Video visits are billed at different rates depending on your insurance coverage.  Please reach out to your insurance provider with any questions.    If during the course of the call the physician/provider feels a video visit is not appropriate, you will not be charged for this service.\"    Patient has given verbal consent for Video visit? Yes  How would you like to obtain your AVS? MyChart  Patient would like the video invitation sent by: Text to cell phone: [phone]  Will anyone else be joining your video visit? No        Video-Visit Details    Type of service:  Video Visit    Video Start Time:  Video End Time:    Originating Location (pt. Location): Home    Distant Location (provider location):  [facility]     Platform used for Video Visit: Xuan Wright MA      "

## 2020-07-13 NOTE — LETTER
"    7/13/2020         RE: Jyothi Freitas  [address]  [place] [address]        Dear Colleague,    Thank you for referring your patient, Jyothi Freitas, to the [facility]. Please see a copy of my visit note below.    [facility]    Hematology/Oncology Established Patient Follow-up Note      Today's Date: 7/13/20    Reason for Follow-up: Right breast cancer.    Jyothi Freitas is a 54 year old female who is being evaluated via a billable video visit.      The patient has been notified of following:     \"This video visit will be conducted via a call between you and your physician/provider. We have found that certain health care needs can be provided without the need for an in-person physical exam.  This service lets us provide the care you need with a video conversation.  If a prescription is necessary we can send it directly to your pharmacy.  If lab work is needed we can place an order for that and you can then stop by our lab to have the test done at a later time.    Video visits are billed at different rates depending on your insurance coverage.  Please reach out to your insurance provider with any questions.    If during the course of the call the physician/provider feels a video visit is not appropriate, you will not be charged for this service.\"    Patient has given verbal consent for Video visit? Yes    How would you like to obtain your AVS? [employer]    Text to cell phone: [phone]          Video-Visit Details    Type of service:  Video Visit (initial video then switched to telephone due to video malfunction).    Originating Location (pt. Location): Home    Distant Location (provider location):  [facility]     Mode of Communication:  Video Conference via BTIG    Video visit duration: 15 minutes    HISTORY OF PRESENT ILLNESS: Jyothi Freitas is a 54 year old female who presents with the following oncologic history:   1.  10/11/2019: Diagnostic right sided " mammogram performed for a palpable lump in the right breast.  This showed an irregularly-shaped spiculated mass in the upper outer right breast with prominent lymph nodes in the right axilla.  Targeted ultrasound of the right upper outer breast showed an irregularly-shaped hypoechoic mass at the 10 o'clock position, 4 cm from the nipple measuring 2.6 x 1.5 x 2.4 cm.  Right axillary ultrasound showed moderately enlarged lymph nodes.  Right breast needle biopsy showed a grade 3 invasive ductal carcinoma with lymphovascular invasion identified, ER strongly positive at 95%, NM strongly positive at 95%, HER-2/juan FISH negative.  Right axillary lymph node measuring 2 cm was positive for metastatic carcinoma.  Note prior 4/2019 mammogram deemed normal.  2.  10/15/2019: Bilateral breast MRI showed known right breast malignancy measuring 2.6 x 1.4 x 2 cm, 3 mildly enlarged right axillary lymph nodes and no contralateral breast malignancy.  3. 10/21/2019 PET scan showed scattered small hypermetabolic bilateral axillary lymph nodes; for example, a hypermetabolic right axillary lymph node measures 1.6 x 0.9 cm with SUV max 3.6.  Hypermetabolic mass in the right breast superiorly and laterally measuring 2.1 x 1.6 cm with SUV max 4.3.  A 0.6 cm low-density lesion in the right hepatic lobe is too small for accurate PET characterization but shows no appreciable hypermetabolic activity.  Incidentally noted ectasia of the ascending thoracic aorta measures 4 cm in diameter.  4.  10/23/2019: Left axillary ultrasound showed benign-appearing lymph node.  Left axillary lymph node biopsy showed benign lymph node tissue.  5.  10/29/2019: Started neoadjuvant chemotherapy with dose dense Adriamycin and Cytoxan, followed by weekly paclitaxel with completion on 3/12/2020.  6.  3/19/2020: Breast MRI showed no residual enhancement in the right breast mass.  The right axillary lymphadenopathy has resolved.  7. 4/01/2020: Underwent right breast  lumpectomy and right axillary sentinel lymph node biopsy under care of Dr. Kaila Hernandez.  Pathology showed fibrocystic change and no evidence of residual carcinoma in the right breast.  Metastatic breast adenocarcinoma to one of 5 lymph node fragments in 1 of 3 lymph nodes excised. Extranodal extension present. ypT0-N1a.  8. 4/29/2020: Started adjuvant anastrazole.  9. 6/15/2020: Completed adjuvant radiation therapy.  10. 6/15/2020: Switched to adjuvant letrozole with some mild improvement in polyathralgias.    INTERIM HISTORY:  Jyothi reports some mild improvement in polyathralgias.  Increasing her gabapentin to 400 mg improved her hot flashes at nights. She does have some shooting pains in her legs which occasionally keep her awake at night.  She does use Ambien intermittently and requests a refill.    She has been regularly exercising.  She denies any recent fevers, chills, shortness of breath, cough, bowel or bladder dysfunction.       REVIEW OF SYSTEMS:   14 point ROS was reviewed and is negative other than as noted above in HPI.       HOME MEDICATIONS:  Current Outpatient Medications   Medication Sig Dispense Refill     acetaminophen (TYLENOL) 500 MG tablet Take 1,000 mg by mouth every 6 hours as needed for mild pain       acetaminophen-caffeine (EXCEDRIN TENSION HEADACHE) 500-65 MG TABS Take 2 tablets by mouth every 6 hours as needed for mild pain       albuterol (PROAIR HFA) 108 (90 Base) MCG/ACT inhaler INHALE 2 PUFFS INTO THE LUNGS EVERY 6 HOURS AS NEEDED FOR SHORTNESS OF BREATH OR DIFFICULT BREATHING OR WHEEZING 8.5 g 0     buPROPion (WELLBUTRIN XL) 300 MG 24 hr tablet TAKE 1 TABLET(300 MG) BY MOUTH EVERY MORNING 90 tablet 3     FLOVENT DISKUS 100 MCG/BLIST inhaler INHALE 1 PUFF INTO THE LUNGS TWICE DAILY 3 Inhaler 3     gabapentin (NEURONTIN) 100 MG capsule Take 3 capsules (300 mg) by mouth every evening 270 capsule 3     letrozole (FEMARA) 2.5 MG tablet Take 1 tablet (2.5 mg) by mouth daily 30 tablet 3      nadolol (CORGARD) 20 MG tablet TAKE 1 TABLET(20 MG) BY MOUTH DAILY 90 tablet 3     senna-docusate (SENOKOT-S/PERICOLACE) 8.6-50 MG tablet Take 1-2 tablets by mouth 2 times daily as needed for constipation (While on oral opioids.) 20 tablet 0     zolpidem (AMBIEN) 5 MG tablet Take 1 tablet (5 mg) by mouth nightly as needed for sleep 30 tablet 1         ALLERGIES:  Allergies   Allergen Reactions     Sulfa Drugs Other (See Comments)     Red face. Ringing in the ears.         PAST MEDICAL HISTORY:  Past Medical History:   Diagnosis Date     GERD (gastroesophageal reflux disease)      H/O colonoscopy 03/08/2016    done at [facility] and [facility]     Hematuria 2016    eval at [facility] and per pt cysto and ct neg     Intermittent asthma     since childhood     Low iron 10/2017    done for eval of hair loss, [facility]     Migraines     most of adult life, has seen neuro in past, on bblocker     Mild depression (H)      Normal stress echocardiogram July 2011    due to hear racing     Osteopenia 2008     Syncope 03/2017    echo [facility] lv size and fxn, grade 1 dd, mild tr         PAST SURGICAL HISTORY:  Past Surgical History:   Procedure Laterality Date     BIOPSY NODE SENTINEL Right 4/1/2020    Procedure: RIGHT SENTINEL LYMPH NODE BIOPSY;  Surgeon: Kaila Hernandez MD;  Location:  OR     C TMJ ARTHROSCOPY/SURGERY       ESOPHAGOSCOPY, GASTROSCOPY, DUODENOSCOPY (EGD), COMBINED N/A 10/30/2017    Procedure: COMBINED ESOPHAGOSCOPY, GASTROSCOPY, DUODENOSCOPY (EGD), BIOPSY SINGLE OR MULTIPLE;  COMBINED ESOPHAGOSCOPY, GASTROSCOPY, DUODENOSCOPY (EGD);  Surgeon: Martin Rodriguez MD;  Location:  GI     INSERT PORT VASCULAR ACCESS N/A 10/24/2019    Procedure: PORT PLACEMENT;  Surgeon: Kaila Hernandez MD;  Location:  OR     LUMPECTOMY BREAST WITH SEED LOCALIZATION Right 4/1/2020    Procedure: SEED LOCALIZED RIGHT BREAST LUMPECTOMY WITH SEED LOCALIZED RIGHT AXILLARY NODE EXCISION;  Surgeon: Kaila Hernandez MD;  Location:  OR     ORTHOPEDIC  "SURGERY      \"leg surgery\"     REMOVE PORT VASCULAR ACCESS Left 2020    Procedure: REMOVAL, VASCULAR ACCESS PORT;  Surgeon: Kaila Hernandez MD;  Location: [facility] OR     single oopherectomy  2010    cyst         SOCIAL HISTORY:  Social History     Socioeconomic History     Marital status:      Spouse name: Not on file     Number of children: 2     Years of education: Not on file     Highest education level: Not on file   Occupational History     Not on file   Social Needs     Financial resource strain: Not on file     Food insecurity     Worry: Not on file     Inability: Not on file     Transportation needs     Medical: Not on file     Non-medical: Not on file   Tobacco Use     Smoking status: Former Smoker     Packs/day: 0.00     Last attempt to quit: 3/27/1997     Years since quittin.3     Smokeless tobacco: Never Used   Substance and Sexual Activity     Alcohol use: Yes     Comment: rarely     Drug use: No     Sexual activity: Yes     Partners: Male     Birth control/protection: Pill   Lifestyle     Physical activity     Days per week: Not on file     Minutes per session: Not on file     Stress: Not on file   Relationships     Social connections     Talks on phone: Not on file     Gets together: Not on file     Attends Jainism service: Not on file     Active member of club or organization: Not on file     Attends meetings of clubs or organizations: Not on file     Relationship status: Not on file     Intimate partner violence     Fear of current or ex partner: Not on file     Emotionally abused: Not on file     Physically abused: Not on file     Forced sexual activity: Not on file   Other Topics Concern     Parent/sibling w/ CABG, MI or angioplasty before 65F 55M? Yes   Social History Narrative     Not on file         FAMILY HISTORY:  Family History   Problem Relation Age of Onset     Coronary Artery Disease Father 48        MI. and one in his 60s     Emphysema Mother         COPD         PHYSICAL " EXAM:  Vital signs:  Not taken.  GENERAL: No acute distress.  EYES: No scleral icterus. No overt erythema.  RESPIRATORY: No cough.  No labored breathing.  MUSCULOSKELETAL: Range of motion in the neck, shoulders, and arms appear normal.  SKIN: No overt rashes, discolorations, or lesions over the face and neck.  NEUROLOGIC: Alert.  No overt tremors.  PSYCHIATRIC: Normal affect and mood.  Does not appear anxious.    The rest of a comprehensive physical examination is deferred due to PHE (public health emergency) video visit restrictions.    LABS:  CBC RESULTS:   Recent Labs   Lab Test 03/26/20  0729   WBC 4.0   RBC 3.86   HGB 12.2   HCT 36.6   MCV 95   MCH 31.6   MCHC 33.3   RDW 12.5        Last Comprehensive Metabolic Panel:  Sodium   Date Value Ref Range Status   03/26/2020 140 133 - 144 mmol/L Final     Potassium   Date Value Ref Range Status   03/26/2020 3.9 3.4 - 5.3 mmol/L Final     Chloride   Date Value Ref Range Status   03/26/2020 108 94 - 109 mmol/L Final     Carbon Dioxide   Date Value Ref Range Status   03/26/2020 25 20 - 32 mmol/L Final     Anion Gap   Date Value Ref Range Status   03/26/2020 7 3 - 14 mmol/L Final     Glucose   Date Value Ref Range Status   03/26/2020 108 (H) 70 - 99 mg/dL Final     Urea Nitrogen   Date Value Ref Range Status   03/26/2020 14 7 - 30 mg/dL Final     Creatinine   Date Value Ref Range Status   03/26/2020 0.68 0.52 - 1.04 mg/dL Final     GFR Estimate   Date Value Ref Range Status   03/26/2020 >90 >60 mL/min/[1.73_m2] Final     Comment:     Non  GFR Calc  Starting 12/18/2018, serum creatinine based estimated GFR (eGFR) will be   calculated using the Chronic Kidney Disease Epidemiology Collaboration   (CKD-EPI) equation.       Calcium   Date Value Ref Range Status   03/26/2020 9.0 8.5 - 10.1 mg/dL Final     Bilirubin Total   Date Value Ref Range Status   03/26/2020 0.2 0.2 - 1.3 mg/dL Final     Alkaline Phosphatase   Date Value Ref Range Status   03/26/2020  47 40 - 150 U/L Final     ALT   Date Value Ref Range Status   03/26/2020 24 0 - 50 U/L Final     AST   Date Value Ref Range Status   03/26/2020 16 0 - 45 U/L Final       PATHOLOGY:  None new since last visit.    IMAGING:  None new since last visit.    ASSESSMENT/PLAN:  Jyothi Freitas is a 53 year old female with the following issues:  1.  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 Polyarthralgias  -Jyothi has no clinical evidence for recurrent breast cancer by history.  -She has completed adjuvant radiation therapy as of 6/15/2020, tolerating that overall well but with some fatigue.   -Due to joint stiffness, polyarthralgias, and myalgias from anastrazole, she switched to letrozole one month ago. She is tolerating this a bit better.  I discussed that she may have some delayed side effects of paclitaxel but also the body may be adapting to letrozole as well.  I recommended incorporating magnesium 100-300 mg daily at night and vitamin Dd 5716-7317 international unit(s) daily.  -Will plan to alternate mammogram with breast MRI, staggered by 6 months.  Next mammogram due 10/2020, ordered.    3. Depression  -Mood stable. Continue bupropion.     4.  Insomnia   5.  Hot flashes, drug induced  -Oxybutynin did help with hot flashes but she had too much dry eye syndrome and fluid retention.   -She has had significant improvement in her hot flashes and insomnia with the use of gabapentin, which she resumed and will continue. Prescription issued for 400 mg daily.  -She will continue to take Ambien as needed. Prescription refilled for Ambien today.    6. Ectasia of thoracic aorta  -This measured 4 cm on the PET scan.  -Surgical management not needed but she will need ongoing monitoring. She is following with Dr. Silverio Gooden for monitoring of this issue.     Return in 3 months  "in-person.    Maricarmen Monroe MD  Hematology/Oncology  [facility]    Jyothi Freitas is a 54 year old female who is being evaluated via a billable video visit.      The patient has been notified of following:     \"This video visit will be conducted via a call between you and your physician/provider. We have found that certain health care needs can be provided without the need for an in-person physical exam.  This service lets us provide the care you need with a video conversation.  If a prescription is necessary we can send it directly to your pharmacy.  If lab work is needed we can place an order for that and you can then stop by our lab to have the test done at a later time.    Video visits are billed at different rates depending on your insurance coverage.  Please reach out to your insurance provider with any questions.    If during the course of the call the physician/provider feels a video visit is not appropriate, you will not be charged for this service.\"    Patient has given verbal consent for Video visit? Yes  How would you like to obtain your AVS? [facility]  Patient would like the video invitation sent by: Text to cell phone: [phone]  Will anyone else be joining your video visit? No        Video-Visit Details    Type of service:  Video Visit    Video Start Time:  Video End Time:    Originating Location (pt. Location): Home    Distant Location (provider location):  [facility]     Platform used for Video Visit: Xuan Wright MA        Again, thank you for allowing me to participate in the care of your patient.        Sincerely,        Maricarmen Monroe MD    "

## 2020-07-13 NOTE — LETTER
"    7/13/2020         RE: Jyothi Freitas  [address]  [place] [address]        Dear Colleague,    Thank you for referring your patient, Jyothi Freitas, to the [facility]. Please see a copy of my visit note below.    [facility]    Hematology/Oncology Established Patient Follow-up Note      Today's Date: 7/13/20    Reason for Follow-up: Right breast cancer.    Jyothi Freitas is a 54 year old female who is being evaluated via a billable video visit.      The patient has been notified of following:     \"This video visit will be conducted via a call between you and your physician/provider. We have found that certain health care needs can be provided without the need for an in-person physical exam.  This service lets us provide the care you need with a video conversation.  If a prescription is necessary we can send it directly to your pharmacy.  If lab work is needed we can place an order for that and you can then stop by our lab to have the test done at a later time.    Video visits are billed at different rates depending on your insurance coverage.  Please reach out to your insurance provider with any questions.    If during the course of the call the physician/provider feels a video visit is not appropriate, you will not be charged for this service.\"    Patient has given verbal consent for Video visit? Yes    How would you like to obtain your AVS? [employer]    Text to cell phone: [phone]          Video-Visit Details    Type of service:  Video Visit (initial video then switched to telephone due to video malfunction).    Originating Location (pt. Location): Home    Distant Location (provider location):  [facility]     Mode of Communication:  Video Conference via [employer]    Video visit duration: 15 minutes    HISTORY OF PRESENT ILLNESS: Jyothi Freitas is a 54 year old female who presents with the following oncologic history:   1.  10/11/2019: Diagnostic right sided " mammogram performed for a palpable lump in the right breast.  This showed an irregularly-shaped spiculated mass in the upper outer right breast with prominent lymph nodes in the right axilla.  Targeted ultrasound of the right upper outer breast showed an irregularly-shaped hypoechoic mass at the 10 o'clock position, 4 cm from the nipple measuring 2.6 x 1.5 x 2.4 cm.  Right axillary ultrasound showed moderately enlarged lymph nodes.  Right breast needle biopsy showed a grade 3 invasive ductal carcinoma with lymphovascular invasion identified, ER strongly positive at 95%, WA strongly positive at 95%, HER-2/juan FISH negative.  Right axillary lymph node measuring 2 cm was positive for metastatic carcinoma.  Note prior 4/2019 mammogram deemed normal.  2.  10/15/2019: Bilateral breast MRI showed known right breast malignancy measuring 2.6 x 1.4 x 2 cm, 3 mildly enlarged right axillary lymph nodes and no contralateral breast malignancy.  3. 10/21/2019 PET scan showed scattered small hypermetabolic bilateral axillary lymph nodes; for example, a hypermetabolic right axillary lymph node measures 1.6 x 0.9 cm with SUV max 3.6.  Hypermetabolic mass in the right breast superiorly and laterally measuring 2.1 x 1.6 cm with SUV max 4.3.  A 0.6 cm low-density lesion in the right hepatic lobe is too small for accurate PET characterization but shows no appreciable hypermetabolic activity.  Incidentally noted ectasia of the ascending thoracic aorta measures 4 cm in diameter.  4.  10/23/2019: Left axillary ultrasound showed benign-appearing lymph node.  Left axillary lymph node biopsy showed benign lymph node tissue.  5.  10/29/2019: Started neoadjuvant chemotherapy with dose dense Adriamycin and Cytoxan, followed by weekly paclitaxel with completion on 3/12/2020.  6.  3/19/2020: Breast MRI showed no residual enhancement in the right breast mass.  The right axillary lymphadenopathy has resolved.  7. 4/01/2020: Underwent right breast  lumpectomy and right axillary sentinel lymph node biopsy under care of Dr. Kaila Hernandez.  Pathology showed fibrocystic change and no evidence of residual carcinoma in the right breast.  Metastatic breast adenocarcinoma to one of 5 lymph node fragments in 1 of 3 lymph nodes excised. Extranodal extension present. ypT0-N1a.  8. 4/29/2020: Started adjuvant anastrazole.  9. 6/15/2020: Completed adjuvant radiation therapy.  10. 6/15/2020: Switched to adjuvant letrozole with some mild improvement in polyathralgias.    INTERIM HISTORY:  Joythi reports some mild improvement in polyathralgias.  Increasing her gabapentin to 400 mg improved her hot flashes at nights. She does have some shooting pains in her legs which occasionally keep her awake at night.  She does use Ambien intermittently and requests a refill.    She has been regularly exercising.  She denies any recent fevers, chills, shortness of breath, cough, bowel or bladder dysfunction.       REVIEW OF SYSTEMS:   14 point ROS was reviewed and is negative other than as noted above in HPI.       HOME MEDICATIONS:  Current Outpatient Medications   Medication Sig Dispense Refill     acetaminophen (TYLENOL) 500 MG tablet Take 1,000 mg by mouth every 6 hours as needed for mild pain       acetaminophen-caffeine (EXCEDRIN TENSION HEADACHE) 500-65 MG TABS Take 2 tablets by mouth every 6 hours as needed for mild pain       albuterol (PROAIR HFA) 108 (90 Base) MCG/ACT inhaler INHALE 2 PUFFS INTO THE LUNGS EVERY 6 HOURS AS NEEDED FOR SHORTNESS OF BREATH OR DIFFICULT BREATHING OR WHEEZING 8.5 g 0     buPROPion (WELLBUTRIN XL) 300 MG 24 hr tablet TAKE 1 TABLET(300 MG) BY MOUTH EVERY MORNING 90 tablet 3     FLOVENT DISKUS 100 MCG/BLIST inhaler INHALE 1 PUFF INTO THE LUNGS TWICE DAILY 3 Inhaler 3     gabapentin (NEURONTIN) 100 MG capsule Take 3 capsules (300 mg) by mouth every evening 270 capsule 3     letrozole (FEMARA) 2.5 MG tablet Take 1 tablet (2.5 mg) by mouth daily 30 tablet 3      nadolol (CORGARD) 20 MG tablet TAKE 1 TABLET(20 MG) BY MOUTH DAILY 90 tablet 3     senna-docusate (SENOKOT-S/PERICOLACE) 8.6-50 MG tablet Take 1-2 tablets by mouth 2 times daily as needed for constipation (While on oral opioids.) 20 tablet 0     zolpidem (AMBIEN) 5 MG tablet Take 1 tablet (5 mg) by mouth nightly as needed for sleep 30 tablet 1         ALLERGIES:  Allergies   Allergen Reactions     Sulfa Drugs Other (See Comments)     Red face. Ringing in the ears.         PAST MEDICAL HISTORY:  Past Medical History:   Diagnosis Date     GERD (gastroesophageal reflux disease)      H/O colonoscopy 03/08/2016    done at [place] and nl     Hematuria 2016    eval at [facility] [place] and per pt cysto and ct neg     Intermittent asthma     since childhood     Low iron 10/2017    done for eval of hair loss, [facility]d [facility]     Migraines     most of adult life, has seen neuro in past, on bblocker     Mild depression (H)      Normal stress echocardiogram July 2011    due to hear racing     Osteopenia 2008     Syncope 03/2017    echo n[facility] lv size and fxn, grade 1 dd, mild tr         PAST SURGICAL HISTORY:  Past Surgical History:   Procedure Laterality Date     BIOPSY NODE SENTINEL Right 4/1/2020    Procedure: RIGHT SENTINEL LYMPH NODE BIOPSY;  Surgeon: Kaila Hernandez MD;  Location:  OR     C [facility]J ARTHROSCOPY/SURGERY       ESOPHAGOSCOPY, GASTROSCOPY, DUODENOSCOPY (EGD), COMBINED N/A 10/30/2017    Procedure: COMBINED ESOPHAGOSCOPY, GASTROSCOPY, DUODENOSCOPY (EGD), BIOPSY SINGLE OR MULTIPLE;  COMBINED ESOPHAGOSCOPY, GASTROSCOPY, DUODENOSCOPY (EGD);  Surgeon: Martin Rodriguez MD;  Location:  GI     INSERT PORT VASCULAR ACCESS N/A 10/24/2019    Procedure: PORT PLACEMENT;  Surgeon: Kaila Hernandez MD;  Location:  OR     LUMPECTOMY BREAST WITH SEED LOCALIZATION Right 4/1/2020    Procedure: SEED LOCALIZED RIGHT BREAST LUMPECTOMY WITH SEED LOCALIZED RIGHT AXILLARY NODE EXCISION;  Surgeon: Kaila Hernandez MD;  Location:  OR     ORTHOPEDIC  "SURGERY      \"leg surgery\"     REMOVE PORT VASCULAR ACCESS Left 2020    Procedure: REMOVAL, VASCULAR ACCESS PORT;  Surgeon: Kaila Hernandez MD;  Location: [facility] OR     single oopherectomy  2010    cyst         SOCIAL HISTORY:  Social History     Socioeconomic History     Marital status:      Spouse name: Not on file     Number of children: 2     Years of education: Not on file     Highest education level: Not on file   Occupational History     Not on file   Social Needs     Financial resource strain: Not on file     Food insecurity     Worry: Not on file     Inability: Not on file     Transportation needs     Medical: Not on file     Non-medical: Not on file   Tobacco Use     Smoking status: Former Smoker     Packs/day: 0.00     Last attempt to quit: 3/27/1997     Years since quittin.3     Smokeless tobacco: Never Used   Substance and Sexual Activity     Alcohol use: Yes     Comment: rarely     Drug use: No     Sexual activity: Yes     Partners: Male     Birth control/protection: Pill   Lifestyle     Physical activity     Days per week: Not on file     Minutes per session: Not on file     Stress: Not on file   Relationships     Social connections     Talks on phone: Not on file     Gets together: Not on file     Attends [employer] service: Not on file     Active member of club or organization: Not on file     Attends meetings of clubs or organizations: Not on file     Relationship status: Not on file     Intimate partner violence     Fear of current or ex partner: Not on file     Emotionally abused: Not on file     Physically abused: Not on file     Forced sexual activity: Not on file   Other Topics Concern     Parent/sibling w/ CABG, MI or angioplasty before 65F 55M? Yes   Social History Narrative     Not on file         FAMILY HISTORY:  Family History   Problem Relation Age of Onset     Coronary Artery Disease Father 48        MI. and one in his 60s     Emphysema Mother         COPD         PHYSICAL " EXAM:  Vital signs:  Not taken.  GENERAL: No acute distress.  EYES: No scleral icterus. No overt erythema.  RESPIRATORY: No cough.  No labored breathing.  MUSCULOSKELETAL: Range of motion in the neck, shoulders, and arms appear normal.  SKIN: No overt rashes, discolorations, or lesions over the face and neck.  NEUROLOGIC: Alert.  No overt tremors.  PSYCHIATRIC: Normal affect and mood.  Does not appear anxious.    The rest of a comprehensive physical examination is deferred due to PHE (public health emergency) video visit restrictions.    LABS:  CBC RESULTS:   Recent Labs   Lab Test 03/26/20  0729   WBC 4.0   RBC 3.86   HGB 12.2   HCT 36.6   MCV 95   MCH 31.6   MCHC 33.3   RDW 12.5        Last Comprehensive Metabolic Panel:  Sodium   Date Value Ref Range Status   03/26/2020 140 133 - 144 mmol/L Final     Potassium   Date Value Ref Range Status   03/26/2020 3.9 3.4 - 5.3 mmol/L Final     Chloride   Date Value Ref Range Status   03/26/2020 108 94 - 109 mmol/L Final     Carbon Dioxide   Date Value Ref Range Status   03/26/2020 25 20 - 32 mmol/L Final     Anion Gap   Date Value Ref Range Status   03/26/2020 7 3 - 14 mmol/L Final     Glucose   Date Value Ref Range Status   03/26/2020 108 (H) 70 - 99 mg/dL Final     Urea Nitrogen   Date Value Ref Range Status   03/26/2020 14 7 - 30 mg/dL Final     Creatinine   Date Value Ref Range Status   03/26/2020 0.68 0.52 - 1.04 mg/dL Final     GFR Estimate   Date Value Ref Range Status   03/26/2020 >90 >60 mL/min/[1.73_m2] Final     Comment:     Non  GFR Calc  Starting 12/18/2018, serum creatinine based estimated GFR (eGFR) will be   calculated using the Chronic Kidney Disease Epidemiology Collaboration   (CKD-EPI) equation.       Calcium   Date Value Ref Range Status   03/26/2020 9.0 8.5 - 10.1 mg/dL Final     Bilirubin Total   Date Value Ref Range Status   03/26/2020 0.2 0.2 - 1.3 mg/dL Final     Alkaline Phosphatase   Date Value Ref Range Status   03/26/2020  47 40 - 150 U/L Final     ALT   Date Value Ref Range Status   03/26/2020 24 0 - 50 U/L Final     AST   Date Value Ref Range Status   03/26/2020 16 0 - 45 U/L Final       PATHOLOGY:  None new since last visit.    IMAGING:  None new since last visit.    ASSESSMENT/PLAN:  Jyothi Freitas is a 53 year old female with the following issues:  1.  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 Polyarthralgias  -Jyothi has no clinical evidence for recurrent breast cancer by history.  -She has completed adjuvant radiation therapy as of 6/15/2020, tolerating that overall well but with some fatigue.   -Due to joint stiffness, polyarthralgias, and myalgias from anastrazole, she switched to letrozole one month ago. She is tolerating this a bit better.  I discussed that she may have some delayed side effects of paclitaxel but also the body may be adapting to letrozole as well.  I recommended incorporating magnesium 100-300 mg daily at night and vitamin Dd 2630-5057 international unit(s) daily.  -Will plan to alternate mammogram with breast MRI, staggered by 6 months.  Next mammogram due 10/2020, ordered.    3. Depression  -Mood stable. Continue bupropion.     4.  Insomnia   5.  Hot flashes, drug induced  -Oxybutynin did help with hot flashes but she had too much dry eye syndrome and fluid retention.   -She has had significant improvement in her hot flashes and insomnia with the use of gabapentin, which she resumed and will continue. Prescription issued for 400 mg daily.  -She will continue to take Ambien as needed. Prescription refilled for Ambien today.    6. Ectasia of thoracic aorta  -This measured 4 cm on the PET scan.  -Surgical management not needed but she will need ongoing monitoring. She is following with Dr. Silverio Gooden for monitoring of this issue.     Return in 3 months  "in-person.    Maricarmen Monroe MD  Hematology/Oncology  [facility]    Jyothi Freitas is a 54 year old female who is being evaluated via a billable video visit.      The patient has been notified of following:     \"This video visit will be conducted via a call between you and your physician/provider. We have found that certain health care needs can be provided without the need for an in-person physical exam.  This service lets us provide the care you need with a video conversation.  If a prescription is necessary we can send it directly to your pharmacy.  If lab work is needed we can place an order for that and you can then stop by our lab to have the test done at a later time.    Video visits are billed at different rates depending on your insurance coverage.  Please reach out to your insurance provider with any questions.    If during the course of the call the physician/provider feels a video visit is not appropriate, you will not be charged for this service.\"    Patient has given verbal consent for Video visit? Yes  How would you like to obtain your AVS? [facility]  Patient would like the video invitation sent by: Text to cell phone: [phone]  Will anyone else be joining your video visit? No        Video-Visit Details    Type of service:  Video Visit    Video Start Time:  Video End Time:    Originating Location (pt. Location): Home    Distant Location (provider location):  [facility]     Platform used for Video Visit: Xuan Wright MA        Again, thank you for allowing me to participate in the care of your patient.        Sincerely,        Maricarmen Monroe MD    "

## 2020-07-14 ENCOUNTER — TELEPHONE (OUTPATIENT)
Dept: ONCOLOGY | Facility: CLINIC | Age: 55
End: 2020-07-14

## 2020-07-14 NOTE — TELEPHONE ENCOUNTER
Left voicemail message for patient requesting a return call regarding scheduling mammogram and return appointment with Dr Fer sierra in 3months.

## 2020-08-04 ENCOUNTER — APPOINTMENT (OUTPATIENT)
Dept: CT IMAGING | Facility: CLINIC | Age: 55
End: 2020-08-04
Attending: NURSE PRACTITIONER
Payer: COMMERCIAL

## 2020-08-04 ENCOUNTER — HOSPITAL ENCOUNTER (EMERGENCY)
Facility: CLINIC | Age: 55
Discharge: HOME | End: 2020-08-04
Attending: NURSE PRACTITIONER | Admitting: NURSE PRACTITIONER
Payer: COMMERCIAL

## 2020-08-04 VITALS
TEMPERATURE: 98.6 F | HEART RATE: 86 BPM | WEIGHT: 135 LBS | DIASTOLIC BLOOD PRESSURE: 90 MMHG | OXYGEN SATURATION: 100 % | RESPIRATION RATE: 20 BRPM | BODY MASS INDEX: 21.19 KG/M2 | SYSTOLIC BLOOD PRESSURE: 144 MMHG | HEIGHT: 67 IN

## 2020-08-04 DIAGNOSIS — S01.81XA FACIAL LACERATION, INITIAL ENCOUNTER: ICD-10-CM

## 2020-08-04 DIAGNOSIS — S09.90XA CLOSED HEAD INJURY, INITIAL ENCOUNTER: ICD-10-CM

## 2020-08-04 PROCEDURE — 70450 CT HEAD/BRAIN W/O DYE: CPT

## 2020-08-04 PROCEDURE — 90471 IMMUNIZATION ADMIN: CPT

## 2020-08-04 PROCEDURE — 25000128 H RX IP 250 OP 636: Performed by: NURSE PRACTITIONER

## 2020-08-04 PROCEDURE — 12011 RPR F/E/E/N/L/M 2.5 CM/<: CPT

## 2020-08-04 PROCEDURE — 99284 EMERGENCY DEPT VISIT MOD MDM: CPT | Mod: 25

## 2020-08-04 PROCEDURE — 90715 TDAP VACCINE 7 YRS/> IM: CPT | Performed by: NURSE PRACTITIONER

## 2020-08-04 PROCEDURE — 27210282 ZZH ADHESIVE DERMABOND SKIN

## 2020-08-04 RX ORDER — CYCLOBENZAPRINE HCL 10 MG
10 TABLET ORAL 3 TIMES DAILY PRN
Qty: 20 TABLET | Refills: 0 | Status: SHIPPED | OUTPATIENT
Start: 2020-08-04 | End: 2020-08-11

## 2020-08-04 RX ADMIN — CLOSTRIDIUM TETANI TOXOID ANTIGEN (FORMALDEHYDE INACTIVATED), CORYNEBACTERIUM DIPHTHERIAE TOXOID ANTIGEN (FORMALDEHYDE INACTIVATED), BORDETELLA PERTUSSIS TOXOID ANTIGEN (GLUTARALDEHYDE INACTIVATED), BORDETELLA PERTUSSIS FILAMENTOUS HEMAGGLUTININ ANTIGEN (FORMALDEHYDE INACTIVATED), BORDETELLA PERTUSSIS PERTACTIN ANTIGEN, AND BORDETELLA PERTUSSIS FIMBRIAE 2/3 ANTIGEN 0.5 ML: 5; 2; 2.5; 5; 3; 5 INJECTION, SUSPENSION INTRAMUSCULAR at 20:03

## 2020-08-04 ASSESSMENT — ENCOUNTER SYMPTOMS
HEADACHES: 1
BACK PAIN: 0
WOUND: 1
SPEECH DIFFICULTY: 0
VOMITING: 0
NECK PAIN: 1

## 2020-08-04 ASSESSMENT — MIFFLIN-ST. JEOR: SCORE: 1239.99

## 2020-08-04 NOTE — ED AVS SNAPSHOT
Emergency Department  [address] [place] [address]  Phone:  [phone]  Fax:  [phone]                                    Jyothi Freitas   MRN: [id number]    Department:   Emergency Department   Date of Visit:  8/4/2020           After Visit Summary Signature Page    I have received my discharge instructions, and my questions have been answered. I have discussed any challenges I see with this plan with the nurse or doctor.    ..........................................................................................................................................  Patient/Patient Representative Signature      ..........................................................................................................................................  Patient Representative Print Name and Relationship to Patient    ..................................................               ................................................  Date                                   Time    ..........................................................................................................................................  Reviewed by Signature/Title    ...................................................              ..............................................  Date                                               Time          22EPIC Rev 08/18
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
## 2020-10-01 ENCOUNTER — TRANSFERRED RECORDS (OUTPATIENT)
Dept: HEALTH INFORMATION MANAGEMENT | Facility: CLINIC | Age: 55
End: 2020-10-01

## 2020-10-01 DIAGNOSIS — Z13.820 SCREENING FOR OSTEOPOROSIS: ICD-10-CM

## 2020-10-01 DIAGNOSIS — Z79.811 AROMATASE INHIBITOR USE: ICD-10-CM

## 2020-10-01 DIAGNOSIS — C50.911 MALIGNANT NEOPLASM OF RIGHT BREAST (H): Primary | ICD-10-CM

## 2020-10-01 LAB — PAP SMEAR - HIM PATIENT REPORTED: NEGATIVE

## 2020-10-06 ENCOUNTER — TELEPHONE (OUTPATIENT)
Dept: ONCOLOGY | Facility: CLINIC | Age: 55
End: 2020-10-06

## 2020-10-06 DIAGNOSIS — Z17.0 MALIGNANT NEOPLASM OF UPPER-OUTER QUADRANT OF RIGHT BREAST IN FEMALE, ESTROGEN RECEPTOR POSITIVE (H): Primary | ICD-10-CM

## 2020-10-06 DIAGNOSIS — C50.411 MALIGNANT NEOPLASM OF UPPER-OUTER QUADRANT OF RIGHT BREAST IN FEMALE, ESTROGEN RECEPTOR POSITIVE (H): Primary | ICD-10-CM

## 2020-10-06 DIAGNOSIS — E61.1 LOW IRON: ICD-10-CM

## 2020-10-13 ENCOUNTER — ANCILLARY PROCEDURE (OUTPATIENT)
Dept: BONE DENSITY | Facility: CLINIC | Age: 55
End: 2020-10-13
Attending: INTERNAL MEDICINE
Payer: COMMERCIAL

## 2020-10-13 DIAGNOSIS — C50.911 MALIGNANT NEOPLASM OF RIGHT BREAST (H): ICD-10-CM

## 2020-10-13 DIAGNOSIS — Z13.820 SCREENING FOR OSTEOPOROSIS: ICD-10-CM

## 2020-10-13 DIAGNOSIS — Z79.811 AROMATASE INHIBITOR USE: ICD-10-CM

## 2020-10-13 PROCEDURE — 77085 DXA BONE DENSITY AXL VRT FX: CPT | Performed by: INTERNAL MEDICINE

## 2020-10-16 NOTE — PROGRESS NOTES
"[facility]    Hematology/Oncology Established Patient Follow-up Note      Today's Date:10/21/20    Reason for Follow-up: Right breast cancer.    Jyothi Freitas is a 54 year old female who is being evaluated via a billable video visit.      The patient has been notified of following:     \"This video visit will be conducted via a call between you and your physician/provider. We have found that certain health care needs can be provided without the need for an in-person physical exam.  This service lets us provide the care you need with a video conversation.  If a prescription is necessary we can send it directly to your pharmacy.  If lab work is needed we can place an order for that and you can then stop by our lab to have the test done at a later time.    Video visits are billed at different rates depending on your insurance coverage.  Please reach out to your insurance provider with any questions.    If during the course of the call the physician/provider feels a video visit is not appropriate, you will not be charged for this service.\"    Patient has given verbal consent for Video visit? Yes    How would you like to obtain your AVS? MyChart    Text to cell phone: [phone]          Video-Visit Details    Type of service:  Video Visit     Originating Location (pt. Location): Home    Distant Location (provider location):  [facility]     Mode of Communication:  Video Conference via [employer]    Video visit duration: 29 minutes    HISTORY OF PRESENT ILLNESS: Jyothi Freitas is a 54 year old female who presents with the following oncologic history:   1.  10/11/2019: Diagnostic right sided mammogram performed for a palpable lump in the right breast.  This showed an irregularly-shaped spiculated mass in the upper outer right breast with prominent lymph nodes in the right axilla.  Targeted ultrasound of the right upper outer breast showed an irregularly-shaped hypoechoic mass at the 10 o'clock " position, 4 cm from the nipple measuring 2.6 x 1.5 x 2.4 cm.  Right axillary ultrasound showed moderately enlarged lymph nodes.  Right breast needle biopsy showed a grade 3 invasive ductal carcinoma with lymphovascular invasion identified, ER strongly positive at 95%, TN strongly positive at 95%, HER-2/juan FISH negative.  Right axillary lymph node measuring 2 cm was positive for metastatic carcinoma.  Note prior 4/2019 mammogram deemed normal.  2.  10/15/2019: Bilateral breast MRI showed known right breast malignancy measuring 2.6 x 1.4 x 2 cm, 3 mildly enlarged right axillary lymph nodes and no contralateral breast malignancy.  3. 10/21/2019 PET scan showed scattered small hypermetabolic bilateral axillary lymph nodes; for example, a hypermetabolic right axillary lymph node measures 1.6 x 0.9 cm with SUV max 3.6.  Hypermetabolic mass in the right breast superiorly and laterally measuring 2.1 x 1.6 cm with SUV max 4.3.  A 0.6 cm low-density lesion in the right hepatic lobe is too small for accurate PET characterization but shows no appreciable hypermetabolic activity.  Incidentally noted ectasia of the ascending thoracic aorta measures 4 cm in diameter.  4.  10/23/2019: Left axillary ultrasound showed benign-appearing lymph node.  Left axillary lymph node biopsy showed benign lymph node tissue.  5.  10/29/2019: Started neoadjuvant chemotherapy with dose dense Adriamycin and Cytoxan, followed by weekly paclitaxel with completion on 3/12/2020.  6.  3/19/2020: Breast MRI showed no residual enhancement in the right breast mass.  The right axillary lymphadenopathy has resolved.  7. 4/01/2020: Underwent right breast lumpectomy and right axillary sentinel lymph node biopsy under care of Dr. Kaila Hernandez.  Pathology showed fibrocystic change and no evidence of residual carcinoma in the right breast.  Metastatic breast adenocarcinoma to one of 5 lymph node fragments in 1 of 3 lymph nodes excised. Extranodal extension  present. ypT0-N1a.  8. 4/29/2020: Started adjuvant anastrazole.  9. 6/15/2020: Completed adjuvant radiation therapy.  10. 6/15/2020: Switched to adjuvant letrozole with some mild improvement in polyathralgias.    INTERIM HISTORY:  Jyothi reports continued but reduced polyathralgias on letrozole as compared to anastrazole. Gabapentin is alleviating her hot flashes well.  She does use Ambien intermittently and requests a refill.    She denies any recent fevers, chills, shortness of breath, cough, bowel or bladder dysfunction.       REVIEW OF SYSTEMS:   14 point ROS was reviewed and is negative other than as noted above in HPI.       HOME MEDICATIONS:  Current Outpatient Medications   Medication Sig Dispense Refill     acetaminophen (TYLENOL) 500 MG tablet Take 1,000 mg by mouth every 6 hours as needed for mild pain       acetaminophen-caffeine (EXCEDRIN TENSION HEADACHE) 500-65 MG TABS Take 2 tablets by mouth every 6 hours as needed for mild pain       albuterol (PROAIR HFA) 108 (90 Base) MCG/ACT inhaler INHALE 2 PUFFS INTO THE LUNGS EVERY 6 HOURS AS NEEDED FOR SHORTNESS OF BREATH OR DIFFICULT BREATHING OR WHEEZING 8.5 g 0     buPROPion (WELLBUTRIN XL) 300 MG 24 hr tablet TAKE 1 TABLET(300 MG) BY MOUTH EVERY MORNING 90 tablet 3     FLOVENT DISKUS 100 MCG/BLIST inhaler INHALE 1 PUFF INTO THE LUNGS TWICE DAILY 3 Inhaler 3     gabapentin (NEURONTIN) 100 MG capsule Take 4 capsules (400 mg) by mouth every evening 270 capsule 3     letrozole (FEMARA) 2.5 MG tablet Take 1 tablet (2.5 mg) by mouth daily 90 tablet 3     zolpidem (AMBIEN) 5 MG tablet Take 1 tablet (5 mg) by mouth nightly as needed for sleep 30 tablet 3         ALLERGIES:  Allergies   Allergen Reactions     Sulfa Drugs Other (See Comments)     Red face. Ringing in the ears.         PAST MEDICAL HISTORY:  Past Medical History:   Diagnosis Date     GERD (gastroesophageal reflux disease)      H/O colonoscopy 03/08/2016    done at [facility] and nl     Hematuria 2016     "eval at [facility] and per pt cysto and ct neg     Intermittent asthma     since childhood     Low iron 10/2017    done for eval of hair loss, [facility]d [facility]     Migraines     most of adult life, has seen neuro in past, on bblocker     Mild depression (H)      Normal stress echocardiogram July 2011    due to hear racing     Osteopenia 2008     Syncope 03/2017    echo [facility] lv size and fxn, grade 1 dd, mild tr         PAST SURGICAL HISTORY:  Past Surgical History:   Procedure Laterality Date     BIOPSY NODE SENTINEL Right 4/1/2020    Procedure: RIGHT SENTINEL LYMPH NODE BIOPSY;  Surgeon: Kaila Hernandez MD;  Location:  OR     C TMJ ARTHROSCOPY/SURGERY       ESOPHAGOSCOPY, GASTROSCOPY, DUODENOSCOPY (EGD), COMBINED N/A 10/30/2017    Procedure: COMBINED ESOPHAGOSCOPY, GASTROSCOPY, DUODENOSCOPY (EGD), BIOPSY SINGLE OR MULTIPLE;  COMBINED ESOPHAGOSCOPY, GASTROSCOPY, DUODENOSCOPY (EGD);  Surgeon: Martin Rodriguez MD;  Location:  GI     INSERT PORT VASCULAR ACCESS N/A 10/24/2019    Procedure: PORT PLACEMENT;  Surgeon: Kaila Hernandez MD;  Location:  OR     LUMPECTOMY BREAST WITH SEED LOCALIZATION Right 4/1/2020    Procedure: SEED LOCALIZED RIGHT BREAST LUMPECTOMY WITH SEED LOCALIZED RIGHT AXILLARY NODE EXCISION;  Surgeon: Kaila Hernandez MD;  Location:  OR     ORTHOPEDIC SURGERY      \"leg surgery\"     REMOVE PORT VASCULAR ACCESS Left 4/1/2020    Procedure: REMOVAL, VASCULAR ACCESS PORT;  Surgeon: Kaila Hernandez MD;  Location:  OR     single oopherectomy  2010    cyst         SOCIAL HISTORY:  Social History     Socioeconomic History     Marital status:      Spouse name: Not on file     Number of children: 2     Years of education: Not on file     Highest education level: Not on file   Occupational History     Not on file   Social Needs     Financial resource strain: Not on file     Food insecurity     Worry: Not on file     Inability: Not on file     Transportation needs     Medical: Not on file     " Non-medical: Not on file   Tobacco Use     Smoking status: Former Smoker     Packs/day: 0.00     Quit date: 3/27/1997     Years since quittin.5     Smokeless tobacco: Never Used   Substance and Sexual Activity     Alcohol use: Yes     Comment: rarely     Drug use: No     Sexual activity: Yes     Partners: Male     Birth control/protection: Pill   Lifestyle     Physical activity     Days per week: Not on file     Minutes per session: Not on file     Stress: Not on file   Relationships     Social connections     Talks on phone: Not on file     Gets together: Not on file     Attends [employer] service: Not on file     Active member of club or organization: Not on file     Attends meetings of clubs or organizations: Not on file     Relationship status: Not on file     Intimate partner violence     Fear of current or ex partner: Not on file     Emotionally abused: Not on file     Physically abused: Not on file     Forced sexual activity: Not on file   Other Topics Concern     Parent/sibling w/ CABG, MI or angioplasty before 65F 55M? Yes   Social History Narrative     Not on file         FAMILY HISTORY:  Family History   Problem Relation Age of Onset     Coronary Artery Disease Father 48        MI. and one in his 60s     Emphysema Mother         COPD         PHYSICAL EXAM:  Vital signs:  Not taken.  GENERAL: No acute distress.  EYES: No scleral icterus. No overt erythema.  RESPIRATORY: No cough.  No labored breathing.  MUSCULOSKELETAL: Range of motion in the neck, shoulders, and arms appear normal.  SKIN: No overt rashes, discolorations, or lesions over the face and neck.  NEUROLOGIC: Alert.  No overt tremors.  PSYCHIATRIC: Normal affect and mood.  Does not appear anxious.    The rest of a comprehensive physical examination is deferred due to PHE (public health emergency) video visit restrictions.    LABS:  CBC RESULTS:   Recent Labs   Lab Test 10/20/20  0906   WBC 4.6   RBC 4.84   HGB 15.0   HCT 44.7   MCV 92   MCH  31.0   MCHC 33.6   RDW 11.9        Last Comprehensive Metabolic Panel:  Sodium   Date Value Ref Range Status   10/20/2020 139 133 - 144 mmol/L Final     Potassium   Date Value Ref Range Status   10/20/2020 4.2 3.4 - 5.3 mmol/L Final     Chloride   Date Value Ref Range Status   10/20/2020 107 94 - 109 mmol/L Final     Carbon Dioxide   Date Value Ref Range Status   10/20/2020 29 20 - 32 mmol/L Final     Anion Gap   Date Value Ref Range Status   10/20/2020 3 3 - 14 mmol/L Final     Glucose   Date Value Ref Range Status   10/20/2020 78 70 - 99 mg/dL Final     Urea Nitrogen   Date Value Ref Range Status   10/20/2020 13 7 - 30 mg/dL Final     Creatinine   Date Value Ref Range Status   10/20/2020 0.78 0.52 - 1.04 mg/dL Final     GFR Estimate   Date Value Ref Range Status   10/20/2020 86 >60 mL/min/[1.73_m2] Final     Comment:     Non  GFR Calc  Starting 2018, serum creatinine based estimated GFR (eGFR) will be   calculated using the Chronic Kidney Disease Epidemiology Collaboration   (CKD-EPI) equation.       Calcium   Date Value Ref Range Status   10/20/2020 9.5 8.5 - 10.1 mg/dL Final     Bilirubin Total   Date Value Ref Range Status   10/20/2020 0.2 0.2 - 1.3 mg/dL Final     Alkaline Phosphatase   Date Value Ref Range Status   10/20/2020 76 40 - 150 U/L Final     ALT   Date Value Ref Range Status   10/20/2020 25 0 - 50 U/L Final     AST   Date Value Ref Range Status   10/20/2020 16 0 - 45 U/L Final       PATHOLOGY:  None new since last visit.    IMAGIN2020 CT head without contrast showed no bleed or trauma.    10/13/2020: DEXA scan showed osteoporosis in the lumbar spine and left femoral neck; osteopenia in right femoral neck.    10/20/2020: Mammogram showed no suspicious findings.    ASSESSMENT/PLAN:  Jyothi Freitas is a 54 year old female with the following issues:  1.  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 Polyarthralgias  -Jyothi has no clinical evidence for recurrent breast cancer by history or mammogram reviewed from 10/20/2020.  -She completed adjuvant radiation therapy as of 6/15/2020, tolerating that overall well but with some fatigue.   -Jyothi is on letrozole and tolerating this much better than anastrazole with 50% less polyarthralgias.  -I recommended continuing magnesium 100-300 mg daily at night and vitamin D 1000 international unit(s) daily.  -Will plan to alternate mammogram with breast MRI, staggered by 6 months.  Next breast MRI due 4/2021.  -Discussed signs and symptoms to watch for breast cancer recurrence.    3. Osteoporosis  -Discussed DEXA scan results from 10/13/2020 which showed osteoporosis.  -Recommended adequate calcium and vitamin D and weight-bearing exercise. Also recommended zoledronic acid every 6 months for 6 doses.  Discussed potential side effects such as flu-like symptoms, bone aches, hypocalcemia, and a small risk of jaw osteonecrosis.  She agrees to proceed. She will arrange for a dental checkup and cleaning prior to start of zoledronic acid.  -Will repeat DEXA scan in 2022.      4. Depression  -Mood stable. Continue bupropion.     5.  Insomnia   6.  Hot flashes, drug induced  -Oxybutynin did help with hot flashes but she had too much dry eye syndrome and fluid retention.   -She has had significant improvement in her hot flashes and insomnia with the use of gabapentin, which she resumed and will continue. Prescription issued for 400 mg daily.  -She will continue to take Ambien as needed. Prescription refilled for Ambien today.    7. Ectasia of thoracic aorta  -This measured 4 cm on the PET scan.  -Surgical management not needed but she will need ongoing monitoring. She is following with Dr. Silverio Gooden for monitoring of this issue.     Return in 3 months.    Maricarmen Monroe MD  Hematology/Oncology  [facility]  [facility]

## 2020-10-20 ENCOUNTER — INFUSION THERAPY VISIT (OUTPATIENT)
Dept: INFUSION THERAPY | Facility: CLINIC | Age: 55
End: 2020-10-20
Attending: INTERNAL MEDICINE
Payer: COMMERCIAL

## 2020-10-20 ENCOUNTER — HOSPITAL ENCOUNTER (OUTPATIENT)
Facility: CLINIC | Age: 55
Setting detail: SPECIMEN
Discharge: HOME | End: 2020-10-20
Admitting: INTERNAL MEDICINE
Payer: COMMERCIAL

## 2020-10-20 ENCOUNTER — HOSPITAL ENCOUNTER (OUTPATIENT)
Dept: MAMMOGRAPHY | Facility: CLINIC | Age: 55
Discharge: HOME | End: 2020-10-20
Attending: INTERNAL MEDICINE | Admitting: INTERNAL MEDICINE
Payer: COMMERCIAL

## 2020-10-20 DIAGNOSIS — C50.911 MALIGNANT NEOPLASM OF RIGHT BREAST (H): Primary | ICD-10-CM

## 2020-10-20 DIAGNOSIS — Z17.0 MALIGNANT NEOPLASM OF UPPER-OUTER QUADRANT OF RIGHT BREAST IN FEMALE, ESTROGEN RECEPTOR POSITIVE (H): ICD-10-CM

## 2020-10-20 DIAGNOSIS — C50.411 MALIGNANT NEOPLASM OF UPPER-OUTER QUADRANT OF RIGHT BREAST IN FEMALE, ESTROGEN RECEPTOR POSITIVE (H): ICD-10-CM

## 2020-10-20 DIAGNOSIS — E61.1 LOW IRON: ICD-10-CM

## 2020-10-20 LAB
ALBUMIN SERPL-MCNC: 4.2 G/DL (ref 3.4–5)
ALP SERPL-CCNC: 76 U/L (ref 40–150)
ALT SERPL W P-5'-P-CCNC: 25 U/L (ref 0–50)
ANION GAP SERPL CALCULATED.3IONS-SCNC: 3 MMOL/L (ref 3–14)
AST SERPL W P-5'-P-CCNC: 16 U/L (ref 0–45)
BASOPHILS # BLD AUTO: 0.1 10E9/L (ref 0–0.2)
BASOPHILS NFR BLD AUTO: 1.5 %
BILIRUB SERPL-MCNC: 0.2 MG/DL (ref 0.2–1.3)
BUN SERPL-MCNC: 13 MG/DL (ref 7–30)
CALCIUM SERPL-MCNC: 9.5 MG/DL (ref 8.5–10.1)
CHLORIDE SERPL-SCNC: 107 MMOL/L (ref 94–109)
CO2 SERPL-SCNC: 29 MMOL/L (ref 20–32)
CREAT SERPL-MCNC: 0.78 MG/DL (ref 0.52–1.04)
DIFFERENTIAL METHOD BLD: NORMAL
EOSINOPHIL # BLD AUTO: 0.4 10E9/L (ref 0–0.7)
EOSINOPHIL NFR BLD AUTO: 9.1 %
ERYTHROCYTE [DISTWIDTH] IN BLOOD BY AUTOMATED COUNT: 11.9 % (ref 10–15)
FERRITIN SERPL-MCNC: 48 NG/ML (ref 8–252)
GFR SERPL CREATININE-BSD FRML MDRD: 86 ML/MIN/{1.73_M2}
GLUCOSE SERPL-MCNC: 78 MG/DL (ref 70–99)
HCT VFR BLD AUTO: 44.7 % (ref 35–47)
HGB BLD-MCNC: 15 G/DL (ref 11.7–15.7)
IMM GRANULOCYTES # BLD: 0 10E9/L (ref 0–0.4)
IMM GRANULOCYTES NFR BLD: 0.2 %
LYMPHOCYTES # BLD AUTO: 1.1 10E9/L (ref 0.8–5.3)
LYMPHOCYTES NFR BLD AUTO: 24.4 %
MAGNESIUM SERPL-MCNC: 2.3 MG/DL (ref 1.6–2.3)
MCH RBC QN AUTO: 31 PG (ref 26.5–33)
MCHC RBC AUTO-ENTMCNC: 33.6 G/DL (ref 31.5–36.5)
MCV RBC AUTO: 92 FL (ref 78–100)
MONOCYTES # BLD AUTO: 0.6 10E9/L (ref 0–1.3)
MONOCYTES NFR BLD AUTO: 13.4 %
NEUTROPHILS # BLD AUTO: 2.4 10E9/L (ref 1.6–8.3)
NEUTROPHILS NFR BLD AUTO: 51.4 %
NRBC # BLD AUTO: 0 10*3/UL
NRBC BLD AUTO-RTO: 0 /100
PLATELET # BLD AUTO: 308 10E9/L (ref 150–450)
POTASSIUM SERPL-SCNC: 4.2 MMOL/L (ref 3.4–5.3)
PROT SERPL-MCNC: 7.6 G/DL (ref 6.8–8.8)
RBC # BLD AUTO: 4.84 10E12/L (ref 3.8–5.2)
SODIUM SERPL-SCNC: 139 MMOL/L (ref 133–144)
WBC # BLD AUTO: 4.6 10E9/L (ref 4–11)

## 2020-10-20 PROCEDURE — 85025 COMPLETE CBC W/AUTO DIFF WBC: CPT | Performed by: INTERNAL MEDICINE

## 2020-10-20 PROCEDURE — 80053 COMPREHEN METABOLIC PANEL: CPT | Performed by: INTERNAL MEDICINE

## 2020-10-20 PROCEDURE — 77063 BREAST TOMOSYNTHESIS BI: CPT

## 2020-10-20 PROCEDURE — 83735 ASSAY OF MAGNESIUM: CPT | Performed by: INTERNAL MEDICINE

## 2020-10-20 PROCEDURE — 99207 PR NO CHARGE LOS: CPT

## 2020-10-20 PROCEDURE — 82728 ASSAY OF FERRITIN: CPT | Performed by: INTERNAL MEDICINE

## 2020-10-20 PROCEDURE — 36415 COLL VENOUS BLD VENIPUNCTURE: CPT

## 2020-10-20 NOTE — PROGRESS NOTES
Medical Assistant Note:  Jyothi Freitas presents today for lab draw.    Patient seen by provider today: No.   present during visit today: Not Applicable.    Concerns: No Concerns.    Procedure:  Lab draw site: LAC, Needle type: Butterfly, Gauge: 23.    Post Assessment:  Labs drawn without difficulty: Yes.    Discharge Plan:  Departure Mode: Ambulatory.    Face to Face Time: 4 min.    Shari Schoenberger, CMA

## 2020-10-21 ENCOUNTER — VIRTUAL VISIT (OUTPATIENT)
Dept: ONCOLOGY | Facility: CLINIC | Age: 55
End: 2020-10-21
Attending: INTERNAL MEDICINE
Payer: COMMERCIAL

## 2020-10-21 VITALS — BODY MASS INDEX: 21.14 KG/M2 | WEIGHT: 135 LBS

## 2020-10-21 DIAGNOSIS — Z79.811 AROMATASE INHIBITOR USE: ICD-10-CM

## 2020-10-21 DIAGNOSIS — F51.01 PRIMARY INSOMNIA: ICD-10-CM

## 2020-10-21 DIAGNOSIS — C50.411 MALIGNANT NEOPLASM OF UPPER-OUTER QUADRANT OF RIGHT BREAST IN FEMALE, ESTROGEN RECEPTOR POSITIVE (H): Primary | ICD-10-CM

## 2020-10-21 DIAGNOSIS — N95.1 MENOPAUSAL SYNDROME (HOT FLASHES): ICD-10-CM

## 2020-10-21 DIAGNOSIS — F32.5 MAJOR DEPRESSION IN COMPLETE REMISSION (H): ICD-10-CM

## 2020-10-21 DIAGNOSIS — M81.0 AGE-RELATED OSTEOPOROSIS WITHOUT CURRENT PATHOLOGICAL FRACTURE: ICD-10-CM

## 2020-10-21 DIAGNOSIS — Z17.0 MALIGNANT NEOPLASM OF UPPER-OUTER QUADRANT OF RIGHT BREAST IN FEMALE, ESTROGEN RECEPTOR POSITIVE (H): Primary | ICD-10-CM

## 2020-10-21 PROCEDURE — 99214 OFFICE O/P EST MOD 30 MIN: CPT | Mod: GT | Performed by: INTERNAL MEDICINE

## 2020-10-21 PROCEDURE — 999N001193 HC VIDEO/TELEPHONE VISIT; NO CHARGE

## 2020-10-21 RX ORDER — HEPARIN SODIUM,PORCINE 10 UNIT/ML
5 VIAL (ML) INTRAVENOUS
Status: CANCELLED | OUTPATIENT
Start: 2020-11-06

## 2020-10-21 RX ORDER — ZOLPIDEM TARTRATE 5 MG/1
5 TABLET ORAL
Qty: 30 TABLET | Refills: 3 | Status: SHIPPED | OUTPATIENT
Start: 2020-10-21 | End: 2021-12-02

## 2020-10-21 RX ORDER — GABAPENTIN 300 MG/1
300 CAPSULE ORAL DAILY
Qty: 90 CAPSULE | Refills: 3 | Status: SHIPPED | OUTPATIENT
Start: 2020-10-21 | End: 2020-11-06

## 2020-10-21 RX ORDER — LETROZOLE 2.5 MG/1
2.5 TABLET, FILM COATED ORAL DAILY
Qty: 90 TABLET | Refills: 3 | Status: SHIPPED | OUTPATIENT
Start: 2020-10-21 | End: 2021-08-09

## 2020-10-21 RX ORDER — HEPARIN SODIUM (PORCINE) LOCK FLUSH IV SOLN 100 UNIT/ML 100 UNIT/ML
5 SOLUTION INTRAVENOUS
Status: CANCELLED | OUTPATIENT
Start: 2020-11-06

## 2020-10-21 RX ORDER — ZOLEDRONIC ACID 0.04 MG/ML
4 INJECTION, SOLUTION INTRAVENOUS ONCE
Status: CANCELLED | OUTPATIENT
Start: 2020-11-06 | End: 2020-11-16

## 2020-10-21 ASSESSMENT — PAIN SCALES - GENERAL: PAINLEVEL: NO PAIN (0)

## 2020-10-21 NOTE — PROGRESS NOTES
"Jyothi Freitas is a 54 year old female who is being evaluated via a billable video visit.      The patient has been notified of following:     \"This video visit will be conducted via a call between you and your physician/provider. We have found that certain health care needs can be provided without the need for an in-person physical exam.  This service lets us provide the care you need with a video conversation.  If a prescription is necessary we can send it directly to your pharmacy.  If lab work is needed we can place an order for that and you can then stop by our lab to have the test done at a later time.    Video visits are billed at different rates depending on your insurance coverage.  Please reach out to your insurance provider with any questions.    If during the course of the call the physician/provider feels a video visit is not appropriate, you will not be charged for this service.\"    Patient has given verbal consent for Video visit? Yes  How would you like to obtain your AVS? MyChart  If you are dropped from the video visit, the video invite should be resent to: Text to cell phone: [phone]  Will anyone else be joining your video visit? No        Video-Visit Details    Type of service:  Video Visit    Video Start Time:   Video End Time:     Originating Location (pt. Location): Home    Distant Location (provider location):  [facility]     Platform used for Video Visit: [employer]    Refills needed on Zolpidem & possibly Gabapentin.    Delilah Scott CMA  10/21/2020      8:41 AM        "

## 2020-10-21 NOTE — LETTER
"    10/21/2020         RE: Jyothi Freitas  [address]  [place] [address]        Dear Colleague,    Thank you for referring your patient, Jyothi Freitas, to the [facility]. Please see a copy of my visit note below.    [facility]    Hematology/Oncology Established Patient Follow-up Note      Today's Date:10/21/20    Reason for Follow-up: Right breast cancer.    Jyothi Freitas is a 54 year old female who is being evaluated via a billable video visit.      The patient has been notified of following:     \"This video visit will be conducted via a call between you and your physician/provider. We have found that certain health care needs can be provided without the need for an in-person physical exam.  This service lets us provide the care you need with a video conversation.  If a prescription is necessary we can send it directly to your pharmacy.  If lab work is needed we can place an order for that and you can then stop by our lab to have the test done at a later time.    Video visits are billed at different rates depending on your insurance coverage.  Please reach out to your insurance provider with any questions.    If during the course of the call the physician/provider feels a video visit is not appropriate, you will not be charged for this service.\"    Patient has given verbal consent for Video visit? Yes    How would you like to obtain your AVS? MyChart    Text to cell phone: [phone]          Video-Visit Details    Type of service:  Video Visit     Originating Location (pt. Location): Home    Distant Location (provider location):  [facility]     Mode of Communication:  Video Conference via [employer]    Video visit duration: 29 minutes    HISTORY OF PRESENT ILLNESS: Jyothi Freitas is a 54 year old female who presents with the following oncologic history:   1.  10/11/2019: Diagnostic right sided mammogram performed for a palpable lump in the right " breast.  This showed an irregularly-shaped spiculated mass in the upper outer right breast with prominent lymph nodes in the right axilla.  Targeted ultrasound of the right upper outer breast showed an irregularly-shaped hypoechoic mass at the 10 o'clock position, 4 cm from the nipple measuring 2.6 x 1.5 x 2.4 cm.  Right axillary ultrasound showed moderately enlarged lymph nodes.  Right breast needle biopsy showed a grade 3 invasive ductal carcinoma with lymphovascular invasion identified, ER strongly positive at 95%, RI strongly positive at 95%, HER-2/juan FISH negative.  Right axillary lymph node measuring 2 cm was positive for metastatic carcinoma.  Note prior 4/2019 mammogram deemed normal.  2.  10/15/2019: Bilateral breast MRI showed known right breast malignancy measuring 2.6 x 1.4 x 2 cm, 3 mildly enlarged right axillary lymph nodes and no contralateral breast malignancy.  3. 10/21/2019 PET scan showed scattered small hypermetabolic bilateral axillary lymph nodes; for example, a hypermetabolic right axillary lymph node measures 1.6 x 0.9 cm with SUV max 3.6.  Hypermetabolic mass in the right breast superiorly and laterally measuring 2.1 x 1.6 cm with SUV max 4.3.  A 0.6 cm low-density lesion in the right hepatic lobe is too small for accurate PET characterization but shows no appreciable hypermetabolic activity.  Incidentally noted ectasia of the ascending thoracic aorta measures 4 cm in diameter.  4.  10/23/2019: Left axillary ultrasound showed benign-appearing lymph node.  Left axillary lymph node biopsy showed benign lymph node tissue.  5.  10/29/2019: Started neoadjuvant chemotherapy with dose dense Adriamycin and Cytoxan, followed by weekly paclitaxel with completion on 3/12/2020.  6.  3/19/2020: Breast MRI showed no residual enhancement in the right breast mass.  The right axillary lymphadenopathy has resolved.  7. 4/01/2020: Underwent right breast lumpectomy and right axillary sentinel lymph node biopsy  under care of Dr. Kaila Hernandez.  Pathology showed fibrocystic change and no evidence of residual carcinoma in the right breast.  Metastatic breast adenocarcinoma to one of 5 lymph node fragments in 1 of 3 lymph nodes excised. Extranodal extension present. ypT0-N1a.  8. 4/29/2020: Started adjuvant anastrazole.  9. 6/15/2020: Completed adjuvant radiation therapy.  10. 6/15/2020: Switched to adjuvant letrozole with some mild improvement in polyathralgias.    INTERIM HISTORY:  Jyothi reports continued but reduced polyathralgias on letrozole as compared to anastrazole. Gabapentin is alleviating her hot flashes well.  She does use Ambien intermittently and requests a refill.    She denies any recent fevers, chills, shortness of breath, cough, bowel or bladder dysfunction.       REVIEW OF SYSTEMS:   14 point ROS was reviewed and is negative other than as noted above in HPI.       HOME MEDICATIONS:  Current Outpatient Medications   Medication Sig Dispense Refill     acetaminophen (TYLENOL) 500 MG tablet Take 1,000 mg by mouth every 6 hours as needed for mild pain       acetaminophen-caffeine (EXCEDRIN TENSION HEADACHE) 500-65 MG TABS Take 2 tablets by mouth every 6 hours as needed for mild pain       albuterol (PROAIR HFA) 108 (90 Base) MCG/ACT inhaler INHALE 2 PUFFS INTO THE LUNGS EVERY 6 HOURS AS NEEDED FOR SHORTNESS OF BREATH OR DIFFICULT BREATHING OR WHEEZING 8.5 g 0     buPROPion (WELLBUTRIN XL) 300 MG 24 hr tablet TAKE 1 TABLET(300 MG) BY MOUTH EVERY MORNING 90 tablet 3     FLOVENT DISKUS 100 MCG/BLIST inhaler INHALE 1 PUFF INTO THE LUNGS TWICE DAILY 3 Inhaler 3     gabapentin (NEURONTIN) 100 MG capsule Take 4 capsules (400 mg) by mouth every evening 270 capsule 3     letrozole (FEMARA) 2.5 MG tablet Take 1 tablet (2.5 mg) by mouth daily 90 tablet 3     zolpidem (AMBIEN) 5 MG tablet Take 1 tablet (5 mg) by mouth nightly as needed for sleep 30 tablet 3         ALLERGIES:  Allergies   Allergen Reactions     Sulfa Drugs  "Other (See Comments)     Red face. Ringing in the ears.         PAST MEDICAL HISTORY:  Past Medical History:   Diagnosis Date     GERD (gastroesophageal reflux disease)      H/O colonoscopy 03/08/2016    done at [facility]     Hematuria 2016    eval at [facility] and per pt cysto and ct neg     Intermittent asthma     since childhood     Low iron 10/2017    done for eval of hair loss, [facility]     Migraines     most of adult life, has seen neuro in past, on bblocker     Mild depression (H)      Normal stress echocardiogram July 2011    due to hear racing     Osteopenia 2008     Syncope 03/2017    echo [facility] lv size and fxn, grade 1 dd, mild tr         PAST SURGICAL HISTORY:  Past Surgical History:   Procedure Laterality Date     BIOPSY NODE SENTINEL Right 4/1/2020    Procedure: RIGHT SENTINEL LYMPH NODE BIOPSY;  Surgeon: Kaila Hernandez MD;  Location:  OR     C TMJ ARTHROSCOPY/SURGERY       ESOPHAGOSCOPY, GASTROSCOPY, DUODENOSCOPY (EGD), COMBINED N/A 10/30/2017    Procedure: COMBINED ESOPHAGOSCOPY, GASTROSCOPY, DUODENOSCOPY (EGD), BIOPSY SINGLE OR MULTIPLE;  COMBINED ESOPHAGOSCOPY, GASTROSCOPY, DUODENOSCOPY (EGD);  Surgeon: Martin Rodriguez MD;  Location:  GI     INSERT PORT VASCULAR ACCESS N/A 10/24/2019    Procedure: PORT PLACEMENT;  Surgeon: Kaila Hernandez MD;  Location:  OR     LUMPECTOMY BREAST WITH SEED LOCALIZATION Right 4/1/2020    Procedure: SEED LOCALIZED RIGHT BREAST LUMPECTOMY WITH SEED LOCALIZED RIGHT AXILLARY NODE EXCISION;  Surgeon: Kaila Hernandez MD;  Location:  OR     ORTHOPEDIC SURGERY      \"leg surgery\"     REMOVE PORT VASCULAR ACCESS Left 4/1/2020    Procedure: REMOVAL, VASCULAR ACCESS PORT;  Surgeon: Kiala Hernandez MD;  Location:  OR     single oopherectomy  2010    cyst         SOCIAL HISTORY:  Social History     Socioeconomic History     Marital status:      Spouse name: Not on file     Number of children: 2     Years of education: Not on file     Highest education " level: Not on file   Occupational History     Not on file   Social Needs     Financial resource strain: Not on file     Food insecurity     Worry: Not on file     Inability: Not on file     Transportation needs     Medical: Not on file     Non-medical: Not on file   Tobacco Use     Smoking status: Former Smoker     Packs/day: 0.00     Quit date: 3/27/1997     Years since quittin.5     Smokeless tobacco: Never Used   Substance and Sexual Activity     Alcohol use: Yes     Comment: rarely     Drug use: No     Sexual activity: Yes     Partners: Male     Birth control/protection: Pill   Lifestyle     Physical activity     Days per week: Not on file     Minutes per session: Not on file     Stress: Not on file   Relationships     Social connections     Talks on phone: Not on file     Gets together: Not on file     Attends [employer] service: Not on file     Active member of club or organization: Not on file     Attends meetings of clubs or organizations: Not on file     Relationship status: Not on file     Intimate partner violence     Fear of current or ex partner: Not on file     Emotionally abused: Not on file     Physically abused: Not on file     Forced sexual activity: Not on file   Other Topics Concern     Parent/sibling w/ CABG, MI or angioplasty before 65F 55M? Yes   Social History Narrative     Not on file         FAMILY HISTORY:  Family History   Problem Relation Age of Onset     Coronary Artery Disease Father 48        MI. and one in his 60s     Emphysema Mother         COPD         PHYSICAL EXAM:  Vital signs:  Not taken.  GENERAL: No acute distress.  EYES: No scleral icterus. No overt erythema.  RESPIRATORY: No cough.  No labored breathing.  MUSCULOSKELETAL: Range of motion in the neck, shoulders, and arms appear normal.  SKIN: No overt rashes, discolorations, or lesions over the face and neck.  NEUROLOGIC: Alert.  No overt tremors.  PSYCHIATRIC: Normal affect and mood.  Does not appear anxious.    The  rest of a comprehensive physical examination is deferred due to PHE (public health emergency) video visit restrictions.    LABS:  CBC RESULTS:   Recent Labs   Lab Test 10/20/20  0906   WBC 4.6   RBC 4.84   HGB 15.0   HCT 44.7   MCV 92   MCH 31.0   MCHC 33.6   RDW 11.9        Last Comprehensive Metabolic Panel:  Sodium   Date Value Ref Range Status   10/20/2020 139 133 - 144 mmol/L Final     Potassium   Date Value Ref Range Status   10/20/2020 4.2 3.4 - 5.3 mmol/L Final     Chloride   Date Value Ref Range Status   10/20/2020 107 94 - 109 mmol/L Final     Carbon Dioxide   Date Value Ref Range Status   10/20/2020 29 20 - 32 mmol/L Final     Anion Gap   Date Value Ref Range Status   10/20/2020 3 3 - 14 mmol/L Final     Glucose   Date Value Ref Range Status   10/20/2020 78 70 - 99 mg/dL Final     Urea Nitrogen   Date Value Ref Range Status   10/20/2020 13 7 - 30 mg/dL Final     Creatinine   Date Value Ref Range Status   10/20/2020 0.78 0.52 - 1.04 mg/dL Final     GFR Estimate   Date Value Ref Range Status   10/20/2020 86 >60 mL/min/[1.73_m2] Final     Comment:     Non  GFR Calc  Starting 2018, serum creatinine based estimated GFR (eGFR) will be   calculated using the Chronic Kidney Disease Epidemiology Collaboration   (CKD-EPI) equation.       Calcium   Date Value Ref Range Status   10/20/2020 9.5 8.5 - 10.1 mg/dL Final     Bilirubin Total   Date Value Ref Range Status   10/20/2020 0.2 0.2 - 1.3 mg/dL Final     Alkaline Phosphatase   Date Value Ref Range Status   10/20/2020 76 40 - 150 U/L Final     ALT   Date Value Ref Range Status   10/20/2020 25 0 - 50 U/L Final     AST   Date Value Ref Range Status   10/20/2020 16 0 - 45 U/L Final       PATHOLOGY:  None new since last visit.    IMAGIN2020 CT head without contrast showed no bleed or trauma.    10/13/2020: DEXA scan showed osteoporosis in the lumbar spine and left femoral neck; osteopenia in right femoral neck.    10/20/2020:  Mammogram showed no suspicious findings.    ASSESSMENT/PLAN:  Jyothi Freitas is a 54 year old female with the following issues:  1.  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 Polyarthralgias  -Jyothi has no clinical evidence for recurrent breast cancer by history or mammogram reviewed from 10/20/2020.  -She completed adjuvant radiation therapy as of 6/15/2020, tolerating that overall well but with some fatigue.   -Jyothi is on letrozole and tolerating this much better than anastrazole with 50% less polyarthralgias.  -I recommended continuing magnesium 100-300 mg daily at night and vitamin D 1000 international unit(s) daily.  -Will plan to alternate mammogram with breast MRI, staggered by 6 months.  Next breast MRI due 4/2021.  -Discussed signs and symptoms to watch for breast cancer recurrence.    3. Osteoporosis  -Discussed DEXA scan results from 10/13/2020 which showed osteoporosis.  -Recommended adequate calcium and vitamin D and weight-bearing exercise. Also recommended zoledronic acid every 6 months for 6 doses.  Discussed potential side effects such as flu-like symptoms, bone aches, hypocalcemia, and a small risk of jaw osteonecrosis.  She agrees to proceed. She will arrange for a dental checkup and cleaning prior to start of zoledronic acid.  -Will repeat DEXA scan in 2022.      4. Depression  -Mood stable. Continue bupropion.     5.  Insomnia   6.  Hot flashes, drug induced  -Oxybutynin did help with hot flashes but she had too much dry eye syndrome and fluid retention.   -She has had significant improvement in her hot flashes and insomnia with the use of gabapentin, which she resumed and will continue. Prescription issued for 400 mg daily.  -She will continue to take Ambien as needed. Prescription refilled for Ambien today.    7. Ectasia of thoracic aorta  -This measured 4  "cm on the PET scan.  -Surgical management not needed but she will need ongoing monitoring. She is following with Dr. Silverio Gooden for monitoring of this issue.     Return in 3 months.    Maricarmen Monroe MD  Hematology/Oncology  [facility]    yJothi Freitas is a 54 year old female who is being evaluated via a billable video visit.      The patient has been notified of following:     \"This video visit will be conducted via a call between you and your physician/provider. We have found that certain health care needs can be provided without the need for an in-person physical exam.  This service lets us provide the care you need with a video conversation.  If a prescription is necessary we can send it directly to your pharmacy.  If lab work is needed we can place an order for that and you can then stop by our lab to have the test done at a later time.    Video visits are billed at different rates depending on your insurance coverage.  Please reach out to your insurance provider with any questions.    If during the course of the call the physician/provider feels a video visit is not appropriate, you will not be charged for this service.\"    Patient has given verbal consent for Video visit? Yes  How would you like to obtain your AVS? MyChart  If you are dropped from the video visit, the video invite should be resent to: Text to cell phone: [phone]  Will anyone else be joining your video visit? No        Video-Visit Details    Type of service:  Video Visit    Video Start Time:   Video End Time:     Originating Location (pt. Location): Home    Distant Location (provider location):  [facility]     Platform used for Video Visit: Doximity    Refills needed on Zolpidem & possibly Gabapentin.    Delilah Scott CMA  10/21/2020      8:41 AM            Again, thank you for allowing me to participate in the care of your patient.        Sincerely,        Maricarmen Monroe MD    "

## 2020-10-21 NOTE — LETTER
"    10/21/2020         RE: Jyothi Freitas  [address]  [place] [address]        Dear Colleague,    Thank you for referring your patient, Jyothi Freitas, to the [facility]. Please see a copy of my visit note below.    [facility]    Hematology/Oncology Established Patient Follow-up Note      Today's Date:10/21/20    Reason for Follow-up: Right breast cancer.    Jyothi Freitas is a 54 year old female who is being evaluated via a billable video visit.      The patient has been notified of following:     \"This video visit will be conducted via a call between you and your physician/provider. We have found that certain health care needs can be provided without the need for an in-person physical exam.  This service lets us provide the care you need with a video conversation.  If a prescription is necessary we can send it directly to your pharmacy.  If lab work is needed we can place an order for that and you can then stop by our lab to have the test done at a later time.    Video visits are billed at different rates depending on your insurance coverage.  Please reach out to your insurance provider with any questions.    If during the course of the call the physician/provider feels a video visit is not appropriate, you will not be charged for this service.\"    Patient has given verbal consent for Video visit? Yes    How would you like to obtain your AVS? MyChart    Text to cell phone: [phone]          Video-Visit Details    Type of service:  Video Visit     Originating Location (pt. Location): Home    Distant Location (provider location):  [facility]     Mode of Communication:  Video Conference via [employer]    Video visit duration: 29 minutes    HISTORY OF PRESENT ILLNESS: Jyothi Freitas is a 54 year old female who presents with the following oncologic history:   1.  10/11/2019: Diagnostic right sided mammogram performed for a palpable lump in the right " breast.  This showed an irregularly-shaped spiculated mass in the upper outer right breast with prominent lymph nodes in the right axilla.  Targeted ultrasound of the right upper outer breast showed an irregularly-shaped hypoechoic mass at the 10 o'clock position, 4 cm from the nipple measuring 2.6 x 1.5 x 2.4 cm.  Right axillary ultrasound showed moderately enlarged lymph nodes.  Right breast needle biopsy showed a grade 3 invasive ductal carcinoma with lymphovascular invasion identified, ER strongly positive at 95%, IA strongly positive at 95%, HER-2/juan FISH negative.  Right axillary lymph node measuring 2 cm was positive for metastatic carcinoma.  Note prior 4/2019 mammogram deemed normal.  2.  10/15/2019: Bilateral breast MRI showed known right breast malignancy measuring 2.6 x 1.4 x 2 cm, 3 mildly enlarged right axillary lymph nodes and no contralateral breast malignancy.  3. 10/21/2019 PET scan showed scattered small hypermetabolic bilateral axillary lymph nodes; for example, a hypermetabolic right axillary lymph node measures 1.6 x 0.9 cm with SUV max 3.6.  Hypermetabolic mass in the right breast superiorly and laterally measuring 2.1 x 1.6 cm with SUV max 4.3.  A 0.6 cm low-density lesion in the right hepatic lobe is too small for accurate PET characterization but shows no appreciable hypermetabolic activity.  Incidentally noted ectasia of the ascending thoracic aorta measures 4 cm in diameter.  4.  10/23/2019: Left axillary ultrasound showed benign-appearing lymph node.  Left axillary lymph node biopsy showed benign lymph node tissue.  5.  10/29/2019: Started neoadjuvant chemotherapy with dose dense Adriamycin and Cytoxan, followed by weekly paclitaxel with completion on 3/12/2020.  6.  3/19/2020: Breast MRI showed no residual enhancement in the right breast mass.  The right axillary lymphadenopathy has resolved.  7. 4/01/2020: Underwent right breast lumpectomy and right axillary sentinel lymph node biopsy  under care of Dr. Kaila Hernandez.  Pathology showed fibrocystic change and no evidence of residual carcinoma in the right breast.  Metastatic breast adenocarcinoma to one of 5 lymph node fragments in 1 of 3 lymph nodes excised. Extranodal extension present. ypT0-N1a.  8. 4/29/2020: Started adjuvant anastrazole.  9. 6/15/2020: Completed adjuvant radiation therapy.  10. 6/15/2020: Switched to adjuvant letrozole with some mild improvement in polyathralgias.    INTERIM HISTORY:  Jyothi reports continued but reduced polyathralgias on letrozole as compared to anastrazole. Gabapentin is alleviating her hot flashes well.  She does use Ambien intermittently and requests a refill.    She denies any recent fevers, chills, shortness of breath, cough, bowel or bladder dysfunction.       REVIEW OF SYSTEMS:   14 point ROS was reviewed and is negative other than as noted above in HPI.       HOME MEDICATIONS:  Current Outpatient Medications   Medication Sig Dispense Refill     acetaminophen (TYLENOL) 500 MG tablet Take 1,000 mg by mouth every 6 hours as needed for mild pain       acetaminophen-caffeine (EXCEDRIN TENSION HEADACHE) 500-65 MG TABS Take 2 tablets by mouth every 6 hours as needed for mild pain       albuterol (PROAIR HFA) 108 (90 Base) MCG/ACT inhaler INHALE 2 PUFFS INTO THE LUNGS EVERY 6 HOURS AS NEEDED FOR SHORTNESS OF BREATH OR DIFFICULT BREATHING OR WHEEZING 8.5 g 0     buPROPion (WELLBUTRIN XL) 300 MG 24 hr tablet TAKE 1 TABLET(300 MG) BY MOUTH EVERY MORNING 90 tablet 3     FLOVENT DISKUS 100 MCG/BLIST inhaler INHALE 1 PUFF INTO THE LUNGS TWICE DAILY 3 Inhaler 3     gabapentin (NEURONTIN) 100 MG capsule Take 4 capsules (400 mg) by mouth every evening 270 capsule 3     letrozole (FEMARA) 2.5 MG tablet Take 1 tablet (2.5 mg) by mouth daily 90 tablet 3     zolpidem (AMBIEN) 5 MG tablet Take 1 tablet (5 mg) by mouth nightly as needed for sleep 30 tablet 3         ALLERGIES:  Allergies   Allergen Reactions     Sulfa Drugs  "Other (See Comments)     Red face. Ringing in the ears.         PAST MEDICAL HISTORY:  Past Medical History:   Diagnosis Date     GERD (gastroesophageal reflux disease)      H/O colonoscopy 03/08/2016    done at [facility] and [facility]     Hematuria 2016    eval at [facility] and per pt cysto and ct neg     Intermittent asthma     since childhood     Low iron 10/2017    done for eval of hair loss, egd nl     Migraines     most of adult life, has seen neuro in past, on bblocker     Mild depression (H)      Normal stress echocardiogram July 2011    due to hear racing     Osteopenia 2008     Syncope 03/2017    echo nl lv size and fxn, grade 1 dd, mild tr         PAST SURGICAL HISTORY:  Past Surgical History:   Procedure Laterality Date     BIOPSY NODE SENTINEL Right 4/1/2020    Procedure: RIGHT SENTINEL LYMPH NODE BIOPSY;  Surgeon: Kaila Hernandez MD;  Location:  OR     C TMJ ARTHROSCOPY/SURGERY       ESOPHAGOSCOPY, GASTROSCOPY, DUODENOSCOPY (EGD), COMBINED N/A 10/30/2017    Procedure: COMBINED ESOPHAGOSCOPY, GASTROSCOPY, DUODENOSCOPY (EGD), BIOPSY SINGLE OR MULTIPLE;  COMBINED ESOPHAGOSCOPY, GASTROSCOPY, DUODENOSCOPY (EGD);  Surgeon: Martin Rodriguez MD;  Location:  GI     INSERT PORT VASCULAR ACCESS N/A 10/24/2019    Procedure: PORT PLACEMENT;  Surgeon: Kaila Hernandez MD;  Location:  OR     LUMPECTOMY BREAST WITH SEED LOCALIZATION Right 4/1/2020    Procedure: SEED LOCALIZED RIGHT BREAST LUMPECTOMY WITH SEED LOCALIZED RIGHT AXILLARY NODE EXCISION;  Surgeon: Kaila Hernandez MD;  Location:  OR     ORTHOPEDIC SURGERY      \"leg surgery\"     REMOVE PORT VASCULAR ACCESS Left 4/1/2020    Procedure: REMOVAL, VASCULAR ACCESS PORT;  Surgeon: Kaila Hernandez MD;  Location:  OR     single oopherectomy  2010    cyst         SOCIAL HISTORY:  Social History     Socioeconomic History     Marital status:      Spouse name: Not on file     Number of children: 2     Years of education: Not on file     Highest education " level: Not on file   Occupational History     Not on file   Social Needs     Financial resource strain: Not on file     Food insecurity     Worry: Not on file     Inability: Not on file     Transportation needs     Medical: Not on file     Non-medical: Not on file   Tobacco Use     Smoking status: Former Smoker     Packs/day: 0.00     Quit date: 3/27/1997     Years since quittin.5     Smokeless tobacco: Never Used   Substance and Sexual Activity     Alcohol use: Yes     Comment: rarely     Drug use: No     Sexual activity: Yes     Partners: Male     Birth control/protection: Pill   Lifestyle     Physical activity     Days per week: Not on file     Minutes per session: Not on file     Stress: Not on file   Relationships     Social connections     Talks on phone: Not on file     Gets together: Not on file     Attends Synagogue service: Not on file     Active member of club or organization: Not on file     Attends meetings of clubs or organizations: Not on file     Relationship status: Not on file     Intimate partner violence     Fear of current or ex partner: Not on file     Emotionally abused: Not on file     Physically abused: Not on file     Forced sexual activity: Not on file   Other Topics Concern     Parent/sibling w/ CABG, MI or angioplasty before 65F 55M? Yes   Social History Narrative     Not on file         FAMILY HISTORY:  Family History   Problem Relation Age of Onset     Coronary Artery Disease Father 48        MI. and one in his 60s     Emphysema Mother         COPD         PHYSICAL EXAM:  Vital signs:  Not taken.  GENERAL: No acute distress.  EYES: No scleral icterus. No overt erythema.  RESPIRATORY: No cough.  No labored breathing.  MUSCULOSKELETAL: Range of motion in the neck, shoulders, and arms appear normal.  SKIN: No overt rashes, discolorations, or lesions over the face and neck.  NEUROLOGIC: Alert.  No overt tremors.  PSYCHIATRIC: Normal affect and mood.  Does not appear anxious.    The  rest of a comprehensive physical examination is deferred due to PHE (public health emergency) video visit restrictions.    LABS:  CBC RESULTS:   Recent Labs   Lab Test 10/20/20  0906   WBC 4.6   RBC 4.84   HGB 15.0   HCT 44.7   MCV 92   MCH 31.0   MCHC 33.6   RDW 11.9        Last Comprehensive Metabolic Panel:  Sodium   Date Value Ref Range Status   10/20/2020 139 133 - 144 mmol/L Final     Potassium   Date Value Ref Range Status   10/20/2020 4.2 3.4 - 5.3 mmol/L Final     Chloride   Date Value Ref Range Status   10/20/2020 107 94 - 109 mmol/L Final     Carbon Dioxide   Date Value Ref Range Status   10/20/2020 29 20 - 32 mmol/L Final     Anion Gap   Date Value Ref Range Status   10/20/2020 3 3 - 14 mmol/L Final     Glucose   Date Value Ref Range Status   10/20/2020 78 70 - 99 mg/dL Final     Urea Nitrogen   Date Value Ref Range Status   10/20/2020 13 7 - 30 mg/dL Final     Creatinine   Date Value Ref Range Status   10/20/2020 0.78 0.52 - 1.04 mg/dL Final     GFR Estimate   Date Value Ref Range Status   10/20/2020 86 >60 mL/min/[1.73_m2] Final     Comment:     Non  GFR Calc  Starting 2018, serum creatinine based estimated GFR (eGFR) will be   calculated using the Chronic Kidney Disease Epidemiology Collaboration   (CKD-EPI) equation.       Calcium   Date Value Ref Range Status   10/20/2020 9.5 8.5 - 10.1 mg/dL Final     Bilirubin Total   Date Value Ref Range Status   10/20/2020 0.2 0.2 - 1.3 mg/dL Final     Alkaline Phosphatase   Date Value Ref Range Status   10/20/2020 76 40 - 150 U/L Final     ALT   Date Value Ref Range Status   10/20/2020 25 0 - 50 U/L Final     AST   Date Value Ref Range Status   10/20/2020 16 0 - 45 U/L Final       PATHOLOGY:  None new since last visit.    IMAGIN2020 CT head without contrast showed no bleed or trauma.    10/13/2020: DEXA scan showed osteoporosis in the lumbar spine and left femoral neck; osteopenia in right femoral neck.    10/20/2020:  Mammogram showed no suspicious findings.    ASSESSMENT/PLAN:  Jyothi Freitas is a 54 year old female with the following issues:  1.  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 Polyarthralgias  -Jyothi has no clinical evidence for recurrent breast cancer by history or mammogram reviewed from 10/20/2020.  -She completed adjuvant radiation therapy as of 6/15/2020, tolerating that overall well but with some fatigue.   -Jyothi is on letrozole and tolerating this much better than anastrazole with 50% less polyarthralgias.  -I recommended continuing magnesium 100-300 mg daily at night and vitamin D 1000 international unit(s) daily.  -Will plan to alternate mammogram with breast MRI, staggered by 6 months.  Next breast MRI due 4/2021.  -Discussed signs and symptoms to watch for breast cancer recurrence.    3. Osteoporosis  -Discussed DEXA scan results from 10/13/2020 which showed osteoporosis.  -Recommended adequate calcium and vitamin D and weight-bearing exercise. Also recommended zoledronic acid every 6 months for 6 doses.  Discussed potential side effects such as flu-like symptoms, bone aches, hypocalcemia, and a small risk of jaw osteonecrosis.  She agrees to proceed. She will arrange for a dental checkup and cleaning prior to start of zoledronic acid.  -Will repeat DEXA scan in 2022.      4. Depression  -Mood stable. Continue bupropion.     5.  Insomnia   6.  Hot flashes, drug induced  -Oxybutynin did help with hot flashes but she had too much dry eye syndrome and fluid retention.   -She has had significant improvement in her hot flashes and insomnia with the use of gabapentin, which she resumed and will continue. Prescription issued for 400 mg daily.  -She will continue to take Ambien as needed. Prescription refilled for Ambien today.    7. Ectasia of thoracic aorta  -This measured 4  "cm on the PET scan.  -Surgical management not needed but she will need ongoing monitoring. She is following with Dr. Silverio Gooden for monitoring of this issue.     Return in 3 months.    Maricarmen Monroe MD  Hematology/Oncology  [facility]    Jyothi Freitsa is a 54 year old female who is being evaluated via a billable video visit.      The patient has been notified of following:     \"This video visit will be conducted via a call between you and your physician/provider. We have found that certain health care needs can be provided without the need for an in-person physical exam.  This service lets us provide the care you need with a video conversation.  If a prescription is necessary we can send it directly to your pharmacy.  If lab work is needed we can place an order for that and you can then stop by our lab to have the test done at a later time.    Video visits are billed at different rates depending on your insurance coverage.  Please reach out to your insurance provider with any questions.    If during the course of the call the physician/provider feels a video visit is not appropriate, you will not be charged for this service.\"    Patient has given verbal consent for Video visit? Yes  How would you like to obtain your AVS? MyChart  If you are dropped from the video visit, the video invite should be resent to: Text to cell phone: [phone]  Will anyone else be joining your video visit? No        Video-Visit Details    Type of service:  Video Visit    Video Start Time:   Video End Time:     Originating Location (pt. Location): Home    Distant Location (provider location):  [facility]     Platform used for Video Visit: Doximity    Refills needed on Zolpidem & possibly Gabapentin.    Delilah Scott CMA  10/21/2020      8:41 AM            Again, thank you for allowing me to participate in the care of your patient.        Sincerely,        Maricarmen Monroe MD    "

## 2020-11-06 ENCOUNTER — INFUSION THERAPY VISIT (OUTPATIENT)
Dept: INFUSION THERAPY | Facility: CLINIC | Age: 55
End: 2020-11-06
Attending: INTERNAL MEDICINE
Payer: COMMERCIAL

## 2020-11-06 VITALS
SYSTOLIC BLOOD PRESSURE: 128 MMHG | HEART RATE: 105 BPM | TEMPERATURE: 97.9 F | DIASTOLIC BLOOD PRESSURE: 84 MMHG | RESPIRATION RATE: 18 BRPM

## 2020-11-06 DIAGNOSIS — M81.0 AGE-RELATED OSTEOPOROSIS WITHOUT CURRENT PATHOLOGICAL FRACTURE: Primary | ICD-10-CM

## 2020-11-06 PROCEDURE — 258N000003 HC RX IP 258 OP 636: Performed by: INTERNAL MEDICINE

## 2020-11-06 PROCEDURE — 250N000011 HC RX IP 250 OP 636: Performed by: INTERNAL MEDICINE

## 2020-11-06 PROCEDURE — 99207 PR NO CHARGE LOS: CPT

## 2020-11-06 PROCEDURE — 96365 THER/PROPH/DIAG IV INF INIT: CPT

## 2020-11-06 RX ORDER — ZOLEDRONIC ACID 0.04 MG/ML
4 INJECTION, SOLUTION INTRAVENOUS
COMMUNITY

## 2020-11-06 RX ORDER — ZOLEDRONIC ACID 0.04 MG/ML
4 INJECTION, SOLUTION INTRAVENOUS ONCE
Status: COMPLETED | OUTPATIENT
Start: 2020-11-06 | End: 2020-11-06

## 2020-11-06 RX ADMIN — SODIUM CHLORIDE 250 ML: 9 INJECTION, SOLUTION INTRAVENOUS at 16:01

## 2020-11-06 RX ADMIN — ZOLEDRONIC ACID 4 MG: 0.04 INJECTION, SOLUTION INTRAVENOUS at 16:02

## 2020-11-06 ASSESSMENT — PAIN SCALES - GENERAL: PAINLEVEL: NO PAIN (0)

## 2020-11-06 NOTE — PROGRESS NOTES
Infusion Nursing Note:  Jyothi STERN Zena presents today for Zometa.    Patient seen by provider today: No   present during visit today: Not Applicable.    Note: N/A.    Intravenous Access:  Peripheral IV placed.    Treatment Conditions:  Lab Results   Component Value Date     10/20/2020                   Lab Results   Component Value Date    POTASSIUM 4.2 10/20/2020           Lab Results   Component Value Date    MAG 2.3 10/20/2020            Lab Results   Component Value Date    CR 0.78 10/20/2020                   Lab Results   Component Value Date    JEFF 9.5 10/20/2020                Lab Results   Component Value Date    BILITOTAL 0.2 10/20/2020           Lab Results   Component Value Date    ALBUMIN 4.2 10/20/2020                    Lab Results   Component Value Date    ALT 25 10/20/2020           Lab Results   Component Value Date    AST 16 10/20/2020       Results reviewed, labs MET treatment parameters, ok to proceed with treatment.      Post Infusion Assessment:  Patient tolerated infusion without incident.  Site patent and intact, free from redness, edema or discomfort.  No evidence of extravasations.  Access discontinued per protocol.       Discharge Plan:   Discharge instructions reviewed with: Patient.  Patient and/or family verbalized understanding of discharge instructions and all questions answered.  Patient discharged in stable condition accompanied by: self.  Departure Mode: Ambulatory.    Bella Lozano RN

## 2020-11-30 ENCOUNTER — MYC MEDICAL ADVICE (OUTPATIENT)
Dept: FAMILY MEDICINE | Facility: CLINIC | Age: 55
End: 2020-11-30

## 2020-11-30 NOTE — TELEPHONE ENCOUNTER
Dr Frnaklin,  Please see below MyChart message and advise.  Last Rx for 300mg daily   Thanks,  Ivanna STEINBERG RN

## 2020-12-03 ENCOUNTER — VIRTUAL VISIT (OUTPATIENT)
Dept: FAMILY MEDICINE | Facility: CLINIC | Age: 55
End: 2020-12-03
Payer: COMMERCIAL

## 2020-12-03 DIAGNOSIS — M81.8 OTHER OSTEOPOROSIS WITHOUT CURRENT PATHOLOGICAL FRACTURE: ICD-10-CM

## 2020-12-03 DIAGNOSIS — C50.911 MALIGNANT NEOPLASM OF RIGHT BREAST IN FEMALE, ESTROGEN RECEPTOR POSITIVE, UNSPECIFIED SITE OF BREAST (H): ICD-10-CM

## 2020-12-03 DIAGNOSIS — J45.20 INTERMITTENT ASTHMA WITHOUT COMPLICATION, UNSPECIFIED ASTHMA SEVERITY: ICD-10-CM

## 2020-12-03 DIAGNOSIS — F32.A MILD DEPRESSION: Primary | ICD-10-CM

## 2020-12-03 DIAGNOSIS — M85.80 OSTEOPENIA, UNSPECIFIED LOCATION: ICD-10-CM

## 2020-12-03 DIAGNOSIS — Z17.0 MALIGNANT NEOPLASM OF RIGHT BREAST IN FEMALE, ESTROGEN RECEPTOR POSITIVE, UNSPECIFIED SITE OF BREAST (H): ICD-10-CM

## 2020-12-03 PROCEDURE — 99213 OFFICE O/P EST LOW 20 MIN: CPT | Mod: GT | Performed by: INTERNAL MEDICINE

## 2020-12-03 RX ORDER — BUPROPION HYDROCHLORIDE 150 MG/1
150 TABLET ORAL EVERY MORNING
Qty: 90 TABLET | Refills: 3 | Status: SHIPPED | OUTPATIENT
Start: 2020-12-03 | End: 2021-04-12

## 2020-12-03 ASSESSMENT — PATIENT HEALTH QUESTIONNAIRE - PHQ9: SUM OF ALL RESPONSES TO PHQ QUESTIONS 1-9: 0

## 2020-12-03 NOTE — PROGRESS NOTES
"Jyothi Freitas is a 54 year old female who is being evaluated via a billable video visit.      The patient has been notified of following:     \"This video visit will be conducted via a call between you and your physician/provider. We have found that certain health care needs can be provided without the need for an in-person physical exam.  This service lets us provide the care you need with a video conversation.  If a prescription is necessary we can send it directly to your pharmacy.  If lab work is needed we can place an order for that and you can then stop by our lab to have the test done at a later time.    Video visits are billed at different rates depending on your insurance coverage.  Please reach out to your insurance provider with any questions.    If during the course of the call the physician/provider feels a video visit is not appropriate, you will not be charged for this service.\"    Patient has given verbal consent for Video visit? Yes  How would you like to obtain your AVS? Mail a copy  If you are dropped from the video visit, the video invite should be resent to: Text to cell phone: [phone]  Will anyone else be joining your video visit? No      Subjective     Jyothi Freitas is a 54 year old female who presents today via video visit for the following health issues:    The patient has depression and overall is doing quite well and would like to lower the dose of her Wellbutrin.  She takes 300 mg of the XL version.  She does not feel particularly down or depressed.  Some anxiety but not bad.    She is otherwise doing quite well.  She has had her pneumonia shot in the past that she is also up-to-date on her flu shot.  Her asthma has not been an issue when she is not using inhalers.  She otherwise feels fine.  She does have osteoporosis as noted and has had follow-up with oncology it is getting IV Zometa now.  She has regular oncology follow-up and there is been no evidence of disease of her breast " "cancer.    Past Medical History:   Diagnosis Date     GERD (gastroesophageal reflux disease)      H/O colonoscopy 03/08/2016    done at [facility] and [facility]     Hematuria 2016    eval at [facility] and per pt cysto and ct neg     Intermittent asthma     since childhood     Low iron 10/2017    done for eval of hair loss, [facility]     Malignant neoplasm of upper-outer quadrant of right breast in female, estrogen receptor positive (H)     had chemo, lumpectomy, onc is Dr. Monroe     Migraines     most of adult life, has seen neuro in past, on bblocker     Mild depression (H)      Normal stress echocardiogram 07/2011    due to hear racing     Osteopenia 2008     Other osteoporosis without current pathological fracture 2020    iv zometa every 6 months     Syncope 03/2017    echo n[facility] lv size and fxn, grade 1 dd, mild tr     Past Surgical History:   Procedure Laterality Date     BIOPSY NODE SENTINEL Right 4/1/2020    Procedure: RIGHT SENTINEL LYMPH NODE BIOPSY;  Surgeon: Kaila Hernandez MD;  Location:  OR      TMJ ARTHROSCOPY/SURGERY       ESOPHAGOSCOPY, GASTROSCOPY, DUODENOSCOPY (EGD), COMBINED N/A 10/30/2017    Procedure: COMBINED ESOPHAGOSCOPY, GASTROSCOPY, DUODENOSCOPY (EGD), BIOPSY SINGLE OR MULTIPLE;  COMBINED ESOPHAGOSCOPY, GASTROSCOPY, DUODENOSCOPY (EGD);  Surgeon: Martin Rodriguez MD;  Location:  GI     INSERT PORT VASCULAR ACCESS N/A 10/24/2019    Procedure: PORT PLACEMENT;  Surgeon: Kaila Hernandez MD;  Location:  OR     LUMPECTOMY BREAST WITH SEED LOCALIZATION Right 4/1/2020    Procedure: SEED LOCALIZED RIGHT BREAST LUMPECTOMY WITH SEED LOCALIZED RIGHT AXILLARY NODE EXCISION;  Surgeon: Kaila Hernandez MD;  Location:  OR     ORTHOPEDIC SURGERY      \"leg surgery\"     REMOVE PORT VASCULAR ACCESS Left 4/1/2020    Procedure: REMOVAL, VASCULAR ACCESS PORT;  Surgeon: Kaila Hernandez MD;  Location:  OR     single oopherectomy  2010    cyst     Social History     Socioeconomic History     Marital status:  "     Spouse name: Not on file     Number of children: 2     Years of education: Not on file     Highest education level: Not on file   Occupational History     Not on file   Social Needs     Financial resource strain: Not on file     Food insecurity     Worry: Not on file     Inability: Not on file     Transportation needs     Medical: Not on file     Non-medical: Not on file   Tobacco Use     Smoking status: Former Smoker     Packs/day: 0.00     Quit date: 3/27/1997     Years since quittin.7     Smokeless tobacco: Never Used   Substance and Sexual Activity     Alcohol use: Yes     Comment: rarely     Drug use: No     Sexual activity: Yes     Partners: Male     Birth control/protection: Pill   Lifestyle     Physical activity     Days per week: Not on file     Minutes per session: Not on file     Stress: Not on file   Relationships     Social connections     Talks on phone: Not on file     Gets together: Not on file     Attends Christian service: Not on file     Active member of club or organization: Not on file     Attends meetings of clubs or organizations: Not on file     Relationship status: Not on file     Intimate partner violence     Fear of current or ex partner: Not on file     Emotionally abused: Not on file     Physically abused: Not on file     Forced sexual activity: Not on file   Other Topics Concern     Parent/sibling w/ CABG, MI or angioplasty before 65F 55M? Yes   Social History Narrative     Not on file     Current Outpatient Medications   Medication Sig Dispense Refill     buPROPion (WELLBUTRIN XL) 150 MG 24 hr tablet Take 1 tablet (150 mg) by mouth every morning 90 tablet 3     buPROPion (WELLBUTRIN XL) 300 MG 24 hr tablet TAKE 1 TABLET(300 MG) BY MOUTH EVERY MORNING 90 tablet 3     gabapentin (NEURONTIN) 100 MG capsule Take 4 capsules (400 mg) by mouth every evening 270 capsule 3     letrozole (FEMARA) 2.5 MG tablet Take 1 tablet (2.5 mg) by mouth daily 90 tablet 3     acetaminophen (TYLENOL)  500 MG tablet Take 1,000 mg by mouth every 6 hours as needed for mild pain       acetaminophen-caffeine (EXCEDRIN TENSION HEADACHE) 500-65 MG TABS Take 2 tablets by mouth every 6 hours as needed for mild pain       zoledronic acid (ZOMETA) 4 MG/100ML SOLN Inject 4 mg into the vein every 6 months       zolpidem (AMBIEN) 5 MG tablet Take 1 tablet (5 mg) by mouth nightly as needed for sleep (Patient not taking: Reported on 12/3/2020) 30 tablet 3     Allergies   Allergen Reactions     Sulfa Drugs Other (See Comments)     Red face. Ringing in the ears.     FAMILY HISTORY NOTED AND REVIEWED    REVIEW OF SYSTEMS: above    PHYSICAL EXAM    LMP 10/07/2017 (Approximate)     Patient appears non toxic    ASSESSMENT:  1. Depression, doing super, will change dose of wellbutrin to 150mg, call if worsens  2. Breast ca, zac, follow up onc  3. Osteopor, iv zometa, follow up onc  4. Asthma, no issues    PLAN:  Above  Call if problems        Video Start Time: 12:29 PM        Vitals:  No vitals were obtained today due to virtual visit.    Physical Exam     GENERAL: Healthy, alert and no distress  EYES: Eyes grossly normal to inspection.  No discharge or erythema, or obvious scleral/conjunctival abnormalities.  RESP: No audible wheeze, cough, or visible cyanosis.  No visible retractions or increased work of breathing.    SKIN: Visible skin clear. No significant rash, abnormal pigmentation or lesions.  NEURO: Cranial nerves grossly intact.  Mentation and speech appropriate for age.  PSYCH: Mentation appears normal, affect normal/bright, judgement and insight intact, normal speech and appearance well-groomed.                    Video-Visit Details    Type of service:  Video Visit    Video End Time:12:42 PM    Originating Location (pt. Location): Home    Distant Location (provider location):  [facility]     Platform used for Video Visit: [employer]

## 2020-12-11 ENCOUNTER — OFFICE VISIT (OUTPATIENT)
Dept: SURGERY | Facility: CLINIC | Age: 55
End: 2020-12-11
Payer: COMMERCIAL

## 2020-12-11 DIAGNOSIS — Z17.0 MALIGNANT NEOPLASM OF UPPER-OUTER QUADRANT OF RIGHT BREAST IN FEMALE, ESTROGEN RECEPTOR POSITIVE (H): Primary | ICD-10-CM

## 2020-12-11 DIAGNOSIS — C50.411 MALIGNANT NEOPLASM OF UPPER-OUTER QUADRANT OF RIGHT BREAST IN FEMALE, ESTROGEN RECEPTOR POSITIVE (H): Primary | ICD-10-CM

## 2020-12-11 PROCEDURE — 99213 OFFICE O/P EST LOW 20 MIN: CPT | Performed by: SURGERY

## 2020-12-11 NOTE — PROGRESS NOTES
[facility] Follow Up Note    CHIEF COMPLAINT:  Right breast cancer    HISTORY OF PRESENT ILLNESS:  Jyothi Freitas is a 54 year old female who is seen in follow up for right breast cancer.    Jyothi originally presented on 10/15/2019 with a mass in her right breast. She was diagnosed with right breast invasive ductal carcinoma, grade 3, ER/ND positive, HER 2 negative pending measuring 2.6cm in size at 10:00, 4cm FN with a right axillary lymph node which is positive for metastatic carcinoma. Jyothi had diagnostic imaging of the right breast due to a palpable lump in the right breast. This revealed an irregularly shaped spiculated mass in the right upper outer breast measuring 2.6cm at 10:00, 4cm FN and several enlarged right axillary lymph nodes. She then had biopsies with the above results. She had a breast MRI which revealed a 2.6cm right breast malignancy and 3 mildly enlarged axillary lymph nodes on the right, PET CT also revealed concerning axillary lymph nodes and the breast lesion. She had a biopsy of a left axillary node which was negative. She underwent neoadjuvant chemotherapy with Dr. Monroe.     She had a lumpectomy with SLNB on 4/1/2020 which revealed a complete pathologic response in the breast and 1/7 axillary nodes with residual cancer.     She is here for 6 month follow up. She is doing well. She is on letrozole and tolerating it fine. Radiation went well. She had some cording over the summer in the axilla and this resolved with PT/exercises.     She had a bilateral mammogram on 10/20/2020 which was benign.     REVIEW OF SYSTEMS:  Constitutional:  Negative for chills, fatigue, fever and weight change.  Eyes:  Negative for blurred vision, eye pain and photophobia.  ENT:  Negative for hearing problems, ENT pain, congestion, rhinorrhea, epistaxis, hoarseness and dental problems.  Cardiovascular:  Negative for chest pain, palpitations, tachycardia, orthopnea and edema.  Respiratory:  Negative  "for cough, dyspnea and hemoptysis.  Gastrointestinal:  Negative for abdominal pain, heartburn, constipation, diarrhea and stool changes.  Musculoskeletal:  Negative for arthralgias, back pain and myalgias.  Integumentary/Breast:  See HPI.    Past Medical History:   Diagnosis Date     GERD (gastroesophageal reflux disease)      H/O colonoscopy 03/08/2016    done at [facility] and [facility]     Hematuria 2016    eval at [facility] and per pt cysto and ct neg     Intermittent asthma     since childhood     Low iron 10/2017    done for eval of hair loss, [facility]d [facility]     Malignant neoplasm of upper-outer quadrant of right breast in female, estrogen receptor positive (H)     had chemo, lumpectomy, onc is Dr. Monroe     Migraines     most of adult life, has seen neuro in past, on bblocker     Mild depression (H)      Normal stress echocardiogram 07/2011    due to hear racing     Osteopenia 2008     Other osteoporosis without current pathological fracture 2020    iv zometa every 6 months     Syncope 03/2017    echo [facility] lv size and fxn, grade 1 dd, mild tr       Past Surgical History:   Procedure Laterality Date     BIOPSY NODE SENTINEL Right 4/1/2020    Procedure: RIGHT SENTINEL LYMPH NODE BIOPSY;  Surgeon: Kaila Hernandez MD;  Location:  OR      TMJ ARTHROSCOPY/SURGERY       ESOPHAGOSCOPY, GASTROSCOPY, DUODENOSCOPY (EGD), COMBINED N/A 10/30/2017    Procedure: COMBINED ESOPHAGOSCOPY, GASTROSCOPY, DUODENOSCOPY (EGD), BIOPSY SINGLE OR MULTIPLE;  COMBINED ESOPHAGOSCOPY, GASTROSCOPY, DUODENOSCOPY (EGD);  Surgeon: Martin Rodriguez MD;  Location:  GI     INSERT PORT VASCULAR ACCESS N/A 10/24/2019    Procedure: PORT PLACEMENT;  Surgeon: Kaila Hernandez MD;  Location:  OR     LUMPECTOMY BREAST WITH SEED LOCALIZATION Right 4/1/2020    Procedure: SEED LOCALIZED RIGHT BREAST LUMPECTOMY WITH SEED LOCALIZED RIGHT AXILLARY NODE EXCISION;  Surgeon: Kaila Hernandez MD;  Location:  OR     ORTHOPEDIC SURGERY      \"leg surgery\"     REMOVE PORT " VASCULAR ACCESS Left 2020    Procedure: REMOVAL, VASCULAR ACCESS PORT;  Surgeon: Kaila Hernandez MD;  Location: [facility] OR     single oopherectomy  2010    cyst       Family History   Problem Relation Age of Onset     Coronary Artery Disease Father 48        MI. and one in his 60s     Emphysema Mother         COPD       Social History     Tobacco Use     Smoking status: Former Smoker     Packs/day: 0.00     Quit date: 3/27/1997     Years since quittin.7     Smokeless tobacco: Never Used   Substance Use Topics     Alcohol use: Yes     Comment: rarely       Patient Active Problem List   Diagnosis     Cervicalgia     Intermittent asthma     Migraines     Osteopenia     Mild depression (H)     Low iron     Breast cancer (H)     Malignant neoplasm of upper-outer quadrant of right breast in female, estrogen receptor positive (H)     Episodic tension-type headache, not intractable     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 Ringing in the ears.     Current Outpatient Medications   Medication Sig Dispense Refill     acetaminophen (TYLENOL) 500 MG tablet Take 1,000 mg by mouth every 6 hours as needed for mild pain       acetaminophen-caffeine (EXCEDRIN TENSION HEADACHE) 500-65 MG TABS Take 2 tablets by mouth every 6 hours as needed for mild pain       buPROPion (WELLBUTRIN XL) 150 MG 24 hr tablet Take 1 tablet (150 mg) by mouth every morning 90 tablet 3     buPROPion (WELLBUTRIN XL) 300 MG 24 hr tablet TAKE 1 TABLET(300 MG) BY MOUTH EVERY MORNING 90 tablet 3     gabapentin (NEURONTIN) 100 MG capsule Take 4 capsules (400 mg) by mouth every evening 270 capsule 3     letrozole (FEMARA) 2.5 MG  tablet Take 1 tablet (2.5 mg) by mouth daily 90 tablet 3     zoledronic acid (ZOMETA) 4 MG/100ML SOLN Inject 4 mg into the vein every 6 months       zolpidem (AMBIEN) 5 MG tablet Take 1 tablet (5 mg) by mouth nightly as needed for sleep (Patient not taking: Reported on 12/3/2020) 30 tablet 3     Vitals: LMP 10/07/2017 (Approximate)   BMI= There is no height or weight on file to calculate BMI.    EXAM:  GENERAL: healthy, alert and no distress   BREAST: right breast upper outer scar well healed. No contour change. Axillary incision well healed. Slight thickening to right breast skin c/w radiation changes. Tissue is very dense bilaterally. No masses. NAC normal.   There is no axillary or supraclavicular lymphadenopathy.  CARDIOVASCULAR:  RRR  RESPIRATORY: nonlabored breathing  NECK: Neck supple. No adenopathy. Thyroid symmetric, normal size,, Carotids without bruits.  SKIN: No suspicious lesions or rashes  LYMPH: Normal cervical lymph nodes      ASSESSMENT/PLAN:  Jyothi Freitas is a 54yof s/p right lumpectomy with SLNB for a ER/NH positive, HER 2 negative cancer. She had neoadjuvant chemo and is now on adjuvant hormone blockade. She is doing well. Plan to continue with high risk screening. She will be due for MRI in March 2021. She will see me for follow up in 6 months for breast exam. No areas of concern on exam today.           Kaila Hernandez MD  [facility]  [phone]        Please route or send letter to:  Primary Care Provider (PCP) and Referring Provider

## 2021-01-05 NOTE — PROGRESS NOTES
"[facility]    Hematology/Oncology Established Patient Follow-up Note      Today's Date:1/11/21    Reason for Follow-up: Right breast cancer.    Jyothi Freitas is a 55 year old female who is being evaluated via a billable video visit.      The patient has been notified of following:     \"This video visit will be conducted via a call between you and your physician/provider. We have found that certain health care needs can be provided without the need for an in-person physical exam.  This service lets us provide the care you need with a video conversation.  If a prescription is necessary we can send it directly to your pharmacy.  If lab work is needed we can place an order for that and you can then stop by our lab to have the test done at a later time.    Video visits are billed at different rates depending on your insurance coverage.  Please reach out to your insurance provider with any questions.    If during the course of the call the physician/provider feels a video visit is not appropriate, you will not be charged for this service.\"    Patient has given verbal consent for Video visit? Yes    How would you like to obtain your AVS? MyChart    Text to cell phone: [phone]          Video-Visit Details    Type of service:  Video Visit     Originating Location (pt. Location): Home    Distant Location (provider location):  [facility]     Mode of Communication:  Video Conference via [employer]    Video visit duration: 17 minutes    HISTORY OF PRESENT ILLNESS: Jyothi Freitas is a 55 year old female who presents with the following oncologic history:   1.  10/11/2019: Diagnostic right sided mammogram performed for a palpable lump in the right breast.  This showed an irregularly-shaped spiculated mass in the upper outer right breast with prominent lymph nodes in the right axilla.  Targeted ultrasound of the right upper outer breast showed an irregularly-shaped hypoechoic mass at the 10 o'clock " position, 4 cm from the nipple measuring 2.6 x 1.5 x 2.4 cm.  Right axillary ultrasound showed moderately enlarged lymph nodes.  Right breast needle biopsy showed a grade 3 invasive ductal carcinoma with lymphovascular invasion identified, ER strongly positive at 95%, IA strongly positive at 95%, HER-2/juan FISH negative.  Right axillary lymph node measuring 2 cm was positive for metastatic carcinoma.  Note prior 4/2019 mammogram deemed normal.  2.  10/15/2019: Bilateral breast MRI showed known right breast malignancy measuring 2.6 x 1.4 x 2 cm, 3 mildly enlarged right axillary lymph nodes and no contralateral breast malignancy.  3. 10/21/2019 PET scan showed scattered small hypermetabolic bilateral axillary lymph nodes; for example, a hypermetabolic right axillary lymph node measures 1.6 x 0.9 cm with SUV max 3.6.  Hypermetabolic mass in the right breast superiorly and laterally measuring 2.1 x 1.6 cm with SUV max 4.3.  A 0.6 cm low-density lesion in the right hepatic lobe is too small for accurate PET characterization but shows no appreciable hypermetabolic activity.  Incidentally noted ectasia of the ascending thoracic aorta measures 4 cm in diameter.  4.  10/23/2019: Left axillary ultrasound showed benign-appearing lymph node.  Left axillary lymph node biopsy showed benign lymph node tissue.  5.  10/29/2019: Started neoadjuvant chemotherapy with dose dense Adriamycin and Cytoxan, followed by weekly paclitaxel with completion on 3/12/2020.  6.  3/19/2020: Breast MRI showed no residual enhancement in the right breast mass.  The right axillary lymphadenopathy has resolved.  7. 4/01/2020: Underwent right breast lumpectomy and right axillary sentinel lymph node biopsy under care of Dr. Kaila Hernandez.  Pathology showed fibrocystic change and no evidence of residual carcinoma in the right breast.  Metastatic breast adenocarcinoma to one of 5 lymph node fragments in 1 of 3 lymph nodes excised. Extranodal extension  present. ypT0-N1a.  8. 4/29/2020: Started adjuvant anastrazole.  9. 6/15/2020: Completed adjuvant radiation therapy.  10. 6/15/2020: Switched to adjuvant letrozole with some mild improvement in polyathralgias.    INTERIM HISTORY:  Jyothi reports less polyathralgias on letrozole as compared to anastrazole and which are stable. Gabapentin continues to alleviate her hot flashes well.  She uses Ambien sparingly on as needed basis.    REVIEW OF SYSTEMS:   14 point ROS was reviewed and is negative other than as noted above in HPI.       HOME MEDICATIONS:  Current Outpatient Medications   Medication Sig Dispense Refill     acetaminophen (TYLENOL) 500 MG tablet Take 1,000 mg by mouth every 6 hours as needed for mild pain       acetaminophen-caffeine (EXCEDRIN TENSION HEADACHE) 500-65 MG TABS Take 2 tablets by mouth every 6 hours as needed for mild pain       buPROPion (WELLBUTRIN XL) 150 MG 24 hr tablet Take 1 tablet (150 mg) by mouth every morning 90 tablet 3     buPROPion (WELLBUTRIN XL) 300 MG 24 hr tablet TAKE 1 TABLET(300 MG) BY MOUTH EVERY MORNING 90 tablet 3     gabapentin (NEURONTIN) 100 MG capsule Take 4 capsules (400 mg) by mouth every evening 270 capsule 3     letrozole (FEMARA) 2.5 MG tablet Take 1 tablet (2.5 mg) by mouth daily 90 tablet 3     zoledronic acid (ZOMETA) 4 MG/100ML SOLN Inject 4 mg into the vein every 6 months       zolpidem (AMBIEN) 5 MG tablet Take 1 tablet (5 mg) by mouth nightly as needed for sleep 30 tablet 3         ALLERGIES:  Allergies   Allergen Reactions     Sulfa Drugs Other (See Comments)     Red face. Ringing in the ears.         PAST MEDICAL HISTORY:  Past Medical History:   Diagnosis Date     GERD (gastroesophageal reflux disease)      H/O colonoscopy 03/08/2016    done at [place] and n[place]     Hematuria 2016    eval at [place] and per pt cysto and ct neg     Intermittent asthma     since childhood     Low iron 10/2017    done for eval of hair loss, egd [facility]     Malignant neoplasm of upper-outer  "quadrant of right breast in female, estrogen receptor positive (H)     had chemo, lumpectomy, onc is Dr. Monroe     Migraines     most of adult life, has seen neuro in past, on bblocker     Mild depression (H)      Normal stress echocardiogram 07/2011    due to hear racing     Osteopenia 2008     Other osteoporosis without current pathological fracture 2020    iv zometa every 6 months     Syncope 03/2017    echo nl lv size and fxn, grade 1 dd, mild tr         PAST SURGICAL HISTORY:  Past Surgical History:   Procedure Laterality Date     BIOPSY NODE SENTINEL Right 4/1/2020    Procedure: RIGHT SENTINEL LYMPH NODE BIOPSY;  Surgeon: Kaila Hernandez MD;  Location:  OR     C [facility] ARTHROSCOPY/SURGERY       ESOPHAGOSCOPY, GASTROSCOPY, DUODENOSCOPY (EGD), COMBINED N/A 10/30/2017    Procedure: COMBINED ESOPHAGOSCOPY, GASTROSCOPY, DUODENOSCOPY (EGD), BIOPSY SINGLE OR MULTIPLE;  COMBINED ESOPHAGOSCOPY, GASTROSCOPY, DUODENOSCOPY (EGD);  Surgeon: Martin Rodriguez MD;  Location:  GI     INSERT PORT VASCULAR ACCESS N/A 10/24/2019    Procedure: PORT PLACEMENT;  Surgeon: Kaila Hernandez MD;  Location:  OR     LUMPECTOMY BREAST WITH SEED LOCALIZATION Right 4/1/2020    Procedure: SEED LOCALIZED RIGHT BREAST LUMPECTOMY WITH SEED LOCALIZED RIGHT AXILLARY NODE EXCISION;  Surgeon: Kaila Hernandez MD;  Location:  OR     ORTHOPEDIC SURGERY      \"leg surgery\"     REMOVE PORT VASCULAR ACCESS Left 4/1/2020    Procedure: REMOVAL, VASCULAR ACCESS PORT;  Surgeon: Kaila Hernandez MD;  Location:  OR     single oopherectomy  2010    cyst         SOCIAL HISTORY:  Social History     Socioeconomic History     Marital status:      Spouse name: Not on file     Number of children: 2     Years of education: Not on file     Highest education level: Not on file   Occupational History     Not on file   Social Needs     Financial resource strain: Not on file     Food insecurity     Worry: Not on file     Inability: Not on file     " Transportation needs     Medical: Not on file     Non-medical: Not on file   Tobacco Use     Smoking status: Former Smoker     Packs/day: 0.00     Quit date: 3/27/1997     Years since quittin.7     Smokeless tobacco: Never Used   Substance and Sexual Activity     Alcohol use: Yes     Comment: rarely     Drug use: No     Sexual activity: Yes     Partners: Male     Birth control/protection: Pill   Lifestyle     Physical activity     Days per week: Not on file     Minutes per session: Not on file     Stress: Not on file   Relationships     Social connections     Talks on phone: Not on file     Gets together: Not on file     Attends L[employer] service: Not on file     Active member of club or organization: Not on file     Attends meetings of clubs or organizations: Not on file     Relationship status: Not on file     Intimate partner violence     Fear of current or ex partner: Not on file     Emotionally abused: Not on file     Physically abused: Not on file     Forced sexual activity: Not on file   Other Topics Concern     Parent/sibling w/ CABG, MI or angioplasty before 65F 55M? Yes   Social History Narrative     Not on file         FAMILY HISTORY:  Family History   Problem Relation Age of Onset     Coronary Artery Disease Father 48        MI. and one in his 60s     Emphysema Mother         COPD         PHYSICAL EXAM:  Vital signs:  Not taken.  GENERAL: No acute distress.  EYES: No scleral icterus. No overt erythema.  RESPIRATORY: No cough.  No labored breathing.  MUSCULOSKELETAL: Range of motion in the neck, shoulders, and arms appear normal.  SKIN: No overt rashes, discolorations, or lesions over the face and neck.  NEUROLOGIC: Alert.  No overt tremors.  PSYCHIATRIC: Normal affect and mood.  Does not appear anxious.    The rest of a comprehensive physical examination is deferred due to PHE (public health emergency) video visit restrictions.    LABS:  CBC RESULTS:   Recent Labs   Lab Test 10/20/20  0906   WBC 4.6    RBC 4.84   HGB 15.0   HCT 44.7   MCV 92   MCH 31.0   MCHC 33.6   RDW 11.9        Last Comprehensive Metabolic Panel:  Sodium   Date Value Ref Range Status   10/20/2020 139 133 - 144 mmol/L Final     Potassium   Date Value Ref Range Status   10/20/2020 4.2 3.4 - 5.3 mmol/L Final     Chloride   Date Value Ref Range Status   10/20/2020 107 94 - 109 mmol/L Final     Carbon Dioxide   Date Value Ref Range Status   10/20/2020 29 20 - 32 mmol/L Final     Anion Gap   Date Value Ref Range Status   10/20/2020 3 3 - 14 mmol/L Final     Glucose   Date Value Ref Range Status   10/20/2020 78 70 - 99 mg/dL Final     Urea Nitrogen   Date Value Ref Range Status   10/20/2020 13 7 - 30 mg/dL Final     Creatinine   Date Value Ref Range Status   10/20/2020 0.78 0.52 - 1.04 mg/dL Final     GFR Estimate   Date Value Ref Range Status   10/20/2020 86 >60 mL/min/[1.73_m2] Final     Comment:     Non  GFR Calc  Starting 2018, serum creatinine based estimated GFR (eGFR) will be   calculated using the Chronic Kidney Disease Epidemiology Collaboration   (CKD-EPI) equation.       Calcium   Date Value Ref Range Status   10/20/2020 9.5 8.5 - 10.1 mg/dL Final     Bilirubin Total   Date Value Ref Range Status   10/20/2020 0.2 0.2 - 1.3 mg/dL Final     Alkaline Phosphatase   Date Value Ref Range Status   10/20/2020 76 40 - 150 U/L Final     ALT   Date Value Ref Range Status   10/20/2020 25 0 - 50 U/L Final     AST   Date Value Ref Range Status   10/20/2020 16 0 - 45 U/L Final       PATHOLOGY:  None new since last visit.    IMAGIN2020 CT head without contrast showed no bleed or trauma.    10/13/2020: DEXA scan showed osteoporosis in the lumbar spine and left femoral neck; osteopenia in right femoral neck.    10/20/2020: Mammogram showed no suspicious findings.    ASSESSMENT/PLAN:  Jyothi Freitas is a 55 year old female with the following issues:  1.  Stage IIB (clinical prognostic), cT2-cN1-M0, grade 3 invasive ductal  carcinoma of the right upper outer breast, strongly ER positive, KS positive, HER-2/juan FISH negative, ypT0-N1a  2. Polyarthralgias  -Jyothi has no clinical evidence for recurrent breast cancer by history.  -Jyothi is on letrozole and tolerating this much better than anastrazole with less polyarthralgias.  -I recommended up to 10 years of letrozole, if tolerated, to maximally reduce her risk of breast cancer recurrence.  -Continue magnesium 100-300 mg daily at night and vitamin D 1000 international unit(s) daily.  -Will continue to alternate mammogram with breast MRI, staggered by 6 months.  Next breast MRI due 4/2021 which we will arrange.  Discussed that mammograms are still important to detect any concerning calcifications.  -Discussed signs and symptoms to watch for breast cancer recurrence. Would only recommend lab workup and imaging workup if concerning signs/symptoms arise.    3. Osteoporosis  -DEXA scan results from 10/13/2020 showed osteoporosis.  -Recommended adequate calcium and vitamin D and weight-bearing exercise. Also recommended continuing zoledronic acid every 6 months for 6 doses with next dose due 5/5/2021.    -Will repeat DEXA scan in 2022.    4. Depression  -Mood stable. Continue bupropion.     5.  Insomnia   6.  Hot flashes, drug induced  -Oxybutynin did help with hot flashes but she had too much dry eye syndrome and fluid retention.   -She has had significant improvement in her hot flashes and insomnia with the use of gabapentin, which she will continue at 400 mg daily.  -She will continue to take Ambien only as needed.     7. Ectasia of thoracic aorta  -This measured 4 cm on prior PET scan.  -Surgical management not needed but she will need ongoing monitoring. She is following with Dr. Silverio Gooden for monitoring of this issue.     Return in 3 months.    Maricarmen Monroe MD  Hematology/Oncology  [facility]

## 2021-01-11 ENCOUNTER — VIRTUAL VISIT (OUTPATIENT)
Dept: ONCOLOGY | Facility: CLINIC | Age: 56
End: 2021-01-11
Attending: INTERNAL MEDICINE
Payer: COMMERCIAL

## 2021-01-11 DIAGNOSIS — N95.1 MENOPAUSAL SYNDROME (HOT FLASHES): ICD-10-CM

## 2021-01-11 DIAGNOSIS — Z79.811 AROMATASE INHIBITOR USE: ICD-10-CM

## 2021-01-11 DIAGNOSIS — F32.5 MAJOR DEPRESSION IN COMPLETE REMISSION (H): ICD-10-CM

## 2021-01-11 DIAGNOSIS — M81.0 AGE-RELATED OSTEOPOROSIS WITHOUT CURRENT PATHOLOGICAL FRACTURE: ICD-10-CM

## 2021-01-11 DIAGNOSIS — Z17.0 MALIGNANT NEOPLASM OF UPPER-OUTER QUADRANT OF RIGHT BREAST IN FEMALE, ESTROGEN RECEPTOR POSITIVE (H): Primary | ICD-10-CM

## 2021-01-11 DIAGNOSIS — C50.411 MALIGNANT NEOPLASM OF UPPER-OUTER QUADRANT OF RIGHT BREAST IN FEMALE, ESTROGEN RECEPTOR POSITIVE (H): Primary | ICD-10-CM

## 2021-01-11 DIAGNOSIS — F51.01 PRIMARY INSOMNIA: ICD-10-CM

## 2021-01-11 PROCEDURE — 999N001193 HC VIDEO/TELEPHONE VISIT; NO CHARGE

## 2021-01-11 PROCEDURE — 99214 OFFICE O/P EST MOD 30 MIN: CPT | Mod: GT | Performed by: INTERNAL MEDICINE

## 2021-01-11 RX ORDER — ZOLEDRONIC ACID 0.04 MG/ML
4 INJECTION, SOLUTION INTRAVENOUS ONCE
Status: CANCELLED | OUTPATIENT
Start: 2021-05-14 | End: 2021-05-05

## 2021-01-11 RX ORDER — HEPARIN SODIUM,PORCINE 10 UNIT/ML
5 VIAL (ML) INTRAVENOUS
Status: CANCELLED | OUTPATIENT
Start: 2021-05-05

## 2021-01-11 RX ORDER — HEPARIN SODIUM (PORCINE) LOCK FLUSH IV SOLN 100 UNIT/ML 100 UNIT/ML
5 SOLUTION INTRAVENOUS
Status: CANCELLED | OUTPATIENT
Start: 2021-05-05

## 2021-01-11 ASSESSMENT — PAIN SCALES - GENERAL: PAINLEVEL: NO PAIN (0)

## 2021-01-11 NOTE — LETTER
"    1/11/2021         RE: Jyothi Freitas  [address]  [place] [address]        Dear Colleague,    Thank you for referring your patient, Jyothi Freitas, to the [facility]. Please see a copy of my visit note below.    [facility]    Hematology/Oncology Established Patient Follow-up Note      Today's Date:1/11/21    Reason for Follow-up: Right breast cancer.    Jyothi Freitas is a 55 year old female who is being evaluated via a billable video visit.      The patient has been notified of following:     \"This video visit will be conducted via a call between you and your physician/provider. We have found that certain health care needs can be provided without the need for an in-person physical exam.  This service lets us provide the care you need with a video conversation.  If a prescription is necessary we can send it directly to your pharmacy.  If lab work is needed we can place an order for that and you can then stop by our lab to have the test done at a later time.    Video visits are billed at different rates depending on your insurance coverage.  Please reach out to your insurance provider with any questions.    If during the course of the call the physician/provider feels a video visit is not appropriate, you will not be charged for this service.\"    Patient has given verbal consent for Video visit? Yes    How would you like to obtain your AVS? MyChart    Text to cell phone: [phone]          Video-Visit Details    Type of service:  Video Visit     Originating Location (pt. Location): Home    Distant Location (provider location):  [facility]     Mode of Communication:  Video Conference via [employer]    Video visit duration: 17 minutes    HISTORY OF PRESENT ILLNESS: Jyothi Freitas is a 55 year old female who presents with the following oncologic history:   1.  10/11/2019: Diagnostic right sided mammogram performed for a palpable lump in the right breast. "  This showed an irregularly-shaped spiculated mass in the upper outer right breast with prominent lymph nodes in the right axilla.  Targeted ultrasound of the right upper outer breast showed an irregularly-shaped hypoechoic mass at the 10 o'clock position, 4 cm from the nipple measuring 2.6 x 1.5 x 2.4 cm.  Right axillary ultrasound showed moderately enlarged lymph nodes.  Right breast needle biopsy showed a grade 3 invasive ductal carcinoma with lymphovascular invasion identified, ER strongly positive at 95%, MS strongly positive at 95%, HER-2/juan FISH negative.  Right axillary lymph node measuring 2 cm was positive for metastatic carcinoma.  Note prior 4/2019 mammogram deemed normal.  2.  10/15/2019: Bilateral breast MRI showed known right breast malignancy measuring 2.6 x 1.4 x 2 cm, 3 mildly enlarged right axillary lymph nodes and no contralateral breast malignancy.  3. 10/21/2019 PET scan showed scattered small hypermetabolic bilateral axillary lymph nodes; for example, a hypermetabolic right axillary lymph node measures 1.6 x 0.9 cm with SUV max 3.6.  Hypermetabolic mass in the right breast superiorly and laterally measuring 2.1 x 1.6 cm with SUV max 4.3.  A 0.6 cm low-density lesion in the right hepatic lobe is too small for accurate PET characterization but shows no appreciable hypermetabolic activity.  Incidentally noted ectasia of the ascending thoracic aorta measures 4 cm in diameter.  4.  10/23/2019: Left axillary ultrasound showed benign-appearing lymph node.  Left axillary lymph node biopsy showed benign lymph node tissue.  5.  10/29/2019: Started neoadjuvant chemotherapy with dose dense Adriamycin and Cytoxan, followed by weekly paclitaxel with completion on 3/12/2020.  6.  3/19/2020: Breast MRI showed no residual enhancement in the right breast mass.  The right axillary lymphadenopathy has resolved.  7. 4/01/2020: Underwent right breast lumpectomy and right axillary sentinel lymph node biopsy under  care of Dr. Kaila Hernandez.  Pathology showed fibrocystic change and no evidence of residual carcinoma in the right breast.  Metastatic breast adenocarcinoma to one of 5 lymph node fragments in 1 of 3 lymph nodes excised. Extranodal extension present. ypT0-N1a.  8. 4/29/2020: Started adjuvant anastrazole.  9. 6/15/2020: Completed adjuvant radiation therapy.  10. 6/15/2020: Switched to adjuvant letrozole with some mild improvement in polyathralgias.    INTERIM HISTORY:  Jyothi reports less polyathralgias on letrozole as compared to anastrazole and which are stable. Gabapentin continues to alleviate her hot flashes well.  She uses Ambien sparingly on as needed basis.    REVIEW OF SYSTEMS:   14 point ROS was reviewed and is negative other than as noted above in HPI.       HOME MEDICATIONS:  Current Outpatient Medications   Medication Sig Dispense Refill     acetaminophen (TYLENOL) 500 MG tablet Take 1,000 mg by mouth every 6 hours as needed for mild pain       acetaminophen-caffeine (EXCEDRIN TENSION HEADACHE) 500-65 MG TABS Take 2 tablets by mouth every 6 hours as needed for mild pain       buPROPion (WELLBUTRIN XL) 150 MG 24 hr tablet Take 1 tablet (150 mg) by mouth every morning 90 tablet 3     buPROPion (WELLBUTRIN XL) 300 MG 24 hr tablet TAKE 1 TABLET(300 MG) BY MOUTH EVERY MORNING 90 tablet 3     gabapentin (NEURONTIN) 100 MG capsule Take 4 capsules (400 mg) by mouth every evening 270 capsule 3     letrozole (FEMARA) 2.5 MG tablet Take 1 tablet (2.5 mg) by mouth daily 90 tablet 3     zoledronic acid (ZOMETA) 4 MG/100ML SOLN Inject 4 mg into the vein every 6 months       zolpidem (AMBIEN) 5 MG tablet Take 1 tablet (5 mg) by mouth nightly as needed for sleep 30 tablet 3         ALLERGIES:  Allergies   Allergen Reactions     Sulfa Drugs Other (See Comments)     Red face. Ringing in the ears.         PAST MEDICAL HISTORY:  Past Medical History:   Diagnosis Date     GERD (gastroesophageal reflux disease)      H/O  "colonoscopy 03/08/2016    done at [facility] and [facility]     Hematuria 2016    eval at [facility] and per pt cysto and ct neg     Intermittent asthma     since childhood     Low iron 10/2017    done for eval of hair loss, [facility]     Malignant neoplasm of upper-outer quadrant of right breast in female, estrogen receptor positive (H)     had chemo, lumpectomy, onc is Dr. Monroe     Migraines     most of adult life, has seen neuro in past, on bblocker     Mild depression (H)      Normal stress echocardiogram 07/2011    due to hear racing     Osteopenia 2008     Other osteoporosis without current pathological fracture 2020    iv zometa every 6 months     Syncope 03/2017    echo nl lv size and fxn, grade 1 dd, mild tr         PAST SURGICAL HISTORY:  Past Surgical History:   Procedure Laterality Date     BIOPSY NODE SENTINEL Right 4/1/2020    Procedure: RIGHT SENTINEL LYMPH NODE BIOPSY;  Surgeon: Kaila Hernandez MD;  Location:  OR      TMJ ARTHROSCOPY/SURGERY       ESOPHAGOSCOPY, GASTROSCOPY, DUODENOSCOPY (EGD), COMBINED N/A 10/30/2017    Procedure: COMBINED ESOPHAGOSCOPY, GASTROSCOPY, DUODENOSCOPY (EGD), BIOPSY SINGLE OR MULTIPLE;  COMBINED ESOPHAGOSCOPY, GASTROSCOPY, DUODENOSCOPY (EGD);  Surgeon: Martin Rodriguez MD;  Location:  GI     INSERT PORT VASCULAR ACCESS N/A 10/24/2019    Procedure: PORT PLACEMENT;  Surgeon: Kaila Hernandez MD;  Location:  OR     LUMPECTOMY BREAST WITH SEED LOCALIZATION Right 4/1/2020    Procedure: SEED LOCALIZED RIGHT BREAST LUMPECTOMY WITH SEED LOCALIZED RIGHT AXILLARY NODE EXCISION;  Surgeon: Kaila Hernandez MD;  Location:  OR     ORTHOPEDIC SURGERY      \"leg surgery\"     REMOVE PORT VASCULAR ACCESS Left 4/1/2020    Procedure: REMOVAL, VASCULAR ACCESS PORT;  Surgeon: Kaila Hernandez MD;  Location:  OR     single oopherectomy  2010    cyst         SOCIAL HISTORY:  Social History     Socioeconomic History     Marital status:      Spouse name: Not on file     Number of children: 2 "     Years of education: Not on file     Highest education level: Not on file   Occupational History     Not on file   Social Needs     Financial resource strain: Not on file     Food insecurity     Worry: Not on file     Inability: Not on file     Transportation needs     Medical: Not on file     Non-medical: Not on file   Tobacco Use     Smoking status: Former Smoker     Packs/day: 0.00     Quit date: 3/27/1997     Years since quittin.7     Smokeless tobacco: Never Used   Substance and Sexual Activity     Alcohol use: Yes     Comment: rarely     Drug use: No     Sexual activity: Yes     Partners: Male     Birth control/protection: Pill   Lifestyle     Physical activity     Days per week: Not on file     Minutes per session: Not on file     Stress: Not on file   Relationships     Social connections     Talks on phone: Not on file     Gets together: Not on file     Attends Yazdanism service: Not on file     Active member of club or organization: Not on file     Attends meetings of clubs or organizations: Not on file     Relationship status: Not on file     Intimate partner violence     Fear of current or ex partner: Not on file     Emotionally abused: Not on file     Physically abused: Not on file     Forced sexual activity: Not on file   Other Topics Concern     Parent/sibling w/ CABG, MI or angioplasty before 65F 55M? Yes   Social History Narrative     Not on file         FAMILY HISTORY:  Family History   Problem Relation Age of Onset     Coronary Artery Disease Father 48        MI. and one in his 60s     Emphysema Mother         COPD         PHYSICAL EXAM:  Vital signs:  Not taken.  GENERAL: No acute distress.  EYES: No scleral icterus. No overt erythema.  RESPIRATORY: No cough.  No labored breathing.  MUSCULOSKELETAL: Range of motion in the neck, shoulders, and arms appear normal.  SKIN: No overt rashes, discolorations, or lesions over the face and neck.  NEUROLOGIC: Alert.  No overt tremors.  PSYCHIATRIC:  Normal affect and mood.  Does not appear anxious.    The rest of a comprehensive physical examination is deferred due to PHE (public health emergency) video visit restrictions.    LABS:  CBC RESULTS:   Recent Labs   Lab Test 10/20/20  0906   WBC 4.6   RBC 4.84   HGB 15.0   HCT 44.7   MCV 92   MCH 31.0   MCHC 33.6   RDW 11.9        Last Comprehensive Metabolic Panel:  Sodium   Date Value Ref Range Status   10/20/2020 139 133 - 144 mmol/L Final     Potassium   Date Value Ref Range Status   10/20/2020 4.2 3.4 - 5.3 mmol/L Final     Chloride   Date Value Ref Range Status   10/20/2020 107 94 - 109 mmol/L Final     Carbon Dioxide   Date Value Ref Range Status   10/20/2020 29 20 - 32 mmol/L Final     Anion Gap   Date Value Ref Range Status   10/20/2020 3 3 - 14 mmol/L Final     Glucose   Date Value Ref Range Status   10/20/2020 78 70 - 99 mg/dL Final     Urea Nitrogen   Date Value Ref Range Status   10/20/2020 13 7 - 30 mg/dL Final     Creatinine   Date Value Ref Range Status   10/20/2020 0.78 0.52 - 1.04 mg/dL Final     GFR Estimate   Date Value Ref Range Status   10/20/2020 86 >60 mL/min/[1.73_m2] Final     Comment:     Non  GFR Calc  Starting 2018, serum creatinine based estimated GFR (eGFR) will be   calculated using the Chronic Kidney Disease Epidemiology Collaboration   (CKD-EPI) equation.       Calcium   Date Value Ref Range Status   10/20/2020 9.5 8.5 - 10.1 mg/dL Final     Bilirubin Total   Date Value Ref Range Status   10/20/2020 0.2 0.2 - 1.3 mg/dL Final     Alkaline Phosphatase   Date Value Ref Range Status   10/20/2020 76 40 - 150 U/L Final     ALT   Date Value Ref Range Status   10/20/2020 25 0 - 50 U/L Final     AST   Date Value Ref Range Status   10/20/2020 16 0 - 45 U/L Final       PATHOLOGY:  None new since last visit.    IMAGIN2020 CT head without contrast showed no bleed or trauma.    10/13/2020: DEXA scan showed osteoporosis in the lumbar spine and left femoral  neck; osteopenia in right femoral neck.    10/20/2020: Mammogram showed no suspicious findings.    ASSESSMENT/PLAN:  Jyothi Freitas is a 55 year old female with the following issues:  1.  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 Polyarthralgias  -Jyothi has no clinical evidence for recurrent breast cancer by history.  -Jyothi is on letrozole and tolerating this much better than anastrazole with less polyarthralgias.  -I recommended up to 10 years of letrozole, if tolerated, to maximally reduce her risk of breast cancer recurrence.  -Continue magnesium 100-300 mg daily at night and vitamin D 1000 international unit(s) daily.  -Will continue to alternate mammogram with breast MRI, staggered by 6 months.  Next breast MRI due 4/2021 which we will arrange.  Discussed that mammograms are still important to detect any concerning calcifications.  -Discussed signs and symptoms to watch for breast cancer recurrence. Would only recommend lab workup and imaging workup if concerning signs/symptoms arise.    3. Osteoporosis  -DEXA scan results from 10/13/2020 showed osteoporosis.  -Recommended adequate calcium and vitamin D and weight-bearing exercise. Also recommended continuing zoledronic acid every 6 months for 6 doses with next dose due 5/5/2021.    -Will repeat DEXA scan in 2022.    4. Depression  -Mood stable. Continue bupropion.     5.  Insomnia   6.  Hot flashes, drug induced  -Oxybutynin did help with hot flashes but she had too much dry eye syndrome and fluid retention.   -She has had significant improvement in her hot flashes and insomnia with the use of gabapentin, which she will continue at 400 mg daily.  -She will continue to take Ambien only as needed.     7. Ectasia of thoracic aorta  -This measured 4 cm on prior PET scan.  -Surgical management not needed but she will need  ongoing monitoring. She is following with Dr. Silverio Gooden for monitoring of this issue.     Return in 3 months.    Maricarmen Monroe MD  Hematology/Oncology  [facility]    Jyothi is a 55 year old who is being evaluated via a billable video visit.      How would you like to obtain your AVS? MyChart  If the video visit is dropped, the invitation should be resent by: Text to cell phone: [phone]  Will anyone else be joining your video visit? No      Video Start Time:       Subjective     Jyothi is a 55 year old who presents to clinic today for the following health issues     HPI     Review of Systems       Objective      Vitals:  No vitals were obtained today due to virtual visit.    Physical Exam         Video-Visit Details    Type of service:  Video Visit    Video End Time    Originating Location (pt. Location): Home    Distant Location (provider location):  [facility]     Platform used for Video Visit: [employer]      Again, thank you for allowing me to participate in the care of your patient.        Sincerely,        Maricarmen Monroe MD

## 2021-01-11 NOTE — PROGRESS NOTES
Jyothi is a 55 year old who is being evaluated via a billable video visit.      How would you like to obtain your AVS? MyChart  If the video visit is dropped, the invitation should be resent by: Text to cell phone: [phone]  Will anyone else be joining your video visit? No      Video Start Time:       Subjective     Jyothi is a 55 year old who presents to clinic today for the following health issues     HPI     Review of Systems       Objective      Vitals:  No vitals were obtained today due to virtual visit.    Physical Exam         Video-Visit Details    Type of service:  Video Visit    Video End Time    Originating Location (pt. Location): Home    Distant Location (provider location):  [facility] [place] [facility]     Platform used for Video Visit: [employer]

## 2021-01-11 NOTE — PATIENT INSTRUCTIONS
1. RTC MD in 3 months with breast MRI prior to visit.  2. Arrange for Zometa 5/5/2021 with lab draw.

## 2021-01-15 ENCOUNTER — HEALTH MAINTENANCE LETTER (OUTPATIENT)
Age: 56
End: 2021-01-15

## 2021-04-08 NOTE — PROGRESS NOTES
[facility], [place]    Hematology/Oncology Established Patient Follow-up Note      Today's Date: 4/12/21    Reason for Follow-up: Right breast cancer.    HISTORY OF PRESENT ILLNESS: Jyothi Freitas is a 55 year old female who presents with the following oncologic history:   1.  10/11/2019: Diagnostic right sided mammogram performed for a palpable lump in the right breast.  This showed an irregularly-shaped spiculated mass in the upper outer right breast with prominent lymph nodes in the right axilla.  Targeted ultrasound of the right upper outer breast showed an irregularly-shaped hypoechoic mass at the 10 o'clock position, 4 cm from the nipple measuring 2.6 x 1.5 x 2.4 cm.  Right axillary ultrasound showed moderately enlarged lymph nodes.  Right breast needle biopsy showed a grade 3 invasive ductal carcinoma with lymphovascular invasion identified, ER strongly positive at 95%, GA strongly positive at 95%, HER-2/juan FISH negative.  Right axillary lymph node measuring 2 cm was positive for metastatic carcinoma.  Note prior 4/2019 mammogram deemed normal.  2.  10/15/2019: Bilateral breast MRI showed known right breast malignancy measuring 2.6 x 1.4 x 2 cm, 3 mildly enlarged right axillary lymph nodes and no contralateral breast malignancy.  3. 10/21/2019 PET scan showed scattered small hypermetabolic bilateral axillary lymph nodes; for example, a hypermetabolic right axillary lymph node measures 1.6 x 0.9 cm with SUV max 3.6.  Hypermetabolic mass in the right breast superiorly and laterally measuring 2.1 x 1.6 cm with SUV max 4.3.  A 0.6 cm low-density lesion in the right hepatic lobe is too small for accurate PET characterization but shows no appreciable hypermetabolic activity.  Incidentally noted ectasia of the ascending thoracic aorta measures 4 cm in diameter.  4.  10/23/2019: Left axillary ultrasound showed benign-appearing lymph node.  Left axillary lymph node biopsy showed benign lymph node tissue.  5.   10/29/2019: Started neoadjuvant chemotherapy with dose dense Adriamycin and Cytoxan, followed by weekly paclitaxel with completion on 3/12/2020.  6.  3/19/2020: Breast MRI showed no residual enhancement in the right breast mass.  The right axillary lymphadenopathy has resolved.  7. 4/01/2020: Underwent right breast lumpectomy and right axillary sentinel lymph node biopsy under care of Dr. Kaila Hernandez.  Pathology showed fibrocystic change and no evidence of residual carcinoma in the right breast.  Metastatic breast adenocarcinoma to one of 5 lymph node fragments in 1 of 3 lymph nodes excised. Extranodal extension present. ypT0-N1a.  8. 4/29/2020: Started adjuvant anastrazole.  9. 6/15/2020: Completed adjuvant radiation therapy.  10. 6/15/2020: Switched to adjuvant letrozole with some mild improvement in polyathralgias.    INTERIM HISTORY:  Jyothi reports experiencing intermittent pain in her limbs even at rest. Gabapentin continues to alleviate her hot flashes relatively well.      REVIEW OF SYSTEMS:   14 point ROS was reviewed and is negative other than as noted above in HPI.       HOME MEDICATIONS:  Current Outpatient Medications   Medication Sig Dispense Refill     acetaminophen (TYLENOL) 500 MG tablet Take 1,000 mg by mouth every 6 hours as needed for mild pain       acetaminophen-caffeine (EXCEDRIN TENSION HEADACHE) 500-65 MG TABS Take 2 tablets by mouth every 6 hours as needed for mild pain       buPROPion (WELLBUTRIN XL) 150 MG 24 hr tablet Take 1 tablet (150 mg) by mouth every morning 90 tablet 3     buPROPion (WELLBUTRIN XL) 300 MG 24 hr tablet TAKE 1 TABLET(300 MG) BY MOUTH EVERY MORNING 90 tablet 3     gabapentin (NEURONTIN) 100 MG capsule Take 4 capsules (400 mg) by mouth every evening 270 capsule 3     letrozole (FEMARA) 2.5 MG tablet Take 1 tablet (2.5 mg) by mouth daily 90 tablet 3     zoledronic acid (ZOMETA) 4 MG/100ML SOLN Inject 4 mg into the vein every 6 months       zolpidem (AMBIEN) 5 MG  "tablet Take 1 tablet (5 mg) by mouth nightly as needed for sleep 30 tablet 3         ALLERGIES:  Allergies   Allergen Reactions     Sulfa Drugs Other (See Comments)     Red face. Ringing in the ears.         PAST MEDICAL HISTORY:  Past Medical History:   Diagnosis Date     GERD (gastroesophageal reflux disease)      H/O colonoscopy 03/08/2016    done at [facility] and [facility]     Hematuria 2016    eval at [facility] and per pt cysto and ct neg     Intermittent asthma     since childhood     Low iron 10/2017    done for eval of hair loss, [facility]     Malignant neoplasm of upper-outer quadrant of right breast in female, estrogen receptor positive (H)     had chemo, lumpectomy, onc is Dr. Monroe     Migraines     most of adult life, has seen neuro in past, on bblocker     Mild depression (H)      Normal stress echocardiogram 07/2011    due to hear racing     Osteopenia 2008     Other osteoporosis without current pathological fracture 2020    iv zometa every 6 months     Syncope 03/2017    echo n[facility] lv size and fxn, grade 1 dd, mild tr         PAST SURGICAL HISTORY:  Past Surgical History:   Procedure Laterality Date     BIOPSY NODE SENTINEL Right 4/1/2020    Procedure: RIGHT SENTINEL LYMPH NODE BIOPSY;  Surgeon: Kaila Hernandez MD;  Location:  OR      TMJ ARTHROSCOPY/SURGERY       ESOPHAGOSCOPY, GASTROSCOPY, DUODENOSCOPY (EGD), COMBINED N/A 10/30/2017    Procedure: COMBINED ESOPHAGOSCOPY, GASTROSCOPY, DUODENOSCOPY (EGD), BIOPSY SINGLE OR MULTIPLE;  COMBINED ESOPHAGOSCOPY, GASTROSCOPY, DUODENOSCOPY (EGD);  Surgeon: Martin Rodriguez MD;  Location:  GI     INSERT PORT VASCULAR ACCESS N/A 10/24/2019    Procedure: PORT PLACEMENT;  Surgeon: Kaila Hernandez MD;  Location:  OR     LUMPECTOMY BREAST WITH SEED LOCALIZATION Right 4/1/2020    Procedure: SEED LOCALIZED RIGHT BREAST LUMPECTOMY WITH SEED LOCALIZED RIGHT AXILLARY NODE EXCISION;  Surgeon: Kaila Hernandez MD;  Location:  OR     ORTHOPEDIC SURGERY      \"leg surgery\"     " REMOVE PORT VASCULAR ACCESS Left 2020    Procedure: REMOVAL, VASCULAR ACCESS PORT;  Surgeon: Kaila Hernandez MD;  Location: [facility] OR     single oopherectomy  2010    cyst         SOCIAL HISTORY:  Social History     Socioeconomic History     Marital status:      Spouse name: Not on file     Number of children: 2     Years of education: Not on file     Highest education level: Not on file   Occupational History     Not on file   Social Needs     Financial resource strain: Not on file     Food insecurity     Worry: Not on file     Inability: Not on file     Transportation needs     Medical: Not on file     Non-medical: Not on file   Tobacco Use     Smoking status: Former Smoker     Packs/day: 0.00     Quit date: 3/27/1997     Years since quittin.0     Smokeless tobacco: Never Used   Substance and Sexual Activity     Alcohol use: Yes     Comment: rarely     Drug use: No     Sexual activity: Yes     Partners: Male     Birth control/protection: Pill   Lifestyle     Physical activity     Days per week: Not on file     Minutes per session: Not on file     Stress: Not on file   Relationships     Social connections     Talks on phone: Not on file     Gets together: Not on file     Attends Bahai service: Not on file     Active member of club or organization: Not on file     Attends meetings of clubs or organizations: Not on file     Relationship status: Not on file     Intimate partner violence     Fear of current or ex partner: Not on file     Emotionally abused: Not on file     Physically abused: Not on file     Forced sexual activity: Not on file   Other Topics Concern     Parent/sibling w/ CABG, MI or angioplasty before 65F 55M? Yes   Social History Narrative     Not on file         FAMILY HISTORY:  Family History   Problem Relation Age of Onset     Coronary Artery Disease Father 48        MI. and one in his 60s     Emphysema Mother         COPD         PHYSICAL EXAM:  Vital signs:  BP (!) 144/98    "Pulse 73   Temp 98.6  F (37  C)   Resp 16   Ht 1.702 m (5' 7\")   Wt 64 kg (141 lb 3.2 oz)   LMP 10/07/2017 (Approximate)   SpO2 100%   BMI 22.12 kg/m    GENERAL/CONSTITUTIONAL: No acute distress.  EYES: No erythema or scleral icterus.  LYMPH: No cervical, supraclavicular, axillary, epitroclear adenopathy.   BREAST: The left breast droops lower than right breast. No palpable masses in either breast. Nipples are everted bilaterally with no discharge. No erythema, ulceration, or dimpling of the skin.  RESPIRATORY: No audible cough or wheezing.  GASTROINTESTINAL: No hepatosplenomegaly, masses, or tenderness. No guarding.  No distention.  MUSCULOSKELETAL: Warm and well-perfused, no cyanosis, clubbing, or edema.  NEUROLOGIC: No focal motor deficits. Alert, oriented, answers questions appropriately.  INTEGUMENTARY: No rashes or jaundice.  GAIT: Steady, does not use assistive device    LABS:  CBC RESULTS:   Recent Labs   Lab Test 10/20/20  0906   WBC 4.6   RBC 4.84   HGB 15.0   HCT 44.7   MCV 92   MCH 31.0   MCHC 33.6   RDW 11.9        Last Comprehensive Metabolic Panel:  Sodium   Date Value Ref Range Status   10/20/2020 139 133 - 144 mmol/L Final     Potassium   Date Value Ref Range Status   10/20/2020 4.2 3.4 - 5.3 mmol/L Final     Chloride   Date Value Ref Range Status   10/20/2020 107 94 - 109 mmol/L Final     Carbon Dioxide   Date Value Ref Range Status   10/20/2020 29 20 - 32 mmol/L Final     Anion Gap   Date Value Ref Range Status   10/20/2020 3 3 - 14 mmol/L Final     Glucose   Date Value Ref Range Status   10/20/2020 78 70 - 99 mg/dL Final     Urea Nitrogen   Date Value Ref Range Status   10/20/2020 13 7 - 30 mg/dL Final     Creatinine   Date Value Ref Range Status   10/20/2020 0.78 0.52 - 1.04 mg/dL Final     GFR Estimate   Date Value Ref Range Status   10/20/2020 86 >60 mL/min/[1.73_m2] Final     Comment:     Non  GFR Calc  Starting 12/18/2018, serum creatinine based estimated GFR " (eGFR) will be   calculated using the Chronic Kidney Disease Epidemiology Collaboration   (CKD-EPI) equation.       Calcium   Date Value Ref Range Status   10/20/2020 9.5 8.5 - 10.1 mg/dL Final     Bilirubin Total   Date Value Ref Range Status   10/20/2020 0.2 0.2 - 1.3 mg/dL Final     Alkaline Phosphatase   Date Value Ref Range Status   10/20/2020 76 40 - 150 U/L Final     ALT   Date Value Ref Range Status   10/20/2020 25 0 - 50 U/L Final     AST   Date Value Ref Range Status   10/20/2020 16 0 - 45 U/L Final       PATHOLOGY:  None new since last visit.    IMAGING:  10/13/2020: DEXA scan showed osteoporosis in the lumbar spine and left femoral neck; osteopenia in right femoral neck.    10/20/2020: Mammogram showed no suspicious findings.    4/09/2021: Breast MRI showed no abnormal enhancement or suspicious findings.    ASSESSMENT/PLAN:  Jyothi Freitas is a 55 year old female with the following issues:  1.  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 Polyarthralgias  -Jyothi has no clinical evidence for recurrent breast cancer by physical exam from today or breast MRI I personally reviewed from 4/9/2021.  -Jyothi is on letrozole and tolerating this overall better than anastrazole with less polyarthralgias.  -I recommended up to 10 years of letrozole, if tolerated, to maximally reduce her risk of breast cancer recurrence.  -Continue magnesium 100-300 mg daily at night and vitamin D 1000 international unit(s) daily.  -Will continue to alternate mammogram with breast MRI, staggered by 6 months.  Next mammogram due in 10/2021.  -Discussed signs and symptoms to watch for breast cancer recurrence. Would only recommend lab workup and imaging workup if concerning signs/symptoms arise.    3. Osteoporosis  -DEXA scan results from 10/13/2020 showed osteoporosis.  -Recommended adequate calcium and  vitamin D and weight-bearing exercise. Also recommended continuing zoledronic acid every 6 months for 6 doses with next dose due 5/5/2021.    -Will repeat DEXA scan in 2022.    4. Depression  -Mood stable even off bupropion.     5.  Insomnia   6.  Hot flashes, drug induced  -Oxybutynin did help with hot flashes but she had too much dry eye syndrome and fluid retention.   -She has had significant improvement in her hot flashes and insomnia with the use of gabapentin, which she will continue at 400 mg daily.  -She will continue to take Ambien only as needed.     7. Ectasia of thoracic aorta  -This measured 4 cm on prior PET scan.  -Surgical management not needed but she will need ongoing monitoring. She is following with Dr. Silverio Gooden for monitoring of this issue.    8. Peripheral neuropathy  -Will add duloxetine and titrate up. Discussed potential side effects of duloxetine.  She is willing to try this drug.     Return in 3 months.    Maricarmen Monroe MD  Hematology/Oncology  [facility]

## 2021-04-09 ENCOUNTER — HOSPITAL ENCOUNTER (OUTPATIENT)
Dept: MRI IMAGING | Facility: CLINIC | Age: 56
Discharge: HOME | End: 2021-04-09
Attending: INTERNAL MEDICINE | Admitting: INTERNAL MEDICINE
Payer: COMMERCIAL

## 2021-04-09 ENCOUNTER — TRANSFERRED RECORDS (OUTPATIENT)
Dept: HEALTH INFORMATION MANAGEMENT | Facility: CLINIC | Age: 56
End: 2021-04-09

## 2021-04-09 DIAGNOSIS — Z17.0 MALIGNANT NEOPLASM OF UPPER-OUTER QUADRANT OF RIGHT BREAST IN FEMALE, ESTROGEN RECEPTOR POSITIVE (H): ICD-10-CM

## 2021-04-09 DIAGNOSIS — C50.411 MALIGNANT NEOPLASM OF UPPER-OUTER QUADRANT OF RIGHT BREAST IN FEMALE, ESTROGEN RECEPTOR POSITIVE (H): ICD-10-CM

## 2021-04-09 PROCEDURE — A9585 GADOBUTROL INJECTION: HCPCS | Performed by: INTERNAL MEDICINE

## 2021-04-09 PROCEDURE — 255N000002 HC RX 255 OP 636: Performed by: INTERNAL MEDICINE

## 2021-04-09 PROCEDURE — 77049 MRI BREAST C-+ W/CAD BI: CPT

## 2021-04-09 RX ORDER — GADOBUTROL 604.72 MG/ML
6 INJECTION INTRAVENOUS ONCE
Status: COMPLETED | OUTPATIENT
Start: 2021-04-09 | End: 2021-04-09

## 2021-04-09 RX ADMIN — GADOBUTROL 6 ML: 604.72 INJECTION INTRAVENOUS at 07:38

## 2021-04-12 ENCOUNTER — ONCOLOGY VISIT (OUTPATIENT)
Dept: ONCOLOGY | Facility: CLINIC | Age: 56
End: 2021-04-12
Attending: INTERNAL MEDICINE
Payer: COMMERCIAL

## 2021-04-12 VITALS
BODY MASS INDEX: 22.16 KG/M2 | WEIGHT: 141.2 LBS | HEART RATE: 73 BPM | HEIGHT: 67 IN | TEMPERATURE: 98.6 F | RESPIRATION RATE: 16 BRPM | DIASTOLIC BLOOD PRESSURE: 98 MMHG | OXYGEN SATURATION: 100 % | SYSTOLIC BLOOD PRESSURE: 144 MMHG

## 2021-04-12 DIAGNOSIS — C50.411 MALIGNANT NEOPLASM OF UPPER-OUTER QUADRANT OF RIGHT BREAST IN FEMALE, ESTROGEN RECEPTOR POSITIVE (H): Primary | ICD-10-CM

## 2021-04-12 DIAGNOSIS — T45.1X5A PERIPHERAL NEUROPATHY DUE TO CHEMOTHERAPY (H): ICD-10-CM

## 2021-04-12 DIAGNOSIS — Z17.0 MALIGNANT NEOPLASM OF UPPER-OUTER QUADRANT OF RIGHT BREAST IN FEMALE, ESTROGEN RECEPTOR POSITIVE (H): Primary | ICD-10-CM

## 2021-04-12 DIAGNOSIS — G62.0 PERIPHERAL NEUROPATHY DUE TO CHEMOTHERAPY (H): ICD-10-CM

## 2021-04-12 DIAGNOSIS — M81.0 AGE-RELATED OSTEOPOROSIS WITHOUT CURRENT PATHOLOGICAL FRACTURE: ICD-10-CM

## 2021-04-12 DIAGNOSIS — Z79.811 AROMATASE INHIBITOR USE: ICD-10-CM

## 2021-04-12 DIAGNOSIS — N95.1 MENOPAUSAL SYNDROME (HOT FLASHES): ICD-10-CM

## 2021-04-12 PROCEDURE — 99214 OFFICE O/P EST MOD 30 MIN: CPT | Performed by: INTERNAL MEDICINE

## 2021-04-12 PROCEDURE — G0463 HOSPITAL OUTPT CLINIC VISIT: HCPCS

## 2021-04-12 RX ORDER — DULOXETIN HYDROCHLORIDE 20 MG/1
20 CAPSULE, DELAYED RELEASE ORAL DAILY
Qty: 30 CAPSULE | Refills: 3 | Status: SHIPPED | OUTPATIENT
Start: 2021-04-12 | End: 2021-08-04 | Stop reason: DRUGHIGH

## 2021-04-12 ASSESSMENT — PAIN SCALES - GENERAL: PAINLEVEL: MILD PAIN (3)

## 2021-04-12 ASSESSMENT — MIFFLIN-ST. JEOR: SCORE: 1263.11

## 2021-04-12 NOTE — PROGRESS NOTES
"Oncology Rooming Note    April 12, 2021 3:24 PM   Jyothi Freitas is a 55 year old female who presents for:    Chief Complaint   Patient presents with     Oncology Clinic Visit     Initial Vitals: LMP 10/07/2017 (Approximate)  Estimated body mass index is 21.14 kg/m  as calculated from the following:    Height as of 8/4/20: 1.702 m (5' 7\").    Weight as of 10/21/20: 61.2 kg (135 lb). There is no height or weight on file to calculate BSA.  Data Unavailable Comment: Data Unavailable   Patient's last menstrual period was 10/07/2017 (approximate).  Allergies reviewed: Yes  Medications reviewed: Yes    Medications: Medication refills not needed today.  Pharmacy name entered into [facility]:    [id number] - [place], [place] - [address] AT [id number] - [place], [place] - [address] AT [address] [place] [id number] [place] [facility], [place] - [address]  [id number] - [place], [place] - [address]    Clinical concerns:  doctor was notified.      Kacie Wright MA            "

## 2021-04-12 NOTE — LETTER
4/12/2021         RE: Jyothi Freitas  [address]  [facility] [address]        Dear Colleague,    Thank you for referring your patient, Jyothi Freitas, to the [facility]. Please see a copy of my visit note below.    [facility]    Hematology/Oncology Established Patient Follow-up Note      Today's Date: 4/12/21    Reason for Follow-up: Right breast cancer.    HISTORY OF PRESENT ILLNESS: Jyothi Freitas is a 55 year old female who presents with the following oncologic history:   1.  10/11/2019: Diagnostic right sided mammogram performed for a palpable lump in the right breast.  This showed an irregularly-shaped spiculated mass in the upper outer right breast with prominent lymph nodes in the right axilla.  Targeted ultrasound of the right upper outer breast showed an irregularly-shaped hypoechoic mass at the 10 o'clock position, 4 cm from the nipple measuring 2.6 x 1.5 x 2.4 cm.  Right axillary ultrasound showed moderately enlarged lymph nodes.  Right breast needle biopsy showed a grade 3 invasive ductal carcinoma with lymphovascular invasion identified, ER strongly positive at 95%, MS strongly positive at 95%, HER-2/juan FISH negative.  Right axillary lymph node measuring 2 cm was positive for metastatic carcinoma.  Note prior 4/2019 mammogram deemed normal.  2.  10/15/2019: Bilateral breast MRI showed known right breast malignancy measuring 2.6 x 1.4 x 2 cm, 3 mildly enlarged right axillary lymph nodes and no contralateral breast malignancy.  3. 10/21/2019 PET scan showed scattered small hypermetabolic bilateral axillary lymph nodes; for example, a hypermetabolic right axillary lymph node measures 1.6 x 0.9 cm with SUV max 3.6.  Hypermetabolic mass in the right breast superiorly and laterally measuring 2.1 x 1.6 cm with SUV max 4.3.  A 0.6 cm low-density lesion in the right hepatic lobe is too small for accurate PET characterization but shows no appreciable  hypermetabolic activity.  Incidentally noted ectasia of the ascending thoracic aorta measures 4 cm in diameter.  4.  10/23/2019: Left axillary ultrasound showed benign-appearing lymph node.  Left axillary lymph node biopsy showed benign lymph node tissue.  5.  10/29/2019: Started neoadjuvant chemotherapy with dose dense Adriamycin and Cytoxan, followed by weekly paclitaxel with completion on 3/12/2020.  6.  3/19/2020: Breast MRI showed no residual enhancement in the right breast mass.  The right axillary lymphadenopathy has resolved.  7. 4/01/2020: Underwent right breast lumpectomy and right axillary sentinel lymph node biopsy under care of Dr. Kaila Hernandez.  Pathology showed fibrocystic change and no evidence of residual carcinoma in the right breast.  Metastatic breast adenocarcinoma to one of 5 lymph node fragments in 1 of 3 lymph nodes excised. Extranodal extension present. ypT0-N1a.  8. 4/29/2020: Started adjuvant anastrazole.  9. 6/15/2020: Completed adjuvant radiation therapy.  10. 6/15/2020: Switched to adjuvant letrozole with some mild improvement in polyathralgias.    INTERIM HISTORY:  Jyothi reports experiencing intermittent pain in her limbs even at rest. Gabapentin continues to alleviate her hot flashes relatively well.      REVIEW OF SYSTEMS:   14 point ROS was reviewed and is negative other than as noted above in HPI.       HOME MEDICATIONS:  Current Outpatient Medications   Medication Sig Dispense Refill     acetaminophen (TYLENOL) 500 MG tablet Take 1,000 mg by mouth every 6 hours as needed for mild pain       acetaminophen-caffeine (EXCEDRIN TENSION HEADACHE) 500-65 MG TABS Take 2 tablets by mouth every 6 hours as needed for mild pain       buPROPion (WELLBUTRIN XL) 150 MG 24 hr tablet Take 1 tablet (150 mg) by mouth every morning 90 tablet 3     buPROPion (WELLBUTRIN XL) 300 MG 24 hr tablet TAKE 1 TABLET(300 MG) BY MOUTH EVERY MORNING 90 tablet 3     gabapentin (NEURONTIN) 100 MG capsule Take 4  capsules (400 mg) by mouth every evening 270 capsule 3     letrozole (FEMARA) 2.5 MG tablet Take 1 tablet (2.5 mg) by mouth daily 90 tablet 3     zoledronic acid (ZOMETA) 4 MG/100ML SOLN Inject 4 mg into the vein every 6 months       zolpidem (AMBIEN) 5 MG tablet Take 1 tablet (5 mg) by mouth nightly as needed for sleep 30 tablet 3         ALLERGIES:  Allergies   Allergen Reactions     Sulfa Drugs Other (See Comments)     Red face. Ringing in the ears.         PAST MEDICAL HISTORY:  Past Medical History:   Diagnosis Date     GERD (gastroesophageal reflux disease)      H/O colonoscopy 03/08/2016    done at [facility] and [facility]     Hematuria 2016    eval at [facility] and per pt cysto and ct neg     Intermittent asthma     since childhood     Low iron 10/2017    done for eval of hair loss, [facility]     Malignant neoplasm of upper-outer quadrant of right breast in female, estrogen receptor positive (H)     had chemo, lumpectomy, onc is Dr. Monroe     Migraines     most of adult life, has seen neuro in past, on bblocker     Mild depression (H)      Normal stress echocardiogram 07/2011    due to hear racing     Osteopenia 2008     Other osteoporosis without current pathological fracture 2020    iv zometa every 6 months     Syncope 03/2017    echo n[facility] lv size and fxn, grade 1 dd, mild tr         PAST SURGICAL HISTORY:  Past Surgical History:   Procedure Laterality Date     BIOPSY NODE SENTINEL Right 4/1/2020    Procedure: RIGHT SENTINEL LYMPH NODE BIOPSY;  Surgeon: Kaila Hernandez MD;  Location:  OR      TMJ ARTHROSCOPY/SURGERY       ESOPHAGOSCOPY, GASTROSCOPY, DUODENOSCOPY (EGD), COMBINED N/A 10/30/2017    Procedure: COMBINED ESOPHAGOSCOPY, GASTROSCOPY, DUODENOSCOPY (EGD), BIOPSY SINGLE OR MULTIPLE;  COMBINED ESOPHAGOSCOPY, GASTROSCOPY, DUODENOSCOPY (EGD);  Surgeon: Martin Rodriguez MD;  Location:  GI     INSERT PORT VASCULAR ACCESS N/A 10/24/2019    Procedure: PORT PLACEMENT;  Surgeon: Kaila Hernandez MD;  Location:  OR  "    LUMPECTOMY BREAST WITH SEED LOCALIZATION Right 2020    Procedure: SEED LOCALIZED RIGHT BREAST LUMPECTOMY WITH SEED LOCALIZED RIGHT AXILLARY NODE EXCISION;  Surgeon: Kaila Hernandez MD;  Location:  OR     ORTHOPEDIC SURGERY      \"leg surgery\"     REMOVE PORT VASCULAR ACCESS Left 2020    Procedure: REMOVAL, VASCULAR ACCESS PORT;  Surgeon: Kaila Hernandez MD;  Location:  OR     single oopherectomy  2010    cyst         SOCIAL HISTORY:  Social History     Socioeconomic History     Marital status:      Spouse name: Not on file     Number of children: 2     Years of education: Not on file     Highest education level: Not on file   Occupational History     Not on file   Social Needs     Financial resource strain: Not on file     Food insecurity     Worry: Not on file     Inability: Not on file     Transportation needs     Medical: Not on file     Non-medical: Not on file   Tobacco Use     Smoking status: Former Smoker     Packs/day: 0.00     Quit date: 3/27/1997     Years since quittin.0     Smokeless tobacco: Never Used   Substance and Sexual Activity     Alcohol use: Yes     Comment: rarely     Drug use: No     Sexual activity: Yes     Partners: Male     Birth control/protection: Pill   Lifestyle     Physical activity     Days per week: Not on file     Minutes per session: Not on file     Stress: Not on file   Relationships     Social connections     Talks on phone: Not on file     Gets together: Not on file     Attends [employer] service: Not on file     Active member of club or organization: Not on file     Attends meetings of clubs or organizations: Not on file     Relationship status: Not on file     Intimate partner violence     Fear of current or ex partner: Not on file     Emotionally abused: Not on file     Physically abused: Not on file     Forced sexual activity: Not on file   Other Topics Concern     Parent/sibling w/ CABG, MI or angioplasty before 65F 55M? Yes   Social History " "Narrative     Not on file         FAMILY HISTORY:  Family History   Problem Relation Age of Onset     Coronary Artery Disease Father 48        MI. and one in his 60s     Emphysema Mother         COPD         PHYSICAL EXAM:  Vital signs:  BP (!) 144/98   Pulse 73   Temp 98.6  F (37  C)   Resp 16   Ht 1.702 m (5' 7\")   Wt 64 kg (141 lb 3.2 oz)   LMP 10/07/2017 (Approximate)   SpO2 100%   BMI 22.12 kg/m    GENERAL/CONSTITUTIONAL: No acute distress.  EYES: No erythema or scleral icterus.  LYMPH: No cervical, supraclavicular, axillary, epitroclear adenopathy.   BREAST: The left breast droops lower than right breast. No palpable masses in either breast. Nipples are everted bilaterally with no discharge. No erythema, ulceration, or dimpling of the skin.  RESPIRATORY: No audible cough or wheezing.  GASTROINTESTINAL: No hepatosplenomegaly, masses, or tenderness. No guarding.  No distention.  MUSCULOSKELETAL: Warm and well-perfused, no cyanosis, clubbing, or edema.  NEUROLOGIC: No focal motor deficits. Alert, oriented, answers questions appropriately.  INTEGUMENTARY: No rashes or jaundice.  GAIT: Steady, does not use assistive device    LABS:  CBC RESULTS:   Recent Labs   Lab Test 10/20/20  0906   WBC 4.6   RBC 4.84   HGB 15.0   HCT 44.7   MCV 92   MCH 31.0   MCHC 33.6   RDW 11.9        Last Comprehensive Metabolic Panel:  Sodium   Date Value Ref Range Status   10/20/2020 139 133 - 144 mmol/L Final     Potassium   Date Value Ref Range Status   10/20/2020 4.2 3.4 - 5.3 mmol/L Final     Chloride   Date Value Ref Range Status   10/20/2020 107 94 - 109 mmol/L Final     Carbon Dioxide   Date Value Ref Range Status   10/20/2020 29 20 - 32 mmol/L Final     Anion Gap   Date Value Ref Range Status   10/20/2020 3 3 - 14 mmol/L Final     Glucose   Date Value Ref Range Status   10/20/2020 78 70 - 99 mg/dL Final     Urea Nitrogen   Date Value Ref Range Status   10/20/2020 13 7 - 30 mg/dL Final     Creatinine   Date Value " Ref Range Status   10/20/2020 0.78 0.52 - 1.04 mg/dL Final     GFR Estimate   Date Value Ref Range Status   10/20/2020 86 >60 mL/min/[1.73_m2] Final     Comment:     Non  GFR Calc  Starting 12/18/2018, serum creatinine based estimated GFR (eGFR) will be   calculated using the Chronic Kidney Disease Epidemiology Collaboration   (CKD-EPI) equation.       Calcium   Date Value Ref Range Status   10/20/2020 9.5 8.5 - 10.1 mg/dL Final     Bilirubin Total   Date Value Ref Range Status   10/20/2020 0.2 0.2 - 1.3 mg/dL Final     Alkaline Phosphatase   Date Value Ref Range Status   10/20/2020 76 40 - 150 U/L Final     ALT   Date Value Ref Range Status   10/20/2020 25 0 - 50 U/L Final     AST   Date Value Ref Range Status   10/20/2020 16 0 - 45 U/L Final       PATHOLOGY:  None new since last visit.    IMAGING:  10/13/2020: DEXA scan showed osteoporosis in the lumbar spine and left femoral neck; osteopenia in right femoral neck.    10/20/2020: Mammogram showed no suspicious findings.    4/09/2021: Breast MRI showed no abnormal enhancement or suspicious findings.    ASSESSMENT/PLAN:  Jyothi Freitas is a 55 year old female with the following issues:  1.  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 Polyarthralgias  -Jyothi has no clinical evidence for recurrent breast cancer by physical exam from today or breast MRI I personally reviewed from 4/9/2021.  -Jyothi is on letrozole and tolerating this overall better than anastrazole with less polyarthralgias.  -I recommended up to 10 years of letrozole, if tolerated, to maximally reduce her risk of breast cancer recurrence.  -Continue magnesium 100-300 mg daily at night and vitamin D 1000 international unit(s) daily.  -Will continue to alternate mammogram with breast MRI, staggered by 6 months.  Next mammogram due in 10/2021.  -Discussed  "signs and symptoms to watch for breast cancer recurrence. Would only recommend lab workup and imaging workup if concerning signs/symptoms arise.    3. Osteoporosis  -DEXA scan results from 10/13/2020 showed osteoporosis.  -Recommended adequate calcium and vitamin D and weight-bearing exercise. Also recommended continuing zoledronic acid every 6 months for 6 doses with next dose due 5/5/2021.    -Will repeat DEXA scan in 2022.    4. Depression  -Mood stable even off bupropion.     5.  Insomnia   6.  Hot flashes, drug induced  -Oxybutynin did help with hot flashes but she had too much dry eye syndrome and fluid retention.   -She has had significant improvement in her hot flashes and insomnia with the use of gabapentin, which she will continue at 400 mg daily.  -She will continue to take Ambien only as needed.     7. Ectasia of thoracic aorta  -This measured 4 cm on prior PET scan.  -Surgical management not needed but she will need ongoing monitoring. She is following with Dr. Silverio Gooden for monitoring of this issue.    8. Peripheral neuropathy  -Will add duloxetine and titrate up. Discussed potential side effects of duloxetine.  She is willing to try this drug.     Return in 3 months.    Maricarmen Monroe MD  Hematology/Oncology  [facility]    Oncology Rooming Note    April 12, 2021 3:24 PM   Jyothi Freitas is a 55 year old female who presents for:    Chief Complaint   Patient presents with     Oncology Clinic Visit     Initial Vitals: LMP 10/07/2017 (Approximate)  Estimated body mass index is 21.14 kg/m  as calculated from the following:    Height as of 8/4/20: 1.702 m (5' 7\").    Weight as of 10/21/20: 61.2 kg (135 lb). There is no height or weight on file to calculate BSA.  Data Unavailable Comment: Data Unavailable   Patient's last menstrual period was 10/07/2017 (approximate).  Allergies reviewed: Yes  Medications reviewed: Yes    Medications: Medication refills not needed today.  Pharmacy name " entered into EPIC:    [id number] - [place], [place] - [address] [facility]  [id number] - [place] - [address] AT [address] [place] [id number] [place] [facility] - [place], [place] - [address]  [id number] - [place], [place] - [address] [facility]    Clinical concerns:  doctor was notified.      Kacie Wright MA                Again, thank you for allowing me to participate in the care of your patient.        Sincerely,        Maricarmen Monroe MD

## 2021-05-13 ENCOUNTER — INFUSION THERAPY VISIT (OUTPATIENT)
Dept: INFUSION THERAPY | Facility: CLINIC | Age: 56
End: 2021-05-13
Attending: NURSE PRACTITIONER
Payer: COMMERCIAL

## 2021-05-13 ENCOUNTER — HOSPITAL ENCOUNTER (OUTPATIENT)
Facility: CLINIC | Age: 56
Setting detail: SPECIMEN
Discharge: HOME | End: 2021-05-13
Attending: INTERNAL MEDICINE | Admitting: INTERNAL MEDICINE
Payer: COMMERCIAL

## 2021-05-13 DIAGNOSIS — C50.411 MALIGNANT NEOPLASM OF UPPER-OUTER QUADRANT OF RIGHT BREAST IN FEMALE, ESTROGEN RECEPTOR POSITIVE (H): Primary | ICD-10-CM

## 2021-05-13 DIAGNOSIS — Z17.0 MALIGNANT NEOPLASM OF UPPER-OUTER QUADRANT OF RIGHT BREAST IN FEMALE, ESTROGEN RECEPTOR POSITIVE (H): Primary | ICD-10-CM

## 2021-05-13 DIAGNOSIS — M81.0 AGE-RELATED OSTEOPOROSIS WITHOUT CURRENT PATHOLOGICAL FRACTURE: ICD-10-CM

## 2021-05-13 LAB
ALBUMIN SERPL-MCNC: 4.2 G/DL (ref 3.4–5)
BASOPHILS # BLD AUTO: 0.1 10E9/L (ref 0–0.2)
BASOPHILS NFR BLD AUTO: 0.9 %
CALCIUM SERPL-MCNC: 9.4 MG/DL (ref 8.5–10.1)
CREAT SERPL-MCNC: 0.76 MG/DL (ref 0.52–1.04)
DIFFERENTIAL METHOD BLD: NORMAL
EOSINOPHIL # BLD AUTO: 0.5 10E9/L (ref 0–0.7)
EOSINOPHIL NFR BLD AUTO: 7.7 %
ERYTHROCYTE [DISTWIDTH] IN BLOOD BY AUTOMATED COUNT: 11.9 % (ref 10–15)
GFR SERPL CREATININE-BSD FRML MDRD: 87 ML/MIN/{1.73_M2}
HCT VFR BLD AUTO: 42.3 % (ref 35–47)
HGB BLD-MCNC: 14 G/DL (ref 11.7–15.7)
IMM GRANULOCYTES # BLD: 0 10E9/L (ref 0–0.4)
IMM GRANULOCYTES NFR BLD: 0.2 %
LYMPHOCYTES # BLD AUTO: 1.9 10E9/L (ref 0.8–5.3)
LYMPHOCYTES NFR BLD AUTO: 29 %
MCH RBC QN AUTO: 29.4 PG (ref 26.5–33)
MCHC RBC AUTO-ENTMCNC: 33.1 G/DL (ref 31.5–36.5)
MCV RBC AUTO: 89 FL (ref 78–100)
MONOCYTES # BLD AUTO: 0.8 10E9/L (ref 0–1.3)
MONOCYTES NFR BLD AUTO: 12 %
NEUTROPHILS # BLD AUTO: 3.4 10E9/L (ref 1.6–8.3)
NEUTROPHILS NFR BLD AUTO: 50.2 %
NRBC # BLD AUTO: 0 10*3/UL
NRBC BLD AUTO-RTO: 0 /100
PLATELET # BLD AUTO: 278 10E9/L (ref 150–450)
RBC # BLD AUTO: 4.76 10E12/L (ref 3.8–5.2)
WBC # BLD AUTO: 6.7 10E9/L (ref 4–11)

## 2021-05-13 PROCEDURE — 82565 ASSAY OF CREATININE: CPT | Performed by: INTERNAL MEDICINE

## 2021-05-13 PROCEDURE — 36415 COLL VENOUS BLD VENIPUNCTURE: CPT

## 2021-05-13 PROCEDURE — 82310 ASSAY OF CALCIUM: CPT | Performed by: INTERNAL MEDICINE

## 2021-05-13 PROCEDURE — 82040 ASSAY OF SERUM ALBUMIN: CPT | Performed by: INTERNAL MEDICINE

## 2021-05-13 PROCEDURE — 85025 COMPLETE CBC W/AUTO DIFF WBC: CPT | Performed by: INTERNAL MEDICINE

## 2021-05-13 NOTE — PROGRESS NOTES
Medical Assistant Note:  Jyothi Freitas presents today for blood draw.    Patient seen by provider today: No.   present during visit today: Not Applicable.    Concerns: No Concerns.    Procedure:  Lab draw site: lac, Needle type: bf, Gauge: 23.    Post Assessment:  Labs drawn without difficulty: Yes.    Discharge Plan:  Departure Mode: Ambulatory.    Face to Face Time: 5 min  .    Brenda Cifuentes, CMA

## 2021-05-14 ENCOUNTER — INFUSION THERAPY VISIT (OUTPATIENT)
Dept: INFUSION THERAPY | Facility: CLINIC | Age: 56
End: 2021-05-14
Attending: INTERNAL MEDICINE
Payer: COMMERCIAL

## 2021-05-14 VITALS
OXYGEN SATURATION: 100 % | RESPIRATION RATE: 16 BRPM | HEART RATE: 69 BPM | TEMPERATURE: 98.3 F | SYSTOLIC BLOOD PRESSURE: 128 MMHG | DIASTOLIC BLOOD PRESSURE: 81 MMHG

## 2021-05-14 DIAGNOSIS — M81.0 AGE-RELATED OSTEOPOROSIS WITHOUT CURRENT PATHOLOGICAL FRACTURE: Primary | ICD-10-CM

## 2021-05-14 PROCEDURE — 96374 THER/PROPH/DIAG INJ IV PUSH: CPT

## 2021-05-14 PROCEDURE — 250N000011 HC RX IP 250 OP 636: Performed by: INTERNAL MEDICINE

## 2021-05-14 RX ORDER — ZOLEDRONIC ACID 0.04 MG/ML
4 INJECTION, SOLUTION INTRAVENOUS ONCE
Status: COMPLETED | OUTPATIENT
Start: 2021-05-14 | End: 2021-05-14

## 2021-05-14 RX ORDER — ZOLEDRONIC ACID 0.04 MG/ML
4 INJECTION, SOLUTION INTRAVENOUS ONCE
Status: DISCONTINUED | OUTPATIENT
Start: 2021-05-14 | End: 2021-05-14 | Stop reason: CLARIF

## 2021-05-14 RX ADMIN — ZOLEDRONIC ACID 4 MG: 0.04 INJECTION, SOLUTION INTRAVENOUS at 16:22

## 2021-05-14 ASSESSMENT — PAIN SCALES - GENERAL: PAINLEVEL: NO PAIN (0)

## 2021-05-14 NOTE — PROGRESS NOTES
Infusion Nursing Note:  Jyothi Freitas presents today for zometa  Patient seen by provider today: No   present during visit today: Not Applicable.    Note: N/A.    Intravenous Access:  Peripheral IV placed.    Treatment Conditions:  Lab Results   Component Value Date     10/20/2020                   Lab Results   Component Value Date    POTASSIUM 4.2 10/20/2020           Lab Results   Component Value Date    MAG 2.3 10/20/2020            Lab Results   Component Value Date    CR 0.76 05/13/2021                   Lab Results   Component Value Date    JEFF 9.4 05/13/2021                Lab Results   Component Value Date    BILITOTAL 0.2 10/20/2020           Lab Results   Component Value Date    ALBUMIN 4.2 05/13/2021                    Lab Results   Component Value Date    ALT 25 10/20/2020           Lab Results   Component Value Date    AST 16 10/20/2020       Results reviewed, labs MET treatment parameters, ok to proceed with treatment.      Post Infusion Assessment:  Patient tolerated infusion without incident.  Blood return noted pre and post infusion.  Site patent and intact, free from redness, edema or discomfort.  No evidence of extravasations.  Access discontinued per protocol.       Discharge Plan:   AVS to patient via MYCHART.  Patient will return as prev ashley for next appointment.   Patient discharged in stable condition accompanied by: self.  Departure Mode: Ambulatory.    Huang Pike RN

## 2021-07-02 ENCOUNTER — OFFICE VISIT (OUTPATIENT)
Dept: SURGERY | Facility: CLINIC | Age: 56
End: 2021-07-02
Payer: COMMERCIAL

## 2021-07-02 VITALS
WEIGHT: 135 LBS | OXYGEN SATURATION: 98 % | HEART RATE: 68 BPM | SYSTOLIC BLOOD PRESSURE: 130 MMHG | BODY MASS INDEX: 21.19 KG/M2 | DIASTOLIC BLOOD PRESSURE: 82 MMHG | HEIGHT: 67 IN

## 2021-07-02 DIAGNOSIS — Z17.0 MALIGNANT NEOPLASM OF UPPER-OUTER QUADRANT OF RIGHT BREAST IN FEMALE, ESTROGEN RECEPTOR POSITIVE (H): Primary | ICD-10-CM

## 2021-07-02 DIAGNOSIS — C50.411 MALIGNANT NEOPLASM OF UPPER-OUTER QUADRANT OF RIGHT BREAST IN FEMALE, ESTROGEN RECEPTOR POSITIVE (H): Primary | ICD-10-CM

## 2021-07-02 PROCEDURE — 99213 OFFICE O/P EST LOW 20 MIN: CPT | Performed by: SURGERY

## 2021-07-02 ASSESSMENT — MIFFLIN-ST. JEOR: SCORE: 1234.99

## 2021-07-02 NOTE — PROGRESS NOTES
[facility] Follow Up Note    CHIEF COMPLAINT:  H/o right breast cancer    HISTORY OF PRESENT ILLNESS:  Jyothi originally presented on 10/15/2019 with a mass in her right breast. She was diagnosed with right breast invasive ductal carcinoma, grade 3, ER/MA positive, HER 2 negative pending measuring 2.6cm in size at 10:00, 4cm FN with a right axillary lymph node which is positive for metastatic carcinoma. Jyothi had diagnostic imaging of the right breast due to a palpable lump in the right breast. This revealed an irregularly shaped spiculated mass in the right upper outer breast measuring 2.6cm at 10:00, 4cm FN and several enlarged right axillary lymph nodes. She then had biopsies with the above results. She had a breast MRI which revealed a 2.6cm right breast malignancy and 3 mildly enlarged axillary lymph nodes on the right, PET CT also revealed concerning axillary lymph nodes and the breast lesion. She had a biopsy of a left axillary node which was negative. She underwent neoadjuvant chemotherapy with Dr. Monroe.      She had a lumpectomy with SLNB on 4/1/2020 which revealed a complete pathologic response in the breast and 1/7 axillary nodes with residual cancer.      She is here for one year follow up. She is doing well. She is on letrozole and does have some aches/pains and is considering trying venlafaxine for this. Her cording resolved in the right axilla. She has had seen PT for a virtual visit and has not been noticing any lymphedema but does occasionally have edema in the upper outer breast. She had a breast MRI on 4/9/2021 which was benign.     She has been busy with weddings this year. She has two children and one was  here in [place] and the other will be  in [place] in the fall. She is excited for 4th of July weekend and will spend with [place] [facility]      She had a bilateral mammogram on 10/20/2020 which was benign.     REVIEW  OF SYSTEMS:  Constitutional:  Negative for chills, fatigue, fever and weight change.  Eyes:  Negative for blurred vision, eye pain and photophobia.  ENT:  Negative for hearing problems, ENT pain, congestion, rhinorrhea, epistaxis, hoarseness and dental problems.  Cardiovascular:  Negative for chest pain, palpitations, tachycardia, orthopnea and edema.  Respiratory:  Negative for cough, dyspnea and hemoptysis.  Gastrointestinal:  Negative for abdominal pain, heartburn, constipation, diarrhea and stool changes.  Musculoskeletal:  Negative for arthralgias, back pain and myalgias.  Integumentary/Breast:  See HPI.    Past Medical History:   Diagnosis Date     GERD (gastroesophageal reflux disease)      H/O colonoscopy 03/08/2016    done at [place] and nl     Hematuria 2016    eval at [place] and per pt cysto and ct neg     Intermittent asthma     since childhood     Low iron 10/2017    done for eval of hair loss, egd nl     Malignant neoplasm of upper-outer quadrant of right breast in female, estrogen receptor positive (H)     had chemo, lumpectomy, onc is Dr. Monroe     Migraines     most of adult life, has seen neuro in past, on bblocker     Mild depression (H)      Normal stress echocardiogram 07/2011    due to hear racing     Osteopenia 2008     Other osteoporosis without current pathological fracture 2020    iv zometa every 6 months     Syncope 03/2017    echo nl lv size and fxn, grade 1 dd, mild tr       Past Surgical History:   Procedure Laterality Date     BIOPSY NODE SENTINEL Right 4/1/2020    Procedure: RIGHT SENTINEL LYMPH NODE BIOPSY;  Surgeon: Kaila Hernandez MD;  Location:  OR      TMJ ARTHROSCOPY/SURGERY       ESOPHAGOSCOPY, GASTROSCOPY, DUODENOSCOPY (EGD), COMBINED N/A 10/30/2017    Procedure: COMBINED ESOPHAGOSCOPY, GASTROSCOPY, DUODENOSCOPY (EGD), BIOPSY SINGLE OR MULTIPLE;  COMBINED ESOPHAGOSCOPY, GASTROSCOPY, DUODENOSCOPY (EGD);  Surgeon: Martin Rodriguez MD;  Location:  GI     INSERT PORT  "VASCULAR ACCESS N/A 10/24/2019    Procedure: PORT PLACEMENT;  Surgeon: Kaila Hernandez MD;  Location: [facility] OR     LUMPECTOMY BREAST WITH SEED LOCALIZATION Right 2020    Procedure: SEED LOCALIZED RIGHT BREAST LUMPECTOMY WITH SEED LOCALIZED RIGHT AXILLARY NODE EXCISION;  Surgeon: Kaila Hernandez MD;  Location:  OR     ORTHOPEDIC SURGERY      \"leg surgery\"     REMOVE PORT VASCULAR ACCESS Left 2020    Procedure: REMOVAL, VASCULAR ACCESS PORT;  Surgeon: Kaila Hernandez MD;  Location:  OR     single oopherectomy  2010    cyst       Family History   Problem Relation Age of Onset     Coronary Artery Disease Father 48        MI. and one in his 60s     Emphysema Mother         COPD       Social History     Tobacco Use     Smoking status: Former Smoker     Packs/day: 0.00     Quit date: 3/27/1997     Years since quittin.2     Smokeless tobacco: Never Used   Substance Use Topics     Alcohol use: Yes     Comment: rarely       Patient Active Problem List   Diagnosis     Cervicalgia     Intermittent asthma     Migraines     Osteopenia     Mild depression (H)     Low iron     Breast cancer (H)     Malignant neoplasm of upper-outer quadrant of right breast in female, estrogen receptor positive (H)     Episodic tension-type headache, not intractable     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 Ringing in the ears.     Current Outpatient Medications   Medication Sig Dispense Refill     acetaminophen (TYLENOL) 500 MG tablet Take 1,000 mg by mouth every 6 hours as needed for mild pain       acetaminophen-caffeine (EXCEDRIN TENSION HEADACHE) 500-65 MG TABS Take 2 tablets by mouth every 6 " "hours as needed for mild pain       DULoxetine (CYMBALTA) 20 MG capsule Take 1 capsule (20 mg) by mouth daily 30 capsule 3     gabapentin (NEURONTIN) 100 MG capsule Take 4 capsules (400 mg) by mouth every evening 270 capsule 3     letrozole (FEMARA) 2.5 MG tablet Take 1 tablet (2.5 mg) by mouth daily 90 tablet 3     zoledronic acid (ZOMETA) 4 MG/100ML SOLN Inject 4 mg into the vein every 6 months       zolpidem (AMBIEN) 5 MG tablet Take 1 tablet (5 mg) by mouth nightly as needed for sleep 30 tablet 3     Vitals: /82 (BP Location: Right arm, Patient Position: Sitting, Cuff Size: Adult Regular)   Pulse 68   Ht 1.702 m (5' 7\")   Wt 61.2 kg (135 lb)   LMP 10/07/2017 (Approximate)   SpO2 98%   BMI 21.14 kg/m    BMI= Body mass index is 21.14 kg/m .    EXAM:  GENERAL: healthy, alert and no distress   BREAST:  Mild changes on the right breast c/w radiation. Well healed lumpectomy scar in the upper outer breast and well healed axillary node scar. Mild edema between scars. Remainder of breast exam is benign. Left breast is normal.   There is no axillary or supraclavicular lymphadenopathy.  CARDIOVASCULAR:  RRR  RESPIRATORY: nonlabored breathing  NECK: Neck supple. No adenopathy. Thyroid symmetric, normal size,, Carotids without bruits.  SKIN: No suspicious lesions or rashes  LYMPH: Normal cervical lymph nodes      ASSESSMENT/PLAN:  Jyothi Freitas is s/p right breast lumpectomy and SLNB. Doing well. MRI reviewed in clinic today and no areas of concern. I would recommend continuing to alternate mammogram and MRI for screening (ordered today) as her initial cancer was not seen on mammography. I would like to see her back next year after her MRI for clinical breast exam. She will continue to follow with Dr. Monroe as well.           Kaila Hernandez MD  [facility]  [phone]        Please route or send letter to:  Primary Care Provider (PCP) and Referring Provider      "

## 2021-07-06 NOTE — PROGRESS NOTES
[facility]    Hematology/Oncology Established Patient Follow-up Note      Today's Date: 8/5/21    Reason for Follow-up: Right breast cancer.    Video visit duration: 15 minutes    HISTORY OF PRESENT ILLNESS: Jyothi Freitas is a 55 year old female who presents with the following oncologic history:   1.  10/11/2019: Diagnostic right sided mammogram performed for a palpable lump in the right breast.  This showed an irregularly-shaped spiculated mass in the upper outer right breast with prominent lymph nodes in the right axilla.  Targeted ultrasound of the right upper outer breast showed an irregularly-shaped hypoechoic mass at the 10 o'clock position, 4 cm from the nipple measuring 2.6 x 1.5 x 2.4 cm.  Right axillary ultrasound showed moderately enlarged lymph nodes.  Right breast needle biopsy showed a grade 3 invasive ductal carcinoma with lymphovascular invasion identified, ER strongly positive at 95%, MN strongly positive at 95%, HER-2/juan FISH negative.  Right axillary lymph node measuring 2 cm was positive for metastatic carcinoma.  Note prior 4/2019 mammogram deemed normal.  2.  10/15/2019: Bilateral breast MRI showed known right breast malignancy measuring 2.6 x 1.4 x 2 cm, 3 mildly enlarged right axillary lymph nodes and no contralateral breast malignancy.  3. 10/21/2019 PET scan showed scattered small hypermetabolic bilateral axillary lymph nodes; for example, a hypermetabolic right axillary lymph node measures 1.6 x 0.9 cm with SUV max 3.6.  Hypermetabolic mass in the right breast superiorly and laterally measuring 2.1 x 1.6 cm with SUV max 4.3.  A 0.6 cm low-density lesion in the right hepatic lobe is too small for accurate PET characterization but shows no appreciable hypermetabolic activity.  Incidentally noted ectasia of the ascending thoracic aorta measures 4 cm in diameter.  4.  10/23/2019: Left axillary ultrasound showed benign-appearing lymph node.  Left axillary lymph node biopsy  showed benign lymph node tissue.  5.  10/29/2019: Started neoadjuvant chemotherapy with dose dense Adriamycin and Cytoxan, followed by weekly paclitaxel with completion on 3/12/2020.  6.  3/19/2020: Breast MRI showed no residual enhancement in the right breast mass.  The right axillary lymphadenopathy has resolved.  7. 4/01/2020: Underwent right breast lumpectomy and right axillary sentinel lymph node biopsy under care of Dr. Kaila Hernandez.  Pathology showed fibrocystic change and no evidence of residual carcinoma in the right breast.  Metastatic breast adenocarcinoma to one of 5 lymph node fragments in 1 of 3 lymph nodes excised. Extranodal extension present. ypT0-N1a.  8. 4/29/2020: Started adjuvant anastrazole.  9. 6/15/2020: Completed adjuvant radiation therapy.  10. 6/15/2020: Switched to adjuvant letrozole with some mild improvement in polyathralgias.    INTERIM HISTORY:  Jyothi reports pain through hips and tail bone region.  She has had overall improvement with the use of duloxetine since starting it about a month ago.    She states she has noticed some increase in bruising without identifiable trauma. She has not had any excessive bleeding issues.    REVIEW OF SYSTEMS:   14 point ROS was reviewed and is negative other than as noted above in HPI.       HOME MEDICATIONS:  Current Outpatient Medications   Medication Sig Dispense Refill     acetaminophen (TYLENOL) 500 MG tablet Take 1,000 mg by mouth every 6 hours as needed for mild pain       acetaminophen-caffeine (EXCEDRIN TENSION HEADACHE) 500-65 MG TABS Take 2 tablets by mouth every 6 hours as needed for mild pain       DULoxetine (CYMBALTA) 20 MG capsule Take 1 capsule (20 mg) by mouth daily 30 capsule 3     gabapentin (NEURONTIN) 100 MG capsule Take 4 capsules (400 mg) by mouth every evening 270 capsule 3     letrozole (FEMARA) 2.5 MG tablet Take 1 tablet (2.5 mg) by mouth daily 90 tablet 3     zoledronic acid (ZOMETA) 4 MG/100ML SOLN Inject 4 mg into  the vein every 6 months       zolpidem (AMBIEN) 5 MG tablet Take 1 tablet (5 mg) by mouth nightly as needed for sleep 30 tablet 3         ALLERGIES:  Allergies   Allergen Reactions     Sulfa Drugs Other (See Comments)     Red face. Ringing in the ears.         PAST MEDICAL HISTORY:  Past Medical History:   Diagnosis Date     GERD (gastroesophageal reflux disease)      H/O colonoscopy 03/08/2016    done at [place] and nl     Hematuria 2016    eval at [place] and per pt cysto and ct neg     Intermittent asthma     since childhood     Low iron 10/2017    done for eval of hair loss, egd nl     Malignant neoplasm of upper-outer quadrant of right breast in female, estrogen receptor positive (H)     had chemo, lumpectomy, onc is Dr. Monroe     Migraines     most of adult life, has seen neuro in past, on bblocker     Mild depression (H)      Normal stress echocardiogram 07/2011    due to hear racing     Osteopenia 2008     Other osteoporosis without current pathological fracture 2020    iv zometa every 6 months     Syncope 03/2017    echo nl lv size and fxn, grade 1 dd, mild tr         PAST SURGICAL HISTORY:  Past Surgical History:   Procedure Laterality Date     BIOPSY NODE SENTINEL Right 4/1/2020    Procedure: RIGHT SENTINEL LYMPH NODE BIOPSY;  Surgeon: Kaila Hernandez MD;  Location:  OR     C TMJ ARTHROSCOPY/SURGERY       ESOPHAGOSCOPY, GASTROSCOPY, DUODENOSCOPY (EGD), COMBINED N/A 10/30/2017    Procedure: COMBINED ESOPHAGOSCOPY, GASTROSCOPY, DUODENOSCOPY (EGD), BIOPSY SINGLE OR MULTIPLE;  COMBINED ESOPHAGOSCOPY, GASTROSCOPY, DUODENOSCOPY (EGD);  Surgeon: Martin Rodriguez MD;  Location:  GI     INSERT PORT VASCULAR ACCESS N/A 10/24/2019    Procedure: PORT PLACEMENT;  Surgeon: Kaila Hernandez MD;  Location:  OR     LUMPECTOMY BREAST WITH SEED LOCALIZATION Right 4/1/2020    Procedure: SEED LOCALIZED RIGHT BREAST LUMPECTOMY WITH SEED LOCALIZED RIGHT AXILLARY NODE EXCISION;  Surgeon: Kaila Hernandez MD;  Location:  "[facility] OR     ORTHOPEDIC SURGERY      \"leg surgery\"     REMOVE PORT VASCULAR ACCESS Left 2020    Procedure: REMOVAL, VASCULAR ACCESS PORT;  Surgeon: Kaila Hernandez MD;  Location: [facility] OR     single oopherectomy  2010    cyst         SOCIAL HISTORY:  Social History     Socioeconomic History     Marital status:      Spouse name: Not on file     Number of children: 2     Years of education: Not on file     Highest education level: Not on file   Occupational History     Not on file   Social Needs     Financial resource strain: Not on file     Food insecurity     Worry: Not on file     Inability: Not on file     Transportation needs     Medical: Not on file     Non-medical: Not on file   Tobacco Use     Smoking status: Former Smoker     Packs/day: 0.00     Quit date: 3/27/1997     Years since quittin.2     Smokeless tobacco: Never Used   Substance and Sexual Activity     Alcohol use: Yes     Comment: rarely     Drug use: No     Sexual activity: Yes     Partners: Male     Birth control/protection: Pill   Lifestyle     Physical activity     Days per week: Not on file     Minutes per session: Not on file     Stress: Not on file   Relationships     Social connections     Talks on phone: Not on file     Gets together: Not on file     Attends Taoism service: Not on file     Active member of club or organization: Not on file     Attends meetings of clubs or organizations: Not on file     Relationship status: Not on file     Intimate partner violence     Fear of current or ex partner: Not on file     Emotionally abused: Not on file     Physically abused: Not on file     Forced sexual activity: Not on file   Other Topics Concern     Parent/sibling w/ CABG, MI or angioplasty before 65F 55M? Yes   Social History Narrative     Not on file         FAMILY HISTORY:  Family History   Problem Relation Age of Onset     Coronary Artery Disease Father 48        MI. and one in his 60s     Emphysema Mother         COPD "         PHYSICAL EXAM:  Vital signs:  None taken.  GENERAL: No acute distress.  EYES: No scleral icterus. No overt erythema.  RESPIRATORY: No cough.  No labored breathing.  MUSCULOSKELETAL: Range of motion in the neck, shoulders, and arms appear normal.  SKIN: No overt rashes, discolorations, or lesions over the face and neck.  NEUROLOGIC: Alert.  No overt tremors.  PSYCHIATRIC: Normal affect and mood.  Does not appear anxious.    LABS:  CBC RESULTS: Recent Labs   Lab Test 05/13/21  1538   WBC 6.7   RBC 4.76   HGB 14.0   HCT 42.3   MCV 89   MCH 29.4   MCHC 33.1   RDW 11.9        Last Comprehensive Metabolic Panel:  Sodium   Date Value Ref Range Status   10/20/2020 139 133 - 144 mmol/L Final     Potassium   Date Value Ref Range Status   10/20/2020 4.2 3.4 - 5.3 mmol/L Final     Chloride   Date Value Ref Range Status   10/20/2020 107 94 - 109 mmol/L Final     Carbon Dioxide   Date Value Ref Range Status   10/20/2020 29 20 - 32 mmol/L Final     Anion Gap   Date Value Ref Range Status   10/20/2020 3 3 - 14 mmol/L Final     Glucose   Date Value Ref Range Status   10/20/2020 78 70 - 99 mg/dL Final     Urea Nitrogen   Date Value Ref Range Status   10/20/2020 13 7 - 30 mg/dL Final     Creatinine   Date Value Ref Range Status   05/13/2021 0.76 0.52 - 1.04 mg/dL Final     GFR Estimate   Date Value Ref Range Status   05/13/2021 87 >60 mL/min/[1.73_m2] Final     Comment:     Non  GFR Calc  Starting 12/18/2018, serum creatinine based estimated GFR (eGFR) will be   calculated using the Chronic Kidney Disease Epidemiology Collaboration   (CKD-EPI) equation.       Calcium   Date Value Ref Range Status   05/13/2021 9.4 8.5 - 10.1 mg/dL Final     Bilirubin Total   Date Value Ref Range Status   10/20/2020 0.2 0.2 - 1.3 mg/dL Final     Alkaline Phosphatase   Date Value Ref Range Status   10/20/2020 76 40 - 150 U/L Final     ALT   Date Value Ref Range Status   10/20/2020 25 0 - 50 U/L Final     AST   Date Value  Ref Range Status   10/20/2020 16 0 - 45 U/L Final       PATHOLOGY:  None new since last visit.    IMAGING:  10/13/2020: DEXA scan showed osteoporosis in the lumbar spine and left femoral neck; osteopenia in right femoral neck.    10/20/2020: Mammogram showed no suspicious findings.    4/09/2021: Breast MRI showed no abnormal enhancement or suspicious findings.    ASSESSMENT/PLAN:  Jyothi Freitas is a 55 year old female with the following issues:  1.  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 Polyarthralgias  -Jyothi has no clinical evidence for recurrent breast cancer by history. She had recent physical exam with Dr. Hernandez that was benign on 7/2/2021.  -Jyothi is on letrozole and tolerating this overall better than anastrazole with less polyarthralgias.  -I recommended up to 10 years of letrozole, if tolerated, to maximally reduce her risk of breast cancer recurrence.  -Continue magnesium 100-300 mg daily at night and vitamin D 1000 international unit(s) daily.  -Will continue to alternate mammogram with breast MRI, staggered by 6 months.  Next mammogram due in 10/2021.  -Discussed signs and symptoms to watch for breast cancer recurrence. Would only recommend lab workup and imaging workup if concerning signs/symptoms arise.    3. Osteoporosis  -DEXA scan results from 10/13/2020 showed osteoporosis.  -Recommended adequate calcium and vitamin D and weight-bearing exercise. Also recommended continuing zoledronic acid every 6 months for 6 doses with next dose due 11/1/2021.    -Will repeat DEXA scan in 2022.  -She asked about whether she could visit with a chiropractor.  I told her that this is okay if the practitioner does not perform any aggressive realignments.    4. Depression  -Mood stable even off bupropion.     5.  Insomnia   6.  Hot flashes, drug induced  -Oxybutynin did  help with hot flashes but she had too much dry eye syndrome and fluid retention.   -She has had significant improvement in her hot flashes and insomnia with the use of gabapentin, which she will continue at 400 mg daily.  -She will continue to take Ambien only as needed.     7. Ectasia of thoracic aorta  -This measured 4 cm on prior PET scan.  -Surgical management not needed but she will need ongoing monitoring. She is following with Dr. Silverio Gooden for monitoring of this issue.    8. Peripheral neuropathy  -Overall improved with duloxetine at 20 mg once daily but she does still have some hip pain.    -Will increase to 30 mg daily.    9. Increased bruising  --She only takes NSAIDS rarely.  --Discussed we can check her coagulation factors, von Willdebrand's panel and platelet function testing.  I asked her to stay off NSAIDS for 7-8 days prior to lab draw.     Return in 3-4 months.    Maricarmen Monroe MD  Hematology/Oncology  [facility]    Total time spent: 30 minutes in patient evaluation, counseling, documentation, and coordination of care.

## 2021-08-04 DIAGNOSIS — T45.1X5A PERIPHERAL NEUROPATHY DUE TO CHEMOTHERAPY (H): Primary | ICD-10-CM

## 2021-08-04 DIAGNOSIS — G62.0 PERIPHERAL NEUROPATHY DUE TO CHEMOTHERAPY (H): Primary | ICD-10-CM

## 2021-08-04 RX ORDER — DULOXETIN HYDROCHLORIDE 30 MG/1
30 CAPSULE, DELAYED RELEASE ORAL DAILY
Qty: 30 CAPSULE | Refills: 3 | Status: SHIPPED | OUTPATIENT
Start: 2021-08-04 | End: 2021-08-20 | Stop reason: DRUGHIGH

## 2021-08-05 ENCOUNTER — VIRTUAL VISIT (OUTPATIENT)
Dept: ONCOLOGY | Facility: CLINIC | Age: 56
End: 2021-08-05
Attending: INTERNAL MEDICINE
Payer: COMMERCIAL

## 2021-08-05 DIAGNOSIS — F51.01 PRIMARY INSOMNIA: ICD-10-CM

## 2021-08-05 DIAGNOSIS — M81.0 AGE-RELATED OSTEOPOROSIS WITHOUT CURRENT PATHOLOGICAL FRACTURE: ICD-10-CM

## 2021-08-05 DIAGNOSIS — G62.0 PERIPHERAL NEUROPATHY DUE TO CHEMOTHERAPY (H): ICD-10-CM

## 2021-08-05 DIAGNOSIS — N95.1 MENOPAUSAL SYNDROME (HOT FLASHES): ICD-10-CM

## 2021-08-05 DIAGNOSIS — Z17.0 MALIGNANT NEOPLASM OF UPPER-OUTER QUADRANT OF RIGHT BREAST IN FEMALE, ESTROGEN RECEPTOR POSITIVE (H): Primary | ICD-10-CM

## 2021-08-05 DIAGNOSIS — R23.3 ABNORMAL BRUISING: ICD-10-CM

## 2021-08-05 DIAGNOSIS — C50.411 MALIGNANT NEOPLASM OF UPPER-OUTER QUADRANT OF RIGHT BREAST IN FEMALE, ESTROGEN RECEPTOR POSITIVE (H): Primary | ICD-10-CM

## 2021-08-05 DIAGNOSIS — M25.50 POLYARTHRALGIA: ICD-10-CM

## 2021-08-05 DIAGNOSIS — T45.1X5A PERIPHERAL NEUROPATHY DUE TO CHEMOTHERAPY (H): ICD-10-CM

## 2021-08-05 PROCEDURE — 99214 OFFICE O/P EST MOD 30 MIN: CPT | Mod: 95 | Performed by: INTERNAL MEDICINE

## 2021-08-05 NOTE — LETTER
8/5/2021         RE: Jyothi Freitas  [address]  [place] [address]        Dear Colleague,    Thank you for referring your patient, Jyothi Freitas, to the [facility]. Please see a copy of my visit note below.    [facility]    Hematology/Oncology Established Patient Follow-up Note      Today's Date: 8/5/21    Reason for Follow-up: Right breast cancer.    Video visit duration: 15 minutes    HISTORY OF PRESENT ILLNESS: Jyothi Freitas is a 55 year old female who presents with the following oncologic history:   1.  10/11/2019: Diagnostic right sided mammogram performed for a palpable lump in the right breast.  This showed an irregularly-shaped spiculated mass in the upper outer right breast with prominent lymph nodes in the right axilla.  Targeted ultrasound of the right upper outer breast showed an irregularly-shaped hypoechoic mass at the 10 o'clock position, 4 cm from the nipple measuring 2.6 x 1.5 x 2.4 cm.  Right axillary ultrasound showed moderately enlarged lymph nodes.  Right breast needle biopsy showed a grade 3 invasive ductal carcinoma with lymphovascular invasion identified, ER strongly positive at 95%, ND strongly positive at 95%, HER-2/juan FISH negative.  Right axillary lymph node measuring 2 cm was positive for metastatic carcinoma.  Note prior 4/2019 mammogram deemed normal.  2.  10/15/2019: Bilateral breast MRI showed known right breast malignancy measuring 2.6 x 1.4 x 2 cm, 3 mildly enlarged right axillary lymph nodes and no contralateral breast malignancy.  3. 10/21/2019 PET scan showed scattered small hypermetabolic bilateral axillary lymph nodes; for example, a hypermetabolic right axillary lymph node measures 1.6 x 0.9 cm with SUV max 3.6.  Hypermetabolic mass in the right breast superiorly and laterally measuring 2.1 x 1.6 cm with SUV max 4.3.  A 0.6 cm low-density lesion in the right hepatic lobe is too small for accurate PET characterization  but shows no appreciable hypermetabolic activity.  Incidentally noted ectasia of the ascending thoracic aorta measures 4 cm in diameter.  4.  10/23/2019: Left axillary ultrasound showed benign-appearing lymph node.  Left axillary lymph node biopsy showed benign lymph node tissue.  5.  10/29/2019: Started neoadjuvant chemotherapy with dose dense Adriamycin and Cytoxan, followed by weekly paclitaxel with completion on 3/12/2020.  6.  3/19/2020: Breast MRI showed no residual enhancement in the right breast mass.  The right axillary lymphadenopathy has resolved.  7. 4/01/2020: Underwent right breast lumpectomy and right axillary sentinel lymph node biopsy under care of Dr. Kaila Hernandez.  Pathology showed fibrocystic change and no evidence of residual carcinoma in the right breast.  Metastatic breast adenocarcinoma to one of 5 lymph node fragments in 1 of 3 lymph nodes excised. Extranodal extension present. ypT0-N1a.  8. 4/29/2020: Started adjuvant anastrazole.  9. 6/15/2020: Completed adjuvant radiation therapy.  10. 6/15/2020: Switched to adjuvant letrozole with some mild improvement in polyathralgias.    INTERIM HISTORY:  Jyothi reports pain through hips and tail bone region.  She has had overall improvement with the use of duloxetine since starting it about a month ago.    She states she has noticed some increase in bruising without identifiable trauma. She has not had any excessive bleeding issues.    REVIEW OF SYSTEMS:   14 point ROS was reviewed and is negative other than as noted above in HPI.       HOME MEDICATIONS:  Current Outpatient Medications   Medication Sig Dispense Refill     acetaminophen (TYLENOL) 500 MG tablet Take 1,000 mg by mouth every 6 hours as needed for mild pain       acetaminophen-caffeine (EXCEDRIN TENSION HEADACHE) 500-65 MG TABS Take 2 tablets by mouth every 6 hours as needed for mild pain       DULoxetine (CYMBALTA) 20 MG capsule Take 1 capsule (20 mg) by mouth daily 30 capsule 3      gabapentin (NEURONTIN) 100 MG capsule Take 4 capsules (400 mg) by mouth every evening 270 capsule 3     letrozole (FEMARA) 2.5 MG tablet Take 1 tablet (2.5 mg) by mouth daily 90 tablet 3     zoledronic acid (ZOMETA) 4 MG/100ML SOLN Inject 4 mg into the vein every 6 months       zolpidem (AMBIEN) 5 MG tablet Take 1 tablet (5 mg) by mouth nightly as needed for sleep 30 tablet 3         ALLERGIES:  Allergies   Allergen Reactions     Sulfa Drugs Other (See Comments)     Red face. Ringing in the ears.         PAST MEDICAL HISTORY:  Past Medical History:   Diagnosis Date     GERD (gastroesophageal reflux disease)      H/O colonoscopy 03/08/2016    done at [place] and [facility]     Hematuria 2016    eval at [place] and per pt cysto and ct neg     Intermittent asthma     since childhood     Low iron 10/2017    done for eval of hair loss, [facility]d [facility]     Malignant neoplasm of upper-outer quadrant of right breast in female, estrogen receptor positive (H)     had chemo, lumpectomy, onc is Dr. Monroe     Migraines     most of adult life, has seen neuro in past, on bblocker     Mild depression (H)      Normal stress echocardiogram 07/2011    due to hear racing     Osteopenia 2008     Other osteoporosis without current pathological fracture 2020    iv zometa every 6 months     Syncope 03/2017    echo nl lv size and fxn, grade 1 dd, mild tr         PAST SURGICAL HISTORY:  Past Surgical History:   Procedure Laterality Date     BIOPSY NODE SENTINEL Right 4/1/2020    Procedure: RIGHT SENTINEL LYMPH NODE BIOPSY;  Surgeon: Kaila Hernandez MD;  Location:  OR      TMJ ARTHROSCOPY/SURGERY       ESOPHAGOSCOPY, GASTROSCOPY, DUODENOSCOPY (EGD), COMBINED N/A 10/30/2017    Procedure: COMBINED ESOPHAGOSCOPY, GASTROSCOPY, DUODENOSCOPY (EGD), BIOPSY SINGLE OR MULTIPLE;  COMBINED ESOPHAGOSCOPY, GASTROSCOPY, DUODENOSCOPY (EGD);  Surgeon: Martin Rodriguez MD;  Location:  GI     INSERT PORT VASCULAR ACCESS N/A 10/24/2019    Procedure: PORT PLACEMENT;   "Surgeon: Kaila Hernandez MD;  Location: [facility] OR     LUMPECTOMY BREAST WITH SEED LOCALIZATION Right 2020    Procedure: SEED LOCALIZED RIGHT BREAST LUMPECTOMY WITH SEED LOCALIZED RIGHT AXILLARY NODE EXCISION;  Surgeon: Kaila Hernandez MD;  Location:  OR     ORTHOPEDIC SURGERY      \"leg surgery\"     REMOVE PORT VASCULAR ACCESS Left 2020    Procedure: REMOVAL, VASCULAR ACCESS PORT;  Surgeon: Kaila Hernandez MD;  Location: [facility] OR     single oopherectomy  2010    cyst         SOCIAL HISTORY:  Social History     Socioeconomic History     Marital status:      Spouse name: Not on file     Number of children: 2     Years of education: Not on file     Highest education level: Not on file   Occupational History     Not on file   Social Needs     Financial resource strain: Not on file     Food insecurity     Worry: Not on file     Inability: Not on file     Transportation needs     Medical: Not on file     Non-medical: Not on file   Tobacco Use     Smoking status: Former Smoker     Packs/day: 0.00     Quit date: 3/27/1997     Years since quittin.2     Smokeless tobacco: Never Used   Substance and Sexual Activity     Alcohol use: Yes     Comment: rarely     Drug use: No     Sexual activity: Yes     Partners: Male     Birth control/protection: Pill   Lifestyle     Physical activity     Days per week: Not on file     Minutes per session: Not on file     Stress: Not on file   Relationships     Social connections     Talks on phone: Not on file     Gets together: Not on file     Attends Yazidism service: Not on file     Active member of club or organization: Not on file     Attends meetings of clubs or organizations: Not on file     Relationship status: Not on file     Intimate partner violence     Fear of current or ex partner: Not on file     Emotionally abused: Not on file     Physically abused: Not on file     Forced sexual activity: Not on file   Other Topics Concern     Parent/sibling w/ CABG, MI or " angioplasty before 65F 55M? Yes   Social History Narrative     Not on file         FAMILY HISTORY:  Family History   Problem Relation Age of Onset     Coronary Artery Disease Father 48        MI. and one in his 60s     Emphysema Mother         COPD         PHYSICAL EXAM:  Vital signs:  None taken.  GENERAL: No acute distress.  EYES: No scleral icterus. No overt erythema.  RESPIRATORY: No cough.  No labored breathing.  MUSCULOSKELETAL: Range of motion in the neck, shoulders, and arms appear normal.  SKIN: No overt rashes, discolorations, or lesions over the face and neck.  NEUROLOGIC: Alert.  No overt tremors.  PSYCHIATRIC: Normal affect and mood.  Does not appear anxious.    LABS:  CBC RESULTS: Recent Labs   Lab Test 05/13/21  1538   WBC 6.7   RBC 4.76   HGB 14.0   HCT 42.3   MCV 89   MCH 29.4   MCHC 33.1   RDW 11.9        Last Comprehensive Metabolic Panel:  Sodium   Date Value Ref Range Status   10/20/2020 139 133 - 144 mmol/L Final     Potassium   Date Value Ref Range Status   10/20/2020 4.2 3.4 - 5.3 mmol/L Final     Chloride   Date Value Ref Range Status   10/20/2020 107 94 - 109 mmol/L Final     Carbon Dioxide   Date Value Ref Range Status   10/20/2020 29 20 - 32 mmol/L Final     Anion Gap   Date Value Ref Range Status   10/20/2020 3 3 - 14 mmol/L Final     Glucose   Date Value Ref Range Status   10/20/2020 78 70 - 99 mg/dL Final     Urea Nitrogen   Date Value Ref Range Status   10/20/2020 13 7 - 30 mg/dL Final     Creatinine   Date Value Ref Range Status   05/13/2021 0.76 0.52 - 1.04 mg/dL Final     GFR Estimate   Date Value Ref Range Status   05/13/2021 87 >60 mL/min/[1.73_m2] Final     Comment:     Non  GFR Calc  Starting 12/18/2018, serum creatinine based estimated GFR (eGFR) will be   calculated using the Chronic Kidney Disease Epidemiology Collaboration   (CKD-EPI) equation.       Calcium   Date Value Ref Range Status   05/13/2021 9.4 8.5 - 10.1 mg/dL Final     Bilirubin Total    Date Value Ref Range Status   10/20/2020 0.2 0.2 - 1.3 mg/dL Final     Alkaline Phosphatase   Date Value Ref Range Status   10/20/2020 76 40 - 150 U/L Final     ALT   Date Value Ref Range Status   10/20/2020 25 0 - 50 U/L Final     AST   Date Value Ref Range Status   10/20/2020 16 0 - 45 U/L Final       PATHOLOGY:  None new since last visit.    IMAGING:  10/13/2020: DEXA scan showed osteoporosis in the lumbar spine and left femoral neck; osteopenia in right femoral neck.    10/20/2020: Mammogram showed no suspicious findings.    4/09/2021: Breast MRI showed no abnormal enhancement or suspicious findings.    ASSESSMENT/PLAN:  Jyothi Freitas is a 55 year old female with the following issues:  1.  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 Polyarthralgias  -Jyothi has no clinical evidence for recurrent breast cancer by history. She had recent physical exam with Dr. Hernandez that was benign on 7/2/2021.  -Jyothi is on letrozole and tolerating this overall better than anastrazole with less polyarthralgias.  -I recommended up to 10 years of letrozole, if tolerated, to maximally reduce her risk of breast cancer recurrence.  -Continue magnesium 100-300 mg daily at night and vitamin D 1000 international unit(s) daily.  -Will continue to alternate mammogram with breast MRI, staggered by 6 months.  Next mammogram due in 10/2021.  -Discussed signs and symptoms to watch for breast cancer recurrence. Would only recommend lab workup and imaging workup if concerning signs/symptoms arise.    3. Osteoporosis  -DEXA scan results from 10/13/2020 showed osteoporosis.  -Recommended adequate calcium and vitamin D and weight-bearing exercise. Also recommended continuing zoledronic acid every 6 months for 6 doses with next dose due 11/1/2021.    -Will repeat DEXA scan in 2022.  -She asked about whether  she could visit with a chiropractor.  I told her that this is okay if the practitioner does not perform any aggressive realignments.    4. Depression  -Mood stable even off bupropion.     5.  Insomnia   6.  Hot flashes, drug induced  -Oxybutynin did help with hot flashes but she had too much dry eye syndrome and fluid retention.   -She has had significant improvement in her hot flashes and insomnia with the use of gabapentin, which she will continue at 400 mg daily.  -She will continue to take Ambien only as needed.     7. Ectasia of thoracic aorta  -This measured 4 cm on prior PET scan.  -Surgical management not needed but she will need ongoing monitoring. She is following with Dr. Silverio Gooden for monitoring of this issue.    8. Peripheral neuropathy  -Overall improved with duloxetine at 20 mg once daily but she does still have some hip pain.    -Will increase to 30 mg daily.    9. Increased bruising  --She only takes NSAIDS rarely.  --Discussed we can check her coagulation factors, von Willdebrand's panel and platelet function testing.  I asked her to stay off NSAIDS for 7-8 days prior to lab draw.     Return in 3-4 months.    Maricarmen Monroe MD  Hematology/Oncology  [facility]    Total time spent: 30 minutes in patient evaluation, counseling, documentation, and coordination of care.    Jyothi is a 55 year old who is being evaluated via a billable video visit.      How would you like to obtain your AVS? [facility]     Please send a text message to: [phone], using [employer]    Type of service:  Video Visit    Originating Location (pt. Location): Home    Distant Location (provider location):  [facility]     Platform used for Video Visit: My[employer]      Again, thank you for allowing me to participate in the care of your patient.        Sincerely,        Maricarmen Monroe MD

## 2021-08-05 NOTE — LETTER
8/5/2021         RE: Jyothi Freitas  [address]  [place] [address]        Dear Colleague,    Thank you for referring your patient, Jyothi Freitas, to the [facility]. Please see a copy of my visit note below.    [facility]    Hematology/Oncology Established Patient Follow-up Note      Today's Date: 8/5/21    Reason for Follow-up: Right breast cancer.    Video visit duration: 15 minutes    HISTORY OF PRESENT ILLNESS: Jyothi Freitas is a 55 year old female who presents with the following oncologic history:   1.  10/11/2019: Diagnostic right sided mammogram performed for a palpable lump in the right breast.  This showed an irregularly-shaped spiculated mass in the upper outer right breast with prominent lymph nodes in the right axilla.  Targeted ultrasound of the right upper outer breast showed an irregularly-shaped hypoechoic mass at the 10 o'clock position, 4 cm from the nipple measuring 2.6 x 1.5 x 2.4 cm.  Right axillary ultrasound showed moderately enlarged lymph nodes.  Right breast needle biopsy showed a grade 3 invasive ductal carcinoma with lymphovascular invasion identified, ER strongly positive at 95%, CO strongly positive at 95%, HER-2/juan FISH negative.  Right axillary lymph node measuring 2 cm was positive for metastatic carcinoma.  Note prior 4/2019 mammogram deemed normal.  2.  10/15/2019: Bilateral breast MRI showed known right breast malignancy measuring 2.6 x 1.4 x 2 cm, 3 mildly enlarged right axillary lymph nodes and no contralateral breast malignancy.  3. 10/21/2019 PET scan showed scattered small hypermetabolic bilateral axillary lymph nodes; for example, a hypermetabolic right axillary lymph node measures 1.6 x 0.9 cm with SUV max 3.6.  Hypermetabolic mass in the right breast superiorly and laterally measuring 2.1 x 1.6 cm with SUV max 4.3.  A 0.6 cm low-density lesion in the right hepatic lobe is too small for accurate PET characterization  but shows no appreciable hypermetabolic activity.  Incidentally noted ectasia of the ascending thoracic aorta measures 4 cm in diameter.  4.  10/23/2019: Left axillary ultrasound showed benign-appearing lymph node.  Left axillary lymph node biopsy showed benign lymph node tissue.  5.  10/29/2019: Started neoadjuvant chemotherapy with dose dense Adriamycin and Cytoxan, followed by weekly paclitaxel with completion on 3/12/2020.  6.  3/19/2020: Breast MRI showed no residual enhancement in the right breast mass.  The right axillary lymphadenopathy has resolved.  7. 4/01/2020: Underwent right breast lumpectomy and right axillary sentinel lymph node biopsy under care of Dr. Kaila Hernandez.  Pathology showed fibrocystic change and no evidence of residual carcinoma in the right breast.  Metastatic breast adenocarcinoma to one of 5 lymph node fragments in 1 of 3 lymph nodes excised. Extranodal extension present. ypT0-N1a.  8. 4/29/2020: Started adjuvant anastrazole.  9. 6/15/2020: Completed adjuvant radiation therapy.  10. 6/15/2020: Switched to adjuvant letrozole with some mild improvement in polyathralgias.    INTERIM HISTORY:  Jyothi reports pain through hips and tail bone region.  She has had overall improvement with the use of duloxetine since starting it about a month ago.    She states she has noticed some increase in bruising without identifiable trauma. She has not had any excessive bleeding issues.    REVIEW OF SYSTEMS:   14 point ROS was reviewed and is negative other than as noted above in HPI.       HOME MEDICATIONS:  Current Outpatient Medications   Medication Sig Dispense Refill     acetaminophen (TYLENOL) 500 MG tablet Take 1,000 mg by mouth every 6 hours as needed for mild pain       acetaminophen-caffeine (EXCEDRIN TENSION HEADACHE) 500-65 MG TABS Take 2 tablets by mouth every 6 hours as needed for mild pain       DULoxetine (CYMBALTA) 20 MG capsule Take 1 capsule (20 mg) by mouth daily 30 capsule 3      gabapentin (NEURONTIN) 100 MG capsule Take 4 capsules (400 mg) by mouth every evening 270 capsule 3     letrozole (FEMARA) 2.5 MG tablet Take 1 tablet (2.5 mg) by mouth daily 90 tablet 3     zoledronic acid (ZOMETA) 4 MG/100ML SOLN Inject 4 mg into the vein every 6 months       zolpidem (AMBIEN) 5 MG tablet Take 1 tablet (5 mg) by mouth nightly as needed for sleep 30 tablet 3         ALLERGIES:  Allergies   Allergen Reactions     Sulfa Drugs Other (See Comments)     Red face. Ringing in the ears.         PAST MEDICAL HISTORY:  Past Medical History:   Diagnosis Date     GERD (gastroesophageal reflux disease)      H/O colonoscopy 03/08/2016    done at [facility] and [facility]     Hematuria 2016    eval at [facility] and per pt cysto and ct neg     Intermittent asthma     since childhood     Low iron 10/2017    done for eval of hair loss, [facility]     Malignant neoplasm of upper-outer quadrant of right breast in female, estrogen receptor positive (H)     had chemo, lumpectomy, onc is Dr. Monroe     Migraines     most of adult life, has seen neuro in past, on bblocker     Mild depression (H)      Normal stress echocardiogram 07/2011    due to hear racing     Osteopenia 2008     Other osteoporosis without current pathological fracture 2020    iv zometa every 6 months     Syncope 03/2017    echo nl lv size and fxn, grade 1 dd, mild tr         PAST SURGICAL HISTORY:  Past Surgical History:   Procedure Laterality Date     BIOPSY NODE SENTINEL Right 4/1/2020    Procedure: RIGHT SENTINEL LYMPH NODE BIOPSY;  Surgeon: Kaila Hernandez MD;  Location:  OR      TMJ ARTHROSCOPY/SURGERY       ESOPHAGOSCOPY, GASTROSCOPY, DUODENOSCOPY (EGD), COMBINED N/A 10/30/2017    Procedure: COMBINED ESOPHAGOSCOPY, GASTROSCOPY, DUODENOSCOPY (EGD), BIOPSY SINGLE OR MULTIPLE;  COMBINED ESOPHAGOSCOPY, GASTROSCOPY, DUODENOSCOPY (EGD);  Surgeon: Martin Rodriguez MD;  Location:  GI     INSERT PORT VASCULAR ACCESS N/A 10/24/2019    Procedure: PORT PLACEMENT;   "Surgeon: Kaila Hernandez MD;  Location: [facility] OR     LUMPECTOMY BREAST WITH SEED LOCALIZATION Right 2020    Procedure: SEED LOCALIZED RIGHT BREAST LUMPECTOMY WITH SEED LOCALIZED RIGHT AXILLARY NODE EXCISION;  Surgeon: Kaila Hernandez MD;  Location:  OR     ORTHOPEDIC SURGERY      \"leg surgery\"     REMOVE PORT VASCULAR ACCESS Left 2020    Procedure: REMOVAL, VASCULAR ACCESS PORT;  Surgeon: Kaila Hernandez MD;  Location: [facility] OR     single oopherectomy  2010    cyst         SOCIAL HISTORY:  Social History     Socioeconomic History     Marital status:      Spouse name: Not on file     Number of children: 2     Years of education: Not on file     Highest education level: Not on file   Occupational History     Not on file   Social Needs     Financial resource strain: Not on file     Food insecurity     Worry: Not on file     Inability: Not on file     Transportation needs     Medical: Not on file     Non-medical: Not on file   Tobacco Use     Smoking status: Former Smoker     Packs/day: 0.00     Quit date: 3/27/1997     Years since quittin.2     Smokeless tobacco: Never Used   Substance and Sexual Activity     Alcohol use: Yes     Comment: rarely     Drug use: No     Sexual activity: Yes     Partners: Male     Birth control/protection: Pill   Lifestyle     Physical activity     Days per week: Not on file     Minutes per session: Not on file     Stress: Not on file   Relationships     Social connections     Talks on phone: Not on file     Gets together: Not on file     Attends [employer] service: Not on file     Active member of club or organization: Not on file     Attends meetings of clubs or organizations: Not on file     Relationship status: Not on file     Intimate partner violence     Fear of current or ex partner: Not on file     Emotionally abused: Not on file     Physically abused: Not on file     Forced sexual activity: Not on file   Other Topics Concern     Parent/sibling w/ CABG, MI or " angioplasty before 65F 55M? Yes   Social History Narrative     Not on file         FAMILY HISTORY:  Family History   Problem Relation Age of Onset     Coronary Artery Disease Father 48        MI. and one in his 60s     Emphysema Mother         COPD         PHYSICAL EXAM:  Vital signs:  None taken.  GENERAL: No acute distress.  EYES: No scleral icterus. No overt erythema.  RESPIRATORY: No cough.  No labored breathing.  MUSCULOSKELETAL: Range of motion in the neck, shoulders, and arms appear normal.  SKIN: No overt rashes, discolorations, or lesions over the face and neck.  NEUROLOGIC: Alert.  No overt tremors.  PSYCHIATRIC: Normal affect and mood.  Does not appear anxious.    LABS:  CBC RESULTS: Recent Labs   Lab Test 05/13/21  1538   WBC 6.7   RBC 4.76   HGB 14.0   HCT 42.3   MCV 89   MCH 29.4   MCHC 33.1   RDW 11.9        Last Comprehensive Metabolic Panel:  Sodium   Date Value Ref Range Status   10/20/2020 139 133 - 144 mmol/L Final     Potassium   Date Value Ref Range Status   10/20/2020 4.2 3.4 - 5.3 mmol/L Final     Chloride   Date Value Ref Range Status   10/20/2020 107 94 - 109 mmol/L Final     Carbon Dioxide   Date Value Ref Range Status   10/20/2020 29 20 - 32 mmol/L Final     Anion Gap   Date Value Ref Range Status   10/20/2020 3 3 - 14 mmol/L Final     Glucose   Date Value Ref Range Status   10/20/2020 78 70 - 99 mg/dL Final     Urea Nitrogen   Date Value Ref Range Status   10/20/2020 13 7 - 30 mg/dL Final     Creatinine   Date Value Ref Range Status   05/13/2021 0.76 0.52 - 1.04 mg/dL Final     GFR Estimate   Date Value Ref Range Status   05/13/2021 87 >60 mL/min/[1.73_m2] Final     Comment:     Non  GFR Calc  Starting 12/18/2018, serum creatinine based estimated GFR (eGFR) will be   calculated using the Chronic Kidney Disease Epidemiology Collaboration   (CKD-EPI) equation.       Calcium   Date Value Ref Range Status   05/13/2021 9.4 8.5 - 10.1 mg/dL Final     Bilirubin Total    Date Value Ref Range Status   10/20/2020 0.2 0.2 - 1.3 mg/dL Final     Alkaline Phosphatase   Date Value Ref Range Status   10/20/2020 76 40 - 150 U/L Final     ALT   Date Value Ref Range Status   10/20/2020 25 0 - 50 U/L Final     AST   Date Value Ref Range Status   10/20/2020 16 0 - 45 U/L Final       PATHOLOGY:  None new since last visit.    IMAGING:  10/13/2020: DEXA scan showed osteoporosis in the lumbar spine and left femoral neck; osteopenia in right femoral neck.    10/20/2020: Mammogram showed no suspicious findings.    4/09/2021: Breast MRI showed no abnormal enhancement or suspicious findings.    ASSESSMENT/PLAN:  Jyothi Freitas is a 55 year old female with the following issues:  1.  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 Polyarthralgias  -Jyothi has no clinical evidence for recurrent breast cancer by history. She had recent physical exam with Dr. Hernandez that was benign on 7/2/2021.  -Jyothi is on letrozole and tolerating this overall better than anastrazole with less polyarthralgias.  -I recommended up to 10 years of letrozole, if tolerated, to maximally reduce her risk of breast cancer recurrence.  -Continue magnesium 100-300 mg daily at night and vitamin D 1000 international unit(s) daily.  -Will continue to alternate mammogram with breast MRI, staggered by 6 months.  Next mammogram due in 10/2021.  -Discussed signs and symptoms to watch for breast cancer recurrence. Would only recommend lab workup and imaging workup if concerning signs/symptoms arise.    3. Osteoporosis  -DEXA scan results from 10/13/2020 showed osteoporosis.  -Recommended adequate calcium and vitamin D and weight-bearing exercise. Also recommended continuing zoledronic acid every 6 months for 6 doses with next dose due 11/1/2021.    -Will repeat DEXA scan in 2022.  -She asked about whether  she could visit with a chiropractor.  I told her that this is okay if the practitioner does not perform any aggressive realignments.    4. Depression  -Mood stable even off bupropion.     5.  Insomnia   6.  Hot flashes, drug induced  -Oxybutynin did help with hot flashes but she had too much dry eye syndrome and fluid retention.   -She has had significant improvement in her hot flashes and insomnia with the use of gabapentin, which she will continue at 400 mg daily.  -She will continue to take Ambien only as needed.     7. Ectasia of thoracic aorta  -This measured 4 cm on prior PET scan.  -Surgical management not needed but she will need ongoing monitoring. She is following with Dr. Silverio Gooden for monitoring of this issue.    8. Peripheral neuropathy  -Overall improved with duloxetine at 20 mg once daily but she does still have some hip pain.    -Will increase to 30 mg daily.    9. Increased bruising  --She only takes NSAIDS rarely.  --Discussed we can check her coagulation factors, von Willdebrand's panel and platelet function testing.  I asked her to stay off NSAIDS for 7-8 days prior to lab draw.     Return in 3-4 months.    Maricarmen Monroe MD  Hematology/Oncology  [facility]    Total time spent: 30 minutes in patient evaluation, counseling, documentation, and coordination of care.    Jyothi is a 55 year old who is being evaluated via a billable video visit.      How would you like to obtain your AVS? [email or web address]     Please send a text message to: [phone], using [employer]    Type of service:  Video Visit    Originating Location (pt. Location): Home    Distant Location (provider location):  [facility]     Platform used for Video Visit: [employer]      Again, thank you for allowing me to participate in the care of your patient.        Sincerely,        Maricarmen Monroe MD

## 2021-08-05 NOTE — PROGRESS NOTES
Jyothi is a 55 year old who is being evaluated via a billable video visit.      How would you like to obtain your AVS? [email or web address]     Please send a text message to: [phone], using [employer]    Type of service:  Video Visit    Originating Location (pt. Location): Home    Distant Location (provider location):  [facility]     Platform used for Video Visit: [employer]

## 2021-08-09 DIAGNOSIS — Z17.0 MALIGNANT NEOPLASM OF UPPER-OUTER QUADRANT OF RIGHT BREAST IN FEMALE, ESTROGEN RECEPTOR POSITIVE (H): ICD-10-CM

## 2021-08-09 DIAGNOSIS — C50.411 MALIGNANT NEOPLASM OF UPPER-OUTER QUADRANT OF RIGHT BREAST IN FEMALE, ESTROGEN RECEPTOR POSITIVE (H): ICD-10-CM

## 2021-08-09 DIAGNOSIS — N95.1 MENOPAUSAL SYNDROME (HOT FLASHES): ICD-10-CM

## 2021-08-09 RX ORDER — GABAPENTIN 100 MG/1
400 CAPSULE ORAL EVERY EVENING
Qty: 270 CAPSULE | Refills: 3 | Status: SHIPPED | OUTPATIENT
Start: 2021-08-09 | End: 2021-11-01

## 2021-08-09 RX ORDER — LETROZOLE 2.5 MG/1
2.5 TABLET, FILM COATED ORAL DAILY
Qty: 90 TABLET | Refills: 3 | Status: SHIPPED | OUTPATIENT
Start: 2021-08-09 | End: 2021-11-01

## 2021-08-11 NOTE — PROGRESS NOTES
Patient Education         How to Do Straight-Leg Raises (01:08)  Your health professional recommends that you watch this short online health video. Straight-leg raises strengthen the muscles in the front of your thighs and your hips. Purpose:  Demonstrates how to do straight-leg raises to the front. Goal:  The user will learn how to do straight-leg raises to strengthen the leg, core, and hip muscles. How to watch the video    Scan the QR code   OR Visit the website    [email or web address]   Current as of: December 17, 2020               Content Version: 12.9  © 2006-2021 [employer]. Care instructions adapted under license by [employer] ([facility]). If you have questions about a medical condition or this instruction, always ask your healthcare professional. Jet any warranty or liability for your use of this information. Patient Education         How to Do Heel Raises (00:48)  Your health professional recommends that you watch this short online health video. Heel raises strengthen your lower leg muscles, which help support your knees, ankles, and feet. Purpose:  Demonstrates how to do heel raise exercises. Goal:  The user will learn how to do heel raises to strengthen the calf muscles. How to watch the video    Scan the QR code   OR Visit the website    [email or web address]   Current as of: December 17, 2020               Content Version: 12.9  © 2006-2021 [employer]. Care instructions adapted under license by [employer] ([facility]). If you have questions about a medical condition or this instruction, always ask your healthcare professional. [employer] any warranty or liability for your use of this information. Patient Education         How to Do the Single-Leg Balance Exercise (01:11)  Your health professional recommends that you watch this short online health video.   Single-leg balance exercises improve Writer received a request to have Magnesium level drawn today to R/O low magnesium as the cause of muscle pain.     Jessica Ruiz RN     balance and strengthen the muscles that support the knees. Purpose:  Demonstrates how to do single-leg balance exercises. Goal:  The user will learn how to do the single-leg balance exercise to improve balance and strengthen the leg and core muscles. How to watch the video    Scan the QR code   OR Visit the website    [email or web address]   Current as of: December 17, 2020               Content Version: 12.9  © 2006-2021 [employer]. Care instructions adapted under license by [facility] ([facility]). If you have questions about a medical condition or this instruction, always ask your healthcare professional. [place] [address] any warranty or liability for your use of this information. Patient Education        Muscle Conditioning: Exercises  Introduction  Here are some examples of exercises for muscle conditioning. Start each exercise slowly. Ease off the exercise if you start to have pain. Your doctor or physical therapist will tell you when you can start these exercises and which ones will work best for you. How to do the exercises  Wall push-ups   When you can do this exercise against a wall comfortably (without your muscles feeling tired), you can try it against a counter. Start with 5 repetitions again and work up to 8 to 12. You can then slowly progress to the end of a couch or a sturdy chair, and finally to the floor. 1. Stand facing a wall, about 12 to 18 inches away. 2. Place your hands on the wall at shoulder height. 3. Slowly bend your elbows and bring your face toward the wall, moving your hips and shoulders forward together. 4. Push slowly back to the starting position. 5. Start with 5 repetitions and work up to 8 to 12. 6. Rest for a minute, and repeat the exercise. Knee extension   If this exercise becomes easy, you can add a light weight around your ankle or tie an elastic resistance band to a chair leg and one ankle.   1. While sitting in a chair, straighten one leg and hold while you slowly count to 5. Be sure you do not lock your knee. 2. Repeat 8 to 12 times. 3. Rest for a minute, and repeat the exercise. 4. Do the same exercise with the other leg. Side-lying leg lift   If this exercise becomes easy, you can add a light weight around your ankle or tie an elastic resistance band to both ankles. 1. Lie on your side, with your legs extended. Keep your hips straight up and down during this exercise. Do not let your top hip rock toward the back. Support your head with your hand, and place the other hand on the floor near your waist.  2. Slowly raise your upper leg until it is about in line with your shoulder. Keep your toes pointed forward. 3. Slowly lower your leg to the starting position. 4. Repeat 8 to 12 times. 5. Rest for a minute, and repeat the exercise. 6. Turn to your other side and do the same exercise with your other leg. Shallow standing knee bends   1. Stand with your hands lightly resting on a counter or chair in front of you with your feet shoulder-width apart. 2. Slowly bend your knees so that you squat down just like you were going to sit in a chair. Make sure your knees do not go in front of your toes. 3. Lower yourself about 6 inches. Your heels should remain on the floor at all times. 4. Rise slowly to a standing position. 5. Repeat 8 to 12 times. 6. Rest for a minute, and repeat the exercise. Follow-up care is a key part of your treatment and safety. Be sure to make and go to all appointments, and call your doctor if you are having problems. It's also a good idea to know your test results and keep a list of the medicines you take. Where can you learn more? Go to https://LaunchKeypejbewjacques.import2. org and sign in to your [employer] account. Enter I985 in the FIGS box to learn more about \"Muscle Conditioning: Exercises. \"     If you do not have an account, please click on the \"Sign Up Now\" link.  Current as of: September 10, 2020               Content Version: 12.9  © 6174-2557 [employer]. Care instructions adapted under license by [facility] ([facility]). If you have questions about a medical condition or this instruction, always ask your healthcare professional. [address] any warranty or liability for your use of this information.

## 2021-08-17 ENCOUNTER — HOSPITAL ENCOUNTER (OUTPATIENT)
Facility: CLINIC | Age: 56
End: 2021-08-17
Attending: INTERNAL MEDICINE
Payer: COMMERCIAL

## 2021-08-17 ENCOUNTER — LAB (OUTPATIENT)
Dept: INFUSION THERAPY | Facility: CLINIC | Age: 56
End: 2021-08-17
Attending: INTERNAL MEDICINE
Payer: COMMERCIAL

## 2021-08-17 DIAGNOSIS — R23.3 ABNORMAL BRUISING: Primary | ICD-10-CM

## 2021-08-17 LAB
BASOPHILS # BLD AUTO: 0.1 10E3/UL (ref 0–0.2)
BASOPHILS NFR BLD AUTO: 1 %
CLOSURE TME COLL+EPINEP BLD: 143 SECONDS
EOSINOPHIL # BLD AUTO: 0.2 10E3/UL (ref 0–0.7)
EOSINOPHIL NFR BLD AUTO: 4 %
ERYTHROCYTE [DISTWIDTH] IN BLOOD BY AUTOMATED COUNT: 11.9 % (ref 10–15)
HCT VFR BLD AUTO: 46.8 % (ref 35–47)
HGB BLD-MCNC: 15.3 G/DL (ref 11.7–15.7)
IMM GRANULOCYTES # BLD: 0 10E3/UL
IMM GRANULOCYTES NFR BLD: 0 %
INR PPP: 0.93 (ref 0.85–1.15)
LYMPHOCYTES # BLD AUTO: 1.5 10E3/UL (ref 0.8–5.3)
LYMPHOCYTES NFR BLD AUTO: 31 %
MCH RBC QN AUTO: 28.9 PG (ref 26.5–33)
MCHC RBC AUTO-ENTMCNC: 32.7 G/DL (ref 31.5–36.5)
MCV RBC AUTO: 88 FL (ref 78–100)
MONOCYTES # BLD AUTO: 0.6 10E3/UL (ref 0–1.3)
MONOCYTES NFR BLD AUTO: 13 %
NEUTROPHILS # BLD AUTO: 2.4 10E3/UL (ref 1.6–8.3)
NEUTROPHILS NFR BLD AUTO: 51 %
NRBC # BLD AUTO: 0 10E3/UL
NRBC BLD AUTO-RTO: 0 /100
PLATELET # BLD AUTO: 288 10E3/UL (ref 150–450)
RBC # BLD AUTO: 5.3 10E6/UL (ref 3.8–5.2)
WBC # BLD AUTO: 4.8 10E3/UL (ref 4–11)

## 2021-08-17 PROCEDURE — 85610 PROTHROMBIN TIME: CPT | Performed by: INTERNAL MEDICINE

## 2021-08-17 PROCEDURE — 85576 BLOOD PLATELET AGGREGATION: CPT | Mod: TC | Performed by: INTERNAL MEDICINE

## 2021-08-17 PROCEDURE — 85245 CLOT FACTOR VIII VW RISTOCTN: CPT | Performed by: INTERNAL MEDICINE

## 2021-08-17 PROCEDURE — 36415 COLL VENOUS BLD VENIPUNCTURE: CPT

## 2021-08-17 PROCEDURE — 85004 AUTOMATED DIFF WBC COUNT: CPT | Performed by: INTERNAL MEDICINE

## 2021-08-17 PROCEDURE — 85240 CLOT FACTOR VIII AHG 1 STAGE: CPT

## 2021-08-17 PROCEDURE — 85246 CLOT FACTOR VIII VW ANTIGEN: CPT

## 2021-08-17 PROCEDURE — 85390 FIBRINOLYSINS SCREEN I&R: CPT | Mod: 26 | Performed by: PATHOLOGY

## 2021-08-17 NOTE — PROGRESS NOTES
Medical Assistant Note:  Jyothi Freitas presents today for blood draw.    Patient seen by provider today: No.   present during visit today: Not Applicable.    Concerns: No Concerns.    Procedure:  Lab draw site: lac, Needle type: bf, Gauge: 23.    Post Assessment:  Labs drawn without difficulty: Yes.    Discharge Plan:  Departure Mode: Ambulatory.    Face to Face Time: 5 min.    Brenda Cifuentes, CMA

## 2021-08-18 LAB
FACT VIII ACT/NOR PPP: 98 % (ref 55–200)
VON WILLEBRAND EVAL PPP-IMP: NORMAL
VWF AG ACT/NOR PPP IA: 101 % (ref 50–200)
VWF MULTIMERS PPP IB: NORMAL
VWF:AC ACT/NOR PPP IA: 97 % (ref 50–180)

## 2021-08-20 ENCOUNTER — MYC MEDICAL ADVICE (OUTPATIENT)
Dept: FAMILY MEDICINE | Facility: CLINIC | Age: 56
End: 2021-08-20

## 2021-08-20 DIAGNOSIS — G62.0 PERIPHERAL NEUROPATHY DUE TO CHEMOTHERAPY (H): Primary | ICD-10-CM

## 2021-08-20 DIAGNOSIS — T45.1X5A PERIPHERAL NEUROPATHY DUE TO CHEMOTHERAPY (H): Primary | ICD-10-CM

## 2021-08-20 RX ORDER — DULOXETINE 40 MG/1
40 CAPSULE, DELAYED RELEASE ORAL DAILY
Qty: 30 CAPSULE | Refills: 1 | Status: SHIPPED | OUTPATIENT
Start: 2021-08-20 | End: 2021-09-08 | Stop reason: DRUGHIGH

## 2021-09-04 ENCOUNTER — HEALTH MAINTENANCE LETTER (OUTPATIENT)
Age: 56
End: 2021-09-04

## 2021-09-08 DIAGNOSIS — C77.3 SECONDARY AND UNSPECIFIED MALIGNANT NEOPLASM OF AXILLA AND UPPER LIMB LYMPH NODES (H): Primary | ICD-10-CM

## 2021-09-08 RX ORDER — DULOXETIN HYDROCHLORIDE 60 MG/1
60 CAPSULE, DELAYED RELEASE ORAL DAILY
Qty: 30 CAPSULE | Refills: 1 | Status: SHIPPED | OUTPATIENT
Start: 2021-09-08 | End: 2021-11-01

## 2021-10-15 ENCOUNTER — IMMUNIZATION (OUTPATIENT)
Dept: NURSING | Facility: CLINIC | Age: 56
End: 2021-10-15
Payer: COMMERCIAL

## 2021-10-15 PROCEDURE — 91301 PR COVID VAC MODERNA 100 MCG/0.5 ML IM: CPT

## 2021-10-15 PROCEDURE — 0013A PR COVID VAC MODERNA 100 MCG/0.5 ML IM: CPT

## 2021-11-01 DIAGNOSIS — Z17.0 MALIGNANT NEOPLASM OF UPPER-OUTER QUADRANT OF RIGHT BREAST IN FEMALE, ESTROGEN RECEPTOR POSITIVE (H): ICD-10-CM

## 2021-11-01 DIAGNOSIS — N95.1 MENOPAUSAL SYNDROME (HOT FLASHES): ICD-10-CM

## 2021-11-01 DIAGNOSIS — C50.411 MALIGNANT NEOPLASM OF UPPER-OUTER QUADRANT OF RIGHT BREAST IN FEMALE, ESTROGEN RECEPTOR POSITIVE (H): ICD-10-CM

## 2021-11-01 DIAGNOSIS — C77.3 SECONDARY AND UNSPECIFIED MALIGNANT NEOPLASM OF AXILLA AND UPPER LIMB LYMPH NODES (H): ICD-10-CM

## 2021-11-01 RX ORDER — LETROZOLE 2.5 MG/1
2.5 TABLET, FILM COATED ORAL DAILY
Qty: 90 TABLET | Refills: 3 | Status: SHIPPED | OUTPATIENT
Start: 2021-11-01 | End: 2022-11-09

## 2021-11-01 RX ORDER — GABAPENTIN 100 MG/1
400 CAPSULE ORAL EVERY EVENING
Qty: 270 CAPSULE | Refills: 3 | Status: SHIPPED | OUTPATIENT
Start: 2021-11-01 | End: 2021-12-02

## 2021-11-01 RX ORDER — DULOXETIN HYDROCHLORIDE 60 MG/1
60 CAPSULE, DELAYED RELEASE ORAL DAILY
Qty: 30 CAPSULE | Refills: 1 | Status: SHIPPED | OUTPATIENT
Start: 2021-11-01 | End: 2022-03-01

## 2021-11-18 ENCOUNTER — TELEPHONE (OUTPATIENT)
Dept: ONCOLOGY | Facility: CLINIC | Age: 56
End: 2021-11-18
Payer: COMMERCIAL

## 2021-11-18 NOTE — TELEPHONE ENCOUNTER
Left message for Jyothi that she was set up for an in person visit with Dr. Monroe today at 410 pm, but Dr. Monroe only does virtual visits on Thursdays.  I have changed her appointment to a Video visit but if she would like to be seen in person requested she call back [phone] to reschedule appt.

## 2021-11-23 ENCOUNTER — HOSPITAL ENCOUNTER (OUTPATIENT)
Dept: MAMMOGRAPHY | Facility: CLINIC | Age: 56
Discharge: HOME | End: 2021-11-23
Attending: INTERNAL MEDICINE | Admitting: INTERNAL MEDICINE
Payer: COMMERCIAL

## 2021-11-23 DIAGNOSIS — Z12.31 VISIT FOR SCREENING MAMMOGRAM: ICD-10-CM

## 2021-11-23 PROCEDURE — 77063 BREAST TOMOSYNTHESIS BI: CPT

## 2021-11-25 NOTE — PROGRESS NOTES
Jyothi is a 55 year old who is being evaluated via a billable video visit.  Pt is taking prilosec.     How would you like to obtain your AVS? MyChart  If the video visit is dropped, the invitation should be resent by: Text to cell phone: [phone]  Will anyone else be joining your video visit? [facility]    Hematology/Oncology Established Patient Follow-up Note      Today's Date: 11/18/21    Reason for Follow-up: Right breast cancer.    Video visit duration (Doximity): 21 minutes    HISTORY OF PRESENT ILLNESS: Jyothi Freitas is a 55 year old female who presents with the following oncologic history:   1.  10/11/2019: Diagnostic right sided mammogram performed for a palpable lump in the right breast.  This showed an irregularly-shaped spiculated mass in the upper outer right breast with prominent lymph nodes in the right axilla.  Targeted ultrasound of the right upper outer breast showed an irregularly-shaped hypoechoic mass at the 10 o'clock position, 4 cm from the nipple measuring 2.6 x 1.5 x 2.4 cm.  Right axillary ultrasound showed moderately enlarged lymph nodes.  Right breast needle biopsy showed a grade 3 invasive ductal carcinoma with lymphovascular invasion identified, ER strongly positive at 95%, NV strongly positive at 95%, HER-2/juan FISH negative.  Right axillary lymph node measuring 2 cm was positive for metastatic carcinoma.  Note prior 4/2019 mammogram deemed normal.  2.  10/15/2019: Bilateral breast MRI showed known right breast malignancy measuring 2.6 x 1.4 x 2 cm, 3 mildly enlarged right axillary lymph nodes and no contralateral breast malignancy.  3. 10/21/2019 PET scan showed scattered small hypermetabolic bilateral axillary lymph nodes; for example, a hypermetabolic right axillary lymph node measures 1.6 x 0.9 cm with SUV max 3.6.  Hypermetabolic mass in the right breast superiorly and laterally measuring 2.1 x 1.6 cm with SUV max 4.3.  A 0.6 cm low-density lesion in  the right hepatic lobe is too small for accurate PET characterization but shows no appreciable hypermetabolic activity.  Incidentally noted ectasia of the ascending thoracic aorta measures 4 cm in diameter.  4.  10/23/2019: Left axillary ultrasound showed benign-appearing lymph node.  Left axillary lymph node biopsy showed benign lymph node tissue.  5.  10/29/2019: Started neoadjuvant chemotherapy with dose dense Adriamycin and Cytoxan, followed by weekly paclitaxel with completion on 3/12/2020.  6.  3/19/2020: Breast MRI showed no residual enhancement in the right breast mass.  The right axillary lymphadenopathy has resolved.  7. 4/01/2020: Underwent right breast lumpectomy and right axillary sentinel lymph node biopsy under care of Dr. Kaila Hernandez.  Pathology showed fibrocystic change and no evidence of residual carcinoma in the right breast.  Metastatic breast adenocarcinoma to one of 5 lymph node fragments in 1 of 3 lymph nodes excised. Extranodal extension present. ypT0-N1a.  8. 4/29/2020: Started adjuvant anastrazole.  9. 6/15/2020: Completed adjuvant radiation therapy.  10. 6/15/2020: Switched to adjuvant letrozole with some mild improvement in polyathralgias.    INTERIM HISTORY:  Jyothi reports intermittent arthralgias but quite tolerable. Her neuropathy has improved with the increase in duloxetine dose. She has fatigue during the day. She is sleeping generally well with no more than one waking per night. Hot flashes are minimal and tolerable.      REVIEW OF SYSTEMS:   14 point ROS was reviewed and is negative other than as noted above in HPI.       HOME MEDICATIONS:  Current Outpatient Medications   Medication Sig Dispense Refill     acetaminophen (TYLENOL) 500 MG tablet Take 1,000 mg by mouth every 6 hours as needed for mild pain       acetaminophen-caffeine (EXCEDRIN TENSION HEADACHE) 500-65 MG TABS Take 2 tablets by mouth every 6 hours as needed for mild pain       DULoxetine (CYMBALTA) 60 MG capsule  Take 1 capsule (60 mg) by mouth daily 30 capsule 1     gabapentin (NEURONTIN) 100 MG capsule Take 4 capsules (400 mg) by mouth every evening 270 capsule 3     letrozole (FEMARA) 2.5 MG tablet Take 1 tablet (2.5 mg) by mouth daily 90 tablet 3     zoledronic acid (ZOMETA) 4 MG/100ML SOLN Inject 4 mg into the vein every 6 months       zolpidem (AMBIEN) 5 MG tablet Take 1 tablet (5 mg) by mouth nightly as needed for sleep 30 tablet 3         ALLERGIES:  Allergies   Allergen Reactions     Sulfa Drugs Other (See Comments)     Red face. Ringing in the ears.         PAST MEDICAL HISTORY:  Past Medical History:   Diagnosis Date     GERD (gastroesophageal reflux disease)      H/O colonoscopy 03/08/2016    done at [place] and [facility]     Hematuria 2016    eval at [place] and per pt cysto and ct neg     Intermittent asthma     since childhood     Low iron 10/2017    done for eval of hair loss, [facility]     Malignant neoplasm of upper-outer quadrant of right breast in female, estrogen receptor positive (H)     had chemo, lumpectomy, onc is Dr. Monroe     Migraines     most of adult life, has seen neuro in past, on bblocker     Mild depression (H)      Normal stress echocardiogram 07/2011    due to hear racing     Osteopenia 2008     Other osteoporosis without current pathological fracture 2020    iv zometa every 6 months     Syncope 03/2017    echo [place] lv size and fxn, grade 1 dd, mild tr         PAST SURGICAL HISTORY:  Past Surgical History:   Procedure Laterality Date     BIOPSY NODE SENTINEL Right 4/1/2020    Procedure: RIGHT SENTINEL LYMPH NODE BIOPSY;  Surgeon: Kaila Hernandez MD;  Location:  OR      TMJ ARTHROSCOPY/SURGERY       ESOPHAGOSCOPY, GASTROSCOPY, DUODENOSCOPY (EGD), COMBINED N/A 10/30/2017    Procedure: COMBINED ESOPHAGOSCOPY, GASTROSCOPY, DUODENOSCOPY (EGD), BIOPSY SINGLE OR MULTIPLE;  COMBINED ESOPHAGOSCOPY, GASTROSCOPY, DUODENOSCOPY (EGD);  Surgeon: Martin Rodriguez MD;  Location:  GI     INSERT PORT VASCULAR  "ACCESS N/A 10/24/2019    Procedure: PORT PLACEMENT;  Surgeon: Kaila Hernandez MD;  Location: [facility] OR     LUMPECTOMY BREAST WITH SEED LOCALIZATION Right 2020    Procedure: SEED LOCALIZED RIGHT BREAST LUMPECTOMY WITH SEED LOCALIZED RIGHT AXILLARY NODE EXCISION;  Surgeon: Kaila Hernandez MD;  Location:  OR     ORTHOPEDIC SURGERY      \"leg surgery\"     REMOVE PORT VASCULAR ACCESS Left 2020    Procedure: REMOVAL, VASCULAR ACCESS PORT;  Surgeon: Kaila Hernandez MD;  Location: [facility] OR     single oopherectomy  2010    cyst         SOCIAL HISTORY:  Social History     Socioeconomic History     Marital status:      Spouse name: Not on file     Number of children: 2     Years of education: Not on file     Highest education level: Not on file   Occupational History     Not on file   Tobacco Use     Smoking status: Former Smoker     Packs/day: 0.00     Quit date: 3/27/1997     Years since quittin.6     Smokeless tobacco: Never Used   Substance and Sexual Activity     Alcohol use: Yes     Comment: rarely     Drug use: No     Sexual activity: Yes     Partners: Male     Birth control/protection: Pill   Other Topics Concern     Parent/sibling w/ CABG, MI or angioplasty before 65F 55M? Yes   Social History Narrative     Not on file     Social Determinants of Health     Financial Resource Strain: Not on file   Food Insecurity: Not on file   Transportation Needs: Not on file   Physical Activity: Not on file   Stress: Not on file   Social Connections: Not on file   Intimate Partner Violence: Not on file   Housing Stability: Not on file         FAMILY HISTORY:  Family History   Problem Relation Age of Onset     Coronary Artery Disease Father 48        MI. and one in his 60s     Emphysema Mother         COPD         PHYSICAL EXAM:  Vital signs:  None taken.  GENERAL: No acute distress.  EYES: No scleral icterus. No overt erythema.  RESPIRATORY: No cough.  No labored breathing.  MUSCULOSKELETAL: Range of motion in " the neck, shoulders, and arms appear normal.  SKIN: No overt rashes, discolorations, or lesions over the face and neck.  NEUROLOGIC: Alert.  No overt tremors.  PSYCHIATRIC: Normal affect and mood.  Does not appear anxious.    LABS:  CBC RESULTS: Recent Labs   Lab Test 05/13/21  1538   WBC 6.7   RBC 4.76   HGB 14.0   HCT 42.3   MCV 89   MCH 29.4   MCHC 33.1   RDW 11.9        Last Comprehensive Metabolic Panel:  Sodium   Date Value Ref Range Status   10/20/2020 139 133 - 144 mmol/L Final     Potassium   Date Value Ref Range Status   10/20/2020 4.2 3.4 - 5.3 mmol/L Final     Chloride   Date Value Ref Range Status   10/20/2020 107 94 - 109 mmol/L Final     Carbon Dioxide   Date Value Ref Range Status   10/20/2020 29 20 - 32 mmol/L Final     Anion Gap   Date Value Ref Range Status   10/20/2020 3 3 - 14 mmol/L Final     Glucose   Date Value Ref Range Status   10/20/2020 78 70 - 99 mg/dL Final     Urea Nitrogen   Date Value Ref Range Status   10/20/2020 13 7 - 30 mg/dL Final     Creatinine   Date Value Ref Range Status   05/13/2021 0.76 0.52 - 1.04 mg/dL Final     GFR Estimate   Date Value Ref Range Status   05/13/2021 87 >60 mL/min/[1.73_m2] Final     Comment:     Non  GFR Calc  Starting 12/18/2018, serum creatinine based estimated GFR (eGFR) will be   calculated using the Chronic Kidney Disease Epidemiology Collaboration   (CKD-EPI) equation.       Calcium   Date Value Ref Range Status   05/13/2021 9.4 8.5 - 10.1 mg/dL Final     Bilirubin Total   Date Value Ref Range Status   10/20/2020 0.2 0.2 - 1.3 mg/dL Final     Alkaline Phosphatase   Date Value Ref Range Status   10/20/2020 76 40 - 150 U/L Final     ALT   Date Value Ref Range Status   10/20/2020 25 0 - 50 U/L Final     AST   Date Value Ref Range Status   10/20/2020 16 0 - 45 U/L Final       PATHOLOGY:  None new since last visit.    IMAGING:  10/13/2020: DEXA scan showed osteoporosis in the lumbar spine and left femoral neck; osteopenia in  right femoral neck.    11/23/2021: Mammogram showed heterogeneously dense breast tissue with no suspicious findings.    4/09/2021: Breast MRI showed no abnormal enhancement or suspicious findings.    ASSESSMENT/PLAN:  Jyothi Freitas is a 55 year old female with the following issues:  1.  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 Polyarthralgias  --Jyothi has no clinical evidence for recurrent breast cancer by history or mammogram reviewed from 11/23/2021.  --Jyothi is on letrozole and tolerating this overall better than anastrazole with less polyarthralgias.  --I recommended up to 10 years of letrozole, if tolerated, to maximally reduce her risk of breast cancer recurrence.  --Will continue to alternate mammogram with breast MRI, staggered by 6 months.  --Would only recommend lab workup and imaging workup if concerning signs/symptoms arise.    3. Osteoporosis  --DEXA scan results from 10/13/2020 showed osteoporosis.  --Recommended adequate calcium and vitamin D and weight-bearing exercise. Also recommended continuing zoledronic acid every 6 months for 6 doses, now slightly overdue -- will arrange.    --Will repeat DEXA scan in 2022.     4.  Insomnia   5.  Hot flashes, drug induced  -Oxybutynin did help with hot flashes but she had too much dry eye syndrome and fluid retention.   -She has had significant improvement in her hot flashes and insomnia with the use of gabapentin, which she will continue at 400 mg daily.  -She is no longer using Ambien.    6. Ectasia of thoracic aorta  --This measured 4 cm on prior PET scan.    --Surgical management not needed but she will need ongoing monitoring. She is following with Dr. Silverio Gooden for monitoring of this issue.    7. Peripheral neuropathy  --Hip pain has improved.  --Continue duloxetine at 60 mg once daily.      Return in 4  months.    Maricarmen Monroe MD  Hematology/Oncology  [facility]    Total time spent: 35 minutes in patient evaluation, counseling, documentation, and coordination of care.

## 2021-12-02 ENCOUNTER — VIRTUAL VISIT (OUTPATIENT)
Dept: ONCOLOGY | Facility: CLINIC | Age: 56
End: 2021-12-02
Attending: INTERNAL MEDICINE
Payer: COMMERCIAL

## 2021-12-02 DIAGNOSIS — Z17.0 MALIGNANT NEOPLASM OF UPPER-OUTER QUADRANT OF RIGHT BREAST IN FEMALE, ESTROGEN RECEPTOR POSITIVE (H): Primary | ICD-10-CM

## 2021-12-02 DIAGNOSIS — N95.1 MENOPAUSAL SYNDROME (HOT FLASHES): ICD-10-CM

## 2021-12-02 DIAGNOSIS — T45.1X5A PERIPHERAL NEUROPATHY DUE TO CHEMOTHERAPY (H): ICD-10-CM

## 2021-12-02 DIAGNOSIS — M25.50 POLYARTHRALGIA: ICD-10-CM

## 2021-12-02 DIAGNOSIS — G62.0 PERIPHERAL NEUROPATHY DUE TO CHEMOTHERAPY (H): ICD-10-CM

## 2021-12-02 DIAGNOSIS — F51.01 PRIMARY INSOMNIA: ICD-10-CM

## 2021-12-02 DIAGNOSIS — M81.0 AGE-RELATED OSTEOPOROSIS WITHOUT CURRENT PATHOLOGICAL FRACTURE: ICD-10-CM

## 2021-12-02 DIAGNOSIS — C50.411 MALIGNANT NEOPLASM OF UPPER-OUTER QUADRANT OF RIGHT BREAST IN FEMALE, ESTROGEN RECEPTOR POSITIVE (H): Primary | ICD-10-CM

## 2021-12-02 PROCEDURE — 99214 OFFICE O/P EST MOD 30 MIN: CPT | Mod: 95 | Performed by: INTERNAL MEDICINE

## 2021-12-02 RX ORDER — ZOLEDRONIC ACID 0.04 MG/ML
4 INJECTION, SOLUTION INTRAVENOUS ONCE
Status: CANCELLED | OUTPATIENT
Start: 2021-12-13 | End: 2021-12-02

## 2021-12-02 RX ORDER — GABAPENTIN 100 MG/1
100 CAPSULE ORAL EVERY EVENING
Qty: 90 CAPSULE | Refills: 3 | Status: SHIPPED | OUTPATIENT
Start: 2021-12-02 | End: 2022-08-08

## 2021-12-02 RX ORDER — HEPARIN SODIUM,PORCINE 10 UNIT/ML
5 VIAL (ML) INTRAVENOUS
Status: CANCELLED | OUTPATIENT
Start: 2021-12-13

## 2021-12-02 RX ORDER — GABAPENTIN 300 MG/1
300 CAPSULE ORAL DAILY
Qty: 90 CAPSULE | Refills: 3 | Status: SHIPPED | OUTPATIENT
Start: 2021-12-02 | End: 2022-06-30

## 2021-12-02 RX ORDER — HEPARIN SODIUM (PORCINE) LOCK FLUSH IV SOLN 100 UNIT/ML 100 UNIT/ML
5 SOLUTION INTRAVENOUS
Status: CANCELLED | OUTPATIENT
Start: 2021-12-13

## 2021-12-02 NOTE — LETTER
12/2/2021         RE: Jyothi Freitas  [address]  [place] [address]        Dear Colleague,    Thank you for referring your patient, Jyothi Freitas, to the [facility]. Please see a copy of my visit note below.    Jyothi is a 55 year old who is being evaluated via a billable video visit.  Pt is taking prilosec.     How would you like to obtain your AVS? MyChart  If the video visit is dropped, the invitation should be resent by: Text to cell phone: [phone]  Will anyone else be joining your video visit? No        [facility]    Hematology/Oncology Established Patient Follow-up Note      Today's Date: 11/18/21    Reason for Follow-up: Right breast cancer.    Video visit duration (Doximity): 21 minutes    HISTORY OF PRESENT ILLNESS: Jyothi Freitas is a 55 year old female who presents with the following oncologic history:   1.  10/11/2019: Diagnostic right sided mammogram performed for a palpable lump in the right breast.  This showed an irregularly-shaped spiculated mass in the upper outer right breast with prominent lymph nodes in the right axilla.  Targeted ultrasound of the right upper outer breast showed an irregularly-shaped hypoechoic mass at the 10 o'clock position, 4 cm from the nipple measuring 2.6 x 1.5 x 2.4 cm.  Right axillary ultrasound showed moderately enlarged lymph nodes.  Right breast needle biopsy showed a grade 3 invasive ductal carcinoma with lymphovascular invasion identified, ER strongly positive at 95%, WA strongly positive at 95%, HER-2/juan FISH negative.  Right axillary lymph node measuring 2 cm was positive for metastatic carcinoma.  Note prior 4/2019 mammogram deemed normal.  2.  10/15/2019: Bilateral breast MRI showed known right breast malignancy measuring 2.6 x 1.4 x 2 cm, 3 mildly enlarged right axillary lymph nodes and no contralateral breast malignancy.  3. 10/21/2019 PET scan showed scattered small hypermetabolic bilateral  axillary lymph nodes; for example, a hypermetabolic right axillary lymph node measures 1.6 x 0.9 cm with SUV max 3.6.  Hypermetabolic mass in the right breast superiorly and laterally measuring 2.1 x 1.6 cm with SUV max 4.3.  A 0.6 cm low-density lesion in the right hepatic lobe is too small for accurate PET characterization but shows no appreciable hypermetabolic activity.  Incidentally noted ectasia of the ascending thoracic aorta measures 4 cm in diameter.  4.  10/23/2019: Left axillary ultrasound showed benign-appearing lymph node.  Left axillary lymph node biopsy showed benign lymph node tissue.  5.  10/29/2019: Started neoadjuvant chemotherapy with dose dense Adriamycin and Cytoxan, followed by weekly paclitaxel with completion on 3/12/2020.  6.  3/19/2020: Breast MRI showed no residual enhancement in the right breast mass.  The right axillary lymphadenopathy has resolved.  7. 4/01/2020: Underwent right breast lumpectomy and right axillary sentinel lymph node biopsy under care of Dr. Kaila Hernandez.  Pathology showed fibrocystic change and no evidence of residual carcinoma in the right breast.  Metastatic breast adenocarcinoma to one of 5 lymph node fragments in 1 of 3 lymph nodes excised. Extranodal extension present. ypT0-N1a.  8. 4/29/2020: Started adjuvant anastrazole.  9. 6/15/2020: Completed adjuvant radiation therapy.  10. 6/15/2020: Switched to adjuvant letrozole with some mild improvement in polyathralgias.    INTERIM HISTORY:  Jyothi reports intermittent arthralgias but quite tolerable. Her neuropathy has improved with the increase in duloxetine dose. She has fatigue during the day. She is sleeping generally well with no more than one waking per night. Hot flashes are minimal and tolerable.      REVIEW OF SYSTEMS:   14 point ROS was reviewed and is negative other than as noted above in HPI.       HOME MEDICATIONS:  Current Outpatient Medications   Medication Sig Dispense Refill     acetaminophen  (TYLENOL) 500 MG tablet Take 1,000 mg by mouth every 6 hours as needed for mild pain       acetaminophen-caffeine (EXCEDRIN TENSION HEADACHE) 500-65 MG TABS Take 2 tablets by mouth every 6 hours as needed for mild pain       DULoxetine (CYMBALTA) 60 MG capsule Take 1 capsule (60 mg) by mouth daily 30 capsule 1     gabapentin (NEURONTIN) 100 MG capsule Take 4 capsules (400 mg) by mouth every evening 270 capsule 3     letrozole (FEMARA) 2.5 MG tablet Take 1 tablet (2.5 mg) by mouth daily 90 tablet 3     zoledronic acid (ZOMETA) 4 MG/100ML SOLN Inject 4 mg into the vein every 6 months       zolpidem (AMBIEN) 5 MG tablet Take 1 tablet (5 mg) by mouth nightly as needed for sleep 30 tablet 3         ALLERGIES:  Allergies   Allergen Reactions     Sulfa Drugs Other (See Comments)     Red face. Ringing in the ears.         PAST MEDICAL HISTORY:  Past Medical History:   Diagnosis Date     GERD (gastroesophageal reflux disease)      H/O colonoscopy 03/08/2016    done at [place] and [facility]     Hematuria 2016    eval at [place] and per pt cysto and ct neg     Intermittent asthma     since childhood     Low iron 10/2017    done for eval of hair loss, [facility]d [facility]     Malignant neoplasm of upper-outer quadrant of right breast in female, estrogen receptor positive (H)     had chemo, lumpectomy, onc is Dr. Monroe     Migraines     most of adult life, has seen neuro in past, on bblocker     Mild depression (H)      Normal stress echocardiogram 07/2011    due to hear racing     Osteopenia 2008     Other osteoporosis without current pathological fracture 2020    iv zometa every 6 months     Syncope 03/2017    echo n[facility] lv size and fxn, grade 1 dd, mild tr         PAST SURGICAL HISTORY:  Past Surgical History:   Procedure Laterality Date     BIOPSY NODE SENTINEL Right 4/1/2020    Procedure: RIGHT SENTINEL LYMPH NODE BIOPSY;  Surgeon: Kaila Hernandez MD;  Location: [facility] OR     C TMJ ARTHROSCOPY/SURGERY       ESOPHAGOSCOPY, GASTROSCOPY, DUODENOSCOPY (EGD),  "COMBINED N/A 10/30/2017    Procedure: COMBINED ESOPHAGOSCOPY, GASTROSCOPY, DUODENOSCOPY (EGD), BIOPSY SINGLE OR MULTIPLE;  COMBINED ESOPHAGOSCOPY, GASTROSCOPY, DUODENOSCOPY (EGD);  Surgeon: Martin Rodriguez MD;  Location:  GI     INSERT PORT VASCULAR ACCESS N/A 10/24/2019    Procedure: PORT PLACEMENT;  Surgeon: Kaila Hernandez MD;  Location:  OR     LUMPECTOMY BREAST WITH SEED LOCALIZATION Right 2020    Procedure: SEED LOCALIZED RIGHT BREAST LUMPECTOMY WITH SEED LOCALIZED RIGHT AXILLARY NODE EXCISION;  Surgeon: Kaila Hernandez MD;  Location:  OR     ORTHOPEDIC SURGERY      \"leg surgery\"     REMOVE PORT VASCULAR ACCESS Left 2020    Procedure: REMOVAL, VASCULAR ACCESS PORT;  Surgeon: Kaila Hernandez MD;  Location:  OR     single oopherectomy  2010    cyst         SOCIAL HISTORY:  Social History     Socioeconomic History     Marital status:      Spouse name: Not on file     Number of children: 2     Years of education: Not on file     Highest education level: Not on file   Occupational History     Not on file   Tobacco Use     Smoking status: Former Smoker     Packs/day: 0.00     Quit date: 3/27/1997     Years since quittin.6     Smokeless tobacco: Never Used   Substance and Sexual Activity     Alcohol use: Yes     Comment: rarely     Drug use: No     Sexual activity: Yes     Partners: Male     Birth control/protection: Pill   Other Topics Concern     Parent/sibling w/ CABG, MI or angioplasty before 65F 55M? Yes   Social History Narrative     Not on file     Social Determinants of Health     Financial Resource Strain: Not on file   Food Insecurity: Not on file   Transportation Needs: Not on file   Physical Activity: Not on file   Stress: Not on file   Social Connections: Not on file   Intimate Partner Violence: Not on file   Housing Stability: Not on file         FAMILY HISTORY:  Family History   Problem Relation Age of Onset     Coronary Artery Disease Father 48        MI. and " one in his 60s     Emphysema Mother         COPD         PHYSICAL EXAM:  Vital signs:  None taken.  GENERAL: No acute distress.  EYES: No scleral icterus. No overt erythema.  RESPIRATORY: No cough.  No labored breathing.  MUSCULOSKELETAL: Range of motion in the neck, shoulders, and arms appear normal.  SKIN: No overt rashes, discolorations, or lesions over the face and neck.  NEUROLOGIC: Alert.  No overt tremors.  PSYCHIATRIC: Normal affect and mood.  Does not appear anxious.    LABS:  CBC RESULTS: Recent Labs   Lab Test 05/13/21  1538   WBC 6.7   RBC 4.76   HGB 14.0   HCT 42.3   MCV 89   MCH 29.4   MCHC 33.1   RDW 11.9        Last Comprehensive Metabolic Panel:  Sodium   Date Value Ref Range Status   10/20/2020 139 133 - 144 mmol/L Final     Potassium   Date Value Ref Range Status   10/20/2020 4.2 3.4 - 5.3 mmol/L Final     Chloride   Date Value Ref Range Status   10/20/2020 107 94 - 109 mmol/L Final     Carbon Dioxide   Date Value Ref Range Status   10/20/2020 29 20 - 32 mmol/L Final     Anion Gap   Date Value Ref Range Status   10/20/2020 3 3 - 14 mmol/L Final     Glucose   Date Value Ref Range Status   10/20/2020 78 70 - 99 mg/dL Final     Urea Nitrogen   Date Value Ref Range Status   10/20/2020 13 7 - 30 mg/dL Final     Creatinine   Date Value Ref Range Status   05/13/2021 0.76 0.52 - 1.04 mg/dL Final     GFR Estimate   Date Value Ref Range Status   05/13/2021 87 >60 mL/min/[1.73_m2] Final     Comment:     Non  GFR Calc  Starting 12/18/2018, serum creatinine based estimated GFR (eGFR) will be   calculated using the Chronic Kidney Disease Epidemiology Collaboration   (CKD-EPI) equation.       Calcium   Date Value Ref Range Status   05/13/2021 9.4 8.5 - 10.1 mg/dL Final     Bilirubin Total   Date Value Ref Range Status   10/20/2020 0.2 0.2 - 1.3 mg/dL Final     Alkaline Phosphatase   Date Value Ref Range Status   10/20/2020 76 40 - 150 U/L Final     ALT   Date Value Ref Range Status    10/20/2020 25 0 - 50 U/L Final     AST   Date Value Ref Range Status   10/20/2020 16 0 - 45 U/L Final       PATHOLOGY:  None new since last visit.    IMAGING:  10/13/2020: DEXA scan showed osteoporosis in the lumbar spine and left femoral neck; osteopenia in right femoral neck.    11/23/2021: Mammogram showed heterogeneously dense breast tissue with no suspicious findings.    4/09/2021: Breast MRI showed no abnormal enhancement or suspicious findings.    ASSESSMENT/PLAN:  Jyothi Freitas is a 55 year old female with the following issues:  1.  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 Polyarthralgias  --Jyothi has no clinical evidence for recurrent breast cancer by history or mammogram reviewed from 11/23/2021.  --Jyothi is on letrozole and tolerating this overall better than anastrazole with less polyarthralgias.  --I recommended up to 10 years of letrozole, if tolerated, to maximally reduce her risk of breast cancer recurrence.  --Will continue to alternate mammogram with breast MRI, staggered by 6 months.  --Would only recommend lab workup and imaging workup if concerning signs/symptoms arise.    3. Osteoporosis  --DEXA scan results from 10/13/2020 showed osteoporosis.  --Recommended adequate calcium and vitamin D and weight-bearing exercise. Also recommended continuing zoledronic acid every 6 months for 6 doses, now slightly overdue -- will arrange.    --Will repeat DEXA scan in 2022.     4.  Insomnia   5.  Hot flashes, drug induced  -Oxybutynin did help with hot flashes but she had too much dry eye syndrome and fluid retention.   -She has had significant improvement in her hot flashes and insomnia with the use of gabapentin, which she will continue at 400 mg daily.  -She is no longer using Ambien.    6. Ectasia of thoracic aorta  --This measured 4 cm on prior PET scan.     --Surgical management not needed but she will need ongoing monitoring. She is following with Dr. Silverio Gooden for monitoring of this issue.    7. Peripheral neuropathy  --Hip pain has improved.  --Continue duloxetine at 60 mg once daily.      Return in 4 months.    Maricarmen Monroe MD  Hematology/Oncology  [facility]    Total time spent: 35 minutes in patient evaluation, counseling, documentation, and coordination of care.      Again, thank you for allowing me to participate in the care of your patient.        Sincerely,        Maricarmen Monroe MD

## 2021-12-02 NOTE — LETTER
12/2/2021         RE: Jyothi Freitas  [address]  [place] [address]        Dear Colleague,    Thank you for referring your patient, Jyothi Freitas, to the [facility]. Please see a copy of my visit note below.    Jyothi is a 55 year old who is being evaluated via a billable video visit.  Pt is taking prilosec.     How would you like to obtain your AVS? MyChart  If the video visit is dropped, the invitation should be resent by: Text to cell phone: [phone]  Will anyone else be joining your video visit? No        [facility]    Hematology/Oncology Established Patient Follow-up Note      Today's Date: 11/18/21    Reason for Follow-up: Right breast cancer.    Video visit duration (Doximity): 21 minutes    HISTORY OF PRESENT ILLNESS: Jyothi Freitas is a 55 year old female who presents with the following oncologic history:   1.  10/11/2019: Diagnostic right sided mammogram performed for a palpable lump in the right breast.  This showed an irregularly-shaped spiculated mass in the upper outer right breast with prominent lymph nodes in the right axilla.  Targeted ultrasound of the right upper outer breast showed an irregularly-shaped hypoechoic mass at the 10 o'clock position, 4 cm from the nipple measuring 2.6 x 1.5 x 2.4 cm.  Right axillary ultrasound showed moderately enlarged lymph nodes.  Right breast needle biopsy showed a grade 3 invasive ductal carcinoma with lymphovascular invasion identified, ER strongly positive at 95%, NC strongly positive at 95%, HER-2/juan FISH negative.  Right axillary lymph node measuring 2 cm was positive for metastatic carcinoma.  Note prior 4/2019 mammogram deemed normal.  2.  10/15/2019: Bilateral breast MRI showed known right breast malignancy measuring 2.6 x 1.4 x 2 cm, 3 mildly enlarged right axillary lymph nodes and no contralateral breast malignancy.  3. 10/21/2019 PET scan showed scattered small hypermetabolic bilateral  axillary lymph nodes; for example, a hypermetabolic right axillary lymph node measures 1.6 x 0.9 cm with SUV max 3.6.  Hypermetabolic mass in the right breast superiorly and laterally measuring 2.1 x 1.6 cm with SUV max 4.3.  A 0.6 cm low-density lesion in the right hepatic lobe is too small for accurate PET characterization but shows no appreciable hypermetabolic activity.  Incidentally noted ectasia of the ascending thoracic aorta measures 4 cm in diameter.  4.  10/23/2019: Left axillary ultrasound showed benign-appearing lymph node.  Left axillary lymph node biopsy showed benign lymph node tissue.  5.  10/29/2019: Started neoadjuvant chemotherapy with dose dense Adriamycin and Cytoxan, followed by weekly paclitaxel with completion on 3/12/2020.  6.  3/19/2020: Breast MRI showed no residual enhancement in the right breast mass.  The right axillary lymphadenopathy has resolved.  7. 4/01/2020: Underwent right breast lumpectomy and right axillary sentinel lymph node biopsy under care of Dr. Kaila Hernandez.  Pathology showed fibrocystic change and no evidence of residual carcinoma in the right breast.  Metastatic breast adenocarcinoma to one of 5 lymph node fragments in 1 of 3 lymph nodes excised. Extranodal extension present. ypT0-N1a.  8. 4/29/2020: Started adjuvant anastrazole.  9. 6/15/2020: Completed adjuvant radiation therapy.  10. 6/15/2020: Switched to adjuvant letrozole with some mild improvement in polyathralgias.    INTERIM HISTORY:  Jyothi reports intermittent arthralgias but quite tolerable. Her neuropathy has improved with the increase in duloxetine dose. She has fatigue during the day. She is sleeping generally well with no more than one waking per night. Hot flashes are minimal and tolerable.      REVIEW OF SYSTEMS:   14 point ROS was reviewed and is negative other than as noted above in HPI.       HOME MEDICATIONS:  Current Outpatient Medications   Medication Sig Dispense Refill     acetaminophen  (TYLENOL) 500 MG tablet Take 1,000 mg by mouth every 6 hours as needed for mild pain       acetaminophen-caffeine (EXCEDRIN TENSION HEADACHE) 500-65 MG TABS Take 2 tablets by mouth every 6 hours as needed for mild pain       DULoxetine (CYMBALTA) 60 MG capsule Take 1 capsule (60 mg) by mouth daily 30 capsule 1     gabapentin (NEURONTIN) 100 MG capsule Take 4 capsules (400 mg) by mouth every evening 270 capsule 3     letrozole (FEMARA) 2.5 MG tablet Take 1 tablet (2.5 mg) by mouth daily 90 tablet 3     zoledronic acid (ZOMETA) 4 MG/100ML SOLN Inject 4 mg into the vein every 6 months       zolpidem (AMBIEN) 5 MG tablet Take 1 tablet (5 mg) by mouth nightly as needed for sleep 30 tablet 3         ALLERGIES:  Allergies   Allergen Reactions     Sulfa Drugs Other (See Comments)     Red face. Ringing in the ears.         PAST MEDICAL HISTORY:  Past Medical History:   Diagnosis Date     GERD (gastroesophageal reflux disease)      H/O colonoscopy 03/08/2016    done at [place] and nl     Hematuria 2016    eval at [facility] [place] and per pt cysto and ct neg     Intermittent asthma     since childhood     Low iron 10/2017    done for eval of hair loss, egd nl     Malignant neoplasm of upper-outer quadrant of right breast in female, estrogen receptor positive (H)     had chemo, lumpectomy, onc is Dr. Monroe     Migraines     most of adult life, has seen neuro in past, on bblocker     Mild depression (H)      Normal stress echocardiogram 07/2011    due to hear racing     Osteopenia 2008     Other osteoporosis without current pathological fracture 2020    iv zometa every 6 months     Syncope 03/2017    echo nl lv size and fxn, grade 1 dd, mild tr         PAST SURGICAL HISTORY:  Past Surgical History:   Procedure Laterality Date     BIOPSY NODE SENTINEL Right 4/1/2020    Procedure: RIGHT SENTINEL LYMPH NODE BIOPSY;  Surgeon: aKila Hernandez MD;  Location: [facility] OR     C TMJ ARTHROSCOPY/SURGERY       ESOPHAGOSCOPY, GASTROSCOPY, DUODENOSCOPY (EGD),  "COMBINED N/A 10/30/2017    Procedure: COMBINED ESOPHAGOSCOPY, GASTROSCOPY, DUODENOSCOPY (EGD), BIOPSY SINGLE OR MULTIPLE;  COMBINED ESOPHAGOSCOPY, GASTROSCOPY, DUODENOSCOPY (EGD);  Surgeon: Martin Rodriguez MD;  Location:  GI     INSERT PORT VASCULAR ACCESS N/A 10/24/2019    Procedure: PORT PLACEMENT;  Surgeon: Kalia Hernandez MD;  Location:  OR     LUMPECTOMY BREAST WITH SEED LOCALIZATION Right 2020    Procedure: SEED LOCALIZED RIGHT BREAST LUMPECTOMY WITH SEED LOCALIZED RIGHT AXILLARY NODE EXCISION;  Surgeon: Kaila Hernandez MD;  Location:  OR     ORTHOPEDIC SURGERY      \"leg surgery\"     REMOVE PORT VASCULAR ACCESS Left 2020    Procedure: REMOVAL, VASCULAR ACCESS PORT;  Surgeon: Kaila Hernandez MD;  Location:  OR     single oopherectomy  2010    cyst         SOCIAL HISTORY:  Social History     Socioeconomic History     Marital status:      Spouse name: Not on file     Number of children: 2     Years of education: Not on file     Highest education level: Not on file   Occupational History     Not on file   Tobacco Use     Smoking status: Former Smoker     Packs/day: 0.00     Quit date: 3/27/1997     Years since quittin.6     Smokeless tobacco: Never Used   Substance and Sexual Activity     Alcohol use: Yes     Comment: rarely     Drug use: No     Sexual activity: Yes     Partners: Male     Birth control/protection: Pill   Other Topics Concern     Parent/sibling w/ CABG, MI or angioplasty before 65F 55M? Yes   Social History Narrative     Not on file     Social Determinants of Health     Financial Resource Strain: Not on file   Food Insecurity: Not on file   Transportation Needs: Not on file   Physical Activity: Not on file   Stress: Not on file   Social Connections: Not on file   Intimate Partner Violence: Not on file   Housing Stability: Not on file         FAMILY HISTORY:  Family History   Problem Relation Age of Onset     Coronary Artery Disease Father 48        MI. and " one in his 60s     Emphysema Mother         COPD         PHYSICAL EXAM:  Vital signs:  None taken.  GENERAL: No acute distress.  EYES: No scleral icterus. No overt erythema.  RESPIRATORY: No cough.  No labored breathing.  MUSCULOSKELETAL: Range of motion in the neck, shoulders, and arms appear normal.  SKIN: No overt rashes, discolorations, or lesions over the face and neck.  NEUROLOGIC: Alert.  No overt tremors.  PSYCHIATRIC: Normal affect and mood.  Does not appear anxious.    LABS:  CBC RESULTS: Recent Labs   Lab Test 05/13/21  1538   WBC 6.7   RBC 4.76   HGB 14.0   HCT 42.3   MCV 89   MCH 29.4   MCHC 33.1   RDW 11.9        Last Comprehensive Metabolic Panel:  Sodium   Date Value Ref Range Status   10/20/2020 139 133 - 144 mmol/L Final     Potassium   Date Value Ref Range Status   10/20/2020 4.2 3.4 - 5.3 mmol/L Final     Chloride   Date Value Ref Range Status   10/20/2020 107 94 - 109 mmol/L Final     Carbon Dioxide   Date Value Ref Range Status   10/20/2020 29 20 - 32 mmol/L Final     Anion Gap   Date Value Ref Range Status   10/20/2020 3 3 - 14 mmol/L Final     Glucose   Date Value Ref Range Status   10/20/2020 78 70 - 99 mg/dL Final     Urea Nitrogen   Date Value Ref Range Status   10/20/2020 13 7 - 30 mg/dL Final     Creatinine   Date Value Ref Range Status   05/13/2021 0.76 0.52 - 1.04 mg/dL Final     GFR Estimate   Date Value Ref Range Status   05/13/2021 87 >60 mL/min/[1.73_m2] Final     Comment:     Non  GFR Calc  Starting 12/18/2018, serum creatinine based estimated GFR (eGFR) will be   calculated using the Chronic Kidney Disease Epidemiology Collaboration   (CKD-EPI) equation.       Calcium   Date Value Ref Range Status   05/13/2021 9.4 8.5 - 10.1 mg/dL Final     Bilirubin Total   Date Value Ref Range Status   10/20/2020 0.2 0.2 - 1.3 mg/dL Final     Alkaline Phosphatase   Date Value Ref Range Status   10/20/2020 76 40 - 150 U/L Final     ALT   Date Value Ref Range Status    10/20/2020 25 0 - 50 U/L Final     AST   Date Value Ref Range Status   10/20/2020 16 0 - 45 U/L Final       PATHOLOGY:  None new since last visit.    IMAGING:  10/13/2020: DEXA scan showed osteoporosis in the lumbar spine and left femoral neck; osteopenia in right femoral neck.    11/23/2021: Mammogram showed heterogeneously dense breast tissue with no suspicious findings.    4/09/2021: Breast MRI showed no abnormal enhancement or suspicious findings.    ASSESSMENT/PLAN:  Jyothi Freitas is a 55 year old female with the following issues:  1.  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 Polyarthralgias  --Jyothi has no clinical evidence for recurrent breast cancer by history or mammogram reviewed from 11/23/2021.  --Jyothi is on letrozole and tolerating this overall better than anastrazole with less polyarthralgias.  --I recommended up to 10 years of letrozole, if tolerated, to maximally reduce her risk of breast cancer recurrence.  --Will continue to alternate mammogram with breast MRI, staggered by 6 months.  --Would only recommend lab workup and imaging workup if concerning signs/symptoms arise.    3. Osteoporosis  --DEXA scan results from 10/13/2020 showed osteoporosis.  --Recommended adequate calcium and vitamin D and weight-bearing exercise. Also recommended continuing zoledronic acid every 6 months for 6 doses, now slightly overdue -- will arrange.    --Will repeat DEXA scan in 2022.     4.  Insomnia   5.  Hot flashes, drug induced  -Oxybutynin did help with hot flashes but she had too much dry eye syndrome and fluid retention.   -She has had significant improvement in her hot flashes and insomnia with the use of gabapentin, which she will continue at 400 mg daily.  -She is no longer using Ambien.    6. Ectasia of thoracic aorta  --This measured 4 cm on prior PET scan.     --Surgical management not needed but she will need ongoing monitoring. She is following with Dr. Silverio Gooden for monitoring of this issue.    7. Peripheral neuropathy  --Hip pain has improved.  --Continue duloxetine at 60 mg once daily.      Return in 4 months.    Maricarmen Monroe MD  Hematology/Oncology  [facility]    Total time spent: 35 minutes in patient evaluation, counseling, documentation, and coordination of care.      Again, thank you for allowing me to participate in the care of your patient.        Sincerely,        Maricarmen Monroe MD

## 2021-12-02 NOTE — PATIENT INSTRUCTIONS
1. Arrange for Zometa within next 1-2 weeks.  2. RTC MD in 4 months.  3. Arrange for breast MRI in 6 months.

## 2021-12-13 ENCOUNTER — INFUSION THERAPY VISIT (OUTPATIENT)
Dept: INFUSION THERAPY | Facility: CLINIC | Age: 56
End: 2021-12-13
Attending: INTERNAL MEDICINE
Payer: COMMERCIAL

## 2021-12-13 VITALS
SYSTOLIC BLOOD PRESSURE: 152 MMHG | TEMPERATURE: 97.9 F | DIASTOLIC BLOOD PRESSURE: 93 MMHG | HEART RATE: 72 BPM | OXYGEN SATURATION: 99 % | RESPIRATION RATE: 16 BRPM

## 2021-12-13 DIAGNOSIS — M81.0 AGE-RELATED OSTEOPOROSIS WITHOUT CURRENT PATHOLOGICAL FRACTURE: Primary | ICD-10-CM

## 2021-12-13 LAB
ALBUMIN SERPL-MCNC: 3.9 G/DL (ref 3.4–5)
CALCIUM SERPL-MCNC: 9.8 MG/DL (ref 8.5–10.1)
CREAT SERPL-MCNC: 0.74 MG/DL (ref 0.52–1.04)
GFR SERPL CREATININE-BSD FRML MDRD: >90 ML/MIN/1.73M2

## 2021-12-13 PROCEDURE — G0008 ADMIN INFLUENZA VIRUS VAC: HCPCS | Performed by: INTERNAL MEDICINE

## 2021-12-13 PROCEDURE — 90682 RIV4 VACC RECOMBINANT DNA IM: CPT | Performed by: INTERNAL MEDICINE

## 2021-12-13 PROCEDURE — 250N000011 HC RX IP 250 OP 636: Performed by: INTERNAL MEDICINE

## 2021-12-13 PROCEDURE — 82565 ASSAY OF CREATININE: CPT | Performed by: INTERNAL MEDICINE

## 2021-12-13 PROCEDURE — 82040 ASSAY OF SERUM ALBUMIN: CPT | Performed by: INTERNAL MEDICINE

## 2021-12-13 PROCEDURE — 36415 COLL VENOUS BLD VENIPUNCTURE: CPT | Performed by: INTERNAL MEDICINE

## 2021-12-13 PROCEDURE — 96374 THER/PROPH/DIAG INJ IV PUSH: CPT

## 2021-12-13 PROCEDURE — 82310 ASSAY OF CALCIUM: CPT | Performed by: INTERNAL MEDICINE

## 2021-12-13 RX ORDER — ZOLEDRONIC ACID 0.04 MG/ML
4 INJECTION, SOLUTION INTRAVENOUS ONCE
Status: COMPLETED | OUTPATIENT
Start: 2021-12-13 | End: 2021-12-13

## 2021-12-13 RX ADMIN — ZOLEDRONIC ACID 4 MG: 0.04 INJECTION, SOLUTION INTRAVENOUS at 09:53

## 2021-12-13 RX ADMIN — INFLUENZA A VIRUS A/WISCONSIN/588/2019 (H1N1) RECOMBINANT HEMAGGLUTININ ANTIGEN, INFLUENZA A VIRUS A/TASMANIA/503/2020 (H3N2) RECOMBINANT HEMAGGLUTININ ANTIGEN, INFLUENZA B VIRUS B/WASHINGTON/02/2019 RECOMBINANT HEMAGGLUTININ ANTIGEN, AND INFLUENZA B VIRUS B/PHUKET/3073/2013 RECOMBINANT HEMAGGLUTININ ANTIGEN 0.5 ML: 45; 45; 45; 45 INJECTION INTRAMUSCULAR at 10:01

## 2021-12-13 ASSESSMENT — PAIN SCALES - GENERAL: PAINLEVEL: NO PAIN (0)

## 2021-12-13 NOTE — PROGRESS NOTES
Infusion Nursing Note:  Jyothi Freitas presents today for labs/zometa.    Patient seen by provider today: No   present during visit today: Not Applicable.    Note: N/A.      Intravenous Access:  Labs drawn without difficulty.  Peripheral IV placed.    Treatment Conditions:  Lab Results   Component Value Date     10/20/2020    POTASSIUM 4.2 10/20/2020    MAG 2.3 10/20/2020    CR 0.74 12/13/2021    JEFF 9.8 12/13/2021    BILITOTAL 0.2 10/20/2020    ALBUMIN 3.9 12/13/2021    ALT 25 10/20/2020    AST 16 10/20/2020     Results reviewed, labs MET treatment parameters, ok to proceed with treatment.      Post Infusion Assessment:  Patient tolerated infusion without incident.  Blood return noted pre and post infusion.  Site patent and intact, free from redness, edema or discomfort.  No evidence of extravasations.  Access discontinued per protocol.       Discharge Plan:   AVS to patient via MYCHART.  Patient will return as prev ashley for next appointment.   Patient discharged in stable condition accompanied by: self.  Departure Mode: Ambulatory.      Huang Pike RN

## 2022-01-04 ENCOUNTER — OFFICE VISIT (OUTPATIENT)
Dept: SURGERY | Facility: CLINIC | Age: 57
End: 2022-01-04
Payer: COMMERCIAL

## 2022-01-04 DIAGNOSIS — Z98.890 S/P LUMPECTOMY, RIGHT BREAST: Primary | ICD-10-CM

## 2022-01-04 PROCEDURE — 99213 OFFICE O/P EST LOW 20 MIN: CPT | Performed by: SURGERY

## 2022-01-04 NOTE — PROGRESS NOTES
[facility] Follow Up Note    CHIEF COMPLAINT:  H/o right breast cancer    HISTORY OF PRESENT ILLNESS: Jyothi originally presented on 10/15/2019 with a mass in her right breast. She was diagnosed with right breast invasive ductal carcinoma, grade 3, ER/VT positive, HER 2 negative pending measuring 2.6cm in size at 10:00, 4cm FN with a right axillary lymph node which is positive for metastatic carcinoma. Jyothi had diagnostic imaging of the right breast due to a palpable lump in the right breast. This revealed an irregularly shaped spiculated mass in the right upper outer breast measuring 2.6cm at 10:00, 4cm FN and several enlarged right axillary lymph nodes. She then had biopsies with the above results. She had a breast MRI which revealed a 2.6cm right breast malignancy and 3 mildly enlarged axillary lymph nodes on the right, PET CT also revealed concerning axillary lymph nodes and the breast lesion. She had a biopsy of a left axillary node which was negative. She underwent neoadjuvant chemotherapy with Dr. Monroe.      She had a lumpectomy with SLNB on 4/1/2020 which revealed a complete pathologic response in the breast and 1/7 axillary nodes with residual cancer.     She had a screening mammogram on 11/23/2021 which was benign. She reports she is doing well. Her son and daughter were both  in 2021 and weddings went well. She has no pain in the breast or axilla. She did ask about if PET for screening is done as she has had a friend recently be diagnosed with metastatic breast cancer.       REVIEW OF SYSTEMS:  Constitutional:  Negative for chills, fatigue, fever and weight change.  Eyes:  Negative for blurred vision, eye pain and photophobia.  ENT:  Negative for hearing problems, ENT pain, congestion, rhinorrhea, epistaxis, hoarseness and dental problems.  Cardiovascular:  Negative for chest pain, palpitations, tachycardia, orthopnea and edema.  Respiratory:  Negative for cough, dyspnea and  "hemoptysis.  Gastrointestinal:  Negative for abdominal pain, heartburn, constipation, diarrhea and stool changes.  Musculoskeletal:  Negative for arthralgias, back pain and myalgias.  Integumentary/Breast:  See HPI.    Past Medical History:   Diagnosis Date     GERD (gastroesophageal reflux disease)      H/O colonoscopy 03/08/2016    done at [place] and nl     Hematuria 2016    eval at [facility] and per pt cysto and ct neg     Intermittent asthma     since childhood     Low iron 10/2017    done for eval of hair loss, egd nl     Malignant neoplasm of upper-outer quadrant of right breast in female, estrogen receptor positive (H)     had chemo, lumpectomy, onc is Dr. Mnoroe     Migraines     most of adult life, has seen neuro in past, on bblocker     Mild depression (H)      Normal stress echocardiogram 07/2011    due to hear racing     Osteopenia 2008     Other osteoporosis without current pathological fracture 2020    iv zometa every 6 months     Syncope 03/2017    echo nl lv size and fxn, grade 1 dd, mild tr       Past Surgical History:   Procedure Laterality Date     BIOPSY NODE SENTINEL Right 4/1/2020    Procedure: RIGHT SENTINEL LYMPH NODE BIOPSY;  Surgeon: Kaila Hernandez MD;  Location:  OR     ESOPHAGOSCOPY, GASTROSCOPY, DUODENOSCOPY (EGD), COMBINED N/A 10/30/2017    Procedure: COMBINED ESOPHAGOSCOPY, GASTROSCOPY, DUODENOSCOPY (EGD), BIOPSY SINGLE OR MULTIPLE;  COMBINED ESOPHAGOSCOPY, GASTROSCOPY, DUODENOSCOPY (EGD);  Surgeon: Martin Rodriguez MD;  Location:  GI     INSERT PORT VASCULAR ACCESS N/A 10/24/2019    Procedure: PORT PLACEMENT;  Surgeon: Kaila Hernandez MD;  Location:  OR     LUMPECTOMY BREAST WITH SEED LOCALIZATION Right 4/1/2020    Procedure: SEED LOCALIZED RIGHT BREAST LUMPECTOMY WITH SEED LOCALIZED RIGHT AXILLARY NODE EXCISION;  Surgeon: Kaila Hernandez MD;  Location:  OR     ORTHOPEDIC SURGERY      \"leg surgery\"     REMOVE PORT VASCULAR ACCESS Left 4/1/2020    Procedure: REMOVAL, " VASCULAR ACCESS PORT;  Surgeon: Kaila Hernandez MD;  Location: [facility] OR     single oopherectomy  2010    cyst     ZZC TMJ ARTHROSCOPY/SURGERY         Family History   Problem Relation Age of Onset     Coronary Artery Disease Father 48        MI. and one in his 60s     Emphysema Mother         COPD       Social History     Tobacco Use     Smoking status: Former Smoker     Packs/day: 0.00     Quit date: 3/27/1997     Years since quittin.7     Smokeless tobacco: Never Used   Substance Use Topics     Alcohol use: Yes     Comment: rarely       Patient Active Problem List   Diagnosis     Cervicalgia     Intermittent asthma     Migraines     Osteopenia     Mild depression (H)     Low iron     Breast cancer (H)     Malignant neoplasm of upper-outer quadrant of right breast in female, estrogen receptor positive (H)     Episodic tension-type headache, not intractable     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 Ringing in the ears.     Current Outpatient Medications   Medication Sig Dispense Refill     acetaminophen (TYLENOL) 500 MG tablet Take 1,000 mg by mouth every 6 hours as needed for mild pain       acetaminophen-caffeine (EXCEDRIN TENSION HEADACHE) 500-65 MG TABS Take 2 tablets by mouth every 6 hours as needed for mild pain       DULoxetine (CYMBALTA) 60 MG capsule Take 1 capsule (60 mg) by mouth daily 30 capsule 1     gabapentin (NEURONTIN) 100 MG capsule Take 1 capsule (100 mg) by mouth every evening 90 capsule 3     gabapentin (NEURONTIN) 300 MG capsule Take 1 capsule (300 mg) by mouth daily 90 capsule 3     letrozole (FEMARA) 2.5 MG tablet Take 1 tablet (2.5 mg) by mouth daily 90  tablet 3     zoledronic acid (ZOMETA) 4 MG/100ML SOLN Inject 4 mg into the vein every 6 months        Vitals: LMP 10/07/2017 (Approximate)   BMI= There is no height or weight on file to calculate BMI.    EXAM:  GENERAL: healthy, alert and no distress   BREAST:  The breasts appear symmetric with no overlying skin changes.  Right breast lumpectomy incision on the upper outer breast is well healed. There is mild breast edema just medial to the incision. Minimal right breast radiation changes. The nipples are normal bilaterally.  There is no dimpling or thickening of the skin.  No mass is appreciated in either breast.  The breast tissue is generally dense.  There is no axillary or supraclavicular lymphadenopathy.  CARDIOVASCULAR:  RRR  RESPIRATORY: nonlabored breathing  NECK: Neck supple. No adenopathy. Thyroid symmetric, normal size,, Carotids without bruits.  SKIN: No suspicious lesions or rashes  LYMPH: Normal cervical lymph nodes      ASSESSMENT/PLAN:  Jyothi Freitas is s/p right breast lumpectomy and SLNB. Her clinical exam is benign. we reviewed her mammogram from 11/23 which is also benign. I would recommend continuing to alternate mammogram and MRI for screening as her initial cancer was not seen on mammography. I would like to see her back next year after her mammogram in November 2022 for clinical breast exam. She will continue to follow with Dr. Monroe as well.     Kaila Hernandez MD  [facility]  [phone]        Please route or send letter to:  Primary Care Provider (PCP) and Referring Provider

## 2022-02-19 ENCOUNTER — HEALTH MAINTENANCE LETTER (OUTPATIENT)
Age: 57
End: 2022-02-19

## 2022-03-01 DIAGNOSIS — G62.0 PERIPHERAL NEUROPATHY DUE TO CHEMOTHERAPY (H): ICD-10-CM

## 2022-03-01 DIAGNOSIS — C50.411 MALIGNANT NEOPLASM OF UPPER-OUTER QUADRANT OF RIGHT BREAST IN FEMALE, ESTROGEN RECEPTOR POSITIVE (H): ICD-10-CM

## 2022-03-01 DIAGNOSIS — T45.1X5A PERIPHERAL NEUROPATHY DUE TO CHEMOTHERAPY (H): ICD-10-CM

## 2022-03-01 DIAGNOSIS — C77.3 SECONDARY AND UNSPECIFIED MALIGNANT NEOPLASM OF AXILLA AND UPPER LIMB LYMPH NODES (H): ICD-10-CM

## 2022-03-01 DIAGNOSIS — N95.1 MENOPAUSAL SYNDROME (HOT FLASHES): Primary | ICD-10-CM

## 2022-03-01 DIAGNOSIS — Z17.0 MALIGNANT NEOPLASM OF UPPER-OUTER QUADRANT OF RIGHT BREAST IN FEMALE, ESTROGEN RECEPTOR POSITIVE (H): ICD-10-CM

## 2022-03-01 RX ORDER — DULOXETIN HYDROCHLORIDE 60 MG/1
60 CAPSULE, DELAYED RELEASE ORAL DAILY
Qty: 30 CAPSULE | Refills: 1 | Status: SHIPPED | OUTPATIENT
Start: 2022-03-01 | End: 2022-03-04

## 2022-03-03 DIAGNOSIS — C50.411 MALIGNANT NEOPLASM OF UPPER-OUTER QUADRANT OF RIGHT BREAST IN FEMALE, ESTROGEN RECEPTOR POSITIVE (H): ICD-10-CM

## 2022-03-03 DIAGNOSIS — Z17.0 MALIGNANT NEOPLASM OF UPPER-OUTER QUADRANT OF RIGHT BREAST IN FEMALE, ESTROGEN RECEPTOR POSITIVE (H): ICD-10-CM

## 2022-03-03 DIAGNOSIS — C77.3 SECONDARY AND UNSPECIFIED MALIGNANT NEOPLASM OF AXILLA AND UPPER LIMB LYMPH NODES (H): ICD-10-CM

## 2022-03-03 DIAGNOSIS — N95.1 MENOPAUSAL SYNDROME (HOT FLASHES): ICD-10-CM

## 2022-03-04 RX ORDER — DULOXETIN HYDROCHLORIDE 60 MG/1
CAPSULE, DELAYED RELEASE ORAL
Qty: 30 CAPSULE | Refills: 1 | Status: SHIPPED | OUTPATIENT
Start: 2022-03-04 | End: 2022-04-23

## 2022-03-18 RX ORDER — GABAPENTIN 100 MG/1
100 CAPSULE ORAL 3 TIMES DAILY
Qty: 20 CAPSULE | Refills: 0 | Status: SHIPPED | OUTPATIENT
Start: 2022-03-18 | End: 2022-05-16

## 2022-03-23 RX ORDER — DULOXETIN HYDROCHLORIDE 60 MG/1
60 CAPSULE, DELAYED RELEASE ORAL DAILY
Qty: 30 CAPSULE | Refills: 1 | Status: SHIPPED | OUTPATIENT
Start: 2022-03-23 | End: 2022-06-20

## 2022-04-16 DIAGNOSIS — C50.411 MALIGNANT NEOPLASM OF UPPER-OUTER QUADRANT OF RIGHT BREAST IN FEMALE, ESTROGEN RECEPTOR POSITIVE (H): ICD-10-CM

## 2022-04-16 DIAGNOSIS — Z17.0 MALIGNANT NEOPLASM OF UPPER-OUTER QUADRANT OF RIGHT BREAST IN FEMALE, ESTROGEN RECEPTOR POSITIVE (H): ICD-10-CM

## 2022-04-16 DIAGNOSIS — C77.3 SECONDARY AND UNSPECIFIED MALIGNANT NEOPLASM OF AXILLA AND UPPER LIMB LYMPH NODES (H): ICD-10-CM

## 2022-04-16 DIAGNOSIS — N95.1 MENOPAUSAL SYNDROME (HOT FLASHES): ICD-10-CM

## 2022-04-23 RX ORDER — DULOXETIN HYDROCHLORIDE 60 MG/1
CAPSULE, DELAYED RELEASE ORAL
Qty: 30 CAPSULE | Refills: 1 | Status: SHIPPED | OUTPATIENT
Start: 2022-04-23 | End: 2022-08-18

## 2022-05-06 NOTE — PROGRESS NOTES
[facility]    Hematology/Oncology Established Patient Follow-up Note      Today's Date: 5/16/2022    Reason for Follow-up: Right breast cancer.    HISTORY OF PRESENT ILLNESS: Jyothi Freitas is a 56 year old female who presents with the following oncologic history:   1.  10/11/2019: Diagnostic right sided mammogram performed for a palpable lump in the right breast.  This showed an irregularly-shaped spiculated mass in the upper outer right breast with prominent lymph nodes in the right axilla.  Targeted ultrasound of the right upper outer breast showed an irregularly-shaped hypoechoic mass at the 10 o'clock position, 4 cm from the nipple measuring 2.6 x 1.5 x 2.4 cm.  Right axillary ultrasound showed moderately enlarged lymph nodes.  Right breast needle biopsy showed a grade 3 invasive ductal carcinoma with lymphovascular invasion identified, ER strongly positive at 95%, MO strongly positive at 95%, HER-2/juan FISH negative.  Right axillary lymph node measuring 2 cm was positive for metastatic carcinoma.  Note prior 4/2019 mammogram deemed normal.  2.  10/15/2019: Bilateral breast MRI showed known right breast malignancy measuring 2.6 x 1.4 x 2 cm, 3 mildly enlarged right axillary lymph nodes and no contralateral breast malignancy.  3. 10/21/2019 PET scan showed scattered small hypermetabolic bilateral axillary lymph nodes; for example, a hypermetabolic right axillary lymph node measures 1.6 x 0.9 cm with SUV max 3.6.  Hypermetabolic mass in the right breast superiorly and laterally measuring 2.1 x 1.6 cm with SUV max 4.3.  A 0.6 cm low-density lesion in the right hepatic lobe is too small for accurate PET characterization but shows no appreciable hypermetabolic activity.  Incidentally noted ectasia of the ascending thoracic aorta measures 4 cm in diameter.  4.  10/23/2019: Left axillary ultrasound showed benign-appearing lymph node.  Left axillary lymph node biopsy showed benign lymph node tissue.  5.   10/29/2019: Started neoadjuvant chemotherapy with dose dense Adriamycin and Cytoxan, followed by weekly paclitaxel with completion on 3/12/2020.  6.  3/19/2020: Breast MRI showed no residual enhancement in the right breast mass.  The right axillary lymphadenopathy has resolved.  7. 4/01/2020: Underwent right breast lumpectomy and right axillary sentinel lymph node biopsy under care of Dr. Kaila Hernandez.  Pathology showed fibrocystic change and no evidence of residual carcinoma in the right breast.  Metastatic breast adenocarcinoma to one of 5 lymph node fragments in 1 of 3 lymph nodes excised. Extranodal extension present. ypT0-N1a.  8. 4/29/2020: Started adjuvant anastrazole.  9. 6/15/2020: Completed adjuvant radiation therapy.  10. 6/15/2020: Switched to adjuvant letrozole with some mild improvement in polyathralgias.    INTERIM HISTORY:  Jyothi reports fatigue. She has occasional stiffness. Her neuropathy has significantly improved with the increase in duloxetine dose. She is sleeping generally well. Hot flashes are minimal and tolerable.      REVIEW OF SYSTEMS:   14 point ROS was reviewed and is negative other than as noted above in HPI.       HOME MEDICATIONS:  Current Outpatient Medications   Medication Sig Dispense Refill     acetaminophen (TYLENOL) 500 MG tablet Take 1,000 mg by mouth every 6 hours as needed for mild pain       acetaminophen-caffeine (EXCEDRIN TENSION HEADACHE) 500-65 MG TABS Take 2 tablets by mouth every 6 hours as needed for mild pain       DULoxetine (CYMBALTA) 60 MG capsule TAKE 1 CAPSULE BY MOUTH EVERY DAY 30 capsule 1     DULoxetine (CYMBALTA) 60 MG capsule Take 1 capsule (60 mg) by mouth daily 30 capsule 1     gabapentin (NEURONTIN) 100 MG capsule Take 1 capsule (100 mg) by mouth every evening 90 capsule 3     gabapentin (NEURONTIN) 300 MG capsule Take 1 capsule (300 mg) by mouth daily 90 capsule 3     letrozole (FEMARA) 2.5 MG tablet Take 1 tablet (2.5 mg) by mouth daily 90 tablet 3  "    zoledronic acid (ZOMETA) 4 MG/100ML SOLN Inject 4 mg into the vein every 6 months            ALLERGIES:  Allergies   Allergen Reactions     Sulfa Drugs Other (See Comments)     Red face. Ringing in the ears.         PAST MEDICAL HISTORY:  Past Medical History:   Diagnosis Date     GERD (gastroesophageal reflux disease)      H/O colonoscopy 03/08/2016    done at [facility] and nl     Hematuria 2016    eval at [facility] and per pt cysto and ct neg     Intermittent asthma     since childhood     Low iron 10/2017    done for eval of hair loss, egd nl     Malignant neoplasm of upper-outer quadrant of right breast in female, estrogen receptor positive (H)     had chemo, lumpectomy, onc is Dr. Monroe     Migraines     most of adult life, has seen neuro in past, on bblocker     Mild depression (H)      Normal stress echocardiogram 07/2011    due to hear racing     Osteopenia 2008     Other osteoporosis without current pathological fracture 2020    iv zometa every 6 months     Syncope 03/2017    echo nl lv size and fxn, grade 1 dd, mild tr         PAST SURGICAL HISTORY:  Past Surgical History:   Procedure Laterality Date     BIOPSY NODE SENTINEL Right 4/1/2020    Procedure: RIGHT SENTINEL LYMPH NODE BIOPSY;  Surgeon: Kaila Hernandez MD;  Location:  OR     ESOPHAGOSCOPY, GASTROSCOPY, DUODENOSCOPY (EGD), COMBINED N/A 10/30/2017    Procedure: COMBINED ESOPHAGOSCOPY, GASTROSCOPY, DUODENOSCOPY (EGD), BIOPSY SINGLE OR MULTIPLE;  COMBINED ESOPHAGOSCOPY, GASTROSCOPY, DUODENOSCOPY (EGD);  Surgeon: Martin Rodriguez MD;  Location:  GI     INSERT PORT VASCULAR ACCESS N/A 10/24/2019    Procedure: PORT PLACEMENT;  Surgeon: Kaila Hernandez MD;  Location:  OR     LUMPECTOMY BREAST WITH SEED LOCALIZATION Right 4/1/2020    Procedure: SEED LOCALIZED RIGHT BREAST LUMPECTOMY WITH SEED LOCALIZED RIGHT AXILLARY NODE EXCISION;  Surgeon: Kaila Hernandez MD;  Location:  OR     ORTHOPEDIC SURGERY      \"leg surgery\"     REMOVE PORT VASCULAR " "ACCESS Left 2020    Procedure: REMOVAL, VASCULAR ACCESS PORT;  Surgeon: Kaila Hernandez MD;  Location: [facility] OR     single oopherectomy  2010    cyst     ZZC TMJ ARTHROSCOPY/SURGERY           SOCIAL HISTORY:  Social History     Socioeconomic History     Marital status:      Spouse name: Not on file     Number of children: 2     Years of education: Not on file     Highest education level: Not on file   Occupational History     Not on file   Tobacco Use     Smoking status: Former Smoker     Packs/day: 0.00     Quit date: 3/27/1997     Years since quittin.1     Smokeless tobacco: Never Used   Substance and Sexual Activity     Alcohol use: Yes     Comment: rarely     Drug use: No     Sexual activity: Yes     Partners: Male     Birth control/protection: Pill   Other Topics Concern     Parent/sibling w/ CABG, MI or angioplasty before 65F 55M? Yes   Social History Narrative     Not on file     Social Determinants of Health     Financial Resource Strain: Not on file   Food Insecurity: Not on file   Transportation Needs: Not on file   Physical Activity: Not on file   Stress: Not on file   Social Connections: Not on file   Intimate Partner Violence: Not on file   Housing Stability: Not on file         FAMILY HISTORY:  Family History   Problem Relation Age of Onset     Coronary Artery Disease Father 48        MI. and one in his 60s     Emphysema Mother         COPD         PHYSICAL EXAM:  Vital signs:  BP (!) 150/88   Pulse 74   Temp 98  F (36.7  C) (Oral)   Resp 16   Ht 1.702 m (5' 7\")   Wt 65.7 kg (144 lb 12.8 oz)   LMP 10/07/2017 (Approximate)   SpO2 99%   BMI 22.68 kg/m    GENERAL/CONSTITUTIONAL: No acute distress.  EYES: No erythema or scleral icterus.  LYMPH: No cervical, supraclavicular, axillary, epitroclear adenopathy.   BREAST: The left breast droops lower than right breast. Nodular scar tissue above the right lumpectomy incision. Nipples are everted bilaterally with no discharge. No erythema, " ulceration, or dimpling of the skin.  RESPIRATORY: No audible cough or wheezing.  GASTROINTESTINAL: No hepatosplenomegaly, masses, or tenderness. No guarding.  No distention.  MUSCULOSKELETAL: Warm and well-perfused, no cyanosis, clubbing, or edema.  NEUROLOGIC: No focal motor deficits. Alert, oriented, answers questions appropriately.  INTEGUMENTARY: No rashes or jaundice.  GAIT: Steady, does not use assistive device    LABS:  CBC RESULTS: Recent Labs   Lab Test 05/13/21  1538   WBC 6.7   RBC 4.76   HGB 14.0   HCT 42.3   MCV 89   MCH 29.4   MCHC 33.1   RDW 11.9        Last Comprehensive Metabolic Panel:  Sodium   Date Value Ref Range Status   10/20/2020 139 133 - 144 mmol/L Final     Potassium   Date Value Ref Range Status   10/20/2020 4.2 3.4 - 5.3 mmol/L Final     Chloride   Date Value Ref Range Status   10/20/2020 107 94 - 109 mmol/L Final     Carbon Dioxide   Date Value Ref Range Status   10/20/2020 29 20 - 32 mmol/L Final     Anion Gap   Date Value Ref Range Status   10/20/2020 3 3 - 14 mmol/L Final     Glucose   Date Value Ref Range Status   10/20/2020 78 70 - 99 mg/dL Final     Urea Nitrogen   Date Value Ref Range Status   10/20/2020 13 7 - 30 mg/dL Final     Creatinine   Date Value Ref Range Status   12/13/2021 0.74 0.52 - 1.04 mg/dL Final   05/13/2021 0.76 0.52 - 1.04 mg/dL Final     GFR Estimate   Date Value Ref Range Status   12/13/2021 >90 >60 mL/min/1.73m2 Final     Comment:     As of July 11, 2021, eGFR is calculated by the CKD-EPI creatinine equation, without race adjustment. eGFR can be influenced by muscle mass, exercise, and diet. The reported eGFR is an estimation only and is only applicable if the renal function is stable.   05/13/2021 87 >60 mL/min/[1.73_m2] Final     Comment:     Non  GFR Calc  Starting 12/18/2018, serum creatinine based estimated GFR (eGFR) will be   calculated using the Chronic Kidney Disease Epidemiology Collaboration   (CKD-EPI) equation.        Calcium   Date Value Ref Range Status   12/13/2021 9.8 8.5 - 10.1 mg/dL Final   05/13/2021 9.4 8.5 - 10.1 mg/dL Final     Bilirubin Total   Date Value Ref Range Status   10/20/2020 0.2 0.2 - 1.3 mg/dL Final     Alkaline Phosphatase   Date Value Ref Range Status   10/20/2020 76 40 - 150 U/L Final     ALT   Date Value Ref Range Status   10/20/2020 25 0 - 50 U/L Final     AST   Date Value Ref Range Status   10/20/2020 16 0 - 45 U/L Final       PATHOLOGY:  None new since last visit.    IMAGING:  10/13/2020: DEXA scan showed osteoporosis in the lumbar spine and left femoral neck; osteopenia in right femoral neck.    11/23/2021: Mammogram showed heterogeneously dense breast tissue with no suspicious findings.    4/09/2021: Breast MRI showed no abnormal enhancement or suspicious findings.    ASSESSMENT/PLAN:  Jyothi Freitas is a 56 year old female with the following issues:  1.  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 Polyarthralgias  --Jyothi has no clinical evidence for recurrent breast cancer by physical exam from today.  --Jyothi is on letrozole and tolerating this overall better than anastrazole with less polyarthralgias.  --I recommended up to 10 years of letrozole, if tolerated, to maximally reduce her risk of breast cancer recurrence.  --Will continue to alternate mammogram with breast MRI, staggered by 6 months. She has breast MRI scheduled for 6/2022.  --Would only recommend lab workup and imaging workup if concerning signs/symptoms arise.    3. Osteoporosis  --DEXA scan results from 10/13/2020 showed osteoporosis.  --Recommended adequate calcium and vitamin D and weight-bearing exercise. Also recommended continuing zoledronic acid every 6 months for 6 doses, next due 6/11/2022.    --Will repeat DEXA scan prior to next visit.     4.  Insomnia   5.  Hot flashes, drug  induced  -Oxybutynin did help with hot flashes but she had too much dry eye syndrome and fluid retention.   -She has had significant improvement in her hot flashes and insomnia with the use of gabapentin, but I recommended trying to taper from 400 mg dosage to see if gabapentin is contributing to her fatigue.  She can always increase back to the 400 mg dose if the hot flashes become intolerable again.  -She is no longer using Ambien.    6. Ectasia of thoracic aorta  --This measured 4 cm on prior PET scan.    --Surgical management not needed but she will need ongoing monitoring. She is following with Dr. Silverio Gooden for monitoring of this issue.    7. Peripheral neuropathy  --Hip pain has improved.  --Continue duloxetine at 60 mg once daily.     8. High blood pressure  --I recommended Jyothi see Dr. Franklin to recheck blood pressure and lipid panel.     Return in 4 months with DEXA.    Maricarmen Monroe MD  Hematology/Oncology  [facility]    Total time spent: 30 minutes in patient evaluation, counseling, documentation, and coordination of care.

## 2022-05-16 ENCOUNTER — ONCOLOGY VISIT (OUTPATIENT)
Dept: ONCOLOGY | Facility: CLINIC | Age: 57
End: 2022-05-16
Attending: INTERNAL MEDICINE
Payer: COMMERCIAL

## 2022-05-16 VITALS
RESPIRATION RATE: 16 BRPM | BODY MASS INDEX: 22.73 KG/M2 | HEART RATE: 74 BPM | SYSTOLIC BLOOD PRESSURE: 150 MMHG | OXYGEN SATURATION: 99 % | HEIGHT: 67 IN | DIASTOLIC BLOOD PRESSURE: 88 MMHG | WEIGHT: 144.8 LBS | TEMPERATURE: 98 F

## 2022-05-16 DIAGNOSIS — M25.50 POLYARTHRALGIA: ICD-10-CM

## 2022-05-16 DIAGNOSIS — G62.0 PERIPHERAL NEUROPATHY DUE TO CHEMOTHERAPY (H): ICD-10-CM

## 2022-05-16 DIAGNOSIS — T45.1X5A PERIPHERAL NEUROPATHY DUE TO CHEMOTHERAPY (H): ICD-10-CM

## 2022-05-16 DIAGNOSIS — M81.0 AGE-RELATED OSTEOPOROSIS WITHOUT CURRENT PATHOLOGICAL FRACTURE: ICD-10-CM

## 2022-05-16 DIAGNOSIS — C50.411 MALIGNANT NEOPLASM OF UPPER-OUTER QUADRANT OF RIGHT BREAST IN FEMALE, ESTROGEN RECEPTOR POSITIVE (H): Primary | ICD-10-CM

## 2022-05-16 DIAGNOSIS — Z17.0 MALIGNANT NEOPLASM OF UPPER-OUTER QUADRANT OF RIGHT BREAST IN FEMALE, ESTROGEN RECEPTOR POSITIVE (H): Primary | ICD-10-CM

## 2022-05-16 DIAGNOSIS — N95.1 MENOPAUSAL SYNDROME (HOT FLASHES): ICD-10-CM

## 2022-05-16 PROCEDURE — 99214 OFFICE O/P EST MOD 30 MIN: CPT | Performed by: INTERNAL MEDICINE

## 2022-05-16 PROCEDURE — G0463 HOSPITAL OUTPT CLINIC VISIT: HCPCS

## 2022-05-16 ASSESSMENT — PAIN SCALES - GENERAL: PAINLEVEL: NO PAIN (0)

## 2022-05-16 NOTE — PROGRESS NOTES
"Oncology Rooming Note    May 16, 2022 4:22 PM   Jyothi Freitas is a 56 year old female who presents for:    Chief Complaint   Patient presents with     Oncology Clinic Visit     Malignant neoplasm of right breast (H)     Initial Vitals: BP (!) 150/88   Pulse 74   Temp 98  F (36.7  C) (Oral)   Resp 16   Ht 1.702 m (5' 7\")   Wt 65.7 kg (144 lb 12.8 oz)   LMP 10/07/2017 (Approximate)   SpO2 99%   BMI 22.68 kg/m   Estimated body mass index is 22.68 kg/m  as calculated from the following:    Height as of this encounter: 1.702 m (5' 7\").    Weight as of this encounter: 65.7 kg (144 lb 12.8 oz). Body surface area is 1.76 meters squared.  No Pain (0) Comment: Data Unavailable   Patient's last menstrual period was 10/07/2017 (approximate).  Allergies reviewed: Yes  Medications reviewed: Yes    Medications: Medication refills not needed today.  Pharmacy name entered into [facility]: [id number] - [id number] - [id number]    Clinical concerns: None       Gudelia Meyer MA              "

## 2022-05-16 NOTE — LETTER
5/16/2022         RE: Jyothi Freitas  [address]  [place] [address]        Dear Colleague,    Thank you for referring your patient, Jyothi Freitas, to the [facility]. Please see a copy of my visit note below.    [facility]    Hematology/Oncology Established Patient Follow-up Note      Today's Date: 5/16/2022    Reason for Follow-up: Right breast cancer.    HISTORY OF PRESENT ILLNESS: Jyothi Freitas is a 56 year old female who presents with the following oncologic history:   1.  10/11/2019: Diagnostic right sided mammogram performed for a palpable lump in the right breast.  This showed an irregularly-shaped spiculated mass in the upper outer right breast with prominent lymph nodes in the right axilla.  Targeted ultrasound of the right upper outer breast showed an irregularly-shaped hypoechoic mass at the 10 o'clock position, 4 cm from the nipple measuring 2.6 x 1.5 x 2.4 cm.  Right axillary ultrasound showed moderately enlarged lymph nodes.  Right breast needle biopsy showed a grade 3 invasive ductal carcinoma with lymphovascular invasion identified, ER strongly positive at 95%, AK strongly positive at 95%, HER-2/juan FISH negative.  Right axillary lymph node measuring 2 cm was positive for metastatic carcinoma.  Note prior 4/2019 mammogram deemed normal.  2.  10/15/2019: Bilateral breast MRI showed known right breast malignancy measuring 2.6 x 1.4 x 2 cm, 3 mildly enlarged right axillary lymph nodes and no contralateral breast malignancy.  3. 10/21/2019 PET scan showed scattered small hypermetabolic bilateral axillary lymph nodes; for example, a hypermetabolic right axillary lymph node measures 1.6 x 0.9 cm with SUV max 3.6.  Hypermetabolic mass in the right breast superiorly and laterally measuring 2.1 x 1.6 cm with SUV max 4.3.  A 0.6 cm low-density lesion in the right hepatic lobe is too small for accurate PET characterization but shows no appreciable  hypermetabolic activity.  Incidentally noted ectasia of the ascending thoracic aorta measures 4 cm in diameter.  4.  10/23/2019: Left axillary ultrasound showed benign-appearing lymph node.  Left axillary lymph node biopsy showed benign lymph node tissue.  5.  10/29/2019: Started neoadjuvant chemotherapy with dose dense Adriamycin and Cytoxan, followed by weekly paclitaxel with completion on 3/12/2020.  6.  3/19/2020: Breast MRI showed no residual enhancement in the right breast mass.  The right axillary lymphadenopathy has resolved.  7. 4/01/2020: Underwent right breast lumpectomy and right axillary sentinel lymph node biopsy under care of Dr. Kaila Hernandez.  Pathology showed fibrocystic change and no evidence of residual carcinoma in the right breast.  Metastatic breast adenocarcinoma to one of 5 lymph node fragments in 1 of 3 lymph nodes excised. Extranodal extension present. ypT0-N1a.  8. 4/29/2020: Started adjuvant anastrazole.  9. 6/15/2020: Completed adjuvant radiation therapy.  10. 6/15/2020: Switched to adjuvant letrozole with some mild improvement in polyathralgias.    INTERIM HISTORY:  Jyothi reports fatigue. She has occasional stiffness. Her neuropathy has significantly improved with the increase in duloxetine dose. She is sleeping generally well. Hot flashes are minimal and tolerable.      REVIEW OF SYSTEMS:   14 point ROS was reviewed and is negative other than as noted above in HPI.       HOME MEDICATIONS:  Current Outpatient Medications   Medication Sig Dispense Refill     acetaminophen (TYLENOL) 500 MG tablet Take 1,000 mg by mouth every 6 hours as needed for mild pain       acetaminophen-caffeine (EXCEDRIN TENSION HEADACHE) 500-65 MG TABS Take 2 tablets by mouth every 6 hours as needed for mild pain       DULoxetine (CYMBALTA) 60 MG capsule TAKE 1 CAPSULE BY MOUTH EVERY DAY 30 capsule 1     DULoxetine (CYMBALTA) 60 MG capsule Take 1 capsule (60 mg) by mouth daily 30 capsule 1     gabapentin  (NEURONTIN) 100 MG capsule Take 1 capsule (100 mg) by mouth every evening 90 capsule 3     gabapentin (NEURONTIN) 300 MG capsule Take 1 capsule (300 mg) by mouth daily 90 capsule 3     letrozole (FEMARA) 2.5 MG tablet Take 1 tablet (2.5 mg) by mouth daily 90 tablet 3     zoledronic acid (ZOMETA) 4 MG/100ML SOLN Inject 4 mg into the vein every 6 months            ALLERGIES:  Allergies   Allergen Reactions     Sulfa Drugs Other (See Comments)     Red face. Ringing in the ears.         PAST MEDICAL HISTORY:  Past Medical History:   Diagnosis Date     GERD (gastroesophageal reflux disease)      H/O colonoscopy 03/08/2016    done at [facility] and [facility]     Hematuria 2016    eval at [facility] and per pt cysto and ct neg     Intermittent asthma     since childhood     Low iron 10/2017    done for eval of hair loss, [facility]     Malignant neoplasm of upper-outer quadrant of right breast in female, estrogen receptor positive (H)     had chemo, lumpectomy, onc is Dr. Monroe     Migraines     most of adult life, has seen neuro in past, on bblocker     Mild depression (H)      Normal stress echocardiogram 07/2011    due to hear racing     Osteopenia 2008     Other osteoporosis without current pathological fracture 2020    iv zometa every 6 months     Syncope 03/2017    echo nl lv size and fxn, grade 1 dd, mild tr         PAST SURGICAL HISTORY:  Past Surgical History:   Procedure Laterality Date     BIOPSY NODE SENTINEL Right 4/1/2020    Procedure: RIGHT SENTINEL LYMPH NODE BIOPSY;  Surgeon: Kaila Hernandez MD;  Location:  OR     ESOPHAGOSCOPY, GASTROSCOPY, DUODENOSCOPY (EGD), COMBINED N/A 10/30/2017    Procedure: COMBINED ESOPHAGOSCOPY, GASTROSCOPY, DUODENOSCOPY (EGD), BIOPSY SINGLE OR MULTIPLE;  COMBINED ESOPHAGOSCOPY, GASTROSCOPY, DUODENOSCOPY (EGD);  Surgeon: Martin Rodriguez MD;  Location:  GI     INSERT PORT VASCULAR ACCESS N/A 10/24/2019    Procedure: PORT PLACEMENT;  Surgeon: Kaila Hernandez MD;  Location:  OR      "LUMPECTOMY BREAST WITH SEED LOCALIZATION Right 2020    Procedure: SEED LOCALIZED RIGHT BREAST LUMPECTOMY WITH SEED LOCALIZED RIGHT AXILLARY NODE EXCISION;  Surgeon: Kaila Hernandez MD;  Location: [facility] OR     ORTHOPEDIC SURGERY      \"leg surgery\"     REMOVE PORT VASCULAR ACCESS Left 2020    Procedure: REMOVAL, VASCULAR ACCESS PORT;  Surgeon: Kaila Hernandez MD;  Location: [facility] OR     single oopherectomy  2010    cyst     ZZC [facility] ARTHROSCOPY/SURGERY           SOCIAL HISTORY:  Social History     Socioeconomic History     Marital status:      Spouse name: Not on file     Number of children: 2     Years of education: Not on file     Highest education level: Not on file   Occupational History     Not on file   Tobacco Use     Smoking status: Former Smoker     Packs/day: 0.00     Quit date: 3/27/1997     Years since quittin.1     Smokeless tobacco: Never Used   Substance and Sexual Activity     Alcohol use: Yes     Comment: rarely     Drug use: No     Sexual activity: Yes     Partners: Male     Birth control/protection: Pill   Other Topics Concern     Parent/sibling w/ CABG, MI or angioplasty before 65F 55M? Yes   Social History Narrative     Not on file     Social Determinants of Health     Financial Resource Strain: Not on file   Food Insecurity: Not on file   Transportation Needs: Not on file   Physical Activity: Not on file   Stress: Not on file   Social Connections: Not on file   Intimate Partner Violence: Not on file   Housing Stability: Not on file         FAMILY HISTORY:  Family History   Problem Relation Age of Onset     Coronary Artery Disease Father 48        MI. and one in his 60s     Emphysema Mother         COPD         PHYSICAL EXAM:  Vital signs:  BP (!) 150/88   Pulse 74   Temp 98  F (36.7  C) (Oral)   Resp 16   Ht 1.702 m (5' 7\")   Wt 65.7 kg (144 lb 12.8 oz)   LMP 10/07/2017 (Approximate)   SpO2 99%   BMI 22.68 kg/m    GENERAL/CONSTITUTIONAL: No acute distress.  EYES: No " erythema or scleral icterus.  LYMPH: No cervical, supraclavicular, axillary, epitroclear adenopathy.   BREAST: The left breast droops lower than right breast. Nodular scar tissue above the right lumpectomy incision. Nipples are everted bilaterally with no discharge. No erythema, ulceration, or dimpling of the skin.  RESPIRATORY: No audible cough or wheezing.  GASTROINTESTINAL: No hepatosplenomegaly, masses, or tenderness. No guarding.  No distention.  MUSCULOSKELETAL: Warm and well-perfused, no cyanosis, clubbing, or edema.  NEUROLOGIC: No focal motor deficits. Alert, oriented, answers questions appropriately.  INTEGUMENTARY: No rashes or jaundice.  GAIT: Steady, does not use assistive device    LABS:  CBC RESULTS: Recent Labs   Lab Test 05/13/21  1538   WBC 6.7   RBC 4.76   HGB 14.0   HCT 42.3   MCV 89   MCH 29.4   MCHC 33.1   RDW 11.9        Last Comprehensive Metabolic Panel:  Sodium   Date Value Ref Range Status   10/20/2020 139 133 - 144 mmol/L Final     Potassium   Date Value Ref Range Status   10/20/2020 4.2 3.4 - 5.3 mmol/L Final     Chloride   Date Value Ref Range Status   10/20/2020 107 94 - 109 mmol/L Final     Carbon Dioxide   Date Value Ref Range Status   10/20/2020 29 20 - 32 mmol/L Final     Anion Gap   Date Value Ref Range Status   10/20/2020 3 3 - 14 mmol/L Final     Glucose   Date Value Ref Range Status   10/20/2020 78 70 - 99 mg/dL Final     Urea Nitrogen   Date Value Ref Range Status   10/20/2020 13 7 - 30 mg/dL Final     Creatinine   Date Value Ref Range Status   12/13/2021 0.74 0.52 - 1.04 mg/dL Final   05/13/2021 0.76 0.52 - 1.04 mg/dL Final     GFR Estimate   Date Value Ref Range Status   12/13/2021 >90 >60 mL/min/1.73m2 Final     Comment:     As of July 11, 2021, eGFR is calculated by the CKD-EPI creatinine equation, without race adjustment. eGFR can be influenced by muscle mass, exercise, and diet. The reported eGFR is an estimation only and is only applicable if the renal function  is stable.   05/13/2021 87 >60 mL/min/[1.73_m2] Final     Comment:     Non  GFR Calc  Starting 12/18/2018, serum creatinine based estimated GFR (eGFR) will be   calculated using the Chronic Kidney Disease Epidemiology Collaboration   (CKD-EPI) equation.       Calcium   Date Value Ref Range Status   12/13/2021 9.8 8.5 - 10.1 mg/dL Final   05/13/2021 9.4 8.5 - 10.1 mg/dL Final     Bilirubin Total   Date Value Ref Range Status   10/20/2020 0.2 0.2 - 1.3 mg/dL Final     Alkaline Phosphatase   Date Value Ref Range Status   10/20/2020 76 40 - 150 U/L Final     ALT   Date Value Ref Range Status   10/20/2020 25 0 - 50 U/L Final     AST   Date Value Ref Range Status   10/20/2020 16 0 - 45 U/L Final       PATHOLOGY:  None new since last visit.    IMAGING:  10/13/2020: DEXA scan showed osteoporosis in the lumbar spine and left femoral neck; osteopenia in right femoral neck.    11/23/2021: Mammogram showed heterogeneously dense breast tissue with no suspicious findings.    4/09/2021: Breast MRI showed no abnormal enhancement or suspicious findings.    ASSESSMENT/PLAN:  Jyothi Freitas is a 56 year old female with the following issues:  1.  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 Polyarthralgias  --Jyothi has no clinical evidence for recurrent breast cancer by physical exam from today.  --Jyothi is on letrozole and tolerating this overall better than anastrazole with less polyarthralgias.  --I recommended up to 10 years of letrozole, if tolerated, to maximally reduce her risk of breast cancer recurrence.  --Will continue to alternate mammogram with breast MRI, staggered by 6 months. She has breast MRI scheduled for 6/2022.  --Would only recommend lab workup and imaging workup if concerning signs/symptoms arise.    3. Osteoporosis  --DEXA scan results from 10/13/2020 showed  "osteoporosis.  --Recommended adequate calcium and vitamin D and weight-bearing exercise. Also recommended continuing zoledronic acid every 6 months for 6 doses, next due 6/11/2022.    --Will repeat DEXA scan prior to next visit.     4.  Insomnia   5.  Hot flashes, drug induced  -Oxybutynin did help with hot flashes but she had too much dry eye syndrome and fluid retention.   -She has had significant improvement in her hot flashes and insomnia with the use of gabapentin, but I recommended trying to taper from 400 mg dosage to see if gabapentin is contributing to her fatigue.  She can always increase back to the 400 mg dose if the hot flashes become intolerable again.  -She is no longer using Ambien.    6. Ectasia of thoracic aorta  --This measured 4 cm on prior PET scan.    --Surgical management not needed but she will need ongoing monitoring. She is following with Dr. Silverio Gooden for monitoring of this issue.    7. Peripheral neuropathy  --Hip pain has improved.  --Continue duloxetine at 60 mg once daily.     8. High blood pressure  --I recommended Jyothi see Dr. Franklin to recheck blood pressure and lipid panel.     Return in 4 months with DEXA.    Maricarmen Monroe MD  Hematology/Oncology  [facility]    Total time spent: 30 minutes in patient evaluation, counseling, documentation, and coordination of care.    Oncology Rooming Note    May 16, 2022 4:22 PM   Jyothi Freitas is a 56 year old female who presents for:    Chief Complaint   Patient presents with     Oncology Clinic Visit     Malignant neoplasm of right breast (H)     Initial Vitals: BP (!) 150/88   Pulse 74   Temp 98  F (36.7  C) (Oral)   Resp 16   Ht 1.702 m (5' 7\")   Wt 65.7 kg (144 lb 12.8 oz)   LMP 10/07/2017 (Approximate)   SpO2 99%   BMI 22.68 kg/m   Estimated body mass index is 22.68 kg/m  as calculated from the following:    Height as of this encounter: 1.702 m (5' 7\").    Weight as of this encounter: 65.7 kg (144 lb 12.8 " oz). Body surface area is 1.76 meters squared.  No Pain (0) Comment: Data Unavailable   Patient's last menstrual period was 10/07/2017 (approximate).  Allergies reviewed: Yes  Medications reviewed: Yes    Medications: Medication refills not needed today.  Pharmacy name entered into [employer]: [id number] - [place], [place] - [address]    Clinical concerns: None       Gudelia Meyer MA                  Again, thank you for allowing me to participate in the care of your patient.        Sincerely,        Maricarmen Monroe MD

## 2022-06-02 ENCOUNTER — HOSPITAL ENCOUNTER (OUTPATIENT)
Dept: MRI IMAGING | Facility: CLINIC | Age: 57
Discharge: HOME | End: 2022-06-02
Attending: INTERNAL MEDICINE | Admitting: INTERNAL MEDICINE
Payer: COMMERCIAL

## 2022-06-02 DIAGNOSIS — Z17.0 MALIGNANT NEOPLASM OF UPPER-OUTER QUADRANT OF RIGHT BREAST IN FEMALE, ESTROGEN RECEPTOR POSITIVE (H): ICD-10-CM

## 2022-06-02 DIAGNOSIS — C50.411 MALIGNANT NEOPLASM OF UPPER-OUTER QUADRANT OF RIGHT BREAST IN FEMALE, ESTROGEN RECEPTOR POSITIVE (H): ICD-10-CM

## 2022-06-02 PROCEDURE — A9585 GADOBUTROL INJECTION: HCPCS | Performed by: INTERNAL MEDICINE

## 2022-06-02 PROCEDURE — 77049 MRI BREAST C-+ W/CAD BI: CPT

## 2022-06-02 PROCEDURE — 255N000002 HC RX 255 OP 636: Performed by: INTERNAL MEDICINE

## 2022-06-02 RX ORDER — GADOBUTROL 604.72 MG/ML
7 INJECTION INTRAVENOUS ONCE
Status: COMPLETED | OUTPATIENT
Start: 2022-06-02 | End: 2022-06-02

## 2022-06-02 RX ADMIN — GADOBUTROL 7 ML: 604.72 INJECTION INTRAVENOUS at 08:01

## 2022-06-13 DIAGNOSIS — M81.0 AGE-RELATED OSTEOPOROSIS WITHOUT CURRENT PATHOLOGICAL FRACTURE: Primary | ICD-10-CM

## 2022-06-13 RX ORDER — HEPARIN SODIUM (PORCINE) LOCK FLUSH IV SOLN 100 UNIT/ML 100 UNIT/ML
5 SOLUTION INTRAVENOUS
Status: CANCELLED | OUTPATIENT
Start: 2022-06-13

## 2022-06-13 RX ORDER — HEPARIN SODIUM,PORCINE 10 UNIT/ML
5 VIAL (ML) INTRAVENOUS
Status: CANCELLED | OUTPATIENT
Start: 2022-06-13

## 2022-06-16 ENCOUNTER — INFUSION THERAPY VISIT (OUTPATIENT)
Dept: INFUSION THERAPY | Facility: CLINIC | Age: 57
End: 2022-06-16
Attending: INTERNAL MEDICINE
Payer: COMMERCIAL

## 2022-06-16 VITALS
SYSTOLIC BLOOD PRESSURE: 151 MMHG | TEMPERATURE: 98 F | OXYGEN SATURATION: 100 % | RESPIRATION RATE: 16 BRPM | HEART RATE: 73 BPM | DIASTOLIC BLOOD PRESSURE: 88 MMHG

## 2022-06-16 DIAGNOSIS — M81.0 AGE-RELATED OSTEOPOROSIS WITHOUT CURRENT PATHOLOGICAL FRACTURE: Primary | ICD-10-CM

## 2022-06-16 LAB
ALBUMIN SERPL-MCNC: 4.1 G/DL (ref 3.4–5)
CALCIUM SERPL-MCNC: 9.9 MG/DL (ref 8.5–10.1)
CREAT SERPL-MCNC: 0.62 MG/DL (ref 0.52–1.04)
GFR SERPL CREATININE-BSD FRML MDRD: >90 ML/MIN/1.73M2

## 2022-06-16 PROCEDURE — 36415 COLL VENOUS BLD VENIPUNCTURE: CPT | Performed by: INTERNAL MEDICINE

## 2022-06-16 PROCEDURE — 96365 THER/PROPH/DIAG IV INF INIT: CPT

## 2022-06-16 PROCEDURE — 250N000011 HC RX IP 250 OP 636: Performed by: INTERNAL MEDICINE

## 2022-06-16 PROCEDURE — 82310 ASSAY OF CALCIUM: CPT | Performed by: INTERNAL MEDICINE

## 2022-06-16 PROCEDURE — 258N000003 HC RX IP 258 OP 636: Performed by: INTERNAL MEDICINE

## 2022-06-16 PROCEDURE — 82565 ASSAY OF CREATININE: CPT | Performed by: INTERNAL MEDICINE

## 2022-06-16 PROCEDURE — 82040 ASSAY OF SERUM ALBUMIN: CPT | Performed by: INTERNAL MEDICINE

## 2022-06-16 RX ORDER — ZOLEDRONIC ACID 0.04 MG/ML
4 INJECTION, SOLUTION INTRAVENOUS ONCE
Status: COMPLETED | OUTPATIENT
Start: 2022-06-16 | End: 2022-06-16

## 2022-06-16 RX ADMIN — SODIUM CHLORIDE 250 ML: 9 INJECTION, SOLUTION INTRAVENOUS at 15:03

## 2022-06-16 RX ADMIN — ZOLEDRONIC ACID 4 MG: 0.04 INJECTION, SOLUTION INTRAVENOUS at 15:03

## 2022-06-16 ASSESSMENT — PAIN SCALES - GENERAL: PAINLEVEL: NO PAIN (0)

## 2022-06-16 NOTE — PROGRESS NOTES
Infusion Nursing Note:  Jyothi Freitas presents today for labs/Zometa infusion.    Patient seen by provider today: No   present during visit today: Not Applicable.    Note: NA    Intravenous Access:  Peripheral IV placed.    Treatment Conditions:  Lab Results   Component Value Date     10/20/2020    POTASSIUM 4.2 10/20/2020    MAG 2.3 10/20/2020    CR 0.62 06/16/2022    JEFF 9.9 06/16/2022    BILITOTAL 0.2 10/20/2020    ALBUMIN 4.1 06/16/2022    ALT 25 10/20/2020    AST 16 10/20/2020     Results reviewed, labs MET treatment parameters, ok to proceed with treatment.    Post Infusion Assessment:  Patient tolerated infusion without incident.  Site patent and intact, free from redness, edema or discomfort.  Access discontinued per protocol.     Discharge Plan:   Discharge instructions reviewed with: Patient.  Patient and/or family verbalized understanding of discharge instructions and all questions answered.  Patient discharged in stable condition accompanied by: self.  Departure Mode: Ambulatory.      Bella Lozano RN

## 2022-06-20 DIAGNOSIS — C77.3 SECONDARY AND UNSPECIFIED MALIGNANT NEOPLASM OF AXILLA AND UPPER LIMB LYMPH NODES (H): ICD-10-CM

## 2022-06-20 DIAGNOSIS — Z17.0 MALIGNANT NEOPLASM OF UPPER-OUTER QUADRANT OF RIGHT BREAST IN FEMALE, ESTROGEN RECEPTOR POSITIVE (H): ICD-10-CM

## 2022-06-20 DIAGNOSIS — C50.411 MALIGNANT NEOPLASM OF UPPER-OUTER QUADRANT OF RIGHT BREAST IN FEMALE, ESTROGEN RECEPTOR POSITIVE (H): ICD-10-CM

## 2022-06-20 DIAGNOSIS — N95.1 MENOPAUSAL SYNDROME (HOT FLASHES): ICD-10-CM

## 2022-06-20 RX ORDER — DULOXETIN HYDROCHLORIDE 60 MG/1
60 CAPSULE, DELAYED RELEASE ORAL DAILY
Qty: 30 CAPSULE | Refills: 1 | Status: SHIPPED | OUTPATIENT
Start: 2022-06-20 | End: 2022-11-07

## 2022-06-30 DIAGNOSIS — T45.1X5A PERIPHERAL NEUROPATHY DUE TO CHEMOTHERAPY (H): ICD-10-CM

## 2022-06-30 DIAGNOSIS — G62.0 PERIPHERAL NEUROPATHY DUE TO CHEMOTHERAPY (H): ICD-10-CM

## 2022-06-30 DIAGNOSIS — N95.1 MENOPAUSAL SYNDROME (HOT FLASHES): ICD-10-CM

## 2022-06-30 RX ORDER — GABAPENTIN 300 MG/1
300 CAPSULE ORAL DAILY
Qty: 90 CAPSULE | Refills: 3 | Status: SHIPPED | OUTPATIENT
Start: 2022-06-30 | End: 2023-06-22

## 2022-08-01 ENCOUNTER — TRANSFERRED RECORDS (OUTPATIENT)
Dept: MULTI SPECIALTY CLINIC | Facility: CLINIC | Age: 57
End: 2022-08-01

## 2022-08-01 LAB — PAP SMEAR - HIM PATIENT REPORTED: NEGATIVE

## 2022-08-08 DIAGNOSIS — N95.1 MENOPAUSAL SYNDROME (HOT FLASHES): ICD-10-CM

## 2022-08-08 RX ORDER — GABAPENTIN 100 MG/1
400 CAPSULE ORAL EVERY EVENING
Qty: 360 CAPSULE | Refills: 3 | Status: SHIPPED | OUTPATIENT
Start: 2022-08-08 | End: 2023-02-15

## 2022-08-17 DIAGNOSIS — C50.411 MALIGNANT NEOPLASM OF UPPER-OUTER QUADRANT OF RIGHT BREAST IN FEMALE, ESTROGEN RECEPTOR POSITIVE (H): ICD-10-CM

## 2022-08-17 DIAGNOSIS — C77.3 SECONDARY AND UNSPECIFIED MALIGNANT NEOPLASM OF AXILLA AND UPPER LIMB LYMPH NODES (H): ICD-10-CM

## 2022-08-17 DIAGNOSIS — Z17.0 MALIGNANT NEOPLASM OF UPPER-OUTER QUADRANT OF RIGHT BREAST IN FEMALE, ESTROGEN RECEPTOR POSITIVE (H): ICD-10-CM

## 2022-08-17 DIAGNOSIS — N95.1 MENOPAUSAL SYNDROME (HOT FLASHES): ICD-10-CM

## 2022-08-18 RX ORDER — DULOXETIN HYDROCHLORIDE 60 MG/1
CAPSULE, DELAYED RELEASE ORAL
Qty: 30 CAPSULE | Refills: 1 | Status: SHIPPED | OUTPATIENT
Start: 2022-08-18 | End: 2022-09-02

## 2022-08-31 ENCOUNTER — TRANSFERRED RECORDS (OUTPATIENT)
Dept: HEALTH INFORMATION MANAGEMENT | Facility: CLINIC | Age: 57
End: 2022-08-31

## 2022-09-01 DIAGNOSIS — C77.3 SECONDARY AND UNSPECIFIED MALIGNANT NEOPLASM OF AXILLA AND UPPER LIMB LYMPH NODES (H): ICD-10-CM

## 2022-09-01 DIAGNOSIS — Z17.0 MALIGNANT NEOPLASM OF UPPER-OUTER QUADRANT OF RIGHT BREAST IN FEMALE, ESTROGEN RECEPTOR POSITIVE (H): ICD-10-CM

## 2022-09-01 DIAGNOSIS — N95.1 MENOPAUSAL SYNDROME (HOT FLASHES): ICD-10-CM

## 2022-09-01 DIAGNOSIS — C50.411 MALIGNANT NEOPLASM OF UPPER-OUTER QUADRANT OF RIGHT BREAST IN FEMALE, ESTROGEN RECEPTOR POSITIVE (H): ICD-10-CM

## 2022-09-02 RX ORDER — DULOXETIN HYDROCHLORIDE 60 MG/1
CAPSULE, DELAYED RELEASE ORAL
Qty: 90 CAPSULE | Refills: 1 | Status: SHIPPED | OUTPATIENT
Start: 2022-09-02 | End: 2022-09-28

## 2022-09-20 NOTE — PROGRESS NOTES
[facility], [place]    Hematology/Oncology Established Patient Follow-up Note      Today's Date: 10/6/2022    Reason for Follow-up: Right breast cancer.    HISTORY OF PRESENT ILLNESS: Jyothi Freitas is a 56 year old female who presents with the following oncologic history:   1.  10/11/2019: Diagnostic right sided mammogram performed for a palpable lump in the right breast.  This showed an irregularly-shaped spiculated mass in the upper outer right breast with prominent lymph nodes in the right axilla.  Targeted ultrasound of the right upper outer breast showed an irregularly-shaped hypoechoic mass at the 10 o'clock position, 4 cm from the nipple measuring 2.6 x 1.5 x 2.4 cm.  Right axillary ultrasound showed moderately enlarged lymph nodes.  Right breast needle biopsy showed a grade 3 invasive ductal carcinoma with lymphovascular invasion identified, ER strongly positive at 95%, AL strongly positive at 95%, HER-2/juan FISH negative.  Right axillary lymph node measuring 2 cm was positive for metastatic carcinoma.  Note prior 4/2019 mammogram deemed normal.  2.  10/15/2019: Bilateral breast MRI showed known right breast malignancy measuring 2.6 x 1.4 x 2 cm, 3 mildly enlarged right axillary lymph nodes and no contralateral breast malignancy.  3. 10/21/2019 PET scan showed scattered small hypermetabolic bilateral axillary lymph nodes; for example, a hypermetabolic right axillary lymph node measures 1.6 x 0.9 cm with SUV max 3.6.  Hypermetabolic mass in the right breast superiorly and laterally measuring 2.1 x 1.6 cm with SUV max 4.3.  A 0.6 cm low-density lesion in the right hepatic lobe is too small for accurate PET characterization but shows no appreciable hypermetabolic activity.  Incidentally noted ectasia of the ascending thoracic aorta measures 4 cm in diameter.  4.  10/23/2019: Left axillary ultrasound showed benign-appearing lymph node.  Left axillary lymph node biopsy showed benign lymph node tissue.  5.   10/29/2019: Started neoadjuvant chemotherapy with dose dense Adriamycin and Cytoxan, followed by weekly paclitaxel with completion on 3/12/2020.  6.  3/19/2020: Breast MRI showed no residual enhancement in the right breast mass.  The right axillary lymphadenopathy has resolved.  7. 4/01/2020: Underwent right breast lumpectomy and right axillary sentinel lymph node biopsy under care of Dr. Kaila Hernandez.  Pathology showed fibrocystic change and no evidence of residual carcinoma in the right breast.  Metastatic breast adenocarcinoma to one of 5 lymph node fragments in 1 of 3 lymph nodes excised. Extranodal extension present. ypT0-N1a.  8. 4/29/2020: Started adjuvant anastrazole.  9. 6/15/2020: Completed adjuvant radiation therapy.  10. 6/15/2020: Switched to adjuvant letrozole with some mild improvement in polyathralgias.    INTERIM HISTORY:  Jyothi tried to wean her gabapentin a bit to reduce fatigue but she experienced increase in body pains (in muscles and joints). She is sleeping well. Hot flashes are well controlled at the 400 mg dose of gabapentin but increased at the 300 mg dose.      REVIEW OF SYSTEMS:   14 point ROS was reviewed and is negative other than as noted above in HPI.       HOME MEDICATIONS:  Current Outpatient Medications   Medication Sig Dispense Refill     acetaminophen (TYLENOL) 500 MG tablet Take 1,000 mg by mouth every 6 hours as needed for mild pain       acetaminophen-caffeine (EXCEDRIN TENSION HEADACHE) 500-65 MG TABS Take 2 tablets by mouth every 6 hours as needed for mild pain       albuterol (PROAIR HFA/PROVENTIL HFA/VENTOLIN HFA) 108 (90 Base) MCG/ACT inhaler Inhale 2 puffs into the lungs every 6 hours as needed for shortness of breath / dyspnea or wheezing 18 g 0     DULoxetine (CYMBALTA) 60 MG capsule Take 1 capsule (60 mg) by mouth daily 30 capsule 1     gabapentin (NEURONTIN) 100 MG capsule Take 4 capsules (400 mg) by mouth every evening 360 capsule 3     gabapentin (NEURONTIN) 300  MG capsule Take 1 capsule (300 mg) by mouth daily 90 capsule 3     letrozole (FEMARA) 2.5 MG tablet Take 1 tablet (2.5 mg) by mouth daily 90 tablet 3     zoledronic acid (ZOMETA) 4 MG/100ML SOLN Inject 4 mg into the vein every 6 months            ALLERGIES:  Allergies   Allergen Reactions     Sulfa Drugs Other (See Comments)     Red face. Ringing in the ears.         PAST MEDICAL HISTORY:  Past Medical History:   Diagnosis Date     GERD (gastroesophageal reflux disease)      H/O colonoscopy 03/08/2016    done at [facility] and nl     Hematuria 2016    eval at [facility] and per pt cysto and ct neg     Intermittent asthma     since childhood     Low iron 10/2017    done for eval of hair loss, egd nl     Malignant neoplasm of upper-outer quadrant of right breast in female, estrogen receptor positive (H)     had chemo, lumpectomy, onc is Dr. Monroe     Migraines     most of adult life, has seen neuro in past, on bblocker     Mild depression (H)      Normal stress echocardiogram 07/2011    due to hear racing     Osteopenia 2008     Other osteoporosis without current pathological fracture 2020    iv zometa every 6 months     Syncope 03/2017    echo nl lv size and fxn, grade 1 dd, mild tr         PAST SURGICAL HISTORY:  Past Surgical History:   Procedure Laterality Date     BIOPSY NODE SENTINEL Right 4/1/2020    Procedure: RIGHT SENTINEL LYMPH NODE BIOPSY;  Surgeon: Kaila Hernandez MD;  Location:  OR     ESOPHAGOSCOPY, GASTROSCOPY, DUODENOSCOPY (EGD), COMBINED N/A 10/30/2017    Procedure: COMBINED ESOPHAGOSCOPY, GASTROSCOPY, DUODENOSCOPY (EGD), BIOPSY SINGLE OR MULTIPLE;  COMBINED ESOPHAGOSCOPY, GASTROSCOPY, DUODENOSCOPY (EGD);  Surgeon: Martin Rodriguez MD;  Location:  GI     INSERT PORT VASCULAR ACCESS N/A 10/24/2019    Procedure: PORT PLACEMENT;  Surgeon: Kaila Hernandez MD;  Location:  OR     LUMPECTOMY BREAST WITH SEED LOCALIZATION Right 4/1/2020    Procedure: SEED LOCALIZED RIGHT BREAST LUMPECTOMY WITH SEED  "LOCALIZED RIGHT AXILLARY NODE EXCISION;  Surgeon: Kaila Hernandez MD;  Location:  OR     ORTHOPEDIC SURGERY      \"leg surgery\"     REMOVE PORT VASCULAR ACCESS Left 2020    Procedure: REMOVAL, VASCULAR ACCESS PORT;  Surgeon: Kaila Hernandez MD;  Location:  OR     single oopherectomy  2010    cyst     ZZC TMJ ARTHROSCOPY/SURGERY           SOCIAL HISTORY:  Social History     Socioeconomic History     Marital status:      Spouse name: Not on file     Number of children: 2     Years of education: Not on file     Highest education level: Not on file   Occupational History     Not on file   Tobacco Use     Smoking status: Former Smoker     Packs/day: 0.00     Quit date: 3/27/1997     Years since quittin.5     Smokeless tobacco: Never Used   Substance and Sexual Activity     Alcohol use: Yes     Comment: rarely     Drug use: No     Sexual activity: Yes     Partners: Male     Birth control/protection: Pill   Other Topics Concern     Parent/sibling w/ CABG, MI or angioplasty before 65F 55M? Yes   Social History Narrative     Not on file     Social Determinants of Health     Financial Resource Strain: Not on file   Food Insecurity: Not on file   Transportation Needs: Not on file   Physical Activity: Not on file   Stress: Not on file   Social Connections: Not on file   Intimate Partner Violence: Not on file   Housing Stability: Not on file         FAMILY HISTORY:  Family History   Problem Relation Age of Onset     Coronary Artery Disease Father 48        MI. and one in his 60s     Emphysema Mother         COPD         PHYSICAL EXAM:  Vital signs:  BP (!) 136/91   Pulse 72   Temp 97.6  F (36.4  C) (Oral)   Resp 14   Wt 65.5 kg (144 lb 6.4 oz)   LMP 10/07/2017 (Approximate)   SpO2 100%   BMI 22.62 kg/m    GENERAL/CONSTITUTIONAL: No acute distress.  EYES: No erythema or scleral icterus.  LYMPH: No cervical, supraclavicular, axillary, epitroclear adenopathy.   BREAST: The left breast droops slightly " lower than right breast. Nodular scar tissue above the right lumpectomy incision is stable compared to prior exam. Nipples are everted bilaterally with no discharge. No erythema, ulceration, or dimpling of the skin.  RESPIRATORY: No audible cough or wheezing.  GASTROINTESTINAL: No hepatosplenomegaly, masses, or tenderness. No guarding.  No distention.  MUSCULOSKELETAL: Warm and well-perfused, no cyanosis, clubbing, or edema.  NEUROLOGIC: No focal motor deficits. Alert, oriented, answers questions appropriately.  INTEGUMENTARY: No rashes or jaundice.  GAIT: Steady, does not use assistive device    LABS:  CBC RESULTS: Recent Labs   Lab Test 05/13/21  1538   WBC 6.7   RBC 4.76   HGB 14.0   HCT 42.3   MCV 89   MCH 29.4   MCHC 33.1   RDW 11.9        Last Comprehensive Metabolic Panel:  Sodium   Date Value Ref Range Status   10/20/2020 139 133 - 144 mmol/L Final     Potassium   Date Value Ref Range Status   10/20/2020 4.2 3.4 - 5.3 mmol/L Final     Chloride   Date Value Ref Range Status   10/20/2020 107 94 - 109 mmol/L Final     Carbon Dioxide   Date Value Ref Range Status   10/20/2020 29 20 - 32 mmol/L Final     Anion Gap   Date Value Ref Range Status   10/20/2020 3 3 - 14 mmol/L Final     Glucose   Date Value Ref Range Status   10/20/2020 78 70 - 99 mg/dL Final     Urea Nitrogen   Date Value Ref Range Status   10/20/2020 13 7 - 30 mg/dL Final     Creatinine   Date Value Ref Range Status   06/16/2022 0.62 0.52 - 1.04 mg/dL Final   05/13/2021 0.76 0.52 - 1.04 mg/dL Final     GFR Estimate   Date Value Ref Range Status   06/16/2022 >90 >60 mL/min/1.73m2 Final     Comment:     Effective December 21, 2021 eGFRcr in adults is calculated using the 2021 CKD-EPI creatinine equation which includes age and gender (Fernie green al., NEJM, DOI: 10.1056/KPNUpb3243098)   05/13/2021 87 >60 mL/min/[1.73_m2] Final     Comment:     Non  GFR Calc  Starting 12/18/2018, serum creatinine based estimated GFR (eGFR) will be    calculated using the Chronic Kidney Disease Epidemiology Collaboration   (CKD-EPI) equation.       Calcium   Date Value Ref Range Status   2022 9.9 8.5 - 10.1 mg/dL Final   2021 9.4 8.5 - 10.1 mg/dL Final     Bilirubin Total   Date Value Ref Range Status   10/20/2020 0.2 0.2 - 1.3 mg/dL Final     Alkaline Phosphatase   Date Value Ref Range Status   10/20/2020 76 40 - 150 U/L Final     ALT   Date Value Ref Range Status   10/20/2020 25 0 - 50 U/L Final     AST   Date Value Ref Range Status   10/20/2020 16 0 - 45 U/L Final       PATHOLOGY:  None new since last visit.    IMAGIN2022: DEXA scan showed osteopenia.    2021: Mammogram showed heterogeneously dense breast tissue with no suspicious findings.    2022: Breast MRI showed no abnormal enhancement or suspicious findings.    ASSESSMENT/PLAN:  Jyothi Freitas is a 56 year old female with the following issues:  1.  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 Polyarthralgias  --Jyothi has no clinical evidence for recurrent breast cancer by physical exam from today or breast MRI reviewed from 2022.  --Jyothi is on letrozole and tolerating this overall better than anastrozole with relatively less polyarthralgias.  --I recommended up to 10 years of letrozole, if tolerated, to maximally reduce risk of breast cancer recurrence.  --Will continue to alternate mammogram with breast MRI, staggered by 6 months. Next mammogram due 2022.  --Would only recommend lab workup and imaging workup if concerning signs/symptoms arise.    3. Osteoporosis  --DEXA scan results from 10/13/2020 showed osteoporosis.  --Repeat DEXA reviewed from 2022 showed improvement to osteopenia.   --Recommended adequate calcium and vitamin D, weight-bearing exercise and continuing zoledronic acid every 6 months for at least 6 or more  doses, next due 12/8/2022.    --Will repeat DEXA scan in 2024.    4.  Insomnia   5.  Hot flashes, drug induced  -Oxybutynin did help with hot flashes but she had too much dry eye syndrome and fluid retention.   -She has had significant improvement in her hot flashes and insomnia with the use of gabapentin, but I recommended trying to taper from 400 mg dosage again to see if gabapentin is contributing to her fatigue. Advised trying acupuncture.  -She is no longer using Ambien.    6. Ectasia of thoracic aorta  --This measured 4 cm on prior PET scan.    --Surgical management not needed but she will need ongoing monitoring. She is following with Dr. Silverio Gooden for monitoring of this issue.    7. Peripheral neuropathy  --Hip pain has improved.  --Continue duloxetine at 60 mg once daily. I recommended against trying to decrease duloxetine as this is less likely causing her fatigue.    8. High blood pressure  --I recommended Jyothi see Dr. Franklin to recheck blood pressure and lipid panel.  --Discussed that letrozole can increase blood pressure and cholesterol levels.     Return in 4 months.    Maricarmen Monroe MD  Hematology/Oncology  [facility]    Total time spent: 30 minutes in patient evaluation, counseling, documentation, and coordination of care.

## 2022-09-27 ENCOUNTER — E-VISIT (OUTPATIENT)
Dept: FAMILY MEDICINE | Facility: CLINIC | Age: 57
End: 2022-09-27
Payer: COMMERCIAL

## 2022-09-27 DIAGNOSIS — Z53.9 ERRONEOUS ENCOUNTER--DISREGARD: Primary | ICD-10-CM

## 2022-09-27 NOTE — TELEPHONE ENCOUNTER
"From Dr. Franklin:    \"This needs office visit, team today or I can see tomorrow at noon.     Peter Franklin M.D.\"      Call to patient. Message left for return call to clinic.     Princess Angelo RN BSN MSN  [facility]        "

## 2022-09-28 ENCOUNTER — OFFICE VISIT (OUTPATIENT)
Dept: FAMILY MEDICINE | Facility: CLINIC | Age: 57
End: 2022-09-28
Payer: COMMERCIAL

## 2022-09-28 VITALS
SYSTOLIC BLOOD PRESSURE: 136 MMHG | DIASTOLIC BLOOD PRESSURE: 96 MMHG | TEMPERATURE: 97.1 F | HEART RATE: 75 BPM | OXYGEN SATURATION: 98 % | RESPIRATION RATE: 18 BRPM | BODY MASS INDEX: 22.44 KG/M2 | HEIGHT: 67 IN | WEIGHT: 143 LBS

## 2022-09-28 DIAGNOSIS — R03.0 ELEVATED BP WITHOUT DIAGNOSIS OF HYPERTENSION: ICD-10-CM

## 2022-09-28 DIAGNOSIS — J06.9 VIRAL URI: Primary | ICD-10-CM

## 2022-09-28 PROCEDURE — 99213 OFFICE O/P EST LOW 20 MIN: CPT | Performed by: INTERNAL MEDICINE

## 2022-09-28 RX ORDER — ALBUTEROL SULFATE 90 UG/1
2 AEROSOL, METERED RESPIRATORY (INHALATION) EVERY 6 HOURS PRN
Qty: 18 G | Refills: 0 | Status: SHIPPED | OUTPATIENT
Start: 2022-09-28

## 2022-09-28 ASSESSMENT — PATIENT HEALTH QUESTIONNAIRE - PHQ9: SUM OF ALL RESPONSES TO PHQ QUESTIONS 1-9: 0

## 2022-09-28 ASSESSMENT — ASTHMA QUESTIONNAIRES: ACT_TOTALSCORE: 25

## 2022-09-28 ASSESSMENT — PAIN SCALES - GENERAL: PAINLEVEL: NO PAIN (0)

## 2022-09-28 NOTE — PATIENT INSTRUCTIONS
For the cold you can use albuterol as needed.  If it worsens or you get a fever or shortness of breath call.  For the blood pressure please get a cuff, be sure to sit for 5 minutes first and check it 3x a week.      Peter Franklin M.D.

## 2022-09-28 NOTE — PROGRESS NOTES
This is a pleasant 56-year-old with a history of asthma who presents with URI type symptoms for approximately 10 days.  It is actually better today.  She has a nonproductive cough with some chest tightness.  Some sinus congestion but no nasal discharge.  No fevers.  She is done 2 COVID test including a PCR all of which are negative.  No earache or sore throat but has a raspy voice.  She does have a history of asthma and uses albuterol as needed.  She has not been using it lately.    Additionally, her blood pressure is slightly elevated.  She has an appointment to see me in the future for this.  At other physician's offices it can be high.    Past Medical History:   Diagnosis Date     GERD (gastroesophageal reflux disease)      H/O colonoscopy 03/08/2016    done at [facility] and nl     Hematuria 2016    eval at [facility] and per pt cysto and ct neg     Intermittent asthma     since childhood     Low iron 10/2017    done for eval of hair loss, egd nl     Malignant neoplasm of upper-outer quadrant of right breast in female, estrogen receptor positive (H) 10/2019    had chemo, lumpectomy, onc is Dr. Monroe     Migraines     most of adult life, has seen neuro in past, on bblocker     Mild depression (H)      Normal stress echocardiogram 07/2011    due to hear racing     Osteopenia 2008     Other osteoporosis without current pathological fracture 2020    iv zometa every 6 months     Syncope 03/2017    echo nl lv size and fxn, grade 1 dd, mild tr     Past Surgical History:   Procedure Laterality Date     BIOPSY NODE SENTINEL Right 4/1/2020    Procedure: RIGHT SENTINEL LYMPH NODE BIOPSY;  Surgeon: Kaila Hernandez MD;  Location:  OR     ESOPHAGOSCOPY, GASTROSCOPY, DUODENOSCOPY (EGD), COMBINED N/A 10/30/2017    Procedure: COMBINED ESOPHAGOSCOPY, GASTROSCOPY, DUODENOSCOPY (EGD), BIOPSY SINGLE OR MULTIPLE;  COMBINED ESOPHAGOSCOPY, GASTROSCOPY, DUODENOSCOPY (EGD);  Surgeon: Martin Rodriguez MD;  Location:  GI     INSERT PORT  "VASCULAR ACCESS N/A 10/24/2019    Procedure: PORT PLACEMENT;  Surgeon: Kaila Hernandez MD;  Location: [facility] OR     LUMPECTOMY BREAST WITH SEED LOCALIZATION Right 2020    Procedure: SEED LOCALIZED RIGHT BREAST LUMPECTOMY WITH SEED LOCALIZED RIGHT AXILLARY NODE EXCISION;  Surgeon: Kaila Hernandez MD;  Location:  OR     ORTHOPEDIC SURGERY      \"leg surgery\"     REMOVE PORT VASCULAR ACCESS Left 2020    Procedure: REMOVAL, VASCULAR ACCESS PORT;  Surgeon: Kaila Hernandez MD;  Location: [facility] OR     single oopherectomy  2010    cyst     ZZC TMJ ARTHROSCOPY/SURGERY       Social History     Socioeconomic History     Marital status:      Spouse name: Not on file     Number of children: 2     Years of education: Not on file     Highest education level: Not on file   Occupational History     Not on file   Tobacco Use     Smoking status: Former Smoker     Packs/day: 0.00     Quit date: 3/27/1997     Years since quittin.5     Smokeless tobacco: Never Used   Substance and Sexual Activity     Alcohol use: Yes     Comment: rarely     Drug use: No     Sexual activity: Yes     Partners: Male     Birth control/protection: Pill   Other Topics Concern     Parent/sibling w/ CABG, MI or angioplasty before 65F 55M? Yes   Social History Narrative     Not on file     Social Determinants of Health     Financial Resource Strain: Not on file   Food Insecurity: Not on file   Transportation Needs: Not on file   Physical Activity: Not on file   Stress: Not on file   Social Connections: Not on file   Intimate Partner Violence: Not on file   Housing Stability: Not on file     Current Outpatient Medications   Medication Sig Dispense Refill     acetaminophen (TYLENOL) 500 MG tablet Take 1,000 mg by mouth every 6 hours as needed for mild pain       acetaminophen-caffeine (EXCEDRIN TENSION HEADACHE) 500-65 MG TABS Take 2 tablets by mouth every 6 hours as needed for mild pain       albuterol (PROAIR HFA/PROVENTIL HFA/VENTOLIN HFA) " "108 (90 Base) MCG/ACT inhaler Inhale 2 puffs into the lungs every 6 hours as needed for shortness of breath / dyspnea or wheezing 18 g 0     DULoxetine (CYMBALTA) 60 MG capsule Take 1 capsule (60 mg) by mouth daily 30 capsule 1     gabapentin (NEURONTIN) 100 MG capsule Take 4 capsules (400 mg) by mouth every evening 360 capsule 3     gabapentin (NEURONTIN) 300 MG capsule Take 1 capsule (300 mg) by mouth daily 90 capsule 3     letrozole (FEMARA) 2.5 MG tablet Take 1 tablet (2.5 mg) by mouth daily 90 tablet 3     zoledronic acid (ZOMETA) 4 MG/100ML SOLN Inject 4 mg into the vein every 6 months        Allergies   Allergen Reactions     Sulfa Drugs Other (See Comments)     Red face. Ringing in the ears.     FAMILY HISTORY NOTED AND REVIEWED    REVIEW OF SYSTEMS: above    PHYSICAL EXAM    BP (!) 136/96   Pulse 75   Temp 97.1  F (36.2  C) (Temporal)   Resp 18   Ht 1.702 m (5' 7\")   Wt 64.9 kg (143 lb)   LMP 10/07/2017 (Approximate)   SpO2 98%   BMI 22.40 kg/m      Patient appears non toxic  Tympanic membranes and canals: within normal limits bilaterally.   Mouth: Posterior pharynx, mucous membranes and tongue exam within normal limits.  Neck: supple, no nuchal rigidity or masses.  No anterior or posterior cervical adenopathy.    Lungs: clear, normal flow and effort.  cv rrr    ASSESSMENT:  1. Uri, most c/w viral process, doubt covid, pneumonia, etc  2. Elevated blood pressure, ?real      PLAN:  Albuterol prn for the uri, call if not resolving or worsens  For the blood pressure she will start checking it and follow up in Oct    Peter Franklin M.D.      Peter Franklin M.D.          "

## 2022-09-28 NOTE — TELEPHONE ENCOUNTER
Per chart review, patient is already scheduled with Dr. Franklin at noon today.    12:00 PM Peter Franklin MD  FAMILY PRAC/IM     Signing encounter.     Muriel Ashraf RN  [facility]

## 2022-09-28 NOTE — NURSING NOTE
"    Initial BP:  BP (!) 141/98   Pulse 75   Temp 97.1  F (36.2  C) (Temporal)   Resp 18   Ht 1.702 m (5' 7\")   Wt 64.9 kg (143 lb)   LMP 10/07/2017 (Approximate)   SpO2 98%   BMI 22.40 kg/m       75  Disposition: provider notified while patient in the clinic      Maria Fernanda Mtz CMA    "

## 2022-09-29 ENCOUNTER — HOSPITAL ENCOUNTER (OUTPATIENT)
Dept: BONE DENSITY | Facility: CLINIC | Age: 57
Discharge: HOME | End: 2022-09-29
Attending: INTERNAL MEDICINE | Admitting: INTERNAL MEDICINE
Payer: COMMERCIAL

## 2022-09-29 DIAGNOSIS — M81.0 AGE-RELATED OSTEOPOROSIS WITHOUT CURRENT PATHOLOGICAL FRACTURE: ICD-10-CM

## 2022-09-29 PROCEDURE — 77080 DXA BONE DENSITY AXIAL: CPT

## 2022-10-06 ENCOUNTER — ONCOLOGY VISIT (OUTPATIENT)
Dept: ONCOLOGY | Facility: CLINIC | Age: 57
End: 2022-10-06
Attending: INTERNAL MEDICINE
Payer: COMMERCIAL

## 2022-10-06 VITALS
WEIGHT: 144.4 LBS | SYSTOLIC BLOOD PRESSURE: 136 MMHG | RESPIRATION RATE: 14 BRPM | DIASTOLIC BLOOD PRESSURE: 91 MMHG | TEMPERATURE: 97.6 F | BODY MASS INDEX: 22.62 KG/M2 | HEART RATE: 72 BPM | OXYGEN SATURATION: 100 %

## 2022-10-06 DIAGNOSIS — F51.01 PRIMARY INSOMNIA: ICD-10-CM

## 2022-10-06 DIAGNOSIS — T45.1X5A PERIPHERAL NEUROPATHY DUE TO CHEMOTHERAPY (H): ICD-10-CM

## 2022-10-06 DIAGNOSIS — Z17.0 MALIGNANT NEOPLASM OF UPPER-OUTER QUADRANT OF RIGHT BREAST IN FEMALE, ESTROGEN RECEPTOR POSITIVE (H): Primary | ICD-10-CM

## 2022-10-06 DIAGNOSIS — C50.411 MALIGNANT NEOPLASM OF UPPER-OUTER QUADRANT OF RIGHT BREAST IN FEMALE, ESTROGEN RECEPTOR POSITIVE (H): Primary | ICD-10-CM

## 2022-10-06 DIAGNOSIS — Z12.31 ENCOUNTER FOR SCREENING MAMMOGRAM FOR BREAST CANCER: ICD-10-CM

## 2022-10-06 DIAGNOSIS — G62.0 PERIPHERAL NEUROPATHY DUE TO CHEMOTHERAPY (H): ICD-10-CM

## 2022-10-06 DIAGNOSIS — N95.1 MENOPAUSAL SYNDROME (HOT FLASHES): ICD-10-CM

## 2022-10-06 DIAGNOSIS — M81.0 AGE-RELATED OSTEOPOROSIS WITHOUT CURRENT PATHOLOGICAL FRACTURE: ICD-10-CM

## 2022-10-06 DIAGNOSIS — M25.50 POLYARTHRALGIA: ICD-10-CM

## 2022-10-06 PROCEDURE — 99214 OFFICE O/P EST MOD 30 MIN: CPT | Performed by: INTERNAL MEDICINE

## 2022-10-06 PROCEDURE — G0463 HOSPITAL OUTPT CLINIC VISIT: HCPCS

## 2022-10-06 RX ORDER — ZOLEDRONIC ACID 0.04 MG/ML
4 INJECTION, SOLUTION INTRAVENOUS ONCE
Status: CANCELLED | OUTPATIENT
Start: 2022-12-08 | End: 2022-12-08

## 2022-10-06 RX ORDER — HEPARIN SODIUM (PORCINE) LOCK FLUSH IV SOLN 100 UNIT/ML 100 UNIT/ML
5 SOLUTION INTRAVENOUS
Status: CANCELLED | OUTPATIENT
Start: 2022-12-08

## 2022-10-06 RX ORDER — HEPARIN SODIUM,PORCINE 10 UNIT/ML
5 VIAL (ML) INTRAVENOUS
Status: CANCELLED | OUTPATIENT
Start: 2022-12-08

## 2022-10-06 ASSESSMENT — PAIN SCALES - GENERAL: PAINLEVEL: NO PAIN (0)

## 2022-10-06 NOTE — PATIENT INSTRUCTIONS
Arrange for mammogram end of 11/2022.  2.  Arrange for Zometa with lab draw early 12/2022.  3.  RTC MD in 4 months.

## 2022-10-06 NOTE — LETTER
10/6/2022         RE: Jyothi Freitas  [address]  [facility] [address]        Dear Colleague,    Thank you for referring your patient, Jyothi Freitas, to the [facility]. Please see a copy of my visit note below.    [facility]    Hematology/Oncology Established Patient Follow-up Note      Today's Date: 10/6/2022    Reason for Follow-up: Right breast cancer.    HISTORY OF PRESENT ILLNESS: Jyothi Freitas is a 56 year old female who presents with the following oncologic history:   1.  10/11/2019: Diagnostic right sided mammogram performed for a palpable lump in the right breast.  This showed an irregularly-shaped spiculated mass in the upper outer right breast with prominent lymph nodes in the right axilla.  Targeted ultrasound of the right upper outer breast showed an irregularly-shaped hypoechoic mass at the 10 o'clock position, 4 cm from the nipple measuring 2.6 x 1.5 x 2.4 cm.  Right axillary ultrasound showed moderately enlarged lymph nodes.  Right breast needle biopsy showed a grade 3 invasive ductal carcinoma with lymphovascular invasion identified, ER strongly positive at 95%, AL strongly positive at 95%, HER-2/juan FISH negative.  Right axillary lymph node measuring 2 cm was positive for metastatic carcinoma.  Note prior 4/2019 mammogram deemed normal.  2.  10/15/2019: Bilateral breast MRI showed known right breast malignancy measuring 2.6 x 1.4 x 2 cm, 3 mildly enlarged right axillary lymph nodes and no contralateral breast malignancy.  3. 10/21/2019 PET scan showed scattered small hypermetabolic bilateral axillary lymph nodes; for example, a hypermetabolic right axillary lymph node measures 1.6 x 0.9 cm with SUV max 3.6.  Hypermetabolic mass in the right breast superiorly and laterally measuring 2.1 x 1.6 cm with SUV max 4.3.  A 0.6 cm low-density lesion in the right hepatic lobe is too small for accurate PET characterization but shows no appreciable  hypermetabolic activity.  Incidentally noted ectasia of the ascending thoracic aorta measures 4 cm in diameter.  4.  10/23/2019: Left axillary ultrasound showed benign-appearing lymph node.  Left axillary lymph node biopsy showed benign lymph node tissue.  5.  10/29/2019: Started neoadjuvant chemotherapy with dose dense Adriamycin and Cytoxan, followed by weekly paclitaxel with completion on 3/12/2020.  6.  3/19/2020: Breast MRI showed no residual enhancement in the right breast mass.  The right axillary lymphadenopathy has resolved.  7. 4/01/2020: Underwent right breast lumpectomy and right axillary sentinel lymph node biopsy under care of Dr. Kaila Hernandez.  Pathology showed fibrocystic change and no evidence of residual carcinoma in the right breast.  Metastatic breast adenocarcinoma to one of 5 lymph node fragments in 1 of 3 lymph nodes excised. Extranodal extension present. ypT0-N1a.  8. 4/29/2020: Started adjuvant anastrazole.  9. 6/15/2020: Completed adjuvant radiation therapy.  10. 6/15/2020: Switched to adjuvant letrozole with some mild improvement in polyathralgias.    INTERIM HISTORY:  Jyothi tried to wean her gabapentin a bit to reduce fatigue but she experienced increase in body pains (in muscles and joints). She is sleeping well. Hot flashes are well controlled at the 400 mg dose of gabapentin but increased at the 300 mg dose.      REVIEW OF SYSTEMS:   14 point ROS was reviewed and is negative other than as noted above in HPI.       HOME MEDICATIONS:  Current Outpatient Medications   Medication Sig Dispense Refill     acetaminophen (TYLENOL) 500 MG tablet Take 1,000 mg by mouth every 6 hours as needed for mild pain       acetaminophen-caffeine (EXCEDRIN TENSION HEADACHE) 500-65 MG TABS Take 2 tablets by mouth every 6 hours as needed for mild pain       albuterol (PROAIR HFA/PROVENTIL HFA/VENTOLIN HFA) 108 (90 Base) MCG/ACT inhaler Inhale 2 puffs into the lungs every 6 hours as needed for shortness of  breath / dyspnea or wheezing 18 g 0     DULoxetine (CYMBALTA) 60 MG capsule Take 1 capsule (60 mg) by mouth daily 30 capsule 1     gabapentin (NEURONTIN) 100 MG capsule Take 4 capsules (400 mg) by mouth every evening 360 capsule 3     gabapentin (NEURONTIN) 300 MG capsule Take 1 capsule (300 mg) by mouth daily 90 capsule 3     letrozole (FEMARA) 2.5 MG tablet Take 1 tablet (2.5 mg) by mouth daily 90 tablet 3     zoledronic acid (ZOMETA) 4 MG/100ML SOLN Inject 4 mg into the vein every 6 months            ALLERGIES:  Allergies   Allergen Reactions     Sulfa Drugs Other (See Comments)     Red face. Ringing in the ears.         PAST MEDICAL HISTORY:  Past Medical History:   Diagnosis Date     GERD (gastroesophageal reflux disease)      H/O colonoscopy 03/08/2016    done at [facility] and n[facility]     Hematuria 2016    eval at [facility] and per pt cysto and ct neg     Intermittent asthma     since childhood     Low iron 10/2017    done for eval of hair loss, [facility]d [facility]     Malignant neoplasm of upper-outer quadrant of right breast in female, estrogen receptor positive (H)     had chemo, lumpectomy, onc is Dr. Monroe     Migraines     most of adult life, has seen neuro in past, on bblocker     Mild depression (H)      Normal stress echocardiogram 07/2011    due to hear racing     Osteopenia 2008     Other osteoporosis without current pathological fracture 2020    iv zometa every 6 months     Syncope 03/2017    echo nl lv size and fxn, grade 1 dd, mild tr         PAST SURGICAL HISTORY:  Past Surgical History:   Procedure Laterality Date     BIOPSY NODE SENTINEL Right 4/1/2020    Procedure: RIGHT SENTINEL LYMPH NODE BIOPSY;  Surgeon: Kaila Hernandez MD;  Location:  OR     ESOPHAGOSCOPY, GASTROSCOPY, DUODENOSCOPY (EGD), COMBINED N/A 10/30/2017    Procedure: COMBINED ESOPHAGOSCOPY, GASTROSCOPY, DUODENOSCOPY (EGD), BIOPSY SINGLE OR MULTIPLE;  COMBINED ESOPHAGOSCOPY, GASTROSCOPY, DUODENOSCOPY (EGD);  Surgeon: Martin Rodriguez MD;   "Location:  GI     INSERT PORT VASCULAR ACCESS N/A 10/24/2019    Procedure: PORT PLACEMENT;  Surgeon: Kaila Hernandez MD;  Location:  OR     LUMPECTOMY BREAST WITH SEED LOCALIZATION Right 2020    Procedure: SEED LOCALIZED RIGHT BREAST LUMPECTOMY WITH SEED LOCALIZED RIGHT AXILLARY NODE EXCISION;  Surgeon: Kaila Hernandez MD;  Location:  OR     ORTHOPEDIC SURGERY      \"leg surgery\"     REMOVE PORT VASCULAR ACCESS Left 2020    Procedure: REMOVAL, VASCULAR ACCESS PORT;  Surgeon: Kaila Hernandez MD;  Location:  OR     single oopherectomy  2010    cyst     ZZC TMJ ARTHROSCOPY/SURGERY           SOCIAL HISTORY:  Social History     Socioeconomic History     Marital status:      Spouse name: Not on file     Number of children: 2     Years of education: Not on file     Highest education level: Not on file   Occupational History     Not on file   Tobacco Use     Smoking status: Former Smoker     Packs/day: 0.00     Quit date: 3/27/1997     Years since quittin.5     Smokeless tobacco: Never Used   Substance and Sexual Activity     Alcohol use: Yes     Comment: rarely     Drug use: No     Sexual activity: Yes     Partners: Male     Birth control/protection: Pill   Other Topics Concern     Parent/sibling w/ CABG, MI or angioplasty before 65F 55M? Yes   Social History Narrative     Not on file     Social Determinants of Health     Financial Resource Strain: Not on file   Food Insecurity: Not on file   Transportation Needs: Not on file   Physical Activity: Not on file   Stress: Not on file   Social Connections: Not on file   Intimate Partner Violence: Not on file   Housing Stability: Not on file         FAMILY HISTORY:  Family History   Problem Relation Age of Onset     Coronary Artery Disease Father 48        MI. and one in his 60s     Emphysema Mother         COPD         PHYSICAL EXAM:  Vital signs:  BP (!) 136/91   Pulse 72   Temp 97.6  F (36.4  C) (Oral)   Resp 14   Wt 65.5 kg (144 lb 6.4 " oz)   LMP 10/07/2017 (Approximate)   SpO2 100%   BMI 22.62 kg/m    GENERAL/CONSTITUTIONAL: No acute distress.  EYES: No erythema or scleral icterus.  LYMPH: No cervical, supraclavicular, axillary, epitroclear adenopathy.   BREAST: The left breast droops slightly lower than right breast. Nodular scar tissue above the right lumpectomy incision is stable compared to prior exam. Nipples are everted bilaterally with no discharge. No erythema, ulceration, or dimpling of the skin.  RESPIRATORY: No audible cough or wheezing.  GASTROINTESTINAL: No hepatosplenomegaly, masses, or tenderness. No guarding.  No distention.  MUSCULOSKELETAL: Warm and well-perfused, no cyanosis, clubbing, or edema.  NEUROLOGIC: No focal motor deficits. Alert, oriented, answers questions appropriately.  INTEGUMENTARY: No rashes or jaundice.  GAIT: Steady, does not use assistive device    LABS:  CBC RESULTS: Recent Labs   Lab Test 05/13/21  1538   WBC 6.7   RBC 4.76   HGB 14.0   HCT 42.3   MCV 89   MCH 29.4   MCHC 33.1   RDW 11.9        Last Comprehensive Metabolic Panel:  Sodium   Date Value Ref Range Status   10/20/2020 139 133 - 144 mmol/L Final     Potassium   Date Value Ref Range Status   10/20/2020 4.2 3.4 - 5.3 mmol/L Final     Chloride   Date Value Ref Range Status   10/20/2020 107 94 - 109 mmol/L Final     Carbon Dioxide   Date Value Ref Range Status   10/20/2020 29 20 - 32 mmol/L Final     Anion Gap   Date Value Ref Range Status   10/20/2020 3 3 - 14 mmol/L Final     Glucose   Date Value Ref Range Status   10/20/2020 78 70 - 99 mg/dL Final     Urea Nitrogen   Date Value Ref Range Status   10/20/2020 13 7 - 30 mg/dL Final     Creatinine   Date Value Ref Range Status   06/16/2022 0.62 0.52 - 1.04 mg/dL Final   05/13/2021 0.76 0.52 - 1.04 mg/dL Final     GFR Estimate   Date Value Ref Range Status   06/16/2022 >90 >60 mL/min/1.73m2 Final     Comment:     Effective December 21, 2021 eGFRcr in adults is calculated using the 2021 CKD-EPI  creatinine equation which includes age and gender (Fernie et al., NEJ, DOI: 10.1056/TGIYfl1723182)   2021 87 >60 mL/min/[1.73_m2] Final     Comment:     Non  GFR Calc  Starting 2018, serum creatinine based estimated GFR (eGFR) will be   calculated using the Chronic Kidney Disease Epidemiology Collaboration   (CKD-EPI) equation.       Calcium   Date Value Ref Range Status   2022 9.9 8.5 - 10.1 mg/dL Final   2021 9.4 8.5 - 10.1 mg/dL Final     Bilirubin Total   Date Value Ref Range Status   10/20/2020 0.2 0.2 - 1.3 mg/dL Final     Alkaline Phosphatase   Date Value Ref Range Status   10/20/2020 76 40 - 150 U/L Final     ALT   Date Value Ref Range Status   10/20/2020 25 0 - 50 U/L Final     AST   Date Value Ref Range Status   10/20/2020 16 0 - 45 U/L Final       PATHOLOGY:  None new since last visit.    IMAGIN2022: DEXA scan showed osteopenia.    2021: Mammogram showed heterogeneously dense breast tissue with no suspicious findings.    2022: Breast MRI showed no abnormal enhancement or suspicious findings.    ASSESSMENT/PLAN:  Jyothi Freitas is a 56 year old female with the following issues:  1.  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 Polyarthralgias  --Jyothi has no clinical evidence for recurrent breast cancer by physical exam from today or breast MRI reviewed from 2022.  --Jyothi is on letrozole and tolerating this overall better than anastrozole with relatively less polyarthralgias.  --I recommended up to 10 years of letrozole, if tolerated, to maximally reduce risk of breast cancer recurrence.  --Will continue to alternate mammogram with breast MRI, staggered by 6 months. Next mammogram due 2022.  --Would only recommend lab workup and imaging workup if concerning signs/symptoms arise.    3. Osteoporosis  --DEXA  scan results from 10/13/2020 showed osteoporosis.  --Repeat DEXA reviewed from 9/29/2022 showed improvement to osteopenia.   --Recommended adequate calcium and vitamin D, weight-bearing exercise and continuing zoledronic acid every 6 months for at least 6 or more doses, next due 12/8/2022.    --Will repeat DEXA scan in 2024.    4.  Insomnia   5.  Hot flashes, drug induced  -Oxybutynin did help with hot flashes but she had too much dry eye syndrome and fluid retention.   -She has had significant improvement in her hot flashes and insomnia with the use of gabapentin, but I recommended trying to taper from 400 mg dosage again to see if gabapentin is contributing to her fatigue. Advised trying acupuncture.  -She is no longer using Ambien.    6. Ectasia of thoracic aorta  --This measured 4 cm on prior PET scan.    --Surgical management not needed but she will need ongoing monitoring. She is following with Dr. Silverio Gooden for monitoring of this issue.    7. Peripheral neuropathy  --Hip pain has improved.  --Continue duloxetine at 60 mg once daily. I recommended against trying to decrease duloxetine as this is less likely causing her fatigue.    8. High blood pressure  --I recommended Jyothi see Dr. Franklin to recheck blood pressure and lipid panel.  --Discussed that letrozole can increase blood pressure and cholesterol levels.     Return in 4 months.    Maricarmen Monroe MD  Hematology/Oncology  [facility]    Total time spent: 30 minutes in patient evaluation, counseling, documentation, and coordination of care.    Oncology Rooming Note    October 6, 2022 8:01 AM   Jyothi Freitas is a 56 year old female who presents for:    Chief Complaint   Patient presents with     Oncology Clinic Visit     Initial Vitals: BP (!) 136/91   Pulse 72   Temp 97.6  F (36.4  C) (Oral)   Resp 14   Wt 65.5 kg (144 lb 6.4 oz)   LMP 10/07/2017 (Approximate)   SpO2 100%   BMI 22.62 kg/m   Estimated body mass index is 22.62  "kg/m  as calculated from the following:    Height as of 9/28/22: 1.702 m (5' 7\").    Weight as of this encounter: 65.5 kg (144 lb 6.4 oz). Body surface area is 1.76 meters squared.  No Pain (0) Comment: Data Unavailable   Patient's last menstrual period was 10/07/2017 (approximate).  Allergies reviewed: Yes  Medications reviewed: Yes    Medications: MEDICATION REFILLS NEEDED TODAY. Provider was notified.  Pharmacy name entered into [employer]: [id number] - [place], [place] - [address]    Femcasper, Kennymbalta, Gabapentin    Clinical concerns: fatigue Dr. Monroe was notified.      Shari J. Schoenberger, [facility]                Again, thank you for allowing me to participate in the care of your patient.        Sincerely,        Maricarmen Monroe MD    "

## 2022-10-06 NOTE — PROGRESS NOTES
"Oncology Rooming Note    October 6, 2022 8:01 AM   yJothi Freitas is a 56 year old female who presents for:    Chief Complaint   Patient presents with     Oncology Clinic Visit     Initial Vitals: BP (!) 136/91   Pulse 72   Temp 97.6  F (36.4  C) (Oral)   Resp 14   Wt 65.5 kg (144 lb 6.4 oz)   LMP 10/07/2017 (Approximate)   SpO2 100%   BMI 22.62 kg/m   Estimated body mass index is 22.62 kg/m  as calculated from the following:    Height as of 9/28/22: 1.702 m (5' 7\").    Weight as of this encounter: 65.5 kg (144 lb 6.4 oz). Body surface area is 1.76 meters squared.  No Pain (0) Comment: Data Unavailable   Patient's last menstrual period was 10/07/2017 (approximate).  Allergies reviewed: Yes  Medications reviewed: Yes    Medications: MEDICATION REFILLS NEEDED TODAY. Provider was notified.  Pharmacy name entered into [employer]: [id number] - [place], [place] - [address]    Femcasper, Kennymbalta, Gabapentin    Clinical concerns: fatigue Dr. Monroe was notified.      Shari J. Schoenberger, [facility]            "

## 2022-10-22 ENCOUNTER — HEALTH MAINTENANCE LETTER (OUTPATIENT)
Age: 57
End: 2022-10-22

## 2022-11-07 DIAGNOSIS — C77.3 SECONDARY AND UNSPECIFIED MALIGNANT NEOPLASM OF AXILLA AND UPPER LIMB LYMPH NODES (H): ICD-10-CM

## 2022-11-07 DIAGNOSIS — N95.1 MENOPAUSAL SYNDROME (HOT FLASHES): ICD-10-CM

## 2022-11-07 DIAGNOSIS — C50.411 MALIGNANT NEOPLASM OF UPPER-OUTER QUADRANT OF RIGHT BREAST IN FEMALE, ESTROGEN RECEPTOR POSITIVE (H): ICD-10-CM

## 2022-11-07 DIAGNOSIS — Z17.0 MALIGNANT NEOPLASM OF UPPER-OUTER QUADRANT OF RIGHT BREAST IN FEMALE, ESTROGEN RECEPTOR POSITIVE (H): ICD-10-CM

## 2022-11-07 RX ORDER — DULOXETIN HYDROCHLORIDE 60 MG/1
60 CAPSULE, DELAYED RELEASE ORAL DAILY
Qty: 90 CAPSULE | Refills: 3 | Status: SHIPPED | OUTPATIENT
Start: 2022-11-07 | End: 2023-03-21

## 2022-11-07 RX ORDER — DULOXETIN HYDROCHLORIDE 60 MG/1
60 CAPSULE, DELAYED RELEASE ORAL DAILY
Qty: 30 CAPSULE | Refills: 11 | Status: CANCELLED | OUTPATIENT
Start: 2022-11-07

## 2022-11-09 DIAGNOSIS — Z17.0 MALIGNANT NEOPLASM OF UPPER-OUTER QUADRANT OF RIGHT BREAST IN FEMALE, ESTROGEN RECEPTOR POSITIVE (H): ICD-10-CM

## 2022-11-09 DIAGNOSIS — C50.411 MALIGNANT NEOPLASM OF UPPER-OUTER QUADRANT OF RIGHT BREAST IN FEMALE, ESTROGEN RECEPTOR POSITIVE (H): ICD-10-CM

## 2022-11-09 RX ORDER — LETROZOLE 2.5 MG/1
2.5 TABLET, FILM COATED ORAL DAILY
Qty: 90 TABLET | Refills: 3 | Status: SHIPPED | OUTPATIENT
Start: 2022-11-09 | End: 2023-11-21

## 2022-12-09 ENCOUNTER — INFUSION THERAPY VISIT (OUTPATIENT)
Dept: INFUSION THERAPY | Facility: CLINIC | Age: 57
End: 2022-12-09
Attending: INTERNAL MEDICINE
Payer: COMMERCIAL

## 2022-12-09 VITALS
HEART RATE: 74 BPM | SYSTOLIC BLOOD PRESSURE: 150 MMHG | RESPIRATION RATE: 16 BRPM | DIASTOLIC BLOOD PRESSURE: 95 MMHG | TEMPERATURE: 98.1 F | OXYGEN SATURATION: 99 %

## 2022-12-09 DIAGNOSIS — M81.0 AGE-RELATED OSTEOPOROSIS WITHOUT CURRENT PATHOLOGICAL FRACTURE: Primary | ICD-10-CM

## 2022-12-09 LAB
ALBUMIN SERPL-MCNC: 4.3 G/DL (ref 3.4–5)
CALCIUM SERPL-MCNC: 9.2 MG/DL (ref 8.5–10.1)
CREAT SERPL-MCNC: 0.72 MG/DL (ref 0.52–1.04)
GFR SERPL CREATININE-BSD FRML MDRD: >90 ML/MIN/1.73M2

## 2022-12-09 PROCEDURE — 96365 THER/PROPH/DIAG IV INF INIT: CPT

## 2022-12-09 PROCEDURE — 36415 COLL VENOUS BLD VENIPUNCTURE: CPT | Performed by: INTERNAL MEDICINE

## 2022-12-09 PROCEDURE — 250N000011 HC RX IP 250 OP 636: Performed by: INTERNAL MEDICINE

## 2022-12-09 PROCEDURE — 82310 ASSAY OF CALCIUM: CPT | Performed by: INTERNAL MEDICINE

## 2022-12-09 PROCEDURE — 82040 ASSAY OF SERUM ALBUMIN: CPT | Performed by: INTERNAL MEDICINE

## 2022-12-09 PROCEDURE — 82565 ASSAY OF CREATININE: CPT | Performed by: INTERNAL MEDICINE

## 2022-12-09 RX ORDER — ZOLEDRONIC ACID 0.04 MG/ML
4 INJECTION, SOLUTION INTRAVENOUS ONCE
Status: COMPLETED | OUTPATIENT
Start: 2022-12-09 | End: 2022-12-09

## 2022-12-09 RX ADMIN — ZOLEDRONIC ACID 4 MG: 0.04 INJECTION, SOLUTION INTRAVENOUS at 09:31

## 2022-12-09 ASSESSMENT — PAIN SCALES - GENERAL: PAINLEVEL: NO PAIN (0)

## 2022-12-09 NOTE — PROGRESS NOTES
Infusion Nursing Note:  Jyothi Freitas presents today for Zometa.    Patient seen by provider today: No   present during visit today: Not Applicable.    Note: Patient reports no concerns today other than mild fatigue. Confirms she is taking calcium and vitamin D supplements as ordered.    Intravenous Access:  Peripheral IV placed.    Treatment Conditions:  Lab Results   Component Value Date     10/20/2020    POTASSIUM 4.2 10/20/2020    MAG 2.3 10/20/2020    CR 0.72 12/09/2022    JEFF 9.2 12/09/2022    BILITOTAL 0.2 10/20/2020    ALBUMIN 4.3 12/09/2022    ALT 25 10/20/2020    AST 16 10/20/2020     Corrected calcium: 8.96  Results reviewed, labs MET treatment parameters, ok to proceed with treatment.    Post Infusion Assessment:  Patient tolerated infusion without incident.  Blood return noted pre and post infusion.  Site patent and intact, free from redness, edema or discomfort.  No evidence of extravasations.  Access discontinued per protocol.     Discharge Plan:   Discharge instructions reviewed with: Patient.  Patient and/or family verbalized understanding of discharge instructions and all questions answered.  AVS to patient via [employer].  Patient will return 2/2/23 for next appointment (clinic visit).   Patient discharged in stable condition accompanied by: self.  Departure Mode: Ambulatory.      Silvia uMnguia RN

## 2022-12-16 ENCOUNTER — HOSPITAL ENCOUNTER (OUTPATIENT)
Dept: MAMMOGRAPHY | Facility: CLINIC | Age: 57
Discharge: HOME | End: 2022-12-16
Attending: INTERNAL MEDICINE | Admitting: INTERNAL MEDICINE
Payer: COMMERCIAL

## 2022-12-16 DIAGNOSIS — Z12.31 ENCOUNTER FOR SCREENING MAMMOGRAM FOR BREAST CANCER: ICD-10-CM

## 2022-12-16 DIAGNOSIS — C50.411 MALIGNANT NEOPLASM OF UPPER-OUTER QUADRANT OF RIGHT BREAST IN FEMALE, ESTROGEN RECEPTOR POSITIVE (H): ICD-10-CM

## 2022-12-16 DIAGNOSIS — Z17.0 MALIGNANT NEOPLASM OF UPPER-OUTER QUADRANT OF RIGHT BREAST IN FEMALE, ESTROGEN RECEPTOR POSITIVE (H): ICD-10-CM

## 2022-12-16 PROCEDURE — 77067 SCR MAMMO BI INCL CAD: CPT

## 2023-01-26 NOTE — PROGRESS NOTES
[facility]    Hematology/Oncology Established Patient Follow-up Note      Today's Date: 2/2/2023    Reason for Follow-up: Right breast cancer.    HISTORY OF PRESENT ILLNESS: Jyothi Freitas is a 57 year old female who presents with the following oncologic history:   1.  10/11/2019: Diagnostic right sided mammogram performed for a palpable lump in the right breast.  This showed an irregularly-shaped spiculated mass in the upper outer right breast with prominent lymph nodes in the right axilla.  Targeted ultrasound of the right upper outer breast showed an irregularly-shaped hypoechoic mass at the 10 o'clock position, 4 cm from the nipple measuring 2.6 x 1.5 x 2.4 cm.  Right axillary ultrasound showed moderately enlarged lymph nodes.  Right breast needle biopsy showed a grade 3 invasive ductal carcinoma with lymphovascular invasion identified, ER strongly positive at 95%, CO strongly positive at 95%, HER-2/juan FISH negative.  Right axillary lymph node measuring 2 cm was positive for metastatic carcinoma.  Note prior 4/2019 mammogram deemed normal.  2.  10/15/2019: Bilateral breast MRI showed known right breast malignancy measuring 2.6 x 1.4 x 2 cm, 3 mildly enlarged right axillary lymph nodes and no contralateral breast malignancy.  3. 10/21/2019 PET scan showed scattered small hypermetabolic bilateral axillary lymph nodes; for example, a hypermetabolic right axillary lymph node measures 1.6 x 0.9 cm with SUV max 3.6.  Hypermetabolic mass in the right breast superiorly and laterally measuring 2.1 x 1.6 cm with SUV max 4.3.  A 0.6 cm low-density lesion in the right hepatic lobe is too small for accurate PET characterization but shows no appreciable hypermetabolic activity.  Incidentally noted ectasia of the ascending thoracic aorta measures 4 cm in diameter.  4.  10/23/2019: Left axillary ultrasound showed benign-appearing lymph node.  Left axillary lymph node biopsy showed benign lymph node tissue.  5.   10/29/2019: Started neoadjuvant chemotherapy with dose dense Adriamycin and Cytoxan, followed by weekly paclitaxel with completion on 3/12/2020.  6.  3/19/2020: Breast MRI showed no residual enhancement in the right breast mass.  The right axillary lymphadenopathy has resolved.  7. 4/01/2020: Underwent right breast lumpectomy and right axillary sentinel lymph node biopsy under care of Dr. Kaila Hernandez.  Pathology showed fibrocystic change and no evidence of residual carcinoma in the right breast.  Metastatic breast adenocarcinoma to one of 5 lymph node fragments in 1 of 3 lymph nodes excised. Extranodal extension present. ypT0-N1a.  8. 4/29/2020: Started adjuvant anastrozole.  9. 6/15/2020: Completed adjuvant radiation therapy.  10. 6/15/2020: Switched to adjuvant letrozole with some mild improvement in polyathralgias.    INTERIM HISTORY:  Jyothi feel her body pain and peripheral neuropathy are well controlled. She has only mild hot flashes 1-2 per day on average.      REVIEW OF SYSTEMS:   14 point ROS was reviewed and is negative other than as noted above in HPI.       HOME MEDICATIONS:  Current Outpatient Medications   Medication Sig Dispense Refill     acetaminophen (TYLENOL) 500 MG tablet Take 1,000 mg by mouth every 6 hours as needed for mild pain       acetaminophen-caffeine (EXCEDRIN TENSION HEADACHE) 500-65 MG TABS Take 2 tablets by mouth every 6 hours as needed for mild pain       albuterol (PROAIR HFA/PROVENTIL HFA/VENTOLIN HFA) 108 (90 Base) MCG/ACT inhaler Inhale 2 puffs into the lungs every 6 hours as needed for shortness of breath / dyspnea or wheezing 18 g 0     Calcium Carb-Cholecalciferol (CALCIUM 500 + D PO) Take by mouth 2 times daily       DULoxetine (CYMBALTA) 60 MG capsule Take 1 capsule (60 mg) by mouth daily 90 capsule 3     gabapentin (NEURONTIN) 100 MG capsule Take 4 capsules (400 mg) by mouth every evening 360 capsule 3     gabapentin (NEURONTIN) 300 MG capsule Take 1 capsule (300 mg) by  mouth daily 90 capsule 3     letrozole (FEMARA) 2.5 MG tablet Take 1 tablet (2.5 mg) by mouth daily 90 tablet 3     zoledronic acid (ZOMETA) 4 MG/100ML SOLN Inject 4 mg into the vein every 6 months            ALLERGIES:  Allergies   Allergen Reactions     Sulfa Drugs Other (See Comments)     Red face. Ringing in the ears.         PAST MEDICAL HISTORY:  Past Medical History:   Diagnosis Date     GERD (gastroesophageal reflux disease)      H/O colonoscopy 03/08/2016    done at [facility] and [facility]     Hematuria 2016    eval at [facility] and per pt cysto and ct neg     Intermittent asthma     since childhood     Low iron 10/2017    done for eval of hair loss, [facility]d [facility]     Malignant neoplasm of upper-outer quadrant of right breast in female, estrogen receptor positive (H) 10/2019    had chemo, lumpectomy, onc is Dr. Monroe     Migraines     most of adult life, has seen neuro in past, on bblocker     Mild depression      Normal stress echocardiogram 07/2011    due to hear racing     Osteopenia 2008     Other osteoporosis without current pathological fracture 2020    iv zometa every 6 months     Syncope 03/2017    echo n[facility] lv size and fxn, grade 1 dd, mild tr         PAST SURGICAL HISTORY:  Past Surgical History:   Procedure Laterality Date     BIOPSY NODE SENTINEL Right 4/1/2020    Procedure: RIGHT SENTINEL LYMPH NODE BIOPSY;  Surgeon: Kaila Hernandez MD;  Location:  OR     ESOPHAGOSCOPY, GASTROSCOPY, DUODENOSCOPY (EGD), COMBINED N/A 10/30/2017    Procedure: COMBINED ESOPHAGOSCOPY, GASTROSCOPY, DUODENOSCOPY (EGD), BIOPSY SINGLE OR MULTIPLE;  COMBINED ESOPHAGOSCOPY, GASTROSCOPY, DUODENOSCOPY (EGD);  Surgeon: Martin Rodriguez MD;  Location:  GI     INSERT PORT VASCULAR ACCESS N/A 10/24/2019    Procedure: PORT PLACEMENT;  Surgeon: Kaila Hernandez MD;  Location:  OR     LUMPECTOMY BREAST WITH SEED LOCALIZATION Right 4/1/2020    Procedure: SEED LOCALIZED RIGHT BREAST LUMPECTOMY WITH SEED LOCALIZED RIGHT AXILLARY NODE EXCISION;   "Surgeon: Kaila Hernandez MD;  Location:  OR     ORTHOPEDIC SURGERY      \"leg surgery\"     REMOVE PORT VASCULAR ACCESS Left 2020    Procedure: REMOVAL, VASCULAR ACCESS PORT;  Surgeon: Kaila Hernandez MD;  Location: [facility] OR     single oopherectomy  2010    cyst     ZZC TMJ ARTHROSCOPY/SURGERY           SOCIAL HISTORY:  Social History     Socioeconomic History     Marital status:      Spouse name: Not on file     Number of children: 2     Years of education: Not on file     Highest education level: Not on file   Occupational History     Not on file   Tobacco Use     Smoking status: Former     Packs/day: 0.00     Types: Cigarettes     Quit date: 3/27/1997     Years since quittin.8     Smokeless tobacco: Never   Substance and Sexual Activity     Alcohol use: Yes     Comment: rarely     Drug use: No     Sexual activity: Yes     Partners: Male     Birth control/protection: Pill   Other Topics Concern     Parent/sibling w/ CABG, MI or angioplasty before 65F 55M? Yes   Social History Narrative     Not on file     Social Determinants of Health     Financial Resource Strain: Not on file   Food Insecurity: Not on file   Transportation Needs: Not on file   Physical Activity: Not on file   Stress: Not on file   Social Connections: Not on file   Intimate Partner Violence: Not on file   Housing Stability: Not on file         FAMILY HISTORY:  Family History   Problem Relation Age of Onset     Coronary Artery Disease Father 48        MI. and one in his 60s     Emphysema Mother         COPD         PHYSICAL EXAM:  Vital signs:  BP (!) 153/93   Pulse 80   Temp 97.4  F (36.3  C) (Oral)   Resp 14   Wt 66.3 kg (146 lb 3.2 oz)   LMP 10/07/2017 (Approximate)   SpO2 100%   BMI 22.90 kg/m    GENERAL/CONSTITUTIONAL: No acute distress.  EYES: No erythema or scleral icterus.  LYMPH: No cervical, supraclavicular, axillary, epitroclear adenopathy.   BREAST: The left breast droops slightly lower than right breast. Nodular " scar tissue above the right lumpectomy incision is stable compared to prior exam. Nipples are everted bilaterally with no discharge. No erythema, ulceration, or dimpling of the skin.  RESPIRATORY: No audible cough or wheezing.  GASTROINTESTINAL: No hepatosplenomegaly, masses, or tenderness. No guarding.  No distention.  MUSCULOSKELETAL: Warm and well-perfused, no cyanosis, clubbing, or edema.  NEUROLOGIC: No focal motor deficits. Alert, oriented, answers questions appropriately.  INTEGUMENTARY: No rashes or jaundice.  GAIT: Steady, does not use assistive device    LABS:  CBC RESULTS: Recent Labs   Lab Test 05/13/21  1538   WBC 6.7   RBC 4.76   HGB 14.0   HCT 42.3   MCV 89   MCH 29.4   MCHC 33.1   RDW 11.9        Last Comprehensive Metabolic Panel:  Sodium   Date Value Ref Range Status   10/20/2020 139 133 - 144 mmol/L Final     Potassium   Date Value Ref Range Status   10/20/2020 4.2 3.4 - 5.3 mmol/L Final     Chloride   Date Value Ref Range Status   10/20/2020 107 94 - 109 mmol/L Final     Carbon Dioxide   Date Value Ref Range Status   10/20/2020 29 20 - 32 mmol/L Final     Anion Gap   Date Value Ref Range Status   10/20/2020 3 3 - 14 mmol/L Final     Glucose   Date Value Ref Range Status   10/20/2020 78 70 - 99 mg/dL Final     Urea Nitrogen   Date Value Ref Range Status   10/20/2020 13 7 - 30 mg/dL Final     Creatinine   Date Value Ref Range Status   12/09/2022 0.72 0.52 - 1.04 mg/dL Final   05/13/2021 0.76 0.52 - 1.04 mg/dL Final     GFR Estimate   Date Value Ref Range Status   12/09/2022 >90 >60 mL/min/1.73m2 Final     Comment:     Effective December 21, 2021 eGFRcr in adults is calculated using the 2021 CKD-EPI creatinine equation which includes age and gender (Fernie et al., NEJM, DOI: 10.1056/MNHSvv3578253)   05/13/2021 87 >60 mL/min/[1.73_m2] Final     Comment:     Non  GFR Calc  Starting 12/18/2018, serum creatinine based estimated GFR (eGFR) will be   calculated using the Chronic  Kidney Disease Epidemiology Collaboration   (CKD-EPI) equation.       Calcium   Date Value Ref Range Status   2022 9.2 8.5 - 10.1 mg/dL Final   2021 9.4 8.5 - 10.1 mg/dL Final     Bilirubin Total   Date Value Ref Range Status   10/20/2020 0.2 0.2 - 1.3 mg/dL Final     Alkaline Phosphatase   Date Value Ref Range Status   10/20/2020 76 40 - 150 U/L Final     ALT   Date Value Ref Range Status   10/20/2020 25 0 - 50 U/L Final     AST   Date Value Ref Range Status   10/20/2020 16 0 - 45 U/L Final       PATHOLOGY:  None new since last visit.    IMAGIN2022: DEXA scan showed osteopenia.    2022: Mammogram showed no malignancy    2022: Breast MRI showed no abnormal enhancement or suspicious findings.    ASSESSMENT/PLAN:  Jyothi Freitas is a 57 year old female with the following issues:  1.  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 Polyarthralgias  --Jyothi has no clinical evidence for recurrent breast cancer by physical exam from today or mammogram reviewed from 2022.  --Jyothi is on letrozole and tolerating this overall better than anastrozole.  --I again recommended up to 10 years of letrozole, if tolerated, to maximally reduce risk of breast cancer recurrence. She agrees with this plan of care.  --Will continue to alternate mammogram with breast MRI, staggered by 6 months. Next breast MRI due 2023.  --Would only recommend lab workup and imaging workup if concerning signs/symptoms arise.  --Advised gentle circular massage to the right breast scar tissue area.    3. Osteoporosis/osteopenia  --DEXA scan results from 10/13/2020 showed osteoporosis.  --Repeat DEXA reviewed from 2022 showed improvement to osteopenia.   --Recommended adequate calcium and vitamin D, weight-bearing exercise and continuing zoledronic acid every 6 months for at least 6 or  more doses, next due 6/2023.    --Will repeat DEXA scan in 2024.    4.  Insomnia   5.  Hot flashes, drug induced  -Oxybutynin did help with hot flashes but she had too much dry eye syndrome and fluid retention.   -She has had significant improvement in her hot flashes and insomnia with the use of gabapentin, but I recommended trying to taper from 400 mg dosage again to see if gabapentin is contributing to her fatigue. Advised trying acupuncture.  -She is no longer using Ambien.    6. Ectasia of thoracic aorta  --This measured 4 cm on prior PET scan.    --Surgical management not needed but she will need ongoing monitoring. She is following with Dr. Silverio Gooden for monitoring of this issue.    7. Peripheral neuropathy  --Hip pain has improved.  --Continue duloxetine at 60 mg once daily. I recommended against trying to decrease duloxetine as this is less likely causing her fatigue.    8. High blood pressure  --I recommended Jyothi see Dr. Franklin to recheck blood pressure and lipid panel.  --Discussed that letrozole can increase blood pressure and cholesterol levels.     Return in 4 months.    Maricarmen Monroe MD  Hematology/Oncology  [facility]    Total time spent: 30 minutes in patient evaluation, counseling, documentation, and coordination of care.

## 2023-02-02 ENCOUNTER — ONCOLOGY VISIT (OUTPATIENT)
Dept: ONCOLOGY | Facility: CLINIC | Age: 58
End: 2023-02-02
Attending: INTERNAL MEDICINE
Payer: COMMERCIAL

## 2023-02-02 VITALS
RESPIRATION RATE: 14 BRPM | BODY MASS INDEX: 22.9 KG/M2 | SYSTOLIC BLOOD PRESSURE: 153 MMHG | OXYGEN SATURATION: 100 % | DIASTOLIC BLOOD PRESSURE: 93 MMHG | HEART RATE: 80 BPM | WEIGHT: 146.2 LBS | TEMPERATURE: 97.4 F

## 2023-02-02 DIAGNOSIS — G62.0 PERIPHERAL NEUROPATHY DUE TO CHEMOTHERAPY (H): ICD-10-CM

## 2023-02-02 DIAGNOSIS — C50.411 MALIGNANT NEOPLASM OF UPPER-OUTER QUADRANT OF RIGHT BREAST IN FEMALE, ESTROGEN RECEPTOR POSITIVE (H): Primary | ICD-10-CM

## 2023-02-02 DIAGNOSIS — M25.50 POLYARTHRALGIA: ICD-10-CM

## 2023-02-02 DIAGNOSIS — Z17.0 MALIGNANT NEOPLASM OF UPPER-OUTER QUADRANT OF RIGHT BREAST IN FEMALE, ESTROGEN RECEPTOR POSITIVE (H): Primary | ICD-10-CM

## 2023-02-02 DIAGNOSIS — T45.1X5A PERIPHERAL NEUROPATHY DUE TO CHEMOTHERAPY (H): ICD-10-CM

## 2023-02-02 DIAGNOSIS — N95.1 MENOPAUSAL SYNDROME (HOT FLASHES): ICD-10-CM

## 2023-02-02 DIAGNOSIS — F51.01 PRIMARY INSOMNIA: ICD-10-CM

## 2023-02-02 DIAGNOSIS — M81.0 AGE-RELATED OSTEOPOROSIS WITHOUT CURRENT PATHOLOGICAL FRACTURE: ICD-10-CM

## 2023-02-02 PROCEDURE — 99214 OFFICE O/P EST MOD 30 MIN: CPT | Performed by: INTERNAL MEDICINE

## 2023-02-02 PROCEDURE — G0463 HOSPITAL OUTPT CLINIC VISIT: HCPCS | Performed by: INTERNAL MEDICINE

## 2023-02-02 ASSESSMENT — PAIN SCALES - GENERAL: PAINLEVEL: NO PAIN (0)

## 2023-02-02 NOTE — PROGRESS NOTES
"Oncology Rooming Note    February 2, 2023 8:01 AM   Jyothi Freitas is a 57 year old female who presents for:    Chief Complaint   Patient presents with     Oncology Clinic Visit     Initial Vitals: BP (!) 153/93   Pulse 80   Temp 97.4  F (36.3  C) (Oral)   Resp 14   Wt 66.3 kg (146 lb 3.2 oz)   LMP 10/07/2017 (Approximate)   SpO2 100%   BMI 22.90 kg/m   Estimated body mass index is 22.9 kg/m  as calculated from the following:    Height as of 9/28/22: 1.702 m (5' 7\").    Weight as of this encounter: 66.3 kg (146 lb 3.2 oz). Body surface area is 1.77 meters squared.  No Pain (0) Comment: Data Unavailable   Patient's last menstrual period was 10/07/2017 (approximate).  Allergies reviewed: Yes  Medications reviewed: Yes    Medications: Medication refills not needed today.  Pharmacy name entered into [employer]: [id number] - [place], [place] - [address]        Shari J. Schoenberger, ROSMERY            "

## 2023-02-02 NOTE — LETTER
2/2/2023         RE: Jyothi Freitas  [address]  [facility] [address]        Dear Colleague,    Thank you for referring your patient, Jyothi Freitas, to the [facility]. Please see a copy of my visit note below.    [facility]    Hematology/Oncology Established Patient Follow-up Note      Today's Date: 2/2/2023    Reason for Follow-up: Right breast cancer.    HISTORY OF PRESENT ILLNESS: Jyothi Freitas is a 57 year old female who presents with the following oncologic history:   1.  10/11/2019: Diagnostic right sided mammogram performed for a palpable lump in the right breast.  This showed an irregularly-shaped spiculated mass in the upper outer right breast with prominent lymph nodes in the right axilla.  Targeted ultrasound of the right upper outer breast showed an irregularly-shaped hypoechoic mass at the 10 o'clock position, 4 cm from the nipple measuring 2.6 x 1.5 x 2.4 cm.  Right axillary ultrasound showed moderately enlarged lymph nodes.  Right breast needle biopsy showed a grade 3 invasive ductal carcinoma with lymphovascular invasion identified, ER strongly positive at 95%, AZ strongly positive at 95%, HER-2/juan FISH negative.  Right axillary lymph node measuring 2 cm was positive for metastatic carcinoma.  Note prior 4/2019 mammogram deemed normal.  2.  10/15/2019: Bilateral breast MRI showed known right breast malignancy measuring 2.6 x 1.4 x 2 cm, 3 mildly enlarged right axillary lymph nodes and no contralateral breast malignancy.  3. 10/21/2019 PET scan showed scattered small hypermetabolic bilateral axillary lymph nodes; for example, a hypermetabolic right axillary lymph node measures 1.6 x 0.9 cm with SUV max 3.6.  Hypermetabolic mass in the right breast superiorly and laterally measuring 2.1 x 1.6 cm with SUV max 4.3.  A 0.6 cm low-density lesion in the right hepatic lobe is too small for accurate PET characterization but shows no appreciable  hypermetabolic activity.  Incidentally noted ectasia of the ascending thoracic aorta measures 4 cm in diameter.  4.  10/23/2019: Left axillary ultrasound showed benign-appearing lymph node.  Left axillary lymph node biopsy showed benign lymph node tissue.  5.  10/29/2019: Started neoadjuvant chemotherapy with dose dense Adriamycin and Cytoxan, followed by weekly paclitaxel with completion on 3/12/2020.  6.  3/19/2020: Breast MRI showed no residual enhancement in the right breast mass.  The right axillary lymphadenopathy has resolved.  7. 4/01/2020: Underwent right breast lumpectomy and right axillary sentinel lymph node biopsy under care of Dr. Kaila Hernandez.  Pathology showed fibrocystic change and no evidence of residual carcinoma in the right breast.  Metastatic breast adenocarcinoma to one of 5 lymph node fragments in 1 of 3 lymph nodes excised. Extranodal extension present. ypT0-N1a.  8. 4/29/2020: Started adjuvant anastrozole.  9. 6/15/2020: Completed adjuvant radiation therapy.  10. 6/15/2020: Switched to adjuvant letrozole with some mild improvement in polyathralgias.    INTERIM HISTORY:  Jyothi feel her body pain and peripheral neuropathy are well controlled. She has only mild hot flashes 1-2 per day on average.      REVIEW OF SYSTEMS:   14 point ROS was reviewed and is negative other than as noted above in HPI.       HOME MEDICATIONS:  Current Outpatient Medications   Medication Sig Dispense Refill     acetaminophen (TYLENOL) 500 MG tablet Take 1,000 mg by mouth every 6 hours as needed for mild pain       acetaminophen-caffeine (EXCEDRIN TENSION HEADACHE) 500-65 MG TABS Take 2 tablets by mouth every 6 hours as needed for mild pain       albuterol (PROAIR HFA/PROVENTIL HFA/VENTOLIN HFA) 108 (90 Base) MCG/ACT inhaler Inhale 2 puffs into the lungs every 6 hours as needed for shortness of breath / dyspnea or wheezing 18 g 0     Calcium Carb-Cholecalciferol (CALCIUM 500 + D PO) Take by mouth 2 times daily        DULoxetine (CYMBALTA) 60 MG capsule Take 1 capsule (60 mg) by mouth daily 90 capsule 3     gabapentin (NEURONTIN) 100 MG capsule Take 4 capsules (400 mg) by mouth every evening 360 capsule 3     gabapentin (NEURONTIN) 300 MG capsule Take 1 capsule (300 mg) by mouth daily 90 capsule 3     letrozole (FEMARA) 2.5 MG tablet Take 1 tablet (2.5 mg) by mouth daily 90 tablet 3     zoledronic acid (ZOMETA) 4 MG/100ML SOLN Inject 4 mg into the vein every 6 months            ALLERGIES:  Allergies   Allergen Reactions     Sulfa Drugs Other (See Comments)     Red face. Ringing in the ears.         PAST MEDICAL HISTORY:  Past Medical History:   Diagnosis Date     GERD (gastroesophageal reflux disease)      H/O colonoscopy 03/08/2016    done at [place] and [facility]     Hematuria 2016    eval at [place] and per pt cysto and ct neg     Intermittent asthma     since childhood     Low iron 10/2017    done for eval of hair loss, egd nl     Malignant neoplasm of upper-outer quadrant of right breast in female, estrogen receptor positive (H) 10/2019    had chemo, lumpectomy, onc is Dr. Monroe     Migraines     most of adult life, has seen neuro in past, on bblocker     Mild depression      Normal stress echocardiogram 07/2011    due to hear racing     Osteopenia 2008     Other osteoporosis without current pathological fracture 2020    iv zometa every 6 months     Syncope 03/2017    echo nl lv size and fxn, grade 1 dd, mild tr         PAST SURGICAL HISTORY:  Past Surgical History:   Procedure Laterality Date     BIOPSY NODE SENTINEL Right 4/1/2020    Procedure: RIGHT SENTINEL LYMPH NODE BIOPSY;  Surgeon: Kaila Hernandez MD;  Location:  OR     ESOPHAGOSCOPY, GASTROSCOPY, DUODENOSCOPY (EGD), COMBINED N/A 10/30/2017    Procedure: COMBINED ESOPHAGOSCOPY, GASTROSCOPY, DUODENOSCOPY (EGD), BIOPSY SINGLE OR MULTIPLE;  COMBINED ESOPHAGOSCOPY, GASTROSCOPY, DUODENOSCOPY (EGD);  Surgeon: Martin Rodriguez MD;  Location:  GI     INSERT PORT VASCULAR  "ACCESS N/A 10/24/2019    Procedure: PORT PLACEMENT;  Surgeon: Kaila Hernandez MD;  Location: [facility] OR     LUMPECTOMY BREAST WITH SEED LOCALIZATION Right 2020    Procedure: SEED LOCALIZED RIGHT BREAST LUMPECTOMY WITH SEED LOCALIZED RIGHT AXILLARY NODE EXCISION;  Surgeon: Kaila Hernandez MD;  Location:  OR     ORTHOPEDIC SURGERY      \"leg surgery\"     REMOVE PORT VASCULAR ACCESS Left 2020    Procedure: REMOVAL, VASCULAR ACCESS PORT;  Surgeon: Kaila Hernandez MD;  Location: [facility] OR     single oopherectomy  2010    cyst     ZZC TMJ ARTHROSCOPY/SURGERY           SOCIAL HISTORY:  Social History     Socioeconomic History     Marital status:      Spouse name: Not on file     Number of children: 2     Years of education: Not on file     Highest education level: Not on file   Occupational History     Not on file   Tobacco Use     Smoking status: Former     Packs/day: 0.00     Types: Cigarettes     Quit date: 3/27/1997     Years since quittin.8     Smokeless tobacco: Never   Substance and Sexual Activity     Alcohol use: Yes     Comment: rarely     Drug use: No     Sexual activity: Yes     Partners: Male     Birth control/protection: Pill   Other Topics Concern     Parent/sibling w/ CABG, MI or angioplasty before 65F 55M? Yes   Social History Narrative     Not on file     Social Determinants of Health     Financial Resource Strain: Not on file   Food Insecurity: Not on file   Transportation Needs: Not on file   Physical Activity: Not on file   Stress: Not on file   Social Connections: Not on file   Intimate Partner Violence: Not on file   Housing Stability: Not on file         FAMILY HISTORY:  Family History   Problem Relation Age of Onset     Coronary Artery Disease Father 48        MI. and one in his 60s     Emphysema Mother         COPD         PHYSICAL EXAM:  Vital signs:  BP (!) 153/93   Pulse 80   Temp 97.4  F (36.3  C) (Oral)   Resp 14   Wt 66.3 kg (146 lb 3.2 oz)   LMP 10/07/2017 " (Approximate)   SpO2 100%   BMI 22.90 kg/m    GENERAL/CONSTITUTIONAL: No acute distress.  EYES: No erythema or scleral icterus.  LYMPH: No cervical, supraclavicular, axillary, epitroclear adenopathy.   BREAST: The left breast droops slightly lower than right breast. Nodular scar tissue above the right lumpectomy incision is stable compared to prior exam. Nipples are everted bilaterally with no discharge. No erythema, ulceration, or dimpling of the skin.  RESPIRATORY: No audible cough or wheezing.  GASTROINTESTINAL: No hepatosplenomegaly, masses, or tenderness. No guarding.  No distention.  MUSCULOSKELETAL: Warm and well-perfused, no cyanosis, clubbing, or edema.  NEUROLOGIC: No focal motor deficits. Alert, oriented, answers questions appropriately.  INTEGUMENTARY: No rashes or jaundice.  GAIT: Steady, does not use assistive device    LABS:  CBC RESULTS: Recent Labs   Lab Test 05/13/21  1538   WBC 6.7   RBC 4.76   HGB 14.0   HCT 42.3   MCV 89   MCH 29.4   MCHC 33.1   RDW 11.9        Last Comprehensive Metabolic Panel:  Sodium   Date Value Ref Range Status   10/20/2020 139 133 - 144 mmol/L Final     Potassium   Date Value Ref Range Status   10/20/2020 4.2 3.4 - 5.3 mmol/L Final     Chloride   Date Value Ref Range Status   10/20/2020 107 94 - 109 mmol/L Final     Carbon Dioxide   Date Value Ref Range Status   10/20/2020 29 20 - 32 mmol/L Final     Anion Gap   Date Value Ref Range Status   10/20/2020 3 3 - 14 mmol/L Final     Glucose   Date Value Ref Range Status   10/20/2020 78 70 - 99 mg/dL Final     Urea Nitrogen   Date Value Ref Range Status   10/20/2020 13 7 - 30 mg/dL Final     Creatinine   Date Value Ref Range Status   12/09/2022 0.72 0.52 - 1.04 mg/dL Final   05/13/2021 0.76 0.52 - 1.04 mg/dL Final     GFR Estimate   Date Value Ref Range Status   12/09/2022 >90 >60 mL/min/1.73m2 Final     Comment:     Effective December 21, 2021 eGFRcr in adults is calculated using the 2021 CKD-EPI creatinine equation  which includes age and gender (Fernie green al., NEJ, DOI: 10.1056/CFNQug1770588)   2021 87 >60 mL/min/[1.73_m2] Final     Comment:     Non  GFR Calc  Starting 2018, serum creatinine based estimated GFR (eGFR) will be   calculated using the Chronic Kidney Disease Epidemiology Collaboration   (CKD-EPI) equation.       Calcium   Date Value Ref Range Status   2022 9.2 8.5 - 10.1 mg/dL Final   2021 9.4 8.5 - 10.1 mg/dL Final     Bilirubin Total   Date Value Ref Range Status   10/20/2020 0.2 0.2 - 1.3 mg/dL Final     Alkaline Phosphatase   Date Value Ref Range Status   10/20/2020 76 40 - 150 U/L Final     ALT   Date Value Ref Range Status   10/20/2020 25 0 - 50 U/L Final     AST   Date Value Ref Range Status   10/20/2020 16 0 - 45 U/L Final       PATHOLOGY:  None new since last visit.    IMAGIN2022: DEXA scan showed osteopenia.    2022: Mammogram showed no malignancy    2022: Breast MRI showed no abnormal enhancement or suspicious findings.    ASSESSMENT/PLAN:  Jyothi Freitas is a 57 year old female with the following issues:  1.  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 Polyarthralgias  --Jyothi has no clinical evidence for recurrent breast cancer by physical exam from today or mammogram reviewed from 2022.  --Jyothi is on letrozole and tolerating this overall better than anastrozole.  --I again recommended up to 10 years of letrozole, if tolerated, to maximally reduce risk of breast cancer recurrence. She agrees with this plan of care.  --Will continue to alternate mammogram with breast MRI, staggered by 6 months. Next breast MRI due 2023.  --Would only recommend lab workup and imaging workup if concerning signs/symptoms arise.  --Advised gentle circular massage to the right breast scar tissue area.    3.  Osteoporosis/osteopenia  --DEXA scan results from 10/13/2020 showed osteoporosis.  --Repeat DEXA reviewed from 9/29/2022 showed improvement to osteopenia.   --Recommended adequate calcium and vitamin D, weight-bearing exercise and continuing zoledronic acid every 6 months for at least 6 or more doses, next due 6/2023.    --Will repeat DEXA scan in 2024.    4.  Insomnia   5.  Hot flashes, drug induced  -Oxybutynin did help with hot flashes but she had too much dry eye syndrome and fluid retention.   -She has had significant improvement in her hot flashes and insomnia with the use of gabapentin, but I recommended trying to taper from 400 mg dosage again to see if gabapentin is contributing to her fatigue. Advised trying acupuncture.  -She is no longer using Ambien.    6. Ectasia of thoracic aorta  --This measured 4 cm on prior PET scan.    --Surgical management not needed but she will need ongoing monitoring. She is following with Dr. Silverio Gooden for monitoring of this issue.    7. Peripheral neuropathy  --Hip pain has improved.  --Continue duloxetine at 60 mg once daily. I recommended against trying to decrease duloxetine as this is less likely causing her fatigue.    8. High blood pressure  --I recommended Jyothi see Dr. Franklin to recheck blood pressure and lipid panel.  --Discussed that letrozole can increase blood pressure and cholesterol levels.     Return in 4 months.    Maricarmen Monroe MD  Hematology/Oncology  [facility]    Total time spent: 30 minutes in patient evaluation, counseling, documentation, and coordination of care.    Oncology Rooming Note    February 2, 2023 8:01 AM   Jyothi Freitas is a 57 year old female who presents for:    Chief Complaint   Patient presents with     Oncology Clinic Visit     Initial Vitals: BP (!) 153/93   Pulse 80   Temp 97.4  F (36.3  C) (Oral)   Resp 14   Wt 66.3 kg (146 lb 3.2 oz)   LMP 10/07/2017 (Approximate)   SpO2 100%   BMI 22.90 kg/m    "Estimated body mass index is 22.9 kg/m  as calculated from the following:    Height as of 9/28/22: 1.702 m (5' 7\").    Weight as of this encounter: 66.3 kg (146 lb 3.2 oz). Body surface area is 1.77 meters squared.  No Pain (0) Comment: Data Unavailable   Patient's last menstrual period was 10/07/2017 (approximate).  Allergies reviewed: Yes  Medications reviewed: Yes    Medications: Medication refills not needed today.  Pharmacy name entered into [facility]: [id number] - [place], [place] - [address]        [facility], [facility]                Again, thank you for allowing me to participate in the care of your patient.        Sincerely,        Maricarmen Monroe MD    "

## 2023-02-03 ENCOUNTER — E-VISIT (OUTPATIENT)
Dept: URGENT CARE | Facility: CLINIC | Age: 58
End: 2023-02-03
Payer: COMMERCIAL

## 2023-02-03 DIAGNOSIS — N39.0 ACUTE UTI (URINARY TRACT INFECTION): Primary | ICD-10-CM

## 2023-02-03 PROCEDURE — 99421 OL DIG E/M SVC 5-10 MIN: CPT | Performed by: PHYSICIAN ASSISTANT

## 2023-02-03 RX ORDER — PHENAZOPYRIDINE HYDROCHLORIDE 100 MG/1
100 TABLET, FILM COATED ORAL 3 TIMES DAILY PRN
Qty: 6 TABLET | Refills: 0 | Status: SHIPPED | OUTPATIENT
Start: 2023-02-03 | End: 2023-03-21

## 2023-02-03 RX ORDER — NITROFURANTOIN 25; 75 MG/1; MG/1
100 CAPSULE ORAL 2 TIMES DAILY
Qty: 10 CAPSULE | Refills: 0 | Status: SHIPPED | OUTPATIENT
Start: 2023-02-03 | End: 2023-02-08

## 2023-02-03 NOTE — PATIENT INSTRUCTIONS
Dear Jyothi Freitas    After reviewing your responses, I've been able to diagnose you with a urinary tract infection, which is a common infection of the bladder with bacteria.  This is not a sexually transmitted infection, though urinating immediately after intercourse can help prevent infections.  Drinking lots of fluids is also helpful to clear your current infection and prevent the next one.      I have sent a prescription for antibiotics to your pharmacy to treat this infection.    It is important that you take all of your prescribed medication even if your symptoms are improving after a few doses.  Taking all of your medicine helps prevent the symptoms from returning.     If your symptoms worsen, you develop pain in your back or stomach, develop fevers, or are not improving in 5 days, please contact your primary care provider for an appointment or visit any of our convenient Walk-in or Urgent Care Centers to be seen, which can be found on our website here.    Thanks again for choosing us as your health care partner,    Vlad Lu, [facility], PA-C    Urinary Tract Infections in Women  Urinary tract infections (UTIs) are most often caused by bacteria. These bacteria enter the urinary tract. The bacteria may come from inside the body. Or they may travel from the skin outside the rectum or vagina into the urethra. Female anatomy makes it easy for bacteria from the bowel to enter a woman s urinary tract, which is the most common source of UTI. This means women develop UTIs more often than men. Pain in or around the urinary tract is a common UTI symptom. But the only way to know for sure if you have a UTI for the healthcare provider to test your urine. The two tests that may be done are the urinalysis and urine culture.     Types of UTIs    Cystitis. A bladder infection (cystitis) is the most common UTI in women. You may have urgent or frequent need to pee. You may also have pain, burning when you pee, and bloody  urine.    Urethritis. This is an inflamed urethra, which is the tube that carries urine from the bladder to outside the body. You may have lower stomach or back pain. You may also have urgent or frequent need to pee.    Pyelonephritis. This is a kidney infection. If not treated, it can be serious and damage your kidneys. In severe cases, you may need to stay in the hospital. You may have a fever and lower back pain.    Medicines to treat a UTI  Most UTIs are treated with antibiotics. These kill the bacteria. The length of time you need to take them depends on the type of infection. It may be as short as 3 days. If you have repeated UTIs, you may need a low-dose antibiotic for several months. Take antibiotics exactly as directed. Don t stop taking them until all of the medicine is gone. If you stop taking the antibiotic too soon, the infection may not go away. You may also develop a resistance to the antibiotic. This can make it much harder to treat.   Lifestyle changes to treat and prevent UTIs   The lifestyle changes below will help get rid of your UTI. They may also help prevent future UTIs.     Drink plenty of fluids. This includes water, juice, or other caffeine-free drinks. Fluids help flush bacteria out of your body.    Empty your bladder. Always empty your bladder when you feel the urge to pee. And always pee before going to sleep. Urine that stays in your bladder can lead to infection. Try to pee before and after sex as well.    Practice good personal hygiene. Wipe yourself from front to back after using the toilet. This helps keep bacteria from getting into the urethra.    Use condoms during sex. These help prevent UTIs caused by sexually transmitted bacteria. Also don't use spermicides during sex. These can increase the risk for UTIs. Choose other forms of birth control instead. For women who tend to get UTIs after sex, a low-dose of a preventive antibiotic may be used. Be sure to discuss this option with  your healthcare provider.    Follow up with your healthcare provider as directed. He or she may test to make sure the infection has cleared. If needed, more treatment may be started.  Lucien last reviewed this educational content on 7/1/2019 2000-2021 [employer]. All rights reserved. This information is not intended as a substitute for professional medical care. Always follow your healthcare professional's instructions.

## 2023-02-13 ENCOUNTER — MYC MEDICAL ADVICE (OUTPATIENT)
Dept: FAMILY MEDICINE | Facility: CLINIC | Age: 58
End: 2023-02-13

## 2023-02-13 ENCOUNTER — E-VISIT (OUTPATIENT)
Dept: URGENT CARE | Facility: URGENT CARE | Age: 58
End: 2023-02-13
Payer: COMMERCIAL

## 2023-02-13 DIAGNOSIS — N39.0 ACUTE UTI (URINARY TRACT INFECTION): Primary | ICD-10-CM

## 2023-02-13 PROCEDURE — 99421 OL DIG E/M SVC 5-10 MIN: CPT | Performed by: PHYSICIAN ASSISTANT

## 2023-02-13 RX ORDER — NITROFURANTOIN 25; 75 MG/1; MG/1
100 CAPSULE ORAL 2 TIMES DAILY
Qty: 10 CAPSULE | Refills: 0 | Status: SHIPPED | OUTPATIENT
Start: 2023-02-13 | End: 2023-02-15

## 2023-02-13 NOTE — PATIENT INSTRUCTIONS
Dear Jyothi Freitas    After reviewing your responses, I've been able to diagnose you with a urinary tract infection, which is a common infection of the bladder with bacteria.  This is not a sexually transmitted infection, though urinating immediately after intercourse can help prevent infections.  Drinking lots of fluids is also helpful to clear your current infection and prevent the next one.      I have sent a prescription for antibiotics to your pharmacy to treat this infection.    It is important that you take all of your prescribed medication even if your symptoms are improving after a few doses.  Taking all of your medicine helps prevent the symptoms from returning.     If your symptoms worsen, you develop pain in your back or stomach, develop fevers, or are not improving in 5 days, please contact your primary care provider for an appointment or visit any of our convenient Walk-in or Urgent Care Centers to be seen, which can be found on our website here.    Thanks again for choosing us as your health care partner,    Charlette Hagan PA-C    Urinary Tract Infections in Women  Urinary tract infections (UTIs) are most often caused by bacteria. These bacteria enter the urinary tract. The bacteria may come from inside the body. Or they may travel from the skin outside the rectum or vagina into the urethra. Female anatomy makes it easy for bacteria from the bowel to enter a woman s urinary tract, which is the most common source of UTI. This means women develop UTIs more often than men. Pain in or around the urinary tract is a common UTI symptom. But the only way to know for sure if you have a UTI for the healthcare provider to test your urine. The two tests that may be done are the urinalysis and urine culture.     Types of UTIs    Cystitis. A bladder infection (cystitis) is the most common UTI in women. You may have urgent or frequent need to pee. You may also have pain, burning when you pee, and bloody  urine.    Urethritis. This is an inflamed urethra, which is the tube that carries urine from the bladder to outside the body. You may have lower stomach or back pain. You may also have urgent or frequent need to pee.    Pyelonephritis. This is a kidney infection. If not treated, it can be serious and damage your kidneys. In severe cases, you may need to stay in the hospital. You may have a fever and lower back pain.    Medicines to treat a UTI  Most UTIs are treated with antibiotics. These kill the bacteria. The length of time you need to take them depends on the type of infection. It may be as short as 3 days. If you have repeated UTIs, you may need a low-dose antibiotic for several months. Take antibiotics exactly as directed. Don t stop taking them until all of the medicine is gone. If you stop taking the antibiotic too soon, the infection may not go away. You may also develop a resistance to the antibiotic. This can make it much harder to treat.   Lifestyle changes to treat and prevent UTIs   The lifestyle changes below will help get rid of your UTI. They may also help prevent future UTIs.     Drink plenty of fluids. This includes water, juice, or other caffeine-free drinks. Fluids help flush bacteria out of your body.    Empty your bladder. Always empty your bladder when you feel the urge to pee. And always pee before going to sleep. Urine that stays in your bladder can lead to infection. Try to pee before and after sex as well.    Practice good personal hygiene. Wipe yourself from front to back after using the toilet. This helps keep bacteria from getting into the urethra.    Use condoms during sex. These help prevent UTIs caused by sexually transmitted bacteria. Also don't use spermicides during sex. These can increase the risk for UTIs. Choose other forms of birth control instead. For women who tend to get UTIs after sex, a low-dose of a preventive antibiotic may be used. Be sure to discuss this option with  your healthcare provider.    Follow up with your healthcare provider as directed. He or she may test to make sure the infection has cleared. If needed, more treatment may be started.  Lucien last reviewed this educational content on 7/1/2019 2000-2021 [employer]. All rights reserved. This information is not intended as a substitute for professional medical care. Always follow your healthcare professional's instructions.

## 2023-02-14 NOTE — TELEPHONE ENCOUNTER
To PCP  See below.     I doesn't look like a UA/UC was obtained.     Order and pharmacy both pended for your Review.     Karina Jauregui RN on 2/14/2023 at 11:21 AM

## 2023-02-15 ENCOUNTER — OFFICE VISIT (OUTPATIENT)
Dept: FAMILY MEDICINE | Facility: CLINIC | Age: 58
End: 2023-02-15
Payer: COMMERCIAL

## 2023-02-15 VITALS
WEIGHT: 151 LBS | SYSTOLIC BLOOD PRESSURE: 130 MMHG | BODY MASS INDEX: 23.7 KG/M2 | HEIGHT: 67 IN | DIASTOLIC BLOOD PRESSURE: 94 MMHG | TEMPERATURE: 96.9 F | RESPIRATION RATE: 16 BRPM | OXYGEN SATURATION: 96 % | HEART RATE: 79 BPM

## 2023-02-15 DIAGNOSIS — N39.0 URINARY TRACT INFECTION WITHOUT HEMATURIA, SITE UNSPECIFIED: Primary | ICD-10-CM

## 2023-02-15 DIAGNOSIS — R03.0 ELEVATED BLOOD PRESSURE READING WITHOUT DIAGNOSIS OF HYPERTENSION: ICD-10-CM

## 2023-02-15 LAB
ALBUMIN UR-MCNC: NEGATIVE MG/DL
APPEARANCE UR: CLEAR
BACTERIA #/AREA URNS HPF: ABNORMAL /HPF
BILIRUB UR QL STRIP: NEGATIVE
COLOR UR AUTO: YELLOW
GLUCOSE UR STRIP-MCNC: NEGATIVE MG/DL
HGB UR QL STRIP: ABNORMAL
KETONES UR STRIP-MCNC: NEGATIVE MG/DL
LEUKOCYTE ESTERASE UR QL STRIP: NEGATIVE
NITRATE UR QL: NEGATIVE
PH UR STRIP: 6.5 [PH] (ref 5–7)
RBC #/AREA URNS AUTO: ABNORMAL /HPF
SP GR UR STRIP: 1.01 (ref 1–1.03)
SQUAMOUS #/AREA URNS AUTO: ABNORMAL /LPF
UROBILINOGEN UR STRIP-ACNC: 0.2 E.U./DL
WBC #/AREA URNS AUTO: ABNORMAL /HPF

## 2023-02-15 PROCEDURE — 87086 URINE CULTURE/COLONY COUNT: CPT | Performed by: PHYSICIAN ASSISTANT

## 2023-02-15 PROCEDURE — 81001 URINALYSIS AUTO W/SCOPE: CPT | Performed by: PHYSICIAN ASSISTANT

## 2023-02-15 PROCEDURE — 99214 OFFICE O/P EST MOD 30 MIN: CPT | Performed by: PHYSICIAN ASSISTANT

## 2023-02-15 RX ORDER — CIPROFLOXACIN 500 MG/1
500 TABLET, FILM COATED ORAL 2 TIMES DAILY
Qty: 14 TABLET | Refills: 0 | Status: SHIPPED | OUTPATIENT
Start: 2023-02-15 | End: 2023-03-21

## 2023-02-15 RX ORDER — GABAPENTIN 100 MG/1
CAPSULE ORAL
Qty: 360 CAPSULE | Refills: 3
Start: 2023-02-15 | End: 2023-06-22

## 2023-02-15 ASSESSMENT — PAIN SCALES - GENERAL: PAINLEVEL: NO PAIN (0)

## 2023-02-15 ASSESSMENT — ASTHMA QUESTIONNAIRES: ACT_TOTALSCORE: 25

## 2023-02-15 NOTE — PROGRESS NOTES
"Assessment and Plan:     (N39.0) Urinary tract infection without hematuria, site unspecified  (primary encounter diagnosis)  Comment: submitted two e visits for same symptoms, UA/UC not obtained, prescribed macrobid twice w/out resolution of symptoms, currently on day 3 of second course of macrobid, has had some chills no fever  Plan: ciprofloxacin (CIPRO) 500 MG tablet, UA with         Microscopic reflex to Culture - lab collect  Will obtain UA/UC today  Stop macrobid  Start cipro given chills and concern for early pyelo  Discussed when to be seen promptly     (R03.0) Elevated blood pressure reading without diagnosis of hypertension  Comment: she has been watching at home, running 135/85-92, would like to avoid adding medication if possible, does not drink a lot of caffeine or eat excessive salt, on letrozole  Plan: 24 Hour Blood Pressure Monitor - Adult    Sadie Crain PA-C  30 minutes on the day of the encounter doing chart review, history and exam, documentation and further activities as noted above.        Subjective   Jyothi is a 57 year old presenting for the following health issues:  No chief complaint on file.      HPI     Follow up    Jyothi is here for UTI symptoms  She started having symptoms on 2/1/23---she has had dysuria, suprapubic discomfort, frequency, urgency.  She was prescribed macrobid via e visit which she took  Her symptoms resolved for a few days then returned on 2/12/23  She submitted another e visit and was prescribed macrobid again  UA/UC was never obtained for either visit    She is currently on the third day of her second course of macrobid  She notes chills, she denies fever, nausea/vomiting  She has had some very low back pain which is positional     She has also been watching her BP at home  Home readings have been 135/85-92    Review of Systems   See above      Objective      BP (!) 130/94   Pulse 79   Temp 96.9  F (36.1  C) (Temporal)   Resp 16   Ht 1.702 m (5' 7\")   Wt " 68.5 kg (151 lb)   LMP 10/07/2017 (Approximate)   SpO2 96%   BMI 23.65 kg/m        Physical Exam     GENERAL: healthy, alert and no distress  RESP: lungs clear to auscultation - no rales, no rhonchi, no wheezes  CV: regular rates and rhythm, normal S1 S2, no S3 or S4 and no murmur, no click or rub  ABD: soft, NT, +BS, no masses, no CVA tenderness   MS: extremities- no gross deformities noted, no edema

## 2023-02-15 NOTE — RESULT ENCOUNTER NOTE
Dear Jyothi,     Here are your recent urine results which are negative for signs of infection but this is likely due to the antibiotic you are on.  The urine culture is still pending.  I'll let you know when I see the results.  Please let me know if you have any questions.     Thanks,  Sadie Crain PA-C

## 2023-02-15 NOTE — PATIENT INSTRUCTIONS
Stop macrobid, switch to cipro    Please leave urine sample today    Please schedule blood pressure monitor hook up: [phone]

## 2023-02-16 LAB — BACTERIA UR CULT: NO GROWTH

## 2023-02-17 NOTE — RESULT ENCOUNTER NOTE
Dear Jyothi,     Your recent culture was negative (likely because you are on antibiotics).  How are you feeling?    Please let us know if you have any questions or concerns.    Regards,  Sadie Crain PA-C

## 2023-03-14 ENCOUNTER — HOSPITAL ENCOUNTER (OUTPATIENT)
Dept: CARDIOLOGY | Facility: CLINIC | Age: 58
Discharge: HOME | End: 2023-03-14
Attending: PHYSICIAN ASSISTANT | Admitting: PHYSICIAN ASSISTANT
Payer: COMMERCIAL

## 2023-03-14 DIAGNOSIS — R03.0 ELEVATED BLOOD PRESSURE READING WITHOUT DIAGNOSIS OF HYPERTENSION: ICD-10-CM

## 2023-03-14 PROCEDURE — 93790 AMBL BP MNTR W/SW I&R: CPT | Performed by: INTERNAL MEDICINE

## 2023-03-14 PROCEDURE — 93786 AMBL BP MNTR W/SW REC ONLY: CPT

## 2023-03-16 NOTE — RESULT ENCOUNTER NOTE
Dear Jyothi,     Here are your recent blood pressure monitor results which show that your blood pressure on average is a little too charles during the day.  I see that you have an appointment with me on 3/21/23, we can discuss treatment at that time.      Please let us know if you have any questions or concerns.    Regards,  Sadie Crain PA-C

## 2023-03-21 ENCOUNTER — OFFICE VISIT (OUTPATIENT)
Dept: FAMILY MEDICINE | Facility: CLINIC | Age: 58
End: 2023-03-21
Payer: COMMERCIAL

## 2023-03-21 VITALS
OXYGEN SATURATION: 100 % | RESPIRATION RATE: 16 BRPM | DIASTOLIC BLOOD PRESSURE: 93 MMHG | WEIGHT: 143 LBS | SYSTOLIC BLOOD PRESSURE: 137 MMHG | HEIGHT: 67 IN | BODY MASS INDEX: 22.44 KG/M2 | HEART RATE: 69 BPM

## 2023-03-21 DIAGNOSIS — R53.83 FATIGUE, UNSPECIFIED TYPE: ICD-10-CM

## 2023-03-21 DIAGNOSIS — C50.411 MALIGNANT NEOPLASM OF UPPER-OUTER QUADRANT OF RIGHT BREAST IN FEMALE, ESTROGEN RECEPTOR POSITIVE (H): ICD-10-CM

## 2023-03-21 DIAGNOSIS — Z17.0 MALIGNANT NEOPLASM OF UPPER-OUTER QUADRANT OF RIGHT BREAST IN FEMALE, ESTROGEN RECEPTOR POSITIVE (H): ICD-10-CM

## 2023-03-21 DIAGNOSIS — I10 PRIMARY HYPERTENSION: Primary | ICD-10-CM

## 2023-03-21 PROCEDURE — 99213 OFFICE O/P EST LOW 20 MIN: CPT | Performed by: PHYSICIAN ASSISTANT

## 2023-03-21 RX ORDER — LISINOPRIL 2.5 MG/1
2.5 TABLET ORAL DAILY
Qty: 90 TABLET | Refills: 1 | Status: SHIPPED | OUTPATIENT
Start: 2023-03-21 | End: 2023-07-31

## 2023-03-21 RX ORDER — DULOXETIN HYDROCHLORIDE 20 MG/1
40 CAPSULE, DELAYED RELEASE ORAL DAILY
Qty: 180 CAPSULE | Refills: 1 | Status: SHIPPED | OUTPATIENT
Start: 2023-03-21 | End: 2023-07-31

## 2023-03-21 ASSESSMENT — PAIN SCALES - GENERAL: PAINLEVEL: MILD PAIN (3)

## 2023-03-21 NOTE — PROGRESS NOTES
"Assessment and Plan:     (I10) Primary hypertension  (primary encounter diagnosis)  Comment: BP on average >140/90 on monitor, dad with htn, discussed classes of medication and basic MOA, recommend low dose lisinopril, discussed side effect profile and potential for angioedema  Plan: lisinopril (ZESTRIL) 2.5 MG tablet      Follow-up in 6 weeks, will get BMP at that time    (C50.411,  Z17.0) Malignant neoplasm of upper-outer quadrant of right breast in female, estrogen receptor positive (H)  Comment: uses cymbalta for chronic pain, would like to reduce dose to see if helps with fatigue  Plan: DULoxetine (CYMBALTA) 20 MG capsule  Decrease from 60-->40mg, follow as above    (R53.83) Fatigue, unspecified type  Comment: chronic but still very active, walks or plays pickle ball on averaged 4 hours/week  Plan: see above      Sadie Crain PA-C        Subjective   Jyothi is a 57 year old presenting for the following health issues:  Follow Up      HPI     Jyothi is here for follow-up on blood pressure   She had a 24 hour BP monitor that showed daytime average 145/93  She notes her dad has hypertension    She drinks ice tea--1-2 glasses per day  She does not eat excess salt  She exercises about 4 hours per week--walking or pickle ball    She also notes chronic fatigue which she has had since breast cancer treatment in 2019 and attributes to letrozole and perhaps gabapentin and cymbalta  She would like to try taking a lower dose of cymbalta (she takes for chronic body aches which started when she was treated for breast cancer)    Review of Systems   See above      Objective    BP (!) 137/93 (BP Location: Left arm, Patient Position: Sitting, Cuff Size: Adult Regular)   Pulse 69   Resp 16   Ht 1.702 m (5' 7\")   Wt 64.9 kg (143 lb)   LMP 10/07/2017 (Approximate)   SpO2 100%   BMI 22.40 kg/m    Body mass index is 22.4 kg/m .     Physical Exam     GENERAL: healthy, alert and no distress  RESP: lungs clear to " auscultation - no rales, no rhonchi, no wheezes  CV: regular rates and rhythm, normal S1 S2, no S3 or S4 and no murmur, no click or rub   MS: extremities- no gross deformities noted, no edema

## 2023-04-01 ENCOUNTER — HEALTH MAINTENANCE LETTER (OUTPATIENT)
Age: 58
End: 2023-04-01

## 2023-05-01 ENCOUNTER — TRANSFERRED RECORDS (OUTPATIENT)
Dept: MULTI SPECIALTY CLINIC | Facility: CLINIC | Age: 58
End: 2023-05-01

## 2023-05-01 LAB — PAP SMEAR - HIM PATIENT REPORTED: NEGATIVE

## 2023-05-02 NOTE — TELEPHONE ENCOUNTER
Patient called back and is able to  Come in Friday.   Show Right And Left Periorbital Units: No Levator Labii Superioris Units: 0 Show Masseter Units: Yes Forehead Units: 12 Lot #: [id number] Dilution (U/0.1 Cc): 4 Incrementing Botox Units: By 0.5 Units Expiration Date (Month Year): 10/25 Consent: Written consent obtained. Risks include but not limited to lid/brow ptosis, bruising, swelling, diplopia, temporary effect, incomplete chemical denervation. Glabellar Complex Units: 20 Detail Level: Detailed Post-Care Instructions: Patient instructed to not lie down for 4 hours and limit physical activity for 24 hours.

## 2023-05-12 DIAGNOSIS — C50.411 MALIGNANT NEOPLASM OF UPPER-OUTER QUADRANT OF RIGHT BREAST IN FEMALE, ESTROGEN RECEPTOR POSITIVE (H): Primary | ICD-10-CM

## 2023-05-12 DIAGNOSIS — M81.0 AGE-RELATED OSTEOPOROSIS WITHOUT CURRENT PATHOLOGICAL FRACTURE: ICD-10-CM

## 2023-05-12 DIAGNOSIS — Z17.0 MALIGNANT NEOPLASM OF UPPER-OUTER QUADRANT OF RIGHT BREAST IN FEMALE, ESTROGEN RECEPTOR POSITIVE (H): Primary | ICD-10-CM

## 2023-06-15 ENCOUNTER — HOSPITAL ENCOUNTER (OUTPATIENT)
Dept: MRI IMAGING | Facility: CLINIC | Age: 58
Discharge: HOME | End: 2023-06-15
Attending: INTERNAL MEDICINE
Payer: COMMERCIAL

## 2023-06-15 ENCOUNTER — LAB (OUTPATIENT)
Dept: LAB | Facility: CLINIC | Age: 58
End: 2023-06-15
Attending: INTERNAL MEDICINE
Payer: COMMERCIAL

## 2023-06-15 DIAGNOSIS — C50.411 MALIGNANT NEOPLASM OF UPPER-OUTER QUADRANT OF RIGHT BREAST IN FEMALE, ESTROGEN RECEPTOR POSITIVE (H): ICD-10-CM

## 2023-06-15 DIAGNOSIS — Z17.0 MALIGNANT NEOPLASM OF UPPER-OUTER QUADRANT OF RIGHT BREAST IN FEMALE, ESTROGEN RECEPTOR POSITIVE (H): ICD-10-CM

## 2023-06-15 DIAGNOSIS — M81.0 AGE-RELATED OSTEOPOROSIS WITHOUT CURRENT PATHOLOGICAL FRACTURE: ICD-10-CM

## 2023-06-15 LAB
ALBUMIN SERPL BCG-MCNC: 4.4 G/DL (ref 3.5–5.2)
CALCIUM SERPL-MCNC: 9.1 MG/DL (ref 8.6–10)
CREAT SERPL-MCNC: 0.83 MG/DL (ref 0.51–0.95)
GFR SERPL CREATININE-BSD FRML MDRD: 81 ML/MIN/1.73M2

## 2023-06-15 PROCEDURE — A9585 GADOBUTROL INJECTION: HCPCS | Performed by: INTERNAL MEDICINE

## 2023-06-15 PROCEDURE — 77049 MRI BREAST C-+ W/CAD BI: CPT

## 2023-06-15 PROCEDURE — 255N000002 HC RX 255 OP 636: Performed by: INTERNAL MEDICINE

## 2023-06-15 PROCEDURE — 82310 ASSAY OF CALCIUM: CPT

## 2023-06-15 PROCEDURE — 36415 COLL VENOUS BLD VENIPUNCTURE: CPT

## 2023-06-15 PROCEDURE — 82565 ASSAY OF CREATININE: CPT

## 2023-06-15 PROCEDURE — 82040 ASSAY OF SERUM ALBUMIN: CPT

## 2023-06-15 RX ORDER — GADOBUTROL 604.72 MG/ML
6 INJECTION INTRAVENOUS ONCE
Status: COMPLETED | OUTPATIENT
Start: 2023-06-15 | End: 2023-06-15

## 2023-06-15 RX ADMIN — GADOBUTROL 6 ML: 604.72 INJECTION INTRAVENOUS at 08:04

## 2023-06-16 NOTE — PROGRESS NOTES
[facility], [place]    Hematology/Oncology Established Patient Follow-up Note      Today's Date: 6/22/2023    Reason for Follow-up: Right breast cancer.    HISTORY OF PRESENT ILLNESS: Jyothi Freitas is a 57 year old female who presents with the following oncologic history:   1.  10/11/2019: Diagnostic right sided mammogram performed for a palpable lump in the right breast.  This showed an irregularly-shaped spiculated mass in the upper outer right breast with prominent lymph nodes in the right axilla.  Targeted ultrasound of the right upper outer breast showed an irregularly-shaped hypoechoic mass at the 10 o'clock position, 4 cm from the nipple measuring 2.6 x 1.5 x 2.4 cm.  Right axillary ultrasound showed moderately enlarged lymph nodes.  Right breast needle biopsy showed a grade 3 invasive ductal carcinoma with lymphovascular invasion identified, ER strongly positive at 95%, MD strongly positive at 95%, HER-2/juan FISH negative.  Right axillary lymph node measuring 2 cm was positive for metastatic carcinoma.  Note prior 4/2019 mammogram deemed normal.  2.  10/15/2019: Bilateral breast MRI showed known right breast malignancy measuring 2.6 x 1.4 x 2 cm, 3 mildly enlarged right axillary lymph nodes and no contralateral breast malignancy.  3. 10/21/2019 PET scan showed scattered small hypermetabolic bilateral axillary lymph nodes; for example, a hypermetabolic right axillary lymph node measures 1.6 x 0.9 cm with SUV max 3.6.  Hypermetabolic mass in the right breast superiorly and laterally measuring 2.1 x 1.6 cm with SUV max 4.3.  A 0.6 cm low-density lesion in the right hepatic lobe is too small for accurate PET characterization but shows no appreciable hypermetabolic activity.  Incidentally noted ectasia of the ascending thoracic aorta measures 4 cm in diameter.  4.  10/23/2019: Left axillary ultrasound showed benign-appearing lymph node.  Left axillary lymph node biopsy showed benign lymph node tissue.  5.   10/29/2019: Started neoadjuvant chemotherapy with dose dense Adriamycin and Cytoxan, followed by weekly paclitaxel with completion on 3/12/2020.  6.  3/19/2020: Breast MRI showed no residual enhancement in the right breast mass.  The right axillary lymphadenopathy has resolved.  7. 4/01/2020: Underwent right breast lumpectomy and right axillary sentinel lymph node biopsy under care of Dr. Kaila Hernandez.  Pathology showed fibrocystic change and no evidence of residual carcinoma in the right breast.  Metastatic breast adenocarcinoma to one of 5 lymph node fragments in 1 of 3 lymph nodes excised. Extranodal extension present. ypT0-N1a.  8. 4/29/2020: Started adjuvant anastrozole.  9. 6/15/2020: Completed adjuvant radiation therapy.  10. 6/15/2020: Switched to adjuvant letrozole with some mild improvement in polyathralgias.    INTERIM HISTORY:  Jyothi reports her peripheral neuropathy and hot flashes are still well controlled on a lower dose of gabapentin 200 mg daily and duloxetine 40 mg daily with less brain fog and fatigue.      REVIEW OF SYSTEMS:   14 point ROS was reviewed and is negative other than as noted above in HPI.       HOME MEDICATIONS:  Current Outpatient Medications   Medication Sig Dispense Refill     acetaminophen (TYLENOL) 500 MG tablet Take 1,000 mg by mouth every 6 hours as needed for mild pain       acetaminophen-caffeine (EXCEDRIN TENSION HEADACHE) 500-65 MG TABS Take 2 tablets by mouth every 6 hours as needed for mild pain       albuterol (PROAIR HFA/PROVENTIL HFA/VENTOLIN HFA) 108 (90 Base) MCG/ACT inhaler Inhale 2 puffs into the lungs every 6 hours as needed for shortness of breath / dyspnea or wheezing 18 g 0     Calcium Carb-Cholecalciferol (CALCIUM 500 + D PO) Take by mouth 2 times daily       DULoxetine (CYMBALTA) 20 MG capsule Take 2 capsules (40 mg) by mouth daily 180 capsule 1     gabapentin (NEURONTIN) 100 MG capsule 200 mg daily 360 capsule 3     letrozole (FEMARA) 2.5 MG tablet Take  1 tablet (2.5 mg) by mouth daily 90 tablet 3     lisinopril (ZESTRIL) 2.5 MG tablet Take 1 tablet (2.5 mg) by mouth daily 90 tablet 1     zoledronic acid (ZOMETA) 4 MG/100ML SOLN Inject 4 mg into the vein every 6 months            ALLERGIES:  Allergies   Allergen Reactions     Sulfa Antibiotics Other (See Comments)     Red face. Ringing in the ears.         PAST MEDICAL HISTORY:  Past Medical History:   Diagnosis Date     GERD (gastroesophageal reflux disease)      H/O colonoscopy 03/08/2016    done at [facility] and nl     Hematuria 2016    eval at [facility] and per pt cysto and ct neg     HTN (hypertension)      Intermittent asthma     since childhood     Low iron 10/2017    done for eval of hair loss, egd nl     Malignant neoplasm of upper-outer quadrant of right breast in female, estrogen receptor positive (H) 10/2019    had chemo, lumpectomy, onc is Dr. Monroe     Migraines     most of adult life, has seen neuro in past, on bblocker     Mild depression      Normal stress echocardiogram 07/2011    due to hear racing     Osteopenia 2008     Other osteoporosis without current pathological fracture 2020    iv zometa every 6 months     Syncope 03/2017    echo nl lv size and fxn, grade 1 dd, mild tr         PAST SURGICAL HISTORY:  Past Surgical History:   Procedure Laterality Date     BIOPSY NODE SENTINEL Right 4/1/2020    Procedure: RIGHT SENTINEL LYMPH NODE BIOPSY;  Surgeon: Kaila Hernandez MD;  Location:  OR     ESOPHAGOSCOPY, GASTROSCOPY, DUODENOSCOPY (EGD), COMBINED N/A 10/30/2017    Procedure: COMBINED ESOPHAGOSCOPY, GASTROSCOPY, DUODENOSCOPY (EGD), BIOPSY SINGLE OR MULTIPLE;  COMBINED ESOPHAGOSCOPY, GASTROSCOPY, DUODENOSCOPY (EGD);  Surgeon: Martin Rodriguez MD;  Location:  GI     INSERT PORT VASCULAR ACCESS N/A 10/24/2019    Procedure: PORT PLACEMENT;  Surgeon: Kaila Hernandez MD;  Location:  OR     LUMPECTOMY BREAST WITH SEED LOCALIZATION Right 4/1/2020    Procedure: SEED LOCALIZED RIGHT BREAST  "LUMPECTOMY WITH SEED LOCALIZED RIGHT AXILLARY NODE EXCISION;  Surgeon: Kaila Hernandez MD;  Location:  OR     ORTHOPEDIC SURGERY      \"leg surgery\"     REMOVE PORT VASCULAR ACCESS Left 2020    Procedure: REMOVAL, VASCULAR ACCESS PORT;  Surgeon: Kaila Hernandez MD;  Location: [facility] OR     single oopherectomy  2010    cyst     ZZC TMJ ARTHROSCOPY/SURGERY           SOCIAL HISTORY:  Social History     Socioeconomic History     Marital status:      Spouse name: Not on file     Number of children: 2     Years of education: Not on file     Highest education level: Not on file   Occupational History     Not on file   Tobacco Use     Smoking status: Former     Packs/day: 0.00     Types: Cigarettes     Quit date: 3/27/1997     Years since quittin.2     Smokeless tobacco: Never   Vaping Use     Vaping status: Not on file   Substance and Sexual Activity     Alcohol use: Yes     Comment: rarely     Drug use: No     Sexual activity: Yes     Partners: Male     Birth control/protection: Pill   Other Topics Concern     Parent/sibling w/ CABG, MI or angioplasty before 65F 55M? Yes   Social History Narrative     Not on file     Social Determinants of Health     Financial Resource Strain: Not on file   Food Insecurity: Not on file   Transportation Needs: Not on file   Physical Activity: Not on file   Stress: Not on file   Social Connections: Not on file   Intimate Partner Violence: Not on file   Housing Stability: Not on file         FAMILY HISTORY:  Family History   Problem Relation Age of Onset     Coronary Artery Disease Father 48        MI. and one in his 60s     Emphysema Mother         COPD         PHYSICAL EXAM:  Vital signs:  BP (!) 135/92   Pulse 71   Resp 16   Wt 64.9 kg (143 lb)   LMP 10/07/2017 (Approximate)   SpO2 98%   BMI 22.40 kg/m    GENERAL/CONSTITUTIONAL: No acute distress.  EYES: No erythema or scleral icterus.  LYMPH: No cervical, supraclavicular, axillary, epitroclear adenopathy.   BREAST: " The left breast droops slightly lower than right breast. Nodular scar tissue above the right lumpectomy incision is stable compared to prior exam. Nipples are everted bilaterally with no discharge. No erythema, ulceration, or dimpling of the skin.  RESPIRATORY: No audible cough or wheezing.  GASTROINTESTINAL: No hepatosplenomegaly, masses, or tenderness. No guarding.  No distention.  MUSCULOSKELETAL: Warm and well-perfused, no cyanosis, clubbing, or edema.  NEUROLOGIC: No focal motor deficits. Alert, oriented, answers questions appropriately.  INTEGUMENTARY: No rashes or jaundice.  GAIT: Steady, does not use assistive device    LABS:  CBC RESULTS: Recent Labs   Lab Test 05/13/21  1538   WBC 6.7   RBC 4.76   HGB 14.0   HCT 42.3   MCV 89   MCH 29.4   MCHC 33.1   RDW 11.9        Last Comprehensive Metabolic Panel:  Sodium   Date Value Ref Range Status   10/20/2020 139 133 - 144 mmol/L Final     Potassium   Date Value Ref Range Status   10/20/2020 4.2 3.4 - 5.3 mmol/L Final     Chloride   Date Value Ref Range Status   10/20/2020 107 94 - 109 mmol/L Final     Carbon Dioxide   Date Value Ref Range Status   10/20/2020 29 20 - 32 mmol/L Final     Anion Gap   Date Value Ref Range Status   10/20/2020 3 3 - 14 mmol/L Final     Glucose   Date Value Ref Range Status   10/20/2020 78 70 - 99 mg/dL Final     Urea Nitrogen   Date Value Ref Range Status   10/20/2020 13 7 - 30 mg/dL Final     Creatinine   Date Value Ref Range Status   06/15/2023 0.83 0.51 - 0.95 mg/dL Final   05/13/2021 0.76 0.52 - 1.04 mg/dL Final     GFR Estimate   Date Value Ref Range Status   06/15/2023 81 >60 mL/min/1.73m2 Final     Comment:     eGFR calculated using 2021 CKD-EPI equation.   05/13/2021 87 >60 mL/min/[1.73_m2] Final     Comment:     Non  GFR Calc  Starting 12/18/2018, serum creatinine based estimated GFR (eGFR) will be   calculated using the Chronic Kidney Disease Epidemiology Collaboration   (CKD-EPI) equation.        Calcium   Date Value Ref Range Status   06/15/2023 9.1 8.6 - 10.0 mg/dL Final   2021 9.4 8.5 - 10.1 mg/dL Final     Bilirubin Total   Date Value Ref Range Status   10/20/2020 0.2 0.2 - 1.3 mg/dL Final     Alkaline Phosphatase   Date Value Ref Range Status   10/20/2020 76 40 - 150 U/L Final     ALT   Date Value Ref Range Status   10/20/2020 25 0 - 50 U/L Final     AST   Date Value Ref Range Status   10/20/2020 16 0 - 45 U/L Final       PATHOLOGY:  None new since last visit.    IMAGIN2022: DEXA scan showed osteopenia.    2022: Mammogram showed no malignancy    2022: Breast MRI showed no abnormal enhancement or suspicious findings.    ASSESSMENT/PLAN:  Jyothi Freitas is a 57 year old female with the following issues:  1.  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 Polyarthralgias  --Jyothi has no clinical evidence for recurrent breast cancer by physical exam from today or breast MRI reviewed from 6/15/2023.  --Jyothi is on letrozole and tolerating this overall better than anastrozole.  --I again recommended up to 10 years of letrozole, if tolerated, to maximally reduce risk of breast cancer recurrence. We discussed the differences in recurrence patterns based on molecular phenotype.  --Will continue to alternate mammogram with breast MRI, staggered by 6 months. Next mammogram due 2023.  --Would only recommend lab workup and imaging workup if concerning signs/symptoms arise.  --Advised gentle circular massage to the right breast scar tissue area.    3. Osteoporosis/osteopenia  --DEXA scan results from 10/13/2020 showed osteoporosis.  --Repeat DEXA reviewed from 2022 showed improvement to osteopenia.   --Recommended adequate calcium and vitamin D, weight-bearing exercise and continuing zoledronic acid every 6 months for at least 6 or more doses, next  due 6/2023.    --Will repeat DEXA scan in 2024.    4.  Insomnia   5.  Hot flashes, drug induced  -Oxybutynin did help with hot flashes but she had too much dry eye syndrome and fluid retention.   -She has had significant improvement in her hot flashes and insomnia with the use of gabapentin, with less fatigue decreasing down to 200 mg daily.   -She is no longer using Ambien.    6. Ectasia of thoracic aorta  --This measured 4 cm on prior PET scan.    --Surgical management not needed but she will need ongoing monitoring. She is following with Dr. Silverio Gooden for monitoring of this issue.    7. Peripheral neuropathy  --Hip pain has improved.  --Continue duloxetine at 40 mg once daily.    8. Hypertension  --Blood pressure stable on small dose of lisinopril.     Return in 4 months.    Maricarmen Monroe MD  Hematology/Oncology  [facility]    Total time spent today: 48 minutes in chart review, patient evaluation, counseling, documentation, test and/or medication/prescription orders, and coordination of care.

## 2023-06-22 ENCOUNTER — ONCOLOGY VISIT (OUTPATIENT)
Dept: ONCOLOGY | Facility: CLINIC | Age: 58
End: 2023-06-22
Attending: INTERNAL MEDICINE
Payer: COMMERCIAL

## 2023-06-22 ENCOUNTER — INFUSION THERAPY VISIT (OUTPATIENT)
Dept: INFUSION THERAPY | Facility: CLINIC | Age: 58
End: 2023-06-22
Attending: INTERNAL MEDICINE
Payer: COMMERCIAL

## 2023-06-22 VITALS
SYSTOLIC BLOOD PRESSURE: 135 MMHG | DIASTOLIC BLOOD PRESSURE: 92 MMHG | HEART RATE: 71 BPM | RESPIRATION RATE: 16 BRPM | BODY MASS INDEX: 22.4 KG/M2 | OXYGEN SATURATION: 98 % | WEIGHT: 143 LBS

## 2023-06-22 DIAGNOSIS — N95.1 MENOPAUSAL SYNDROME (HOT FLASHES): ICD-10-CM

## 2023-06-22 DIAGNOSIS — M81.0 AGE-RELATED OSTEOPOROSIS WITHOUT CURRENT PATHOLOGICAL FRACTURE: ICD-10-CM

## 2023-06-22 DIAGNOSIS — M81.0 AGE-RELATED OSTEOPOROSIS WITHOUT CURRENT PATHOLOGICAL FRACTURE: Primary | ICD-10-CM

## 2023-06-22 DIAGNOSIS — T45.1X5A PERIPHERAL NEUROPATHY DUE TO CHEMOTHERAPY (H): ICD-10-CM

## 2023-06-22 DIAGNOSIS — G62.0 PERIPHERAL NEUROPATHY DUE TO CHEMOTHERAPY (H): ICD-10-CM

## 2023-06-22 DIAGNOSIS — Z12.31 ENCOUNTER FOR SCREENING MAMMOGRAM FOR BREAST CANCER: ICD-10-CM

## 2023-06-22 DIAGNOSIS — C50.411 MALIGNANT NEOPLASM OF UPPER-OUTER QUADRANT OF RIGHT BREAST IN FEMALE, ESTROGEN RECEPTOR POSITIVE (H): Primary | ICD-10-CM

## 2023-06-22 DIAGNOSIS — Z17.0 MALIGNANT NEOPLASM OF UPPER-OUTER QUADRANT OF RIGHT BREAST IN FEMALE, ESTROGEN RECEPTOR POSITIVE (H): Primary | ICD-10-CM

## 2023-06-22 PROCEDURE — 96365 THER/PROPH/DIAG IV INF INIT: CPT

## 2023-06-22 PROCEDURE — G0463 HOSPITAL OUTPT CLINIC VISIT: HCPCS | Performed by: INTERNAL MEDICINE

## 2023-06-22 PROCEDURE — 250N000011 HC RX IP 250 OP 636: Mod: JZ | Performed by: INTERNAL MEDICINE

## 2023-06-22 PROCEDURE — G0463 HOSPITAL OUTPT CLINIC VISIT: HCPCS | Mod: 25 | Performed by: INTERNAL MEDICINE

## 2023-06-22 PROCEDURE — 258N000003 HC RX IP 258 OP 636: Performed by: INTERNAL MEDICINE

## 2023-06-22 PROCEDURE — 99215 OFFICE O/P EST HI 40 MIN: CPT | Performed by: INTERNAL MEDICINE

## 2023-06-22 RX ORDER — ZOLEDRONIC ACID 0.04 MG/ML
4 INJECTION, SOLUTION INTRAVENOUS ONCE
Status: COMPLETED | OUTPATIENT
Start: 2023-06-22 | End: 2023-06-22

## 2023-06-22 RX ORDER — GABAPENTIN 100 MG/1
CAPSULE ORAL
Qty: 360 CAPSULE | Refills: 3 | Status: SHIPPED | OUTPATIENT
Start: 2023-06-22 | End: 2024-02-01

## 2023-06-22 RX ORDER — HEPARIN SODIUM,PORCINE 10 UNIT/ML
5 VIAL (ML) INTRAVENOUS
Status: CANCELLED | OUTPATIENT
Start: 2023-06-22

## 2023-06-22 RX ORDER — ZOLEDRONIC ACID 0.04 MG/ML
4 INJECTION, SOLUTION INTRAVENOUS ONCE
Status: CANCELLED | OUTPATIENT
Start: 2023-06-22 | End: 2023-06-22

## 2023-06-22 RX ORDER — HEPARIN SODIUM (PORCINE) LOCK FLUSH IV SOLN 100 UNIT/ML 100 UNIT/ML
5 SOLUTION INTRAVENOUS
Status: CANCELLED | OUTPATIENT
Start: 2023-06-22

## 2023-06-22 RX ADMIN — ZOLEDRONIC ACID 4 MG: 0.04 INJECTION, SOLUTION INTRAVENOUS at 14:22

## 2023-06-22 RX ADMIN — SODIUM CHLORIDE 250 ML: 9 INJECTION, SOLUTION INTRAVENOUS at 14:21

## 2023-06-22 ASSESSMENT — PAIN SCALES - GENERAL: PAINLEVEL: NO PAIN (0)

## 2023-06-22 NOTE — PROGRESS NOTES
Infusion Nursing Note:  Jyothi Freitas presents today for Zometa    Patient seen by provider today: Yes: Dr. Monroe   present during visit today: Not Applicable.    Note: Pt reports no new health changes or concerns.      Intravenous Access:  Peripheral IV placed.    Treatment Conditions:  Lab Results   Component Value Date     10/20/2020    POTASSIUM 4.2 10/20/2020    MAG 2.3 10/20/2020    CR 0.83 06/15/2023    JEFF 9.1 06/15/2023    BILITOTAL 0.2 10/20/2020    ALBUMIN 4.4 06/15/2023    ALT 25 10/20/2020    AST 16 10/20/2020     Results reviewed, labs MET treatment parameters, ok to proceed with treatment.      Post Infusion Assessment:  Patient tolerated infusion without incident.  Blood return noted pre and post infusion.  Site patent and intact, free from redness, edema or discomfort.  No evidence of extravasations.  Access discontinued per protocol.       Discharge Plan:   Patient declined prescription refills.  Discharge instructions reviewed with: Patient.  Patient and/or family verbalized understanding of discharge instructions and all questions answered.  AVS to patient via [employer].  Patient will return as advised by her provider for next appointment.   Patient discharged in stable condition accompanied by: self.  Departure Mode: Ambulatory.      Lorena Rousseau RN

## 2023-06-22 NOTE — LETTER
6/22/2023         RE: Jyothi Freitas  [address]  [facility] [address]        Dear Colleague,    Thank you for referring your patient, Jyothi Freitas, to the [facility]. Please see a copy of my visit note below.    [facility]    Hematology/Oncology Established Patient Follow-up Note      Today's Date: 6/22/2023    Reason for Follow-up: Right breast cancer.    HISTORY OF PRESENT ILLNESS: Jyothi Freitas is a 57 year old female who presents with the following oncologic history:   1.  10/11/2019: Diagnostic right sided mammogram performed for a palpable lump in the right breast.  This showed an irregularly-shaped spiculated mass in the upper outer right breast with prominent lymph nodes in the right axilla.  Targeted ultrasound of the right upper outer breast showed an irregularly-shaped hypoechoic mass at the 10 o'clock position, 4 cm from the nipple measuring 2.6 x 1.5 x 2.4 cm.  Right axillary ultrasound showed moderately enlarged lymph nodes.  Right breast needle biopsy showed a grade 3 invasive ductal carcinoma with lymphovascular invasion identified, ER strongly positive at 95%, SD strongly positive at 95%, HER-2/juan FISH negative.  Right axillary lymph node measuring 2 cm was positive for metastatic carcinoma.  Note prior 4/2019 mammogram deemed normal.  2.  10/15/2019: Bilateral breast MRI showed known right breast malignancy measuring 2.6 x 1.4 x 2 cm, 3 mildly enlarged right axillary lymph nodes and no contralateral breast malignancy.  3. 10/21/2019 PET scan showed scattered small hypermetabolic bilateral axillary lymph nodes; for example, a hypermetabolic right axillary lymph node measures 1.6 x 0.9 cm with SUV max 3.6.  Hypermetabolic mass in the right breast superiorly and laterally measuring 2.1 x 1.6 cm with SUV max 4.3.  A 0.6 cm low-density lesion in the right hepatic lobe is too small for accurate PET characterization but shows no appreciable  hypermetabolic activity.  Incidentally noted ectasia of the ascending thoracic aorta measures 4 cm in diameter.  4.  10/23/2019: Left axillary ultrasound showed benign-appearing lymph node.  Left axillary lymph node biopsy showed benign lymph node tissue.  5.  10/29/2019: Started neoadjuvant chemotherapy with dose dense Adriamycin and Cytoxan, followed by weekly paclitaxel with completion on 3/12/2020.  6.  3/19/2020: Breast MRI showed no residual enhancement in the right breast mass.  The right axillary lymphadenopathy has resolved.  7. 4/01/2020: Underwent right breast lumpectomy and right axillary sentinel lymph node biopsy under care of Dr. Kaila Hernandez.  Pathology showed fibrocystic change and no evidence of residual carcinoma in the right breast.  Metastatic breast adenocarcinoma to one of 5 lymph node fragments in 1 of 3 lymph nodes excised. Extranodal extension present. ypT0-N1a.  8. 4/29/2020: Started adjuvant anastrozole.  9. 6/15/2020: Completed adjuvant radiation therapy.  10. 6/15/2020: Switched to adjuvant letrozole with some mild improvement in polyathralgias.    INTERIM HISTORY:  Jyothi reports her peripheral neuropathy and hot flashes are still well controlled on a lower dose of gabapentin 200 mg daily and duloxetine 40 mg daily with less brain fog and fatigue.      REVIEW OF SYSTEMS:   14 point ROS was reviewed and is negative other than as noted above in HPI.       HOME MEDICATIONS:  Current Outpatient Medications   Medication Sig Dispense Refill     acetaminophen (TYLENOL) 500 MG tablet Take 1,000 mg by mouth every 6 hours as needed for mild pain       acetaminophen-caffeine (EXCEDRIN TENSION HEADACHE) 500-65 MG TABS Take 2 tablets by mouth every 6 hours as needed for mild pain       albuterol (PROAIR HFA/PROVENTIL HFA/VENTOLIN HFA) 108 (90 Base) MCG/ACT inhaler Inhale 2 puffs into the lungs every 6 hours as needed for shortness of breath / dyspnea or wheezing 18 g 0     Calcium  Carb-Cholecalciferol (CALCIUM 500 + D PO) Take by mouth 2 times daily       DULoxetine (CYMBALTA) 20 MG capsule Take 2 capsules (40 mg) by mouth daily 180 capsule 1     gabapentin (NEURONTIN) 100 MG capsule 200 mg daily 360 capsule 3     letrozole (FEMARA) 2.5 MG tablet Take 1 tablet (2.5 mg) by mouth daily 90 tablet 3     lisinopril (ZESTRIL) 2.5 MG tablet Take 1 tablet (2.5 mg) by mouth daily 90 tablet 1     zoledronic acid (ZOMETA) 4 MG/100ML SOLN Inject 4 mg into the vein every 6 months            ALLERGIES:  Allergies   Allergen Reactions     Sulfa Antibiotics Other (See Comments)     Red face. Ringing in the ears.         PAST MEDICAL HISTORY:  Past Medical History:   Diagnosis Date     GERD (gastroesophageal reflux disease)      H/O colonoscopy 03/08/2016    done at [place] and [facility]     Hematuria 2016    eval at [place] and per pt cysto and ct neg     HTN (hypertension)      Intermittent asthma     since childhood     Low iron 10/2017    done for eval of hair loss, egd nl     Malignant neoplasm of upper-outer quadrant of right breast in female, estrogen receptor positive (H) 10/2019    had chemo, lumpectomy, onc is Dr. Monroe     Migraines     most of adult life, has seen neuro in past, on bblocker     Mild depression      Normal stress echocardiogram 07/2011    due to hear racing     Osteopenia 2008     Other osteoporosis without current pathological fracture 2020    iv zometa every 6 months     Syncope 03/2017    echo nl lv size and fxn, grade 1 dd, mild tr         PAST SURGICAL HISTORY:  Past Surgical History:   Procedure Laterality Date     BIOPSY NODE SENTINEL Right 4/1/2020    Procedure: RIGHT SENTINEL LYMPH NODE BIOPSY;  Surgeon: Kaila Hernandez MD;  Location:  OR     ESOPHAGOSCOPY, GASTROSCOPY, DUODENOSCOPY (EGD), COMBINED N/A 10/30/2017    Procedure: COMBINED ESOPHAGOSCOPY, GASTROSCOPY, DUODENOSCOPY (EGD), BIOPSY SINGLE OR MULTIPLE;  COMBINED ESOPHAGOSCOPY, GASTROSCOPY, DUODENOSCOPY (EGD);  Surgeon:  "Martin Rodriguez MD;  Location:  GI     INSERT PORT VASCULAR ACCESS N/A 10/24/2019    Procedure: PORT PLACEMENT;  Surgeon: Kaila Hernandez MD;  Location:  OR     LUMPECTOMY BREAST WITH SEED LOCALIZATION Right 2020    Procedure: SEED LOCALIZED RIGHT BREAST LUMPECTOMY WITH SEED LOCALIZED RIGHT AXILLARY NODE EXCISION;  Surgeon: Kaila Hernandez MD;  Location:  OR     ORTHOPEDIC SURGERY      \"leg surgery\"     REMOVE PORT VASCULAR ACCESS Left 2020    Procedure: REMOVAL, VASCULAR ACCESS PORT;  Surgeon: Kaila Hernandez MD;  Location:  OR     single oopherectomy      cyst     ZZC TMJ ARTHROSCOPY/SURGERY           SOCIAL HISTORY:  Social History     Socioeconomic History     Marital status:      Spouse name: Not on file     Number of children: 2     Years of education: Not on file     Highest education level: Not on file   Occupational History     Not on file   Tobacco Use     Smoking status: Former     Packs/day: 0.00     Types: Cigarettes     Quit date: 3/27/1997     Years since quittin.2     Smokeless tobacco: Never   Vaping Use     Vaping status: Not on file   Substance and Sexual Activity     Alcohol use: Yes     Comment: rarely     Drug use: No     Sexual activity: Yes     Partners: Male     Birth control/protection: Pill   Other Topics Concern     Parent/sibling w/ CABG, MI or angioplasty before 65F 55M? Yes   Social History Narrative     Not on file     Social Determinants of Health     Financial Resource Strain: Not on file   Food Insecurity: Not on file   Transportation Needs: Not on file   Physical Activity: Not on file   Stress: Not on file   Social Connections: Not on file   Intimate Partner Violence: Not on file   Housing Stability: Not on file         FAMILY HISTORY:  Family History   Problem Relation Age of Onset     Coronary Artery Disease Father 48        MI. and one in his 60s     Emphysema Mother         COPD         PHYSICAL EXAM:  Vital signs:  BP (!) 135/92   " Pulse 71   Resp 16   Wt 64.9 kg (143 lb)   LMP 10/07/2017 (Approximate)   SpO2 98%   BMI 22.40 kg/m    GENERAL/CONSTITUTIONAL: No acute distress.  EYES: No erythema or scleral icterus.  LYMPH: No cervical, supraclavicular, axillary, epitroclear adenopathy.   BREAST: The left breast droops slightly lower than right breast. Nodular scar tissue above the right lumpectomy incision is stable compared to prior exam. Nipples are everted bilaterally with no discharge. No erythema, ulceration, or dimpling of the skin.  RESPIRATORY: No audible cough or wheezing.  GASTROINTESTINAL: No hepatosplenomegaly, masses, or tenderness. No guarding.  No distention.  MUSCULOSKELETAL: Warm and well-perfused, no cyanosis, clubbing, or edema.  NEUROLOGIC: No focal motor deficits. Alert, oriented, answers questions appropriately.  INTEGUMENTARY: No rashes or jaundice.  GAIT: Steady, does not use assistive device    LABS:  CBC RESULTS: Recent Labs   Lab Test 05/13/21  1538   WBC 6.7   RBC 4.76   HGB 14.0   HCT 42.3   MCV 89   MCH 29.4   MCHC 33.1   RDW 11.9        Last Comprehensive Metabolic Panel:  Sodium   Date Value Ref Range Status   10/20/2020 139 133 - 144 mmol/L Final     Potassium   Date Value Ref Range Status   10/20/2020 4.2 3.4 - 5.3 mmol/L Final     Chloride   Date Value Ref Range Status   10/20/2020 107 94 - 109 mmol/L Final     Carbon Dioxide   Date Value Ref Range Status   10/20/2020 29 20 - 32 mmol/L Final     Anion Gap   Date Value Ref Range Status   10/20/2020 3 3 - 14 mmol/L Final     Glucose   Date Value Ref Range Status   10/20/2020 78 70 - 99 mg/dL Final     Urea Nitrogen   Date Value Ref Range Status   10/20/2020 13 7 - 30 mg/dL Final     Creatinine   Date Value Ref Range Status   06/15/2023 0.83 0.51 - 0.95 mg/dL Final   05/13/2021 0.76 0.52 - 1.04 mg/dL Final     GFR Estimate   Date Value Ref Range Status   06/15/2023 81 >60 mL/min/1.73m2 Final     Comment:     eGFR calculated using 2021 CKD-EPI equation.    2021 87 >60 mL/min/[1.73_m2] Final     Comment:     Non  GFR Calc  Starting 2018, serum creatinine based estimated GFR (eGFR) will be   calculated using the Chronic Kidney Disease Epidemiology Collaboration   (CKD-EPI) equation.       Calcium   Date Value Ref Range Status   06/15/2023 9.1 8.6 - 10.0 mg/dL Final   2021 9.4 8.5 - 10.1 mg/dL Final     Bilirubin Total   Date Value Ref Range Status   10/20/2020 0.2 0.2 - 1.3 mg/dL Final     Alkaline Phosphatase   Date Value Ref Range Status   10/20/2020 76 40 - 150 U/L Final     ALT   Date Value Ref Range Status   10/20/2020 25 0 - 50 U/L Final     AST   Date Value Ref Range Status   10/20/2020 16 0 - 45 U/L Final       PATHOLOGY:  None new since last visit.    IMAGIN2022: DEXA scan showed osteopenia.    2022: Mammogram showed no malignancy    2022: Breast MRI showed no abnormal enhancement or suspicious findings.    ASSESSMENT/PLAN:  Jyothi Freitas is a 57 year old female with the following issues:  1.  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 Polyarthralgias  --Jyothi has no clinical evidence for recurrent breast cancer by physical exam from today or breast MRI reviewed from 6/15/2023.  --Jyothi is on letrozole and tolerating this overall better than anastrozole.  --I again recommended up to 10 years of letrozole, if tolerated, to maximally reduce risk of breast cancer recurrence. We discussed the differences in recurrence patterns based on molecular phenotype.  --Will continue to alternate mammogram with breast MRI, staggered by 6 months. Next mammogram due 2023.  --Would only recommend lab workup and imaging workup if concerning signs/symptoms arise.  --Advised gentle circular massage to the right breast scar tissue area.    3. Osteoporosis/osteopenia  --DEXA scan results  from 10/13/2020 showed osteoporosis.  --Repeat DEXA reviewed from 9/29/2022 showed improvement to osteopenia.   --Recommended adequate calcium and vitamin D, weight-bearing exercise and continuing zoledronic acid every 6 months for at least 6 or more doses, next due 6/2023.    --Will repeat DEXA scan in 2024.    4.  Insomnia   5.  Hot flashes, drug induced  -Oxybutynin did help with hot flashes but she had too much dry eye syndrome and fluid retention.   -She has had significant improvement in her hot flashes and insomnia with the use of gabapentin, with less fatigue decreasing down to 200 mg daily.   -She is no longer using Ambien.    6. Ectasia of thoracic aorta  --This measured 4 cm on prior PET scan.    --Surgical management not needed but she will need ongoing monitoring. She is following with Dr. Silverio Gooden for monitoring of this issue.    7. Peripheral neuropathy  --Hip pain has improved.  --Continue duloxetine at 40 mg once daily.    8. Hypertension  --Blood pressure stable on small dose of lisinopril.     Return in 4 months.    Maricarmen Monroe MD  Hematology/Oncology  [facility]    Total time spent today: 48 minutes in chart review, patient evaluation, counseling, documentation, test and/or medication/prescription orders, and coordination of care.      Again, thank you for allowing me to participate in the care of your patient.        Sincerely,        Maricarmen Monroe MD

## 2023-07-29 DIAGNOSIS — Z17.0 MALIGNANT NEOPLASM OF UPPER-OUTER QUADRANT OF RIGHT BREAST IN FEMALE, ESTROGEN RECEPTOR POSITIVE (H): ICD-10-CM

## 2023-07-29 DIAGNOSIS — C50.411 MALIGNANT NEOPLASM OF UPPER-OUTER QUADRANT OF RIGHT BREAST IN FEMALE, ESTROGEN RECEPTOR POSITIVE (H): ICD-10-CM

## 2023-07-29 DIAGNOSIS — I10 PRIMARY HYPERTENSION: ICD-10-CM

## 2023-07-31 RX ORDER — DULOXETIN HYDROCHLORIDE 20 MG/1
CAPSULE, DELAYED RELEASE ORAL
Qty: 180 CAPSULE | Refills: 1 | Status: SHIPPED | OUTPATIENT
Start: 2023-07-31 | End: 2024-01-16

## 2023-07-31 RX ORDER — LISINOPRIL 2.5 MG/1
TABLET ORAL
Qty: 90 TABLET | Refills: 1 | Status: SHIPPED | OUTPATIENT
Start: 2023-07-31 | End: 2024-01-16

## 2023-08-11 ENCOUNTER — TRANSFERRED RECORDS (OUTPATIENT)
Dept: HEALTH INFORMATION MANAGEMENT | Facility: CLINIC | Age: 58
End: 2023-08-11
Payer: COMMERCIAL

## 2023-08-11 NOTE — PROGRESS NOTES
SUBJECTIVE:   CC: Jyothi is an 57 year old who presents for preventive health visit.     The patient overall is doing well but does have a couple of issues to go over today.    She has intermittent migraines for years which has not changed.  She has tried different migraine meds in the past but they have not worked great.  They are about 2 times a month.  Again no change in the character.    She has a history of concussions and sometimes this can affect her.  She wonders what to do about that.    Her depression is controlled.  Her asthma is not an issue.  She has breast cancer with regular oncology follow-up and no evidence of disease.  She is up-to-date with her gynecologist.  She has neuropathy due to her chemo.  She wonders what she can do to screen for heart disease as her father has heart disease.    She does exercise some.  She is up-to-date on mammogram and colon exams.      8/14/2023     8:41 AM   Additional Questions   Roomed by AUGIE       Healthy Habits:     Getting at least 3 servings of Calcium per day:  Yes    Bi-annual eye exam:  Yes    Dental care twice a year:  Yes    Sleep apnea or symptoms of sleep apnea:  None    Diet:  Regular (no restrictions)    Frequency of exercise:  2-3 days/week    Duration of exercise:  15-30 minutes    Taking medications regularly:  Yes    Medication side effects:  Not applicable    Additional concerns today:  Yes               Past Medical History:      Past Medical History:   Diagnosis Date     GERD (gastroesophageal reflux disease)      H/O colonoscopy 03/08/2016    done at [place] [facility]     Hematuria 2016    eval at [place] and per pt cysto and ct neg     HTN (hypertension)     lisinopril added 3/23     Intermittent asthma     since childhood     Low iron 10/2017    done for eval of hair loss, egd n[facility]     Malignant neoplasm of upper-outer quadrant of right breast in female, estrogen receptor positive (H) 10/2019    had chemo, lumpectomy, onc is Dr. 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)      Normal stress echocardiogram 2011    due to hear racing     Osteopenia      Other osteoporosis without current pathological fracture     iv zometa every 6 months     Syncope 2017    echo nl lv size and fxn, grade 1 dd, mild tr             Past Surgical History:      Past Surgical History:   Procedure Laterality Date     BIOPSY NODE SENTINEL Right 2020    Procedure: RIGHT SENTINEL LYMPH NODE BIOPSY;  Surgeon: Kaila Hernandez MD;  Location:  OR     ESOPHAGOSCOPY, GASTROSCOPY, DUODENOSCOPY (EGD), COMBINED N/A 10/30/2017    Procedure: COMBINED ESOPHAGOSCOPY, GASTROSCOPY, DUODENOSCOPY (EGD), BIOPSY SINGLE OR MULTIPLE;  COMBINED ESOPHAGOSCOPY, GASTROSCOPY, DUODENOSCOPY (EGD);  Surgeon: Martin Rodriguez MD;  Location:  GI     INSERT PORT VASCULAR ACCESS N/A 10/24/2019    Procedure: PORT PLACEMENT;  Surgeon: Kaila Hernandez MD;  Location:  OR     LUMPECTOMY BREAST WITH SEED LOCALIZATION Right 2020    Procedure: SEED LOCALIZED RIGHT BREAST LUMPECTOMY WITH SEED LOCALIZED RIGHT AXILLARY NODE EXCISION;  Surgeon: Kaila Hernandez MD;  Location:  OR     ORTHOPEDIC SURGERY      \"leg surgery\"     REMOVE PORT VASCULAR ACCESS Left 2020    Procedure: REMOVAL, VASCULAR ACCESS PORT;  Surgeon: Kaila Hernandez MD;  Location:  OR     single oopherectomy      cyst     ZZC TMJ ARTHROSCOPY/SURGERY               Social History:     Social History     Socioeconomic History     Marital status:      Spouse name: Not on file     Number of children: 2     Years of education: Not on file     Highest education level: Not on file   Occupational History     Not on file   Tobacco Use     Smoking status: Former     Packs/day: 0.00     Types: Cigarettes     Quit date: 3/27/1997     Years since quittin.4     Smokeless tobacco: Never   Substance and Sexual " Activity     Alcohol use: Yes     Comment: rarely     Drug use: No     Sexual activity: Yes     Partners: Male     Birth control/protection: Pill   Other Topics Concern     Parent/sibling w/ CABG, MI or angioplasty before 65F 55M? Yes   Social History Narrative     Not on file     Social Determinants of Health     Financial Resource Strain: Not on file   Food Insecurity: Not on file   Transportation Needs: Not on file   Physical Activity: Not on file   Stress: Not on file   Social Connections: Not on file   Intimate Partner Violence: Not on file   Housing Stability: Not on file             Family History:   reviewed         Allergies:     Allergies   Allergen Reactions     Sulfa Antibiotics Other (See Comments)     Red face. Ringing in the ears.             Medications:     Current Outpatient Medications   Medication Sig Dispense Refill     acetaminophen (TYLENOL) 500 MG tablet Take 1,000 mg by mouth every 6 hours as needed for mild pain       acetaminophen-caffeine (EXCEDRIN TENSION HEADACHE) 500-65 MG TABS Take 2 tablets by mouth every 6 hours as needed for mild pain       albuterol (PROAIR HFA/PROVENTIL HFA/VENTOLIN HFA) 108 (90 Base) MCG/ACT inhaler Inhale 2 puffs into the lungs every 6 hours as needed for shortness of breath / dyspnea or wheezing 18 g 0     Calcium Carb-Cholecalciferol (CALCIUM 500 + D PO) Take by mouth 2 times daily       DULoxetine (CYMBALTA) 20 MG capsule TAKE 2 CAPSULES BY MOUTH EVERY  capsule 1     gabapentin (NEURONTIN) 100 MG capsule 200 mg daily 360 capsule 3     letrozole (FEMARA) 2.5 MG tablet Take 1 tablet (2.5 mg) by mouth daily 90 tablet 3     lisinopril (ZESTRIL) 2.5 MG tablet TAKE 1 TABLET BY MOUTH EVERY DAY 90 tablet 1     zoledronic acid (ZOMETA) 4 MG/100ML SOLN Inject 4 mg into the vein every 6 months        ZOLMitriptan (ZOMIG) 5 MG tablet Take 1 tablet (5 mg) by mouth at onset of headache for migraine May repeat in 2 hours. Max 2 tablets/24 hours. 10 tablet 1         "       Review of Systems:     The 10 point Review of Systems is negative other than noted in the HPI           Physical Exam:   Blood pressure 126/82, pulse 79, temperature 96.9  F (36.1  C), temperature source Oral, resp. rate 18, height 1.702 m (5' 7\"), weight 64.9 kg (143 lb), last menstrual period 10/07/2017, SpO2 100 %, not currently breastfeeding.    Exam:  Constitutional: healthy appearing, alert and in no distress  Heent: Normocephalic. Head without obvious masses or lesions. PERRLDC, EOMI. Mouth exam within normal limits: tongue, mucous membranes, posterior pharynx all normal, no lesions or abnormalities seen.  Tm's and canals within normal limits bilaterally. Neck supple, no nuchal rigidity or masses. No supraclavicular, or cervical adenopathy. Thyroid symmetric, no masses.  Cardiovascular: Regular rate and rhythm, no murmer, rub or gallops.  JVP not elevated, no edema.  Carotids within normal limits bilaterally, no bruits.  Respiratory: Normal respiratory effort.  Lungs clear, normal flow, no wheezing or crackles.  Gastrointestinal: Normal active bowel sounds.   Soft, not tender, no masses, guarding or rebound.  No hepatosplenomegaly.   Musculoskeletal: extremities normal, no gross deformities noted.  Skin: no suspicious lesions or rashes   Neurologic: Mental status within normal limits.  Speech fluent.  No gross motor abnormalities and gait intact.  Psychiatric: mentation appears normal and affect normal.         Data:   Labs sent        Assessment:   Normal complete physical exam  Migraines, not new or unstable, will try different triptin but if not effective to neuro  Concussions, to concussion clinic  Screen for heart dz, discussed with patient and to get cor ct scan  Asthma, no issues  Depression, doing well  Low iron, follow up labs  Asc aortic dil, has reg scans  Osteopor, zometa via onc  hcm         Plan:   Try zomig  Concussion referral    Letter with labs  Exercise, " diet  Coronary ct score  Shingrix at pharm  Up to date mammogram and colon      Peter Franklin M.D.

## 2023-08-14 ENCOUNTER — OFFICE VISIT (OUTPATIENT)
Dept: FAMILY MEDICINE | Facility: CLINIC | Age: 58
End: 2023-08-14
Payer: COMMERCIAL

## 2023-08-14 VITALS
SYSTOLIC BLOOD PRESSURE: 126 MMHG | TEMPERATURE: 96.9 F | OXYGEN SATURATION: 100 % | BODY MASS INDEX: 22.44 KG/M2 | HEART RATE: 79 BPM | RESPIRATION RATE: 18 BRPM | WEIGHT: 143 LBS | HEIGHT: 67 IN | DIASTOLIC BLOOD PRESSURE: 82 MMHG

## 2023-08-14 DIAGNOSIS — C50.411 MALIGNANT NEOPLASM OF UPPER-OUTER QUADRANT OF RIGHT BREAST IN FEMALE, ESTROGEN RECEPTOR POSITIVE (H): ICD-10-CM

## 2023-08-14 DIAGNOSIS — S06.0XAS CONCUSSION WITH UNKNOWN LOSS OF CONSCIOUSNESS STATUS, SEQUELA (H): ICD-10-CM

## 2023-08-14 DIAGNOSIS — Z13.6 SCREENING FOR HEART DISEASE: ICD-10-CM

## 2023-08-14 DIAGNOSIS — E61.1 LOW IRON: ICD-10-CM

## 2023-08-14 DIAGNOSIS — I77.810 ASCENDING AORTA DILATION (H): ICD-10-CM

## 2023-08-14 DIAGNOSIS — Z00.00 ENCOUNTER FOR ANNUAL PHYSICAL EXAM: Primary | ICD-10-CM

## 2023-08-14 DIAGNOSIS — G62.0 NEUROPATHY DUE TO CHEMOTHERAPEUTIC DRUG (H): ICD-10-CM

## 2023-08-14 DIAGNOSIS — T45.1X5A NEUROPATHY DUE TO CHEMOTHERAPEUTIC DRUG (H): ICD-10-CM

## 2023-08-14 DIAGNOSIS — F32.A MILD DEPRESSION: ICD-10-CM

## 2023-08-14 DIAGNOSIS — G44.219 EPISODIC TENSION-TYPE HEADACHE, NOT INTRACTABLE: ICD-10-CM

## 2023-08-14 DIAGNOSIS — J45.20 INTERMITTENT ASTHMA WITHOUT COMPLICATION, UNSPECIFIED ASTHMA SEVERITY: ICD-10-CM

## 2023-08-14 DIAGNOSIS — M81.8 OTHER OSTEOPOROSIS WITHOUT CURRENT PATHOLOGICAL FRACTURE: ICD-10-CM

## 2023-08-14 DIAGNOSIS — Z17.0 MALIGNANT NEOPLASM OF UPPER-OUTER QUADRANT OF RIGHT BREAST IN FEMALE, ESTROGEN RECEPTOR POSITIVE (H): ICD-10-CM

## 2023-08-14 LAB
ALBUMIN SERPL BCG-MCNC: 4.6 G/DL (ref 3.5–5.2)
ALP SERPL-CCNC: 49 U/L (ref 35–104)
ALT SERPL W P-5'-P-CCNC: 20 U/L (ref 0–50)
ANION GAP SERPL CALCULATED.3IONS-SCNC: 11 MMOL/L (ref 7–15)
AST SERPL W P-5'-P-CCNC: 31 U/L (ref 0–45)
BILIRUB SERPL-MCNC: 0.3 MG/DL
BUN SERPL-MCNC: 18.1 MG/DL (ref 6–20)
CALCIUM SERPL-MCNC: 9.7 MG/DL (ref 8.6–10)
CHLORIDE SERPL-SCNC: 102 MMOL/L (ref 98–107)
CHOLEST SERPL-MCNC: 188 MG/DL
CREAT SERPL-MCNC: 0.73 MG/DL (ref 0.51–0.95)
DEPRECATED HCO3 PLAS-SCNC: 26 MMOL/L (ref 22–29)
ERYTHROCYTE [DISTWIDTH] IN BLOOD BY AUTOMATED COUNT: 11.8 % (ref 10–15)
GFR SERPL CREATININE-BSD FRML MDRD: >90 ML/MIN/1.73M2
GLUCOSE SERPL-MCNC: 99 MG/DL (ref 70–99)
HCT VFR BLD AUTO: 42.5 % (ref 35–47)
HDLC SERPL-MCNC: 77 MG/DL
HGB BLD-MCNC: 14 G/DL (ref 11.7–15.7)
LDLC SERPL CALC-MCNC: 98 MG/DL
MCH RBC QN AUTO: 29.9 PG (ref 26.5–33)
MCHC RBC AUTO-ENTMCNC: 32.9 G/DL (ref 31.5–36.5)
MCV RBC AUTO: 91 FL (ref 78–100)
NONHDLC SERPL-MCNC: 111 MG/DL
PLATELET # BLD AUTO: 260 10E3/UL (ref 150–450)
POTASSIUM SERPL-SCNC: 4.1 MMOL/L (ref 3.4–5.3)
PROT SERPL-MCNC: 7 G/DL (ref 6.4–8.3)
RBC # BLD AUTO: 4.68 10E6/UL (ref 3.8–5.2)
SODIUM SERPL-SCNC: 139 MMOL/L (ref 136–145)
TRIGL SERPL-MCNC: 67 MG/DL
TSH SERPL DL<=0.005 MIU/L-ACNC: 1.12 UIU/ML (ref 0.3–4.2)
WBC # BLD AUTO: 5.8 10E3/UL (ref 4–11)

## 2023-08-14 PROCEDURE — 80053 COMPREHEN METABOLIC PANEL: CPT | Performed by: INTERNAL MEDICINE

## 2023-08-14 PROCEDURE — 99396 PREV VISIT EST AGE 40-64: CPT | Performed by: INTERNAL MEDICINE

## 2023-08-14 PROCEDURE — 99213 OFFICE O/P EST LOW 20 MIN: CPT | Mod: 25 | Performed by: INTERNAL MEDICINE

## 2023-08-14 PROCEDURE — 80061 LIPID PANEL: CPT | Performed by: INTERNAL MEDICINE

## 2023-08-14 PROCEDURE — 85027 COMPLETE CBC AUTOMATED: CPT | Performed by: INTERNAL MEDICINE

## 2023-08-14 PROCEDURE — 36415 COLL VENOUS BLD VENIPUNCTURE: CPT | Performed by: INTERNAL MEDICINE

## 2023-08-14 PROCEDURE — 84443 ASSAY THYROID STIM HORMONE: CPT | Performed by: INTERNAL MEDICINE

## 2023-08-14 RX ORDER — ZOLMITRIPTAN 5 MG/1
5 TABLET, FILM COATED ORAL
Qty: 10 TABLET | Refills: 1 | Status: SHIPPED | OUTPATIENT
Start: 2023-08-14 | End: 2024-04-26

## 2023-08-14 ASSESSMENT — ENCOUNTER SYMPTOMS
DIZZINESS: 0
CHILLS: 0
ARTHRALGIAS: 1
JOINT SWELLING: 0
MYALGIAS: 1
NERVOUS/ANXIOUS: 0
DYSURIA: 0
EYE PAIN: 0
FREQUENCY: 0
WEAKNESS: 0
NAUSEA: 0
SORE THROAT: 0
SHORTNESS OF BREATH: 0
DIARRHEA: 0
PARESTHESIAS: 0
ABDOMINAL PAIN: 0
CONSTIPATION: 1
HEARTBURN: 0
HEMATOCHEZIA: 0
HEADACHES: 1
COUGH: 1
FEVER: 0
PALPITATIONS: 0
HEMATURIA: 0

## 2023-08-14 ASSESSMENT — PATIENT HEALTH QUESTIONNAIRE - PHQ9
10. IF YOU CHECKED OFF ANY PROBLEMS, HOW DIFFICULT HAVE THESE PROBLEMS MADE IT FOR YOU TO DO YOUR WORK, TAKE CARE OF THINGS AT HOME, OR GET ALONG WITH OTHER PEOPLE: NOT DIFFICULT AT ALL
SUM OF ALL RESPONSES TO PHQ QUESTIONS 1-9: 3
SUM OF ALL RESPONSES TO PHQ QUESTIONS 1-9: 3

## 2023-08-14 ASSESSMENT — PAIN SCALES - GENERAL: PAINLEVEL: NO PAIN (0)

## 2023-08-14 ASSESSMENT — ASTHMA QUESTIONNAIRES: ACT_TOTALSCORE: 25

## 2023-08-14 NOTE — RESULT ENCOUNTER NOTE
It was a pleasure seeing you for your physical examination.  I wanted to get back to you with your test results.  I have enclosed a copy for your review.     I am happy to report that your cbc or complete blood count is normal with no signs of anemia, leukemia or platelet abnormalities. Your chemistry panel shows no signs of diabetes.  Your blood salts, kidney tests, liver tests, and proteins are all fine.    Your total cholesterol is 188 with the normal range being below 200.  Your HDL or good cholesterol is 77 with the normal range being above 50.  Your LDL or bad cholesterol is 98 with the normal range being below 130.  These numbers are super.    One additional normal result is your tsh or thyroid test.    I am happy to bring you this overall excellent report.  If you have any questions please call me.    Peter Franklin M.D.

## 2023-08-14 NOTE — LETTER
August 14, 2023      Jyothi Freitas  [address]   [place] [address]        Dear MsJulianne,    We are writing to inform you of your test results.    It was a pleasure seeing you for your physical examination.  I wanted to get back to you with your test results.  I have enclosed a copy for your review.     I am happy to report that your cbc or complete blood count is normal with no signs of anemia, leukemia or platelet abnormalities. Your chemistry panel shows no signs of diabetes.  Your blood salts, kidney tests, liver tests, and proteins are all fine.     Your total cholesterol is 188 with the normal range being below 200.  Your HDL or good cholesterol is 77 with the normal range being above 50.  Your LDL or bad cholesterol is 98 with the normal range being below 130.  These numbers are super.     One additional normal result is your tsh or thyroid test.     I am happy to bring you this overall excellent report.  If you have any questions please call me.     Peter Franklin M.D.     Resulted Orders   CBC with platelets   Result Value Ref Range    WBC Count 5.8 4.0 - 11.0 10e3/uL    RBC Count 4.68 3.80 - 5.20 10e6/uL    Hemoglobin 14.0 11.7 - 15.7 g/dL    Hematocrit 42.5 35.0 - 47.0 %    MCV 91 78 - 100 fL    MCH 29.9 26.5 - 33.0 pg    MCHC 32.9 31.5 - 36.5 g/dL    RDW 11.8 10.0 - 15.0 %    Platelet Count 260 150 - 450 10e3/uL   Comprehensive metabolic panel   Result Value Ref Range    Sodium 139 136 - 145 mmol/L    Potassium 4.1 3.4 - 5.3 mmol/L    Chloride 102 98 - 107 mmol/L    Carbon Dioxide (CO2) 26 22 - 29 mmol/L    Anion Gap 11 7 - 15 mmol/L    Urea Nitrogen 18.1 6.0 - 20.0 mg/dL    Creatinine 0.73 0.51 - 0.95 mg/dL    Calcium 9.7 8.6 - 10.0 mg/dL    Glucose 99 70 - 99 mg/dL    Alkaline Phosphatase 49 35 - 104 U/L    AST 31 0 - 45 U/L      Comment:      Reference intervals for this test were updated on 6/12/2023 to more accurately reflect our healthy population. There may be differences in the flagging  of prior results with similar values performed with this method. Interpretation of those prior results can be made in the context of the updated reference intervals.    ALT 20 0 - 50 U/L      Comment:      Reference intervals for this test were updated on 6/12/2023 to more accurately reflect our healthy population. There may be differences in the flagging of prior results with similar values performed with this method. Interpretation of those prior results can be made in the context of the updated reference intervals.      Protein Total 7.0 6.4 - 8.3 g/dL    Albumin 4.6 3.5 - 5.2 g/dL    Bilirubin Total 0.3 <=1.2 mg/dL    GFR Estimate >90 >60 mL/min/1.73m2   Lipid panel reflex to direct LDL Fasting   Result Value Ref Range    Cholesterol 188 <200 mg/dL    Triglycerides 67 <150 mg/dL    Direct Measure HDL 77 >=50 mg/dL    LDL Cholesterol Calculated 98 <=100 mg/dL    Non HDL Cholesterol 111 <130 mg/dL    Narrative    Cholesterol  Desirable:  <200 mg/dL    Triglycerides  Normal:  Less than 150 mg/dL  Borderline High:  150-199 mg/dL  High:  200-499 mg/dL  Very High:  Greater than or equal to 500 mg/dL    Direct Measure HDL  Female:  Greater than or equal to 50 mg/dL   Male:  Greater than or equal to 40 mg/dL    LDL Cholesterol  Desirable:  <100mg/dL  Above Desirable:  100-129 mg/dL   Borderline High:  130-159 mg/dL   High:  160-189 mg/dL   Very High:  >= 190 mg/dL    Non HDL Cholesterol  Desirable:  130 mg/dL  Above Desirable:  130-159 mg/dL  Borderline High:  160-189 mg/dL  High:  190-219 mg/dL  Very High:  Greater than or equal to 220 mg/dL   TSH with free T4 reflex   Result Value Ref Range    TSH 1.12 0.30 - 4.20 uIU/mL       If you have any questions or concerns, please call the clinic at the number listed above.       Sincerely,      Petre Franklin MD/kw

## 2023-08-14 NOTE — PATIENT INSTRUCTIONS
I would recommend getting the new shingles shot called shingrix, but I would do it at your pharmacy as they can check with the insurance company to see if it is paid for.    Peter Franklin M.D.

## 2023-08-30 ENCOUNTER — TRANSFERRED RECORDS (OUTPATIENT)
Dept: HEALTH INFORMATION MANAGEMENT | Facility: CLINIC | Age: 58
End: 2023-08-30
Payer: COMMERCIAL

## 2023-09-06 ENCOUNTER — TELEPHONE (OUTPATIENT)
Dept: CARDIOLOGY | Facility: CLINIC | Age: 58
End: 2023-09-06

## 2023-09-06 NOTE — TELEPHONE ENCOUNTER
[facility] Call Center    Phone Message    May a detailed message be left on voicemail: yes     Reason for Call: Other: Patient will like to schedule appt for CT Coronary Calcium scan in [place]. Please call patient back to further coordinate.     Action Taken: Other: Cardiology    Travel Screening: Not Applicable    Thank you!  [facility]

## 2023-11-21 DIAGNOSIS — Z17.0 MALIGNANT NEOPLASM OF UPPER-OUTER QUADRANT OF RIGHT BREAST IN FEMALE, ESTROGEN RECEPTOR POSITIVE (H): ICD-10-CM

## 2023-11-21 DIAGNOSIS — C50.411 MALIGNANT NEOPLASM OF UPPER-OUTER QUADRANT OF RIGHT BREAST IN FEMALE, ESTROGEN RECEPTOR POSITIVE (H): ICD-10-CM

## 2023-11-21 RX ORDER — LETROZOLE 2.5 MG/1
2.5 TABLET, FILM COATED ORAL DAILY
Qty: 90 TABLET | Refills: 3 | Status: SHIPPED | OUTPATIENT
Start: 2023-11-21 | End: 2024-02-01

## 2023-12-19 ENCOUNTER — HOSPITAL ENCOUNTER (OUTPATIENT)
Dept: MAMMOGRAPHY | Facility: CLINIC | Age: 58
Discharge: HOME | End: 2023-12-19
Attending: INTERNAL MEDICINE | Admitting: INTERNAL MEDICINE
Payer: COMMERCIAL

## 2023-12-19 PROCEDURE — 77067 SCR MAMMO BI INCL CAD: CPT

## 2024-01-16 ENCOUNTER — APPOINTMENT (OUTPATIENT)
Dept: LAB | Facility: CLINIC | Age: 59
End: 2024-01-16
Payer: COMMERCIAL

## 2024-01-16 ENCOUNTER — VIRTUAL VISIT (OUTPATIENT)
Dept: FAMILY MEDICINE | Facility: CLINIC | Age: 59
End: 2024-01-16
Payer: COMMERCIAL

## 2024-01-16 DIAGNOSIS — R31.0 GROSS HEMATURIA: ICD-10-CM

## 2024-01-16 DIAGNOSIS — R30.0 DYSURIA: Primary | ICD-10-CM

## 2024-01-16 DIAGNOSIS — Z17.0 MALIGNANT NEOPLASM OF UPPER-OUTER QUADRANT OF RIGHT BREAST IN FEMALE, ESTROGEN RECEPTOR POSITIVE (H): ICD-10-CM

## 2024-01-16 DIAGNOSIS — I10 PRIMARY HYPERTENSION: ICD-10-CM

## 2024-01-16 DIAGNOSIS — C50.411 MALIGNANT NEOPLASM OF UPPER-OUTER QUADRANT OF RIGHT BREAST IN FEMALE, ESTROGEN RECEPTOR POSITIVE (H): ICD-10-CM

## 2024-01-16 LAB
ALBUMIN UR-MCNC: NEGATIVE MG/DL
APPEARANCE UR: CLEAR
BACTERIA #/AREA URNS HPF: ABNORMAL /HPF
BILIRUB UR QL STRIP: NEGATIVE
COLOR UR AUTO: ABNORMAL
GLUCOSE UR STRIP-MCNC: NEGATIVE MG/DL
HGB UR QL STRIP: ABNORMAL
KETONES UR STRIP-MCNC: >=160 MG/DL
LEUKOCYTE ESTERASE UR QL STRIP: NEGATIVE
NITRATE UR QL: NEGATIVE
PH UR STRIP: 5.5 [PH] (ref 5–7)
RBC #/AREA URNS AUTO: ABNORMAL /HPF
SP GR UR STRIP: 1.01 (ref 1–1.03)
SQUAMOUS #/AREA URNS AUTO: ABNORMAL /LPF
UROBILINOGEN UR STRIP-ACNC: 0.2 E.U./DL
WBC #/AREA URNS AUTO: ABNORMAL /HPF

## 2024-01-16 PROCEDURE — 99213 OFFICE O/P EST LOW 20 MIN: CPT | Mod: 95

## 2024-01-16 PROCEDURE — 81001 URINALYSIS AUTO W/SCOPE: CPT | Mod: 95

## 2024-01-16 RX ORDER — DULOXETIN HYDROCHLORIDE 20 MG/1
CAPSULE, DELAYED RELEASE ORAL
Qty: 180 CAPSULE | Refills: 1 | Status: SHIPPED | OUTPATIENT
Start: 2024-01-16 | End: 2024-02-01

## 2024-01-16 RX ORDER — LISINOPRIL 2.5 MG/1
TABLET ORAL
Qty: 90 TABLET | Refills: 1 | Status: SHIPPED | OUTPATIENT
Start: 2024-01-16

## 2024-01-16 NOTE — PROGRESS NOTES
Jyothi is a 58 year old who is being evaluated via a billable video visit.      How would you like to obtain your AVS? MyChart  If the video visit is dropped, the invitation should be resent by: Text to cell phone: [phone]  Will anyone else be joining your video visit? No          Assessment & Plan   Problem List Items Addressed This Visit       Gross hematuria     Patient's UA was significant for hematuria but not suggestive of an acute infection.  Would recommend CT of the abdomen/pelvis to assess for nephrolithiasis or other concerning findings.  Of note, she also had ketones in her urine, which have not been noted historically.  She has no significant history of diabetes and her glucose that was drawn 4 months ago was normal.  Question ketogenic diet as contributing, but as lab results were obtained after our visit, I am unable to confirm this suspicion.  Would recommend following up on the imaging that was ordered, and repeating a UA in clinic to monitor for resolution of the ketones.  Encouraged supportive cares such as over-the-counter medications as they are helpful and pushing fluids.  Follow-up if symptoms worsen or she develops new concerning symptoms such as fever or flank pain.  Patient expressed understanding of and agreement with this.  All questions were answered.         Relevant Orders    CT Abdomen Pelvis w/o & w Contrast     Other Visit Diagnoses       Dysuria    -  Primary    Relevant Orders    UA Macroscopic with reflex to Microscopic and Culture - Clinic Collect (Completed)    UA Microscopic with Reflex to Culture (Completed)           KRISTIAN Galarza CNP  [facility]    Subjective   Jyothi is a 58 year old, presenting for the following health issues:  UTI (Blood in urine, dysuria, frequency, no fever. Sx for 2 days.)        1/16/2024     1:53 PM   Additional Questions   Roomed by sac   Accompanied by self         1/16/2024     1:53 PM   Patient Reported  Additional Medications   Patient reports taking the following new medications no     Painful urination x2 days, blood in the urine (starting today)  No fevers, abodminal pain, pelvic pain or flank pain.  No vaginal symptoms.  No changes in her bowel habits.  No history of recurrent UTIs.  She notes her last 1 was approximately 1 year ago, which she was prescribed Macrobid for.  Home care has included over-the-counter medications and pushing fluids.    UTI       Review of Systems         Objective         Vitals:  No vitals were obtained today due to virtual visit.    Physical Exam   GENERAL: Healthy, alert and no distress  EYES: Eyes grossly normal to inspection.  No discharge or erythema, or obvious scleral/conjunctival abnormalities.  RESP: No audible wheeze, cough, or visible cyanosis.  No visible retractions or increased work of breathing.    SKIN: Visible skin clear. No significant rash, abnormal pigmentation or lesions.  NEURO: Cranial nerves grossly intact.  Mentation and speech appropriate for age.  PSYCH: Mentation appears normal, affect normal/bright, judgement and insight intact, normal speech and appearance well-groomed.    Results for orders placed or performed in visit on 01/16/24 (from the past 24 hour(s))   UA Macroscopic with reflex to Microscopic and Culture - Clinic Collect    Specimen: Urine, Midstream   Result Value Ref Range    Color Urine Orange (A) Colorless, Straw, Light Yellow, Yellow    Appearance Urine Clear Clear    Glucose Urine Negative Negative mg/dL    Bilirubin Urine Negative Negative    Ketones Urine >=160 (A) Negative mg/dL    Specific Gravity Urine 1.015 1.003 - 1.035    Blood Urine Moderate (A) Negative    pH Urine 5.5 5.0 - 7.0    Protein Albumin Urine Negative Negative mg/dL    Urobilinogen Urine 0.2 0.2, 1.0 E.U./dL    Nitrite Urine Negative Negative    Leukocyte Esterase Urine Negative Negative   UA Microscopic with Reflex to Culture   Result Value Ref Range    Bacteria  Urine Few (A) None Seen /HPF    RBC Urine 5-10 (A) 0-2 /HPF /HPF    WBC Urine 0-5 0-5 /HPF /HPF    Squamous Epithelials Urine Few (A) None Seen /LPF    Narrative    Urine Culture not indicated           Video-Visit Details    Type of service:  Video Visit     Originating Location (pt. Location): Home    Distant Location (provider location):  On-site  Platform used for Video Visit: Well

## 2024-01-22 ENCOUNTER — ANCILLARY PROCEDURE (OUTPATIENT)
Dept: CT IMAGING | Facility: CLINIC | Age: 59
End: 2024-01-22
Payer: COMMERCIAL

## 2024-01-22 DIAGNOSIS — R31.0 GROSS HEMATURIA: ICD-10-CM

## 2024-01-22 PROCEDURE — 74178 CT ABD&PLV WO CNTR FLWD CNTR: CPT

## 2024-01-22 PROCEDURE — 250N000009 HC RX 250

## 2024-01-22 PROCEDURE — 250N000011 HC RX IP 250 OP 636

## 2024-01-22 RX ORDER — IOPAMIDOL 755 MG/ML
100 INJECTION, SOLUTION INTRAVASCULAR ONCE
Status: COMPLETED | OUTPATIENT
Start: 2024-01-22 | End: 2024-01-22

## 2024-01-22 RX ADMIN — SODIUM CHLORIDE 120 ML: 9 INJECTION, SOLUTION INTRAVENOUS at 16:13

## 2024-01-22 RX ADMIN — IOPAMIDOL 100 ML: 755 INJECTION, SOLUTION INTRAVENOUS at 16:12

## 2024-01-23 DIAGNOSIS — R31.9 HEMATURIA, UNSPECIFIED TYPE: ICD-10-CM

## 2024-01-23 DIAGNOSIS — N13.1 HYDRONEPHROSIS WITH URETERAL STRICTURE, NOT ELSEWHERE CLASSIFIED: Primary | ICD-10-CM

## 2024-01-25 ENCOUNTER — MYC MEDICAL ADVICE (OUTPATIENT)
Dept: FAMILY MEDICINE | Facility: CLINIC | Age: 59
End: 2024-01-25
Payer: COMMERCIAL

## 2024-01-25 DIAGNOSIS — N28.1 RENAL CYST: Primary | ICD-10-CM

## 2024-01-25 NOTE — PROGRESS NOTES
[facility] [place]    Hematology/Oncology Established Patient Follow-up Note      Today's Date: 2/1/2024    Reason for Follow-up: Right breast cancer.    HISTORY OF PRESENT ILLNESS: Jyothi Freitas is a 58 year old female who presents with the following oncologic history:   1.  10/11/2019: Diagnostic right sided mammogram performed for a palpable lump in the right breast.  This showed an irregularly-shaped spiculated mass in the upper outer right breast with prominent lymph nodes in the right axilla.  Targeted ultrasound of the right upper outer breast showed an irregularly-shaped hypoechoic mass at the 10 o'clock position, 4 cm from the nipple measuring 2.6 x 1.5 x 2.4 cm.  Right axillary ultrasound showed moderately enlarged lymph nodes.  Right breast needle biopsy showed a grade 3 invasive ductal carcinoma with lymphovascular invasion identified, ER strongly positive at 95%, MT strongly positive at 95%, HER-2/juan FISH negative.  Right axillary lymph node measuring 2 cm was positive for metastatic carcinoma.  Note prior 4/2019 mammogram deemed normal.  2.  10/15/2019: Bilateral breast MRI showed known right breast malignancy measuring 2.6 x 1.4 x 2 cm, 3 mildly enlarged right axillary lymph nodes and no contralateral breast malignancy.  3. 10/21/2019 PET scan showed scattered small hypermetabolic bilateral axillary lymph nodes; for example, a hypermetabolic right axillary lymph node measures 1.6 x 0.9 cm with SUV max 3.6.  Hypermetabolic mass in the right breast superiorly and laterally measuring 2.1 x 1.6 cm with SUV max 4.3.  A 0.6 cm low-density lesion in the right hepatic lobe is too small for accurate PET characterization but shows no appreciable hypermetabolic activity.  Incidentally noted ectasia of the ascending thoracic aorta measures 4 cm in diameter.  4.  10/23/2019: Left axillary ultrasound showed benign-appearing lymph node.  Left axillary lymph node biopsy showed benign lymph node tissue.  5.   10/29/2019: Started neoadjuvant chemotherapy with dose dense Adriamycin and Cytoxan, followed by weekly paclitaxel with completion on 3/12/2020.  6.  3/19/2020: Breast MRI showed no residual enhancement in the right breast mass.  The right axillary lymphadenopathy has resolved.  7. 4/01/2020: Underwent right breast lumpectomy and right axillary sentinel lymph node biopsy under care of Dr. Kaila Hernandez.  Pathology showed fibrocystic change and no evidence of residual carcinoma in the right breast.  Metastatic breast adenocarcinoma to one of 5 lymph node fragments in 1 of 3 lymph nodes excised. Extranodal extension present. ypT0-N1a.  8. 4/29/2020: Started adjuvant anastrozole.  9. 6/15/2020: Completed adjuvant radiation therapy.  10. 6/15/2020: Switched to adjuvant letrozole with some mild improvement in polyathralgias.    INTERIM HISTORY:  Jyothi reports she had transient hematuria and was found to have a 1.3 cm simple left kidney cyst. CT urogram was otherwise unremarkable. Her peripheral neuropathy and hot flashes are still well controlled on a lower dose of gabapentin 200 mg daily and duloxetine 60 mg daily.      REVIEW OF SYSTEMS:   14 point ROS was reviewed and is negative other than as noted above in HPI.       HOME MEDICATIONS:  Current Outpatient Medications   Medication Sig Dispense Refill    acetaminophen (TYLENOL) 500 MG tablet Take 1,000 mg by mouth every 6 hours as needed for mild pain      acetaminophen-caffeine (EXCEDRIN TENSION HEADACHE) 500-65 MG TABS Take 2 tablets by mouth every 6 hours as needed for mild pain      albuterol (PROAIR HFA/PROVENTIL HFA/VENTOLIN HFA) 108 (90 Base) MCG/ACT inhaler Inhale 2 puffs into the lungs every 6 hours as needed for shortness of breath / dyspnea or wheezing 18 g 0    Calcium Carb-Cholecalciferol (CALCIUM 500 + D PO) Take by mouth 2 times daily      DULoxetine (CYMBALTA) 20 MG capsule TAKE 2 CAPSULES BY MOUTH EVERY  capsule 1    gabapentin (NEURONTIN) 100  MG capsule 200 mg daily 360 capsule 3    letrozole (FEMARA) 2.5 MG tablet Take 1 tablet (2.5 mg) by mouth daily 90 tablet 3    lisinopril (ZESTRIL) 2.5 MG tablet TAKE 1 TABLET BY MOUTH EVERY DAY 90 tablet 1    zoledronic acid (ZOMETA) 4 MG/100ML SOLN Inject 4 mg into the vein every 6 months      ZOLMitriptan (ZOMIG) 2.5 MG tablet Take 1 tablet (2.5 mg) by mouth at onset of headache for migraine May repeat in 2 hours. Max 4 tablets/24 hours. 10 tablet 1    ZOLMitriptan (ZOMIG) 5 MG tablet Take 1 tablet (5 mg) by mouth at onset of headache for migraine May repeat in 2 hours. Max 2 tablets/24 hours. 10 tablet 1         ALLERGIES:  Allergies   Allergen Reactions    Sulfa Antibiotics Other (See Comments)     Red face. Ringing in the ears.         PAST MEDICAL HISTORY:  Past Medical History:   Diagnosis Date    GERD (gastroesophageal reflux disease)     H/O colonoscopy 03/08/2016    done at [facility] and [facility]    Hematuria 2016    eval at [facility] and per pt cysto and ct neg    HTN (hypertension)     lisinopril added 3/23    Intermittent asthma     since childhood    Low iron 10/2017    done for eval of hair loss, [facility]d [facility]    Malignant neoplasm of upper-outer quadrant of right breast in female, estrogen receptor positive (H) 10/2019    had chemo, lumpectomy, onc is Dr. Monroe    Migraines     most of adult life, has seen neuro in past, on bblocker    Mild depression     Neuropathy due to chemotherapeutic drug  (H24)     Normal stress echocardiogram 07/2011    due to hear racing    Osteopenia 2008    Other osteoporosis without current pathological fracture 2020    iv zometa every 6 months    Syncope 03/2017    echo nl lv size and fxn, grade 1 dd, mild tr         PAST SURGICAL HISTORY:  Past Surgical History:   Procedure Laterality Date    BIOPSY NODE SENTINEL Right 4/1/2020    Procedure: RIGHT SENTINEL LYMPH NODE BIOPSY;  Surgeon: Kaila Hernandez MD;  Location:  OR    ESOPHAGOSCOPY, GASTROSCOPY, DUODENOSCOPY (EGD), COMBINED N/A  "10/30/2017    Procedure: COMBINED ESOPHAGOSCOPY, GASTROSCOPY, DUODENOSCOPY (EGD), BIOPSY SINGLE OR MULTIPLE;  COMBINED ESOPHAGOSCOPY, GASTROSCOPY, DUODENOSCOPY (EGD);  Surgeon: Martin Rodriguez MD;  Location:  GI    INSERT PORT VASCULAR ACCESS N/A 10/24/2019    Procedure: PORT PLACEMENT;  Surgeon: Kaila Hernandez MD;  Location:  OR    LUMPECTOMY BREAST WITH SEED LOCALIZATION Right 2020    Procedure: SEED LOCALIZED RIGHT BREAST LUMPECTOMY WITH SEED LOCALIZED RIGHT AXILLARY NODE EXCISION;  Surgeon: Kaila Hernandez MD;  Location:  OR    ORTHOPEDIC SURGERY      \"leg surgery\"    REMOVE PORT VASCULAR ACCESS Left 2020    Procedure: REMOVAL, VASCULAR ACCESS PORT;  Surgeon: Kaila Hernandez MD;  Location:  OR    single oopherectomy  2010    cyst    ZZC TMJ ARTHROSCOPY/SURGERY           SOCIAL HISTORY:  Social History     Socioeconomic History    Marital status:      Spouse name: Not on file    Number of children: 2    Years of education: Not on file    Highest education level: Not on file   Occupational History    Not on file   Tobacco Use    Smoking status: Former     Packs/day: 0     Types: Cigarettes     Quit date: 3/27/1997     Years since quittin.8    Smokeless tobacco: Never   Vaping Use    Vaping Use: Never used   Substance and Sexual Activity    Alcohol use: Yes     Comment: rarely    Drug use: No    Sexual activity: Yes     Partners: Male     Birth control/protection: Pill   Other Topics Concern    Parent/sibling w/ CABG, MI or angioplasty before 65F 55M? Yes   Social History Narrative    Not on file     Social Determinants of Health     Financial Resource Strain: Not on file   Food Insecurity: Not on file   Transportation Needs: Not on file   Physical Activity: Not on file   Stress: Not on file   Social Connections: Not on file   Interpersonal Safety: Not on file   Housing Stability: Not on file         FAMILY HISTORY:  Family History   Problem Relation Age of Onset    " Coronary Artery Disease Father 48        MI. and one in his 60s    Emphysema Mother         COPD         PHYSICAL EXAM:  Vital signs:  BP (!) 128/90   Pulse 94   Resp 14   Wt 62.9 kg (138 lb 9.6 oz)   LMP 10/07/2017 (Approximate)   SpO2 100%   BMI 21.71 kg/m    GENERAL/CONSTITUTIONAL: No acute distress.  EYES: No erythema or scleral icterus.  LYMPH: No cervical, supraclavicular, axillary, epitroclear adenopathy.   BREAST: The left breast droops slightly lower than right breast. Nodular scar tissue above the right lumpectomy incision is stable compared to prior exam. Nipples are everted bilaterally with no discharge. No erythema, ulceration, or dimpling of the skin.  RESPIRATORY: No audible cough or wheezing.  GASTROINTESTINAL: No hepatosplenomegaly, masses, or tenderness. No guarding.  No distention.  MUSCULOSKELETAL: Warm and well-perfused, no cyanosis, clubbing, or edema.  NEUROLOGIC: No focal motor deficits. Alert, oriented, answers questions appropriately.  INTEGUMENTARY: No rashes or jaundice.  GAIT: Steady, does not use assistive device    LABS:  CBC RESULTS:   Recent Labs   Lab Test 08/14/23  0917   WBC 5.8   RBC 4.68   HGB 14.0   HCT 42.5   MCV 91   MCH 29.9   MCHC 32.9   RDW 11.8           Last Comprehensive Metabolic Panel:  Sodium   Date Value Ref Range Status   08/14/2023 139 136 - 145 mmol/L Final   10/20/2020 139 133 - 144 mmol/L Final     Potassium   Date Value Ref Range Status   08/14/2023 4.1 3.4 - 5.3 mmol/L Final   10/20/2020 4.2 3.4 - 5.3 mmol/L Final     Chloride   Date Value Ref Range Status   08/14/2023 102 98 - 107 mmol/L Final   10/20/2020 107 94 - 109 mmol/L Final     Carbon Dioxide   Date Value Ref Range Status   10/20/2020 29 20 - 32 mmol/L Final     Carbon Dioxide (CO2)   Date Value Ref Range Status   08/14/2023 26 22 - 29 mmol/L Final     Anion Gap   Date Value Ref Range Status   08/14/2023 11 7 - 15 mmol/L Final   10/20/2020 3 3 - 14 mmol/L Final     Glucose   Date Value  Ref Range Status   08/14/2023 99 70 - 99 mg/dL Final   10/20/2020 78 70 - 99 mg/dL Final     Urea Nitrogen   Date Value Ref Range Status   08/14/2023 18.1 6.0 - 20.0 mg/dL Final   10/20/2020 13 7 - 30 mg/dL Final     Creatinine   Date Value Ref Range Status   08/14/2023 0.73 0.51 - 0.95 mg/dL Final   05/13/2021 0.76 0.52 - 1.04 mg/dL Final     GFR Estimate   Date Value Ref Range Status   08/14/2023 >90 >60 mL/min/1.73m2 Final   05/13/2021 87 >60 mL/min/[1.73_m2] Final     Comment:     Non  GFR Calc  Starting 12/18/2018, serum creatinine based estimated GFR (eGFR) will be   calculated using the Chronic Kidney Disease Epidemiology Collaboration   (CKD-EPI) equation.       Calcium   Date Value Ref Range Status   08/14/2023 9.7 8.6 - 10.0 mg/dL Final   05/13/2021 9.4 8.5 - 10.1 mg/dL Final     Bilirubin Total   Date Value Ref Range Status   08/14/2023 0.3 <=1.2 mg/dL Final   10/20/2020 0.2 0.2 - 1.3 mg/dL Final     Alkaline Phosphatase   Date Value Ref Range Status   08/14/2023 49 35 - 104 U/L Final   10/20/2020 76 40 - 150 U/L Final     ALT   Date Value Ref Range Status   08/14/2023 20 0 - 50 U/L Final     Comment:     Reference intervals for this test were updated on 6/12/2023 to more accurately reflect our healthy population. There may be differences in the flagging of prior results with similar values performed with this method. Interpretation of those prior results can be made in the context of the updated reference intervals.     10/20/2020 25 0 - 50 U/L Final     AST   Date Value Ref Range Status   08/14/2023 31 0 - 45 U/L Final     Comment:     Reference intervals for this test were updated on 6/12/2023 to more accurately reflect our healthy population. There may be differences in the flagging of prior results with similar values performed with this method. Interpretation of those prior results can be made in the context of the updated reference intervals.   10/20/2020 16 0 - 45 U/L Final        PATHOLOGY:  None new since last visit.    IMAGIN2022: DEXA scan showed osteopenia.    2023: Mammogram showed no malignancy    2022: Breast MRI showed no abnormal enhancement or suspicious findings.    2024: CT urogram showed mild right-sided hydronephrosis is present with focal narrowing seen along the ureteropelvic junction and may suggest congenital UPJ or focal stenosis; 1.5 cm minimally complex cystic lesion along the mid pole of the left kidney which may have proteinaceous or hemorrhagic debris; dilated left ovarian vein with multiple prominent periuterine and paraovarian vessels suggestive of pelvic congestion.     ASSESSMENT/PLAN:  Jyothi Freitas is a 58 year old female with the following issues:  1.  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 Polyarthralgias  --Jyothi has no clinical evidence for recurrent breast cancer by physical exam from today or mammmogram reviewed from 2023.  --Jyothi is on letrozole and tolerating this overall better than anastrozole.  --I again recommended up to 10 years of letrozole.  --Will continue to alternate mammogram with breast MRI, staggered by 6 months. Next breast MRI due 2024 and mammogram 2024.  --Would only recommend lab workup and imaging workup if concerning signs/symptoms arise.  --Advised gentle circular massage to the right breast scar tissue area.    3. Osteoporosis/osteopenia  --DEXA scan results from 10/13/2020 showed osteoporosis.  --2022 DEXA showed improvement to osteopenia.   --She completed Zometa in 2023.  --Recommended adequate calcium and vitamin D, weight-bearing exercise.    --Will repeat DEXA scan in .    4.  Insomnia   5.  Hot flashes, drug induced  -Oxybutynin did help with hot flashes but she had too much dry eye syndrome and fluid retention.   -She has had  significant improvement in her hot flashes and insomnia with the use of gabapentin, with less fatigue decreasing down to 200 mg daily.   -She is no longer using Ambien.    6. Ectasia of thoracic aorta  --This measured 4 cm on prior PET scan.    --Surgical management not needed but she will need ongoing monitoring. She is following with Dr. Silverio Gooden for monitoring of this issue.    7. Peripheral neuropathy  --Hip pain has improved.  --Continue duloxetine at 60 mg once daily.    8. Hypertension  --Blood pressure stable on small dose of lisinopril.    9. Left kidney cystic lesion  --U/S showed this was benign simple cyst.    Refilled her duloxetine, letrozole, and gabapentin today.     Return in 6 months with DEXA and breast MRI.    Maricarmen Monroe MD  Hematology/Oncology  [facility]    Total time spent today: 30 minutes in chart review, patient evaluation, counseling, documentation, test and/or medication/prescription orders, and coordination of care.

## 2024-01-25 NOTE — TELEPHONE ENCOUNTER
CT 1/22/245:  IMPRESSION:  1.  No nephrolithiasis is seen. Mild right-sided hydronephrosis is present with focal narrowing seen along the ureteropelvic junction and may suggest congenital UPJ or focal stenosis.  2.  1.5 cm minimally complex cystic lesion along the mid pole of the left kidney which may have proteinaceous or hemorrhagic debris. Consider further characterization with renal ultrasound.  3.  Dilated left ovarian vein with multiple prominent periuterine and paraovarian vessels suggestive of pelvic congestion.

## 2024-01-29 ENCOUNTER — MYC MEDICAL ADVICE (OUTPATIENT)
Dept: FAMILY MEDICINE | Facility: CLINIC | Age: 59
End: 2024-01-29
Payer: COMMERCIAL

## 2024-01-29 DIAGNOSIS — E27.8 ADRENAL HYPERPLASIA (H): Primary | ICD-10-CM

## 2024-01-31 ENCOUNTER — ANCILLARY PROCEDURE (OUTPATIENT)
Dept: ULTRASOUND IMAGING | Facility: CLINIC | Age: 59
End: 2024-01-31
Payer: COMMERCIAL

## 2024-01-31 DIAGNOSIS — N28.1 RENAL CYST: ICD-10-CM

## 2024-01-31 PROCEDURE — 76770 US EXAM ABDO BACK WALL COMP: CPT

## 2024-02-01 ENCOUNTER — ONCOLOGY VISIT (OUTPATIENT)
Dept: ONCOLOGY | Facility: CLINIC | Age: 59
End: 2024-02-01
Attending: INTERNAL MEDICINE
Payer: COMMERCIAL

## 2024-02-01 VITALS
DIASTOLIC BLOOD PRESSURE: 90 MMHG | WEIGHT: 138.6 LBS | BODY MASS INDEX: 21.71 KG/M2 | SYSTOLIC BLOOD PRESSURE: 128 MMHG | HEART RATE: 94 BPM | OXYGEN SATURATION: 100 % | RESPIRATION RATE: 14 BRPM

## 2024-02-01 DIAGNOSIS — M81.0 AGE-RELATED OSTEOPOROSIS WITHOUT CURRENT PATHOLOGICAL FRACTURE: ICD-10-CM

## 2024-02-01 DIAGNOSIS — Z17.0 MALIGNANT NEOPLASM OF UPPER-OUTER QUADRANT OF RIGHT BREAST IN FEMALE, ESTROGEN RECEPTOR POSITIVE (H): Primary | ICD-10-CM

## 2024-02-01 DIAGNOSIS — C50.411 MALIGNANT NEOPLASM OF UPPER-OUTER QUADRANT OF RIGHT BREAST IN FEMALE, ESTROGEN RECEPTOR POSITIVE (H): Primary | ICD-10-CM

## 2024-02-01 DIAGNOSIS — T45.1X5A PERIPHERAL NEUROPATHY DUE TO CHEMOTHERAPY (H): ICD-10-CM

## 2024-02-01 DIAGNOSIS — N95.1 MENOPAUSAL SYNDROME (HOT FLASHES): ICD-10-CM

## 2024-02-01 DIAGNOSIS — G62.0 PERIPHERAL NEUROPATHY DUE TO CHEMOTHERAPY (H): ICD-10-CM

## 2024-02-01 PROCEDURE — 99214 OFFICE O/P EST MOD 30 MIN: CPT | Performed by: INTERNAL MEDICINE

## 2024-02-01 PROCEDURE — 99213 OFFICE O/P EST LOW 20 MIN: CPT | Performed by: INTERNAL MEDICINE

## 2024-02-01 RX ORDER — LETROZOLE 2.5 MG/1
2.5 TABLET, FILM COATED ORAL DAILY
Qty: 90 TABLET | Refills: 3 | Status: SHIPPED | OUTPATIENT
Start: 2024-02-01

## 2024-02-01 RX ORDER — DULOXETIN HYDROCHLORIDE 20 MG/1
60 CAPSULE, DELAYED RELEASE ORAL DAILY
Qty: 270 CAPSULE | Refills: 3 | Status: SHIPPED | OUTPATIENT
Start: 2024-02-01

## 2024-02-01 RX ORDER — GABAPENTIN 100 MG/1
CAPSULE ORAL
Qty: 360 CAPSULE | Refills: 3 | Status: SHIPPED | OUTPATIENT
Start: 2024-02-01

## 2024-02-01 ASSESSMENT — PAIN SCALES - GENERAL: PAINLEVEL: NO PAIN (0)

## 2024-02-01 NOTE — PROGRESS NOTES
" Oncology Rooming Note    February 1, 2024 8:03 AM   Jyothi Freitas is a 58 year old female who presents for:    Chief Complaint   Patient presents with    Oncology Clinic Visit     Initial Vitals: BP (!) 128/90   Pulse 94   Resp 14   Wt 62.9 kg (138 lb 9.6 oz)   LMP 10/07/2017 (Approximate)   SpO2 100%   BMI 21.71 kg/m   Estimated body mass index is 21.71 kg/m  as calculated from the following:    Height as of 8/14/23: 1.702 m (5' 7\").    Weight as of this encounter: 62.9 kg (138 lb 9.6 oz). Body surface area is 1.72 meters squared.  No Pain (0) Comment: Data Unavailable   Patient's last menstrual period was 10/07/2017 (approximate).  Allergies reviewed: Yes  Medications reviewed: Yes    Medications: MEDICATION REFILLS NEEDED TODAY. Provider was notified.    Cymbalta, gabapentin, Femara    Pharmacy name entered into [employer]: [id number] - [place], [place] - [address]    Frailty Screening:   Is the patient here for a new oncology consult visit in cancer care? 2. No      Clinical concerns: talk about renal scan  Dr. Monroe  was notified.      Shari J. Schoenberger, [facility]              "

## 2024-02-01 NOTE — LETTER
2/1/2024         RE: Jyothi Freitas  [address]  [place] [address]        Dear Colleague,    Thank you for referring your patient, Jyothi Freitas, to the [facility]. Please see a copy of my visit note below.    [facility]    Hematology/Oncology Established Patient Follow-up Note      Today's Date: 2/1/2024    Reason for Follow-up: Right breast cancer.    HISTORY OF PRESENT ILLNESS: Jyothi Freitas is a 58 year old female who presents with the following oncologic history:   1.  10/11/2019: Diagnostic right sided mammogram performed for a palpable lump in the right breast.  This showed an irregularly-shaped spiculated mass in the upper outer right breast with prominent lymph nodes in the right axilla.  Targeted ultrasound of the right upper outer breast showed an irregularly-shaped hypoechoic mass at the 10 o'clock position, 4 cm from the nipple measuring 2.6 x 1.5 x 2.4 cm.  Right axillary ultrasound showed moderately enlarged lymph nodes.  Right breast needle biopsy showed a grade 3 invasive ductal carcinoma with lymphovascular invasion identified, ER strongly positive at 95%, NE strongly positive at 95%, HER-2/juan FISH negative.  Right axillary lymph node measuring 2 cm was positive for metastatic carcinoma.  Note prior 4/2019 mammogram deemed normal.  2.  10/15/2019: Bilateral breast MRI showed known right breast malignancy measuring 2.6 x 1.4 x 2 cm, 3 mildly enlarged right axillary lymph nodes and no contralateral breast malignancy.  3. 10/21/2019 PET scan showed scattered small hypermetabolic bilateral axillary lymph nodes; for example, a hypermetabolic right axillary lymph node measures 1.6 x 0.9 cm with SUV max 3.6.  Hypermetabolic mass in the right breast superiorly and laterally measuring 2.1 x 1.6 cm with SUV max 4.3.  A 0.6 cm low-density lesion in the right hepatic lobe is too small for accurate PET characterization but shows no appreciable  hypermetabolic activity.  Incidentally noted ectasia of the ascending thoracic aorta measures 4 cm in diameter.  4.  10/23/2019: Left axillary ultrasound showed benign-appearing lymph node.  Left axillary lymph node biopsy showed benign lymph node tissue.  5.  10/29/2019: Started neoadjuvant chemotherapy with dose dense Adriamycin and Cytoxan, followed by weekly paclitaxel with completion on 3/12/2020.  6.  3/19/2020: Breast MRI showed no residual enhancement in the right breast mass.  The right axillary lymphadenopathy has resolved.  7. 4/01/2020: Underwent right breast lumpectomy and right axillary sentinel lymph node biopsy under care of Dr. Kaila Hernandez.  Pathology showed fibrocystic change and no evidence of residual carcinoma in the right breast.  Metastatic breast adenocarcinoma to one of 5 lymph node fragments in 1 of 3 lymph nodes excised. Extranodal extension present. ypT0-N1a.  8. 4/29/2020: Started adjuvant anastrozole.  9. 6/15/2020: Completed adjuvant radiation therapy.  10. 6/15/2020: Switched to adjuvant letrozole with some mild improvement in polyathralgias.    INTERIM HISTORY:  Jyothi reports she had transient hematuria and was found to have a 1.3 cm simple left kidney cyst. CT urogram was otherwise unremarkable. Her peripheral neuropathy and hot flashes are still well controlled on a lower dose of gabapentin 200 mg daily and duloxetine 60 mg daily.      REVIEW OF SYSTEMS:   14 point ROS was reviewed and is negative other than as noted above in HPI.       HOME MEDICATIONS:  Current Outpatient Medications   Medication Sig Dispense Refill     acetaminophen (TYLENOL) 500 MG tablet Take 1,000 mg by mouth every 6 hours as needed for mild pain       acetaminophen-caffeine (EXCEDRIN TENSION HEADACHE) 500-65 MG TABS Take 2 tablets by mouth every 6 hours as needed for mild pain       albuterol (PROAIR HFA/PROVENTIL HFA/VENTOLIN HFA) 108 (90 Base) MCG/ACT inhaler Inhale 2 puffs into the lungs every 6 hours  as needed for shortness of breath / dyspnea or wheezing 18 g 0     Calcium Carb-Cholecalciferol (CALCIUM 500 + D PO) Take by mouth 2 times daily       DULoxetine (CYMBALTA) 20 MG capsule TAKE 2 CAPSULES BY MOUTH EVERY  capsule 1     gabapentin (NEURONTIN) 100 MG capsule 200 mg daily 360 capsule 3     letrozole (FEMARA) 2.5 MG tablet Take 1 tablet (2.5 mg) by mouth daily 90 tablet 3     lisinopril (ZESTRIL) 2.5 MG tablet TAKE 1 TABLET BY MOUTH EVERY DAY 90 tablet 1     zoledronic acid (ZOMETA) 4 MG/100ML SOLN Inject 4 mg into the vein every 6 months       ZOLMitriptan (ZOMIG) 2.5 MG tablet Take 1 tablet (2.5 mg) by mouth at onset of headache for migraine May repeat in 2 hours. Max 4 tablets/24 hours. 10 tablet 1     ZOLMitriptan (ZOMIG) 5 MG tablet Take 1 tablet (5 mg) by mouth at onset of headache for migraine May repeat in 2 hours. Max 2 tablets/24 hours. 10 tablet 1         ALLERGIES:  Allergies   Allergen Reactions     Sulfa Antibiotics Other (See Comments)     Red face. Ringing in the ears.         PAST MEDICAL HISTORY:  Past Medical History:   Diagnosis Date     GERD (gastroesophageal reflux disease)      H/O colonoscopy 03/08/2016    done at [place] and nl     Hematuria 2016    eval at [place] and per pt cysto and ct neg     HTN (hypertension)     lisinopril added 3/23     Intermittent asthma     since childhood     Low iron 10/2017    done for eval of hair loss, egd nl     Malignant neoplasm of upper-outer quadrant of right breast in female, estrogen receptor positive (H) 10/2019    had chemo, lumpectomy, onc is Dr. Monroe     Migraines     most of adult life, has seen neuro in past, on bblocker     Mild depression      Neuropathy due to chemotherapeutic drug  (H24)      Normal stress echocardiogram 07/2011    due to hear racing     Osteopenia 2008     Other osteoporosis without current pathological fracture 2020    iv zometa every 6 months     Syncope 03/2017    echo nl lv size and fxn, grade 1 dd, mild tr  "        PAST SURGICAL HISTORY:  Past Surgical History:   Procedure Laterality Date     BIOPSY NODE SENTINEL Right 2020    Procedure: RIGHT SENTINEL LYMPH NODE BIOPSY;  Surgeon: Kaila Hernandez MD;  Location:  OR     ESOPHAGOSCOPY, GASTROSCOPY, DUODENOSCOPY (EGD), COMBINED N/A 10/30/2017    Procedure: COMBINED ESOPHAGOSCOPY, GASTROSCOPY, DUODENOSCOPY (EGD), BIOPSY SINGLE OR MULTIPLE;  COMBINED ESOPHAGOSCOPY, GASTROSCOPY, DUODENOSCOPY (EGD);  Surgeon: Martin Rodriguez MD;  Location:  GI     INSERT PORT VASCULAR ACCESS N/A 10/24/2019    Procedure: PORT PLACEMENT;  Surgeon: Kaila Hernandez MD;  Location:  OR     LUMPECTOMY BREAST WITH SEED LOCALIZATION Right 2020    Procedure: SEED LOCALIZED RIGHT BREAST LUMPECTOMY WITH SEED LOCALIZED RIGHT AXILLARY NODE EXCISION;  Surgeon: Kaila Hernandez MD;  Location:  OR     ORTHOPEDIC SURGERY      \"leg surgery\"     REMOVE PORT VASCULAR ACCESS Left 2020    Procedure: REMOVAL, VASCULAR ACCESS PORT;  Surgeon: Kaila Hernandez MD;  Location:  OR     single oopherectomy      cyst     ZC TMJ ARTHROSCOPY/SURGERY           SOCIAL HISTORY:  Social History     Socioeconomic History     Marital status:      Spouse name: Not on file     Number of children: 2     Years of education: Not on file     Highest education level: Not on file   Occupational History     Not on file   Tobacco Use     Smoking status: Former     Packs/day: 0     Types: Cigarettes     Quit date: 3/27/1997     Years since quittin.8     Smokeless tobacco: Never   Vaping Use     Vaping Use: Never used   Substance and Sexual Activity     Alcohol use: Yes     Comment: rarely     Drug use: No     Sexual activity: Yes     Partners: Male     Birth control/protection: Pill   Other Topics Concern     Parent/sibling w/ CABG, MI or angioplasty before 65F 55M? Yes   Social History Narrative     Not on file     Social Determinants of Health     Financial Resource Strain: Not on file "   Food Insecurity: Not on file   Transportation Needs: Not on file   Physical Activity: Not on file   Stress: Not on file   Social Connections: Not on file   Interpersonal Safety: Not on file   Housing Stability: Not on file         FAMILY HISTORY:  Family History   Problem Relation Age of Onset     Coronary Artery Disease Father 48        MI. and one in his 60s     Emphysema Mother         COPD         PHYSICAL EXAM:  Vital signs:  BP (!) 128/90   Pulse 94   Resp 14   Wt 62.9 kg (138 lb 9.6 oz)   LMP 10/07/2017 (Approximate)   SpO2 100%   BMI 21.71 kg/m    GENERAL/CONSTITUTIONAL: No acute distress.  EYES: No erythema or scleral icterus.  LYMPH: No cervical, supraclavicular, axillary, epitroclear adenopathy.   BREAST: The left breast droops slightly lower than right breast. Nodular scar tissue above the right lumpectomy incision is stable compared to prior exam. Nipples are everted bilaterally with no discharge. No erythema, ulceration, or dimpling of the skin.  RESPIRATORY: No audible cough or wheezing.  GASTROINTESTINAL: No hepatosplenomegaly, masses, or tenderness. No guarding.  No distention.  MUSCULOSKELETAL: Warm and well-perfused, no cyanosis, clubbing, or edema.  NEUROLOGIC: No focal motor deficits. Alert, oriented, answers questions appropriately.  INTEGUMENTARY: No rashes or jaundice.  GAIT: Steady, does not use assistive device    LABS:  CBC RESULTS:   Recent Labs   Lab Test 08/14/23  0917   WBC 5.8   RBC 4.68   HGB 14.0   HCT 42.5   MCV 91   MCH 29.9   MCHC 32.9   RDW 11.8           Last Comprehensive Metabolic Panel:  Sodium   Date Value Ref Range Status   08/14/2023 139 136 - 145 mmol/L Final   10/20/2020 139 133 - 144 mmol/L Final     Potassium   Date Value Ref Range Status   08/14/2023 4.1 3.4 - 5.3 mmol/L Final   10/20/2020 4.2 3.4 - 5.3 mmol/L Final     Chloride   Date Value Ref Range Status   08/14/2023 102 98 - 107 mmol/L Final   10/20/2020 107 94 - 109 mmol/L Final     Carbon  Dioxide   Date Value Ref Range Status   10/20/2020 29 20 - 32 mmol/L Final     Carbon Dioxide (CO2)   Date Value Ref Range Status   08/14/2023 26 22 - 29 mmol/L Final     Anion Gap   Date Value Ref Range Status   08/14/2023 11 7 - 15 mmol/L Final   10/20/2020 3 3 - 14 mmol/L Final     Glucose   Date Value Ref Range Status   08/14/2023 99 70 - 99 mg/dL Final   10/20/2020 78 70 - 99 mg/dL Final     Urea Nitrogen   Date Value Ref Range Status   08/14/2023 18.1 6.0 - 20.0 mg/dL Final   10/20/2020 13 7 - 30 mg/dL Final     Creatinine   Date Value Ref Range Status   08/14/2023 0.73 0.51 - 0.95 mg/dL Final   05/13/2021 0.76 0.52 - 1.04 mg/dL Final     GFR Estimate   Date Value Ref Range Status   08/14/2023 >90 >60 mL/min/1.73m2 Final   05/13/2021 87 >60 mL/min/[1.73_m2] Final     Comment:     Non  GFR Calc  Starting 12/18/2018, serum creatinine based estimated GFR (eGFR) will be   calculated using the Chronic Kidney Disease Epidemiology Collaboration   (CKD-EPI) equation.       Calcium   Date Value Ref Range Status   08/14/2023 9.7 8.6 - 10.0 mg/dL Final   05/13/2021 9.4 8.5 - 10.1 mg/dL Final     Bilirubin Total   Date Value Ref Range Status   08/14/2023 0.3 <=1.2 mg/dL Final   10/20/2020 0.2 0.2 - 1.3 mg/dL Final     Alkaline Phosphatase   Date Value Ref Range Status   08/14/2023 49 35 - 104 U/L Final   10/20/2020 76 40 - 150 U/L Final     ALT   Date Value Ref Range Status   08/14/2023 20 0 - 50 U/L Final     Comment:     Reference intervals for this test were updated on 6/12/2023 to more accurately reflect our healthy population. There may be differences in the flagging of prior results with similar values performed with this method. Interpretation of those prior results can be made in the context of the updated reference intervals.     10/20/2020 25 0 - 50 U/L Final     AST   Date Value Ref Range Status   08/14/2023 31 0 - 45 U/L Final     Comment:     Reference intervals for this test were updated on  2023 to more accurately reflect our healthy population. There may be differences in the flagging of prior results with similar values performed with this method. Interpretation of those prior results can be made in the context of the updated reference intervals.   10/20/2020 16 0 - 45 U/L Final       PATHOLOGY:  None new since last visit.    IMAGIN2022: DEXA scan showed osteopenia.    2023: Mammogram showed no malignancy    2022: Breast MRI showed no abnormal enhancement or suspicious findings.    2024: CT urogram showed mild right-sided hydronephrosis is present with focal narrowing seen along the ureteropelvic junction and may suggest congenital UPJ or focal stenosis; 1.5 cm minimally complex cystic lesion along the mid pole of the left kidney which may have proteinaceous or hemorrhagic debris; dilated left ovarian vein with multiple prominent periuterine and paraovarian vessels suggestive of pelvic congestion.     ASSESSMENT/PLAN:  Jyothi Freitas is a 58 year old female with the following issues:  1.  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 Polyarthralgias  --Jyothi has no clinical evidence for recurrent breast cancer by physical exam from today or mammmogram reviewed from 2023.  --Jyothi is on letrozole and tolerating this overall better than anastrozole.  --I again recommended up to 10 years of letrozole.  --Will continue to alternate mammogram with breast MRI, staggered by 6 months. Next breast MRI due 2024 and mammogram 2024.  --Would only recommend lab workup and imaging workup if concerning signs/symptoms arise.  --Advised gentle circular massage to the right breast scar tissue area.    3. Osteoporosis/osteopenia  --DEXA scan results from 10/13/2020 showed osteoporosis.  --2022 DEXA showed improvement to osteopenia.   --She  "completed Zometa in 6/2023.  --Recommended adequate calcium and vitamin D, weight-bearing exercise.    --Will repeat DEXA scan in 2024.    4.  Insomnia   5.  Hot flashes, drug induced  -Oxybutynin did help with hot flashes but she had too much dry eye syndrome and fluid retention.   -She has had significant improvement in her hot flashes and insomnia with the use of gabapentin, with less fatigue decreasing down to 200 mg daily.   -She is no longer using Ambien.    6. Ectasia of thoracic aorta  --This measured 4 cm on prior PET scan.    --Surgical management not needed but she will need ongoing monitoring. She is following with Dr. Silverio Gooden for monitoring of this issue.    7. Peripheral neuropathy  --Hip pain has improved.  --Continue duloxetine at 60 mg once daily.    8. Hypertension  --Blood pressure stable on small dose of lisinopril.    9. Left kidney cystic lesion  --U/S showed this was benign simple cyst.    Refilled her duloxetine, letrozole, and gabapentin today.     Return in 6 months with DEXA and breast MRI.    Maricarmen Monroe MD  Hematology/Oncology  [facility]    Total time spent today: 30 minutes in chart review, patient evaluation, counseling, documentation, test and/or medication/prescription orders, and coordination of care.     Oncology Rooming Note    February 1, 2024 8:03 AM   Jyothi Freitas is a 58 year old female who presents for:    Chief Complaint   Patient presents with     Oncology Clinic Visit     Initial Vitals: BP (!) 128/90   Pulse 94   Resp 14   Wt 62.9 kg (138 lb 9.6 oz)   LMP 10/07/2017 (Approximate)   SpO2 100%   BMI 21.71 kg/m   Estimated body mass index is 21.71 kg/m  as calculated from the following:    Height as of 8/14/23: 1.702 m (5' 7\").    Weight as of this encounter: 62.9 kg (138 lb 9.6 oz). Body surface area is 1.72 meters squared.  No Pain (0) Comment: Data Unavailable   Patient's last menstrual period was 10/07/2017 (approximate).  Allergies " reviewed: Yes  Medications reviewed: Yes    Medications: MEDICATION REFILLS NEEDED TODAY. Provider was notified.    Cymbalta, gabapentin, Femara    Pharmacy name entered into [employer]: [id number] - [place], [place] - [address]    Frailty Screening:   Is the patient here for a new oncology consult visit in cancer care? 2. No      Clinical concerns: talk about renal scan  Dr. Monroe  was notified.      Shari J. Schoenberger, CMA                Again, thank you for allowing me to participate in the care of your patient.        Sincerely,        Maricarmen Monroe MD

## 2024-02-07 ENCOUNTER — VIRTUAL VISIT (OUTPATIENT)
Dept: UROLOGY | Facility: CLINIC | Age: 59
End: 2024-02-07
Payer: COMMERCIAL

## 2024-02-07 DIAGNOSIS — N13.1 HYDRONEPHROSIS WITH URETERAL STRICTURE, NOT ELSEWHERE CLASSIFIED: ICD-10-CM

## 2024-02-07 DIAGNOSIS — R31.0 GROSS HEMATURIA: ICD-10-CM

## 2024-02-07 PROCEDURE — 99204 OFFICE O/P NEW MOD 45 MIN: CPT | Mod: 95 | Performed by: PHYSICIAN ASSISTANT

## 2024-02-07 ASSESSMENT — PAIN SCALES - GENERAL: PAINLEVEL: NO PAIN (0)

## 2024-02-07 NOTE — LETTER
"2/7/2024       RE: Jyothi Freitas  [address]  [place] [address]     Dear Colleague,    Thank you for referring your patient, Jyothi Freitas, to the [facility] at [facility]. Please see a copy of my visit note below.    Virtual Visit Details    Type of service:  Video Visit     Originating Location (pt. Location): Other car    Distant Location (provider location):  On-site  Platform used for Video Visit: Robots[employer]  Start time: 1:30pm  End time: 1:37pm    CC: Hematuria.    HPI: It is a pleasure to see Ms. Jyothi Freitas, a 58 year old female, asked to be seen in consultation by Ally Parekh CNP for evaluation of gross hematuria.     The patient denies any further gross hematuria.  Ms. Freitas voids without difficulty.  She currently denies any dysuria, pyuria, hesitancy, intermittency, feelings of incomplete emptying, or any recent history of urinary tract infections or stones.    Recalls having \"trace\" blood on UAs for years and having a cysto 15 years ago without worrisome findings. Also recalls being told she had a tiny kidney stone at that time.    Hx breast Ca.     CT urogram noted mild right-sided hydronephrosis present with focal narrowing seen along the ureteropelvic junction that may suggest congenital UPJ or focal stenosis as well as a 1.5 cm minimally complex cystic lesion along the mid pole of the left kidney. Follow-up US noted the cyst to be simple.     Had a twinge of flank pain with the gross hematuria, but is not a recurrent symptom.     Hematuria Risk Factors:  Age >40: Yes     Smoking history: former  Occupational exposure to chemicals or dyes (ie, benzenes, aromatic amines): no  History of urologic disorder or disease: no  History of irritative voiding symptoms: no  History of urinary tract infection: no  Analgesic abuse: no  History of pelvic irradiation: no    Narrative & Impression   EXAM: CT UROGRAM WO and W " CONTRAST  LOCATION: [facility]  DATE: 1/22/2024     INDICATION: hematuria, no acute infection on UA  COMPARISON: PET CT performed on 10/21/2019  TECHNIQUE: CT scan of the abdomen and pelvis using urogram technique with pre contrast, post contrast, and delayed images. Multiplanar reformats were obtained. Dose reduction techniques were used.   CONTRAST: 100mL Isovue 370     FINDINGS:   LOWER CHEST: Subsegmental atelectasis is seen in the left lower lobe.     HEPATOBILIARY: Focal fatty infiltration is seen adjacent to the falciform ligament. Gallbladder is unremarkable.     PANCREAS: No significant mass, duct dilatation, or inflammatory change.     SPLEEN: Normal size.     ADRENAL GLANDS: Mild thickening of the adrenal glands suggestive of hyperplasia.     RIGHT KIDNEY/URETER: No nephrolithiasis is seen. Mild right-sided hydronephrosis is present with narrowing noted at the ureteropelvic junction (series 12, image 39).     LEFT KIDNEY/URETER: No hydronephrosis or nephrolithiasis is seen. 1.5 cm lesion along the mid pole measures slightly above fluid attenuation. Other subcentimeter hypoattenuating lesions are too small to characterize.     BLADDER: Moderately distended with no stones seen.     BOWEL: No obstruction or inflammatory change.     LYMPH NODES: No lymphadenopathy.     VASCULATURE: Left ovarian vein appears dilated measuring up to 9 mm. Multiple prominent paraovarian and periuterine vessels are seen.     PELVIC ORGANS: No pelvic masses.     MUSCULOSKELETAL: 8 mm sclerotic lesion is unchanged in the left posterior acetabulum which may reflect a bone island. Multilevel degenerative changes are seen in the spine.                                                                      IMPRESSION:  1.  No nephrolithiasis is seen. Mild right-sided hydronephrosis is present with focal narrowing seen along the ureteropelvic junction and may suggest congenital UPJ or focal stenosis.  2.  1.5 cm  minimally complex cystic lesion along the mid pole of the left kidney which may have proteinaceous or hemorrhagic debris. Consider further characterization with renal ultrasound.  3.  Dilated left ovarian vein with multiple prominent periuterine and paraovarian vessels suggestive of pelvic congestion.       Past Medical History:   Diagnosis Date    GERD (gastroesophageal reflux disease)     H/O colonoscopy 03/08/2016    done at [facility] and nl    Hematuria 2016    eval at [facility] and per pt cysto and ct neg    HTN (hypertension)     lisinopril added 3/23    Intermittent asthma     since childhood    Low iron 10/2017    done for eval of hair loss, egd nl    Malignant neoplasm of upper-outer quadrant of right breast in female, estrogen receptor positive (H) 10/2019    had chemo, lumpectomy, onc is Dr. Monroe    Migraines     most of adult life, has seen neuro in past, on bblocker    Mild depression     Neuropathy due to chemotherapeutic drug  (H24)     Normal stress echocardiogram 07/2011    due to hear racing    Osteopenia 2008    Other osteoporosis without current pathological fracture 2020    iv zometa every 6 months    Syncope 03/2017    echo nl lv size and fxn, grade 1 dd, mild tr     Past Surgical History:   Procedure Laterality Date    BIOPSY NODE SENTINEL Right 4/1/2020    Procedure: RIGHT SENTINEL LYMPH NODE BIOPSY;  Surgeon: Kaila Hernandez MD;  Location:  OR    ESOPHAGOSCOPY, GASTROSCOPY, DUODENOSCOPY (EGD), COMBINED N/A 10/30/2017    Procedure: COMBINED ESOPHAGOSCOPY, GASTROSCOPY, DUODENOSCOPY (EGD), BIOPSY SINGLE OR MULTIPLE;  COMBINED ESOPHAGOSCOPY, GASTROSCOPY, DUODENOSCOPY (EGD);  Surgeon: Martin Rodriguez MD;  Location:  GI    INSERT PORT VASCULAR ACCESS N/A 10/24/2019    Procedure: PORT PLACEMENT;  Surgeon: Kaila Hernandez MD;  Location:  OR    LUMPECTOMY BREAST WITH SEED LOCALIZATION Right 4/1/2020    Procedure: SEED LOCALIZED RIGHT BREAST LUMPECTOMY WITH SEED LOCALIZED RIGHT AXILLARY NODE  "EXCISION;  Surgeon: Kaila Hernandez MD;  Location:  OR    ORTHOPEDIC SURGERY      \"leg surgery\"    REMOVE PORT VASCULAR ACCESS Left 4/1/2020    Procedure: REMOVAL, VASCULAR ACCESS PORT;  Surgeon: Kaila Hernandez MD;  Location: [facility] OR    single oopherectomy  2010    cyst    ZZC TMJ ARTHROSCOPY/SURGERY       Current Outpatient Medications   Medication Sig Dispense Refill    acetaminophen (TYLENOL) 500 MG tablet Take 1,000 mg by mouth every 6 hours as needed for mild pain      acetaminophen-caffeine (EXCEDRIN TENSION HEADACHE) 500-65 MG TABS Take 2 tablets by mouth every 6 hours as needed for mild pain      albuterol (PROAIR HFA/PROVENTIL HFA/VENTOLIN HFA) 108 (90 Base) MCG/ACT inhaler Inhale 2 puffs into the lungs every 6 hours as needed for shortness of breath / dyspnea or wheezing 18 g 0    Calcium Carb-Cholecalciferol (CALCIUM 500 + D PO) Take by mouth 2 times daily      DULoxetine (CYMBALTA) 20 MG capsule Take 3 capsules (60 mg) by mouth daily 270 capsule 3    gabapentin (NEURONTIN) 100 MG capsule 200 mg daily 360 capsule 3    letrozole (FEMARA) 2.5 MG tablet Take 1 tablet (2.5 mg) by mouth daily 90 tablet 3    lisinopril (ZESTRIL) 2.5 MG tablet TAKE 1 TABLET BY MOUTH EVERY DAY 90 tablet 1    zoledronic acid (ZOMETA) 4 MG/100ML SOLN Inject 4 mg into the vein every 6 months      ZOLMitriptan (ZOMIG) 2.5 MG tablet Take 1 tablet (2.5 mg) by mouth at onset of headache for migraine May repeat in 2 hours. Max 4 tablets/24 hours. 10 tablet 1    ZOLMitriptan (ZOMIG) 5 MG tablet Take 1 tablet (5 mg) by mouth at onset of headache for migraine May repeat in 2 hours. Max 2 tablets/24 hours. 10 tablet 1     Allergies   Allergen Reactions    Sulfa Antibiotics Other (See Comments)     Red face. Ringing in the ears.     FAMILY HISTORY: There is no reported history of genitourinary carcinoma.  There is no history of urolithiasis.      Social History     Socioeconomic History    Marital status:      Spouse name: Not on " file    Number of children: 2    Years of education: Not on file    Highest education level: Not on file   Occupational History    Not on file   Tobacco Use    Smoking status: Former     Packs/day: 0     Types: Cigarettes     Quit date: 3/27/1997     Years since quittin.8    Smokeless tobacco: Never   Vaping Use    Vaping Use: Never used   Substance and Sexual Activity    Alcohol use: Yes     Comment: rarely    Drug use: No    Sexual activity: Yes     Partners: Male     Birth control/protection: Pill   Other Topics Concern    Parent/sibling w/ CABG, MI or angioplasty before 65F 55M? Yes   Social History Narrative    Not on file     Social Determinants of Health     Financial Resource Strain: Not on file   Food Insecurity: Not on file   Transportation Needs: Not on file   Physical Activity: Not on file   Stress: Not on file   Social Connections: Not on file   Interpersonal Safety: Not on file   Housing Stability: Not on file       PHYSICAL EXAM:   There were no vitals filed for this visit.  PSYCH: NAD  EYES: EOMI  MOUTH: MMM  NEURO: AAO x3    Virtual Visit on 2024   Component Date Value Ref Range Status    Color Urine 2024 Orange (A)  Colorless, Straw, Light Yellow, Yellow Final    Appearance Urine 2024 Clear  Clear Final    Glucose Urine 2024 Negative  Negative mg/dL Final    Bilirubin Urine 2024 Negative  Negative Final    Ketones Urine 2024 >=160 (A)  Negative mg/dL Final    Specific Gravity Urine 2024 1.015  1.003 - 1.035 Final    Blood Urine 2024 Moderate (A)  Negative Final    pH Urine 2024 5.5  5.0 - 7.0 Final    Protein Albumin Urine 2024 Negative  Negative mg/dL Final    Urobilinogen Urine 2024 0.2  0.2, 1.0 E.U./dL Final    Nitrite Urine 2024 Negative  Negative Final    Leukocyte Esterase Urine 2024 Negative  Negative Final    Bacteria Urine 2024 Few (A)  None Seen /HPF Final    RBC Urine 2024 5-10 (A)  0-2 /HPF  /HPF Final    WBC Urine 01/16/2024 0-5  0-5 /HPF /HPF Final    Squamous Epithelials Urine 01/16/2024 Few (A)  None Seen /LPF Final         ASSESSMENT and PLAN:    58 year old female with gross hematuria.     At this time, recommend proceeding with comprehensive hematuria evaluation to include:  - Urine cytology to look for cells concerning for malignancy.  - CT urogram for upper tract imaging (done).  - Cystoscopy with the first available urologist to evaluate the interior of the bladder. Follow up for hematuria as recommended by urologist performing cystoscopic evaluation.    Thank you for allowing me to participate in Ms. Freitas's care. I will keep you updated of her progress, but please do not hesitate to contact me with any questions.    Jaye Triplett PA-C  [facility] Urology  20 minutes spent on the date of the encounter doing chart review, review of outside records, review of test results, interpretation of tests, patient visit and documentation.

## 2024-02-07 NOTE — PROGRESS NOTES
"Virtual Visit Details    Type of service:  Video Visit     Originating Location (pt. Location): Other car    Distant Location (provider location):  On-site  Platform used for Video Visit: [employer]  Start time: 1:30pm  End time: 1:37pm    CC: Hematuria.    HPI: It is a pleasure to see Ms. Jyothi Freitas, a 58 year old female, asked to be seen in consultation by Ally Parekh CNP for evaluation of gross hematuria.     The patient denies any further gross hematuria.  Ms. Freitas voids without difficulty.  She currently denies any dysuria, pyuria, hesitancy, intermittency, feelings of incomplete emptying, or any recent history of urinary tract infections or stones.    Recalls having \"trace\" blood on UAs for years and having a cysto 15 years ago without worrisome findings. Also recalls being told she had a tiny kidney stone at that time.    Hx breast Ca.     CT urogram noted mild right-sided hydronephrosis present with focal narrowing seen along the ureteropelvic junction that may suggest congenital UPJ or focal stenosis as well as a 1.5 cm minimally complex cystic lesion along the mid pole of the left kidney. Follow-up US noted the cyst to be simple.     Had a twinge of flank pain with the gross hematuria, but is not a recurrent symptom.     Hematuria Risk Factors:  Age >40: Yes     Smoking history: former  Occupational exposure to chemicals or dyes (ie, benzenes, aromatic amines): no  History of urologic disorder or disease: no  History of irritative voiding symptoms: no  History of urinary tract infection: no  Analgesic abuse: no  History of pelvic irradiation: no    Narrative & Impression   EXAM: CT UROGRAM WO and W CONTRAST  LOCATION: [facility]  DATE: 1/22/2024     INDICATION: hematuria, no acute infection on UA  COMPARISON: PET CT performed on 10/21/2019  TECHNIQUE: CT scan of the abdomen and pelvis using urogram technique with pre contrast, post contrast, and delayed images. Multiplanar " reformats were obtained. Dose reduction techniques were used.   CONTRAST: 100mL Isovue 370     FINDINGS:   LOWER CHEST: Subsegmental atelectasis is seen in the left lower lobe.     HEPATOBILIARY: Focal fatty infiltration is seen adjacent to the falciform ligament. Gallbladder is unremarkable.     PANCREAS: No significant mass, duct dilatation, or inflammatory change.     SPLEEN: Normal size.     ADRENAL GLANDS: Mild thickening of the adrenal glands suggestive of hyperplasia.     RIGHT KIDNEY/URETER: No nephrolithiasis is seen. Mild right-sided hydronephrosis is present with narrowing noted at the ureteropelvic junction (series 12, image 39).     LEFT KIDNEY/URETER: No hydronephrosis or nephrolithiasis is seen. 1.5 cm lesion along the mid pole measures slightly above fluid attenuation. Other subcentimeter hypoattenuating lesions are too small to characterize.     BLADDER: Moderately distended with no stones seen.     BOWEL: No obstruction or inflammatory change.     LYMPH NODES: No lymphadenopathy.     VASCULATURE: Left ovarian vein appears dilated measuring up to 9 mm. Multiple prominent paraovarian and periuterine vessels are seen.     PELVIC ORGANS: No pelvic masses.     MUSCULOSKELETAL: 8 mm sclerotic lesion is unchanged in the left posterior acetabulum which may reflect a bone island. Multilevel degenerative changes are seen in the spine.                                                                      IMPRESSION:  1.  No nephrolithiasis is seen. Mild right-sided hydronephrosis is present with focal narrowing seen along the ureteropelvic junction and may suggest congenital UPJ or focal stenosis.  2.  1.5 cm minimally complex cystic lesion along the mid pole of the left kidney which may have proteinaceous or hemorrhagic debris. Consider further characterization with renal ultrasound.  3.  Dilated left ovarian vein with multiple prominent periuterine and paraovarian vessels suggestive of pelvic congestion.  "      Past Medical History:   Diagnosis Date    GERD (gastroesophageal reflux disease)     H/O colonoscopy 03/08/2016    done at [place] and nl    Hematuria 2016    eval at [facility] and per pt cysto and ct neg    HTN (hypertension)     lisinopril added 3/23    Intermittent asthma     since childhood    Low iron 10/2017    done for eval of hair loss, egd nl    Malignant neoplasm of upper-outer quadrant of right breast in female, estrogen receptor positive (H) 10/2019    had chemo, lumpectomy, onc is Dr. Monroe    Migraines     most of adult life, has seen neuro in past, on bblocker    Mild depression     Neuropathy due to chemotherapeutic drug  (H24)     Normal stress echocardiogram 07/2011    due to hear racing    Osteopenia 2008    Other osteoporosis without current pathological fracture 2020    iv zometa every 6 months    Syncope 03/2017    echo nl lv size and fxn, grade 1 dd, mild tr     Past Surgical History:   Procedure Laterality Date    BIOPSY NODE SENTINEL Right 4/1/2020    Procedure: RIGHT SENTINEL LYMPH NODE BIOPSY;  Surgeon: Kaila Hernandez MD;  Location:  OR    ESOPHAGOSCOPY, GASTROSCOPY, DUODENOSCOPY (EGD), COMBINED N/A 10/30/2017    Procedure: COMBINED ESOPHAGOSCOPY, GASTROSCOPY, DUODENOSCOPY (EGD), BIOPSY SINGLE OR MULTIPLE;  COMBINED ESOPHAGOSCOPY, GASTROSCOPY, DUODENOSCOPY (EGD);  Surgeon: Martin Rodriguez MD;  Location:  GI    INSERT PORT VASCULAR ACCESS N/A 10/24/2019    Procedure: PORT PLACEMENT;  Surgeon: Kaila Hernandez MD;  Location:  OR    LUMPECTOMY BREAST WITH SEED LOCALIZATION Right 4/1/2020    Procedure: SEED LOCALIZED RIGHT BREAST LUMPECTOMY WITH SEED LOCALIZED RIGHT AXILLARY NODE EXCISION;  Surgeon: Kaila Hernandez MD;  Location:  OR    ORTHOPEDIC SURGERY      \"leg surgery\"    REMOVE PORT VASCULAR ACCESS Left 4/1/2020    Procedure: REMOVAL, VASCULAR ACCESS PORT;  Surgeon: Kaila Hernandez MD;  Location:  OR    single oopherectomy  2010    cyst    ZZC TMJ " ARTHROSCOPY/SURGERY       Current Outpatient Medications   Medication Sig Dispense Refill    acetaminophen (TYLENOL) 500 MG tablet Take 1,000 mg by mouth every 6 hours as needed for mild pain      acetaminophen-caffeine (EXCEDRIN TENSION HEADACHE) 500-65 MG TABS Take 2 tablets by mouth every 6 hours as needed for mild pain      albuterol (PROAIR HFA/PROVENTIL HFA/VENTOLIN HFA) 108 (90 Base) MCG/ACT inhaler Inhale 2 puffs into the lungs every 6 hours as needed for shortness of breath / dyspnea or wheezing 18 g 0    Calcium Carb-Cholecalciferol (CALCIUM 500 + D PO) Take by mouth 2 times daily      DULoxetine (CYMBALTA) 20 MG capsule Take 3 capsules (60 mg) by mouth daily 270 capsule 3    gabapentin (NEURONTIN) 100 MG capsule 200 mg daily 360 capsule 3    letrozole (FEMARA) 2.5 MG tablet Take 1 tablet (2.5 mg) by mouth daily 90 tablet 3    lisinopril (ZESTRIL) 2.5 MG tablet TAKE 1 TABLET BY MOUTH EVERY DAY 90 tablet 1    zoledronic acid (ZOMETA) 4 MG/100ML SOLN Inject 4 mg into the vein every 6 months      ZOLMitriptan (ZOMIG) 2.5 MG tablet Take 1 tablet (2.5 mg) by mouth at onset of headache for migraine May repeat in 2 hours. Max 4 tablets/24 hours. 10 tablet 1    ZOLMitriptan (ZOMIG) 5 MG tablet Take 1 tablet (5 mg) by mouth at onset of headache for migraine May repeat in 2 hours. Max 2 tablets/24 hours. 10 tablet 1     Allergies   Allergen Reactions    Sulfa Antibiotics Other (See Comments)     Red face. Ringing in the ears.     FAMILY HISTORY: There is no reported history of genitourinary carcinoma.  There is no history of urolithiasis.      Social History     Socioeconomic History    Marital status:      Spouse name: Not on file    Number of children: 2    Years of education: Not on file    Highest education level: Not on file   Occupational History    Not on file   Tobacco Use    Smoking status: Former     Packs/day: 0     Types: Cigarettes     Quit date: 3/27/1997     Years since quittin.8     Smokeless tobacco: Never   Vaping Use    Vaping Use: Never used   Substance and Sexual Activity    Alcohol use: Yes     Comment: rarely    Drug use: No    Sexual activity: Yes     Partners: Male     Birth control/protection: Pill   Other Topics Concern    Parent/sibling w/ CABG, MI or angioplasty before 65F 55M? Yes   Social History Narrative    Not on file     Social Determinants of Health     Financial Resource Strain: Not on file   Food Insecurity: Not on file   Transportation Needs: Not on file   Physical Activity: Not on file   Stress: Not on file   Social Connections: Not on file   Interpersonal Safety: Not on file   Housing Stability: Not on file       PHYSICAL EXAM:   There were no vitals filed for this visit.  PSYCH: NAD  EYES: EOMI  MOUTH: MMM  NEURO: AAO x3    Virtual Visit on 01/16/2024   Component Date Value Ref Range Status    Color Urine 01/16/2024 Orange (A)  Colorless, Straw, Light Yellow, Yellow Final    Appearance Urine 01/16/2024 Clear  Clear Final    Glucose Urine 01/16/2024 Negative  Negative mg/dL Final    Bilirubin Urine 01/16/2024 Negative  Negative Final    Ketones Urine 01/16/2024 >=160 (A)  Negative mg/dL Final    Specific Gravity Urine 01/16/2024 1.015  1.003 - 1.035 Final    Blood Urine 01/16/2024 Moderate (A)  Negative Final    pH Urine 01/16/2024 5.5  5.0 - 7.0 Final    Protein Albumin Urine 01/16/2024 Negative  Negative mg/dL Final    Urobilinogen Urine 01/16/2024 0.2  0.2, 1.0 E.U./dL Final    Nitrite Urine 01/16/2024 Negative  Negative Final    Leukocyte Esterase Urine 01/16/2024 Negative  Negative Final    Bacteria Urine 01/16/2024 Few (A)  None Seen /HPF Final    RBC Urine 01/16/2024 5-10 (A)  0-2 /HPF /HPF Final    WBC Urine 01/16/2024 0-5  0-5 /HPF /HPF Final    Squamous Epithelials Urine 01/16/2024 Few (A)  None Seen /LPF Final         ASSESSMENT and PLAN:    58 year old female with gross hematuria.     At this time, recommend proceeding with comprehensive hematuria evaluation  to include:  - Urine cytology to look for cells concerning for malignancy.  - CT urogram for upper tract imaging (done).  - Cystoscopy with the first available urologist to evaluate the interior of the bladder. Follow up for hematuria as recommended by urologist performing cystoscopic evaluation.    Thank you for allowing me to participate in Ms. Freitas's care. I will keep you updated of her progress, but please do not hesitate to contact me with any questions.    Jaye Triplett PA-C  [facility] Urology  20 minutes spent on the date of the encounter doing chart review, review of outside records, review of test results, interpretation of tests, patient visit and documentation.

## 2024-02-07 NOTE — PATIENT INSTRUCTIONS
Please make an appointment at your local [place] lab ([phone]) to leave a urine sample.   CYSTOSCOPY    What is a Cystoscopy?  This is a procedure done to check for problems inside the bladder/prostate.  Problems may include polyps (growths), tumors, inflammation (swelling and redness), obstruction and other concerns.    The Urologist inserts a thin tube (called a cystoscope) into the bladder.  The tube is about the size of a pencil.  We will give you numbing medicine to reduce the pain or discomfort you may feel.    The Urologist will be able to see inside the bladder by filling the bladder with water.  The water makes it easier to see any problems that may be present. You will have a sense to need to urinate and this is normal.       How should I get ready for the exam?  Nothing to do to prepare. You may eat normally the day of the exam. There is no sedation, so you may drive yourself to and from if you can drive.       Please tell your doctor if:  You have a history of urinary tract infections.  You know that you have a tumor in your bladder.  You have bleeding problems.  You have any allergies.  You are or may be pregnant.      What happens after the exam?  You may go back to your normal diet and activity as you feel ready.    For the next two days after the exam, you may notice:  Some blood in your urine.  Some burning when you urinate (use the toilet).  An urge to urinate more often.  Bladder spasms.    These are normal after the procedure. They should go away on their own after a day or two.      You can help to relieve the above listed symptoms by:  Drinking 6 to 8 large glasses of water each day (includes drinks at meals).  This will help clear the urine.  Take warm baths to relieve pain and bladder spasms.  Do not add anything to the bath water.  You may take Tylenol (acetaminophen) per label instructions for discomfort.

## 2024-02-07 NOTE — NURSING NOTE
Is the patient currently in the state of [place]? YES    Visit mode:VIDEO    If the visit is dropped, the patient can be reconnected by: VIDEO VISIT: Text to cell phone:   Telephone Information:   Mobile [phone]       Will anyone else be joining the visit? NO  (If patient encounters technical issues they should call [phone]    How would you like to obtain your AVS? MyChart    Are changes needed to the allergy or medication list? No    Reason for visit: Consult    Addie CARRIZALES

## 2024-02-08 ENCOUNTER — TELEPHONE (OUTPATIENT)
Dept: UROLOGY | Facility: CLINIC | Age: 59
End: 2024-02-08
Payer: COMMERCIAL

## 2024-02-08 NOTE — TELEPHONE ENCOUNTER
----- Message from Sarah Nolasco sent at 2/8/2024  8:32 AM CST -----  Regarding: Cysto  Cysto, gross hematuria, cytology prior    WILLIAM  2/7/24

## 2024-02-13 ENCOUNTER — TELEPHONE (OUTPATIENT)
Dept: FAMILY MEDICINE | Facility: CLINIC | Age: 59
End: 2024-02-13
Payer: COMMERCIAL

## 2024-02-13 DIAGNOSIS — E27.8 ADRENAL HYPERPLASIA (H): Primary | ICD-10-CM

## 2024-02-13 PROCEDURE — 99207 E-CONSULT TO ENDOCRINOLOGY (ADULT OUTPT PROVIDER TO SPECIALIST WRITTEN QUESTION & RESPONSE): CPT | Performed by: INTERNAL MEDICINE

## 2024-02-13 NOTE — TELEPHONE ENCOUNTER
PH: Pt states she agreeable with E-Consult (Endo) and charges that could occur.       Pt will await future communication from Dr. Franklin and will contact us if does not hear within a week.     (This is a follow up of 1-29-24 MY CHART message)     Francine VERGARA MA

## 2024-02-13 NOTE — TELEPHONE ENCOUNTER
Please call the patient.  This is in regards to the endocrine referral.  It may take several months to get her into actually see an endocrinologist but we could do an E consult.  Have an answer within 3 days.  Usually insurance covers this but if not there may be a charge that is usually less than $150.  If this is okay I will do the E consult and then I will get back to her with what they said.  Please let me know.    Thank you    Peter Franklin M.D.

## 2024-02-14 NOTE — TELEPHONE ENCOUNTER
LM for patient to return our call back, I will also send a [employer] message with Dr. Franklin's response.            Nemo HIGGINS MA on 2/14/2024 at 12:05 PM

## 2024-02-14 NOTE — TELEPHONE ENCOUNTER
Pt calling back and also just read MyChart. Pt stated understanding and will call the clinic back in 5 days if she has not heard anything back.    Malena Pina RN

## 2024-02-14 NOTE — TELEPHONE ENCOUNTER
Above message from TE encounter on 2/13/24.    Sent [employer] message to patient with this information.    Muriel Ashraf RN  [facility]

## 2024-02-15 ENCOUNTER — LAB (OUTPATIENT)
Dept: LAB | Facility: CLINIC | Age: 59
End: 2024-02-15
Payer: COMMERCIAL

## 2024-02-15 DIAGNOSIS — R31.0 GROSS HEMATURIA: ICD-10-CM

## 2024-02-15 PROCEDURE — 88112 CYTOPATH CELL ENHANCE TECH: CPT | Performed by: PATHOLOGY

## 2024-02-16 LAB
PATH REPORT.COMMENTS IMP SPEC: NORMAL
PATH REPORT.FINAL DX SPEC: NORMAL
PATH REPORT.GROSS SPEC: NORMAL
PATH REPORT.RELEVANT HX SPEC: NORMAL

## 2024-02-17 ENCOUNTER — E-CONSULT (OUTPATIENT)
Dept: ENDOCRINOLOGY | Facility: CLINIC | Age: 59
End: 2024-02-17
Payer: COMMERCIAL

## 2024-02-17 PROCEDURE — 99451 NTRPROF PH1/NTRNET/EHR 5/>: CPT

## 2024-02-17 NOTE — PROGRESS NOTES
"    2/17/2024     E-Consult has been accepted.    Interprofessional consultation requested by:  Peter Franklin MD      Clinical Question/Purpose: MY CLINICAL QUESTION IS: please see ct scan of 1/22/2024 re:  ADRENAL GLANDS: Mild thickening of the adrenal glands suggestive of hyperplasia.  Any evaluation needed for this?    Patient assessment and information reviewed:   PMH: HTN, Migraine, osteopenia, depression, asthma, GERD, syncope, Breast cancer (diagnosis age 89  10/15/19 note states menarche 14/15; LMP ? 2015 ; 2 pregnancies; one ovary removed. History or premarin and provera, OCP and IUD in the past    12/6/16 CT abdomen:adrenals \"normal\" - images not on PACS  10/21/19 PET : adrenals without abnormal FDG activity  1/22/24 CT urogram: adrenals very easily seen - Mild thickening of the adrenal glands suggestive of hyperplasia per radiologist     Labs  5/21/19 HgbA1c 5.1!  8/14/23 TSH 1.12, Na 139, K 4.1, creatinine 0.73,     Impression:    thickened adrenal appearance of uncertain clinical significance.  It could indicate increased ACTH stimulation (such as with ACTH dependent cushing), congenital adrenal hyperplasia, stress response, normal variant or possibly infiltrative process   Postmenopausal status by age . The chart does not indicate likely menstrual dysfunction or hirsutism which might indicate CAH was present.    Recommendations:   AM labs before 8 AM: cortisol, ACTH, DHEAS.  Normal cortisol should be > 10  On a different day from the above, do overnight dexamethasone suppression test.   Instructions as listed below.  Precise timing is very important.  You will need to schedule the lab appoint for 8 AM prior to doing the test  (order cortisol dexamethasone suppression test .  Normal level is < 1.8.     Overnight Dexamethasone Suppression Test    The purpose of the test is to test your adrenal system for abnormalities resulting in excessive production of adrenal hormones.    Test " procedure:  Take 1 mg of Dexamethasone at 11 PM the night before you are getting the blood test.  Your doctor will provide you with a prescription for dexamethasone.  The next morning, get a blood test to measure serum cortisol between 8 and 9 AM.    Dexamethasone is usually well tolerated.  It may produce minor sleep interference on the night you do the test.    The recommendations provided in this E-Consult are based on a review of clinical data pertinent to the clinical question presented, without a review of the patient's complete medical record or, the benefit of a comprehensive in-person or virtual patient evaluation. This consultation should not replace the clinical judgement and evaluation of the provider ordering this E-Consult. Any new clinical issues, or changes in patient status since the filing of this E-Consult will need to be taken into account when assessing these recommendations. Please contact me if you have further questions.    My total time spent reviewing clinical information and formulating assessment was 20 minutes.        Catherine Daigle MD    3/18/2024 addendum  3/6/24 0747: cortisol 16.5, DHEAS 74  3/14/24 0751 1 mg overnight dexamethasone suppression test cortisol 1.    The results are normal.  No further work up is needed.  I recommend to cancel the scheduled endocrine consult.     Catherine Daigle MD

## 2024-02-20 DIAGNOSIS — E27.8 ADRENAL HYPERPLASIA (H): ICD-10-CM

## 2024-02-20 RX ORDER — DEXAMETHASONE 1 MG
1 TABLET ORAL 2 TIMES DAILY WITH MEALS
Qty: 1 TABLET | Refills: 0 | OUTPATIENT
Start: 2024-02-20

## 2024-02-21 NOTE — TELEPHONE ENCOUNTER
Called and spoke to the pharmacy. Relayed message from the provider. Pharmacy expressed understanding and stated the medication is ready for pick-up.     Closing encounter.    Jo Ann Sparks RN

## 2024-03-05 ENCOUNTER — OFFICE VISIT (OUTPATIENT)
Dept: UROLOGY | Facility: CLINIC | Age: 59
End: 2024-03-05
Payer: COMMERCIAL

## 2024-03-05 VITALS
HEART RATE: 78 BPM | DIASTOLIC BLOOD PRESSURE: 85 MMHG | SYSTOLIC BLOOD PRESSURE: 135 MMHG | WEIGHT: 135 LBS | HEIGHT: 66 IN | OXYGEN SATURATION: 100 % | BODY MASS INDEX: 21.69 KG/M2

## 2024-03-05 DIAGNOSIS — N13.30 HYDRONEPHROSIS, UNSPECIFIED HYDRONEPHROSIS TYPE: ICD-10-CM

## 2024-03-05 DIAGNOSIS — R31.9 HEMATURIA, UNSPECIFIED TYPE: Primary | ICD-10-CM

## 2024-03-05 LAB
ALBUMIN UR-MCNC: NEGATIVE MG/DL
APPEARANCE UR: CLEAR
BILIRUB UR QL STRIP: NEGATIVE
COLOR UR AUTO: YELLOW
GLUCOSE UR STRIP-MCNC: NEGATIVE MG/DL
HGB UR QL STRIP: ABNORMAL
KETONES UR STRIP-MCNC: NEGATIVE MG/DL
LEUKOCYTE ESTERASE UR QL STRIP: NEGATIVE
NITRATE UR QL: NEGATIVE
PH UR STRIP: 6.5 [PH] (ref 5–7)
SP GR UR STRIP: 1.01 (ref 1–1.03)
UROBILINOGEN UR STRIP-ACNC: 0.2 E.U./DL

## 2024-03-05 PROCEDURE — 81003 URINALYSIS AUTO W/O SCOPE: CPT | Performed by: UROLOGY

## 2024-03-05 PROCEDURE — 99213 OFFICE O/P EST LOW 20 MIN: CPT | Mod: 25 | Performed by: UROLOGY

## 2024-03-05 PROCEDURE — 52000 CYSTOURETHROSCOPY: CPT | Performed by: UROLOGY

## 2024-03-05 NOTE — PROGRESS NOTES
Office Visit Note  [facility]  [phone]    Mar 5, 2024    [unfilled]    1965    UROLOGIC DIAGNOSES:    Gross hematuria  Mild right hydronephrosis    CURRENT INTERVENTIONS:        History:    This is a 58-year-old woman who is undergoing a gross hematuria workup in the urology clinic.  She had a CT urogram performed to begin her workup and it showed mild right hydronephrosis in the absence of hydroureter.  There were no stones identified.  She is here today for cystoscopy in order to complete her hematuria workup.  She has also had a negative urine cytology performed.      Imaging: I personally reviewed her CT scan images.  Mild right hydronephrosis versus extrarenal pelvis.    Labs:      MEDICATIONS:    Current Outpatient Medications:     acetaminophen (TYLENOL) 500 MG tablet, Take 1,000 mg by mouth every 6 hours as needed for mild pain, Disp: , Rfl:     acetaminophen-caffeine (EXCEDRIN TENSION HEADACHE) 500-65 MG TABS, Take 2 tablets by mouth every 6 hours as needed for mild pain, Disp: , Rfl:     albuterol (PROAIR HFA/PROVENTIL HFA/VENTOLIN HFA) 108 (90 Base) MCG/ACT inhaler, Inhale 2 puffs into the lungs every 6 hours as needed for shortness of breath / dyspnea or wheezing, Disp: 18 g, Rfl: 0    Calcium Carb-Cholecalciferol (CALCIUM 500 + D PO), Take by mouth 2 times daily, Disp: , Rfl:     dexAMETHasone (DECADRON) 1 MG tablet, Take 1 tablet (1 mg) by mouth 2 times daily (with meals), Disp: 1 tablet, Rfl: 0    DULoxetine (CYMBALTA) 20 MG capsule, Take 3 capsules (60 mg) by mouth daily, Disp: 270 capsule, Rfl: 3    gabapentin (NEURONTIN) 100 MG capsule, 200 mg daily, Disp: 360 capsule, Rfl: 3    letrozole (FEMARA) 2.5 MG tablet, Take 1 tablet (2.5 mg) by mouth daily, Disp: 90 tablet, Rfl: 3    lisinopril (ZESTRIL) 2.5 MG tablet, TAKE 1 TABLET BY MOUTH EVERY DAY, Disp: 90 tablet, Rfl: 1    ZOLMitriptan (ZOMIG) 2.5 MG tablet, Take 1 tablet (2.5 mg) by mouth at onset of headache for migraine  "May repeat in 2 hours. Max 4 tablets/24 hours., Disp: 10 tablet, Rfl: 1    ZOLMitriptan (ZOMIG) 5 MG tablet, Take 1 tablet (5 mg) by mouth at onset of headache for migraine May repeat in 2 hours. Max 2 tablets/24 hours., Disp: 10 tablet, Rfl: 1    zoledronic acid (ZOMETA) 4 MG/100ML SOLN, Inject 4 mg into the vein every 6 months (Patient not taking: Reported on 3/5/2024), Disp: , Rfl:     ALLERGIES:     Allergies   Allergen Reactions    Sulfa Antibiotics Other (See Comments)     Red face. Ringing in the ears.       REVIEW OF SYSTEMS: Ten point review of systems without change as outlined in HPI    SURGICAL HISTORY:    Past Surgical History:   Procedure Laterality Date    BIOPSY NODE SENTINEL Right 4/1/2020    Procedure: RIGHT SENTINEL LYMPH NODE BIOPSY;  Surgeon: Kaila Hernandez MD;  Location:  OR    ESOPHAGOSCOPY, GASTROSCOPY, DUODENOSCOPY (EGD), COMBINED N/A 10/30/2017    Procedure: COMBINED ESOPHAGOSCOPY, GASTROSCOPY, DUODENOSCOPY (EGD), BIOPSY SINGLE OR MULTIPLE;  COMBINED ESOPHAGOSCOPY, GASTROSCOPY, DUODENOSCOPY (EGD);  Surgeon: Martin Rodriguez MD;  Location:  GI    INSERT PORT VASCULAR ACCESS N/A 10/24/2019    Procedure: PORT PLACEMENT;  Surgeon: Kaila Hernandez MD;  Location:  OR    LUMPECTOMY BREAST WITH SEED LOCALIZATION Right 4/1/2020    Procedure: SEED LOCALIZED RIGHT BREAST LUMPECTOMY WITH SEED LOCALIZED RIGHT AXILLARY NODE EXCISION;  Surgeon: Kaila Hernandez MD;  Location:  OR    ORTHOPEDIC SURGERY      \"leg surgery\"    REMOVE PORT VASCULAR ACCESS Left 4/1/2020    Procedure: REMOVAL, VASCULAR ACCESS PORT;  Surgeon: Kaila Hernandez MD;  Location:  OR    single oopherectomy  2010    cyst    ZZC TMJ ARTHROSCOPY/SURGERY           PHYSICAL EXAM:    LMP 10/07/2017 (Approximate)     Constitutional: Well developed. Conversant and in no acute distress  Eyes: Anicteric sclera, conjunctiva clear, normal extraocular movements  ENT: Normocephalic and atraumatic,   Skin: Warm and dry. No " rashes or lesions  Cardiac: No peripheral edema  Back/Flank: Not done  CNS/PNS: Normal musculature and movements, moves all extremities normally  Respiratory: Normal non-labored breathing  Abdomen: Soft nontender and nondistended  Peripheral Vascular: No peripheral edema  Mental Status/Psych: Alert and Oriented x 3. Normal mood and affect      External Genitalia: Normal  Bladder: Normal, no evidence of cystocele  Urethra: Normal  Vagina: Normal vaginal mucosa    Cystoscopy: I performed flexible cystoscopy and the bladder was normal throughout.  No tumors identified throughout the bladder.  Each ureteral orifice in its normal anatomic location.      Urinalysis:  UA RESULTS:  Recent Labs   Lab Test 01/16/24  1531   COLOR Orange*   APPEARANCE Clear   URINEGLC Negative   URINEBILI Negative   URINEKETONE >=160*   SG 1.015   UBLD Moderate*   URINEPH 5.5   PROTEIN Negative   UROBILINOGEN 0.2   NITRITE Negative   LEUKEST Negative   RBCU 5-10*   WBCU 0-5         IMPRESSION:    Negative hematuria workup  Right hydronephrosis    PLAN:    Her hematuria workup is complete with a CT scan, cystoscopy, and urine cytology.  She is provided reassurance that no concerning causes for her hematuria been identified.  We discussed the CT scan findings.  The hydronephrosis on the right side is very mild.  I did recommend a nuclear medicine scan with Lasix washout in order to make certain she does not have any obstruction or UPJ obstruction.  We discussed the test and she agreed to it.  I will call her when the nuclear medicine scan results are available.      Gilson Young M.D.

## 2024-03-05 NOTE — LETTER
3/5/2024       RE: Jyothi Freitas  [address]  [place] [address]     Dear Colleague,    Thank you for referring your patient, Jyothi Freitas, to the [facility] at [facility]. Please see a copy of my visit note below.    Office Visit Note  [facility]  [phone]    Mar 5, 2024    [unfilled]    1965    UROLOGIC DIAGNOSES:    Gross hematuria  Mild right hydronephrosis    CURRENT INTERVENTIONS:        History:    This is a 58-year-old woman who is undergoing a gross hematuria workup in the urology clinic.  She had a CT urogram performed to begin her workup and it showed mild right hydronephrosis in the absence of hydroureter.  There were no stones identified.  She is here today for cystoscopy in order to complete her hematuria workup.  She has also had a negative urine cytology performed.      Imaging: I personally reviewed her CT scan images.  Mild right hydronephrosis versus extrarenal pelvis.    Labs:      MEDICATIONS:    Current Outpatient Medications:     acetaminophen (TYLENOL) 500 MG tablet, Take 1,000 mg by mouth every 6 hours as needed for mild pain, Disp: , Rfl:     acetaminophen-caffeine (EXCEDRIN TENSION HEADACHE) 500-65 MG TABS, Take 2 tablets by mouth every 6 hours as needed for mild pain, Disp: , Rfl:     albuterol (PROAIR HFA/PROVENTIL HFA/VENTOLIN HFA) 108 (90 Base) MCG/ACT inhaler, Inhale 2 puffs into the lungs every 6 hours as needed for shortness of breath / dyspnea or wheezing, Disp: 18 g, Rfl: 0    Calcium Carb-Cholecalciferol (CALCIUM 500 + D PO), Take by mouth 2 times daily, Disp: , Rfl:     dexAMETHasone (DECADRON) 1 MG tablet, Take 1 tablet (1 mg) by mouth 2 times daily (with meals), Disp: 1 tablet, Rfl: 0    DULoxetine (CYMBALTA) 20 MG capsule, Take 3 capsules (60 mg) by mouth daily, Disp: 270 capsule, Rfl: 3    gabapentin (NEURONTIN) 100 MG capsule, 200 mg daily, Disp: 360 capsule, Rfl:  "3    letrozole (FEMARA) 2.5 MG tablet, Take 1 tablet (2.5 mg) by mouth daily, Disp: 90 tablet, Rfl: 3    lisinopril (ZESTRIL) 2.5 MG tablet, TAKE 1 TABLET BY MOUTH EVERY DAY, Disp: 90 tablet, Rfl: 1    ZOLMitriptan (ZOMIG) 2.5 MG tablet, Take 1 tablet (2.5 mg) by mouth at onset of headache for migraine May repeat in 2 hours. Max 4 tablets/24 hours., Disp: 10 tablet, Rfl: 1    ZOLMitriptan (ZOMIG) 5 MG tablet, Take 1 tablet (5 mg) by mouth at onset of headache for migraine May repeat in 2 hours. Max 2 tablets/24 hours., Disp: 10 tablet, Rfl: 1    zoledronic acid (ZOMETA) 4 MG/100ML SOLN, Inject 4 mg into the vein every 6 months (Patient not taking: Reported on 3/5/2024), Disp: , Rfl:     ALLERGIES:     Allergies   Allergen Reactions    Sulfa Antibiotics Other (See Comments)     Red face. Ringing in the ears.       REVIEW OF SYSTEMS: Ten point review of systems without change as outlined in HPI    SURGICAL HISTORY:    Past Surgical History:   Procedure Laterality Date    BIOPSY NODE SENTINEL Right 4/1/2020    Procedure: RIGHT SENTINEL LYMPH NODE BIOPSY;  Surgeon: Kaila Hernandez MD;  Location:  OR    ESOPHAGOSCOPY, GASTROSCOPY, DUODENOSCOPY (EGD), COMBINED N/A 10/30/2017    Procedure: COMBINED ESOPHAGOSCOPY, GASTROSCOPY, DUODENOSCOPY (EGD), BIOPSY SINGLE OR MULTIPLE;  COMBINED ESOPHAGOSCOPY, GASTROSCOPY, DUODENOSCOPY (EGD);  Surgeon: Martin Rodriguez MD;  Location:  GI    INSERT PORT VASCULAR ACCESS N/A 10/24/2019    Procedure: PORT PLACEMENT;  Surgeon: Kaila Hernandez MD;  Location:  OR    LUMPECTOMY BREAST WITH SEED LOCALIZATION Right 4/1/2020    Procedure: SEED LOCALIZED RIGHT BREAST LUMPECTOMY WITH SEED LOCALIZED RIGHT AXILLARY NODE EXCISION;  Surgeon: Kaila Hernandez MD;  Location:  OR    ORTHOPEDIC SURGERY      \"leg surgery\"    REMOVE PORT VASCULAR ACCESS Left 4/1/2020    Procedure: REMOVAL, VASCULAR ACCESS PORT;  Surgeon: Kaila Hernandez MD;  Location:  OR    single oopherectomy  2010    " cyst    ZZC TMJ ARTHROSCOPY/SURGERY           PHYSICAL EXAM:    LMP 10/07/2017 (Approximate)     Constitutional: Well developed. Conversant and in no acute distress  Eyes: Anicteric sclera, conjunctiva clear, normal extraocular movements  ENT: Normocephalic and atraumatic,   Skin: Warm and dry. No rashes or lesions  Cardiac: No peripheral edema  Back/Flank: Not done  CNS/PNS: Normal musculature and movements, moves all extremities normally  Respiratory: Normal non-labored breathing  Abdomen: Soft nontender and nondistended  Peripheral Vascular: No peripheral edema  Mental Status/Psych: Alert and Oriented x 3. Normal mood and affect      External Genitalia: Normal  Bladder: Normal, no evidence of cystocele  Urethra: Normal  Vagina: Normal vaginal mucosa    Cystoscopy: I performed flexible cystoscopy and the bladder was normal throughout.  No tumors identified throughout the bladder.  Each ureteral orifice in its normal anatomic location.      Urinalysis:  UA RESULTS:  Recent Labs   Lab Test 01/16/24  1531   COLOR Orange*   APPEARANCE Clear   URINEGLC Negative   URINEBILI Negative   URINEKETONE >=160*   SG 1.015   UBLD Moderate*   URINEPH 5.5   PROTEIN Negative   UROBILINOGEN 0.2   NITRITE Negative   LEUKEST Negative   RBCU 5-10*   WBCU 0-5         IMPRESSION:    Negative hematuria workup  Right hydronephrosis    PLAN:    Her hematuria workup is complete with a CT scan, cystoscopy, and urine cytology.  She is provided reassurance that no concerning causes for her hematuria been identified.  We discussed the CT scan findings.  The hydronephrosis on the right side is very mild.  I did recommend a nuclear medicine scan with Lasix washout in order to make certain she does not have any obstruction or UPJ obstruction.  We discussed the test and she agreed to it.  I will call her when the nuclear medicine scan results are available.      Gilson Young M.D.

## 2024-03-05 NOTE — PATIENT INSTRUCTIONS

## 2024-03-06 ENCOUNTER — LAB (OUTPATIENT)
Dept: LAB | Facility: CLINIC | Age: 59
End: 2024-03-06
Payer: COMMERCIAL

## 2024-03-06 DIAGNOSIS — E27.8 ADRENAL HYPERPLASIA (H): ICD-10-CM

## 2024-03-06 PROCEDURE — 36415 COLL VENOUS BLD VENIPUNCTURE: CPT

## 2024-03-06 PROCEDURE — 82024 ASSAY OF ACTH: CPT

## 2024-03-06 PROCEDURE — 82627 DEHYDROEPIANDROSTERONE: CPT

## 2024-03-06 PROCEDURE — 82533 TOTAL CORTISOL: CPT

## 2024-03-07 ENCOUNTER — HOSPITAL ENCOUNTER (OUTPATIENT)
Dept: NUCLEAR MEDICINE | Facility: CLINIC | Age: 59
Setting detail: NUCLEAR MEDICINE
Discharge: HOME | End: 2024-03-07
Attending: UROLOGY | Admitting: UROLOGY
Payer: COMMERCIAL

## 2024-03-07 DIAGNOSIS — N13.30 HYDRONEPHROSIS, UNSPECIFIED HYDRONEPHROSIS TYPE: ICD-10-CM

## 2024-03-07 LAB
ACTH PLAS-MCNC: 18 PG/ML
CORTIS SERPL-MCNC: 16.5 UG/DL
DHEA-S SERPL-MCNC: 74 UG/DL (ref 35–430)

## 2024-03-07 PROCEDURE — 343N000001 HC RX 343: Performed by: UROLOGY

## 2024-03-07 PROCEDURE — A9562 TC99M MERTIATIDE: HCPCS | Performed by: UROLOGY

## 2024-03-07 PROCEDURE — 78708 K FLOW/FUNCT IMAGE W/DRUG: CPT

## 2024-03-07 PROCEDURE — 250N000011 HC RX IP 250 OP 636: Performed by: UROLOGY

## 2024-03-07 RX ORDER — FUROSEMIDE 10 MG/ML
40 INJECTION INTRAMUSCULAR; INTRAVENOUS ONCE
Status: COMPLETED | OUTPATIENT
Start: 2024-03-07 | End: 2024-03-07

## 2024-03-07 RX ADMIN — FUROSEMIDE 40 MG: 10 INJECTION, SOLUTION INTRAMUSCULAR; INTRAVENOUS at 14:27

## 2024-03-07 RX ADMIN — TECHNESCAN TC 99M MERTIATIDE 8.46 MILLICURIE: 1 INJECTION, POWDER, LYOPHILIZED, FOR SOLUTION INTRAVENOUS at 14:15

## 2024-03-14 ENCOUNTER — LAB (OUTPATIENT)
Dept: LAB | Facility: CLINIC | Age: 59
End: 2024-03-14
Payer: COMMERCIAL

## 2024-03-14 DIAGNOSIS — E27.8 ADRENAL HYPERPLASIA (H): ICD-10-CM

## 2024-03-14 LAB
CORTIS 8H P 1 MG DEX SERPL-MCNC: 1 UG/DL
CORTIS SERPL-MCNC: 1 UG/DL

## 2024-03-14 PROCEDURE — 82533 TOTAL CORTISOL: CPT

## 2024-03-14 PROCEDURE — 36415 COLL VENOUS BLD VENIPUNCTURE: CPT

## 2024-03-18 PROBLEM — E27.8 ADRENAL HYPERPLASIA (H): Status: ACTIVE | Noted: 2024-01-22

## 2024-03-18 NOTE — RESULT ENCOUNTER NOTE
I am sorry for the long delay in reporting all the test to you.  I wanted to have them all back first.  I am happy to report that they are all normal indicating no hyperfunctioning of the adrenal gland.  No further testing is needed at this time and no follow-up testing is needed.    Please let me know if you have questions.    Peter Franklin M.D.

## 2024-04-10 NOTE — NURSING NOTE
"Chief Complaint   Patient presents with     Hospital F/U       Initial /90  Pulse 77  Temp 98.2  F (36.8  C) (Oral)  Wt 130 lb (59 kg)  SpO2 100%  Breastfeeding? No  BMI 20.36 kg/m2 Estimated body mass index is 20.36 kg/(m^2) as calculated from the following:    Height as of 3/16/17: 5' 7\" (1.702 m).    Weight as of this encounter: 130 lb (59 kg).  Medication Reconciliation: complete   Janneth LUCIANO CMA      " PT here with c/o nausea and vomiting, unable to keep anything down, decreased appetite, 2 episodes of vomiting iin the last 24HRS.

## 2024-04-26 DIAGNOSIS — G44.219 EPISODIC TENSION-TYPE HEADACHE, NOT INTRACTABLE: ICD-10-CM

## 2024-04-26 RX ORDER — ZOLMITRIPTAN 5 MG/1
TABLET, FILM COATED ORAL
Qty: 6 TABLET | Refills: 3 | Status: SHIPPED | OUTPATIENT
Start: 2024-04-26

## 2024-06-28 ENCOUNTER — TRANSFERRED RECORDS (OUTPATIENT)
Dept: HEALTH INFORMATION MANAGEMENT | Facility: CLINIC | Age: 59
End: 2024-06-28
Payer: COMMERCIAL

## 2024-07-10 ENCOUNTER — HOSPITAL ENCOUNTER (OUTPATIENT)
Dept: CARDIOLOGY | Facility: CLINIC | Age: 59
Discharge: HOME | End: 2024-07-10
Attending: INTERNAL MEDICINE | Admitting: INTERNAL MEDICINE
Payer: COMMERCIAL

## 2024-07-10 DIAGNOSIS — Z13.6 SCREENING FOR HEART DISEASE: ICD-10-CM

## 2024-07-10 PROCEDURE — 75571 CT HRT W/O DYE W/CA TEST: CPT

## 2024-07-10 PROCEDURE — 75571 CT HRT W/O DYE W/CA TEST: CPT | Mod: 26 | Performed by: INTERNAL MEDICINE

## 2024-07-11 NOTE — RESULT ENCOUNTER NOTE
Good afternoon,    Your coronary calcium score is 0 which is great.  It does not get any lower than this.    Incidentally on the scan they note a tiny ascending aortic aneurysm.  Normal is up to about 3.8 so it 4.15 is really quite insignificant.  These things often do not grow especially in people who do not smoke and have good blood pressure.  There is nothing otherwise to do except to repeat in about 3 years.    Please let me know if you have questions.    Peter Franklin M.D.

## 2024-07-23 ENCOUNTER — VIRTUAL VISIT (OUTPATIENT)
Dept: FAMILY MEDICINE | Facility: CLINIC | Age: 59
End: 2024-07-23
Payer: COMMERCIAL

## 2024-07-23 DIAGNOSIS — I77.810 ASCENDING AORTA DILATATION (H): ICD-10-CM

## 2024-07-23 DIAGNOSIS — Z17.0 MALIGNANT NEOPLASM OF UPPER-OUTER QUADRANT OF RIGHT BREAST IN FEMALE, ESTROGEN RECEPTOR POSITIVE (H): ICD-10-CM

## 2024-07-23 DIAGNOSIS — G44.219 EPISODIC TENSION-TYPE HEADACHE, NOT INTRACTABLE: Primary | ICD-10-CM

## 2024-07-23 DIAGNOSIS — I10 PRIMARY HYPERTENSION: ICD-10-CM

## 2024-07-23 DIAGNOSIS — G62.0 NEUROPATHY DUE TO CHEMOTHERAPEUTIC DRUG (H): ICD-10-CM

## 2024-07-23 DIAGNOSIS — T45.1X5A NEUROPATHY DUE TO CHEMOTHERAPEUTIC DRUG (H): ICD-10-CM

## 2024-07-23 DIAGNOSIS — C50.411 MALIGNANT NEOPLASM OF UPPER-OUTER QUADRANT OF RIGHT BREAST IN FEMALE, ESTROGEN RECEPTOR POSITIVE (H): ICD-10-CM

## 2024-07-23 PROCEDURE — 99213 OFFICE O/P EST LOW 20 MIN: CPT | Mod: 95 | Performed by: INTERNAL MEDICINE

## 2024-07-23 RX ORDER — LISINOPRIL 5 MG/1
5 TABLET ORAL DAILY
Qty: 90 TABLET | Refills: 3 | Status: SHIPPED | OUTPATIENT
Start: 2024-07-23

## 2024-07-23 ASSESSMENT — ASTHMA QUESTIONNAIRES: ACT_TOTALSCORE: 25

## 2024-07-23 NOTE — PROGRESS NOTES
Jyothi is a 58 year old who is being evaluated via a billable video visit.    How would you like to obtain your AVS? MyChart  If the video visit is dropped, the invitation should be resent by: Text to cell phone: [phone]  Will anyone else be joining your video visit? No          Subjective   Jyothi is a 58 year old, presenting for the following health issues:  No chief complaint on file.    130's/85 to 88.    This is a follow-up for a few issues.  The patient has hypertension as noted and was started on medication in March 2023 which she tolerates well.  Her blood pressure is in the 130s over mid to upper 80s.  She does have ascending aortic ectasia is noted which is not new.  However given this we certainly want better control.  She does not smoke.    The patient has chronic migraines for many years but wonders if there is something else we can do to make them better.  They are not new.    The patient has breast cancer and sees oncology for this.  She has regular follow-up with no evidence of disease.    The patient has osteoporosis and was on IV Zometa the oncology but is on holiday from that.  She has regular bone density through them.    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  This is not new.    She is otherwise doing well and feels quite well.    Vitals:  No vitals were obtained today due to virtual visit.    Physical Exam   GENERAL: alert and no distress  EYES: Eyes grossly normal to inspection.  No discharge or erythema, or obvious scleral/conjunctival abnormalities.  RESP: No audible wheeze, cough, or visible cyanosis.    SKIN: Visible skin clear. No significant rash, abnormal pigmentation or lesions.  NEURO: Cranial nerves grossly intact.  Mentation and speech appropriate for age.  PSYCH: Appropriate affect, tone, and pace of words    ASSESSMENT:  Hypertension, I would like her blood pressure around 130/80 or below.  She will raise lisinopril to 5 mg and send me her readings in 3 weeks and we  can dose titrate up from there  Breast cancer, no evidence of disease, follow-up oncology  Ascending aortic dilatation, minimal and minimally changed from a prior PET/CT.  Can do an echo in 3 years  Neuropathy from chemo, follow-up oncology  Headaches, neuro referral    PLAN:  Above    Peter Franklin M.D.        Video-Visit Details    Type of service:  Video Visit   Originating Location (pt. Location): Home    Distant Location (provider location):  On-site  Platform used for Video Visit: [facility]  Signed Electronically by: Peter Franklin MD

## 2024-07-23 NOTE — PATIENT INSTRUCTIONS
Change the dose of the lisinopril to 5mg daily.  Please send me a note in 3 weeks with your blood pressure readings.

## 2024-07-30 NOTE — PROGRESS NOTES
[facility]    Hematology/Oncology Established Patient Follow-up Note      Today's Date: 8/5/2024    Reason for Follow-up: Right breast cancer.    HISTORY OF PRESENT ILLNESS: Jyothi Freitas is a 58 year old female who presents with the following oncologic history:   1.  10/11/2019: Diagnostic right sided mammogram performed for a palpable lump in the right breast.  This showed an irregularly-shaped spiculated mass in the upper outer right breast with prominent lymph nodes in the right axilla.  Targeted ultrasound of the right upper outer breast showed an irregularly-shaped hypoechoic mass at the 10 o'clock position, 4 cm from the nipple measuring 2.6 x 1.5 x 2.4 cm.  Right axillary ultrasound showed moderately enlarged lymph nodes.  Right breast needle biopsy showed a grade 3 invasive ductal carcinoma with lymphovascular invasion identified, ER strongly positive at 95%, ID strongly positive at 95%, HER-2/juan FISH negative.  Right axillary lymph node measuring 2 cm was positive for metastatic carcinoma.  Note prior 4/2019 mammogram deemed normal.  2.  10/15/2019: Bilateral breast MRI showed known right breast malignancy measuring 2.6 x 1.4 x 2 cm, 3 mildly enlarged right axillary lymph nodes and no contralateral breast malignancy.  3. 10/21/2019 PET scan showed scattered small hypermetabolic bilateral axillary lymph nodes; for example, a hypermetabolic right axillary lymph node measures 1.6 x 0.9 cm with SUV max 3.6.  Hypermetabolic mass in the right breast superiorly and laterally measuring 2.1 x 1.6 cm with SUV max 4.3.  A 0.6 cm low-density lesion in the right hepatic lobe is too small for accurate PET characterization but shows no appreciable hypermetabolic activity.  Incidentally noted ectasia of the ascending thoracic aorta measures 4 cm in diameter.  4.  10/23/2019: Left axillary ultrasound showed benign-appearing lymph node.  Left axillary lymph node biopsy showed benign lymph node tissue.  5.   10/29/2019: Started neoadjuvant chemotherapy with dose dense Adriamycin and Cytoxan, followed by weekly paclitaxel with completion on 3/12/2020.  6.  3/19/2020: Breast MRI showed no residual enhancement in the right breast mass.  The right axillary lymphadenopathy has resolved.  7. 4/01/2020: Underwent right breast lumpectomy and right axillary sentinel lymph node biopsy under care of Dr. Kaila Hernandez.  Pathology showed fibrocystic change and no evidence of residual carcinoma in the right breast.  Metastatic breast adenocarcinoma to one of 5 lymph node fragments in 1 of 3 lymph nodes excised. Extranodal extension present. ypT0-N1a.  8. 4/29/2020: Started adjuvant anastrozole.  9. 6/15/2020: Completed adjuvant radiation therapy.  10. 6/15/2020: Switched to adjuvant letrozole with some mild improvement in polyathralgias.    INTERVAL HISTORY:  Jyothi reports some increase in hot flashes. Her peripheral neuropathy is still well controlled on a lower dose of gabapentin 200 mg daily and duloxetine 60 mg daily.      REVIEW OF SYSTEMS:   14 point ROS was reviewed and is negative other than as noted above in HPI.       HOME MEDICATIONS:  Current Outpatient Medications   Medication Sig Dispense Refill    acetaminophen (TYLENOL) 500 MG tablet Take 1,000 mg by mouth every 6 hours as needed for mild pain      acetaminophen-caffeine (EXCEDRIN TENSION HEADACHE) 500-65 MG TABS Take 2 tablets by mouth every 6 hours as needed for mild pain      albuterol (PROAIR HFA/PROVENTIL HFA/VENTOLIN HFA) 108 (90 Base) MCG/ACT inhaler Inhale 2 puffs into the lungs every 6 hours as needed for shortness of breath / dyspnea or wheezing 18 g 0    Calcium Carb-Cholecalciferol (CALCIUM 500 + D PO) Take by mouth 2 times daily      dexAMETHasone (DECADRON) 1 MG tablet Take 1 tablet (1 mg) by mouth 2 times daily (with meals) 1 tablet 0    DULoxetine (CYMBALTA) 20 MG capsule Take 3 capsules (60 mg) by mouth daily 270 capsule 3    gabapentin (NEURONTIN)  100 MG capsule 200 mg daily 360 capsule 3    letrozole (FEMARA) 2.5 MG tablet Take 1 tablet (2.5 mg) by mouth daily 90 tablet 3    lisinopril (ZESTRIL) 2.5 MG tablet TAKE 1 TABLET BY MOUTH EVERY DAY 90 tablet 1    lisinopril (ZESTRIL) 5 MG tablet Take 1 tablet (5 mg) by mouth daily 90 tablet 3    zoledronic acid (ZOMETA) 4 MG/100ML SOLN Inject 4 mg into the vein every 6 months      ZOLMitriptan (ZOMIG) 2.5 MG tablet Take 1 tablet (2.5 mg) by mouth at onset of headache for migraine May repeat in 2 hours. Max 4 tablets/24 hours. 10 tablet 1    ZOLMitriptan (ZOMIG) 5 MG tablet TAKE 1 TABLET (5 MG) BY MOUTH AT ONSET OF HEADACHE FOR MIGRAINE MAY REPEAT IN 2 HR. MAX 2 TABS/24 HR 6 tablet 3         ALLERGIES:  Allergies   Allergen Reactions    Sulfa Antibiotics Other (See Comments)     Red face. Ringing in the ears.         PAST MEDICAL HISTORY:  Past Medical History:   Diagnosis Date    Adrenal hyperplasia (H24) 01/22/2024    incidental finding, then functional tests nl    Ascending aorta dilatation (H24) 07/2024    4.15, incidental finding on coronary CT scan, prior seen on pet/ct 2019 4cm    Encounter for screening for coronary artery disease 07/2024    calcium score of 0    GERD (gastroesophageal reflux disease)     H/O colonoscopy 03/08/2016    done at [facility] and nl    Hematuria 2016    eval at [facility] and per pt cysto and ct neg    HTN (hypertension)     lisinopril added 3/23    Intermittent asthma     since childhood    Low iron 10/2017    done for eval of hair loss, egd nl    Malignant neoplasm of upper-outer quadrant of right breast in female, estrogen receptor positive (H) 10/2019    had chemo, lumpectomy, onc is Dr. Monroe    Migraines     most of adult life, has seen neuro in past, on bblocker    Mild depression     Neuropathy due to chemotherapeutic drug (H24)     Normal stress echocardiogram 07/2011    due to hear racing    Osteopenia 2008    Other osteoporosis without current pathological fracture 2020    iv  "zometa every 6 months via onc    Syncope 2017    echo nl lv size and fxn, grade 1 dd, mild tr         PAST SURGICAL HISTORY:  Past Surgical History:   Procedure Laterality Date    BIOPSY NODE SENTINEL Right 2020    Procedure: RIGHT SENTINEL LYMPH NODE BIOPSY;  Surgeon: Kaila Hernandez MD;  Location:  OR    ESOPHAGOSCOPY, GASTROSCOPY, DUODENOSCOPY (EGD), COMBINED N/A 10/30/2017    Procedure: COMBINED ESOPHAGOSCOPY, GASTROSCOPY, DUODENOSCOPY (EGD), BIOPSY SINGLE OR MULTIPLE;  COMBINED ESOPHAGOSCOPY, GASTROSCOPY, DUODENOSCOPY (EGD);  Surgeon: Martin Rodriguez MD;  Location:  GI    INSERT PORT VASCULAR ACCESS N/A 10/24/2019    Procedure: PORT PLACEMENT;  Surgeon: Kaila Hernandez MD;  Location:  OR    LUMPECTOMY BREAST WITH SEED LOCALIZATION Right 2020    Procedure: SEED LOCALIZED RIGHT BREAST LUMPECTOMY WITH SEED LOCALIZED RIGHT AXILLARY NODE EXCISION;  Surgeon: Kaila Hernandez MD;  Location:  OR    ORTHOPEDIC SURGERY      \"leg surgery\"    REMOVE PORT VASCULAR ACCESS Left 2020    Procedure: REMOVAL, VASCULAR ACCESS PORT;  Surgeon: Kaila Hernandez MD;  Location:  OR    single oopherectomy  2010    cyst    ZZC TMJ ARTHROSCOPY/SURGERY           SOCIAL HISTORY:  Social History     Socioeconomic History    Marital status:      Spouse name: Not on file    Number of children: 2    Years of education: Not on file    Highest education level: Not on file   Occupational History    Not on file   Tobacco Use    Smoking status: Former     Current packs/day: 0.00     Types: Cigarettes     Quit date: 3/27/1997     Years since quittin.3    Smokeless tobacco: Never   Vaping Use    Vaping status: Never Used   Substance and Sexual Activity    Alcohol use: Yes     Comment: rarely    Drug use: No    Sexual activity: Yes     Partners: Male     Birth control/protection: Pill   Other Topics Concern    Parent/sibling w/ CABG, MI or angioplasty before 65F 55M? Yes   Social History Narrative    " Not on file     Social Determinants of Health     Financial Resource Strain: Not on file   Food Insecurity: Not on file   Transportation Needs: Not on file   Physical Activity: Not on file   Stress: Not on file   Social Connections: Not on file   Interpersonal Safety: Not on file   Housing Stability: Not on file         FAMILY HISTORY:  Family History   Problem Relation Age of Onset    Coronary Artery Disease Father 48        MI. and one in his 60s    Emphysema Mother         COPD         PHYSICAL EXAM:  Vital signs:  /85   Pulse 85   Resp 16   Wt 64.4 kg (142 lb)   LMP 10/07/2017 (Approximate)   SpO2 98%   BMI 22.92 kg/m    GENERAL/CONSTITUTIONAL: No acute distress.  EYES: No erythema or scleral icterus.  LYMPH: No cervical, supraclavicular, axillary, epitroclear adenopathy.   BREAST: The left breast droops slightly lower than right breast. No masses in either breast.  Right lumpectomy scar is softer compared to prior exam. Nipples are everted bilaterally with no discharge. No erythema, ulceration, or dimpling of the skin.  RESPIRATORY: No audible cough or wheezing.  GASTROINTESTINAL: No hepatosplenomegaly, masses, or tenderness. No guarding.  No distention.  MUSCULOSKELETAL: Warm and well-perfused, no cyanosis, clubbing, or edema.  NEUROLOGIC: No focal motor deficits. Alert, oriented, answers questions appropriately.  INTEGUMENTARY: No rashes or jaundice.  GAIT: Steady, does not use assistive device    LABS:  CBC RESULTS:   Recent Labs   Lab Test 08/14/23  0917   WBC 5.8   RBC 4.68   HGB 14.0   HCT 42.5   MCV 91   MCH 29.9   MCHC 32.9   RDW 11.8           Last Comprehensive Metabolic Panel:  Sodium   Date Value Ref Range Status   08/14/2023 139 136 - 145 mmol/L Final   10/20/2020 139 133 - 144 mmol/L Final     Potassium   Date Value Ref Range Status   08/14/2023 4.1 3.4 - 5.3 mmol/L Final   10/20/2020 4.2 3.4 - 5.3 mmol/L Final     Chloride   Date Value Ref Range Status   08/14/2023 102 98 - 107  mmol/L Final   10/20/2020 107 94 - 109 mmol/L Final     Carbon Dioxide   Date Value Ref Range Status   10/20/2020 29 20 - 32 mmol/L Final     Carbon Dioxide (CO2)   Date Value Ref Range Status   08/14/2023 26 22 - 29 mmol/L Final     Anion Gap   Date Value Ref Range Status   08/14/2023 11 7 - 15 mmol/L Final   10/20/2020 3 3 - 14 mmol/L Final     Glucose   Date Value Ref Range Status   08/14/2023 99 70 - 99 mg/dL Final   10/20/2020 78 70 - 99 mg/dL Final     Urea Nitrogen   Date Value Ref Range Status   08/14/2023 18.1 6.0 - 20.0 mg/dL Final   10/20/2020 13 7 - 30 mg/dL Final     Creatinine   Date Value Ref Range Status   08/14/2023 0.73 0.51 - 0.95 mg/dL Final   05/13/2021 0.76 0.52 - 1.04 mg/dL Final     GFR Estimate   Date Value Ref Range Status   08/14/2023 >90 >60 mL/min/1.73m2 Final   05/13/2021 87 >60 mL/min/[1.73_m2] Final     Comment:     Non  GFR Calc  Starting 12/18/2018, serum creatinine based estimated GFR (eGFR) will be   calculated using the Chronic Kidney Disease Epidemiology Collaboration   (CKD-EPI) equation.       Calcium   Date Value Ref Range Status   08/14/2023 9.7 8.6 - 10.0 mg/dL Final   05/13/2021 9.4 8.5 - 10.1 mg/dL Final     Bilirubin Total   Date Value Ref Range Status   08/14/2023 0.3 <=1.2 mg/dL Final   10/20/2020 0.2 0.2 - 1.3 mg/dL Final     Alkaline Phosphatase   Date Value Ref Range Status   08/14/2023 49 35 - 104 U/L Final   10/20/2020 76 40 - 150 U/L Final     ALT   Date Value Ref Range Status   08/14/2023 20 0 - 50 U/L Final     Comment:     Reference intervals for this test were updated on 6/12/2023 to more accurately reflect our healthy population. There may be differences in the flagging of prior results with similar values performed with this method. Interpretation of those prior results can be made in the context of the updated reference intervals.     10/20/2020 25 0 - 50 U/L Final     AST   Date Value Ref Range Status   08/14/2023 31 0 - 45 U/L Final      Comment:     Reference intervals for this test were updated on 6/12/2023 to more accurately reflect our healthy population. There may be differences in the flagging of prior results with similar values performed with this method. Interpretation of those prior results can be made in the context of the updated reference intervals.   10/20/2020 16 0 - 45 U/L Final       ASSESSMENT/PLAN:  Jyothi Freitas is a 58 year old female with the following issues:  1.  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 Left breast 0.4 cm enhancing focus  3. Polyarthralgias  --I personally reviewed her 7/31/2024 breast MRI which showed a 0.4 cm enhancing focus in the left breast. Will proceed with left breast U/S with possible biopsy.  --Jyothi is on letrozole and tolerating this overall better than anastrozole.  --I again recommended up to 10 years of letrozole.  --Will continue to alternate mammogram with breast MRI, staggered by 6 months. Next breast MRI due 6/2025 and mammogram 12/2024.      3. Osteoporosis/osteopenia  --DEXA scan results from 10/13/2020 showed osteoporosis.  --9/29/2022 DEXA showed improvement to osteopenia.   --She completed Zometa in 6/2023.  --Recommended adequate calcium and vitamin D, weight-bearing exercise.    --Will repeat DEXA scan 10/2/2024.    4.  Insomnia   5.  Hot flashes, drug induced  -Oxybutynin did help with hot flashes but she had too much dry eye syndrome and fluid retention.   -She has had significant improvement in her hot flashes and insomnia with the use of gabapentin, with less fatigue decreasing down to 200 mg daily.   -She is no longer using Ambien.    6. Ectasia of thoracic aorta  --This measured 4 cm on prior PET scan.    --Surgical management not needed but she will need ongoing monitoring. She is following with Dr. Silverio Gooden for monitoring of this  issue.    7. Peripheral neuropathy  --Hip pain has improved.  --Continue duloxetine at 60 mg once daily.    8. Hypertension  --Blood pressure stable on small dose of lisinopril.    9. Left kidney cystic lesion  --U/S showed this was benign simple cyst.      Return in 6 months.    Maricarmen Monroe MD  [facility] Hematology/Oncology     Total time spent today: 30 minutes in chart review, patient evaluation, counseling, documentation, test and/or medication/prescription orders, and coordination of care.    The longitudinal plan of care for the diagnosis(es)/condition(s) as documented were addressed during this visit. Due to the added complexity in care, I will continue to support Jyothi in the subsequent management and with ongoing continuity of care.

## 2024-07-31 ENCOUNTER — ANCILLARY PROCEDURE (OUTPATIENT)
Dept: MRI IMAGING | Facility: CLINIC | Age: 59
End: 2024-07-31
Attending: INTERNAL MEDICINE
Payer: COMMERCIAL

## 2024-07-31 DIAGNOSIS — Z17.0 MALIGNANT NEOPLASM OF UPPER-OUTER QUADRANT OF RIGHT BREAST IN FEMALE, ESTROGEN RECEPTOR POSITIVE (H): ICD-10-CM

## 2024-07-31 DIAGNOSIS — C50.411 MALIGNANT NEOPLASM OF UPPER-OUTER QUADRANT OF RIGHT BREAST IN FEMALE, ESTROGEN RECEPTOR POSITIVE (H): ICD-10-CM

## 2024-07-31 PROCEDURE — 77049 MRI BREAST C-+ W/CAD BI: CPT

## 2024-07-31 PROCEDURE — 255N000002 HC RX 255 OP 636: Performed by: INTERNAL MEDICINE

## 2024-07-31 PROCEDURE — A9585 GADOBUTROL INJECTION: HCPCS | Performed by: INTERNAL MEDICINE

## 2024-07-31 RX ORDER — GADOBUTROL 604.72 MG/ML
6.5 INJECTION INTRAVENOUS ONCE
Status: COMPLETED | OUTPATIENT
Start: 2024-07-31 | End: 2024-07-31

## 2024-07-31 RX ADMIN — GADOBUTROL 6.5 ML: 604.72 INJECTION INTRAVENOUS at 15:05

## 2024-08-01 DIAGNOSIS — Z17.0 MALIGNANT NEOPLASM OF RIGHT BREAST IN FEMALE, ESTROGEN RECEPTOR POSITIVE, UNSPECIFIED SITE OF BREAST (H): Primary | ICD-10-CM

## 2024-08-01 DIAGNOSIS — C50.911 MALIGNANT NEOPLASM OF RIGHT BREAST IN FEMALE, ESTROGEN RECEPTOR POSITIVE, UNSPECIFIED SITE OF BREAST (H): Primary | ICD-10-CM

## 2024-08-05 ENCOUNTER — ONCOLOGY VISIT (OUTPATIENT)
Dept: ONCOLOGY | Facility: CLINIC | Age: 59
End: 2024-08-05
Attending: INTERNAL MEDICINE
Payer: COMMERCIAL

## 2024-08-05 VITALS
WEIGHT: 142 LBS | RESPIRATION RATE: 16 BRPM | OXYGEN SATURATION: 98 % | SYSTOLIC BLOOD PRESSURE: 137 MMHG | BODY MASS INDEX: 22.92 KG/M2 | HEART RATE: 85 BPM | DIASTOLIC BLOOD PRESSURE: 85 MMHG

## 2024-08-05 DIAGNOSIS — M81.0 AGE-RELATED OSTEOPOROSIS WITHOUT CURRENT PATHOLOGICAL FRACTURE: ICD-10-CM

## 2024-08-05 DIAGNOSIS — T45.1X5A PERIPHERAL NEUROPATHY DUE TO CHEMOTHERAPY (H): ICD-10-CM

## 2024-08-05 DIAGNOSIS — N95.1 MENOPAUSAL SYNDROME (HOT FLASHES): ICD-10-CM

## 2024-08-05 DIAGNOSIS — G62.0 PERIPHERAL NEUROPATHY DUE TO CHEMOTHERAPY (H): ICD-10-CM

## 2024-08-05 DIAGNOSIS — C50.411 MALIGNANT NEOPLASM OF UPPER-OUTER QUADRANT OF RIGHT BREAST IN FEMALE, ESTROGEN RECEPTOR POSITIVE (H): Primary | ICD-10-CM

## 2024-08-05 DIAGNOSIS — Z17.0 MALIGNANT NEOPLASM OF UPPER-OUTER QUADRANT OF RIGHT BREAST IN FEMALE, ESTROGEN RECEPTOR POSITIVE (H): Primary | ICD-10-CM

## 2024-08-05 DIAGNOSIS — Z12.31 ENCOUNTER FOR SCREENING MAMMOGRAM FOR BREAST CANCER: ICD-10-CM

## 2024-08-05 PROCEDURE — 99214 OFFICE O/P EST MOD 30 MIN: CPT | Performed by: INTERNAL MEDICINE

## 2024-08-05 PROCEDURE — G2211 COMPLEX E/M VISIT ADD ON: HCPCS | Performed by: INTERNAL MEDICINE

## 2024-08-05 PROCEDURE — 99213 OFFICE O/P EST LOW 20 MIN: CPT | Performed by: INTERNAL MEDICINE

## 2024-08-05 ASSESSMENT — PAIN SCALES - GENERAL: PAINLEVEL: NO PAIN (0)

## 2024-08-05 NOTE — PROGRESS NOTES
"Oncology Rooming Note    August 5, 2024 3:39 PM   Jyothi Freitas is a 58 year old female who presents for:    Chief Complaint   Patient presents with    Oncology Clinic Visit     Initial Vitals: LMP 10/07/2017 (Approximate)  Estimated body mass index is 21.79 kg/m  as calculated from the following:    Height as of 3/5/24: 1.676 m (5' 6\").    Weight as of 3/5/24: 61.2 kg (135 lb). There is no height or weight on file to calculate BSA.  Data Unavailable Comment: Data Unavailable   Patient's last menstrual period was 10/07/2017 (approximate).  Allergies reviewed: Yes  Medications reviewed: Yes    Medications: Medication refills not needed today.  Pharmacy name entered into [employer]: [id number] - [place], [place] - [address]    Frailty Screening:   Is the patient here for a new oncology consult visit in cancer care? 2. No      Clinical concerns:   doctor was notified.      Brenda Cifuentes CMA              "

## 2024-08-05 NOTE — LETTER
8/5/2024      Jyothi Freitas  [address] [facility] [address]      Dear Colleague,    Thank you for referring your patient, Jyothi Freitas, to the [facility]. Please see a copy of my visit note below.    [facility]    Hematology/Oncology Established Patient Follow-up Note      Today's Date: 8/5/2024    Reason for Follow-up: Right breast cancer.    HISTORY OF PRESENT ILLNESS: Jyothi Freitas is a 58 year old female who presents with the following oncologic history:   1.  10/11/2019: Diagnostic right sided mammogram performed for a palpable lump in the right breast.  This showed an irregularly-shaped spiculated mass in the upper outer right breast with prominent lymph nodes in the right axilla.  Targeted ultrasound of the right upper outer breast showed an irregularly-shaped hypoechoic mass at the 10 o'clock position, 4 cm from the nipple measuring 2.6 x 1.5 x 2.4 cm.  Right axillary ultrasound showed moderately enlarged lymph nodes.  Right breast needle biopsy showed a grade 3 invasive ductal carcinoma with lymphovascular invasion identified, ER strongly positive at 95%, DC strongly positive at 95%, HER-2/juan FISH negative.  Right axillary lymph node measuring 2 cm was positive for metastatic carcinoma.  Note prior 4/2019 mammogram deemed normal.  2.  10/15/2019: Bilateral breast MRI showed known right breast malignancy measuring 2.6 x 1.4 x 2 cm, 3 mildly enlarged right axillary lymph nodes and no contralateral breast malignancy.  3. 10/21/2019 PET scan showed scattered small hypermetabolic bilateral axillary lymph nodes; for example, a hypermetabolic right axillary lymph node measures 1.6 x 0.9 cm with SUV max 3.6.  Hypermetabolic mass in the right breast superiorly and laterally measuring 2.1 x 1.6 cm with SUV max 4.3.  A 0.6 cm low-density lesion in the right hepatic lobe is too small for accurate PET characterization but shows no appreciable hypermetabolic  activity.  Incidentally noted ectasia of the ascending thoracic aorta measures 4 cm in diameter.  4.  10/23/2019: Left axillary ultrasound showed benign-appearing lymph node.  Left axillary lymph node biopsy showed benign lymph node tissue.  5.  10/29/2019: Started neoadjuvant chemotherapy with dose dense Adriamycin and Cytoxan, followed by weekly paclitaxel with completion on 3/12/2020.  6.  3/19/2020: Breast MRI showed no residual enhancement in the right breast mass.  The right axillary lymphadenopathy has resolved.  7. 4/01/2020: Underwent right breast lumpectomy and right axillary sentinel lymph node biopsy under care of Dr. Kaila Hernandez.  Pathology showed fibrocystic change and no evidence of residual carcinoma in the right breast.  Metastatic breast adenocarcinoma to one of 5 lymph node fragments in 1 of 3 lymph nodes excised. Extranodal extension present. ypT0-N1a.  8. 4/29/2020: Started adjuvant anastrozole.  9. 6/15/2020: Completed adjuvant radiation therapy.  10. 6/15/2020: Switched to adjuvant letrozole with some mild improvement in polyathralgias.    INTERVAL HISTORY:  Jyothi reports some increase in hot flashes. Her peripheral neuropathy is still well controlled on a lower dose of gabapentin 200 mg daily and duloxetine 60 mg daily.      REVIEW OF SYSTEMS:   14 point ROS was reviewed and is negative other than as noted above in HPI.       HOME MEDICATIONS:  Current Outpatient Medications   Medication Sig Dispense Refill     acetaminophen (TYLENOL) 500 MG tablet Take 1,000 mg by mouth every 6 hours as needed for mild pain       acetaminophen-caffeine (EXCEDRIN TENSION HEADACHE) 500-65 MG TABS Take 2 tablets by mouth every 6 hours as needed for mild pain       albuterol (PROAIR HFA/PROVENTIL HFA/VENTOLIN HFA) 108 (90 Base) MCG/ACT inhaler Inhale 2 puffs into the lungs every 6 hours as needed for shortness of breath / dyspnea or wheezing 18 g 0     Calcium Carb-Cholecalciferol (CALCIUM 500 + D PO) Take by  mouth 2 times daily       dexAMETHasone (DECADRON) 1 MG tablet Take 1 tablet (1 mg) by mouth 2 times daily (with meals) 1 tablet 0     DULoxetine (CYMBALTA) 20 MG capsule Take 3 capsules (60 mg) by mouth daily 270 capsule 3     gabapentin (NEURONTIN) 100 MG capsule 200 mg daily 360 capsule 3     letrozole (FEMARA) 2.5 MG tablet Take 1 tablet (2.5 mg) by mouth daily 90 tablet 3     lisinopril (ZESTRIL) 2.5 MG tablet TAKE 1 TABLET BY MOUTH EVERY DAY 90 tablet 1     lisinopril (ZESTRIL) 5 MG tablet Take 1 tablet (5 mg) by mouth daily 90 tablet 3     zoledronic acid (ZOMETA) 4 MG/100ML SOLN Inject 4 mg into the vein every 6 months       ZOLMitriptan (ZOMIG) 2.5 MG tablet Take 1 tablet (2.5 mg) by mouth at onset of headache for migraine May repeat in 2 hours. Max 4 tablets/24 hours. 10 tablet 1     ZOLMitriptan (ZOMIG) 5 MG tablet TAKE 1 TABLET (5 MG) BY MOUTH AT ONSET OF HEADACHE FOR MIGRAINE MAY REPEAT IN 2 HR. MAX 2 TABS/24 HR 6 tablet 3         ALLERGIES:  Allergies   Allergen Reactions     Sulfa Antibiotics Other (See Comments)     Red face. Ringing in the ears.         PAST MEDICAL HISTORY:  Past Medical History:   Diagnosis Date     Adrenal hyperplasia (H24) 01/22/2024    incidental finding, then functional tests nl     Ascending aorta dilatation (H24) 07/2024    4.15, incidental finding on coronary CT scan, prior seen on pet/ct 2019 4cm     Encounter for screening for coronary artery disease 07/2024    calcium score of 0     GERD (gastroesophageal reflux disease)      H/O colonoscopy 03/08/2016    done at [place] and nl     Hematuria 2016    eval at [place] and per pt cysto and ct neg     HTN (hypertension)     lisinopril added 3/23     Intermittent asthma     since childhood     Low iron 10/2017    done for eval of hair loss, egd nl     Malignant neoplasm of upper-outer quadrant of right breast in female, estrogen receptor positive (H) 10/2019    had chemo, lumpectomy, onc is Dr. Monroe     Migraines     most of adult  "life, has seen neuro in past, on bblocker     Mild depression      Neuropathy due to chemotherapeutic drug (H24)      Normal stress echocardiogram 2011    due to hear racing     Osteopenia      Other osteoporosis without current pathological fracture     iv zometa every 6 months via onc     Syncope 2017    echo nl lv size and fxn, grade 1 dd, mild tr         PAST SURGICAL HISTORY:  Past Surgical History:   Procedure Laterality Date     BIOPSY NODE SENTINEL Right 2020    Procedure: RIGHT SENTINEL LYMPH NODE BIOPSY;  Surgeon: Kaila Hernandez MD;  Location:  OR     ESOPHAGOSCOPY, GASTROSCOPY, DUODENOSCOPY (EGD), COMBINED N/A 10/30/2017    Procedure: COMBINED ESOPHAGOSCOPY, GASTROSCOPY, DUODENOSCOPY (EGD), BIOPSY SINGLE OR MULTIPLE;  COMBINED ESOPHAGOSCOPY, GASTROSCOPY, DUODENOSCOPY (EGD);  Surgeon: Martin Rodriguez MD;  Location:  GI     INSERT PORT VASCULAR ACCESS N/A 10/24/2019    Procedure: PORT PLACEMENT;  Surgeon: Kaila Hernandez MD;  Location:  OR     LUMPECTOMY BREAST WITH SEED LOCALIZATION Right 2020    Procedure: SEED LOCALIZED RIGHT BREAST LUMPECTOMY WITH SEED LOCALIZED RIGHT AXILLARY NODE EXCISION;  Surgeon: Kaila Hernandez MD;  Location:  OR     ORTHOPEDIC SURGERY      \"leg surgery\"     REMOVE PORT VASCULAR ACCESS Left 2020    Procedure: REMOVAL, VASCULAR ACCESS PORT;  Surgeon: Kaila Hernandez MD;  Location:  OR     single oopherectomy      cyst     ZZC TMJ ARTHROSCOPY/SURGERY           SOCIAL HISTORY:  Social History     Socioeconomic History     Marital status:      Spouse name: Not on file     Number of children: 2     Years of education: Not on file     Highest education level: Not on file   Occupational History     Not on file   Tobacco Use     Smoking status: Former     Current packs/day: 0.00     Types: Cigarettes     Quit date: 3/27/1997     Years since quittin.3     Smokeless tobacco: Never   Vaping Use     Vaping status: Never Used "   Substance and Sexual Activity     Alcohol use: Yes     Comment: rarely     Drug use: No     Sexual activity: Yes     Partners: Male     Birth control/protection: Pill   Other Topics Concern     Parent/sibling w/ CABG, MI or angioplasty before 65F 55M? Yes   Social History Narrative     Not on file     Social Determinants of Health     Financial Resource Strain: Not on file   Food Insecurity: Not on file   Transportation Needs: Not on file   Physical Activity: Not on file   Stress: Not on file   Social Connections: Not on file   Interpersonal Safety: Not on file   Housing Stability: Not on file         FAMILY HISTORY:  Family History   Problem Relation Age of Onset     Coronary Artery Disease Father 48        MI. and one in his 60s     Emphysema Mother         COPD         PHYSICAL EXAM:  Vital signs:  /85   Pulse 85   Resp 16   Wt 64.4 kg (142 lb)   LMP 10/07/2017 (Approximate)   SpO2 98%   BMI 22.92 kg/m    GENERAL/CONSTITUTIONAL: No acute distress.  EYES: No erythema or scleral icterus.  LYMPH: No cervical, supraclavicular, axillary, epitroclear adenopathy.   BREAST: The left breast droops slightly lower than right breast. No masses in either breast.  Right lumpectomy scar is softer compared to prior exam. Nipples are everted bilaterally with no discharge. No erythema, ulceration, or dimpling of the skin.  RESPIRATORY: No audible cough or wheezing.  GASTROINTESTINAL: No hepatosplenomegaly, masses, or tenderness. No guarding.  No distention.  MUSCULOSKELETAL: Warm and well-perfused, no cyanosis, clubbing, or edema.  NEUROLOGIC: No focal motor deficits. Alert, oriented, answers questions appropriately.  INTEGUMENTARY: No rashes or jaundice.  GAIT: Steady, does not use assistive device    LABS:  CBC RESULTS:   Recent Labs   Lab Test 08/14/23  0917   WBC 5.8   RBC 4.68   HGB 14.0   HCT 42.5   MCV 91   MCH 29.9   MCHC 32.9   RDW 11.8           Last Comprehensive Metabolic Panel:  Sodium   Date  Value Ref Range Status   08/14/2023 139 136 - 145 mmol/L Final   10/20/2020 139 133 - 144 mmol/L Final     Potassium   Date Value Ref Range Status   08/14/2023 4.1 3.4 - 5.3 mmol/L Final   10/20/2020 4.2 3.4 - 5.3 mmol/L Final     Chloride   Date Value Ref Range Status   08/14/2023 102 98 - 107 mmol/L Final   10/20/2020 107 94 - 109 mmol/L Final     Carbon Dioxide   Date Value Ref Range Status   10/20/2020 29 20 - 32 mmol/L Final     Carbon Dioxide (CO2)   Date Value Ref Range Status   08/14/2023 26 22 - 29 mmol/L Final     Anion Gap   Date Value Ref Range Status   08/14/2023 11 7 - 15 mmol/L Final   10/20/2020 3 3 - 14 mmol/L Final     Glucose   Date Value Ref Range Status   08/14/2023 99 70 - 99 mg/dL Final   10/20/2020 78 70 - 99 mg/dL Final     Urea Nitrogen   Date Value Ref Range Status   08/14/2023 18.1 6.0 - 20.0 mg/dL Final   10/20/2020 13 7 - 30 mg/dL Final     Creatinine   Date Value Ref Range Status   08/14/2023 0.73 0.51 - 0.95 mg/dL Final   05/13/2021 0.76 0.52 - 1.04 mg/dL Final     GFR Estimate   Date Value Ref Range Status   08/14/2023 >90 >60 mL/min/1.73m2 Final   05/13/2021 87 >60 mL/min/[1.73_m2] Final     Comment:     Non  GFR Calc  Starting 12/18/2018, serum creatinine based estimated GFR (eGFR) will be   calculated using the Chronic Kidney Disease Epidemiology Collaboration   (CKD-EPI) equation.       Calcium   Date Value Ref Range Status   08/14/2023 9.7 8.6 - 10.0 mg/dL Final   05/13/2021 9.4 8.5 - 10.1 mg/dL Final     Bilirubin Total   Date Value Ref Range Status   08/14/2023 0.3 <=1.2 mg/dL Final   10/20/2020 0.2 0.2 - 1.3 mg/dL Final     Alkaline Phosphatase   Date Value Ref Range Status   08/14/2023 49 35 - 104 U/L Final   10/20/2020 76 40 - 150 U/L Final     ALT   Date Value Ref Range Status   08/14/2023 20 0 - 50 U/L Final     Comment:     Reference intervals for this test were updated on 6/12/2023 to more accurately reflect our healthy population. There may be  differences in the flagging of prior results with similar values performed with this method. Interpretation of those prior results can be made in the context of the updated reference intervals.     10/20/2020 25 0 - 50 U/L Final     AST   Date Value Ref Range Status   08/14/2023 31 0 - 45 U/L Final     Comment:     Reference intervals for this test were updated on 6/12/2023 to more accurately reflect our healthy population. There may be differences in the flagging of prior results with similar values performed with this method. Interpretation of those prior results can be made in the context of the updated reference intervals.   10/20/2020 16 0 - 45 U/L Final       ASSESSMENT/PLAN:  Jyothi Freitas is a 58 year old female with the following issues:  1.  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 Left breast 0.4 cm enhancing focus  3. Polyarthralgias  --I personally reviewed her 7/31/2024 breast MRI which showed a 0.4 cm enhancing focus in the left breast. Will proceed with left breast U/S with possible biopsy.  --Jyothi is on letrozole and tolerating this overall better than anastrozole.  --I again recommended up to 10 years of letrozole.  --Will continue to alternate mammogram with breast MRI, staggered by 6 months. Next breast MRI due 6/2025 and mammogram 12/2024.      3. Osteoporosis/osteopenia  --DEXA scan results from 10/13/2020 showed osteoporosis.  --9/29/2022 DEXA showed improvement to osteopenia.   --She completed Zometa in 6/2023.  --Recommended adequate calcium and vitamin D, weight-bearing exercise.    --Will repeat DEXA scan 10/2/2024.    4.  Insomnia   5.  Hot flashes, drug induced  -Oxybutynin did help with hot flashes but she had too much dry eye syndrome and fluid retention.   -She has had significant improvement in her hot flashes and insomnia with the use of  "gabapentin, with less fatigue decreasing down to 200 mg daily.   -She is no longer using Ambien.    6. Ectasia of thoracic aorta  --This measured 4 cm on prior PET scan.    --Surgical management not needed but she will need ongoing monitoring. She is following with Dr. Silverio Gooden for monitoring of this issue.    7. Peripheral neuropathy  --Hip pain has improved.  --Continue duloxetine at 60 mg once daily.    8. Hypertension  --Blood pressure stable on small dose of lisinopril.    9. Left kidney cystic lesion  --U/S showed this was benign simple cyst.      Return in 6 months.    Maricarmen Monroe MD  [facility] Hematology/Oncology     Total time spent today: 30 minutes in chart review, patient evaluation, counseling, documentation, test and/or medication/prescription orders, and coordination of care.    The longitudinal plan of care for the diagnosis(es)/condition(s) as documented were addressed during this visit. Due to the added complexity in care, I will continue to support Jyothi in the subsequent management and with ongoing continuity of care.      Oncology Rooming Note    August 5, 2024 3:39 PM   Jyothi Freitas is a 58 year old female who presents for:    Chief Complaint   Patient presents with     Oncology Clinic Visit     Initial Vitals: LMP 10/07/2017 (Approximate)  Estimated body mass index is 21.79 kg/m  as calculated from the following:    Height as of 3/5/24: 1.676 m (5' 6\").    Weight as of 3/5/24: 61.2 kg (135 lb). There is no height or weight on file to calculate BSA.  Data Unavailable Comment: Data Unavailable   Patient's last menstrual period was 10/07/2017 (approximate).  Allergies reviewed: Yes  Medications reviewed: Yes    Medications: Medication refills not needed today.  Pharmacy name entered into [facility]: [id number] - [place], [place] - [address]    Frailty Screening:   Is the patient here for a new oncology consult visit in cancer care? 2. No      Clinical concerns:   doctor was " notified.      Brenda Cifuentes CMA                Again, thank you for allowing me to participate in the care of your patient.        Sincerely,        Maricarmen Monroe MD

## 2024-08-07 ENCOUNTER — HOSPITAL ENCOUNTER (OUTPATIENT)
Dept: MAMMOGRAPHY | Facility: CLINIC | Age: 59
Discharge: HOME | End: 2024-08-07
Attending: INTERNAL MEDICINE
Payer: COMMERCIAL

## 2024-08-07 DIAGNOSIS — Z17.0 MALIGNANT NEOPLASM OF RIGHT BREAST IN FEMALE, ESTROGEN RECEPTOR POSITIVE, UNSPECIFIED SITE OF BREAST (H): ICD-10-CM

## 2024-08-07 DIAGNOSIS — C50.911 MALIGNANT NEOPLASM OF RIGHT BREAST IN FEMALE, ESTROGEN RECEPTOR POSITIVE, UNSPECIFIED SITE OF BREAST (H): ICD-10-CM

## 2024-08-07 PROCEDURE — 76642 ULTRASOUND BREAST LIMITED: CPT | Mod: LT

## 2024-08-07 PROCEDURE — 88305 TISSUE EXAM BY PATHOLOGIST: CPT | Mod: TC | Performed by: INTERNAL MEDICINE

## 2024-08-07 PROCEDURE — 272N000615 US BREAST BIOPSY CORE NEEDLE LEFT

## 2024-08-07 PROCEDURE — 250N000009 HC RX 250: Performed by: INTERNAL MEDICINE

## 2024-08-07 PROCEDURE — 999N000065 MA POST PROCEDURE LEFT

## 2024-08-07 RX ADMIN — LIDOCAINE HYDROCHLORIDE 5 ML: 10 INJECTION, SOLUTION INFILTRATION; PERINEURAL at 08:31

## 2024-08-07 NOTE — DISCHARGE INSTRUCTIONS
After Your Breast Biopsy  Bleeding, bruising, and pain  Breast tenderness and some bruising is normal and may last several days. You may wear your bra overnight to support the breast.  You may use an ice pack for pain. Place it over the area for 15 to 20 minutes, several times a day.  You may take over-the-counter pain medicine:  On the day of the biopsy, we recommend Tylenol (acetaminophen) because it does not raise your risk of bleeding.  The next day, you may take an anti-inflammatory medicine (aspirin, ibuprofen, Motrin, Aleve, Advil), unless your doctor tells you not to.  Bandages and showering  Keep your bandage in place until tomorrow morning. Don't get it wet.  If you have small pieces of tape on the skin, leave them in place. They will fall off on their own, or you can remove them after 5 days.  You may shower the next morning after your biopsy.  Activity  No heavy activity (no running, no gym workouts, no lifting, no vacuuming, etc.) on the day of your biopsy.  You may go back to normal activity the next day. But limit what you do if you still have pain or discomfort.  Infection  Infection is rare. Signs of infection include:  Fever (including sweats and chills)  Redness  Pain that gets worse  Fluid draining from the biopsy site  Biopsy results  Results may take up to 5 business days.  A nurse or doctor from the Breast Center will call with your results. We will also send the results to the doctor that ordered your biopsy.  If you have not gotten your results in 5 days, please call the Breast Center.  Call the Breast Center with questions or if:   You have bleeding that lasts more than 20 minutes.  You have pain that you can't control.  You have signs of infection (fever, sweats, chills, redness, increasing pain, or drainage).  After hours, please call the doctor who ordered your biopsy.  For informational purposes only. Not to replace the advice of your health care provider. Copyright   2010 [facility]. All rights reserved. Clinically reviewed by Heydi Meyer, Director, [facility]. [employer] [id number] - REV 08/23.

## 2024-08-08 ENCOUNTER — MYC MEDICAL ADVICE (OUTPATIENT)
Dept: ONCOLOGY | Facility: CLINIC | Age: 59
End: 2024-08-08
Payer: COMMERCIAL

## 2024-08-08 ENCOUNTER — TELEPHONE (OUTPATIENT)
Dept: MAMMOGRAPHY | Facility: CLINIC | Age: 59
End: 2024-08-08
Payer: COMMERCIAL

## 2024-08-08 DIAGNOSIS — Z17.0 MALIGNANT NEOPLASM OF UPPER-OUTER QUADRANT OF RIGHT BREAST IN FEMALE, ESTROGEN RECEPTOR POSITIVE (H): Primary | ICD-10-CM

## 2024-08-08 DIAGNOSIS — C50.411 MALIGNANT NEOPLASM OF UPPER-OUTER QUADRANT OF RIGHT BREAST IN FEMALE, ESTROGEN RECEPTOR POSITIVE (H): Primary | ICD-10-CM

## 2024-08-08 DIAGNOSIS — R92.8 ABNORMAL MAGNETIC RESONANCE IMAGING OF LEFT BREAST: ICD-10-CM

## 2024-08-08 LAB
PATH REPORT.COMMENTS IMP SPEC: NORMAL
PATH REPORT.FINAL DX SPEC: NORMAL
PATH REPORT.GROSS SPEC: NORMAL
PATH REPORT.MICROSCOPIC SPEC OTHER STN: NORMAL
PHOTO IMAGE: NORMAL

## 2024-08-08 PROCEDURE — 88305 TISSUE EXAM BY PATHOLOGIST: CPT | Mod: 26 | Performed by: PATHOLOGY

## 2024-08-08 NOTE — TELEPHONE ENCOUNTER
Call placed to Jyothi.   verified.     Jyothi was notified that pathology results from her 2024 LEFT BREAST BIOPSY revealed:    [facility]  Jyothi Freitas [id number]  F, 1965  Surgical Pathology Report (Final result) [id number]  Authorizing Provider: Maricarmen Monroe MD Ordering Provider: Maricarmen Monroe MD  Ordering Location: [facility]  Collected: 2024 08:34 AM  Pathologist: Aleksandr Oliver MD Received: 2024 09:46 AM  .  Specimens  A Breast, Left  .  .  Final Diagnosis  Breast, left, 11:00, 2 cm from nipple, biopsy-  Fibrocystic change, proliferative type, no evidence of malignancy  Electronically signed by Aleksandr Oliver MD on 2024 at 11:27 AM       Per [facility] Radiologist, Dr. Constantin King, results are concordant with imaging findings.     Recommendation: 6 month follow up Breast MRI     Jyothi denies any concerns with the biopsy site. Ordering provider was informed of the results and follow up plan.  I encouraged her to contact her doctor with any further breast concerns.  Patient verbalized understanding and agrees with the plan of care.        Cleo Neil RN BSN  Procedure Nurse  [facility]  [phone]

## 2024-08-20 ENCOUNTER — MYC MEDICAL ADVICE (OUTPATIENT)
Dept: FAMILY MEDICINE | Facility: CLINIC | Age: 59
End: 2024-08-20
Payer: COMMERCIAL

## 2024-08-26 ENCOUNTER — PATIENT OUTREACH (OUTPATIENT)
Dept: CARE COORDINATION | Facility: CLINIC | Age: 59
End: 2024-08-26
Payer: COMMERCIAL

## 2024-09-09 ENCOUNTER — PATIENT OUTREACH (OUTPATIENT)
Dept: CARE COORDINATION | Facility: CLINIC | Age: 59
End: 2024-09-09
Payer: COMMERCIAL

## 2024-10-02 ENCOUNTER — TRANSFERRED RECORDS (OUTPATIENT)
Dept: HEALTH INFORMATION MANAGEMENT | Facility: CLINIC | Age: 59
End: 2024-10-02
Payer: COMMERCIAL

## 2024-10-07 ENCOUNTER — TRANSFERRED RECORDS (OUTPATIENT)
Dept: HEALTH INFORMATION MANAGEMENT | Facility: CLINIC | Age: 59
End: 2024-10-07
Payer: COMMERCIAL

## 2024-10-15 ENCOUNTER — TRANSFERRED RECORDS (OUTPATIENT)
Dept: HEALTH INFORMATION MANAGEMENT | Facility: CLINIC | Age: 59
End: 2024-10-15
Payer: COMMERCIAL

## 2024-10-28 ENCOUNTER — HOSPITAL ENCOUNTER (OUTPATIENT)
Dept: BONE DENSITY | Facility: CLINIC | Age: 59
Discharge: HOME | End: 2024-10-28
Attending: INTERNAL MEDICINE | Admitting: INTERNAL MEDICINE
Payer: COMMERCIAL

## 2024-10-28 DIAGNOSIS — M81.0 AGE-RELATED OSTEOPOROSIS WITHOUT CURRENT PATHOLOGICAL FRACTURE: ICD-10-CM

## 2024-10-28 PROCEDURE — 77080 DXA BONE DENSITY AXIAL: CPT

## 2024-11-19 ENCOUNTER — PATIENT OUTREACH (OUTPATIENT)
Dept: CARE COORDINATION | Facility: CLINIC | Age: 59
End: 2024-11-19
Payer: COMMERCIAL

## 2024-12-12 ENCOUNTER — PATIENT OUTREACH (OUTPATIENT)
Dept: CARE COORDINATION | Facility: CLINIC | Age: 59
End: 2024-12-12
Payer: COMMERCIAL

## 2024-12-22 ENCOUNTER — HEALTH MAINTENANCE LETTER (OUTPATIENT)
Age: 59
End: 2024-12-22

## 2024-12-31 ENCOUNTER — E-VISIT (OUTPATIENT)
Dept: URGENT CARE | Facility: CLINIC | Age: 59
End: 2024-12-31
Payer: COMMERCIAL

## 2024-12-31 ENCOUNTER — MYC MEDICAL ADVICE (OUTPATIENT)
Dept: FAMILY MEDICINE | Facility: CLINIC | Age: 59
End: 2024-12-31
Payer: COMMERCIAL

## 2024-12-31 DIAGNOSIS — J11.1 INFLUENZA: Primary | ICD-10-CM

## 2024-12-31 RX ORDER — OSELTAMIVIR PHOSPHATE 75 MG/1
75 CAPSULE ORAL 2 TIMES DAILY
Qty: 10 CAPSULE | Refills: 0 | Status: SHIPPED | OUTPATIENT
Start: 2024-12-31 | End: 2025-01-05

## 2024-12-31 NOTE — PATIENT INSTRUCTIONS
Thank you for choosing us for your care. I have placed an order for a prescription so that you can start treatment. View your full visit summary for details by clicking on the link below. Your pharmacist will able to address any questions you may have about the medication.     If you're not feeling better within 5-7 days, please schedule an appointment.  You can schedule an appointment right here in [facility], or call [phone]  If the visit is for the same symptoms as your eVisit, we'll refund the cost of your eVisit if seen within seven days.

## 2025-02-03 DIAGNOSIS — C50.411 MALIGNANT NEOPLASM OF UPPER-OUTER QUADRANT OF RIGHT BREAST IN FEMALE, ESTROGEN RECEPTOR POSITIVE (H): ICD-10-CM

## 2025-02-03 DIAGNOSIS — Z17.0 MALIGNANT NEOPLASM OF UPPER-OUTER QUADRANT OF RIGHT BREAST IN FEMALE, ESTROGEN RECEPTOR POSITIVE (H): ICD-10-CM

## 2025-02-03 RX ORDER — LETROZOLE 2.5 MG/1
2.5 TABLET, FILM COATED ORAL DAILY
Qty: 90 TABLET | Refills: 3 | Status: SHIPPED | OUTPATIENT
Start: 2025-02-03

## 2025-02-03 NOTE — PROGRESS NOTES
[facility]    Hematology/Oncology Established Patient Follow-up Note      Today's Date: 2/12/2025    Reason for Follow-up: Right breast cancer.    HISTORY OF PRESENT ILLNESS: Jyothi Freitas is a 59 year old female who presents with the following oncologic history:   1.  10/11/2019: Diagnostic right sided mammogram performed for a palpable lump in the right breast.  This showed an irregularly-shaped spiculated mass in the upper outer right breast with prominent lymph nodes in the right axilla.  Targeted ultrasound of the right upper outer breast showed an irregularly-shaped hypoechoic mass at the 10 o'clock position, 4 cm from the nipple measuring 2.6 x 1.5 x 2.4 cm.  Right axillary ultrasound showed moderately enlarged lymph nodes.  Right breast needle biopsy showed a grade 3 invasive ductal carcinoma with lymphovascular invasion identified, ER strongly positive at 95%, MS strongly positive at 95%, HER-2/juan FISH negative.  Right axillary lymph node measuring 2 cm was positive for metastatic carcinoma.  Note prior 4/2019 mammogram deemed normal.  2.  10/15/2019: Bilateral breast MRI showed known right breast malignancy measuring 2.6 x 1.4 x 2 cm, 3 mildly enlarged right axillary lymph nodes and no contralateral breast malignancy.  3. 10/21/2019 PET scan showed scattered small hypermetabolic bilateral axillary lymph nodes; for example, a hypermetabolic right axillary lymph node measures 1.6 x 0.9 cm with SUV max 3.6.  Hypermetabolic mass in the right breast superiorly and laterally measuring 2.1 x 1.6 cm with SUV max 4.3.  A 0.6 cm low-density lesion in the right hepatic lobe is too small for accurate PET characterization but shows no appreciable hypermetabolic activity.  Incidentally noted ectasia of the ascending thoracic aorta measures 4 cm in diameter.  4.  10/23/2019: Left axillary ultrasound showed benign-appearing lymph node.  Left axillary lymph node biopsy showed benign lymph node tissue.  5.   10/29/2019: Started neoadjuvant chemotherapy with dose dense Adriamycin and Cytoxan, followed by weekly paclitaxel with completion on 3/12/2020.  6.  3/19/2020: Breast MRI showed no residual enhancement in the right breast mass.  The right axillary lymphadenopathy has resolved.  7. 4/01/2020: Underwent right breast lumpectomy and right axillary sentinel lymph node biopsy under care of Dr. Kaila Hernandez.  Pathology showed fibrocystic change and no evidence of residual carcinoma in the right breast.  Metastatic breast adenocarcinoma to one of 5 lymph node fragments in 1 of 3 lymph nodes excised. Extranodal extension present. ypT0-N1a.  8. 4/29/2020: Started adjuvant anastrozole.  9. 6/15/2020: Completed adjuvant radiation therapy.  10. 6/15/2020: Switched to adjuvant letrozole with some mild improvement in polyathralgias.    INTERVAL HISTORY:  Jyothi reports feeling overall well. Hot flashes are manageable. She describes tolerable neuropathic limb discomfort.      REVIEW OF SYSTEMS:   14 point ROS was reviewed and is negative other than as noted above in HPI.       HOME MEDICATIONS:  Current Outpatient Medications   Medication Sig Dispense Refill    acetaminophen (TYLENOL) 500 MG tablet Take 1,000 mg by mouth every 6 hours as needed for mild pain      acetaminophen-caffeine (EXCEDRIN TENSION HEADACHE) 500-65 MG TABS Take 2 tablets by mouth every 6 hours as needed for mild pain      albuterol (PROAIR HFA/PROVENTIL HFA/VENTOLIN HFA) 108 (90 Base) MCG/ACT inhaler Inhale 2 puffs into the lungs every 6 hours as needed for shortness of breath / dyspnea or wheezing 18 g 0    Calcium Carb-Cholecalciferol (CALCIUM 500 + D PO) Take by mouth 2 times daily      DULoxetine (CYMBALTA) 20 MG capsule Take 3 capsules (60 mg) by mouth daily 270 capsule 3    gabapentin (NEURONTIN) 100 MG capsule 200 mg daily 360 capsule 3    letrozole (FEMARA) 2.5 MG tablet Take 1 tablet (2.5 mg) by mouth daily. 90 tablet 3    lisinopril (ZESTRIL) 2.5  MG tablet TAKE 1 TABLET BY MOUTH EVERY DAY (Patient not taking: Reported on 8/5/2024) 90 tablet 1    lisinopril (ZESTRIL) 5 MG tablet Take 1 tablet (5 mg) by mouth daily 90 tablet 3    zoledronic acid (ZOMETA) 4 MG/100ML SOLN Inject 4 mg into the vein every 6 months      ZOLMitriptan (ZOMIG) 2.5 MG tablet Take 1 tablet (2.5 mg) by mouth at onset of headache for migraine May repeat in 2 hours. Max 4 tablets/24 hours. (Patient not taking: Reported on 8/5/2024) 10 tablet 1    ZOLMitriptan (ZOMIG) 5 MG tablet TAKE 1 TABLET (5 MG) BY MOUTH AT ONSET OF HEADACHE FOR MIGRAINE MAY REPEAT IN 2 HR. MAX 2 TABS/24 HR 6 tablet 3         ALLERGIES:  Allergies   Allergen Reactions    Sulfa Antibiotics Other (See Comments)     Red face. Ringing in the ears.         PAST MEDICAL HISTORY:  Past Medical History:   Diagnosis Date    Adrenal hyperplasia 01/22/2024    incidental finding, then functional tests nl    Ascending aorta dilatation 07/2024    4.15, incidental finding on coronary CT scan, prior seen on pet/ct 2019 4cm    Encounter for screening for coronary artery disease 07/2024    calcium score of 0    GERD (gastroesophageal reflux disease)     H/O colonoscopy 03/08/2016    done at [place] and nl    Hematuria 2016    eval at [place] and per pt cysto and ct neg    HTN (hypertension)     lisinopril added 3/23    Intermittent asthma     since childhood    Low iron 10/2017    done for eval of hair loss, egd nl    Malignant neoplasm of upper-outer quadrant of right breast in female, estrogen receptor positive (H) 10/2019    had chemo, lumpectomy, onc is Dr. Monroe    Migraines     most of adult life, has seen neuro in past, on bblocker    Mild depression     Neuropathy due to chemotherapeutic drug     Normal stress echocardiogram 07/2011    due to hear racing    Osteopenia 2008    Other osteoporosis without current pathological fracture 2020    iv zometa every 6 months via onc    Syncope 03/2017    echo nl lv size and fxn, grade 1 dd, mild  "tr         PAST SURGICAL HISTORY:  Past Surgical History:   Procedure Laterality Date    BIOPSY NODE SENTINEL Right 2020    Procedure: RIGHT SENTINEL LYMPH NODE BIOPSY;  Surgeon: Kaila Hernandez MD;  Location:  OR    ESOPHAGOSCOPY, GASTROSCOPY, DUODENOSCOPY (EGD), COMBINED N/A 10/30/2017    Procedure: COMBINED ESOPHAGOSCOPY, GASTROSCOPY, DUODENOSCOPY (EGD), BIOPSY SINGLE OR MULTIPLE;  COMBINED ESOPHAGOSCOPY, GASTROSCOPY, DUODENOSCOPY (EGD);  Surgeon: Martin Rodriguez MD;  Location:  GI    INSERT PORT VASCULAR ACCESS N/A 10/24/2019    Procedure: PORT PLACEMENT;  Surgeon: Kaila Hernandez MD;  Location:  OR    LUMPECTOMY BREAST WITH SEED LOCALIZATION Right 2020    Procedure: SEED LOCALIZED RIGHT BREAST LUMPECTOMY WITH SEED LOCALIZED RIGHT AXILLARY NODE EXCISION;  Surgeon: Kaila Hernandez MD;  Location:  OR    ORTHOPEDIC SURGERY      \"leg surgery\"    REMOVE PORT VASCULAR ACCESS Left 2020    Procedure: REMOVAL, VASCULAR ACCESS PORT;  Surgeon: Kaila Hernandez MD;  Location:  OR    single oopherectomy      cyst    ZZC TMJ ARTHROSCOPY/SURGERY           SOCIAL HISTORY:  Social History     Socioeconomic History    Marital status:      Spouse name: Not on file    Number of children: 2    Years of education: Not on file    Highest education level: Not on file   Occupational History   Tobacco Use    Smoking status: Former     Current packs/day: 0.00     Types: Cigarettes     Quit date: 3/27/1997     Years since quittin.8    Smokeless tobacco: Never   Vaping Use    Vaping status: Never Used   Substance and Sexual Activity    Alcohol use: Yes     Comment: rarely    Drug use: No    Sexual activity: Yes     Partners: Male     Birth control/protection: Pill   Other Topics Concern    Parent/sibling w/ CABG, MI or angioplasty before 65F 55M? Yes   Social History Narrative    Not on file     Social Drivers of Health     Financial Resource Strain: Not on file   Food Insecurity: Not on " "file   Transportation Needs: Not on file   Physical Activity: Not on file   Stress: Not on file   Social Connections: Not on file   Interpersonal Safety: Not on file   Housing Stability: Not on file         FAMILY HISTORY:  Family History   Problem Relation Age of Onset    Coronary Artery Disease Father 48        MI. and one in his 60s    Emphysema Mother         COPD         PHYSICAL EXAM:  Vital signs:  /83   Pulse 89   Resp 16   Ht 1.676 m (5' 6\")   Wt 64 kg (141 lb)   LMP 10/07/2017 (Approximate)   SpO2 100%   BMI 22.76 kg/m    GENERAL/CONSTITUTIONAL: No acute distress.  EYES: No erythema or scleral icterus.  LYMPH: No cervical, supraclavicular, axillary, epitroclear adenopathy.   BREAST: The left breast droops slightly lower than right breast. No masses in either breast.  Right lumpectomy scar is softer compared to prior exam. Nipples are everted bilaterally with no discharge. No erythema, ulceration, or dimpling of the skin.  RESPIRATORY: No audible cough or wheezing.  GASTROINTESTINAL: No hepatosplenomegaly, masses, or tenderness. No guarding.  No distention.  MUSCULOSKELETAL: Warm and well-perfused, no cyanosis, clubbing, or edema.  NEUROLOGIC: No focal motor deficits. Alert, oriented, answers questions appropriately.  INTEGUMENTARY: No rashes or jaundice.  GAIT: Steady, does not use assistive device    LABS:  CBC RESULTS:   Recent Labs   Lab Test 08/14/23  0917   WBC 5.8   RBC 4.68   HGB 14.0   HCT 42.5   MCV 91   MCH 29.9   MCHC 32.9   RDW 11.8           Last Comprehensive Metabolic Panel:  Sodium   Date Value Ref Range Status   08/14/2023 139 136 - 145 mmol/L Final   10/20/2020 139 133 - 144 mmol/L Final     Potassium   Date Value Ref Range Status   08/14/2023 4.1 3.4 - 5.3 mmol/L Final   10/20/2020 4.2 3.4 - 5.3 mmol/L Final     Chloride   Date Value Ref Range Status   08/14/2023 102 98 - 107 mmol/L Final   10/20/2020 107 94 - 109 mmol/L Final     Carbon Dioxide   Date Value Ref Range " Status   10/20/2020 29 20 - 32 mmol/L Final     Carbon Dioxide (CO2)   Date Value Ref Range Status   08/14/2023 26 22 - 29 mmol/L Final     Anion Gap   Date Value Ref Range Status   08/14/2023 11 7 - 15 mmol/L Final   10/20/2020 3 3 - 14 mmol/L Final     Glucose   Date Value Ref Range Status   08/14/2023 99 70 - 99 mg/dL Final   10/20/2020 78 70 - 99 mg/dL Final     Urea Nitrogen   Date Value Ref Range Status   08/14/2023 18.1 6.0 - 20.0 mg/dL Final   10/20/2020 13 7 - 30 mg/dL Final     Creatinine   Date Value Ref Range Status   08/14/2023 0.73 0.51 - 0.95 mg/dL Final   05/13/2021 0.76 0.52 - 1.04 mg/dL Final     GFR Estimate   Date Value Ref Range Status   08/14/2023 >90 >60 mL/min/1.73m2 Final   05/13/2021 87 >60 mL/min/[1.73_m2] Final     Comment:     Non  GFR Calc  Starting 12/18/2018, serum creatinine based estimated GFR (eGFR) will be   calculated using the Chronic Kidney Disease Epidemiology Collaboration   (CKD-EPI) equation.       Calcium   Date Value Ref Range Status   08/14/2023 9.7 8.6 - 10.0 mg/dL Final   05/13/2021 9.4 8.5 - 10.1 mg/dL Final     Bilirubin Total   Date Value Ref Range Status   08/14/2023 0.3 <=1.2 mg/dL Final   10/20/2020 0.2 0.2 - 1.3 mg/dL Final     Alkaline Phosphatase   Date Value Ref Range Status   08/14/2023 49 35 - 104 U/L Final   10/20/2020 76 40 - 150 U/L Final     ALT   Date Value Ref Range Status   08/14/2023 20 0 - 50 U/L Final     Comment:     Reference intervals for this test were updated on 6/12/2023 to more accurately reflect our healthy population. There may be differences in the flagging of prior results with similar values performed with this method. Interpretation of those prior results can be made in the context of the updated reference intervals.     10/20/2020 25 0 - 50 U/L Final     AST   Date Value Ref Range Status   08/14/2023 31 0 - 45 U/L Final     Comment:     Reference intervals for this test were updated on 6/12/2023 to more accurately  reflect our healthy population. There may be differences in the flagging of prior results with similar values performed with this method. Interpretation of those prior results can be made in the context of the updated reference intervals.   10/20/2020 16 0 - 45 U/L Final       ASSESSMENT/PLAN:  Jyothi Freitas is a 59 year old female with the following issues:  1.  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 Polyarthralgias  --I personally reviewed her 7/31/2024 breast MRI which showed a 0.4 cm enhancing focus in the left breast. Will proceed with left breast U/S with possible biopsy.  --Jyothi is on letrozole and tolerating this overall better than anastrozole.  --I again recommended up to 10 years of letrozole.  --Will continue to alternate mammogram with breast MRI, staggered by 6 months. Next follow-up breast MRI due this month and mammogram 12/2025.      3. Osteoporosis/osteopenia  --DEXA scan results from 10/13/2020 showed osteoporosis.  --9/29/2022 DEXA showed improvement to osteopenia.   --She completed Zometa in 6/2023.  --10/28/2024 DEXA showed osteopenia with 1.5% increase in lumbar spine BMD and 1.6% increase in bilateral hip BMD.  --Recommended adequate calcium and vitamin D, weight-bearing exercise.    --Will repeat DEXA scan in 10/2026.    4.  Insomnia   5.  Hot flashes, drug induced  -Oxybutynin did help with hot flashes but she had too much dry eye syndrome and fluid retention.   -She has had significant improvement in her hot flashes and insomnia with the use of gabapentin, with less fatigue decreasing down to 200 mg daily.   -She is no longer using Ambien.    6. Ectasia of thoracic aorta  --This measured 4 cm on prior PET scan.    --Surgical management not needed but she will need ongoing monitoring. She is following with Dr. Silverio Gooden for monitoring of this  issue.    7. Peripheral neuropathy  --Hip pain has improved.  --Continue duloxetine at 60 mg once daily.    8. Hypertension  --Blood pressure remains stable on small dose of lisinopril.    9. Left kidney cystic lesion  --U/S showed this was benign simple cyst.    Return in 6 months.    Maricarmen Monroe MD  [facility] Hematology/Oncology     Total time spent today: 30 minutes in chart review, patient evaluation, counseling, documentation, test and/or medication/prescription orders, and coordination of care.    The longitudinal plan of care for the diagnosis(es)/condition(s) as documented were addressed during this visit. Due to the added complexity in care, I will continue to support Jyothi in the subsequent management and with ongoing continuity of care.

## 2025-02-12 ENCOUNTER — ONCOLOGY VISIT (OUTPATIENT)
Dept: ONCOLOGY | Facility: CLINIC | Age: 60
End: 2025-02-12
Attending: INTERNAL MEDICINE
Payer: COMMERCIAL

## 2025-02-12 ENCOUNTER — TRANSFERRED RECORDS (OUTPATIENT)
Dept: HEALTH INFORMATION MANAGEMENT | Facility: CLINIC | Age: 60
End: 2025-02-12

## 2025-02-12 VITALS
DIASTOLIC BLOOD PRESSURE: 83 MMHG | BODY MASS INDEX: 22.66 KG/M2 | OXYGEN SATURATION: 100 % | HEART RATE: 89 BPM | HEIGHT: 66 IN | WEIGHT: 141 LBS | SYSTOLIC BLOOD PRESSURE: 124 MMHG | RESPIRATION RATE: 16 BRPM

## 2025-02-12 DIAGNOSIS — G62.0 PERIPHERAL NEUROPATHY DUE TO CHEMOTHERAPY: ICD-10-CM

## 2025-02-12 DIAGNOSIS — C50.411 MALIGNANT NEOPLASM OF UPPER-OUTER QUADRANT OF RIGHT BREAST IN FEMALE, ESTROGEN RECEPTOR POSITIVE (H): Primary | ICD-10-CM

## 2025-02-12 DIAGNOSIS — Z17.0 MALIGNANT NEOPLASM OF UPPER-OUTER QUADRANT OF RIGHT BREAST IN FEMALE, ESTROGEN RECEPTOR POSITIVE (H): Primary | ICD-10-CM

## 2025-02-12 DIAGNOSIS — N95.1 MENOPAUSAL SYNDROME (HOT FLASHES): ICD-10-CM

## 2025-02-12 DIAGNOSIS — M81.0 AGE-RELATED OSTEOPOROSIS WITHOUT CURRENT PATHOLOGICAL FRACTURE: ICD-10-CM

## 2025-02-12 DIAGNOSIS — T45.1X5A PERIPHERAL NEUROPATHY DUE TO CHEMOTHERAPY: ICD-10-CM

## 2025-02-12 PROCEDURE — G2211 COMPLEX E/M VISIT ADD ON: HCPCS | Performed by: INTERNAL MEDICINE

## 2025-02-12 PROCEDURE — 99214 OFFICE O/P EST MOD 30 MIN: CPT | Performed by: INTERNAL MEDICINE

## 2025-02-12 PROCEDURE — 99213 OFFICE O/P EST LOW 20 MIN: CPT | Performed by: INTERNAL MEDICINE

## 2025-02-12 RX ORDER — UBROGEPANT 100 MG/1
TABLET ORAL
COMMUNITY
Start: 2024-10-02

## 2025-02-12 ASSESSMENT — PAIN SCALES - GENERAL: PAINLEVEL_OUTOF10: NO PAIN (0)

## 2025-02-12 NOTE — LETTER
2/12/2025      Jyothi Freitas  [address] [facility] [address]      Dear Colleague,    Thank you for referring your patient, Jyothi Freitas, to the [facility]. Please see a copy of my visit note below.    [facility]    Hematology/Oncology Established Patient Follow-up Note      Today's Date: 2/12/2025    Reason for Follow-up: Right breast cancer.    HISTORY OF PRESENT ILLNESS: Jyothi Freitas is a 59 year old female who presents with the following oncologic history:   1.  10/11/2019: Diagnostic right sided mammogram performed for a palpable lump in the right breast.  This showed an irregularly-shaped spiculated mass in the upper outer right breast with prominent lymph nodes in the right axilla.  Targeted ultrasound of the right upper outer breast showed an irregularly-shaped hypoechoic mass at the 10 o'clock position, 4 cm from the nipple measuring 2.6 x 1.5 x 2.4 cm.  Right axillary ultrasound showed moderately enlarged lymph nodes.  Right breast needle biopsy showed a grade 3 invasive ductal carcinoma with lymphovascular invasion identified, ER strongly positive at 95%, MI strongly positive at 95%, HER-2/juan FISH negative.  Right axillary lymph node measuring 2 cm was positive for metastatic carcinoma.  Note prior 4/2019 mammogram deemed normal.  2.  10/15/2019: Bilateral breast MRI showed known right breast malignancy measuring 2.6 x 1.4 x 2 cm, 3 mildly enlarged right axillary lymph nodes and no contralateral breast malignancy.  3. 10/21/2019 PET scan showed scattered small hypermetabolic bilateral axillary lymph nodes; for example, a hypermetabolic right axillary lymph node measures 1.6 x 0.9 cm with SUV max 3.6.  Hypermetabolic mass in the right breast superiorly and laterally measuring 2.1 x 1.6 cm with SUV max 4.3.  A 0.6 cm low-density lesion in the right hepatic lobe is too small for accurate PET characterization but shows no appreciable hypermetabolic  activity.  Incidentally noted ectasia of the ascending thoracic aorta measures 4 cm in diameter.  4.  10/23/2019: Left axillary ultrasound showed benign-appearing lymph node.  Left axillary lymph node biopsy showed benign lymph node tissue.  5.  10/29/2019: Started neoadjuvant chemotherapy with dose dense Adriamycin and Cytoxan, followed by weekly paclitaxel with completion on 3/12/2020.  6.  3/19/2020: Breast MRI showed no residual enhancement in the right breast mass.  The right axillary lymphadenopathy has resolved.  7. 4/01/2020: Underwent right breast lumpectomy and right axillary sentinel lymph node biopsy under care of Dr. Kaila Hernandez.  Pathology showed fibrocystic change and no evidence of residual carcinoma in the right breast.  Metastatic breast adenocarcinoma to one of 5 lymph node fragments in 1 of 3 lymph nodes excised. Extranodal extension present. ypT0-N1a.  8. 4/29/2020: Started adjuvant anastrozole.  9. 6/15/2020: Completed adjuvant radiation therapy.  10. 6/15/2020: Switched to adjuvant letrozole with some mild improvement in polyathralgias.    INTERVAL HISTORY:  Jyothi reports feeling overall well. Hot flashes are manageable. She describes tolerable neuropathic limb discomfort.      REVIEW OF SYSTEMS:   14 point ROS was reviewed and is negative other than as noted above in HPI.       HOME MEDICATIONS:  Current Outpatient Medications   Medication Sig Dispense Refill     acetaminophen (TYLENOL) 500 MG tablet Take 1,000 mg by mouth every 6 hours as needed for mild pain       acetaminophen-caffeine (EXCEDRIN TENSION HEADACHE) 500-65 MG TABS Take 2 tablets by mouth every 6 hours as needed for mild pain       albuterol (PROAIR HFA/PROVENTIL HFA/VENTOLIN HFA) 108 (90 Base) MCG/ACT inhaler Inhale 2 puffs into the lungs every 6 hours as needed for shortness of breath / dyspnea or wheezing 18 g 0     Calcium Carb-Cholecalciferol (CALCIUM 500 + D PO) Take by mouth 2 times daily       DULoxetine (CYMBALTA)  20 MG capsule Take 3 capsules (60 mg) by mouth daily 270 capsule 3     gabapentin (NEURONTIN) 100 MG capsule 200 mg daily 360 capsule 3     letrozole (FEMARA) 2.5 MG tablet Take 1 tablet (2.5 mg) by mouth daily. 90 tablet 3     lisinopril (ZESTRIL) 2.5 MG tablet TAKE 1 TABLET BY MOUTH EVERY DAY (Patient not taking: Reported on 8/5/2024) 90 tablet 1     lisinopril (ZESTRIL) 5 MG tablet Take 1 tablet (5 mg) by mouth daily 90 tablet 3     zoledronic acid (ZOMETA) 4 MG/100ML SOLN Inject 4 mg into the vein every 6 months       ZOLMitriptan (ZOMIG) 2.5 MG tablet Take 1 tablet (2.5 mg) by mouth at onset of headache for migraine May repeat in 2 hours. Max 4 tablets/24 hours. (Patient not taking: Reported on 8/5/2024) 10 tablet 1     ZOLMitriptan (ZOMIG) 5 MG tablet TAKE 1 TABLET (5 MG) BY MOUTH AT ONSET OF HEADACHE FOR MIGRAINE MAY REPEAT IN 2 HR. MAX 2 TABS/24 HR 6 tablet 3         ALLERGIES:  Allergies   Allergen Reactions     Sulfa Antibiotics Other (See Comments)     Red face. Ringing in the ears.         PAST MEDICAL HISTORY:  Past Medical History:   Diagnosis Date     Adrenal hyperplasia 01/22/2024    incidental finding, then functional tests nl     Ascending aorta dilatation 07/2024    4.15, incidental finding on coronary CT scan, prior seen on pet/ct 2019 4cm     Encounter for screening for coronary artery disease 07/2024    calcium score of 0     GERD (gastroesophageal reflux disease)      H/O colonoscopy 03/08/2016    done at [place] and nl     Hematuria 2016    eval at [place] and per pt cysto and ct neg     HTN (hypertension)     lisinopril added 3/23     Intermittent asthma     since childhood     Low iron 10/2017    done for eval of hair loss, egd nl     Malignant neoplasm of upper-outer quadrant of right breast in female, estrogen receptor positive (H) 10/2019    had chemo, lumpectomy, onc is Dr. Monroe     Migraines     most of adult life, has seen neuro in past, on bblocker     Mild depression      Neuropathy due  "to chemotherapeutic drug      Normal stress echocardiogram 2011    due to hear racing     Osteopenia      Other osteoporosis without current pathological fracture     iv zometa every 6 months via onc     Syncope 2017    echo nl lv size and fxn, grade 1 dd, mild tr         PAST SURGICAL HISTORY:  Past Surgical History:   Procedure Laterality Date     BIOPSY NODE SENTINEL Right 2020    Procedure: RIGHT SENTINEL LYMPH NODE BIOPSY;  Surgeon: Kaila Hernandez MD;  Location:  OR     ESOPHAGOSCOPY, GASTROSCOPY, DUODENOSCOPY (EGD), COMBINED N/A 10/30/2017    Procedure: COMBINED ESOPHAGOSCOPY, GASTROSCOPY, DUODENOSCOPY (EGD), BIOPSY SINGLE OR MULTIPLE;  COMBINED ESOPHAGOSCOPY, GASTROSCOPY, DUODENOSCOPY (EGD);  Surgeon: Martin Rodriguez MD;  Location:  GI     INSERT PORT VASCULAR ACCESS N/A 10/24/2019    Procedure: PORT PLACEMENT;  Surgeon: Kaila Hernandez MD;  Location:  OR     LUMPECTOMY BREAST WITH SEED LOCALIZATION Right 2020    Procedure: SEED LOCALIZED RIGHT BREAST LUMPECTOMY WITH SEED LOCALIZED RIGHT AXILLARY NODE EXCISION;  Surgeon: Kaila Hernandez MD;  Location:  OR     ORTHOPEDIC SURGERY      \"leg surgery\"     REMOVE PORT VASCULAR ACCESS Left 2020    Procedure: REMOVAL, VASCULAR ACCESS PORT;  Surgeon: Kaila Hernandez MD;  Location:  OR     single oopherectomy  2010    cyst     ZZC TMJ ARTHROSCOPY/SURGERY           SOCIAL HISTORY:  Social History     Socioeconomic History     Marital status:      Spouse name: Not on file     Number of children: 2     Years of education: Not on file     Highest education level: Not on file   Occupational History   Tobacco Use     Smoking status: Former     Current packs/day: 0.00     Types: Cigarettes     Quit date: 3/27/1997     Years since quittin.8     Smokeless tobacco: Never   Vaping Use     Vaping status: Never Used   Substance and Sexual Activity     Alcohol use: Yes     Comment: rarely     Drug use: No     Sexual " "activity: Yes     Partners: Male     Birth control/protection: Pill   Other Topics Concern     Parent/sibling w/ CABG, MI or angioplasty before 65F 55M? Yes   Social History Narrative     Not on file     Social Drivers of Health     Financial Resource Strain: Not on file   Food Insecurity: Not on file   Transportation Needs: Not on file   Physical Activity: Not on file   Stress: Not on file   Social Connections: Not on file   Interpersonal Safety: Not on file   Housing Stability: Not on file         FAMILY HISTORY:  Family History   Problem Relation Age of Onset     Coronary Artery Disease Father 48        MI. and one in his 60s     Emphysema Mother         COPD         PHYSICAL EXAM:  Vital signs:  /83   Pulse 89   Resp 16   Ht 1.676 m (5' 6\")   Wt 64 kg (141 lb)   LMP 10/07/2017 (Approximate)   SpO2 100%   BMI 22.76 kg/m    GENERAL/CONSTITUTIONAL: No acute distress.  EYES: No erythema or scleral icterus.  LYMPH: No cervical, supraclavicular, axillary, epitroclear adenopathy.   BREAST: The left breast droops slightly lower than right breast. No masses in either breast.  Right lumpectomy scar is softer compared to prior exam. Nipples are everted bilaterally with no discharge. No erythema, ulceration, or dimpling of the skin.  RESPIRATORY: No audible cough or wheezing.  GASTROINTESTINAL: No hepatosplenomegaly, masses, or tenderness. No guarding.  No distention.  MUSCULOSKELETAL: Warm and well-perfused, no cyanosis, clubbing, or edema.  NEUROLOGIC: No focal motor deficits. Alert, oriented, answers questions appropriately.  INTEGUMENTARY: No rashes or jaundice.  GAIT: Steady, does not use assistive device    LABS:  CBC RESULTS:   Recent Labs   Lab Test 08/14/23  0917   WBC 5.8   RBC 4.68   HGB 14.0   HCT 42.5   MCV 91   MCH 29.9   MCHC 32.9   RDW 11.8           Last Comprehensive Metabolic Panel:  Sodium   Date Value Ref Range Status   08/14/2023 139 136 - 145 mmol/L Final   10/20/2020 139 133 - 144 " mmol/L Final     Potassium   Date Value Ref Range Status   08/14/2023 4.1 3.4 - 5.3 mmol/L Final   10/20/2020 4.2 3.4 - 5.3 mmol/L Final     Chloride   Date Value Ref Range Status   08/14/2023 102 98 - 107 mmol/L Final   10/20/2020 107 94 - 109 mmol/L Final     Carbon Dioxide   Date Value Ref Range Status   10/20/2020 29 20 - 32 mmol/L Final     Carbon Dioxide (CO2)   Date Value Ref Range Status   08/14/2023 26 22 - 29 mmol/L Final     Anion Gap   Date Value Ref Range Status   08/14/2023 11 7 - 15 mmol/L Final   10/20/2020 3 3 - 14 mmol/L Final     Glucose   Date Value Ref Range Status   08/14/2023 99 70 - 99 mg/dL Final   10/20/2020 78 70 - 99 mg/dL Final     Urea Nitrogen   Date Value Ref Range Status   08/14/2023 18.1 6.0 - 20.0 mg/dL Final   10/20/2020 13 7 - 30 mg/dL Final     Creatinine   Date Value Ref Range Status   08/14/2023 0.73 0.51 - 0.95 mg/dL Final   05/13/2021 0.76 0.52 - 1.04 mg/dL Final     GFR Estimate   Date Value Ref Range Status   08/14/2023 >90 >60 mL/min/1.73m2 Final   05/13/2021 87 >60 mL/min/[1.73_m2] Final     Comment:     Non  GFR Calc  Starting 12/18/2018, serum creatinine based estimated GFR (eGFR) will be   calculated using the Chronic Kidney Disease Epidemiology Collaboration   (CKD-EPI) equation.       Calcium   Date Value Ref Range Status   08/14/2023 9.7 8.6 - 10.0 mg/dL Final   05/13/2021 9.4 8.5 - 10.1 mg/dL Final     Bilirubin Total   Date Value Ref Range Status   08/14/2023 0.3 <=1.2 mg/dL Final   10/20/2020 0.2 0.2 - 1.3 mg/dL Final     Alkaline Phosphatase   Date Value Ref Range Status   08/14/2023 49 35 - 104 U/L Final   10/20/2020 76 40 - 150 U/L Final     ALT   Date Value Ref Range Status   08/14/2023 20 0 - 50 U/L Final     Comment:     Reference intervals for this test were updated on 6/12/2023 to more accurately reflect our healthy population. There may be differences in the flagging of prior results with similar values performed with this method.  Interpretation of those prior results can be made in the context of the updated reference intervals.     10/20/2020 25 0 - 50 U/L Final     AST   Date Value Ref Range Status   08/14/2023 31 0 - 45 U/L Final     Comment:     Reference intervals for this test were updated on 6/12/2023 to more accurately reflect our healthy population. There may be differences in the flagging of prior results with similar values performed with this method. Interpretation of those prior results can be made in the context of the updated reference intervals.   10/20/2020 16 0 - 45 U/L Final       ASSESSMENT/PLAN:  Jyothi Freitas is a 59 year old female with the following issues:  1.  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 Polyarthralgias  --I personally reviewed her 7/31/2024 breast MRI which showed a 0.4 cm enhancing focus in the left breast. Will proceed with left breast U/S with possible biopsy.  --Jyothi is on letrozole and tolerating this overall better than anastrozole.  --I again recommended up to 10 years of letrozole.  --Will continue to alternate mammogram with breast MRI, staggered by 6 months. Next follow-up breast MRI due this month and mammogram 12/2025.      3. Osteoporosis/osteopenia  --DEXA scan results from 10/13/2020 showed osteoporosis.  --9/29/2022 DEXA showed improvement to osteopenia.   --She completed Zometa in 6/2023.  --10/28/2024 DEXA showed osteopenia with 1.5% increase in lumbar spine BMD and 1.6% increase in bilateral hip BMD.  --Recommended adequate calcium and vitamin D, weight-bearing exercise.    --Will repeat DEXA scan in 10/2026.    4.  Insomnia   5.  Hot flashes, drug induced  -Oxybutynin did help with hot flashes but she had too much dry eye syndrome and fluid retention.   -She has had significant improvement in her hot flashes and insomnia with the use of  "gabapentin, with less fatigue decreasing down to 200 mg daily.   -She is no longer using Ambien.    6. Ectasia of thoracic aorta  --This measured 4 cm on prior PET scan.    --Surgical management not needed but she will need ongoing monitoring. She is following with Dr. Silverio Gooden for monitoring of this issue.    7. Peripheral neuropathy  --Hip pain has improved.  --Continue duloxetine at 60 mg once daily.    8. Hypertension  --Blood pressure remains stable on small dose of lisinopril.    9. Left kidney cystic lesion  --U/S showed this was benign simple cyst.    Return in 6 months.    Maricarmen Monroe MD  [facility] Hematology/Oncology     Total time spent today: 30 minutes in chart review, patient evaluation, counseling, documentation, test and/or medication/prescription orders, and coordination of care.    The longitudinal plan of care for the diagnosis(es)/condition(s) as documented were addressed during this visit. Due to the added complexity in care, I will continue to support Jyothi in the subsequent management and with ongoing continuity of care.      Oncology Rooming Note    February 12, 2025 3:11 PM   Jyothi Freitas is a 59 year old female who presents for:    Chief Complaint   Patient presents with     Oncology Clinic Visit     Initial Vitals: Resp 16   Ht 1.676 m (5' 6\")   Wt 64 kg (141 lb)   LMP 10/07/2017 (Approximate)   BMI 22.76 kg/m   Estimated body mass index is 22.76 kg/m  as calculated from the following:    Height as of this encounter: 1.676 m (5' 6\").    Weight as of this encounter: 64 kg (141 lb). Body surface area is 1.73 meters squared.  No Pain (0) Comment: Data Unavailable   Patient's last menstrual period was 10/07/2017 (approximate).  Allergies reviewed: Yes  Medications reviewed: Yes    Medications: Medication refills not needed today.  Pharmacy name entered into [employer]:    [id number] [place] - [address] [facility] [place] - [address] [facility] [address]    Frailty Screening:   Is the patient here for a new oncology consult visit in cancer care? 2. No    PHQ9:  Did this patient require a PHQ9?: Yes   If the patient required a PHQ9 assessment, did the results require a follow up with the Provider/Nurse?: No            Kacie Wright MA              Again, thank you for allowing me to participate in the care of your patient.        Sincerely,        Maricarmen Monroe MD    Electronically signed

## 2025-02-12 NOTE — PROGRESS NOTES
"Oncology Rooming Note    February 12, 2025 3:11 PM   Jyothi Freitas is a 59 year old female who presents for:    Chief Complaint   Patient presents with    Oncology Clinic Visit     Initial Vitals: Resp 16   Ht 1.676 m (5' 6\")   Wt 64 kg (141 lb)   LMP 10/07/2017 (Approximate)   BMI 22.76 kg/m   Estimated body mass index is 22.76 kg/m  as calculated from the following:    Height as of this encounter: 1.676 m (5' 6\").    Weight as of this encounter: 64 kg (141 lb). Body surface area is 1.73 meters squared.  No Pain (0) Comment: Data Unavailable   Patient's last menstrual period was 10/07/2017 (approximate).  Allergies reviewed: Yes  Medications reviewed: Yes    Medications: Medication refills not needed today.  Pharmacy name entered into [employer]:    [id number] [place] - [address] [facility] [facility], [place] - [address] [address]    Frailty Screening:   Is the patient here for a new oncology consult visit in cancer care? 2. No    PHQ9:  Did this patient require a PHQ9?: Yes   If the patient required a PHQ9 assessment, did the results require a follow up with the Provider/Nurse?: No            Kacie Wright MA            "

## 2025-02-13 ENCOUNTER — TELEPHONE (OUTPATIENT)
Dept: ONCOLOGY | Facility: CLINIC | Age: 60
End: 2025-02-13
Payer: COMMERCIAL

## 2025-02-21 ENCOUNTER — HOSPITAL ENCOUNTER (OUTPATIENT)
Dept: MRI IMAGING | Facility: CLINIC | Age: 60
Discharge: HOME | End: 2025-02-21
Attending: INTERNAL MEDICINE | Admitting: INTERNAL MEDICINE
Payer: COMMERCIAL

## 2025-02-21 DIAGNOSIS — R92.8 ABNORMAL MAGNETIC RESONANCE IMAGING OF LEFT BREAST: ICD-10-CM

## 2025-02-21 DIAGNOSIS — C50.411 MALIGNANT NEOPLASM OF UPPER-OUTER QUADRANT OF RIGHT BREAST IN FEMALE, ESTROGEN RECEPTOR POSITIVE (H): ICD-10-CM

## 2025-02-21 DIAGNOSIS — Z17.0 MALIGNANT NEOPLASM OF UPPER-OUTER QUADRANT OF RIGHT BREAST IN FEMALE, ESTROGEN RECEPTOR POSITIVE (H): ICD-10-CM

## 2025-02-21 PROCEDURE — 77049 MRI BREAST C-+ W/CAD BI: CPT

## 2025-02-21 PROCEDURE — 255N000002 HC RX 255 OP 636: Performed by: INTERNAL MEDICINE

## 2025-02-21 PROCEDURE — A9585 GADOBUTROL INJECTION: HCPCS | Performed by: INTERNAL MEDICINE

## 2025-02-21 RX ORDER — GADOBUTROL 604.72 MG/ML
6.5 INJECTION INTRAVENOUS ONCE
Status: COMPLETED | OUTPATIENT
Start: 2025-02-21 | End: 2025-02-21

## 2025-02-21 RX ADMIN — GADOBUTROL 6.5 ML: 604.72 INJECTION INTRAVENOUS at 14:15

## 2025-02-23 ENCOUNTER — HEALTH MAINTENANCE LETTER (OUTPATIENT)
Age: 60
End: 2025-02-23

## 2025-03-21 ENCOUNTER — TRANSFERRED RECORDS (OUTPATIENT)
Dept: HEALTH INFORMATION MANAGEMENT | Facility: CLINIC | Age: 60
End: 2025-03-21
Payer: COMMERCIAL

## 2025-05-29 ENCOUNTER — RESULTS FOLLOW-UP (OUTPATIENT)
Dept: FAMILY MEDICINE | Facility: CLINIC | Age: 60
End: 2025-05-29

## 2025-05-29 ENCOUNTER — OFFICE VISIT (OUTPATIENT)
Dept: FAMILY MEDICINE | Facility: CLINIC | Age: 60
End: 2025-05-29
Payer: COMMERCIAL

## 2025-05-29 VITALS
HEART RATE: 75 BPM | OXYGEN SATURATION: 95 % | BODY MASS INDEX: 22.66 KG/M2 | TEMPERATURE: 98 F | RESPIRATION RATE: 16 BRPM | HEIGHT: 66 IN | DIASTOLIC BLOOD PRESSURE: 81 MMHG | WEIGHT: 141 LBS | SYSTOLIC BLOOD PRESSURE: 120 MMHG

## 2025-05-29 DIAGNOSIS — G62.0 NEUROPATHY DUE TO CHEMOTHERAPEUTIC DRUG: ICD-10-CM

## 2025-05-29 DIAGNOSIS — T45.1X5A NEUROPATHY DUE TO CHEMOTHERAPEUTIC DRUG: ICD-10-CM

## 2025-05-29 DIAGNOSIS — I77.810 ASCENDING AORTA DILATATION: ICD-10-CM

## 2025-05-29 DIAGNOSIS — C77.3 SECONDARY AND UNSPECIFIED MALIGNANT NEOPLASM OF AXILLA AND UPPER LIMB LYMPH NODES (H): Primary | ICD-10-CM

## 2025-05-29 DIAGNOSIS — M81.0 AGE-RELATED OSTEOPOROSIS WITHOUT CURRENT PATHOLOGICAL FRACTURE: ICD-10-CM

## 2025-05-29 DIAGNOSIS — I10 PRIMARY HYPERTENSION: ICD-10-CM

## 2025-05-29 DIAGNOSIS — C50.911 MALIGNANT NEOPLASM OF RIGHT FEMALE BREAST, UNSPECIFIED ESTROGEN RECEPTOR STATUS, UNSPECIFIED SITE OF BREAST (H): ICD-10-CM

## 2025-05-29 PROBLEM — M81.8 OTHER OSTEOPOROSIS WITHOUT CURRENT PATHOLOGICAL FRACTURE: Status: RESOLVED | Noted: 2020-01-01 | Resolved: 2025-05-29

## 2025-05-29 PROBLEM — R31.0 GROSS HEMATURIA: Status: RESOLVED | Noted: 2024-01-16 | Resolved: 2025-05-29

## 2025-05-29 LAB
ALBUMIN SERPL BCG-MCNC: 4.2 G/DL (ref 3.5–5.2)
ALP SERPL-CCNC: 64 U/L (ref 40–150)
ALT SERPL W P-5'-P-CCNC: 23 U/L (ref 0–50)
ANION GAP SERPL CALCULATED.3IONS-SCNC: 12 MMOL/L (ref 7–15)
AST SERPL W P-5'-P-CCNC: 27 U/L (ref 0–45)
BILIRUB SERPL-MCNC: 0.3 MG/DL
BUN SERPL-MCNC: 15.5 MG/DL (ref 8–23)
CALCIUM SERPL-MCNC: 9 MG/DL (ref 8.8–10.4)
CHLORIDE SERPL-SCNC: 105 MMOL/L (ref 98–107)
CHOLEST SERPL-MCNC: 187 MG/DL
CREAT SERPL-MCNC: 0.78 MG/DL (ref 0.51–0.95)
EGFRCR SERPLBLD CKD-EPI 2021: 86 ML/MIN/1.73M2
ERYTHROCYTE [DISTWIDTH] IN BLOOD BY AUTOMATED COUNT: 11.9 % (ref 10–15)
FASTING STATUS PATIENT QL REPORTED: YES
FASTING STATUS PATIENT QL REPORTED: YES
GLUCOSE SERPL-MCNC: 102 MG/DL (ref 70–99)
HCO3 SERPL-SCNC: 23 MMOL/L (ref 22–29)
HCT VFR BLD AUTO: 43.2 % (ref 35–47)
HDLC SERPL-MCNC: 68 MG/DL
HGB BLD-MCNC: 14.4 G/DL (ref 11.7–15.7)
LDLC SERPL CALC-MCNC: 101 MG/DL
MCH RBC QN AUTO: 29.8 PG (ref 26.5–33)
MCHC RBC AUTO-ENTMCNC: 33.3 G/DL (ref 31.5–36.5)
MCV RBC AUTO: 89 FL (ref 78–100)
NONHDLC SERPL-MCNC: 119 MG/DL
PLATELET # BLD AUTO: 267 10E3/UL (ref 150–450)
POTASSIUM SERPL-SCNC: 4.1 MMOL/L (ref 3.4–5.3)
PROT SERPL-MCNC: 6.8 G/DL (ref 6.4–8.3)
RBC # BLD AUTO: 4.84 10E6/UL (ref 3.8–5.2)
SODIUM SERPL-SCNC: 140 MMOL/L (ref 135–145)
TRIGL SERPL-MCNC: 90 MG/DL
WBC # BLD AUTO: 4.9 10E3/UL (ref 4–11)

## 2025-05-29 PROCEDURE — 3074F SYST BP LT 130 MM HG: CPT | Performed by: INTERNAL MEDICINE

## 2025-05-29 PROCEDURE — 3079F DIAST BP 80-89 MM HG: CPT | Performed by: INTERNAL MEDICINE

## 2025-05-29 PROCEDURE — 85027 COMPLETE CBC AUTOMATED: CPT | Performed by: INTERNAL MEDICINE

## 2025-05-29 PROCEDURE — 36415 COLL VENOUS BLD VENIPUNCTURE: CPT | Performed by: INTERNAL MEDICINE

## 2025-05-29 PROCEDURE — G2211 COMPLEX E/M VISIT ADD ON: HCPCS | Performed by: INTERNAL MEDICINE

## 2025-05-29 PROCEDURE — 99214 OFFICE O/P EST MOD 30 MIN: CPT | Performed by: INTERNAL MEDICINE

## 2025-05-29 PROCEDURE — 80061 LIPID PANEL: CPT | Performed by: INTERNAL MEDICINE

## 2025-05-29 PROCEDURE — 80053 COMPREHEN METABOLIC PANEL: CPT | Performed by: INTERNAL MEDICINE

## 2025-05-29 PROCEDURE — 1126F AMNT PAIN NOTED NONE PRSNT: CPT | Performed by: INTERNAL MEDICINE

## 2025-05-29 RX ORDER — LISINOPRIL 5 MG/1
5 TABLET ORAL DAILY
Qty: 90 TABLET | Refills: 3 | Status: SHIPPED | OUTPATIENT
Start: 2025-05-29

## 2025-05-29 ASSESSMENT — ASTHMA QUESTIONNAIRES
ACT_TOTALSCORE: 25
QUESTION_4 LAST FOUR WEEKS HOW OFTEN HAVE YOU USED YOUR RESCUE INHALER OR NEBULIZER MEDICATION (SUCH AS ALBUTEROL): NOT AT ALL
QUESTION_1 LAST FOUR WEEKS HOW MUCH OF THE TIME DID YOUR ASTHMA KEEP YOU FROM GETTING AS MUCH DONE AT WORK, SCHOOL OR AT HOME: NONE OF THE TIME
QUESTION_2 LAST FOUR WEEKS HOW OFTEN HAVE YOU HAD SHORTNESS OF BREATH: NOT AT ALL
QUESTION_5 LAST FOUR WEEKS HOW WOULD YOU RATE YOUR ASTHMA CONTROL: COMPLETELY CONTROLLED
QUESTION_3 LAST FOUR WEEKS HOW OFTEN DID YOUR ASTHMA SYMPTOMS (WHEEZING, COUGHING, SHORTNESS OF BREATH, CHEST TIGHTNESS OR PAIN) WAKE YOU UP AT NIGHT OR EARLIER THAN USUAL IN THE MORNING: NOT AT ALL

## 2025-05-29 ASSESSMENT — PAIN SCALES - GENERAL: PAINLEVEL_OUTOF10: NO PAIN (0)

## 2025-05-29 NOTE — PROGRESS NOTES
This is a follow-up for this 59-year-old with multiple medical problems.  This includes breast cancer for which she has regular oncology follow-up and imaging it was seen there in February, osteoporosis as of 2020 with Zometa treatment completed in June 2023 and recommended DEXA follow-up October 2026, hot flashes with insomnia and the use of gabapentin, ectasia of thoracic aorta seen on prior PET/CT, peripheral neuropathy for which she is on duloxetine, benign left renal cyst, and hypertension for which she is on ACE inhibitor as noted, as well as migraines for which she sees neurology.  Last year and incidental finding of bilateral adrenal hyperplasia was made with subsequent endocrine testing negative.    For the blood pressure she was started on treatment in March 2023 and is tolerated well.  When I saw her last year I raised her lisinopril dose to try and attain better blood pressure control.  She does not smoke.    She is doing very well at this time.  She is up-to-date with her specialist.  Her asthma has not been an issue.  Her blood pressure has been quite good at home.  She has no symptoms.    Her father, Yovani Marquez, passed within the last year    Past Medical History:   Diagnosis Date    Adrenal hyperplasia 01/22/2024    incidental finding, then functional tests nl    Ascending aorta dilatation 07/2024    4.15, incidental finding on coronary CT scan, prior seen on pet/ct 2019 4cm    Encounter for screening for coronary artery disease 07/2024    calcium score of 0    GERD (gastroesophageal reflux disease)     H/O colonoscopy 03/08/2016    done at [facility] and [facility]    Hematuria 2016    eval at [facility] and per pt cysto and ct neg    HTN (hypertension)     lisinopril added 3/23    Intermittent asthma     since childhood    Low iron 10/2017    done for eval of hair loss, egd nl    Malignant neoplasm of upper-outer quadrant of right breast in female, estrogen receptor positive (H) 10/2019    had chemo, lumpectomy, onc is  "Dr. Monroe    Migraines     most of adult life, has seen neuro in past, on bblocker    Mild depression     Neuropathy due to chemotherapeutic drug     Normal stress echocardiogram 2011    due to hear racing    Osteopenia     Other osteoporosis without current pathological fracture     iv zometa every 6 months via onc    Syncope 2017    echo nl lv size and fxn, grade 1 dd, mild tr     Past Surgical History:   Procedure Laterality Date    BIOPSY NODE SENTINEL Right 2020    Procedure: RIGHT SENTINEL LYMPH NODE BIOPSY;  Surgeon: Kaila Hernandez MD;  Location:  OR    ESOPHAGOSCOPY, GASTROSCOPY, DUODENOSCOPY (EGD), COMBINED N/A 10/30/2017    Procedure: COMBINED ESOPHAGOSCOPY, GASTROSCOPY, DUODENOSCOPY (EGD), BIOPSY SINGLE OR MULTIPLE;  COMBINED ESOPHAGOSCOPY, GASTROSCOPY, DUODENOSCOPY (EGD);  Surgeon: Martin Rodriguez MD;  Location:  GI    INSERT PORT VASCULAR ACCESS N/A 10/24/2019    Procedure: PORT PLACEMENT;  Surgeon: Kaila Hernandez MD;  Location:  OR    LUMPECTOMY BREAST WITH SEED LOCALIZATION Right 2020    Procedure: SEED LOCALIZED RIGHT BREAST LUMPECTOMY WITH SEED LOCALIZED RIGHT AXILLARY NODE EXCISION;  Surgeon: Kaila Hernandez MD;  Location:  OR    ORTHOPEDIC SURGERY      \"leg surgery\"    REMOVE PORT VASCULAR ACCESS Left 2020    Procedure: REMOVAL, VASCULAR ACCESS PORT;  Surgeon: Kaila Hernandez MD;  Location:  OR    single oopherectomy      cyst    ZZC TMJ ARTHROSCOPY/SURGERY       Social History     Socioeconomic History    Marital status:      Spouse name: Not on file    Number of children: 2    Years of education: Not on file    Highest education level: Not on file   Occupational History   Tobacco Use    Smoking status: Former     Current packs/day: 0.00     Types: Cigarettes     Quit date: 3/27/1997     Years since quittin.1    Smokeless tobacco: Never   Vaping Use    Vaping status: Never Used   Substance and Sexual Activity    Alcohol use: Yes "     Comment: rarely    Drug use: No    Sexual activity: Yes     Partners: Male     Birth control/protection: Pill   Other Topics Concern    Parent/sibling w/ CABG, MI or angioplasty before 65F 55M? Yes   Social History Narrative    Not on file     Social Drivers of Health     Financial Resource Strain: Not on file   Food Insecurity: Not on file   Transportation Needs: Not on file   Physical Activity: Not on file   Stress: Not on file   Social Connections: Not on file   Interpersonal Safety: Low Risk  (5/29/2025)    Interpersonal Safety     Do you feel physically and emotionally safe where you currently live?: Yes     Within the past 12 months, have you been hit, slapped, kicked or otherwise physically hurt by someone?: No     Within the past 12 months, have you been humiliated or emotionally abused in other ways by your partner or ex-partner?: No   Housing Stability: Not on file     Current Outpatient Medications   Medication Sig Dispense Refill    acetaminophen (TYLENOL) 500 MG tablet Take 1,000 mg by mouth every 6 hours as needed for mild pain      albuterol (PROAIR HFA/PROVENTIL HFA/VENTOLIN HFA) 108 (90 Base) MCG/ACT inhaler Inhale 2 puffs into the lungs every 6 hours as needed for shortness of breath / dyspnea or wheezing 18 g 0    Calcium Carb-Cholecalciferol (CALCIUM 500 + D PO) Take by mouth 2 times daily      DULoxetine (CYMBALTA) 20 MG capsule Take 3 capsules (60 mg) by mouth daily. 270 capsule 3    gabapentin (NEURONTIN) 100 MG capsule 200 mg daily 360 capsule 3    letrozole (FEMARA) 2.5 MG tablet Take 1 tablet (2.5 mg) by mouth daily. 90 tablet 3    lisinopril (ZESTRIL) 5 MG tablet Take 1 tablet (5 mg) by mouth daily. 90 tablet 3    UBRELVY 100 MG tablet       zoledronic acid (ZOMETA) 4 MG/100ML SOLN Inject 4 mg into the vein every 6 months       Allergies   Allergen Reactions    Sulfa Antibiotics Other (See Comments)     Red face. Ringing in the ears.     FAMILY HISTORY NOTED AND REVIEWED    REVIEW OF  "SYSTEMS: above    PHYSICAL EXAM    /81 (BP Location: Right arm, Patient Position: Sitting, Cuff Size: Adult Regular)   Pulse 75   Temp 98  F (36.7  C) (Temporal)   Resp 16   Ht 1.676 m (5' 6\")   Wt 64 kg (141 lb)   LMP 10/07/2017 (Approximate)   SpO2 95%   BMI 22.76 kg/m      Patient appears non toxic  Mouth - tongue midline and within normal limits, mucous membranes and posterior pharynx within normal limits, no lesions seen.  Neck - no masses, lesions or tenderness  Nodes - no supraclavicular, cervical or axially adenopathy .  Lungs - clear, normal flow  Cardiovascular - regular rate and rhythm, no murmer, rub or gallop, no jvp or edema, carotids within normal limits, no bruits.  Abdomen - normal active bowel sounds, soft, non tender, no masses, guarding or rebound, no hepatosplenomegaly      Labs sent    ASSESSMENT:  Breast cancer, no evidence of disease, follow-up oncology  Osteoporosis, plan as noted above through oncology  Neuropathy from chemo  Hypertension, blood pressure controlled, non-smoker, monitor it  Ascending aortic dilatation, follow-up has been done as noted, consider follow-up next year  Healthcare maintenance    PLAN:  She is up-to-date on breast imaging, she gets mammogram alternating with breast MRI  She is up-to-date on colon exam, due for follow-up next year  Will check labs today  Follow-up oncology with respect to bone density testing  She is up-to-date on Pap smear  Consider echo next year or the following year  Monitor blood pressure  She is up-to-date on immunizations    The longitudinal plan of care for the diagnosis(es)/condition(s) as documented were addressed during this visit. Due to the added complexity in care, I will continue to support Jyothi in the subsequent management and with ongoing continuity of care.        Peter Franklin M.D.                "

## 2025-05-30 NOTE — RESULT ENCOUNTER NOTE
It was nice to see you.  Your should be able to view the lab results.    Your tests look very good.  Your cbc or blood count is normal with no signs of anemia or blood disorders.  Your chemistries are also normal.  This includes your blood salts, kidney tests, liver tests and proteins.  Your sugar is not a problem.    Your total cholesterol is good.  The hdl or good is super and the ldl or bad is also fine.    I am happy to bring you this overall excellent report.  If you have any questions please call me.    Peter Franklin M.D.

## 2025-08-11 ENCOUNTER — LAB (OUTPATIENT)
Dept: INFUSION THERAPY | Facility: CLINIC | Age: 60
End: 2025-08-11
Attending: INTERNAL MEDICINE
Payer: COMMERCIAL

## 2025-08-11 ENCOUNTER — ONCOLOGY VISIT (OUTPATIENT)
Dept: ONCOLOGY | Facility: CLINIC | Age: 60
End: 2025-08-11
Attending: INTERNAL MEDICINE
Payer: COMMERCIAL

## 2025-08-11 VITALS
OXYGEN SATURATION: 99 % | WEIGHT: 143 LBS | DIASTOLIC BLOOD PRESSURE: 84 MMHG | HEART RATE: 81 BPM | BODY MASS INDEX: 22.98 KG/M2 | HEIGHT: 66 IN | RESPIRATION RATE: 16 BRPM | SYSTOLIC BLOOD PRESSURE: 129 MMHG

## 2025-08-11 DIAGNOSIS — N95.1 MENOPAUSAL SYNDROME (HOT FLASHES): ICD-10-CM

## 2025-08-11 DIAGNOSIS — G62.0 PERIPHERAL NEUROPATHY DUE TO CHEMOTHERAPY: ICD-10-CM

## 2025-08-11 DIAGNOSIS — C50.411 MALIGNANT NEOPLASM OF UPPER-OUTER QUADRANT OF RIGHT BREAST IN FEMALE, ESTROGEN RECEPTOR POSITIVE (H): Primary | ICD-10-CM

## 2025-08-11 DIAGNOSIS — T45.1X5A PERIPHERAL NEUROPATHY DUE TO CHEMOTHERAPY: ICD-10-CM

## 2025-08-11 DIAGNOSIS — L65.9 HAIR LOSS: ICD-10-CM

## 2025-08-11 DIAGNOSIS — Z12.31 ENCOUNTER FOR SCREENING MAMMOGRAM FOR BREAST CANCER: ICD-10-CM

## 2025-08-11 DIAGNOSIS — M81.0 AGE-RELATED OSTEOPOROSIS WITHOUT CURRENT PATHOLOGICAL FRACTURE: ICD-10-CM

## 2025-08-11 DIAGNOSIS — Z17.0 MALIGNANT NEOPLASM OF UPPER-OUTER QUADRANT OF RIGHT BREAST IN FEMALE, ESTROGEN RECEPTOR POSITIVE (H): Primary | ICD-10-CM

## 2025-08-11 LAB
BASOPHILS # BLD AUTO: 0.1 10E3/UL (ref 0–0.2)
BASOPHILS NFR BLD AUTO: 1 %
EOSINOPHIL # BLD AUTO: 0.2 10E3/UL (ref 0–0.7)
EOSINOPHIL NFR BLD AUTO: 5 %
ERYTHROCYTE [DISTWIDTH] IN BLOOD BY AUTOMATED COUNT: 12.1 % (ref 10–15)
FERRITIN SERPL-MCNC: 114 NG/ML (ref 11–328)
HCT VFR BLD AUTO: 45 % (ref 35–47)
HGB BLD-MCNC: 15.1 G/DL (ref 11.7–15.7)
IMM GRANULOCYTES # BLD: 0 10E3/UL
IMM GRANULOCYTES NFR BLD: 1 %
IRON BINDING CAPACITY (ROCHE): NORMAL
IRON SATN MFR SERPL: NORMAL %
IRON SERPL-MCNC: 74 UG/DL (ref 37–145)
LYMPHOCYTES # BLD AUTO: 1.2 10E3/UL (ref 0.8–5.3)
LYMPHOCYTES NFR BLD AUTO: 27 %
MCH RBC QN AUTO: 29.9 PG (ref 26.5–33)
MCHC RBC AUTO-ENTMCNC: 33.6 G/DL (ref 31.5–36.5)
MCV RBC AUTO: 89 FL (ref 78–100)
MONOCYTES # BLD AUTO: 0.9 10E3/UL (ref 0–1.3)
MONOCYTES NFR BLD AUTO: 20 %
NEUTROPHILS # BLD AUTO: 2 10E3/UL (ref 1.6–8.3)
NEUTROPHILS NFR BLD AUTO: 46 %
NRBC # BLD AUTO: 0 10E3/UL
NRBC BLD AUTO-RTO: 0 /100
PLATELET # BLD AUTO: 232 10E3/UL (ref 150–450)
RBC # BLD AUTO: 5.05 10E6/UL (ref 3.8–5.2)
TSH SERPL DL<=0.005 MIU/L-ACNC: 1.57 UIU/ML (ref 0.3–4.2)
WBC # BLD AUTO: 4.4 10E3/UL (ref 4–11)

## 2025-08-11 PROCEDURE — 99213 OFFICE O/P EST LOW 20 MIN: CPT | Performed by: INTERNAL MEDICINE

## 2025-08-11 PROCEDURE — 85025 COMPLETE CBC W/AUTO DIFF WBC: CPT | Performed by: INTERNAL MEDICINE

## 2025-08-11 PROCEDURE — 83540 ASSAY OF IRON: CPT | Performed by: INTERNAL MEDICINE

## 2025-08-11 PROCEDURE — 84443 ASSAY THYROID STIM HORMONE: CPT | Performed by: INTERNAL MEDICINE

## 2025-08-11 PROCEDURE — 82728 ASSAY OF FERRITIN: CPT | Performed by: INTERNAL MEDICINE

## 2025-08-11 PROCEDURE — 86038 ANTINUCLEAR ANTIBODIES: CPT | Performed by: INTERNAL MEDICINE

## 2025-08-11 ASSESSMENT — PAIN SCALES - GENERAL: PAINLEVEL_OUTOF10: NO PAIN (0)

## 2025-08-14 LAB — ANA SER QL IF: NEGATIVE

## 2025-08-26 DIAGNOSIS — N95.2 VAGINAL ATROPHY: Primary | ICD-10-CM

## 2025-08-26 RX ORDER — ESTRADIOL 0.1 MG/G
CREAM VAGINAL
Qty: 42.5 G | Refills: 3 | Status: SHIPPED | OUTPATIENT
Start: 2025-08-28

## 2025-08-27 ENCOUNTER — HOSPITAL ENCOUNTER (OUTPATIENT)
Dept: MAMMOGRAPHY | Facility: CLINIC | Age: 60
Discharge: HOME | End: 2025-08-27
Attending: INTERNAL MEDICINE
Payer: COMMERCIAL

## 2025-08-27 DIAGNOSIS — C50.411 MALIGNANT NEOPLASM OF UPPER-OUTER QUADRANT OF RIGHT BREAST IN FEMALE, ESTROGEN RECEPTOR POSITIVE (H): ICD-10-CM

## 2025-08-27 DIAGNOSIS — Z17.0 MALIGNANT NEOPLASM OF UPPER-OUTER QUADRANT OF RIGHT BREAST IN FEMALE, ESTROGEN RECEPTOR POSITIVE (H): ICD-10-CM

## 2025-08-27 DIAGNOSIS — Z12.31 ENCOUNTER FOR SCREENING MAMMOGRAM FOR BREAST CANCER: ICD-10-CM

## 2025-08-27 PROCEDURE — 77063 BREAST TOMOSYNTHESIS BI: CPT

## (undated) DEVICE — ESU GROUND PAD UNIVERSAL W/O CORD

## (undated) DEVICE — SOL NACL 0.9% IRRIG 1000ML BOTTLE 2F7124

## (undated) DEVICE — APPLICATOR COTTON TIP 6"X2 STERILE LF 6012

## (undated) DEVICE — BNDG ELASTIC 6" DBL LENGTH UNSTERILE 6611-16

## (undated) DEVICE — PREP CHLORAPREP 26ML TINTED ORANGE  260815

## (undated) DEVICE — SUTURE BOOTS 051003PBX

## (undated) DEVICE — SU SILK 2-0 FSL 18" 677G

## (undated) DEVICE — MANIFOLD NEPTUNE 4 PORT 700-20

## (undated) DEVICE — GLOVE PROTEXIS BLUE W/NEU-THERA 6.5  2D73EB65

## (undated) DEVICE — SU PROLENE 2-0 CT-2 30" 8411H

## (undated) DEVICE — SYR BULB IRRIG 50ML LATEX FREE 0035280

## (undated) DEVICE — NDL 25GA 1.5" 305127

## (undated) DEVICE — DRSG STERI STRIP 1/2X4" R1547

## (undated) DEVICE — PACK MINOR SBA15MIFSE

## (undated) DEVICE — DRSG STERI STRIP 1/4X3" R1541

## (undated) DEVICE — COVER ULTRASOUND PROBE 6X48" PC1290

## (undated) DEVICE — NDL 19GA 1.5"

## (undated) DEVICE — DRSG TELFA ISLAND 4X8" 7541

## (undated) DEVICE — ESU ELEC BLADE 2.75" COATED/INSULATED E1455

## (undated) DEVICE — BLADE KNIFE SURG 11 371111

## (undated) DEVICE — DRAPE COVER C-ARM SEAMLESS SNAP-KAP 03-KP26 LATEX FREE

## (undated) DEVICE — GLOVE PROTEXIS MICRO 6.0  2D73PM60

## (undated) DEVICE — PAD CHUX UNDERPAD 23X24" 7136

## (undated) DEVICE — SUCTION CANISTER MEDIVAC LINER 3000ML W/LID 65651-530

## (undated) DEVICE — SOL WATER IRRIG 1000ML BOTTLE 2F7114

## (undated) DEVICE — SU MONOCRYL 4-0 PS-2 18" UND Y496G

## (undated) DEVICE — ESU PENCIL W/SMOKE EVAC CVPLP2000

## (undated) DEVICE — SYR 10ML SLIP TIP W/O NDL

## (undated) DEVICE — ADH SKIN CLOSURE PREMIERPRO EXOFIN 1.0ML 3470

## (undated) DEVICE — DECANTER BAG 2002S

## (undated) DEVICE — DRAPE LAP TRANSVERSE 29421

## (undated) DEVICE — LINEN TOWEL PACK X5 5464

## (undated) DEVICE — SYR 10ML LL W/O NDL

## (undated) DEVICE — CLIP ETHICON LIGACLIP SM BLUE LT100

## (undated) DEVICE — DECANTER VIAL 2006S

## (undated) DEVICE — DRAPE BREAST/CHEST 29420

## (undated) DEVICE — DRSG TEGADERM 4X4 3/4" 1626

## (undated) DEVICE — SYR 10ML FINGER CONTROL W/O NDL 309695

## (undated) DEVICE — SU VICRYL 3-0 SH 27" J316H

## (undated) DEVICE — SOL NACL 0.9% INJ 250ML BAG 2B1322Q

## (undated) RX ORDER — FUROSEMIDE 10 MG/ML
INJECTION INTRAMUSCULAR; INTRAVENOUS
Status: DISPENSED
Start: 2024-03-07

## (undated) RX ORDER — PROPOFOL 10 MG/ML
INJECTION, EMULSION INTRAVENOUS
Status: DISPENSED
Start: 2019-10-24

## (undated) RX ORDER — DEXAMETHASONE SODIUM PHOSPHATE 4 MG/ML
INJECTION, SOLUTION INTRA-ARTICULAR; INTRALESIONAL; INTRAMUSCULAR; INTRAVENOUS; SOFT TISSUE
Status: DISPENSED
Start: 2019-10-24

## (undated) RX ORDER — FENTANYL CITRATE 50 UG/ML
INJECTION, SOLUTION INTRAMUSCULAR; INTRAVENOUS
Status: DISPENSED
Start: 2019-10-24

## (undated) RX ORDER — ONDANSETRON 2 MG/ML
INJECTION INTRAMUSCULAR; INTRAVENOUS
Status: DISPENSED
Start: 2020-04-01

## (undated) RX ORDER — FENTANYL CITRATE 50 UG/ML
INJECTION, SOLUTION INTRAMUSCULAR; INTRAVENOUS
Status: DISPENSED
Start: 2020-04-01

## (undated) RX ORDER — LIDOCAINE HYDROCHLORIDE 20 MG/ML
INJECTION, SOLUTION EPIDURAL; INFILTRATION; INTRACAUDAL; PERINEURAL
Status: DISPENSED
Start: 2019-10-24

## (undated) RX ORDER — NEOSTIGMINE METHYLSULFATE 1 MG/ML
VIAL (ML) INJECTION
Status: DISPENSED
Start: 2019-10-24

## (undated) RX ORDER — ONDANSETRON 2 MG/ML
INJECTION INTRAMUSCULAR; INTRAVENOUS
Status: DISPENSED
Start: 2019-10-24

## (undated) RX ORDER — LIDOCAINE HYDROCHLORIDE 10 MG/ML
INJECTION, SOLUTION EPIDURAL; INFILTRATION; INTRACAUDAL; PERINEURAL
Status: DISPENSED
Start: 2020-04-01

## (undated) RX ORDER — CEFAZOLIN SODIUM 2 G/100ML
INJECTION, SOLUTION INTRAVENOUS
Status: DISPENSED
Start: 2019-10-24

## (undated) RX ORDER — PROPOFOL 10 MG/ML
INJECTION, EMULSION INTRAVENOUS
Status: DISPENSED
Start: 2020-04-01

## (undated) RX ORDER — KETOROLAC TROMETHAMINE 30 MG/ML
INJECTION, SOLUTION INTRAMUSCULAR; INTRAVENOUS
Status: DISPENSED
Start: 2020-04-01

## (undated) RX ORDER — HYDROMORPHONE HYDROCHLORIDE 1 MG/ML
INJECTION, SOLUTION INTRAMUSCULAR; INTRAVENOUS; SUBCUTANEOUS
Status: DISPENSED
Start: 2020-04-01

## (undated) RX ORDER — WATER 10 ML/10ML
INJECTION INTRAMUSCULAR; INTRAVENOUS; SUBCUTANEOUS
Status: DISPENSED
Start: 2020-04-01

## (undated) RX ORDER — FENTANYL CITRATE 50 UG/ML
INJECTION, SOLUTION INTRAMUSCULAR; INTRAVENOUS
Status: DISPENSED
Start: 2017-10-30

## (undated) RX ORDER — DEXTROSE MONOHYDRATE 50 MG/ML
INJECTION, SOLUTION INTRAVENOUS
Status: DISPENSED
Start: 2020-04-01

## (undated) RX ORDER — HYDROCODONE BITARTRATE AND ACETAMINOPHEN 5; 325 MG/1; MG/1
TABLET ORAL
Status: DISPENSED
Start: 2020-04-01

## (undated) RX ORDER — DEXAMETHASONE SODIUM PHOSPHATE 4 MG/ML
INJECTION, SOLUTION INTRA-ARTICULAR; INTRALESIONAL; INTRAMUSCULAR; INTRAVENOUS; SOFT TISSUE
Status: DISPENSED
Start: 2020-04-01

## (undated) RX ORDER — BUPIVACAINE HYDROCHLORIDE 5 MG/ML
INJECTION, SOLUTION EPIDURAL; INTRACAUDAL
Status: DISPENSED
Start: 2020-04-01

## (undated) RX ORDER — GLYCOPYRROLATE 0.2 MG/ML
INJECTION, SOLUTION INTRAMUSCULAR; INTRAVENOUS
Status: DISPENSED
Start: 2019-10-24